# Patient Record
Sex: FEMALE | Race: BLACK OR AFRICAN AMERICAN | NOT HISPANIC OR LATINO | URBAN - METROPOLITAN AREA
[De-identification: names, ages, dates, MRNs, and addresses within clinical notes are randomized per-mention and may not be internally consistent; named-entity substitution may affect disease eponyms.]

---

## 2017-01-01 ENCOUNTER — TELEPHONE (OUTPATIENT)
Dept: FAMILY MEDICINE | Facility: CLINIC | Age: 72
End: 2017-01-01

## 2017-01-01 DIAGNOSIS — I73.9 PAD (PERIPHERAL ARTERY DISEASE) (H): ICD-10-CM

## 2017-01-01 DIAGNOSIS — E78.5 HYPERLIPIDEMIA LDL GOAL <100: ICD-10-CM

## 2017-01-01 DIAGNOSIS — J44.9 COPD (CHRONIC OBSTRUCTIVE PULMONARY DISEASE) (H): Primary | ICD-10-CM

## 2017-01-01 RX ORDER — ATORVASTATIN CALCIUM 40 MG/1
TABLET, FILM COATED ORAL
Qty: 90 TABLET | Refills: 1 | Status: ON HOLD | OUTPATIENT
Start: 2017-01-01 | End: 2018-01-01

## 2017-01-01 RX ORDER — CLOPIDOGREL BISULFATE 75 MG/1
TABLET ORAL
Qty: 90 TABLET | Refills: 1 | Status: ON HOLD | OUTPATIENT
Start: 2017-01-01 | End: 2018-01-01

## 2017-01-09 ENCOUNTER — NURSING HOME VISIT (OUTPATIENT)
Dept: GERIATRICS | Facility: CLINIC | Age: 72
End: 2017-01-09
Payer: MEDICARE

## 2017-01-09 VITALS
TEMPERATURE: 97.8 F | WEIGHT: 136 LBS | DIASTOLIC BLOOD PRESSURE: 74 MMHG | RESPIRATION RATE: 18 BRPM | HEIGHT: 55 IN | OXYGEN SATURATION: 99 % | BODY MASS INDEX: 31.47 KG/M2 | HEART RATE: 74 BPM | SYSTOLIC BLOOD PRESSURE: 138 MMHG

## 2017-01-09 DIAGNOSIS — E04.2 MULTINODULAR GOITER: ICD-10-CM

## 2017-01-09 DIAGNOSIS — Z72.0 TOBACCO ABUSE: ICD-10-CM

## 2017-01-09 DIAGNOSIS — I73.9 PAD (PERIPHERAL ARTERY DISEASE) (H): ICD-10-CM

## 2017-01-09 DIAGNOSIS — R74.8 ABNORMAL LIVER ENZYMES: ICD-10-CM

## 2017-01-09 DIAGNOSIS — I10 BENIGN ESSENTIAL HYPERTENSION: ICD-10-CM

## 2017-01-09 DIAGNOSIS — L97.429 ULCER OF HEEL, LEFT, WITH UNSPECIFIED SEVERITY (H): Primary | ICD-10-CM

## 2017-01-09 DIAGNOSIS — F10.10 ALCOHOL ABUSE: ICD-10-CM

## 2017-01-09 DIAGNOSIS — J44.9 CHRONIC OBSTRUCTIVE PULMONARY DISEASE, UNSPECIFIED COPD TYPE (H): ICD-10-CM

## 2017-01-09 PROCEDURE — 99207 ZZC CDG-CORRECTLY CODED, REVIEWED AND AGREE: CPT | Performed by: NURSE PRACTITIONER

## 2017-01-09 PROCEDURE — 99310 SBSQ NF CARE HIGH MDM 45: CPT | Performed by: NURSE PRACTITIONER

## 2017-01-09 RX ORDER — MULTIVITAMIN
1 TABLET ORAL DAILY
Status: ON HOLD | COMMUNITY
End: 2018-01-01

## 2017-01-09 RX ORDER — POLYETHYLENE GLYCOL 3350 17 G/17G
1 POWDER, FOR SOLUTION ORAL DAILY PRN
Qty: 7 PACKET | Status: ON HOLD
Start: 2017-01-09 | End: 2018-01-01

## 2017-01-09 RX ORDER — BISACODYL 5 MG/1
15 TABLET, DELAYED RELEASE ORAL DAILY PRN
Status: ON HOLD | COMMUNITY
End: 2018-01-01

## 2017-01-09 RX ORDER — SENNOSIDES 8.6 MG
2 TABLET ORAL 2 TIMES DAILY
Status: ON HOLD | COMMUNITY
End: 2018-01-01

## 2017-01-09 RX ORDER — ACETAMINOPHEN 500 MG
500 TABLET ORAL EVERY 6 HOURS
Status: ON HOLD
Start: 2017-01-09 | End: 2018-01-01

## 2017-01-09 RX ORDER — POLYETHYLENE GLYCOL 3350 17 G/17G
1 POWDER, FOR SOLUTION ORAL DAILY
COMMUNITY
End: 2017-01-09

## 2017-01-09 RX ORDER — ALBUTEROL SULFATE 90 UG/1
2 AEROSOL, METERED RESPIRATORY (INHALATION) EVERY 4 HOURS PRN
COMMUNITY
End: 2018-01-01

## 2017-01-09 NOTE — PROGRESS NOTES
"[place] [facility]  PRIMARY CARE PROVIDER AND CLINIC:  Asa Elliott [facility] [place] [id number] [facility] / [facility] [place]   Chief Complaint   Patient presents with     Hospital F/U     HPI:    Keira Collins is a 71 year old  (1945),admitted to the [facility] from [facility].  Hospital stay 12/28/16 through 1/6/17.  Admitted to this facility for  rehab, medical management and nursing care. Hospital course per review of discharge summary:    This is a 71-year-old female, with a past medical history significant for chronic obstructive pulmonary disease, severe multilevel peripheral arterial disease with critical limb ischemia and non-healing bilateral foot ulcerations s/p angiogram 9/23/16, peripheral neuropathy, hypertension, hyperlipidemia, tobacco abuse, alcohol dependence and multinodular goiter, who was admitted to [facility] with left foot pain and drainage. An MRI revealed \"soft tissue ulceration of the left heel extends to the posterolateral aspect of the inferior calcaneus. Mild reactive marrow edema and enhancement, but no definite cortical erosion to suggest osteomyelitis\". An abdominal and pelvic CT angiogram with LE run off revealed \"1. Occlusion of both external iliac arteries 2. Extensive atherosclerotic calcified and noncalcified plaque in the common femoral arteries bilaterally. 3. Occlusion of the proximal half of the right SFA. 4. Occlusion of a 6.5 cm segment of the mid left SFA......Extensive plaque in the distal left and right posterior tibial arteries 6. Moderate stenosis at the origin of the left renal artery\". Podiatry was consulted. An irrigation with debridement of the left heel and placement of a wound VAC was performed on 12/29/16. Vascular Surgery was consulted and a femoral artery endarterectomy, iliac artery recannulization with stent and right SFA recannulization with stent was performed on " "1/1/17. A TCU stay was recommended for ongoing physical rehabilitation.     Current issues are:        Questions when she will be returning home. Has some pain in the left foot and hip. Tingling in both feet for years. Has had sores on both feet for years. Lives with her son and daughter. Daughter is currently in the hospital. Moved to [place] \"2 or 3\" years ago from [place]. Is not quite certain who her primary care provider is. Uses a walker for mobility at home. Has been smoking since she was 2 years old after stealing her mother's cigarettes off the table. Has smoked her entire life, except 5 years, and has no intention of quitting. Drinks 2-3 cans of beer every other day with her daughter while watching television. Does not want to quit drinking even if it does impact her health.     CODE STATUS/ADVANCE DIRECTIVES DISCUSSION:   CPR/Full code   Patient's living condition: lives with family, son and daughter     ALLERGIES:Penicillins  PAST MEDICAL HISTORY:  has a past medical history of Essential hypertension with goal blood pressure less than 140/90; Hyperlipidemia LDL goal <100; H/O: alcohol abuse; Tobacco abuse; and Cholelithiasis.  PAST SURGICAL HISTORY:  has past surgical history that includes surgical history of -  (5/13/2016).  FAMILY HISTORY: family history is not on file.  SOCIAL HISTORY:  reports that she has been smoking Cigarettes.  She has a 25 pack-year smoking history. She has never used smokeless tobacco. She reports that she drinks alcohol. She reports that she does not use illicit drugs.    Post Discharge Medication Reconciliation Status: discharge medications reconciled and changed, per note/orders (see AVS).  Current Outpatient Prescriptions   Medication Sig Dispense Refill     albuterol (PROAIR HFA/PROVENTIL HFA/VENTOLIN HFA) 108 (90 BASE) MCG/ACT Inhaler Inhale 2 puffs into the lungs every 4 hours as needed for shortness of breath / dyspnea or wheezing       amoxicillin-clavulanate " "(AUGMENTIN) 875-125 MG per tablet Take 1 tablet by mouth 2 times daily       bisacodyl (DULCOLAX) 5 MG EC tablet Take 15 mg by mouth daily as needed for constipation       Atorvastatin Calcium (LIPITOR PO) Take 40 mg by mouth daily       calcium carb 1250 mg, 500 mg [place],/vitamin D 200 units (OSCAL WITH D) 500-200 MG-UNIT per tablet Take 1 tablet by mouth 3 times daily (with meals)       Clopidogrel Bisulfate (PLAVIX PO) Take 75 mg by mouth daily       Multiple vitamin TABS Take 1 tablet by mouth daily       sennosides (SENOKOT) 8.6 MG tablet Take 2 tablets by mouth 2 times daily as needed for constipation        acetaminophen (TYLENOL) 500 MG tablet Take 1 tablet (500 mg) by mouth every 6 hours       polyethylene glycol (MIRALAX/GLYCOLAX) Packet Take 17 g by mouth daily as needed for constipation 7 packet      ONDANSETRON PO Take 4 mg by mouth every 6 hours as needed for nausea       tiotropium (SPIRIVA HANDIHALER) 18 MCG inhalation capsule Inhale contents of one capsule daily. 90 capsule 1     fluticasone-salmeterol (ADVAIR-HFA) 45-21 MCG/ACT inhaler Inhale 2 puffs into the lungs 2 times daily 36 g 1     amLODIPine (NORVASC) 10 MG tablet Take 1 tablet (10 mg) by mouth daily 30 tablet 3     metoprolol (TOPROL-XL) 50 MG 24 hr tablet Take 1 tablet (50 mg) by mouth daily 30 tablet 3     aspirin 81 MG tablet Take 1 tablet (81 mg) by mouth daily 30 tablet      ROS:  4 point ROS including Respiratory, CV, GI and , other than that noted in the HPI,  is negative    Exam:  /74 mmHg  Pulse 74  Temp(Src) 97.8  F (36.6  C)  Resp 18  Ht 4' 6\" (1.372 m)  Wt 136 lb (61.689 kg)  BMI 32.77 kg/m2  SpO2 99%  GENERAL APPEARANCE:  Alert, in no distress  ENT:  Mouth and posterior oropharynx normal, moist mucous membranes  EYES:  EOM, conjunctivae, lids, pupils and irises normal  RESP:  respiratory effort and palpation of chest normal, lungs clear to auscultation , no respiratory distress  CV:  Palpation and auscultation " of heart done , regular rate and rhythm, no murmur, rub, or gallop  ABDOMEN:  normal bowel sounds, soft, nontender, no hepatosplenomegaly or other masses  M/S:   Active movement of bilateral upper and lower extremities. No edema.  SKIN:  Inspection of skin and subcutaneous tissue baseline, Palpation of skin and subcutaneous tissue baseline, Jomar intact on left groin. Dressing in place on left foot.   NEURO:   Cranial nerves 2-12 are normal tested and grossly at patient's baseline  PSYCH:  oriented to person and place    Lab/Diagnostic data:  Last Complete Blood Count:  WBC     11.0   12/28/2016  RBC     4.50   12/28/2016  HGB     13.5   12/28/2016  HCT     42.0   12/28/2016  MCV       93   12/28/2016  MCH       30   12/28/2016  MCHC       32   12/28/2016  RDW     13.0   12/28/2016  PLT      182   12/28/2016    Last Basic Metabolic Panel:  NA      140   9/15/2016   POTASSIUM      4.0   9/15/2016  ESTEFANI      9.9   9/15/2016  CO2       29   9/15/2016  BUN        9   9/15/2016  CR     0.59   9/15/2016  GLC       88   9/15/2016    ASSESSMENT/PLAN:  Left Heel Ulcer Cellulitis S/P I&D with Wound VAC placement on 12/29/16. Follow-up with Dr. Stewart within 2 weeks of discharge. Continue Amoxicillin-Clavulanate as ordered. Acetaminophen ordered for pain control. Wound vac removed today. Continue dressing changes as ordered. Weight bearing as tolerated with CAM boot. Physical and Occupational Therapy ordered for deconditioning.    Severe Peripheral Artery Disease with Critical Limb Ischemia S/P Femoral Artery Endarterectomy, Iliac Artery Recannulization with Stent and Right SFA Recannulization with Stent on 1/1/17. Follow-up with Dr. Hughes on 1/19/17 as scheduled. Clopidogrel initiated during hospital stay. Continue Aspirin as ordered. Physical and Occupational Therapy ordered for deconditioning.    Hypertension/Hyperlipidemia. Monitor blood pressure daily. Continue Amlodipine, Atorvastatin and Metoprolol as  ordered.    Multinodular Goiter. Noted. Last TSH 1.36 on 7/11/16.     Chronic Obstructive Pulmonary Disease. Continue Albuterol, Fluticasone-Salmeterol and Tiotropium as ordered.    Tobacco Abuse. Not interested in smoking cessation. Will discontinue Nicotine Patch. Encouraged to communicate with staff if interested in cessation.    Alcohol Abuse with Abnormal Liver Enzymes and Cholelithiasis. CIWA scale consistently low during hospitalization. Reviewed recent abnormal liver enzymes and recommendations to stop alcohol use. Patient not interested in alcohol cessation. Acetaminophen 2000 mg/24 hours scheduled for pain control.     Information reviewed:  Medications, vital signs, orders, nursing notes, problem list, hospital information. Total time spent with patient visit was 45 min including patient visit and review of past records. Greater than 50% of total time spent with counseling and coordinating care.    Electronically signed by:  LORI Salazar CNP

## 2017-01-11 ENCOUNTER — TRANSFERRED RECORDS (OUTPATIENT)
Dept: HEALTH INFORMATION MANAGEMENT | Facility: CLINIC | Age: 72
End: 2017-01-11

## 2017-01-11 LAB
ANION GAP SERPL CALCULATED.3IONS-SCNC: 9 MMOL/L (ref 0–15)
BUN SERPL-MCNC: 11 MG/DL (ref 7–24)
CALCIUM SERPL-MCNC: 8.7 MG/DL (ref 8.5–10.1)
CHLORIDE SERPLBLD-SCNC: 109 MMOL/L (ref 98–112)
CO2 SERPL-SCNC: 25 MMOL/L (ref 21–32)
CREAT SERPL-MCNC: 0.54 MG/DL (ref 0.55–1.02)
ERYTHROCYTE [DISTWIDTH] IN BLOOD BY AUTOMATED COUNT: 13.4 % (ref 11.5–14.5)
GFR SERPL CREATININE-BSD FRML MDRD: >60 ML/MIN/1.73M2
GLUCOSE SERPL-MCNC: 90 MG/DL (ref 74–106)
HCT VFR BLD AUTO: 29.9 % (ref 36–48)
HEMOGLOBIN: 9.5 G/DL (ref 12–18)
MCH RBC QN AUTO: 30 PG (ref 27–33)
MCHC RBC AUTO-ENTMCNC: 32 G/DL (ref 33–35)
MCV RBC AUTO: 94 FL (ref 80–100)
PLATELET # BLD AUTO: 297 K/UL (ref 150–400)
PMV BLD: 12.2 FL (ref 5.5–12)
POTASSIUM SERPL-SCNC: 4 MMOL/L (ref 3.5–5.1)
RBC # BLD AUTO: 3.18 M/UL (ref 4.2–5.4)
SODIUM SERPL-SCNC: 143 MMOL/L (ref 136–145)
WBC # BLD AUTO: 12.5 K/UL (ref 4.3–10.3)

## 2017-01-12 VITALS
BODY MASS INDEX: 32.77 KG/M2 | WEIGHT: 136 LBS | OXYGEN SATURATION: 100 % | RESPIRATION RATE: 18 BRPM | SYSTOLIC BLOOD PRESSURE: 116 MMHG | HEART RATE: 74 BPM | DIASTOLIC BLOOD PRESSURE: 65 MMHG | TEMPERATURE: 96.8 F

## 2017-01-12 RX ORDER — ZINC OXIDE 216 MG/ML
4 LOTION TOPICAL 2 TIMES DAILY
Status: ON HOLD | COMMUNITY
End: 2018-01-01

## 2017-01-13 ENCOUNTER — NURSING HOME VISIT (OUTPATIENT)
Dept: GERIATRICS | Facility: CLINIC | Age: 72
End: 2017-01-13
Payer: MEDICARE

## 2017-01-13 DIAGNOSIS — I10 BENIGN ESSENTIAL HYPERTENSION: ICD-10-CM

## 2017-01-13 DIAGNOSIS — L03.818 CELLULITIS OF OTHER SPECIFIED SITE: Primary | ICD-10-CM

## 2017-01-13 DIAGNOSIS — K59.01 SLOW TRANSIT CONSTIPATION: ICD-10-CM

## 2017-01-13 DIAGNOSIS — I73.9 PAD (PERIPHERAL ARTERY DISEASE) (H): ICD-10-CM

## 2017-01-13 DIAGNOSIS — J44.9 CHRONIC OBSTRUCTIVE PULMONARY DISEASE, UNSPECIFIED COPD TYPE (H): ICD-10-CM

## 2017-01-13 DIAGNOSIS — R53.81 PHYSICAL DECONDITIONING: ICD-10-CM

## 2017-01-13 PROCEDURE — 99306 1ST NF CARE HIGH MDM 50: CPT | Performed by: INTERNAL MEDICINE

## 2017-01-13 PROCEDURE — 99207 ZZC CDG-CORRECTLY CODED, REVIEWED AND AGREE: CPT | Performed by: INTERNAL MEDICINE

## 2017-01-13 NOTE — PROGRESS NOTES
"[place] [facility]  INITIAL VISIT NOTE  January 13, 2017    PRIMARY CARE PROVIDER AND CLINIC:  Asa Elliott [facility] [place] [address] [facility] / [facility] [place] [facility]    Chief Complaint   Patient presents with     Hospital F/U     Initial MD       HPI:    Keria Collins is a 71 year old  (1945) female who was seen at [facility] on January 13, 2017 for an initial visit. Medical history is notable for PAD, HTN, COPD, peripheral neuropathy and EtOH abuse. She was hospitalized at [facility] from 12/28/16 to 1/6/17 where she presented with left heel pain and drainage. Imaging not consistent with osteomyelitis s/p I&D with wound VAC placement (12/29/16). Imaging also with extensive calcification and occlusion of iliac and femoral arteries and she is s/p femoral artery endarterectomy, iliac artery recannulization with stent and right SFA recannulization with stent (1/1/17). She was admitted to this facility for medical management and rehab.     Today, Ms. Collins is seen in her room. She only wants to talk about when she can go home. States her daughter is 57 years old and has \"breathing problems\" and is being discharged from the hospital today. I suggested her daughter should have some time to get stronger to which she replied, \"Itake care of her and she takes care of me\". Talked with therapies and she has not yet met goals for discharge.     CODE STATUS:   CPR/Full code     ALLERGIES:     Allergies   Allergen Reactions     Penicillins        PAST MEDICAL HISTORY:   Past Medical History   Diagnosis Date     Essential hypertension with goal blood pressure less than 140/90      Hyperlipidemia LDL goal <100      H/O: alcohol abuse      Tobacco abuse      Cholelithiasis        PAST SURGICAL HISTORY:   Past Surgical History   Procedure Laterality Date     Surgical history of -   5/13/2016     right hip fracture       FAMILY HISTORY:   No family history on file.    SOCIAL HISTORY:   Lives with " family    MEDICATIONS:  Current Outpatient Prescriptions   Medication Sig Dispense Refill     NUTRITIONAL SUPPLEMENT LIQD Take 4 oz by mouth 2 times daily       albuterol (PROAIR HFA/PROVENTIL HFA/VENTOLIN HFA) 108 (90 BASE) MCG/ACT Inhaler Inhale 2 puffs into the lungs every 4 hours as needed for shortness of breath / dyspnea or wheezing       amoxicillin-clavulanate (AUGMENTIN) 875-125 MG per tablet Take 1 tablet by mouth 2 times daily       bisacodyl (DULCOLAX) 5 MG EC tablet Take 15 mg by mouth daily as needed for constipation       Atorvastatin Calcium (LIPITOR PO) Take 40 mg by mouth daily       calcium carb 1250 mg, 500 mg [place],/vitamin D 200 units (OSCAL WITH D) 500-200 MG-UNIT per tablet Take 1 tablet by mouth 3 times daily (with meals)       Clopidogrel Bisulfate (PLAVIX PO) Take 75 mg by mouth daily       Multiple vitamin TABS Take 1 tablet by mouth daily       sennosides (SENOKOT) 8.6 MG tablet Take 2 tablets by mouth 2 times daily        acetaminophen (TYLENOL) 500 MG tablet Take 1 tablet (500 mg) by mouth every 6 hours       polyethylene glycol (MIRALAX/GLYCOLAX) Packet Take 17 g by mouth daily as needed for constipation 7 packet      ONDANSETRON PO Take 4 mg by mouth every 6 hours as needed for nausea       tiotropium (SPIRIVA HANDIHALER) 18 MCG inhalation capsule Inhale contents of one capsule daily. 90 capsule 1     fluticasone-salmeterol (ADVAIR-HFA) 45-21 MCG/ACT inhaler Inhale 2 puffs into the lungs 2 times daily 36 g 1     amLODIPine (NORVASC) 10 MG tablet Take 1 tablet (10 mg) by mouth daily 30 tablet 3     metoprolol (TOPROL-XL) 50 MG 24 hr tablet Take 1 tablet (50 mg) by mouth daily 30 tablet 3     aspirin 81 MG tablet Take 1 tablet (81 mg) by mouth daily 30 tablet        Post Discharge Medication Reconciliation Status: medication reconcilation previously completed during another office visit.    ROS:  10 point ROS neg other than the symptoms noted above in the HPI    PHYSICAL EXAM:  BP  116/65 mmHg  Pulse 74  Temp(Src) 96.8  F (36  C)  Resp 18  Wt 136 lb (61.689 kg)  SpO2 100%  Gen: sitting in wheelchair, alert, cooperative and in no acute distress  HEENT: normocephalic; oropharynx clear  Card: RRR, S1, S2, no murmurs  Resp: lungs clear to auscultation bilaterally  GI: abdomen soft, not-tender  MSK: normal muscle tone, no RLE edema; LLE is short CAM boot  Neuro: CX II-XII grossly in tact; ROM in all four extremities grossly in tact  Psych: alert and oriented x3; normal affect    LABORATORY/IMAGING DATA:  Reviewed as per Epic    ASSESSMENT/PLAN:    Left Heel Cellulitis s/p I&D with Wound VAC (12/29/16)  MRI not convincing for osteomyelitis.   -- continues on Augmentin 875-125 mg BID x14 days post hospital discharge   -- follow up with podiatry as scheduled on 1/26/17    PAD s/p Femoral Artery Endarterectomy/Iliac Artery Recannulization/R SFA Recannulization (1/1/17)  New on clopidogrel.   -- continues on ASA 81 mg daily, atorvastatin 40 mg daily, clopidogrel 75 mg daily   -- follow up with vascular surgery as scheduled on 1/19/17    HTN  SBPs 130s  -- continues on amlodipine 10 mg daily and metoprolol XL 50 mg daily  -- follow BPs and adjust medications as needed    COPD  No signs of exacerbation.   -- continues on Advair 45-21 mcg BID, tiotropium 18 mcg daily and PRN albuterol     Slow Transit Constipation  -- continues on Senna 2 tabs BID plus PRN meds  -- adjust bowel regimen as needed    Physical Deconditioning  In setting of hospitalization and underlying medical conditions  -- ongoing PT/OT        Electronically signed by:  Vivien Wilson MD

## 2017-01-16 ENCOUNTER — TRANSFERRED RECORDS (OUTPATIENT)
Dept: HEALTH INFORMATION MANAGEMENT | Facility: CLINIC | Age: 72
End: 2017-01-16

## 2017-01-16 ENCOUNTER — NURSING HOME VISIT (OUTPATIENT)
Dept: GERIATRICS | Facility: CLINIC | Age: 72
End: 2017-01-16
Payer: MEDICARE

## 2017-01-16 DIAGNOSIS — I10 BENIGN ESSENTIAL HYPERTENSION: ICD-10-CM

## 2017-01-16 DIAGNOSIS — D72.829 LEUKOCYTOSIS, UNSPECIFIED TYPE: Primary | ICD-10-CM

## 2017-01-16 DIAGNOSIS — L97.429 HEEL ULCER, LEFT, WITH UNSPECIFIED SEVERITY (H): ICD-10-CM

## 2017-01-16 LAB
DIFFERENTIAL: ABNORMAL
ERYTHROCYTE [DISTWIDTH] IN BLOOD BY AUTOMATED COUNT: 13 % (ref 11.5–14.5)
HCT VFR BLD AUTO: 34.1 % (ref 36–48)
HEMOGLOBIN: 10.6 G/DL (ref 12–16)
MCH RBC QN AUTO: 29 PG (ref 27–33)
MCHC RBC AUTO-ENTMCNC: 31 G/DL (ref 33–36)
MCV RBC AUTO: 94 FL (ref 80–100)
PLATELET # BLD AUTO: 382 K/UL (ref 150–400)
PMV BLD: 11.7 FL (ref 8.5–12)
RBC # BLD AUTO: 3.64 M/UL (ref 4.2–5.4)
WBC # BLD AUTO: 12.9 K/UL (ref 4.3–10.8)

## 2017-01-16 PROCEDURE — 99309 SBSQ NF CARE MODERATE MDM 30: CPT | Performed by: NURSE PRACTITIONER

## 2017-01-16 PROCEDURE — 99207 ZZC CDG-CORRECTLY CODED, REVIEWED AND AGREE: CPT | Performed by: NURSE PRACTITIONER

## 2017-01-17 VITALS
WEIGHT: 125.3 LBS | SYSTOLIC BLOOD PRESSURE: 125 MMHG | RESPIRATION RATE: 18 BRPM | DIASTOLIC BLOOD PRESSURE: 74 MMHG | OXYGEN SATURATION: 99 % | HEART RATE: 78 BPM | BODY MASS INDEX: 30.19 KG/M2 | TEMPERATURE: 97.8 F

## 2017-01-17 NOTE — PROGRESS NOTES
[place] [facility]    Chief Complaint   Patient presents with     Nursing Home Acute     HPI:    Keira Collins is a 71 year old  (1945), who is being seen today for an episodic care visit at [facility]. Today's concern is:    Leukocytosis. WBC 12.9 on 1/16/17. Previous WBC 12.5. Is taking Augmentin for left heel cellulitis. Is incontinent of bowel and bladder, but reports no new issues with urination or diarrhea. Denies shortness of breath or chills.    Left Heel Ulcer Cellulitis S/P I&D with Wound VAC placement on 12/29/16. Performed dressing change with staff today. Patient wants to return home with her daughter. Therapies have no discharge date set.     Hypertension. Upon review of blood pressure over the past week, systolic range from 116-134. Diastolic range from 65-74.    ALLERGIES: Penicillins  Past Medical, Surgical, Family and Social History reviewed and updated in [facility].    Current Outpatient Prescriptions   Medication Sig Dispense Refill     NUTRITIONAL SUPPLEMENT LIQD Take 4 oz by mouth 2 times daily       albuterol (PROAIR HFA/PROVENTIL HFA/VENTOLIN HFA) 108 (90 BASE) MCG/ACT Inhaler Inhale 2 puffs into the lungs every 4 hours as needed for shortness of breath / dyspnea or wheezing       amoxicillin-clavulanate (AUGMENTIN) 875-125 MG per tablet Take 1 tablet by mouth 2 times daily       bisacodyl (DULCOLAX) 5 MG EC tablet Take 15 mg by mouth daily as needed for constipation       Atorvastatin Calcium (LIPITOR PO) Take 40 mg by mouth daily       calcium carb 1250 mg, 500 mg [place],/vitamin D 200 units (OSCAL WITH D) 500-200 MG-UNIT per tablet Take 1 tablet by mouth 3 times daily (with meals)       Clopidogrel Bisulfate (PLAVIX PO) Take 75 mg by mouth daily       Multiple vitamin TABS Take 1 tablet by mouth daily       sennosides (SENOKOT) 8.6 MG tablet Take 2 tablets by mouth 2 times daily        acetaminophen (TYLENOL) 500 MG tablet Take 1 tablet (500 mg) by mouth every 6  hours       polyethylene glycol (MIRALAX/GLYCOLAX) Packet Take 17 g by mouth daily as needed for constipation 7 packet      ONDANSETRON PO Take 4 mg by mouth every 6 hours as needed for nausea       tiotropium (SPIRIVA HANDIHALER) 18 MCG inhalation capsule Inhale contents of one capsule daily. 90 capsule 1     fluticasone-salmeterol (ADVAIR-HFA) 45-21 MCG/ACT inhaler Inhale 2 puffs into the lungs 2 times daily 36 g 1     amLODIPine (NORVASC) 10 MG tablet Take 1 tablet (10 mg) by mouth daily 30 tablet 3     metoprolol (TOPROL-XL) 50 MG 24 hr tablet Take 1 tablet (50 mg) by mouth daily 30 tablet 3     aspirin 81 MG tablet Take 1 tablet (81 mg) by mouth daily 30 tablet      Medications reviewed:  Medications reconciled to facility chart and changes were made to reflect current medications as identified as above med list. Below are the changes that were made:   Medications stopped since last EPIC medication reconciliation:   There are no discontinued medications.    Medications started since last [facility] medication reconciliation:  No orders of the defined types were placed in this encounter.       REVIEW OF SYSTEMS:  4 point ROS including Respiratory, CV, GI and , other than that noted in the HPI,  is negative    Physical Exam:  /74 mmHg  Pulse 78  Temp(Src) 97.8  F (36.6  C)  Resp 18  Wt 125 lb 4.8 oz (56.836 kg)  SpO2 99%  GENERAL APPEARANCE:  Alert, in no distress  ENT:  Mouth and posterior oropharynx normal, moist mucous membranes  EYES:  EOM, conjunctivae, lids, pupils and irises normal  RESP:  respiratory effort and palpation of chest normal, lungs clear to auscultation , no respiratory distress  CV:  Palpation and auscultation of heart done , regular rate and rhythm, no murmur, rub, or gallop  ABDOMEN:  normal bowel sounds, soft, nontender, no hepatosplenomegaly or other masses  M/S:   Active movement of bilateral upper and lower extremities. No edema.  SKIN:  Left heel ulcer covered with slough.  Macerated edges. Scant amount of drainage.  NEURO:   Cranial nerves 2-12 are normal tested and grossly at patient's baseline  PSYCH:  affect and mood normal    Recent Labs:      Last Basic Metabolic Panel:  NA      143   1/11/2017   POTASSIUM      4.0   1/11/2017  CHLORIDE      109   1/11/2017  ESTEFANI      8.7   1/11/2017  CO2       25   1/11/2017  BUN       11   1/11/2017  CR     0.54   1/11/2017  GLC       90   1/11/2017    WBC     12.9   1/16/2017  RBC     3.64   1/16/2017  HGB     10.6   1/16/2017  HCT     34.1   1/16/2017  MCV       94   1/16/2017  MCH       29   1/16/2017  MCHC       31   1/16/2017  RDW     13.0   1/16/2017  PLT      382   1/16/2017    Assessment/Plan:  Leukocytosis. Continues on Augmentin for left heel cellulitis. No other obvious source of infection. VSS. WBC previously elevated on 7/11/16 at 12.2 with no obvious source of infection. Likely reactive. Continue to monitor clinically.     Left Heel Ulcer Cellulitis S/P I&D with Wound VAC placement on 12/29/16. Follow-up with Dr. Stewart as scheduled. Continue Amoxicillin-Clavulanate as ordered. Acetaminophen ordered for pain control. Continue dressing changes as ordered. Physical and Occupational Therapy ordered for deconditioning.    Hypertension. Blood pressures <140/90. Continue Amlodipine and Metoprolol as ordered.    Electronically signed by  LORI Salazar CNP

## 2017-01-30 ENCOUNTER — NURSING HOME VISIT (OUTPATIENT)
Dept: GERIATRICS | Facility: CLINIC | Age: 72
End: 2017-01-30
Payer: MEDICARE

## 2017-01-30 VITALS
OXYGEN SATURATION: 99 % | DIASTOLIC BLOOD PRESSURE: 75 MMHG | HEART RATE: 89 BPM | SYSTOLIC BLOOD PRESSURE: 134 MMHG | BODY MASS INDEX: 30.07 KG/M2 | TEMPERATURE: 98.2 F | RESPIRATION RATE: 18 BRPM | WEIGHT: 124.8 LBS

## 2017-01-30 DIAGNOSIS — Z51.89 ENCOUNTER FOR WOUND RE-CHECK: Primary | ICD-10-CM

## 2017-01-30 PROCEDURE — 99207 ZZC CDG-CORRECTLY CODED, REVIEWED AND AGREE: CPT | Performed by: NURSE PRACTITIONER

## 2017-01-30 PROCEDURE — 99308 SBSQ NF CARE LOW MDM 20: CPT | Performed by: NURSE PRACTITIONER

## 2017-01-30 NOTE — PROGRESS NOTES
[place] [facility]    Chief Complaint   Patient presents with     Nursing Home Acute     HPI:    Keira Collins is a 71 year old  (1945), who is being seen today for an episodic care visit at [facility]. Today's concern is:    Left Heel Ulcer Cellulitis S/P I&D with Wound VAC placement on 12/29/16 and Severe Peripheral Artery Disease with Critical Limb Ischemia S/P Femoral Artery Endarterectomy, Iliac Artery Recannulization with Stent and Right SFA Recannulization with Stent on 1/1/17. Requested by staff to evaluate left groin incision. Staples removed 1/19/17 by Dr. Hughes at appointment.     REVIEW OF SYSTEMS:  4 point ROS including Respiratory, CV, GI and , other than that noted in the HPI,  is negative    /75 mmHg  Pulse 89  Temp(Src) 98.2  F (36.8  C)  Resp 18  Wt 124 lb 12.8 oz (56.609 kg)  SpO2 99%  GENERAL APPEARANCE:  Alert, in no distress  LEFT GROIN: Dehiscence at bottom of incision. Nearly 100% slough with small amount of drainage. Covered by steri-strips. Mild erythema surrounding wound edges.  LEFT HEEL: Covered with nearly 100% slough. Macerated. No foul smells    ASSESSMENT/PLAN:  Wound Recheck S/P Left Heel Ulcer Cellulitis S/P I&D with Wound VAC placement on 12/29/16 and Severe Peripheral Artery Disease with Critical Limb Ischemia S/P Femoral Artery Endarterectomy, Iliac Artery Recannulization with Stent and Right SFA Recannulization with Stent on 1/1/17. No obvious signs of infection. Due to amount of slough on both wounds, will change dressing to Silvasorb gel daily to help debride wounds. Steri-strips removed from bottom of groin incision. Follow-up with Dr. Garg on 2/2/17 as scheduled (family rescheduled previous appointment). Per Dr. Hughes's recommendations, continue Plavix and Aspirin x 90 days from 1/19/17 then continue just Aspirin.     LORI Salazar CNP

## 2017-02-02 ENCOUNTER — DISCHARGE SUMMARY NURSING HOME (OUTPATIENT)
Dept: GERIATRICS | Facility: CLINIC | Age: 72
End: 2017-02-02
Payer: MEDICARE

## 2017-02-02 VITALS
TEMPERATURE: 97.9 F | DIASTOLIC BLOOD PRESSURE: 75 MMHG | WEIGHT: 124.8 LBS | HEART RATE: 88 BPM | SYSTOLIC BLOOD PRESSURE: 129 MMHG | RESPIRATION RATE: 18 BRPM | OXYGEN SATURATION: 99 % | BODY MASS INDEX: 30.07 KG/M2

## 2017-02-02 DIAGNOSIS — R74.8 ABNORMAL LIVER ENZYMES: ICD-10-CM

## 2017-02-02 DIAGNOSIS — J44.9 CHRONIC OBSTRUCTIVE PULMONARY DISEASE, UNSPECIFIED COPD TYPE (H): ICD-10-CM

## 2017-02-02 DIAGNOSIS — F10.10 ALCOHOL ABUSE: ICD-10-CM

## 2017-02-02 DIAGNOSIS — E04.2 MULTINODULAR GOITER: ICD-10-CM

## 2017-02-02 DIAGNOSIS — I73.9 PVD (PERIPHERAL VASCULAR DISEASE) (H): Chronic | ICD-10-CM

## 2017-02-02 DIAGNOSIS — I10 BENIGN ESSENTIAL HYPERTENSION: ICD-10-CM

## 2017-02-02 DIAGNOSIS — D72.829 LEUKOCYTOSIS, UNSPECIFIED TYPE: ICD-10-CM

## 2017-02-02 DIAGNOSIS — Z72.0 TOBACCO ABUSE: ICD-10-CM

## 2017-02-02 DIAGNOSIS — L97.429 ULCER OF HEEL, LEFT, WITH UNSPECIFIED SEVERITY (H): Primary | ICD-10-CM

## 2017-02-02 PROCEDURE — 99316 NF DSCHRG MGMT 30 MIN+: CPT | Performed by: NURSE PRACTITIONER

## 2017-02-02 PROCEDURE — 99207 ZZC CDG-CORRECTLY CODED, REVIEWED AND AGREE: CPT | Performed by: NURSE PRACTITIONER

## 2017-02-02 NOTE — PROGRESS NOTES
"[facility] DISCHARGE SUMMARY    PATIENT'S NAME: Keira Collins  YOB: 1945  MEDICAL RECORD NUMBER:  [id number]    PRIMARY CARE PROVIDER AND CLINIC RESPONSIBLE AFTER TRANSFER: [facility] [address] [facility] [place] [facility]    CODE STATUS/ADVANCE DIRECTIVES DISCUSSION:   CPR/Full code      Allergies   Allergen Reactions     Penicillins      TRANSFERRING PROVIDERS: LORI Salazar CNP, Vivien Wilson MD  DATE OF SNF ADMISSION:  January / 06 / 2017  DATE OF SNF (anticipated) DISCHARGE: February / 04 / 2017  DISCHARGE DISPOSITION: FMG Provider   Nursing Facility: [facility] stay 12/28/016 to 1/6/2017.     Condition on Discharge:  Stable.  Cognitive Scores: BIMS 14/15, SLUMS 14/30 and CPT 4.6/5.6    Equipment: walker    DISCHARGE DIAGNOSIS:   1. Ulcer of heel, left, with unspecified severity (H)    2. PVD (peripheral vascular disease) (H)    3. Multinodular goiter    4. Benign essential hypertension    5. Chronic obstructive pulmonary disease, unspecified COPD type (H)    6. Tobacco abuse    7. Alcohol abuse    8. Abnormal liver enzymes      HPI Nursing Facility Course:  This is a 71-year-old female, with a past medical history significant for chronic obstructive pulmonary disease, severe multilevel peripheral arterial disease with critical limb ischemia and non-healing bilateral foot ulcerations s/p angiogram 9/23/16, peripheral neuropathy, hypertension, hyperlipidemia, tobacco abuse, alcohol dependence and multinodular goiter, who was admitted to [facility] with left foot pain and drainage. An MRI revealed \"soft tissue ulceration of the left heel extends to the posterolateral aspect of the inferior calcaneus. Mild reactive marrow edema and enhancement, but no definite cortical erosion to suggest osteomyelitis\". An abdominal and pelvic CT angiogram with LE run off " "revealed \"1. Occlusion of both external iliac arteries 2. Extensive atherosclerotic calcified and noncalcified plaque in the common femoral arteries bilaterally. 3. Occlusion of the proximal half of the right SFA. 4. Occlusion of a 6.5 cm segment of the mid left SFA......Extensive plaque in the distal left and right posterior tibial arteries 6. Moderate stenosis at the origin of the left renal artery\". Podiatry was consulted. An irrigation with debridement of the left heel and placement of a wound VAC was performed on 12/29/16. Vascular Surgery was consulted and a femoral artery endarterectomy, iliac artery recannulization with stent and right SFA recannulization with stent was performed on 1/1/17. A TCU stay was recommended for ongoing physical rehabilitation.     During her TCU stay, received Physical and Occupational Therapy. Able to dress self and use toilet with modified independence. Requires assistance with pad management due to incontinence. Able to ambulate with modified independence and FWW with left CAM boot.     Left Heel Ulcer Cellulitis S/P I&D with Wound VAC placement on 12/29/16. Follow-up with Dr. Stewart on 2/16/17 at 1100 as scheduled. Family did not transport patient to scheduled podiatry appointments 2 times during TCU stay.  Completed course of Amoxicillin-Clavulanate. Acetaminophen ordered for pain control. Continue dressing changes as ordered. Weight bearing as tolerated with CAM boot. Home Health Nurse ordered to assist with dressing changes. Son to be instructed on how to perform dressing changes prior to discharge.    Severe Peripheral Artery Disease with Critical Limb Ischemia S/P Femoral Artery Endarterectomy, Iliac Artery Recannulization with Stent and Right SFA Recannulization with Stent on 1/1/17. Seen by Dr. Hughes on 1/19/17. Will continue Clopidogrel x 90 days until 4/20/17. Continue Aspirin indefinitely. Follow-up ultrasound recommended around 2/19/17. Dressing change ordered for " groin incision.     Leukocytosis. While on Augmentin for left heel cellulitis, WBC 12.9 on 1/16/17. No other obvious source of infection. VSS. WBC previously elevated on 7/11/16 at 12.2 with no obvious source of infection. Likely reactive. Follow-up outpatient.    Hypertension/Hyperlipidemia. Upon review of blood pressure over the past week, systolic range from 128-146. Diastolic range from 75-86. Continue Amlodipine, Atorvastatin and Metoprolol as ordered.    Multinodular Goiter. Noted. Last TSH 1.36 on 7/11/16.     Chronic Obstructive Pulmonary Disease. Continue Albuterol, Fluticasone-Salmeterol and Tiotropium as ordered.    Tobacco Abuse. Not interested in smoking cessation.     Alcohol Abuse with Abnormal Liver Enzymes and Cholelithiasis. CIWA scale consistently low during hospitalization. Reviewed recent abnormal liver enzymes and recommendations to stop alcohol use. Patient not interested in alcohol cessation. Acetaminophen 2000 mg/24 hours scheduled for pain control.     PAST MEDICAL HISTORY:  has a past medical history of Essential hypertension with goal blood pressure less than 140/90; Hyperlipidemia LDL goal <100; H/O: alcohol abuse; Tobacco abuse; and Cholelithiasis.    DISCHARGE MEDICATIONS:  Current Outpatient Prescriptions   Medication Sig Dispense Refill     NUTRITIONAL SUPPLEMENT LIQD Take 4 oz by mouth 2 times daily       albuterol (PROAIR HFA/PROVENTIL HFA/VENTOLIN HFA) 108 (90 BASE) MCG/ACT Inhaler Inhale 2 puffs into the lungs every 4 hours as needed for shortness of breath / dyspnea or wheezing       bisacodyl (DULCOLAX) 5 MG EC tablet Take 15 mg by mouth daily as needed for constipation       Atorvastatin Calcium (LIPITOR PO) Take 40 mg by mouth daily       calcium carb 1250 mg, 500 mg [place],/vitamin D 200 units (OSCAL WITH D) 500-200 MG-UNIT per tablet Take 1 tablet by mouth 3 times daily (with meals)       Clopidogrel Bisulfate (PLAVIX PO) Take 75 mg by mouth daily       Multiple vitamin TABS  Take 1 tablet by mouth daily       sennosides (SENOKOT) 8.6 MG tablet Take 2 tablets by mouth 2 times daily        acetaminophen (TYLENOL) 500 MG tablet Take 1 tablet (500 mg) by mouth every 6 hours       polyethylene glycol (MIRALAX/GLYCOLAX) Packet Take 17 g by mouth daily as needed for constipation 7 packet      ONDANSETRON PO Take 4 mg by mouth every 6 hours as needed for nausea       tiotropium (SPIRIVA HANDIHALER) 18 MCG inhalation capsule Inhale contents of one capsule daily. 90 capsule 1     fluticasone-salmeterol (ADVAIR-HFA) 45-21 MCG/ACT inhaler Inhale 2 puffs into the lungs 2 times daily 36 g 1     amLODIPine (NORVASC) 10 MG tablet Take 1 tablet (10 mg) by mouth daily 30 tablet 3     metoprolol (TOPROL-XL) 50 MG 24 hr tablet Take 1 tablet (50 mg) by mouth daily 30 tablet 3     aspirin 81 MG tablet Take 1 tablet (81 mg) by mouth daily 30 tablet      MEDICATION CHANGES/RATIONALE: See above    Controlled medications sent with patient: not applicable/none     ROS:    4 point ROS including Respiratory, CV, GI and , other than that noted in the HPI,  is negative    Physical Exam:   Vitals: /75 mmHg  Pulse 88  Temp(Src) 97.9  F (36.6  C)  Resp 18  Wt 124 lb 12.8 oz (56.609 kg)  SpO2 99%  BMI= Body mass index is 30.07 kg/(m^2).    GENERAL APPEARANCE:  Alert, in no distress  ENT:  Mouth and posterior oropharynx normal, moist mucous membranes  EYES:  EOM, conjunctivae, lids, pupils and irises normal  RESP:  no respiratory distress  M/S:   Active movement of bilateral upper and lower extremities. No edema.  SKIN:  Inspection of skin and subcutaneous tissue baseline, Palpation of skin and subcutaneous tissue baseline, Dressing in place on left heel  NEURO:   Cranial nerves 2-12 are normal tested and grossly at patient's baseline  PSYCH:  affect and mood normal    DISCHARGE PLAN:  Registered Nurse and From:  [facility]  Patient instructed to follow-up with:  PCP in 7 days      Current [place]  scheduled appointments: None    Pending labs: None  SNF labs     Last Basic Metabolic Panel:  NA      143   2017   POTASSIUM      4.0   2017  CHLORIDE      109   2017  ESTEFANI      8.7   2017  CO2       25   2017  BUN       11   2017  CR     0.54   2017  GLC       90   2017    WBC     12.9   2017  RBC     3.64   2017  HGB     10.6   2017  HCT     34.1   2017  MCV       94   2017  MCH       29   2017  MCHC       31   2017  RDW     13.0   2017  PLT      382   2017    Discharge Treatments: Cleanse left heel wound with wound cleanser. Apply Silvasorb gel. Cover with non-adhesive Optifoam and wrap with Kerlix. Clean left groin incision with wound cleanser. Apply Silvasorb gel. Cover with dressing.    TOTAL DISCHARGE TIME:   Greater than 30 minutes  Electronically signed by:  LORI Salazar CNP       Face to Face and Medical Necessity Statement for DME Provider visit    Demographic Information on Keira Collins:  Gender: female  : 1945  [facility] [address] [place] [address]  [phone] (home)     Medical Record: [id number]  Social Security Number: [id number]  Primary Care Provider: Asa Elliott  Insurance: Payor: MEDICARE / Plan: MEDICARE / Product Type: Medicare /     HPI:   Keira Collins is a 71 year old  (1945), who is being seen today for a face to face provider visit at [facility]; medical necessity statement for DME included. This patient requires the following:  DME ordered:  Walker:  4 wheeled seated    Medical Necessity Statement   Pt needing above DME with expected length of need of 99  months  due to medical nessisity associated with following diagnosis:     The patient has gait instability due to a left heel ulcer requiring a 4WW. Length of need is 99 months.    PMH   has a past medical history of Essential hypertension with goal blood pressure less than 140/90;  Hyperlipidemia LDL goal <100; H/O: alcohol abuse; Tobacco abuse; and Cholelithiasis.  CURRENT MEDICATIONS  Current Outpatient Prescriptions   Medication Sig Dispense Refill     NUTRITIONAL SUPPLEMENT LIQD Take 4 oz by mouth 2 times daily       albuterol (PROAIR HFA/PROVENTIL HFA/VENTOLIN HFA) 108 (90 BASE) MCG/ACT Inhaler Inhale 2 puffs into the lungs every 4 hours as needed for shortness of breath / dyspnea or wheezing       bisacodyl (DULCOLAX) 5 MG EC tablet Take 15 mg by mouth daily as needed for constipation       Atorvastatin Calcium (LIPITOR PO) Take 40 mg by mouth daily       calcium carb 1250 mg, 500 mg [place],/vitamin D 200 units (OSCAL WITH D) 500-200 MG-UNIT per tablet Take 1 tablet by mouth 3 times daily (with meals)       Clopidogrel Bisulfate (PLAVIX PO) Take 75 mg by mouth daily       Multiple vitamin TABS Take 1 tablet by mouth daily       sennosides (SENOKOT) 8.6 MG tablet Take 2 tablets by mouth 2 times daily        acetaminophen (TYLENOL) 500 MG tablet Take 1 tablet (500 mg) by mouth every 6 hours       polyethylene glycol (MIRALAX/GLYCOLAX) Packet Take 17 g by mouth daily as needed for constipation 7 packet      ONDANSETRON PO Take 4 mg by mouth every 6 hours as needed for nausea       tiotropium (SPIRIVA HANDIHALER) 18 MCG inhalation capsule Inhale contents of one capsule daily. 90 capsule 1     fluticasone-salmeterol (ADVAIR-HFA) 45-21 MCG/ACT inhaler Inhale 2 puffs into the lungs 2 times daily 36 g 1     amLODIPine (NORVASC) 10 MG tablet Take 1 tablet (10 mg) by mouth daily 30 tablet 3     metoprolol (TOPROL-XL) 50 MG 24 hr tablet Take 1 tablet (50 mg) by mouth daily 30 tablet 3     aspirin 81 MG tablet Take 1 tablet (81 mg) by mouth daily 30 tablet      ROS:4 point ROS including Respiratory, CV, GI and , other than that noted in the HPI,  is negative     EXAM  Vitals: /75 mmHg  Pulse 88  Temp(Src) 97.9  F (36.6  C)  Resp 18  Wt 124 lb 12.8 oz (56.609 kg)  SpO2 99%;BMI= Body mass  index is 30.07 kg/(m^2).  Constitutional: healthy, alert and no distress   Respiratory: negative  JOINT/EXTREMITIES: extremities normal- no gross deformities noted     ASSESSMENT/PLAN:  1. Ulcer of heel, left, with unspecified severity (H)    2. PVD (peripheral vascular disease) (H)    3. Multinodular goiter    4. Benign essential hypertension    5. Chronic obstructive pulmonary disease, unspecified COPD type (H)    6. Tobacco abuse    7. Alcohol abuse    8. Abnormal liver enzymes    9. Leukocytosis, unspecified type      Orders:  1. Facility staff/TC to contact DME company to get their order form for provider to fill out    ELECTRONICALLY SIGNED BY [employer] CERTIFIED PROVIDER:  LORI Salazar CNP   NPI: [id number]  [facility]  [facility], [address]  [place] [address]

## 2017-02-06 ENCOUNTER — TELEPHONE (OUTPATIENT)
Dept: FAMILY MEDICINE | Facility: CLINIC | Age: 72
End: 2017-02-06

## 2017-02-06 ENCOUNTER — CARE COORDINATION (OUTPATIENT)
Dept: CARE COORDINATION | Facility: CLINIC | Age: 72
End: 2017-02-06

## 2017-02-06 NOTE — Clinical Note
Health Care Home - Access Care Plan    About Me  Patient Name:  Keira García    YOB: 1945  Age:                            71 year old   [place] MRN:         [id number] Telephone Information:     Home Phone [phone]   Mobile [phone]       Address:    [address] [place] [address] Email address:  No e-mail address on record      Emergency Contact(s)  Name Relationship Lgl Grd Work Phone Home Phone Mobile Phone   1. KAREL GARCÍA* Son   [phone]    2. CHANDRAKANT PAULA* Relative   [phone]         Health Maintenance:    My Access Plan  Medical Emergency 911   Questions or concerns during clinic hours Primary Clinic Line, I will call the clinic directly: Primary Clinic: [facility] [phone]   24 Hour Appointment Line [phone] or  [phone])  (toll free)   24 Hour Nurse Line [phone] (toll free)   Questions or concerns outside clinic hours 24 Hour Appointment Line, I will call the after-hours on-call line:   [facility] [phone] or [phone] ([phone]) (toll-free)   Preferred Urgent Care     Preferred Hospital [facility]  [phone]   [facility]acy [id number] - [place], [place] - [address])     Behavioral Health Crisis Line [facility], [phone] or [phone]

## 2017-02-06 NOTE — PROGRESS NOTES
Clinic Care Coordination Contact  [facility]/Voicemail    Referral Source: IP/TCU Report  Clinical Data: Care Coordinator Outreach  Notified by clinic of DC from TCU ([facility]). Had been there 1/6-2/4. Prior to that was hospitalized at [facility] 12/28-1/6 with bilateral foot wounds. HC ( Home Care) ordered on DC from TCU.  Outreach attempted x 1.  Left message on voicemail with call back information and requested return call.  Plan: Care Coordinator will try to reach patient again in 1-2 business days.  Lenin ROGERSN,RN- BC  Clinic Care Coordinator  [facility], [facility]  Phone: [phone]

## 2017-02-06 NOTE — TELEPHONE ENCOUNTER
[facility] called and states that they can not get in contact with this patient and needs a delay of start of care.

## 2017-02-06 NOTE — TELEPHONE ENCOUNTER
Care Coordination to call for hospital follow up:    Reason for follow up: Keira Collins appeared on our list for being seen in an Emergency Room or a recent Hospital discharge.    Admitting date: 1/6/2017  Discharge date: 2/4/17  Location: [facility]  Reason for visit: Chief Complaint: Ulcer Of Heel, Left, With Unspecified Severity    Ellen Corbin RN, BSN

## 2017-02-06 NOTE — Clinical Note
[facility] [place]   [address] [facility]    [place] [address]  February 7, 2017      Keira Collins  [address] [place] [address]    Dear Keira,  I am a Clinic Care Coordinator who works with your primary care provider's clinic. I have been trying to reach you recently to introduce Clinic Care Coordination and to see if there was anything I could assist you with.  Below is a description of what Clinic Care Coordination is and how I can further assist you.     The Clinic Care Coordinator role is a Registered Nurse and/or  who understands the health care system. The goal of Clinic Care Coordination is to help you manage your health and improve access to the [facility] in the most efficient manner.  The Registered Nurse can assist you in meeting your health care goals by providing education, coordinating services, and strengthening the communication among your providers. The  can assist you with financial, behavioral, psychosocial, and chemical dependency and counseling/psychiatric resources.    Please feel free to keep this letter and contact information to contact me at [phone] with any further questions or concerns that may arise. We at [facility] are focused on providing you with the highest-quality healthcare experience possible and that all starts with you.       Sincerely,     Lenin LOCKHART,RN- BC  Clinic Care Coordinator  [facility]  Phone: [phone]      Enclosed: I have enclosed a copy of a 24 Hour Access Plan. This has helpful phone numbers for you to call when needed. Please keep this in an easy to access place to use as needed.

## 2017-02-07 ENCOUNTER — TELEPHONE (OUTPATIENT)
Dept: FAMILY MEDICINE | Facility: CLINIC | Age: 72
End: 2017-02-07

## 2017-02-07 NOTE — TELEPHONE ENCOUNTER
Lety from  Home Care calling. They are unable to reach the pt. They need a verbal order for a delay of service. Please call her at [phone].  Thank you,  Monica Gotti- Pt Rep.

## 2017-02-07 NOTE — PROGRESS NOTES
Clinic Care Coordination Contact  UTC/ No Voicemail    Referral Source: IP/TCU Report  Clinical Data: Care Coordinator Outreach  Outreach attempted x 2.  Unable to leave message on voicemail. RN CC called both numbers listed for patient contact. The first number just rings continually, no answer or VM. The second number has VM. Yesterday I was able to leave message but today mailbox is full, unable to take messages. Per FYI from provider office,  HC has been unable to make contact with patient as well. They have requested a delay for start of care.  Plan: Care Coordinator will mail out care coordination introduction letter with care coordinator contact information and explanation of care coordination services. Care Coordinator will try to reach patient again in 3-5 business days.  Lenin ROGERSN,RN- BC  Clinic Care Coordinator  [facility]  Phone: [phone]

## 2017-02-09 ENCOUNTER — TELEPHONE (OUTPATIENT)
Dept: FAMILY MEDICINE | Facility: CLINIC | Age: 72
End: 2017-02-09

## 2017-02-09 NOTE — TELEPHONE ENCOUNTER
Reason for call:  Not able to get a hold of patient to schedule home care.  So they are going to close the order.

## 2017-02-10 ENCOUNTER — TELEPHONE (OUTPATIENT)
Dept: FAMILY MEDICINE | Facility: CLINIC | Age: 72
End: 2017-02-10

## 2017-02-10 NOTE — TELEPHONE ENCOUNTER
Reason for call:  Form  Reason for Call:  Form, our goal is to have forms completed with 72 hours, however, some forms may require a visit or additional information.    Type of letter, form or note:  medical    Who is the form from?: [facility]     Where did the form come from: form was faxed in    What clinic location was the form placed at?: [facility] - [phone]    Where the form was placed:  in box     What number is listed as a contact on the form?: [phone]       Additional comments: Fax to [phone]    Call taken on 11/8/2016 at 3:08 PM by Constanza Calderon

## 2017-02-16 NOTE — PROGRESS NOTES
"  SUBJECTIVE:                                                    Keira Collins is a 71 year old female who presents to clinic today for the following health issues:    Hospital Follow-up Visit:    Hospital/Nursing Home/IP Rehab Facility: [facility]  Date of Admission: December 2016  Date of Discharge: 2/4/17  Reason(s) for Admission: Blood Loss to Foot and had a hole that was drained and infected.            Problems taking medications regularly:  Yes- has not been taking her oral meds. Unclear why.        Medication changes since discharge: None       Problems adhering to non-medication therapy:  In Rehab facility after surgery    Summary of hospitalization:  [facility] discharge summary reviewed  Diagnostic Tests/Treatments reviewed.  Follow up needed: yes- see below  Other Healthcare Providers Involved in Patient s Care:         Specialist appointment - vascular & podiatry and Surgical follow-up appointment - vascular  Update since discharge: improved.     Post Discharge Medication Reconciliation: discharge medications reconciled and changed, per note/orders (see AVS).  Plan of care communicated with patient and family     Coding guidelines for this visit:  Type of Medical   Decision Making Face-to-Face Visit       within 7 Days of discharge Face-to-Face Visit        within 14 days of discharge   Moderate Complexity [id number]   High Complexity [id number]            Has been doing well. More mobile now. Can walk further and longer amount of time- using 2 wheeled walker.     Foot: left foot. Family is changing the bandage daily. Was taught how to do it by RN staff.   Was supposed to see  02/16/17 but \"didn't make that visit\".  Got a letter in the mail told to follow up.  Son thinks is getting better. \"deep hole\".   Painful. Taking tylenol for that.   Serosanguinous fluid.   Not purulent looking.  No fevers.   Has not needed the CAM boot lately.    Needs F/U " arterial US per  after/around 02/19/17.  Has not been taking clopidogrel. Supposed to be on 90 days.   Has been taking aspirin daily. Not sure dose.     HTN: pt has not been taking any oral meds since her discharge from TCU. Not taking her amlodipine or her metoprolol. BP here 130/80.   Not checking at home.    Has not been taking statin since being home.     COPD: has been using her inhalers. Denies change in baseline breathing, SOB, cough, HAGER.    Overall doing well not additional or new concerns      Hypertension Follow-up      Outpatient blood pressures are being checked at home.  Results are Unknown.    Low Salt Diet: not monitoring salt     COPD Follow-Up    Symptoms are currently: stable    Current fatigue or dyspnea with ambulation: stable     Shortness of breath: stable    Increased or change in Cough/Sputum: No    Fever(s): No    Baseline ambulation without stopping to rest 10 min. Able to walk up 2 flights of stairs without stopping to rest.    Any ER/UC or hospital admissions since your last visit? Yes - Hospital     History   Smoking Status     Current Every Day Smoker     Packs/day: 0.50     Years: 50.00     Types: Cigarettes   Smokeless Tobacco     Never Used     No results found for: FEV1, GQM5ORY       Amount of exercise or physical activity: None    Problems taking medications regularly: No    Medication side effects: none  Diet: regular (no restrictions)      Problem list and histories reviewed & adjusted, as indicated.  Additional history: as documented    Patient Active Problem List   Diagnosis     Essential hypertension with goal blood pressure less than 140/90     Hyperlipidemia LDL goal <100     H/O: alcohol abuse     History of fracture of right hip     PVD (peripheral vascular disease) (H)     Pressure ulcer, stage I     Abnormal liver enzymes     Microalbuminuria     Tobacco abuse     Disorder of artery or arteriole (H)      Cholelithiasis     Osteoporosis     COPD (chronic  "obstructive pulmonary disease) (H)     Physical deconditioning     Past Surgical History   Procedure Laterality Date     Surgical history of -   5/13/2016     right hip fracture       Social History   Substance Use Topics     Smoking status: Current Every Day Smoker     Packs/day: 0.50     Years: 50.00     Types: Cigarettes     Smokeless tobacco: Never Used     Alcohol use 0.0 oz/week     0 Standard drinks or equivalent per week      Comment: Occassionally     History reviewed. No pertinent family history.      Current Outpatient Prescriptions   Medication Sig Dispense Refill     clopidogrel (PLAVIX) 75 MG tablet Take 1 tablet (75 mg) by mouth daily 90 tablet 0     metoprolol (TOPROL-XL) 50 MG 24 hr tablet Take 1 tablet (50 mg) by mouth daily 90 tablet 0     atorvastatin (LIPITOR) 40 MG tablet Take 1 tablet (40 mg) by mouth daily 90 tablet 0     tiotropium (SPIRIVA HANDIHALER) 18 MCG capsule Inhale contents of one capsule daily. 90 capsule 1     fluticasone-salmeterol (ADVAIR-HFA) 45-21 MCG/ACT inhaler Inhale 2 puffs into the lungs 2 times daily 36 Inhaler 1     Wound Dressings (SILVASORB) GEL Externally apply 1 Application topically daily 1 Tube 1     Gauze Pads & Dressings (OPTIFOAM) 4\"X4\" PADS 1 Application daily 30 each 1     Gauze Pads & Dressings (CURITY KERLIX ROLL) MISC 1 Application daily 30 each 1     Incontinence Supply Disposable (ULTIMA INCONTINENCE PAD) MISC 1 Units At Bedtime 30 each 5     amoxicillin-clavulanate (AUGMENTIN) 875-125 MG per tablet Take 1 tablet by mouth 2 times daily 28 tablet 0     albuterol (PROAIR HFA/PROVENTIL HFA/VENTOLIN HFA) 108 (90 BASE) MCG/ACT Inhaler Inhale 2 puffs into the lungs every 4 hours as needed for shortness of breath / dyspnea or wheezing       acetaminophen (TYLENOL) 500 MG tablet Take 1 tablet (500 mg) by mouth every 6 hours       NUTRITIONAL SUPPLEMENT LIQD Take 4 oz by mouth 2 times daily Reported on 2/24/2017       bisacodyl (DULCOLAX) 5 MG EC tablet Take 15 " mg by mouth daily as needed for constipation Reported on 2/24/2017       calcium carb 1250 mg, 500 mg [place],/vitamin D 200 units (OSCAL WITH D) 500-200 MG-UNIT per tablet Take 1 tablet by mouth 3 times daily (with meals) Reported on 2/24/2017       Multiple vitamin TABS Take 1 tablet by mouth daily Reported on 2/24/2017       sennosides (SENOKOT) 8.6 MG tablet Take 2 tablets by mouth 2 times daily Reported on 2/24/2017       polyethylene glycol (MIRALAX/GLYCOLAX) Packet Take 17 g by mouth daily as needed for constipation (Patient not taking: Reported on 2/24/2017) 7 packet      ONDANSETRON PO Take 4 mg by mouth every 6 hours as needed for nausea       aspirin 81 MG tablet Take 1 tablet (81 mg) by mouth daily (Patient not taking: Reported on 2/24/2017) 30 tablet      Allergies   Allergen Reactions     Penicillins      BP Readings from Last 3 Encounters:   02/24/17 130/80   01/31/17 129/75   01/29/17 134/75    Wt Readings from Last 3 Encounters:   01/31/17 124 lb 12.8 oz (56.6 kg)   01/29/17 124 lb 12.8 oz (56.6 kg)   01/16/17 125 lb 4.8 oz (56.8 kg)                  Labs reviewed in EPIC  Problem list, Medication list, Allergies, and Medical/Social/Surgical histories reviewed in [facility] and updated as appropriate.    ROS:  Constitutional, HEENT, cardiovascular, pulmonary, gi and gu systems are negative, except as otherwise noted.    OBJECTIVE:                                                    /80  Pulse 86  Temp 97.1  F (36.2  C) (Temporal)  Resp 10  SpO2 96%  There is no height or weight on file to calculate BMI.  GENERAL: healthy, alert and no distress  EYES: Eyes grossly normal to inspection, PERRL and conjunctivae and sclerae normal  HENT: ear canals and TM's normal, nose and mouth without ulcers or lesions  NECK: no adenopathy and no asymmetry, masses, or scars  RESP: lungs clear to auscultation - no rales, rhonchi or wheezes and decreased breath sounds bibasilar  CV: regular rates and rhythm, normal S1 S2,  no S3 or S4 and no peripheral edema  ABDOMEN: soft, nontender, no hepatosplenomegaly, no masses and bowel sounds normal. Left groin incision: removed steri-strip from upper portion of incision. Lower portion of incision 3mm healing area open to sub-cutaneous tissue, no erythema, no drainage, granulation tissue at edges.   MS: slender legs, low muscle bulk, difficult to palpate pedal pulses  SKIN: warm and dry. LEFT heel: dressed with kerlix and coban and telfa. Removed dressing over ulcer- quarter to half-dollar sized- thick yellow mucoid drainage over surface w/strong odor.   PSYCH: mentation appears normal and affect calm    Diagnostic Test Results:  Results for orders placed or performed in visit on 02/24/17 (from the past 24 hour(s))   Comprehensive metabolic panel (BMP + Alb, Alk Phos, ALT, AST, Total. Bili, TP)   Result Value Ref Range    Sodium 141 133 - 144 mmol/L    Potassium 3.9 3.4 - 5.3 mmol/L    Chloride 107 94 - 109 mmol/L    Carbon Dioxide 22 20 - 32 mmol/L    Anion Gap 12 3 - 14 mmol/L    Glucose 99 70 - 99 mg/dL    Urea Nitrogen 13 7 - 30 mg/dL    Creatinine 0.60 0.52 - 1.04 mg/dL    GFR Estimate >90  Non  GFR Calc   >60 mL/min/1.7m2    GFR Estimate If Black >90   GFR Calc   >60 mL/min/1.7m2    Calcium 9.7 8.5 - 10.1 mg/dL    Bilirubin Total 0.4 0.2 - 1.3 mg/dL    Albumin 3.9 3.4 - 5.0 g/dL    Protein Total 8.7 6.8 - 8.8 g/dL    Alkaline Phosphatase 105 40 - 150 U/L    ALT 17 0 - 50 U/L    AST 18 0 - 45 U/L   Lipid panel reflex to direct LDL   Result Value Ref Range    Cholesterol 162 <200 mg/dL    Triglycerides 146 <150 mg/dL    HDL Cholesterol 46 (L) >49 mg/dL    LDL Cholesterol Calculated 87 <100 mg/dL    Non HDL Cholesterol 116 <130 mg/dL   CBC with platelets and differential   Result Value Ref Range    WBC 10.3 4.0 - 11.0 10e9/L    RBC Count 4.33 3.8 - 5.2 10e12/L    Hemoglobin 12.7 11.7 - 15.7 g/dL    Hematocrit 40.4 35.0 - 47.0 %    MCV 93 78 - 100 fl    MCH  29.3 26.5 - 33.0 pg    MCHC 31.4 (L) 31.5 - 36.5 g/dL    RDW 13.3 10.0 - 15.0 %    Platelet Count 246 150 - 450 10e9/L    Diff Method Automated Method     % Neutrophils 57.4 %    % Lymphocytes 30.9 %    % Monocytes 6.9 %    % Eosinophils 4.1 %    % Basophils 0.7 %    Absolute Neutrophil 5.9 1.6 - 8.3 10e9/L    Absolute Lymphocytes 3.2 0.8 - 5.3 10e9/L    Absolute Monocytes 0.7 0.0 - 1.3 10e9/L    Absolute Eosinophils 0.4 0.0 - 0.7 10e9/L    Absolute Basophils 0.1 0.0 - 0.2 10e9/L        ASSESSMENT/PLAN:                                                      1. PAD (peripheral artery disease) (H)  Reviewed discharge summary from TCU indetail with pt and her son.  They have missed the follow up with the podiatrist.  Has not been taking plavix since discharge. Refilled urged to  today and restart; continue aslo w/daily aspirin. Reviewed duration of plavix. And need for follow up arterial US per 's office- contact 's office for US (due last week).     - clopidogrel (PLAVIX) 75 MG tablet; Take 1 tablet (75 mg) by mouth daily  Dispense: 90 tablet; Refill: 0  - atorvastatin (LIPITOR) 40 MG tablet; Take 1 tablet (40 mg) by mouth daily  Dispense: 90 tablet; Refill: 0  - Comprehensive metabolic panel (BMP + Alb, Alk Phos, ALT, AST, Total. Bili, TP)  - Lipid panel reflex to direct LDL    2. Ulcer of left heel, with unspecified severity (H)  Son has info to schedule with podiatrist- should be seen next week- contact us if difficulty getting scheduled.  rx for supplies that were advised by the TCU for wound care of the ulcer.    PCN allergy listed in chart, however, clearly documented that pt has had augmentin for foot ulcer- both from [facility] chart and TCU notes.   Will restart augmentin.   Reassuring that pt's pain has been improving and has increased tolerance for walking and has not needed CAM boot.   - Wound Dressings (SILVASORB) GEL; Externally apply 1 Application topically daily  Dispense: 1 Tube;  "Refill: 1  - Gauze Pads & Dressings (OPTIFOAM) 4\"X4\" PADS; 1 Application daily  Dispense: 30 each; Refill: 1  - Gauze Pads & Dressings (CURITY KERLIX ROLL) MISC; 1 Application daily  Dispense: 30 each; Refill: 1  - amoxicillin-clavulanate (AUGMENTIN) 875-125 MG per tablet; Take 1 tablet by mouth 2 times daily  Dispense: 28 tablet; Refill: 0    3. Essential hypertension with goal blood pressure less than 140/90  BP today at goal- 130/80  Son does not think she has been taking her oral meds since d/c from [facility].   Would have them verify if this is indeed the case. Would restart metoprolol monotherapy, hold the amlodipine for now with pressures as they are in clinic today.    Labs as below.   - metoprolol (TOPROL-XL) 50 MG 24 hr tablet; Take 1 tablet (50 mg) by mouth daily  Dispense: 90 tablet; Refill: 0  - Comprehensive metabolic panel (BMP + Alb, Alk Phos, ALT, AST, Total. Bili, TP)    4. Hyperlipidemia LDL goal <100  Restart if has been off  - atorvastatin (LIPITOR) 40 MG tablet; Take 1 tablet (40 mg) by mouth daily  Dispense: 90 tablet; Refill: 0  - Lipid panel reflex to direct LDL    5. Chronic obstructive pulmonary disease, unspecified COPD type (H)  Refilled inhalers  - tiotropium (SPIRIVA HANDIHALER) 18 MCG capsule; Inhale contents of one capsule daily.  Dispense: 90 capsule; Refill: 1  - fluticasone-salmeterol (ADVAIR-HFA) 45-21 MCG/ACT inhaler; Inhale 2 puffs into the lungs 2 times daily  Dispense: 36 Inhaler; Refill: 1    6. Leukocytosis, unspecified type  Improved. Labs as below  - CBC with platelets and differential    7. H/O: alcohol abuse  Pt not interested in cessation or reduction    8. Abnormal liver enzymes  Normal on  Lab today    9. Urinary incontinence, unspecified type  rx for supplies  - Incontinence Supply Disposable (ULTIMA INCONTINENCE PAD) MISC; 1 Units At Bedtime  Dispense: 30 each; Refill: 5    Follow Up: as above. RTC here in 4-8 weeks. For worsening symptoms (ie new fevers, worsening pain, " etc), non-improvement as expected/discussed, questions regarding your medications or treatment plan. Discussed parameters for follow up and included in After Visit Summary given to patient.      Jennifer Lee PA-C  [facility]

## 2017-02-24 ENCOUNTER — OFFICE VISIT (OUTPATIENT)
Dept: FAMILY MEDICINE | Facility: CLINIC | Age: 72
End: 2017-02-24
Payer: MEDICARE

## 2017-02-24 VITALS
RESPIRATION RATE: 10 BRPM | DIASTOLIC BLOOD PRESSURE: 80 MMHG | OXYGEN SATURATION: 96 % | SYSTOLIC BLOOD PRESSURE: 130 MMHG | HEART RATE: 86 BPM | TEMPERATURE: 97.1 F

## 2017-02-24 DIAGNOSIS — R32 URINARY INCONTINENCE, UNSPECIFIED TYPE: ICD-10-CM

## 2017-02-24 DIAGNOSIS — E78.5 HYPERLIPIDEMIA LDL GOAL <100: ICD-10-CM

## 2017-02-24 DIAGNOSIS — I10 ESSENTIAL HYPERTENSION WITH GOAL BLOOD PRESSURE LESS THAN 140/90: ICD-10-CM

## 2017-02-24 DIAGNOSIS — R74.8 ABNORMAL LIVER ENZYMES: ICD-10-CM

## 2017-02-24 DIAGNOSIS — F10.11 H/O: ALCOHOL ABUSE: ICD-10-CM

## 2017-02-24 DIAGNOSIS — J44.9 CHRONIC OBSTRUCTIVE PULMONARY DISEASE, UNSPECIFIED COPD TYPE (H): ICD-10-CM

## 2017-02-24 DIAGNOSIS — L97.429: ICD-10-CM

## 2017-02-24 DIAGNOSIS — I73.9 PAD (PERIPHERAL ARTERY DISEASE) (H): Primary | ICD-10-CM

## 2017-02-24 DIAGNOSIS — D72.829 LEUKOCYTOSIS, UNSPECIFIED TYPE: ICD-10-CM

## 2017-02-24 LAB
ALBUMIN SERPL-MCNC: 3.9 G/DL (ref 3.4–5)
ALP SERPL-CCNC: 105 U/L (ref 40–150)
ALT SERPL W P-5'-P-CCNC: 17 U/L (ref 0–50)
ANION GAP SERPL CALCULATED.3IONS-SCNC: 12 MMOL/L (ref 3–14)
AST SERPL W P-5'-P-CCNC: 18 U/L (ref 0–45)
BASOPHILS # BLD AUTO: 0.1 10E9/L (ref 0–0.2)
BASOPHILS NFR BLD AUTO: 0.7 %
BILIRUB SERPL-MCNC: 0.4 MG/DL (ref 0.2–1.3)
BUN SERPL-MCNC: 13 MG/DL (ref 7–30)
CALCIUM SERPL-MCNC: 9.7 MG/DL (ref 8.5–10.1)
CHLORIDE SERPL-SCNC: 107 MMOL/L (ref 94–109)
CHOLEST SERPL-MCNC: 162 MG/DL
CO2 SERPL-SCNC: 22 MMOL/L (ref 20–32)
CREAT SERPL-MCNC: 0.6 MG/DL (ref 0.52–1.04)
DIFFERENTIAL METHOD BLD: ABNORMAL
EOSINOPHIL # BLD AUTO: 0.4 10E9/L (ref 0–0.7)
EOSINOPHIL NFR BLD AUTO: 4.1 %
ERYTHROCYTE [DISTWIDTH] IN BLOOD BY AUTOMATED COUNT: 13.3 % (ref 10–15)
GFR SERPL CREATININE-BSD FRML MDRD: NORMAL ML/MIN/1.7M2
GLUCOSE SERPL-MCNC: 99 MG/DL (ref 70–99)
HCT VFR BLD AUTO: 40.4 % (ref 35–47)
HDLC SERPL-MCNC: 46 MG/DL
HGB BLD-MCNC: 12.7 G/DL (ref 11.7–15.7)
LDLC SERPL CALC-MCNC: 87 MG/DL
LYMPHOCYTES # BLD AUTO: 3.2 10E9/L (ref 0.8–5.3)
LYMPHOCYTES NFR BLD AUTO: 30.9 %
MCH RBC QN AUTO: 29.3 PG (ref 26.5–33)
MCHC RBC AUTO-ENTMCNC: 31.4 G/DL (ref 31.5–36.5)
MCV RBC AUTO: 93 FL (ref 78–100)
MONOCYTES # BLD AUTO: 0.7 10E9/L (ref 0–1.3)
MONOCYTES NFR BLD AUTO: 6.9 %
NEUTROPHILS # BLD AUTO: 5.9 10E9/L (ref 1.6–8.3)
NEUTROPHILS NFR BLD AUTO: 57.4 %
NONHDLC SERPL-MCNC: 116 MG/DL
PLATELET # BLD AUTO: 246 10E9/L (ref 150–450)
POTASSIUM SERPL-SCNC: 3.9 MMOL/L (ref 3.4–5.3)
PROT SERPL-MCNC: 8.7 G/DL (ref 6.8–8.8)
RBC # BLD AUTO: 4.33 10E12/L (ref 3.8–5.2)
SODIUM SERPL-SCNC: 141 MMOL/L (ref 133–144)
TRIGL SERPL-MCNC: 146 MG/DL
WBC # BLD AUTO: 10.3 10E9/L (ref 4–11)

## 2017-02-24 PROCEDURE — 36415 COLL VENOUS BLD VENIPUNCTURE: CPT | Performed by: PHYSICIAN ASSISTANT

## 2017-02-24 PROCEDURE — 85025 COMPLETE CBC W/AUTO DIFF WBC: CPT | Performed by: PHYSICIAN ASSISTANT

## 2017-02-24 PROCEDURE — 80053 COMPREHEN METABOLIC PANEL: CPT | Performed by: PHYSICIAN ASSISTANT

## 2017-02-24 PROCEDURE — 99215 OFFICE O/P EST HI 40 MIN: CPT | Performed by: PHYSICIAN ASSISTANT

## 2017-02-24 PROCEDURE — 80061 LIPID PANEL: CPT | Performed by: PHYSICIAN ASSISTANT

## 2017-02-24 RX ORDER — GAUZE BANDAGE 4.5"X147"
1 BANDAGE TOPICAL DAILY
Qty: 30 EACH | Refills: 1 | Status: ON HOLD | OUTPATIENT
Start: 2017-02-24 | End: 2018-01-01

## 2017-02-24 RX ORDER — TIOTROPIUM BROMIDE 18 UG/1
CAPSULE ORAL; RESPIRATORY (INHALATION)
Qty: 90 CAPSULE | Refills: 1 | Status: SHIPPED | OUTPATIENT
Start: 2017-02-24 | End: 2018-01-01

## 2017-02-24 RX ORDER — METOPROLOL SUCCINATE 50 MG/1
50 TABLET, EXTENDED RELEASE ORAL DAILY
Qty: 90 TABLET | Refills: 0 | Status: SHIPPED | OUTPATIENT
Start: 2017-02-24 | End: 2018-01-01

## 2017-02-24 RX ORDER — CLOPIDOGREL BISULFATE 75 MG/1
75 TABLET ORAL DAILY
Qty: 90 TABLET | Refills: 0 | Status: SHIPPED | OUTPATIENT
Start: 2017-02-24 | End: 2017-01-01

## 2017-02-24 RX ORDER — ATORVASTATIN CALCIUM 40 MG/1
40 TABLET, FILM COATED ORAL DAILY
Qty: 90 TABLET | Refills: 0 | Status: SHIPPED | OUTPATIENT
Start: 2017-02-24 | End: 2017-01-01

## 2017-02-24 RX ORDER — FLUTICASONE PROPIONATE AND SALMETEROL XINAFOATE 45; 21 UG/1; UG/1
2 AEROSOL, METERED RESPIRATORY (INHALATION) 2 TIMES DAILY
Qty: 36 INHALER | Refills: 1 | Status: ON HOLD | OUTPATIENT
Start: 2017-02-24 | End: 2018-01-01

## 2017-02-24 RX ORDER — FOAM BANDAGE 4" X 4"
1 BANDAGE TOPICAL DAILY
Qty: 30 EACH | Refills: 1 | Status: ON HOLD | OUTPATIENT
Start: 2017-02-24 | End: 2018-01-01

## 2017-02-24 RX ORDER — SILVER
1 GEL, EXTENDED RELEASE (ML) TOPICAL DAILY
Qty: 1 TUBE | Refills: 1 | Status: ON HOLD | OUTPATIENT
Start: 2017-02-24 | End: 2018-01-01

## 2017-02-24 ASSESSMENT — PAIN SCALES - GENERAL: PAINLEVEL: MODERATE PAIN (5)

## 2017-02-24 NOTE — NURSING NOTE
"Chief Complaint   Patient presents with     Hospital F/U     Panel Management     MyChart, Flu Shot, Honoring Choices, Tobacco Cessation, BMP, COPD Action Plan, Spirometry       Initial /80  Pulse 86  Temp 97.1  F (36.2  C) (Temporal)  Resp 10  SpO2 96% Estimated body mass index is 30.09 kg/(m^2) as calculated from the following:    Height as of 1/9/17: 4' 6\" (1.372 m).    Weight as of 1/31/17: 124 lb 12.8 oz (56.6 kg).  Medication Reconciliation: complete     Delfina Maciel CMA  "

## 2017-02-24 NOTE — MR AVS SNAPSHOT
"              After Visit Summary   2/24/2017    Keira Collins    MRN: [id number]           Patient Information     Date Of Birth          1945        Visit Information        Provider Department      2/24/2017 11:40 AM Jennifer Lee PA-C [facility]        Today's Diagnoses     PAD (peripheral artery disease) (H)    -  1    Essential hypertension with goal blood pressure less than 140/90        Hyperlipidemia LDL goal <100        Leukocytosis, unspecified type        Chronic obstructive pulmonary disease, unspecified COPD type (H)        H/O: alcohol abuse        Abnormal liver enzymes        Pressure ulcer, stage I        Urinary incontinence, unspecified type          Care Instructions    Please plan to see the podiatrist (foot doctor)- . Our paperwork said you were to see him on 02/16/17-- should reschedule from missing that visit    You should call 's office - Vascular Surgery- he wanted you to have a follow up arterial ultrasound after 02/19/2017.  You need to be taking clopidogrel 75mg daily through the end of April, maybe longer-- per .   Continue the aspirin everyday for rest of life    She should restart her metoprolol 50mg daily. Please check blood pressures at home      I sent wound supplies to the pharmacy: from the [facility]:  \"Discharge Treatments: Cleanse left heel wound with wound cleanser. Apply Silvasorb gel. Cover with non-adhesive Optifoam and wrap with Kerlix. Clean left groin incision with wound cleanser. Apply Silvasorb gel. Cover with dressing.\"    Will swab and reculture  Restart augmentin twice daily - see podiatry early next week    Labs today            Follow-ups after your visit        Who to contact     If you have questions or need follow up information about today's clinic visit or your schedule please contact [facility] directly at [phone].  Normal or non-critical lab and imaging results will be communicated to " "you by MyChart, letter or phone within 4 business days after the clinic has received the results. If you do not hear from us within 7 days, please contact the clinic through [employer] or phone. If you have a critical or abnormal lab result, we will notify you by phone as soon as possible.  Submit refill requests through [employer] or call your pharmacy and they will forward the refill request to us. Please allow 3 business days for your refill to be completed.          Additional Information About Your Visit        [employer] Information     [employer] lets you send messages to your doctor, view your test results, renew your prescriptions, schedule appointments and more. To sign up, go to [email or web address] . Click on \"Log in\" on the left side of the screen, which will take you to the Welcome page. Then click on \"Sign up Now\" on the right side of the page.     You will be asked to enter the access code listed below, as well as some personal information. Please follow the directions to create your username and password.     Your access code is: [email or web address]  Expires: 2017 12:25 PM     Your access code will  in 90 days. If you need help or a new code, please call your [place] clinic or [phone].        Care EveryWhere ID     This is your Care EveryWhere ID. This could be used by other organizations to access your [place] medical records  [id number]        Your Vitals Were     Pulse Temperature Respirations Pulse Oximetry          86 97.1  F (36.2  C) (Temporal) 10 96%         Blood Pressure from Last 3 Encounters:   17 130/80   17 129/75   17 134/75    Weight from Last 3 Encounters:   17 124 lb 12.8 oz (56.6 kg)   17 124 lb 12.8 oz (56.6 kg)   17 125 lb 4.8 oz (56.8 kg)              We Performed the Following     CBC with platelets and differential     Comprehensive metabolic panel (BMP + Alb, Alk Phos, ALT, AST, Total. Bili, TP)     Lipid panel reflex to direct LDL     " "     Today's Medication Changes          These changes are accurate as of: 2/24/17 12:25 PM.  If you have any questions, ask your nurse or doctor.               Start taking these medicines.        Dose/Directions    amoxicillin-clavulanate 875-125 MG per tablet   Commonly known as:  AUGMENTIN   Used for:  Pressure ulcer, stage I   Started by:  Jennifer Lee PA-C        Dose:  1 tablet   Take 1 tablet by mouth 2 times daily   Quantity:  28 tablet   Refills:  0       * OPTIFOAM 4\"X4\" Pads   Used for:  Pressure ulcer, stage I   Started by:  Jennifer Lee PA-C        Dose:  1 Application   1 Application daily   Quantity:  30 each   Refills:  1       * CURITY KERLIX ROLL Misc   Used for:  Pressure ulcer, stage I   Started by:  Jennifer Lee PA-C        Dose:  1 Application   1 Application daily   Quantity:  30 each   Refills:  1       SILVASORB Gel   Used for:  Pressure ulcer, stage I   Started by:  Jennifer Lee PA-C        Dose:  1 Application   Externally apply 1 Application topically daily   Quantity:  1 Tube   Refills:  1       ULTIMA INCONTINENCE PAD Misc   Used for:  Urinary incontinence, unspecified type   Started by:  Jennifer Lee PA-C        Dose:  1 Units   1 Units At Bedtime   Quantity:  30 each   Refills:  5       * Notice:  This list has 2 medication(s) that are the same as other medications prescribed for you. Read the directions carefully, and ask your doctor or other care provider to review them with you.      These medicines have changed or have updated prescriptions.        Dose/Directions    atorvastatin 40 MG tablet   Commonly known as:  LIPITOR   This may have changed:    - medication strength  - additional instructions   Used for:  PAD (peripheral artery disease) (H), Hyperlipidemia LDL goal <100   Changed by:  Jennifer Lee PA-C        Dose:  40 mg   Take 1 tablet (40 mg) by mouth daily   Quantity:  90 tablet   Refills:  0       clopidogrel 75 MG tablet " "  Commonly known as:  PLAVIX   This may have changed:  additional instructions   Used for:  PAD (peripheral artery disease) (H)   Changed by:  Jennifer Lee PA-C        Dose:  75 mg   Take 1 tablet (75 mg) by mouth daily   Quantity:  90 tablet   Refills:  0            Where to get your medicines      These medications were sent to [id number] - [place], [place] - [address] AT [facility] [address] ( [address])  [address], [place] [address]     Phone:  [phone]     amoxicillin-clavulanate 875-125 MG per tablet    atorvastatin 40 MG tablet    clopidogrel 75 MG tablet    CURITY KERLIX ROLL Misc    fluticasone-salmeterol 45-21 MCG/ACT inhaler    metoprolol 50 MG 24 hr tablet    OPTIFOAM 4\"X4\" Pads    SILVASORB Gel    tiotropium 18 MCG capsule    ULTIMA INCONTINENCE PAD [facility]                Primary Care Provider Office Phone # Fax #    Asa Elliott [phone]       [facility] [address] [place] [id number]        Thank you!     Thank you for choosing [facility]  for your care. Our goal is always to provide you with excellent care. Hearing back from our patients is one way we can continue to improve our services. Please take a few minutes to complete the written survey that you may receive in the mail after your visit with us. Thank you!             Your Updated Medication List - Protect others around you: Learn how to safely use, store and throw away your medicines at www.disposemymeds.org.          This list is accurate as of: 2/24/17 12:25 PM.  Always use your most recent med list.                   Brand Name Dispense Instructions for use    acetaminophen 500 MG tablet    TYLENOL     Take 1 tablet (500 mg) by mouth every 6 hours       albuterol 108 (90 BASE) MCG/ACT Inhaler    PROAIR HFA/PROVENTIL HFA/VENTOLIN HFA     Inhale 2 puffs into the lungs every 4 hours as needed for shortness of breath / dyspnea or wheezing    " "   amoxicillin-clavulanate 875-125 MG per tablet    AUGMENTIN    28 tablet    Take 1 tablet by mouth 2 times daily       aspirin 81 MG tablet     30 tablet    Take 1 tablet (81 mg) by mouth daily       atorvastatin 40 MG tablet    LIPITOR    90 tablet    Take 1 tablet (40 mg) by mouth daily       bisacodyl 5 MG EC tablet    DULCOLAX     Take 15 mg by mouth daily as needed for constipation Reported on 2/24/2017       calcium carb 1250 mg (500 mg [place])/vitamin D 200 units 500-200 MG-UNIT per tablet    OSCAL with D     Take 1 tablet by mouth 3 times daily (with meals) Reported on 2/24/2017       clopidogrel 75 MG tablet    PLAVIX    90 tablet    Take 1 tablet (75 mg) by mouth daily       fluticasone-salmeterol 45-21 MCG/ACT inhaler    ADVAIR-HFA    36 Inhaler    Inhale 2 puffs into the lungs 2 times daily       metoprolol 50 MG 24 hr tablet    TOPROL-XL    90 tablet    Take 1 tablet (50 mg) by mouth daily       Multiple vitamin Tabs      Take 1 tablet by mouth daily Reported on 2/24/2017       NUTRITIONAL SUPPLEMENT Liqd      Take 4 oz by mouth 2 times daily Reported on 2/24/2017       ONDANSETRON PO      Take 4 mg by mouth every 6 hours as needed for nausea       * OPTIFOAM 4\"X4\" Pads     30 each    1 Application daily       * CURITY KERLIX ROLL Misc     30 each    1 Application daily       polyethylene glycol Packet    MIRALAX/GLYCOLAX    7 packet    Take 17 g by mouth daily as needed for constipation       sennosides 8.6 MG tablet    SENOKOT     Take 2 tablets by mouth 2 times daily Reported on 2/24/2017       SILVASORB Gel     1 Tube    Externally apply 1 Application topically daily       tiotropium 18 MCG capsule    SPIRIVA HANDIHALER    90 capsule    Inhale contents of one capsule daily.       ULTIMA INCONTINENCE PAD Misc     30 each    1 Units At Bedtime       * Notice:  This list has 2 medication(s) that are the same as other medications prescribed for you. Read the directions carefully, and ask your doctor or " other care provider to review them with you.

## 2017-02-24 NOTE — LETTER
[facility] [place]  [address]  [place] [address] [phone]             February 25, 2017    Keira Collins  [address] [place] [address]              Dear Keira,    The results of your recent tests were normal.  Enclosed is a copy of the results.  It was a pleasure to see you at your last appointment.      Your complete blood count was normal: hemoglobin (measure of red blood cells, screening for anemia), the white blood cells (fight infection), and platelets (form plug/clot at sites of bleeding) were all normal.     Cholesterol is controlled on your atorvastatin.    Electrolytes, kidney function, and liver tests are normal.       Results for orders placed or performed in visit on 02/24/17   Comprehensive metabolic panel (BMP + Alb, Alk Phos, ALT, AST, Total. Bili, TP)   Result Value Ref Range    Sodium 141 133 - 144 mmol/L    Potassium 3.9 3.4 - 5.3 mmol/L    Chloride 107 94 - 109 mmol/L    Carbon Dioxide 22 20 - 32 mmol/L    Anion Gap 12 3 - 14 mmol/L    Glucose 99 70 - 99 mg/dL    Urea Nitrogen 13 7 - 30 mg/dL    Creatinine 0.60 0.52 - 1.04 mg/dL    GFR Estimate >90  Non  GFR Calc   >60 mL/min/1.7m2    GFR Estimate If Black >90   GFR Calc   >60 mL/min/1.7m2    Calcium 9.7 8.5 - 10.1 mg/dL    Bilirubin Total 0.4 0.2 - 1.3 mg/dL    Albumin 3.9 3.4 - 5.0 g/dL    Protein Total 8.7 6.8 - 8.8 g/dL    Alkaline Phosphatase 105 40 - 150 U/L    ALT 17 0 - 50 U/L    AST 18 0 - 45 U/L   Lipid panel reflex to direct LDL   Result Value Ref Range    Cholesterol 162 <200 mg/dL    Triglycerides 146 <150 mg/dL    HDL Cholesterol 46 (L) >49 mg/dL    LDL Cholesterol Calculated 87 <100 mg/dL    Non HDL Cholesterol 116 <130 mg/dL   CBC with platelets and differential   Result Value Ref Range    WBC 10.3 4.0 - 11.0 10e9/L    RBC Count 4.33 3.8 - 5.2 10e12/L    Hemoglobin 12.7 11.7 - 15.7 g/dL    Hematocrit 40.4 35.0 - 47.0 %    MCV 93 78 - 100 fl    MCH  29.3 26.5 - 33.0 pg    MCHC 31.4 (L) 31.5 - 36.5 g/dL    RDW 13.3 10.0 - 15.0 %    Platelet Count 246 150 - 450 10e9/L    Diff Method Automated Method     % Neutrophils 57.4 %    % Lymphocytes 30.9 %    % Monocytes 6.9 %    % Eosinophils 4.1 %    % Basophils 0.7 %    Absolute Neutrophil 5.9 1.6 - 8.3 10e9/L    Absolute Lymphocytes 3.2 0.8 - 5.3 10e9/L    Absolute Monocytes 0.7 0.0 - 1.3 10e9/L    Absolute Eosinophils 0.4 0.0 - 0.7 10e9/L    Absolute Basophils 0.1 0.0 - 0.2 10e9/L         If you have any questions or concerns, please call myself or my nurse at the above number.      Sincerely,      Jennifer Lee PA-C

## 2017-02-24 NOTE — PATIENT INSTRUCTIONS
"Please plan to see the podiatrist (foot doctor)- . Our paperwork said you were to see him on 02/16/17-- should reschedule from missing that visit    You should call 's office - Vascular Surgery- he wanted you to have a follow up arterial ultrasound after 02/19/2017.  You need to be taking clopidogrel 75mg daily through the end of April, maybe longer-- per .   Continue the aspirin everyday for rest of life    She should restart her metoprolol 50mg daily. Please check blood pressures at home      I sent wound supplies to the pharmacy: from the [facility]:  \"Discharge Treatments: Cleanse left heel wound with wound cleanser. Apply Silvasorb gel. Cover with non-adhesive Optifoam and wrap with Kerlix. Clean left groin incision with wound cleanser. Apply Silvasorb gel. Cover with dressing.\"    Will swab and reculture  Restart augmentin twice daily - see podiatry early next week    Labs today      "

## 2017-02-27 ENCOUNTER — TELEPHONE (OUTPATIENT)
Dept: FAMILY MEDICINE | Facility: CLINIC | Age: 72
End: 2017-02-27

## 2017-02-27 NOTE — TELEPHONE ENCOUNTER
Notes Recorded by Babar Cedeño on 2/27/2017 at 8:25 AM  Letter printed.   Unable to reach patient. Will have to try call patient again.     ------    Notes Recorded by Jennifer Lee PA-C on 2/25/2017 at 9:49 AM  Please call pt- her labs from her visit were normal. She should follow up with the specialists as we discussed. Please print and mail letter with lab results in Letters Tab. Thanks. Jennifer Lee PA-C

## 2017-04-18 DIAGNOSIS — L97.429: ICD-10-CM

## 2017-04-18 NOTE — TELEPHONE ENCOUNTER
Review the request for a repeat fell on Augmentin. This requires reassessment in clinic and is not refilled.    Asa Elliott MD  Please close encounter when call/work completed.

## 2017-04-18 NOTE — TELEPHONE ENCOUNTER
This was prescribed for an acute issue - refill consideration would need a visit.     Jennifer Woodson, RN, BSN

## 2017-04-18 NOTE — TELEPHONE ENCOUNTER
AMOX-CLAV      Last Written Prescription Date: 2/24/17  Last Fill Quantity: 28,  # refills: 0   Last Office Visit with G, P or [facility] prescribing provider: 2/24/17

## 2017-04-18 NOTE — TELEPHONE ENCOUNTER
Patient's son Dar informed.  He stated this request came from the pharmacy, not them.  Patient's heel is healing nicely.  If the situation declines, he will schedule an appt.

## 2017-09-22 NOTE — TELEPHONE ENCOUNTER
Summary:    Patient is due/failing the following:   BMP, FIT and PHYSICAL    Action needed:   Patient needs office visit for Physical . and complete a FIT test     Type of outreach:    Phone, left message for patient to call back.     Questions for provider review:    None                                                                                                                                    Violeta Singh       Chart routed to Care Team .      Panel Management Review      Patient has the following on her problem list:     Hypertension   Last three blood pressure readings:  BP Readings from Last 3 Encounters:   02/24/17 130/80   01/31/17 129/75   01/29/17 134/75     Blood pressure: Passed    HTN Guidelines:  Age 18-59 BP range:  Less than 140/90  Age 60-85 with Diabetes:  Less than 140/90  Age 60-85 without Diabetes:  less than 150/90        Composite cancer screening  Chart review shows that this patient is due/due soon for the following Fecal Colorectal (FIT)

## 2017-09-22 NOTE — LETTER
[facility]  [address]  [place] [address]  Phone: [phone]  Fax: [phone]  September 26, 2017      Keira Collins  [address] [place] [address]      Dear Keira,    We care about your health and have reviewed your health plan including your medical conditions, medications, and lab results.  Based on this review, it is recommended that you follow up regarding the following health topic(s):  -Colon Cancer Screening  -Wellness (Physical) Visit     We recommend you take the following action(s):  -schedule a WELLNESS (Physical) APPOINTMENT.  We will perform the following labs: BMP (basic metabolic panel).  -schedule a COLONOSCOPY to look for colon cancer (due every 10 years or 5 years in higher risk situations.)  Colonoscopies can prevent 90-95% of colon cancer deaths.  Problem lesions can be removed before they ever become cancer.  If you do not wish to do a colonoscopy or cannot afford to do one at this time, there is another option called a Fecal Immunochemical Occult Blood Test (FIT) a take home stool sample kit.  It does not replace the colonoscopy for colorectal cancer screening, but it can detect hidden bleeding in the lower colon.  It does need to be repeated every year and if a positive result is obtained, you would be referred for a colonoscopy.  If you have completed either one of these tests at another facility, please have the records sent to our clinic for our records.     Please call us at the [facility] - [phone] (or use [employer]) to address the above recommendations.     Thank you for trusting [facility] and we appreciate the opportunity to serve you.  We look forward to supporting your healthcare needs in the future.    Healthy Regards,    Your Health Care Team  [facility]

## 2017-09-26 NOTE — TELEPHONE ENCOUNTER
2nd attempt to contact patient. Phone number listed no longer in service and reminder letter sent.   Violeta Singh

## 2017-11-03 NOTE — TELEPHONE ENCOUNTER
clopidogrel (PLAVIX) 75 MG tablet  Routing refill request to provider for review/approval because:  A break in medication, should have needed refills 05/24/2017      Lab Results   Component Value Date    WBC 10.3 02/24/2017     Lab Results   Component Value Date    RBC 4.33 02/24/2017     Lab Results   Component Value Date    HGB 12.7 02/24/2017     Lab Results   Component Value Date    HCT 40.4 02/24/2017     No components found for: MCT  Lab Results   Component Value Date    MCV 93 02/24/2017     Lab Results   Component Value Date    MCH 29.3 02/24/2017     Lab Results   Component Value Date    MCHC 31.4 02/24/2017     Lab Results   Component Value Date    RDW 13.3 02/24/2017     Lab Results   Component Value Date     02/24/2017     Lab Results   Component Value Date    AST 18 02/24/2017     Lab Results   Component Value Date    ALT 17 02/24/2017     Creatinine   Date Value Ref Range Status   02/24/2017 0.60 0.52 - 1.04 mg/dL Final   ]  atorvastatin (LIPITOR) 40 MG tablet  Routing refill request to provider for review/approval because:  A break in medication, should have needed refills around 05/24/2017    Lab Results   Component Value Date    CHOL 162 02/24/2017     Lab Results   Component Value Date    HDL 46 02/24/2017     Lab Results   Component Value Date    LDL 87 02/24/2017     Lab Results   Component Value Date    TRIG 146 02/24/2017     No results found for: YANET Medrano, RN, BSN

## 2017-12-22 NOTE — LETTER
My COPD Action Plan     Name: Keira Collins    YOB: 1945   Date: 12/26/2017    My doctor: Asa Elliott MD   My clinic: [facility]    [address] [place] [address], [address]  [place] [id number] [phone]  My Controller Medicine: Tiotropium (Spiriva)  Serevent/Fluticasone (Advair)   Dose: As directed     My Rescue Medicine: Albuterol (Proair/Ventolin/Proventil) inhaler   Dose: 2 puffs every 4 hours as needed     My Flare Up Medicine: Prednisone   Dose: AS=s needed     My COPD Severity: Moderate = FeV1 < 79% -50%      Use of Oxygen: Oxygen Not Prescribed      Make sure you've had your pneumonia   vaccines.          GREEN ZONE       Doing well today      Usual level of activity and exercise    Usual amount of cough and mucus    No shortness of breath    Usual level of health (thinking clearly, sleeping well, feel like eating) Actions:      Take daily medicines    Use oxygen as prescribed    Follow regular exercise and diet plan    Avoid cigarette smoke and other irritants that harm the lungs           YELLOW ZONE          Having a bad day or flare up      Short of breath more than usual    A lot more sputum (mucus) than usual    Sputum looks yellow, green, tan, brown or bloody    More coughing or wheezing    Fever or chills    Less energy; trouble completing activities    Trouble thinking or focusing    Using quick relief inhaler or nebulizer more often    Poor sleep; symptoms wake me up    Do not feel like eating Actions:      Get plenty of rest    Take daily medicines    Use quick relief inhaler every 4 hours    If you use oxygen, call you doctor to see if you should adjust your oxygen    Do breathing exercises or other things to help you relax    Let a loved one, friend or neighbor know you are feeling worse    Call your care team if you have 2 or more symptoms.  Start taking steroids or antibiotics if directed by your care team           RED ZONE       Need medical care  now      Severe shortness of breath (feel you can't breathe)    Fever, chills    Not enough breath to do any activity    Trouble coughing up mucus, walking or talking    Blood in mucus    Frequent coughing   Rescue medicines are not working    Not able to sleep because of breathing    Feel confused or drowsy    Chest pain    Actions:      Call your health care team.  If you cannot reach your care team, call 911 or go to the emergency room.        Electronically signed by: Asa Elliott, December 26, 2017  Annual Reminders:  Meet with Care Team, Flu Shot every Fall  Pharmacy: [id number] - [place], [place] - [address]

## 2017-12-22 NOTE — TELEPHONE ENCOUNTER
Panel Management Review      Patient has the following on her problem list:       IVD   ASA: Passed    Last LDL:    Lab Results   Component Value Date    CHOL 162 02/24/2017     Lab Results   Component Value Date    HDL 46 02/24/2017     Lab Results   Component Value Date    LDL 87 02/24/2017     Lab Results   Component Value Date    TRIG 146 02/24/2017      No results found for: CHOLHDLRATIO     Is the patient on a Statin? YES   Is the patient on Aspirin? YES                  Medications     HMG CoA Reductase Inhibitors    atorvastatin (LIPITOR) 40 MG tablet    Salicylates    aspirin 81 MG tablet          Last three blood pressure readings:  BP Readings from Last 3 Encounters:   02/24/17 130/80   01/31/17 129/75   01/29/17 134/75        Tobacco History:     History   Smoking Status     Current Every Day Smoker     Packs/day: 0.50     Years: 50.00     Types: Cigarettes   Smokeless Tobacco     Never Used       Hypertension   Last three blood pressure readings:  BP Readings from Last 3 Encounters:   02/24/17 130/80   01/31/17 129/75   01/29/17 134/75     Blood pressure: Passed    HTN Guidelines:  Age 18-59 BP range:  Less than 140/90  Age 60-85 with Diabetes:  Less than 140/90  Age 60-85 without Diabetes:  less than 150/90     COPD  Diagnosis date: 5/2016   Is this diagnosis new within the last year?   NO   Was spirometry completed?  NO            Composite cancer screening  Chart review shows that this patient is due/due soon for the following Fecal Colorectal (FIT)  Summary:    Patient is due/failing the following:   Copd action plan, bmp/cmp, spirometry, FIT and PHYSICAL    Action needed:   Patient needs office visit for physical. Due now Patient needs non-fasting lab only appointment Patient needs referral/order: for spirometry  Patient needs to complete FIT test.     Type of outreach:    C2C on file, spoke with son and pt is scheduled for physical and will discuss other items at appt with provider    Questions  for provider review:    Please update COPD action plan                                                                                                                                    Yeni Bentley CMA (AAMA)       Chart routed to Provider .

## 2018-01-01 ENCOUNTER — TELEPHONE (OUTPATIENT)
Dept: FAMILY MEDICINE | Facility: CLINIC | Age: 73
End: 2018-01-01

## 2018-01-01 ENCOUNTER — APPOINTMENT (OUTPATIENT)
Dept: SPEECH THERAPY | Facility: CLINIC | Age: 73
DRG: 011 | End: 2018-01-01
Attending: OTOLARYNGOLOGY
Payer: MEDICARE

## 2018-01-01 ENCOUNTER — APPOINTMENT (OUTPATIENT)
Dept: PHYSICAL THERAPY | Facility: CLINIC | Age: 73
DRG: 011 | End: 2018-01-01
Attending: OTOLARYNGOLOGY
Payer: MEDICARE

## 2018-01-01 ENCOUNTER — OFFICE VISIT (OUTPATIENT)
Dept: OTOLARYNGOLOGY | Facility: CLINIC | Age: 73
End: 2018-01-01
Payer: MEDICARE

## 2018-01-01 ENCOUNTER — APPOINTMENT (OUTPATIENT)
Dept: OCCUPATIONAL THERAPY | Facility: CLINIC | Age: 73
DRG: 011 | End: 2018-01-01
Attending: OTOLARYNGOLOGY
Payer: MEDICARE

## 2018-01-01 ENCOUNTER — APPOINTMENT (OUTPATIENT)
Dept: GENERAL RADIOLOGY | Facility: CLINIC | Age: 73
DRG: 981 | End: 2018-01-01
Payer: MEDICARE

## 2018-01-01 ENCOUNTER — OFFICE VISIT (OUTPATIENT)
Dept: FAMILY MEDICINE | Facility: CLINIC | Age: 73
End: 2018-01-01
Payer: MEDICARE

## 2018-01-01 ENCOUNTER — TELEPHONE (OUTPATIENT)
Dept: PHARMACY | Facility: OTHER | Age: 73
End: 2018-01-01

## 2018-01-01 ENCOUNTER — PATIENT OUTREACH (OUTPATIENT)
Dept: CARE COORDINATION | Facility: CLINIC | Age: 73
End: 2018-01-01

## 2018-01-01 ENCOUNTER — APPOINTMENT (OUTPATIENT)
Dept: OCCUPATIONAL THERAPY | Facility: CLINIC | Age: 73
DRG: 981 | End: 2018-01-01
Payer: MEDICARE

## 2018-01-01 ENCOUNTER — CARE COORDINATION (OUTPATIENT)
Dept: CARE COORDINATION | Facility: CLINIC | Age: 73
End: 2018-01-01

## 2018-01-01 ENCOUNTER — DOCUMENTATION ONLY (OUTPATIENT)
Dept: INTERVENTIONAL RADIOLOGY/VASCULAR | Facility: CLINIC | Age: 73
End: 2018-01-01

## 2018-01-01 ENCOUNTER — TELEPHONE (OUTPATIENT)
Dept: OTOLARYNGOLOGY | Facility: CLINIC | Age: 73
End: 2018-01-01

## 2018-01-01 ENCOUNTER — APPOINTMENT (OUTPATIENT)
Dept: OCCUPATIONAL THERAPY | Facility: CLINIC | Age: 73
DRG: 981 | End: 2018-01-01
Attending: PHYSICIAN ASSISTANT
Payer: MEDICARE

## 2018-01-01 ENCOUNTER — APPOINTMENT (OUTPATIENT)
Dept: GENERAL RADIOLOGY | Facility: CLINIC | Age: 73
DRG: 011 | End: 2018-01-01
Attending: OTOLARYNGOLOGY
Payer: MEDICARE

## 2018-01-01 ENCOUNTER — THERAPY VISIT (OUTPATIENT)
Dept: SPEECH THERAPY | Facility: CLINIC | Age: 73
End: 2018-01-01
Payer: MEDICARE

## 2018-01-01 ENCOUNTER — ANESTHESIA EVENT (OUTPATIENT)
Dept: SURGERY | Facility: CLINIC | Age: 73
DRG: 011 | End: 2018-01-01
Payer: MEDICARE

## 2018-01-01 ENCOUNTER — APPOINTMENT (OUTPATIENT)
Dept: GENERAL RADIOLOGY | Facility: CLINIC | Age: 73
DRG: 011 | End: 2018-01-01
Attending: HOSPITALIST
Payer: MEDICARE

## 2018-01-01 ENCOUNTER — SURGERY (OUTPATIENT)
Age: 73
End: 2018-01-01
Payer: MEDICARE

## 2018-01-01 ENCOUNTER — RADIANT APPOINTMENT (OUTPATIENT)
Dept: CT IMAGING | Facility: CLINIC | Age: 73
End: 2018-01-01
Attending: FAMILY MEDICINE
Payer: MEDICARE

## 2018-01-01 ENCOUNTER — RADIANT APPOINTMENT (OUTPATIENT)
Dept: GENERAL RADIOLOGY | Facility: CLINIC | Age: 73
End: 2018-01-01
Attending: FAMILY MEDICINE
Payer: MEDICARE

## 2018-01-01 ENCOUNTER — HOSPITAL ENCOUNTER (INPATIENT)
Facility: CLINIC | Age: 73
LOS: 16 days | Discharge: HOSPICE | DRG: 981 | End: 2018-09-01
Attending: EMERGENCY MEDICINE | Admitting: OTOLARYNGOLOGY
Payer: MEDICARE

## 2018-01-01 ENCOUNTER — ANESTHESIA (OUTPATIENT)
Dept: SURGERY | Facility: CLINIC | Age: 73
DRG: 011 | End: 2018-01-01
Payer: MEDICARE

## 2018-01-01 ENCOUNTER — ANESTHESIA (OUTPATIENT)
Dept: SURGERY | Facility: CLINIC | Age: 73
DRG: 981 | End: 2018-01-01
Payer: MEDICARE

## 2018-01-01 ENCOUNTER — APPOINTMENT (OUTPATIENT)
Dept: INTERVENTIONAL RADIOLOGY/VASCULAR | Facility: CLINIC | Age: 73
DRG: 011 | End: 2018-01-01
Attending: NURSE PRACTITIONER
Payer: MEDICARE

## 2018-01-01 ENCOUNTER — APPOINTMENT (OUTPATIENT)
Dept: MRI IMAGING | Facility: CLINIC | Age: 73
DRG: 011 | End: 2018-01-01
Attending: PHYSICIAN ASSISTANT
Payer: MEDICARE

## 2018-01-01 ENCOUNTER — APPOINTMENT (OUTPATIENT)
Dept: GENERAL RADIOLOGY | Facility: CLINIC | Age: 73
DRG: 011 | End: 2018-01-01
Attending: PHYSICIAN ASSISTANT
Payer: MEDICARE

## 2018-01-01 ENCOUNTER — APPOINTMENT (OUTPATIENT)
Dept: PET IMAGING | Facility: CLINIC | Age: 73
DRG: 011 | End: 2018-01-01
Attending: OTOLARYNGOLOGY
Payer: MEDICARE

## 2018-01-01 ENCOUNTER — PRE VISIT (OUTPATIENT)
Dept: UROLOGY | Facility: CLINIC | Age: 73
End: 2018-01-01

## 2018-01-01 ENCOUNTER — TEAM CONFERENCE (OUTPATIENT)
Dept: SURGERY | Facility: CLINIC | Age: 73
End: 2018-01-01

## 2018-01-01 ENCOUNTER — APPOINTMENT (OUTPATIENT)
Dept: CT IMAGING | Facility: CLINIC | Age: 73
DRG: 011 | End: 2018-01-01
Attending: PHYSICIAN ASSISTANT
Payer: MEDICARE

## 2018-01-01 ENCOUNTER — HOSPITAL ENCOUNTER (INPATIENT)
Facility: CLINIC | Age: 73
LOS: 24 days | Discharge: NURSING, REHAB OR LONG-TERM CARE | DRG: 011 | End: 2018-08-10
Attending: OTOLARYNGOLOGY | Admitting: OTOLARYNGOLOGY
Payer: MEDICARE

## 2018-01-01 ENCOUNTER — ANESTHESIA EVENT (OUTPATIENT)
Dept: SURGERY | Facility: CLINIC | Age: 73
DRG: 981 | End: 2018-01-01
Payer: MEDICARE

## 2018-01-01 ENCOUNTER — APPOINTMENT (OUTPATIENT)
Dept: CT IMAGING | Facility: CLINIC | Age: 73
DRG: 011 | End: 2018-01-01
Attending: NURSE PRACTITIONER
Payer: MEDICARE

## 2018-01-01 ENCOUNTER — APPOINTMENT (OUTPATIENT)
Dept: GENERAL RADIOLOGY | Facility: CLINIC | Age: 73
DRG: 011 | End: 2018-01-01
Attending: NURSE PRACTITIONER
Payer: MEDICARE

## 2018-01-01 ENCOUNTER — TRANSFERRED RECORDS (OUTPATIENT)
Dept: HEALTH INFORMATION MANAGEMENT | Facility: CLINIC | Age: 73
End: 2018-01-01

## 2018-01-01 ENCOUNTER — PRE VISIT (OUTPATIENT)
Dept: OTOLARYNGOLOGY | Facility: CLINIC | Age: 73
End: 2018-01-01

## 2018-01-01 ENCOUNTER — APPOINTMENT (OUTPATIENT)
Dept: CT IMAGING | Facility: CLINIC | Age: 73
DRG: 981 | End: 2018-01-01
Payer: MEDICARE

## 2018-01-01 ENCOUNTER — APPOINTMENT (OUTPATIENT)
Dept: CARDIOLOGY | Facility: CLINIC | Age: 73
DRG: 011 | End: 2018-01-01
Attending: NURSE PRACTITIONER
Payer: MEDICARE

## 2018-01-01 ENCOUNTER — DOCUMENTATION ONLY (OUTPATIENT)
Dept: OTHER | Facility: CLINIC | Age: 73
End: 2018-01-01

## 2018-01-01 ENCOUNTER — HOME INFUSION (PRE-WILLOW HOME INFUSION) (OUTPATIENT)
Dept: PHARMACY | Facility: CLINIC | Age: 73
End: 2018-01-01

## 2018-01-01 ENCOUNTER — APPOINTMENT (OUTPATIENT)
Dept: CARDIOLOGY | Facility: CLINIC | Age: 73
DRG: 011 | End: 2018-01-01
Attending: PHYSICIAN ASSISTANT
Payer: MEDICARE

## 2018-01-01 ENCOUNTER — RADIANT APPOINTMENT (OUTPATIENT)
Dept: ULTRASOUND IMAGING | Facility: CLINIC | Age: 73
End: 2018-01-01
Attending: FAMILY MEDICINE
Payer: MEDICARE

## 2018-01-01 VITALS
HEART RATE: 89 BPM | TEMPERATURE: 97.8 F | OXYGEN SATURATION: 99 % | DIASTOLIC BLOOD PRESSURE: 88 MMHG | HEIGHT: 62 IN | WEIGHT: 132 LBS | SYSTOLIC BLOOD PRESSURE: 150 MMHG | RESPIRATION RATE: 14 BRPM | BODY MASS INDEX: 24.29 KG/M2

## 2018-01-01 VITALS
HEART RATE: 100 BPM | TEMPERATURE: 98.2 F | WEIGHT: 121.25 LBS | BODY MASS INDEX: 19.03 KG/M2 | RESPIRATION RATE: 16 BRPM | OXYGEN SATURATION: 99 % | SYSTOLIC BLOOD PRESSURE: 110 MMHG | HEIGHT: 67 IN | DIASTOLIC BLOOD PRESSURE: 58 MMHG

## 2018-01-01 VITALS
BODY MASS INDEX: 20.96 KG/M2 | OXYGEN SATURATION: 100 % | SYSTOLIC BLOOD PRESSURE: 138 MMHG | DIASTOLIC BLOOD PRESSURE: 72 MMHG | WEIGHT: 118.3 LBS | HEIGHT: 63 IN | RESPIRATION RATE: 18 BRPM | TEMPERATURE: 97.7 F | HEART RATE: 83 BPM

## 2018-01-01 VITALS — DIASTOLIC BLOOD PRESSURE: 84 MMHG | HEART RATE: 92 BPM | SYSTOLIC BLOOD PRESSURE: 159 MMHG

## 2018-01-01 VITALS — HEIGHT: 63 IN | BODY MASS INDEX: 20.38 KG/M2 | WEIGHT: 115 LBS

## 2018-01-01 VITALS
HEIGHT: 63 IN | OXYGEN SATURATION: 98 % | DIASTOLIC BLOOD PRESSURE: 63 MMHG | SYSTOLIC BLOOD PRESSURE: 139 MMHG | BODY MASS INDEX: 22.58 KG/M2 | RESPIRATION RATE: 22 BRPM | HEART RATE: 96 BPM | WEIGHT: 127.43 LBS | TEMPERATURE: 98.8 F

## 2018-01-01 VITALS
SYSTOLIC BLOOD PRESSURE: 160 MMHG | OXYGEN SATURATION: 95 % | HEART RATE: 93 BPM | DIASTOLIC BLOOD PRESSURE: 80 MMHG | TEMPERATURE: 98.3 F

## 2018-01-01 DIAGNOSIS — D64.9 ANEMIA, UNSPECIFIED TYPE: ICD-10-CM

## 2018-01-01 DIAGNOSIS — I73.9 PVD (PERIPHERAL VASCULAR DISEASE) (H): Chronic | ICD-10-CM

## 2018-01-01 DIAGNOSIS — J44.9 CHRONIC OBSTRUCTIVE PULMONARY DISEASE, UNSPECIFIED COPD TYPE (H): Primary | ICD-10-CM

## 2018-01-01 DIAGNOSIS — Z12.31 ENCOUNTER FOR SCREENING MAMMOGRAM FOR BREAST CANCER: ICD-10-CM

## 2018-01-01 DIAGNOSIS — F43.22 ADJUSTMENT DISORDER WITH ANXIOUS MOOD: ICD-10-CM

## 2018-01-01 DIAGNOSIS — I10 ESSENTIAL HYPERTENSION WITH GOAL BLOOD PRESSURE LESS THAN 140/90: ICD-10-CM

## 2018-01-01 DIAGNOSIS — R79.89 ELEVATED SERUM FREE T4 LEVEL: ICD-10-CM

## 2018-01-01 DIAGNOSIS — R13.12 OROPHARYNGEAL DYSPHAGIA: ICD-10-CM

## 2018-01-01 DIAGNOSIS — E78.5 HYPERLIPIDEMIA LDL GOAL <100: ICD-10-CM

## 2018-01-01 DIAGNOSIS — R32 URINARY INCONTINENCE, UNSPECIFIED TYPE: ICD-10-CM

## 2018-01-01 DIAGNOSIS — C32.9 LARYNGEAL CANCER (H): ICD-10-CM

## 2018-01-01 DIAGNOSIS — R91.8 PULMONARY NODULES: ICD-10-CM

## 2018-01-01 DIAGNOSIS — J44.9 CHRONIC OBSTRUCTIVE PULMONARY DISEASE, UNSPECIFIED COPD TYPE (H): ICD-10-CM

## 2018-01-01 DIAGNOSIS — R53.81 PHYSICAL DECONDITIONING: ICD-10-CM

## 2018-01-01 DIAGNOSIS — R49.1 LOSS OF VOICE: Primary | ICD-10-CM

## 2018-01-01 DIAGNOSIS — Z71.89 ADVANCED DIRECTIVES, COUNSELING/DISCUSSION: ICD-10-CM

## 2018-01-01 DIAGNOSIS — R80.9 MICROALBUMINURIA: ICD-10-CM

## 2018-01-01 DIAGNOSIS — D49.1 NEOPLASM OF THE TRACHEA, BRONCHUS AND LUNG: ICD-10-CM

## 2018-01-01 DIAGNOSIS — R49.1 LOSS OF VOICE: ICD-10-CM

## 2018-01-01 DIAGNOSIS — R74.8 ABNORMAL LIVER ENZYMES: ICD-10-CM

## 2018-01-01 DIAGNOSIS — R94.31 ABNORMAL EKG: ICD-10-CM

## 2018-01-01 DIAGNOSIS — C77.0 SECONDARY MALIGNANCY OF LYMPH NODES OF HEAD, FACE AND NECK (H): ICD-10-CM

## 2018-01-01 DIAGNOSIS — I73.9 PAD (PERIPHERAL ARTERY DISEASE) (H): ICD-10-CM

## 2018-01-01 DIAGNOSIS — H04.123 DRY EYES: ICD-10-CM

## 2018-01-01 DIAGNOSIS — R91.8 ABNORMALITY OF LUNG ON CXR: ICD-10-CM

## 2018-01-01 DIAGNOSIS — R91.8 PULMONARY NODULES: Primary | ICD-10-CM

## 2018-01-01 DIAGNOSIS — R40.0 SOMNOLENCE: ICD-10-CM

## 2018-01-01 DIAGNOSIS — R59.0 HILAR ADENOPATHY: ICD-10-CM

## 2018-01-01 DIAGNOSIS — J95.01 TRACHEOSTOMY HEMORRHAGE (H): ICD-10-CM

## 2018-01-01 DIAGNOSIS — R22.1 MASS OF NECK: ICD-10-CM

## 2018-01-01 DIAGNOSIS — R91.8 ABNORMALITY OF LUNG ON CXR: Primary | ICD-10-CM

## 2018-01-01 DIAGNOSIS — K59.00 CONSTIPATION, UNSPECIFIED CONSTIPATION TYPE: ICD-10-CM

## 2018-01-01 DIAGNOSIS — C32.9 CARCINOMA LARYNX (H): ICD-10-CM

## 2018-01-01 DIAGNOSIS — J38.7 LARYNGEAL MASS: Primary | ICD-10-CM

## 2018-01-01 DIAGNOSIS — E04.1 THYROID NODULE: ICD-10-CM

## 2018-01-01 DIAGNOSIS — Z12.11 SCREEN FOR COLON CANCER: ICD-10-CM

## 2018-01-01 DIAGNOSIS — C32.9 MALIGNANT NEOPLASM OF LARYNX (H): ICD-10-CM

## 2018-01-01 DIAGNOSIS — R93.89 ABNORMAL IMAGING OF THYROID: ICD-10-CM

## 2018-01-01 DIAGNOSIS — Z72.0 TOBACCO ABUSE: ICD-10-CM

## 2018-01-01 DIAGNOSIS — R09.89 AIR HUNGER: ICD-10-CM

## 2018-01-01 DIAGNOSIS — R19.7 DIARRHEA, UNSPECIFIED TYPE: ICD-10-CM

## 2018-01-01 DIAGNOSIS — C32.9 LARYNGEAL CANCER (H): Primary | ICD-10-CM

## 2018-01-01 DIAGNOSIS — R06.09 DOE (DYSPNEA ON EXERTION): ICD-10-CM

## 2018-01-01 DIAGNOSIS — E46 MALNUTRITION, UNSPECIFIED TYPE (H): ICD-10-CM

## 2018-01-01 DIAGNOSIS — C32.9 MALIGNANT NEOPLASM OF LARYNX (H): Primary | ICD-10-CM

## 2018-01-01 DIAGNOSIS — D49.1 NEOPLASM OF LARYNX: Primary | ICD-10-CM

## 2018-01-01 DIAGNOSIS — Z91.81 AT RISK FOR FALLING: ICD-10-CM

## 2018-01-01 DIAGNOSIS — Z00.00 MEDICARE ANNUAL WELLNESS VISIT, SUBSEQUENT: Primary | ICD-10-CM

## 2018-01-01 DIAGNOSIS — Z23 NEED FOR PROPHYLACTIC VACCINATION AND INOCULATION AGAINST INFLUENZA: ICD-10-CM

## 2018-01-01 DIAGNOSIS — L97.429 ULCER OF HEEL, LEFT, WITH UNSPECIFIED SEVERITY (H): ICD-10-CM

## 2018-01-01 DIAGNOSIS — Z01.818 PREOP GENERAL PHYSICAL EXAM: Primary | ICD-10-CM

## 2018-01-01 DIAGNOSIS — R13.10 DYSPHAGIA, UNSPECIFIED TYPE: ICD-10-CM

## 2018-01-01 DIAGNOSIS — F43.21 ADJUSTMENT DISORDER WITH DEPRESSED MOOD: ICD-10-CM

## 2018-01-01 DIAGNOSIS — F10.11 H/O: ALCOHOL ABUSE: ICD-10-CM

## 2018-01-01 DIAGNOSIS — K59.03 DRUG-INDUCED CONSTIPATION: Primary | ICD-10-CM

## 2018-01-01 DIAGNOSIS — Z43.0 TRACHEOSTOMY, ACUTE MANAGEMENT (H): Primary | ICD-10-CM

## 2018-01-01 DIAGNOSIS — F17.200 TOBACCO USE DISORDER: ICD-10-CM

## 2018-01-01 LAB
ABO + RH BLD: NORMAL
ALBUMIN SERPL-MCNC: 2.2 G/DL (ref 3.4–5)
ALBUMIN SERPL-MCNC: 2.6 G/DL (ref 3.4–5)
ALBUMIN SERPL-MCNC: 3.5 G/DL (ref 3.4–5)
ALBUMIN SERPL-MCNC: 3.6 G/DL (ref 3.4–5)
ALBUMIN SERPL-MCNC: 3.7 G/DL (ref 3.4–5)
ALBUMIN SERPL-MCNC: 4 G/DL (ref 3.4–5)
ALBUMIN UR-MCNC: 30 MG/DL
ALBUMIN UR-MCNC: 30 MG/DL
ALBUMIN UR-MCNC: NEGATIVE MG/DL
ALP SERPL-CCNC: 100 U/L (ref 40–150)
ALP SERPL-CCNC: 103 U/L (ref 40–150)
ALP SERPL-CCNC: 92 U/L (ref 40–150)
ALT SERPL W P-5'-P-CCNC: 18 U/L (ref 0–50)
ALT SERPL W P-5'-P-CCNC: 19 U/L (ref 0–50)
ALT SERPL W P-5'-P-CCNC: 20 U/L (ref 0–50)
AMORPH CRY #/AREA URNS HPF: ABNORMAL /HPF
ANGLE RATE OF CLOT GROWTH: 78.1 DEG (ref 59–74)
ANION GAP SERPL CALCULATED.3IONS-SCNC: 10 MMOL/L (ref 3–14)
ANION GAP SERPL CALCULATED.3IONS-SCNC: 11 MMOL/L (ref 3–14)
ANION GAP SERPL CALCULATED.3IONS-SCNC: 12 MMOL/L (ref 3–14)
ANION GAP SERPL CALCULATED.3IONS-SCNC: 14 MMOL/L (ref 3–14)
ANION GAP SERPL CALCULATED.3IONS-SCNC: 5 MMOL/L (ref 3–14)
ANION GAP SERPL CALCULATED.3IONS-SCNC: 7 MMOL/L (ref 3–14)
ANION GAP SERPL CALCULATED.3IONS-SCNC: 8 MMOL/L (ref 3–14)
ANION GAP SERPL CALCULATED.3IONS-SCNC: 9 MMOL/L (ref 3–14)
APPEARANCE UR: CLEAR
AST SERPL W P-5'-P-CCNC: 18 U/L (ref 0–45)
AST SERPL W P-5'-P-CCNC: 21 U/L (ref 0–45)
AST SERPL W P-5'-P-CCNC: 21 U/L (ref 0–45)
BACTERIA SPEC CULT: ABNORMAL
BACTERIA SPEC CULT: ABNORMAL
BACTERIA SPEC CULT: NO GROWTH
BACTERIA SPEC CULT: NORMAL
BASE DEFICIT BLDA-SCNC: 2.2 MMOL/L
BASE DEFICIT BLDA-SCNC: 3.3 MMOL/L
BASE DEFICIT BLDV-SCNC: 5.4 MMOL/L
BASE EXCESS BLDA CALC-SCNC: 1.3 MMOL/L
BASOPHILS # BLD AUTO: 0 10E9/L (ref 0–0.2)
BASOPHILS # BLD AUTO: 0.1 10E9/L (ref 0–0.2)
BASOPHILS NFR BLD AUTO: 0 %
BASOPHILS NFR BLD AUTO: 0.2 %
BASOPHILS NFR BLD AUTO: 0.3 %
BASOPHILS NFR BLD AUTO: 0.3 %
BILIRUB SERPL-MCNC: 0.4 MG/DL (ref 0.2–1.3)
BILIRUB UR QL STRIP: NEGATIVE
BLD GP AB SCN SERPL QL: NORMAL
BLD PROD TYP BPU: NORMAL
BLD UNIT ID BPU: 0
BLOOD BANK CMNT PATIENT-IMP: NORMAL
BLOOD PRODUCT CODE: NORMAL
BPU ID: NORMAL
BUN SERPL-MCNC: 10 MG/DL (ref 7–30)
BUN SERPL-MCNC: 11 MG/DL (ref 7–30)
BUN SERPL-MCNC: 11 MG/DL (ref 7–30)
BUN SERPL-MCNC: 12 MG/DL (ref 7–30)
BUN SERPL-MCNC: 13 MG/DL (ref 7–30)
BUN SERPL-MCNC: 13 MG/DL (ref 7–30)
BUN SERPL-MCNC: 15 MG/DL (ref 7–30)
BUN SERPL-MCNC: 16 MG/DL (ref 7–30)
BUN SERPL-MCNC: 18 MG/DL (ref 7–30)
BUN SERPL-MCNC: 20 MG/DL (ref 7–30)
BUN SERPL-MCNC: 20 MG/DL (ref 7–30)
BUN SERPL-MCNC: 23 MG/DL (ref 7–30)
BUN SERPL-MCNC: 27 MG/DL (ref 7–30)
BUN SERPL-MCNC: 30 MG/DL (ref 7–30)
BUN SERPL-MCNC: 31 MG/DL (ref 7–30)
BUN SERPL-MCNC: 31 MG/DL (ref 7–30)
BUN SERPL-MCNC: 32 MG/DL (ref 7–30)
BUN SERPL-MCNC: 37 MG/DL (ref 7–30)
BUN SERPL-MCNC: 39 MG/DL (ref 7–30)
BUN SERPL-MCNC: 4 MG/DL (ref 7–30)
BUN SERPL-MCNC: 4 MG/DL (ref 7–30)
BUN SERPL-MCNC: 40 MG/DL (ref 7–30)
BUN SERPL-MCNC: 5 MG/DL (ref 7–30)
BUN SERPL-MCNC: 6 MG/DL (ref 7–30)
BUN SERPL-MCNC: 7 MG/DL (ref 7–30)
BUN SERPL-MCNC: 8 MG/DL (ref 7–30)
BUN SERPL-MCNC: 8 MG/DL (ref 7–30)
BUN SERPL-MCNC: 9 MG/DL (ref 7–30)
BURR CELLS BLD QL SMEAR: SLIGHT
CA-I BLD-MCNC: 4.3 MG/DL (ref 4.4–5.2)
CA-I BLD-MCNC: 4.4 MG/DL (ref 4.4–5.2)
CA-I SERPL ISE-MCNC: 4.2 MG/DL (ref 4.4–5.2)
CA-I SERPL ISE-MCNC: 4.3 MG/DL (ref 4.4–5.2)
CA-I SERPL ISE-MCNC: 4.3 MG/DL (ref 4.4–5.2)
CA-I SERPL ISE-MCNC: 4.5 MG/DL (ref 4.4–5.2)
CA-I SERPL ISE-MCNC: 4.6 MG/DL (ref 4.4–5.2)
CA-I SERPL ISE-MCNC: 4.7 MG/DL (ref 4.4–5.2)
CA-I SERPL ISE-MCNC: 4.7 MG/DL (ref 4.4–5.2)
CA-I SERPL ISE-MCNC: NORMAL MG/DL (ref 4.4–5.2)
CALCIUM SERPL-MCNC: 7.3 MG/DL (ref 8.5–10.1)
CALCIUM SERPL-MCNC: 7.6 MG/DL (ref 8.5–10.1)
CALCIUM SERPL-MCNC: 8 MG/DL (ref 8.5–10.1)
CALCIUM SERPL-MCNC: 8 MG/DL (ref 8.5–10.1)
CALCIUM SERPL-MCNC: 8.1 MG/DL (ref 8.5–10.1)
CALCIUM SERPL-MCNC: 8.1 MG/DL (ref 8.5–10.1)
CALCIUM SERPL-MCNC: 8.3 MG/DL (ref 8.5–10.1)
CALCIUM SERPL-MCNC: 8.4 MG/DL (ref 8.5–10.1)
CALCIUM SERPL-MCNC: 8.5 MG/DL (ref 8.5–10.1)
CALCIUM SERPL-MCNC: 8.6 MG/DL (ref 8.5–10.1)
CALCIUM SERPL-MCNC: 8.7 MG/DL (ref 8.5–10.1)
CALCIUM SERPL-MCNC: 8.7 MG/DL (ref 8.5–10.1)
CALCIUM SERPL-MCNC: 8.8 MG/DL (ref 8.5–10.1)
CALCIUM SERPL-MCNC: 8.8 MG/DL (ref 8.5–10.1)
CALCIUM SERPL-MCNC: 8.9 MG/DL (ref 8.5–10.1)
CALCIUM SERPL-MCNC: 8.9 MG/DL (ref 8.5–10.1)
CALCIUM SERPL-MCNC: 9 MG/DL (ref 8.5–10.1)
CALCIUM SERPL-MCNC: 9.1 MG/DL (ref 8.5–10.1)
CALCIUM SERPL-MCNC: 9.2 MG/DL (ref 8.5–10.1)
CALCIUM SERPL-MCNC: 9.3 MG/DL (ref 8.5–10.1)
CALCIUM SERPL-MCNC: 9.3 MG/DL (ref 8.5–10.1)
CALCIUM SERPL-MCNC: 9.4 MG/DL (ref 8.5–10.1)
CHLORIDE SERPL-SCNC: 102 MMOL/L (ref 94–109)
CHLORIDE SERPL-SCNC: 103 MMOL/L (ref 94–109)
CHLORIDE SERPL-SCNC: 103 MMOL/L (ref 94–109)
CHLORIDE SERPL-SCNC: 104 MMOL/L (ref 94–109)
CHLORIDE SERPL-SCNC: 105 MMOL/L (ref 94–109)
CHLORIDE SERPL-SCNC: 106 MMOL/L (ref 94–109)
CHLORIDE SERPL-SCNC: 107 MMOL/L (ref 94–109)
CHLORIDE SERPL-SCNC: 108 MMOL/L (ref 94–109)
CHLORIDE SERPL-SCNC: 109 MMOL/L (ref 94–109)
CHLORIDE SERPL-SCNC: 110 MMOL/L (ref 94–109)
CHLORIDE SERPL-SCNC: 110 MMOL/L (ref 94–109)
CHLORIDE SERPL-SCNC: 112 MMOL/L (ref 94–109)
CHLORIDE SERPL-SCNC: 98 MMOL/L (ref 94–109)
CHOLEST SERPL-MCNC: 110 MG/DL
CHOLEST SERPL-MCNC: 191 MG/DL
CHOLEST SERPL-MCNC: 214 MG/DL
CI HYPERCOAGULATION INDEX: 5.2 RATIO (ref 0–3)
CLOT LYSIS 30M P MA LENFR BLD TEG: 10 % (ref 0–8)
CLOT STRENGTH BLD TEG: 35 KD/SC (ref 5.3–13.2)
CO2 SERPL-SCNC: 16 MMOL/L (ref 20–32)
CO2 SERPL-SCNC: 16 MMOL/L (ref 20–32)
CO2 SERPL-SCNC: 17 MMOL/L (ref 20–32)
CO2 SERPL-SCNC: 18 MMOL/L (ref 20–32)
CO2 SERPL-SCNC: 18 MMOL/L (ref 20–32)
CO2 SERPL-SCNC: 19 MMOL/L (ref 20–32)
CO2 SERPL-SCNC: 20 MMOL/L (ref 20–32)
CO2 SERPL-SCNC: 21 MMOL/L (ref 20–32)
CO2 SERPL-SCNC: 22 MMOL/L (ref 20–32)
CO2 SERPL-SCNC: 23 MMOL/L (ref 20–32)
CO2 SERPL-SCNC: 24 MMOL/L (ref 20–32)
CO2 SERPL-SCNC: 25 MMOL/L (ref 20–32)
CO2 SERPL-SCNC: 25 MMOL/L (ref 20–32)
CO2 SERPL-SCNC: 26 MMOL/L (ref 20–32)
CO2 SERPL-SCNC: 27 MMOL/L (ref 20–32)
CO2 SERPL-SCNC: 28 MMOL/L (ref 20–32)
CO2 SERPL-SCNC: 29 MMOL/L (ref 20–32)
COLOR UR AUTO: YELLOW
COPATH REPORT: NORMAL
CREAT SERPL-MCNC: 0.44 MG/DL (ref 0.52–1.04)
CREAT SERPL-MCNC: 0.45 MG/DL (ref 0.52–1.04)
CREAT SERPL-MCNC: 0.47 MG/DL (ref 0.52–1.04)
CREAT SERPL-MCNC: 0.48 MG/DL (ref 0.52–1.04)
CREAT SERPL-MCNC: 0.5 MG/DL (ref 0.52–1.04)
CREAT SERPL-MCNC: 0.51 MG/DL (ref 0.52–1.04)
CREAT SERPL-MCNC: 0.52 MG/DL (ref 0.52–1.04)
CREAT SERPL-MCNC: 0.53 MG/DL (ref 0.52–1.04)
CREAT SERPL-MCNC: 0.53 MG/DL (ref 0.52–1.04)
CREAT SERPL-MCNC: 0.54 MG/DL (ref 0.52–1.04)
CREAT SERPL-MCNC: 0.54 MG/DL (ref 0.52–1.04)
CREAT SERPL-MCNC: 0.55 MG/DL (ref 0.52–1.04)
CREAT SERPL-MCNC: 0.56 MG/DL (ref 0.52–1.04)
CREAT SERPL-MCNC: 0.58 MG/DL (ref 0.52–1.04)
CREAT SERPL-MCNC: 0.6 MG/DL (ref 0.52–1.04)
CREAT SERPL-MCNC: 0.61 MG/DL (ref 0.52–1.04)
CREAT SERPL-MCNC: 0.63 MG/DL (ref 0.52–1.04)
CREAT SERPL-MCNC: 0.65 MG/DL (ref 0.52–1.04)
CREAT SERPL-MCNC: 0.65 MG/DL (ref 0.52–1.04)
CREAT SERPL-MCNC: 0.66 MG/DL (ref 0.52–1.04)
CREAT SERPL-MCNC: 0.67 MG/DL (ref 0.52–1.04)
CREAT SERPL-MCNC: 0.68 MG/DL (ref 0.52–1.04)
CREAT SERPL-MCNC: 0.68 MG/DL (ref 0.52–1.04)
CREAT SERPL-MCNC: 0.78 MG/DL (ref 0.52–1.04)
CREAT UR-MCNC: 47 MG/DL
CRP SERPL-MCNC: 110 MG/L (ref 0–8)
CRP SERPL-MCNC: 140 MG/L (ref 0–8)
CRP SERPL-MCNC: 19 MG/L (ref 0–8)
CRP SERPL-MCNC: 23 MG/L (ref 0–8)
CRP SERPL-MCNC: 44 MG/L (ref 0–8)
CRP SERPL-MCNC: 47 MG/L (ref 0–8)
CRP SERPL-MCNC: 68 MG/L (ref 0–8)
CRP SERPL-MCNC: 75 MG/L (ref 0–8)
CRP SERPL-MCNC: 84 MG/L (ref 0–8)
CRP SERPL-MCNC: 99 MG/L (ref 0–8)
DIFFERENTIAL METHOD BLD: ABNORMAL
EOSINOPHIL # BLD AUTO: 0.1 10E9/L (ref 0–0.7)
EOSINOPHIL # BLD AUTO: 0.9 10E9/L (ref 0–0.7)
EOSINOPHIL # BLD AUTO: 1.1 10E9/L (ref 0–0.7)
EOSINOPHIL # BLD AUTO: 1.7 10E9/L (ref 0–0.7)
EOSINOPHIL NFR BLD AUTO: 0.6 %
EOSINOPHIL NFR BLD AUTO: 6.5 %
EOSINOPHIL NFR BLD AUTO: 6.7 %
EOSINOPHIL NFR BLD AUTO: 9.5 %
ERYTHROCYTE [DISTWIDTH] IN BLOOD BY AUTOMATED COUNT: 12.5 % (ref 10–15)
ERYTHROCYTE [DISTWIDTH] IN BLOOD BY AUTOMATED COUNT: 12.6 % (ref 10–15)
ERYTHROCYTE [DISTWIDTH] IN BLOOD BY AUTOMATED COUNT: 12.6 % (ref 10–15)
ERYTHROCYTE [DISTWIDTH] IN BLOOD BY AUTOMATED COUNT: 12.8 % (ref 10–15)
ERYTHROCYTE [DISTWIDTH] IN BLOOD BY AUTOMATED COUNT: 13 % (ref 10–15)
ERYTHROCYTE [DISTWIDTH] IN BLOOD BY AUTOMATED COUNT: 13.1 % (ref 10–15)
ERYTHROCYTE [DISTWIDTH] IN BLOOD BY AUTOMATED COUNT: 13.2 % (ref 10–15)
ERYTHROCYTE [DISTWIDTH] IN BLOOD BY AUTOMATED COUNT: 13.2 % (ref 10–15)
ERYTHROCYTE [DISTWIDTH] IN BLOOD BY AUTOMATED COUNT: 13.3 % (ref 10–15)
ERYTHROCYTE [DISTWIDTH] IN BLOOD BY AUTOMATED COUNT: 13.4 % (ref 10–15)
ERYTHROCYTE [DISTWIDTH] IN BLOOD BY AUTOMATED COUNT: 13.5 % (ref 10–15)
ERYTHROCYTE [DISTWIDTH] IN BLOOD BY AUTOMATED COUNT: 13.6 % (ref 10–15)
ERYTHROCYTE [DISTWIDTH] IN BLOOD BY AUTOMATED COUNT: 13.6 % (ref 10–15)
ERYTHROCYTE [DISTWIDTH] IN BLOOD BY AUTOMATED COUNT: 13.7 % (ref 10–15)
ERYTHROCYTE [DISTWIDTH] IN BLOOD BY AUTOMATED COUNT: 14.1 % (ref 10–15)
ERYTHROCYTE [DISTWIDTH] IN BLOOD BY AUTOMATED COUNT: 14.2 % (ref 10–15)
ERYTHROCYTE [DISTWIDTH] IN BLOOD BY AUTOMATED COUNT: 14.4 % (ref 10–15)
ERYTHROCYTE [DISTWIDTH] IN BLOOD BY AUTOMATED COUNT: 14.4 % (ref 10–15)
ERYTHROCYTE [DISTWIDTH] IN BLOOD BY AUTOMATED COUNT: 14.5 % (ref 10–15)
ERYTHROCYTE [DISTWIDTH] IN BLOOD BY AUTOMATED COUNT: 14.6 % (ref 10–15)
ERYTHROCYTE [DISTWIDTH] IN BLOOD BY AUTOMATED COUNT: 14.7 % (ref 10–15)
ERYTHROCYTE [DISTWIDTH] IN BLOOD BY AUTOMATED COUNT: 14.7 % (ref 10–15)
ERYTHROCYTE [DISTWIDTH] IN BLOOD BY AUTOMATED COUNT: 14.8 % (ref 10–15)
ERYTHROCYTE [DISTWIDTH] IN BLOOD BY AUTOMATED COUNT: 15.1 % (ref 10–15)
ERYTHROCYTE [DISTWIDTH] IN BLOOD BY AUTOMATED COUNT: 16.5 % (ref 10–15)
ERYTHROCYTE [DISTWIDTH] IN BLOOD BY AUTOMATED COUNT: 16.7 % (ref 10–15)
ERYTHROCYTE [DISTWIDTH] IN BLOOD BY AUTOMATED COUNT: 16.9 % (ref 10–15)
ERYTHROCYTE [DISTWIDTH] IN BLOOD BY AUTOMATED COUNT: 16.9 % (ref 10–15)
ERYTHROCYTE [DISTWIDTH] IN BLOOD BY AUTOMATED COUNT: 17 % (ref 10–15)
FEF 25/75: 0.34
FEF 25/75: NORMAL
FERRITIN SERPL-MCNC: 1069 NG/ML (ref 8–252)
FEV-1: 0.58
FEV-1: NORMAL
FEV1/FVC: NORMAL
FEV1/FVC: NORMAL
FOLATE SERPL-MCNC: 36.4 NG/ML
FVC: 0.89
FVC: NORMAL
GFR SERPL CREATININE-BSD FRML MDRD: 72 ML/MIN/1.7M2
GFR SERPL CREATININE-BSD FRML MDRD: 85 ML/MIN/1.7M2
GFR SERPL CREATININE-BSD FRML MDRD: 85 ML/MIN/1.7M2
GFR SERPL CREATININE-BSD FRML MDRD: 86 ML/MIN/1.7M2
GFR SERPL CREATININE-BSD FRML MDRD: 88 ML/MIN/1.7M2
GFR SERPL CREATININE-BSD FRML MDRD: 89 ML/MIN/1.7M2
GFR SERPL CREATININE-BSD FRML MDRD: 89 ML/MIN/1.7M2
GFR SERPL CREATININE-BSD FRML MDRD: >90 ML/MIN/1.7M2
GLUCOSE BLD-MCNC: 159 MG/DL (ref 70–99)
GLUCOSE BLDC GLUCOMTR-MCNC: 102 MG/DL (ref 70–99)
GLUCOSE BLDC GLUCOMTR-MCNC: 103 MG/DL (ref 70–99)
GLUCOSE BLDC GLUCOMTR-MCNC: 103 MG/DL (ref 70–99)
GLUCOSE BLDC GLUCOMTR-MCNC: 104 MG/DL (ref 70–99)
GLUCOSE BLDC GLUCOMTR-MCNC: 104 MG/DL (ref 70–99)
GLUCOSE BLDC GLUCOMTR-MCNC: 105 MG/DL (ref 70–99)
GLUCOSE BLDC GLUCOMTR-MCNC: 106 MG/DL (ref 70–99)
GLUCOSE BLDC GLUCOMTR-MCNC: 110 MG/DL (ref 70–99)
GLUCOSE BLDC GLUCOMTR-MCNC: 111 MG/DL (ref 70–99)
GLUCOSE BLDC GLUCOMTR-MCNC: 112 MG/DL (ref 70–99)
GLUCOSE BLDC GLUCOMTR-MCNC: 112 MG/DL (ref 70–99)
GLUCOSE BLDC GLUCOMTR-MCNC: 115 MG/DL (ref 70–99)
GLUCOSE BLDC GLUCOMTR-MCNC: 116 MG/DL (ref 70–99)
GLUCOSE BLDC GLUCOMTR-MCNC: 118 MG/DL (ref 70–99)
GLUCOSE BLDC GLUCOMTR-MCNC: 118 MG/DL (ref 70–99)
GLUCOSE BLDC GLUCOMTR-MCNC: 120 MG/DL (ref 70–99)
GLUCOSE BLDC GLUCOMTR-MCNC: 120 MG/DL (ref 70–99)
GLUCOSE BLDC GLUCOMTR-MCNC: 121 MG/DL (ref 70–99)
GLUCOSE BLDC GLUCOMTR-MCNC: 121 MG/DL (ref 70–99)
GLUCOSE BLDC GLUCOMTR-MCNC: 123 MG/DL (ref 70–99)
GLUCOSE BLDC GLUCOMTR-MCNC: 124 MG/DL (ref 70–99)
GLUCOSE BLDC GLUCOMTR-MCNC: 125 MG/DL (ref 70–99)
GLUCOSE BLDC GLUCOMTR-MCNC: 126 MG/DL (ref 70–99)
GLUCOSE BLDC GLUCOMTR-MCNC: 127 MG/DL (ref 70–99)
GLUCOSE BLDC GLUCOMTR-MCNC: 128 MG/DL (ref 70–99)
GLUCOSE BLDC GLUCOMTR-MCNC: 130 MG/DL (ref 70–99)
GLUCOSE BLDC GLUCOMTR-MCNC: 130 MG/DL (ref 70–99)
GLUCOSE BLDC GLUCOMTR-MCNC: 131 MG/DL (ref 70–99)
GLUCOSE BLDC GLUCOMTR-MCNC: 132 MG/DL (ref 70–99)
GLUCOSE BLDC GLUCOMTR-MCNC: 133 MG/DL (ref 70–99)
GLUCOSE BLDC GLUCOMTR-MCNC: 135 MG/DL (ref 70–99)
GLUCOSE BLDC GLUCOMTR-MCNC: 140 MG/DL (ref 70–99)
GLUCOSE BLDC GLUCOMTR-MCNC: 141 MG/DL (ref 70–99)
GLUCOSE BLDC GLUCOMTR-MCNC: 145 MG/DL (ref 70–99)
GLUCOSE BLDC GLUCOMTR-MCNC: 148 MG/DL (ref 70–99)
GLUCOSE BLDC GLUCOMTR-MCNC: 155 MG/DL (ref 70–99)
GLUCOSE BLDC GLUCOMTR-MCNC: 165 MG/DL (ref 70–99)
GLUCOSE BLDC GLUCOMTR-MCNC: 68 MG/DL (ref 70–99)
GLUCOSE BLDC GLUCOMTR-MCNC: 73 MG/DL (ref 70–99)
GLUCOSE BLDC GLUCOMTR-MCNC: 79 MG/DL (ref 70–99)
GLUCOSE BLDC GLUCOMTR-MCNC: 80 MG/DL (ref 70–99)
GLUCOSE BLDC GLUCOMTR-MCNC: 85 MG/DL (ref 70–99)
GLUCOSE BLDC GLUCOMTR-MCNC: 86 MG/DL (ref 70–99)
GLUCOSE BLDC GLUCOMTR-MCNC: 89 MG/DL (ref 70–99)
GLUCOSE BLDC GLUCOMTR-MCNC: 92 MG/DL (ref 70–99)
GLUCOSE BLDC GLUCOMTR-MCNC: 92 MG/DL (ref 70–99)
GLUCOSE BLDC GLUCOMTR-MCNC: 95 MG/DL (ref 70–99)
GLUCOSE BLDC GLUCOMTR-MCNC: 97 MG/DL (ref 70–99)
GLUCOSE BLDC GLUCOMTR-MCNC: 98 MG/DL (ref 70–99)
GLUCOSE BLDC GLUCOMTR-MCNC: 98 MG/DL (ref 70–99)
GLUCOSE SERPL-MCNC: 100 MG/DL (ref 70–99)
GLUCOSE SERPL-MCNC: 101 MG/DL (ref 70–99)
GLUCOSE SERPL-MCNC: 102 MG/DL (ref 70–99)
GLUCOSE SERPL-MCNC: 103 MG/DL (ref 70–99)
GLUCOSE SERPL-MCNC: 104 MG/DL (ref 70–99)
GLUCOSE SERPL-MCNC: 107 MG/DL (ref 70–99)
GLUCOSE SERPL-MCNC: 114 MG/DL (ref 70–99)
GLUCOSE SERPL-MCNC: 114 MG/DL (ref 70–99)
GLUCOSE SERPL-MCNC: 118 MG/DL (ref 70–99)
GLUCOSE SERPL-MCNC: 119 MG/DL (ref 70–99)
GLUCOSE SERPL-MCNC: 121 MG/DL (ref 70–99)
GLUCOSE SERPL-MCNC: 122 MG/DL (ref 70–99)
GLUCOSE SERPL-MCNC: 122 MG/DL (ref 70–99)
GLUCOSE SERPL-MCNC: 124 MG/DL (ref 70–99)
GLUCOSE SERPL-MCNC: 128 MG/DL (ref 70–99)
GLUCOSE SERPL-MCNC: 128 MG/DL (ref 70–99)
GLUCOSE SERPL-MCNC: 130 MG/DL (ref 70–99)
GLUCOSE SERPL-MCNC: 130 MG/DL (ref 70–99)
GLUCOSE SERPL-MCNC: 133 MG/DL (ref 70–99)
GLUCOSE SERPL-MCNC: 134 MG/DL (ref 70–99)
GLUCOSE SERPL-MCNC: 135 MG/DL (ref 70–99)
GLUCOSE SERPL-MCNC: 139 MG/DL (ref 70–99)
GLUCOSE SERPL-MCNC: 141 MG/DL (ref 70–99)
GLUCOSE SERPL-MCNC: 142 MG/DL (ref 70–99)
GLUCOSE SERPL-MCNC: 154 MG/DL (ref 70–99)
GLUCOSE SERPL-MCNC: 155 MG/DL (ref 70–99)
GLUCOSE SERPL-MCNC: 156 MG/DL (ref 70–99)
GLUCOSE SERPL-MCNC: 164 MG/DL (ref 70–99)
GLUCOSE SERPL-MCNC: 170 MG/DL (ref 70–99)
GLUCOSE SERPL-MCNC: 179 MG/DL (ref 70–99)
GLUCOSE SERPL-MCNC: 91 MG/DL (ref 70–99)
GLUCOSE SERPL-MCNC: 95 MG/DL (ref 70–99)
GLUCOSE SERPL-MCNC: 97 MG/DL (ref 70–99)
GLUCOSE SERPL-MCNC: 98 MG/DL (ref 70–99)
GLUCOSE UR STRIP-MCNC: NEGATIVE MG/DL
GRAM STN SPEC: ABNORMAL
GRAM STN SPEC: ABNORMAL
GRAM STN SPEC: NORMAL
GRAM STN SPEC: NORMAL
HAPTOGLOB SERPL-MCNC: 528 MG/DL (ref 35–175)
HBA1C MFR BLD: 5.7 % (ref 0–5.6)
HCO3 BLD-SCNC: 20 MMOL/L (ref 21–28)
HCO3 BLD-SCNC: 21 MMOL/L (ref 21–28)
HCO3 BLD-SCNC: 25 MMOL/L (ref 21–28)
HCO3 BLDV-SCNC: 20 MMOL/L (ref 21–28)
HCT VFR BLD AUTO: 23.3 % (ref 35–47)
HCT VFR BLD AUTO: 26.1 % (ref 35–47)
HCT VFR BLD AUTO: 26.5 % (ref 35–47)
HCT VFR BLD AUTO: 27.4 % (ref 35–47)
HCT VFR BLD AUTO: 27.5 % (ref 35–47)
HCT VFR BLD AUTO: 27.7 % (ref 35–47)
HCT VFR BLD AUTO: 27.8 % (ref 35–47)
HCT VFR BLD AUTO: 28.9 % (ref 35–47)
HCT VFR BLD AUTO: 29 % (ref 35–47)
HCT VFR BLD AUTO: 29.1 % (ref 35–47)
HCT VFR BLD AUTO: 29.2 % (ref 35–47)
HCT VFR BLD AUTO: 29.2 % (ref 35–47)
HCT VFR BLD AUTO: 29.5 % (ref 35–47)
HCT VFR BLD AUTO: 30.3 % (ref 35–47)
HCT VFR BLD AUTO: 30.6 % (ref 35–47)
HCT VFR BLD AUTO: 30.8 % (ref 35–47)
HCT VFR BLD AUTO: 32.2 % (ref 35–47)
HCT VFR BLD AUTO: 33.1 % (ref 35–47)
HCT VFR BLD AUTO: 33.1 % (ref 35–47)
HCT VFR BLD AUTO: 33.7 % (ref 35–47)
HCT VFR BLD AUTO: 33.7 % (ref 35–47)
HCT VFR BLD AUTO: 33.9 % (ref 35–47)
HCT VFR BLD AUTO: 34.4 % (ref 35–47)
HCT VFR BLD AUTO: 34.9 % (ref 35–47)
HCT VFR BLD AUTO: 35.1 % (ref 35–47)
HCT VFR BLD AUTO: 37.1 % (ref 35–47)
HCT VFR BLD AUTO: 37.5 % (ref 35–47)
HCT VFR BLD AUTO: 38 % (ref 35–47)
HCT VFR BLD AUTO: 38.2 % (ref 35–47)
HCT VFR BLD AUTO: 38.5 % (ref 35–47)
HCT VFR BLD AUTO: 38.7 % (ref 35–47)
HCT VFR BLD AUTO: 40.2 % (ref 35–47)
HCT VFR BLD AUTO: 40.2 % (ref 35–47)
HCT VFR BLD AUTO: 41.4 % (ref 35–47)
HCT VFR BLD AUTO: 42 % (ref 35–47)
HCT VFR BLD AUTO: 42.8 % (ref 35–47)
HCT VFR BLD AUTO: 42.8 % (ref 35–47)
HCT VFR BLD AUTO: 44.1 % (ref 35–47)
HCT VFR BLD AUTO: 45.3 % (ref 35–47)
HCT VFR BLD AUTO: 46.7 % (ref 35–47)
HCT VFR BLD AUTO: 47 % (ref 35–47)
HCT VFR BLD AUTO: 47.1 % (ref 35–47)
HDLC SERPL-MCNC: 35 MG/DL
HDLC SERPL-MCNC: 44 MG/DL
HDLC SERPL-MCNC: 57 MG/DL
HGB BLD-MCNC: 10.1 G/DL (ref 11.7–15.7)
HGB BLD-MCNC: 10.4 G/DL (ref 11.7–15.7)
HGB BLD-MCNC: 10.4 G/DL (ref 11.7–15.7)
HGB BLD-MCNC: 10.5 G/DL (ref 11.7–15.7)
HGB BLD-MCNC: 10.6 G/DL (ref 11.7–15.7)
HGB BLD-MCNC: 10.9 G/DL (ref 11.7–15.7)
HGB BLD-MCNC: 11.1 G/DL (ref 11.7–15.7)
HGB BLD-MCNC: 11.2 G/DL (ref 11.7–15.7)
HGB BLD-MCNC: 11.3 G/DL (ref 11.7–15.7)
HGB BLD-MCNC: 11.5 G/DL (ref 11.7–15.7)
HGB BLD-MCNC: 11.8 G/DL (ref 11.7–15.7)
HGB BLD-MCNC: 11.9 G/DL (ref 11.7–15.7)
HGB BLD-MCNC: 12 G/DL (ref 11.7–15.7)
HGB BLD-MCNC: 12.3 G/DL (ref 11.7–15.7)
HGB BLD-MCNC: 12.4 G/DL (ref 11.7–15.7)
HGB BLD-MCNC: 13 G/DL (ref 11.7–15.7)
HGB BLD-MCNC: 13.1 G/DL (ref 11.7–15.7)
HGB BLD-MCNC: 13.5 G/DL (ref 11.7–15.7)
HGB BLD-MCNC: 13.7 G/DL (ref 11.7–15.7)
HGB BLD-MCNC: 14 G/DL (ref 11.7–15.7)
HGB BLD-MCNC: 14 G/DL (ref 11.7–15.7)
HGB BLD-MCNC: 14.7 G/DL (ref 11.7–15.7)
HGB BLD-MCNC: 14.9 G/DL (ref 11.7–15.7)
HGB BLD-MCNC: 14.9 G/DL (ref 11.7–15.7)
HGB BLD-MCNC: 15 G/DL (ref 11.7–15.7)
HGB BLD-MCNC: 15.4 G/DL (ref 11.7–15.7)
HGB BLD-MCNC: 7.2 G/DL (ref 11.7–15.7)
HGB BLD-MCNC: 7.3 G/DL (ref 11.7–15.7)
HGB BLD-MCNC: 7.5 G/DL (ref 11.7–15.7)
HGB BLD-MCNC: 8.2 G/DL (ref 11.7–15.7)
HGB BLD-MCNC: 8.3 G/DL (ref 11.7–15.7)
HGB BLD-MCNC: 8.4 G/DL (ref 11.7–15.7)
HGB BLD-MCNC: 8.5 G/DL (ref 11.7–15.7)
HGB BLD-MCNC: 8.7 G/DL (ref 11.7–15.7)
HGB BLD-MCNC: 8.8 G/DL (ref 11.7–15.7)
HGB BLD-MCNC: 8.8 G/DL (ref 11.7–15.7)
HGB BLD-MCNC: 9 G/DL (ref 11.7–15.7)
HGB BLD-MCNC: 9.1 G/DL (ref 11.7–15.7)
HGB BLD-MCNC: 9.2 G/DL (ref 11.7–15.7)
HGB BLD-MCNC: 9.4 G/DL (ref 11.7–15.7)
HGB BLD-MCNC: 9.5 G/DL (ref 11.7–15.7)
HGB BLD-MCNC: 9.6 G/DL (ref 11.7–15.7)
HGB BLD-MCNC: 9.6 G/DL (ref 11.7–15.7)
HGB BLD-MCNC: 9.7 G/DL (ref 11.7–15.7)
HGB BLD-MCNC: 9.8 G/DL (ref 11.7–15.7)
HGB BLD-MCNC: 9.9 G/DL (ref 11.7–15.7)
HGB BLD-MCNC: 9.9 G/DL (ref 11.7–15.7)
HGB UR QL STRIP: ABNORMAL
HGB UR QL STRIP: ABNORMAL
HGB UR QL STRIP: NEGATIVE
IMM GRANULOCYTES # BLD: 0 10E9/L (ref 0–0.4)
IMM GRANULOCYTES # BLD: 0.1 10E9/L (ref 0–0.4)
IMM GRANULOCYTES # BLD: 0.1 10E9/L (ref 0–0.4)
IMM GRANULOCYTES NFR BLD: 0.3 %
IMM GRANULOCYTES NFR BLD: 0.6 %
IMM GRANULOCYTES NFR BLD: 0.6 %
INR PPP: 1.11 (ref 0.86–1.14)
INR PPP: 1.16 (ref 0.86–1.14)
INTERPRETATION ECG - MUSE: NORMAL
IRON SATN MFR SERPL: 10 % (ref 15–46)
IRON SERPL-MCNC: 26 UG/DL (ref 35–180)
K TIME TO SPEC CLOT STRENGTH: 1 MIN (ref 1–3)
KETONES UR STRIP-MCNC: 10 MG/DL
KETONES UR STRIP-MCNC: 40 MG/DL
KETONES UR STRIP-MCNC: NEGATIVE MG/DL
LACTATE BLD-SCNC: 0.7 MMOL/L (ref 0.7–2)
LACTATE BLD-SCNC: 0.8 MMOL/L (ref 0.4–1.9)
LACTATE BLD-SCNC: 0.9 MMOL/L (ref 0.4–1.9)
LACTATE BLD-SCNC: 0.9 MMOL/L (ref 0.4–1.9)
LACTATE BLD-SCNC: 0.9 MMOL/L (ref 0.7–2)
LACTATE BLD-SCNC: 0.9 MMOL/L (ref 0.7–2)
LACTATE BLD-SCNC: 1 MMOL/L (ref 0.7–2)
LACTATE BLD-SCNC: 1.1 MMOL/L (ref 0.7–2)
LACTATE BLD-SCNC: 1.2 MMOL/L (ref 0.7–2)
LACTATE BLD-SCNC: 1.3 MMOL/L (ref 0.4–1.9)
LACTATE BLD-SCNC: 1.3 MMOL/L (ref 0.7–2)
LACTATE BLD-SCNC: 1.4 MMOL/L (ref 0.7–2)
LACTATE BLD-SCNC: 1.5 MMOL/L (ref 0.4–1.9)
LACTATE BLD-SCNC: 1.5 MMOL/L (ref 0.7–2)
LDH SERPL L TO P-CCNC: 128 U/L (ref 81–234)
LDH SERPL L TO P-CCNC: 178 U/L (ref 81–234)
LDLC SERPL CALC-MCNC: 125 MG/DL
LDLC SERPL CALC-MCNC: 125 MG/DL
LDLC SERPL CALC-MCNC: 59 MG/DL
LEUKOCYTE ESTERASE UR QL STRIP: ABNORMAL
LY60 LYSIS AT 60 MINUTES: 19.2 % (ref 0–15)
LYMPHOCYTES # BLD AUTO: 0.3 10E9/L (ref 0.8–5.3)
LYMPHOCYTES # BLD AUTO: 1.9 10E9/L (ref 0.8–5.3)
LYMPHOCYTES # BLD AUTO: 2.8 10E9/L (ref 0.8–5.3)
LYMPHOCYTES # BLD AUTO: 3.1 10E9/L (ref 0.8–5.3)
LYMPHOCYTES NFR BLD AUTO: 1.7 %
LYMPHOCYTES NFR BLD AUTO: 14.6 %
LYMPHOCYTES NFR BLD AUTO: 17.2 %
LYMPHOCYTES NFR BLD AUTO: 24.7 %
Lab: ABNORMAL
Lab: ABNORMAL
Lab: NORMAL
MA MAXIMUM CLOT STRENGTH: 87.5 MM (ref 55–74)
MAGNESIUM SERPL-MCNC: 1.9 MG/DL (ref 1.6–2.3)
MAGNESIUM SERPL-MCNC: 2 MG/DL (ref 1.6–2.3)
MAGNESIUM SERPL-MCNC: 2.1 MG/DL (ref 1.6–2.3)
MAGNESIUM SERPL-MCNC: 2.2 MG/DL (ref 1.6–2.3)
MAGNESIUM SERPL-MCNC: 2.3 MG/DL (ref 1.6–2.3)
MAGNESIUM SERPL-MCNC: 2.4 MG/DL (ref 1.6–2.3)
MAGNESIUM SERPL-MCNC: 2.4 MG/DL (ref 1.6–2.3)
MAGNESIUM SERPL-MCNC: 2.5 MG/DL (ref 1.6–2.3)
MAGNESIUM SERPL-MCNC: 2.6 MG/DL (ref 1.6–2.3)
MAGNESIUM SERPL-MCNC: 2.7 MG/DL (ref 1.6–2.3)
MAGNESIUM SERPL-MCNC: 2.8 MG/DL (ref 1.6–2.3)
MAGNESIUM SERPL-MCNC: 2.8 MG/DL (ref 1.6–2.3)
MCH RBC QN AUTO: 28.7 PG (ref 26.5–33)
MCH RBC QN AUTO: 28.8 PG (ref 26.5–33)
MCH RBC QN AUTO: 28.9 PG (ref 26.5–33)
MCH RBC QN AUTO: 29 PG (ref 26.5–33)
MCH RBC QN AUTO: 29 PG (ref 26.5–33)
MCH RBC QN AUTO: 29.1 PG (ref 26.5–33)
MCH RBC QN AUTO: 29.2 PG (ref 26.5–33)
MCH RBC QN AUTO: 29.3 PG (ref 26.5–33)
MCH RBC QN AUTO: 29.5 PG (ref 26.5–33)
MCH RBC QN AUTO: 29.5 PG (ref 26.5–33)
MCH RBC QN AUTO: 29.6 PG (ref 26.5–33)
MCH RBC QN AUTO: 29.6 PG (ref 26.5–33)
MCH RBC QN AUTO: 29.7 PG (ref 26.5–33)
MCH RBC QN AUTO: 29.8 PG (ref 26.5–33)
MCH RBC QN AUTO: 29.8 PG (ref 26.5–33)
MCH RBC QN AUTO: 29.9 PG (ref 26.5–33)
MCH RBC QN AUTO: 30.1 PG (ref 26.5–33)
MCH RBC QN AUTO: 30.1 PG (ref 26.5–33)
MCH RBC QN AUTO: 30.2 PG (ref 26.5–33)
MCH RBC QN AUTO: 30.2 PG (ref 26.5–33)
MCH RBC QN AUTO: 30.3 PG (ref 26.5–33)
MCH RBC QN AUTO: 30.3 PG (ref 26.5–33)
MCH RBC QN AUTO: 30.4 PG (ref 26.5–33)
MCH RBC QN AUTO: 30.6 PG (ref 26.5–33)
MCH RBC QN AUTO: 30.6 PG (ref 26.5–33)
MCH RBC QN AUTO: 30.7 PG (ref 26.5–33)
MCH RBC QN AUTO: 31.3 PG (ref 26.5–33)
MCHC RBC AUTO-ENTMCNC: 30.4 G/DL (ref 31.5–36.5)
MCHC RBC AUTO-ENTMCNC: 30.5 G/DL (ref 31.5–36.5)
MCHC RBC AUTO-ENTMCNC: 30.7 G/DL (ref 31.5–36.5)
MCHC RBC AUTO-ENTMCNC: 30.8 G/DL (ref 31.5–36.5)
MCHC RBC AUTO-ENTMCNC: 30.9 G/DL (ref 31.5–36.5)
MCHC RBC AUTO-ENTMCNC: 30.9 G/DL (ref 31.5–36.5)
MCHC RBC AUTO-ENTMCNC: 31 G/DL (ref 31.5–36.5)
MCHC RBC AUTO-ENTMCNC: 31.1 G/DL (ref 31.5–36.5)
MCHC RBC AUTO-ENTMCNC: 31.2 G/DL (ref 31.5–36.5)
MCHC RBC AUTO-ENTMCNC: 31.2 G/DL (ref 31.5–36.5)
MCHC RBC AUTO-ENTMCNC: 31.3 G/DL (ref 31.5–36.5)
MCHC RBC AUTO-ENTMCNC: 31.4 G/DL (ref 31.5–36.5)
MCHC RBC AUTO-ENTMCNC: 31.6 G/DL (ref 31.5–36.5)
MCHC RBC AUTO-ENTMCNC: 31.7 G/DL (ref 31.5–36.5)
MCHC RBC AUTO-ENTMCNC: 31.8 G/DL (ref 31.5–36.5)
MCHC RBC AUTO-ENTMCNC: 31.9 G/DL (ref 31.5–36.5)
MCHC RBC AUTO-ENTMCNC: 32 G/DL (ref 31.5–36.5)
MCHC RBC AUTO-ENTMCNC: 32 G/DL (ref 31.5–36.5)
MCHC RBC AUTO-ENTMCNC: 32.1 G/DL (ref 31.5–36.5)
MCHC RBC AUTO-ENTMCNC: 32.2 G/DL (ref 31.5–36.5)
MCHC RBC AUTO-ENTMCNC: 32.2 G/DL (ref 31.5–36.5)
MCHC RBC AUTO-ENTMCNC: 32.3 G/DL (ref 31.5–36.5)
MCHC RBC AUTO-ENTMCNC: 32.6 G/DL (ref 31.5–36.5)
MCHC RBC AUTO-ENTMCNC: 32.6 G/DL (ref 31.5–36.5)
MCHC RBC AUTO-ENTMCNC: 32.7 G/DL (ref 31.5–36.5)
MCHC RBC AUTO-ENTMCNC: 32.7 G/DL (ref 31.5–36.5)
MCHC RBC AUTO-ENTMCNC: 32.9 G/DL (ref 31.5–36.5)
MCHC RBC AUTO-ENTMCNC: 33.1 G/DL (ref 31.5–36.5)
MCHC RBC AUTO-ENTMCNC: 33.3 G/DL (ref 31.5–36.5)
MCHC RBC AUTO-ENTMCNC: 33.3 G/DL (ref 31.5–36.5)
MCHC RBC AUTO-ENTMCNC: 33.5 G/DL (ref 31.5–36.5)
MCV RBC AUTO: 90 FL (ref 78–100)
MCV RBC AUTO: 90 FL (ref 78–100)
MCV RBC AUTO: 91 FL (ref 78–100)
MCV RBC AUTO: 92 FL (ref 78–100)
MCV RBC AUTO: 93 FL (ref 78–100)
MCV RBC AUTO: 94 FL (ref 78–100)
MCV RBC AUTO: 95 FL (ref 78–100)
MICROALBUMIN UR-MCNC: 175 MG/L
MICROALBUMIN/CREAT UR: 371.55 MG/G CR (ref 0–25)
MONOCYTES # BLD AUTO: 0.5 10E9/L (ref 0–1.3)
MONOCYTES # BLD AUTO: 1 10E9/L (ref 0–1.3)
MONOCYTES # BLD AUTO: 1 10E9/L (ref 0–1.3)
MONOCYTES # BLD AUTO: 1.1 10E9/L (ref 0–1.3)
MONOCYTES NFR BLD AUTO: 2.6 %
MONOCYTES NFR BLD AUTO: 6.4 %
MONOCYTES NFR BLD AUTO: 7.7 %
MONOCYTES NFR BLD AUTO: 8.7 %
MRSA DNA SPEC QL NAA+PROBE: NEGATIVE
MUCOUS THREADS #/AREA URNS LPF: PRESENT /LPF
MUCOUS THREADS #/AREA URNS LPF: PRESENT /LPF
NEUTROPHILS # BLD AUTO: 11.1 10E9/L (ref 1.6–8.3)
NEUTROPHILS # BLD AUTO: 15.3 10E9/L (ref 1.6–8.3)
NEUTROPHILS # BLD AUTO: 7.6 10E9/L (ref 1.6–8.3)
NEUTROPHILS # BLD AUTO: 9.5 10E9/L (ref 1.6–8.3)
NEUTROPHILS NFR BLD AUTO: 60.1 %
NEUTROPHILS NFR BLD AUTO: 69 %
NEUTROPHILS NFR BLD AUTO: 75.5 %
NEUTROPHILS NFR BLD AUTO: 86.2 %
NITRATE UR QL: NEGATIVE
NON-SQ EPI CELLS #/AREA URNS LPF: ABNORMAL /LPF
NONHDLC SERPL-MCNC: 147 MG/DL
NONHDLC SERPL-MCNC: 157 MG/DL
NONHDLC SERPL-MCNC: 74 MG/DL
NRBC # BLD AUTO: 0 10*3/UL
NRBC BLD AUTO-RTO: 0 /100
NT-PROBNP SERPL-MCNC: 1932 PG/ML (ref 0–900)
NUM BPU REQUESTED: 1
NUM BPU REQUESTED: 1
NUM BPU REQUESTED: 2
O2/TOTAL GAS SETTING VFR VENT: 21 %
O2/TOTAL GAS SETTING VFR VENT: 50 %
PCO2 BLD: 27 MM HG (ref 35–45)
PCO2 BLD: 34 MM HG (ref 35–45)
PCO2 BLD: 36 MM HG (ref 35–45)
PCO2 BLDV: 36 MM HG (ref 40–50)
PH BLD: 7.39 PH (ref 7.35–7.45)
PH BLD: 7.46 PH (ref 7.35–7.45)
PH BLD: 7.48 PH (ref 7.35–7.45)
PH BLDV: 7.34 PH (ref 7.32–7.43)
PH UR STRIP: 6 PH (ref 5–7)
PH UR STRIP: 6 PH (ref 5–7)
PH UR STRIP: 7 PH (ref 5–7)
PHOSPHATE SERPL-MCNC: 2.2 MG/DL (ref 2.5–4.5)
PHOSPHATE SERPL-MCNC: 2.2 MG/DL (ref 2.5–4.5)
PHOSPHATE SERPL-MCNC: 2.3 MG/DL (ref 2.5–4.5)
PHOSPHATE SERPL-MCNC: 2.3 MG/DL (ref 2.5–4.5)
PHOSPHATE SERPL-MCNC: 2.4 MG/DL (ref 2.5–4.5)
PHOSPHATE SERPL-MCNC: 2.4 MG/DL (ref 2.5–4.5)
PHOSPHATE SERPL-MCNC: 2.5 MG/DL (ref 2.5–4.5)
PHOSPHATE SERPL-MCNC: 3 MG/DL (ref 2.5–4.5)
PHOSPHATE SERPL-MCNC: 3 MG/DL (ref 2.5–4.5)
PHOSPHATE SERPL-MCNC: 3.1 MG/DL (ref 2.5–4.5)
PHOSPHATE SERPL-MCNC: 3.1 MG/DL (ref 2.5–4.5)
PHOSPHATE SERPL-MCNC: 3.2 MG/DL (ref 2.5–4.5)
PHOSPHATE SERPL-MCNC: 3.7 MG/DL (ref 2.5–4.5)
PHOSPHATE SERPL-MCNC: 3.8 MG/DL (ref 2.5–4.5)
PHOSPHATE SERPL-MCNC: 3.8 MG/DL (ref 2.5–4.5)
PHOSPHATE SERPL-MCNC: 3.9 MG/DL (ref 2.5–4.5)
PHOSPHATE SERPL-MCNC: 4 MG/DL (ref 2.5–4.5)
PHOSPHATE SERPL-MCNC: 4.1 MG/DL (ref 2.5–4.5)
PHOSPHATE SERPL-MCNC: 4.2 MG/DL (ref 2.5–4.5)
PHOSPHATE SERPL-MCNC: 4.4 MG/DL (ref 2.5–4.5)
PHOSPHATE SERPL-MCNC: 4.5 MG/DL (ref 2.5–4.5)
PHOSPHATE SERPL-MCNC: 4.6 MG/DL (ref 2.5–4.5)
PHOSPHATE SERPL-MCNC: 4.6 MG/DL (ref 2.5–4.5)
PHOSPHATE SERPL-MCNC: 4.7 MG/DL (ref 2.5–4.5)
PHOSPHATE SERPL-MCNC: 4.7 MG/DL (ref 2.5–4.5)
PHOSPHATE SERPL-MCNC: 4.8 MG/DL (ref 2.5–4.5)
PLATELET # BLD AUTO: 166 10E9/L (ref 150–450)
PLATELET # BLD AUTO: 175 10E9/L (ref 150–450)
PLATELET # BLD AUTO: 177 10E9/L (ref 150–450)
PLATELET # BLD AUTO: 177 10E9/L (ref 150–450)
PLATELET # BLD AUTO: 179 10E9/L (ref 150–450)
PLATELET # BLD AUTO: 179 10E9/L (ref 150–450)
PLATELET # BLD AUTO: 187 10E9/L (ref 150–450)
PLATELET # BLD AUTO: 189 10E9/L (ref 150–450)
PLATELET # BLD AUTO: 189 10E9/L (ref 150–450)
PLATELET # BLD AUTO: 190 10E9/L (ref 150–450)
PLATELET # BLD AUTO: 194 10E9/L (ref 150–450)
PLATELET # BLD AUTO: 206 10E9/L (ref 150–450)
PLATELET # BLD AUTO: 216 10E9/L (ref 150–450)
PLATELET # BLD AUTO: 224 10E9/L (ref 150–450)
PLATELET # BLD AUTO: 225 10E9/L (ref 150–450)
PLATELET # BLD AUTO: 225 10E9/L (ref 150–450)
PLATELET # BLD AUTO: 228 10E9/L (ref 150–450)
PLATELET # BLD AUTO: 230 10E9/L (ref 150–450)
PLATELET # BLD AUTO: 235 10E9/L (ref 150–450)
PLATELET # BLD AUTO: 236 10E9/L (ref 150–450)
PLATELET # BLD AUTO: 243 10E9/L (ref 150–450)
PLATELET # BLD AUTO: 246 10E9/L (ref 150–450)
PLATELET # BLD AUTO: 247 10E9/L (ref 150–450)
PLATELET # BLD AUTO: 247 10E9/L (ref 150–450)
PLATELET # BLD AUTO: 250 10E9/L (ref 150–450)
PLATELET # BLD AUTO: 251 10E9/L (ref 150–450)
PLATELET # BLD AUTO: 258 10E9/L (ref 150–450)
PLATELET # BLD AUTO: 259 10E9/L (ref 150–450)
PLATELET # BLD AUTO: 269 10E9/L (ref 150–450)
PLATELET # BLD AUTO: 272 10E9/L (ref 150–450)
PLATELET # BLD AUTO: 278 10E9/L (ref 150–450)
PLATELET # BLD AUTO: 283 10E9/L (ref 150–450)
PLATELET # BLD AUTO: 285 10E9/L (ref 150–450)
PLATELET # BLD AUTO: 300 10E9/L (ref 150–450)
PLATELET # BLD AUTO: 303 10E9/L (ref 150–450)
PLATELET # BLD AUTO: 310 10E9/L (ref 150–450)
PLATELET # BLD AUTO: 311 10E9/L (ref 150–450)
PLATELET # BLD AUTO: 314 10E9/L (ref 150–450)
PLATELET # BLD AUTO: 314 10E9/L (ref 150–450)
PLATELET # BLD AUTO: 332 10E9/L (ref 150–450)
PLATELET # BLD EST: ABNORMAL 10*3/UL
PLATELET # BLD EST: ABNORMAL 10*3/UL
PLATELET REACTIVITY P2Y12: 20 PRU
PO2 BLD: 105 MM HG (ref 80–105)
PO2 BLD: 59 MM HG (ref 80–105)
PO2 BLD: 86 MM HG (ref 80–105)
PO2 BLDV: 68 MM HG (ref 25–47)
POIKILOCYTOSIS BLD QL SMEAR: SLIGHT
POTASSIUM BLD-SCNC: 4.5 MMOL/L (ref 3.4–5.3)
POTASSIUM SERPL-SCNC: 3 MMOL/L (ref 3.4–5.3)
POTASSIUM SERPL-SCNC: 3.1 MMOL/L (ref 3.4–5.3)
POTASSIUM SERPL-SCNC: 3.1 MMOL/L (ref 3.4–5.3)
POTASSIUM SERPL-SCNC: 3.2 MMOL/L (ref 3.4–5.3)
POTASSIUM SERPL-SCNC: 3.3 MMOL/L (ref 3.4–5.3)
POTASSIUM SERPL-SCNC: 3.3 MMOL/L (ref 3.4–5.3)
POTASSIUM SERPL-SCNC: 3.4 MMOL/L (ref 3.4–5.3)
POTASSIUM SERPL-SCNC: 3.4 MMOL/L (ref 3.4–5.3)
POTASSIUM SERPL-SCNC: 3.5 MMOL/L (ref 3.4–5.3)
POTASSIUM SERPL-SCNC: 3.6 MMOL/L (ref 3.4–5.3)
POTASSIUM SERPL-SCNC: 3.7 MMOL/L (ref 3.4–5.3)
POTASSIUM SERPL-SCNC: 3.8 MMOL/L (ref 3.4–5.3)
POTASSIUM SERPL-SCNC: 3.9 MMOL/L (ref 3.4–5.3)
POTASSIUM SERPL-SCNC: 3.9 MMOL/L (ref 3.4–5.3)
POTASSIUM SERPL-SCNC: 4 MMOL/L (ref 3.4–5.3)
POTASSIUM SERPL-SCNC: 4.1 MMOL/L (ref 3.4–5.3)
POTASSIUM SERPL-SCNC: 4.2 MMOL/L (ref 3.4–5.3)
POTASSIUM SERPL-SCNC: 4.3 MMOL/L (ref 3.4–5.3)
POTASSIUM SERPL-SCNC: 4.4 MMOL/L (ref 3.4–5.3)
POTASSIUM SERPL-SCNC: 4.4 MMOL/L (ref 3.4–5.3)
POTASSIUM SERPL-SCNC: 4.5 MMOL/L (ref 3.4–5.3)
POTASSIUM SERPL-SCNC: 4.5 MMOL/L (ref 3.4–5.3)
PREALB SERPL IA-MCNC: 13 MG/DL (ref 15–45)
PREALB SERPL IA-MCNC: 16 MG/DL (ref 15–45)
PREALB SERPL IA-MCNC: 17 MG/DL (ref 15–45)
PROCALCITONIN SERPL-MCNC: 0.11 NG/ML
PROCALCITONIN SERPL-MCNC: 0.11 NG/ML
PROCALCITONIN SERPL-MCNC: 0.15 NG/ML
PROCALCITONIN SERPL-MCNC: 0.15 NG/ML
PROCALCITONIN SERPL-MCNC: 0.16 NG/ML
PROCALCITONIN SERPL-MCNC: 0.25 NG/ML
PROCALCITONIN SERPL-MCNC: 0.27 NG/ML
PROCALCITONIN SERPL-MCNC: 0.71 NG/ML
PROCALCITONIN SERPL-MCNC: 1.41 NG/ML
PROT SERPL-MCNC: 8 G/DL (ref 6.8–8.8)
PROT SERPL-MCNC: 8.1 G/DL (ref 6.8–8.8)
PROT SERPL-MCNC: 8.2 G/DL (ref 6.8–8.8)
R TIME UNTIL CLOT FORMS: 5 MIN (ref 4–9)
RADIOLOGIST FLAGS: ABNORMAL
RADIOLOGIST FLAGS: NORMAL
RBC # BLD AUTO: 2.5 10E12/L (ref 3.8–5.2)
RBC # BLD AUTO: 2.78 10E12/L (ref 3.8–5.2)
RBC # BLD AUTO: 2.84 10E12/L (ref 3.8–5.2)
RBC # BLD AUTO: 2.9 10E12/L (ref 3.8–5.2)
RBC # BLD AUTO: 2.92 10E12/L (ref 3.8–5.2)
RBC # BLD AUTO: 2.93 10E12/L (ref 3.8–5.2)
RBC # BLD AUTO: 3.04 10E12/L (ref 3.8–5.2)
RBC # BLD AUTO: 3.05 10E12/L (ref 3.8–5.2)
RBC # BLD AUTO: 3.1 10E12/L (ref 3.8–5.2)
RBC # BLD AUTO: 3.12 10E12/L (ref 3.8–5.2)
RBC # BLD AUTO: 3.15 10E12/L (ref 3.8–5.2)
RBC # BLD AUTO: 3.22 10E12/L (ref 3.8–5.2)
RBC # BLD AUTO: 3.22 10E12/L (ref 3.8–5.2)
RBC # BLD AUTO: 3.24 10E12/L (ref 3.8–5.2)
RBC # BLD AUTO: 3.25 10E12/L (ref 3.8–5.2)
RBC # BLD AUTO: 3.4 10E12/L (ref 3.8–5.2)
RBC # BLD AUTO: 3.48 10E12/L (ref 3.8–5.2)
RBC # BLD AUTO: 3.56 10E12/L (ref 3.8–5.2)
RBC # BLD AUTO: 3.59 10E12/L (ref 3.8–5.2)
RBC # BLD AUTO: 3.59 10E12/L (ref 3.8–5.2)
RBC # BLD AUTO: 3.62 10E12/L (ref 3.8–5.2)
RBC # BLD AUTO: 3.67 10E12/L (ref 3.8–5.2)
RBC # BLD AUTO: 3.71 10E12/L (ref 3.8–5.2)
RBC # BLD AUTO: 3.74 10E12/L (ref 3.8–5.2)
RBC # BLD AUTO: 3.74 10E12/L (ref 3.8–5.2)
RBC # BLD AUTO: 3.92 10E12/L (ref 3.8–5.2)
RBC # BLD AUTO: 3.99 10E12/L (ref 3.8–5.2)
RBC # BLD AUTO: 4 10E12/L (ref 3.8–5.2)
RBC # BLD AUTO: 4.04 10E12/L (ref 3.8–5.2)
RBC # BLD AUTO: 4.11 10E12/L (ref 3.8–5.2)
RBC # BLD AUTO: 4.11 10E12/L (ref 3.8–5.2)
RBC # BLD AUTO: 4.29 10E12/L (ref 3.8–5.2)
RBC # BLD AUTO: 4.33 10E12/L (ref 3.8–5.2)
RBC # BLD AUTO: 4.44 10E12/L (ref 3.8–5.2)
RBC # BLD AUTO: 4.51 10E12/L (ref 3.8–5.2)
RBC # BLD AUTO: 4.57 10E12/L (ref 3.8–5.2)
RBC # BLD AUTO: 4.6 10E12/L (ref 3.8–5.2)
RBC # BLD AUTO: 4.76 10E12/L (ref 3.8–5.2)
RBC # BLD AUTO: 4.81 10E12/L (ref 3.8–5.2)
RBC # BLD AUTO: 4.95 10E12/L (ref 3.8–5.2)
RBC # BLD AUTO: 4.99 10E12/L (ref 3.8–5.2)
RBC # BLD AUTO: 5.01 10E12/L (ref 3.8–5.2)
RBC #/AREA URNS AUTO: 13 /HPF (ref 0–2)
RBC #/AREA URNS AUTO: <1 /HPF (ref 0–2)
RBC #/AREA URNS AUTO: ABNORMAL /HPF
RETICS # AUTO: 46.2 10E9/L (ref 25–95)
RETICS/RBC NFR AUTO: 1.5 % (ref 0.5–2)
SODIUM BLD-SCNC: 135 MMOL/L (ref 133–144)
SODIUM SERPL-SCNC: 134 MMOL/L (ref 133–144)
SODIUM SERPL-SCNC: 134 MMOL/L (ref 133–144)
SODIUM SERPL-SCNC: 136 MMOL/L (ref 133–144)
SODIUM SERPL-SCNC: 136 MMOL/L (ref 133–144)
SODIUM SERPL-SCNC: 137 MMOL/L (ref 133–144)
SODIUM SERPL-SCNC: 138 MMOL/L (ref 133–144)
SODIUM SERPL-SCNC: 139 MMOL/L (ref 133–144)
SODIUM SERPL-SCNC: 140 MMOL/L (ref 133–144)
SODIUM SERPL-SCNC: 141 MMOL/L (ref 133–144)
SODIUM SERPL-SCNC: 142 MMOL/L (ref 133–144)
SOURCE: ABNORMAL
SP GR UR STRIP: 1.01 (ref 1–1.03)
SP GR UR STRIP: 1.01 (ref 1–1.03)
SP GR UR STRIP: 1.02 (ref 1–1.03)
SPECIMEN EXP DATE BLD: NORMAL
SPECIMEN SOURCE: ABNORMAL
SPECIMEN SOURCE: NORMAL
SQUAMOUS #/AREA URNS AUTO: <1 /HPF (ref 0–1)
T4 FREE SERPL-MCNC: 1.88 NG/DL (ref 0.76–1.46)
T4 FREE SERPL-MCNC: 1.89 NG/DL (ref 0.76–1.46)
TIBC SERPL-MCNC: 265 UG/DL (ref 240–430)
TRANS CELLS #/AREA URNS HPF: 1 /HPF (ref 0–1)
TRANS CELLS #/AREA URNS HPF: <1 /HPF (ref 0–1)
TRANSFUSION STATUS PATIENT QL: NORMAL
TRIGL SERPL-MCNC: 108 MG/DL
TRIGL SERPL-MCNC: 160 MG/DL
TRIGL SERPL-MCNC: 76 MG/DL
TROPONIN I SERPL-MCNC: <0.015 UG/L (ref 0–0.04)
TSH SERPL DL<=0.005 MIU/L-ACNC: 0.54 MU/L (ref 0.4–4)
TSH SERPL DL<=0.005 MIU/L-ACNC: 0.95 MU/L (ref 0.4–4)
TSH SERPL DL<=0.005 MIU/L-ACNC: 1.36 MU/L (ref 0.4–4)
TSH SERPL DL<=0.005 MIU/L-ACNC: 2.07 MU/L (ref 0.4–4)
URATE SERPL-MCNC: 2.4 MG/DL (ref 2.6–6)
UROBILINOGEN UR STRIP-ACNC: 0.2 EU/DL (ref 0.2–1)
UROBILINOGEN UR STRIP-MCNC: NORMAL MG/DL (ref 0–2)
UROBILINOGEN UR STRIP-MCNC: NORMAL MG/DL (ref 0–2)
VIT B12 SERPL-MCNC: 737 PG/ML (ref 193–986)
WBC # BLD AUTO: 10 10E9/L (ref 4–11)
WBC # BLD AUTO: 10.2 10E9/L (ref 4–11)
WBC # BLD AUTO: 10.2 10E9/L (ref 4–11)
WBC # BLD AUTO: 11.4 10E9/L (ref 4–11)
WBC # BLD AUTO: 11.6 10E9/L (ref 4–11)
WBC # BLD AUTO: 11.8 10E9/L (ref 4–11)
WBC # BLD AUTO: 11.9 10E9/L (ref 4–11)
WBC # BLD AUTO: 12 10E9/L (ref 4–11)
WBC # BLD AUTO: 12.1 10E9/L (ref 4–11)
WBC # BLD AUTO: 12.1 10E9/L (ref 4–11)
WBC # BLD AUTO: 12.2 10E9/L (ref 4–11)
WBC # BLD AUTO: 12.2 10E9/L (ref 4–11)
WBC # BLD AUTO: 12.3 10E9/L (ref 4–11)
WBC # BLD AUTO: 12.6 10E9/L (ref 4–11)
WBC # BLD AUTO: 12.7 10E9/L (ref 4–11)
WBC # BLD AUTO: 12.7 10E9/L (ref 4–11)
WBC # BLD AUTO: 13 10E9/L (ref 4–11)
WBC # BLD AUTO: 13.1 10E9/L (ref 4–11)
WBC # BLD AUTO: 13.7 10E9/L (ref 4–11)
WBC # BLD AUTO: 13.7 10E9/L (ref 4–11)
WBC # BLD AUTO: 13.8 10E9/L (ref 4–11)
WBC # BLD AUTO: 13.9 10E9/L (ref 4–11)
WBC # BLD AUTO: 13.9 10E9/L (ref 4–11)
WBC # BLD AUTO: 14.2 10E9/L (ref 4–11)
WBC # BLD AUTO: 14.6 10E9/L (ref 4–11)
WBC # BLD AUTO: 14.8 10E9/L (ref 4–11)
WBC # BLD AUTO: 15.2 10E9/L (ref 4–11)
WBC # BLD AUTO: 15.6 10E9/L (ref 4–11)
WBC # BLD AUTO: 16.9 10E9/L (ref 4–11)
WBC # BLD AUTO: 16.9 10E9/L (ref 4–11)
WBC # BLD AUTO: 17.7 10E9/L (ref 4–11)
WBC # BLD AUTO: 7.4 10E9/L (ref 4–11)
WBC # BLD AUTO: 7.5 10E9/L (ref 4–11)
WBC # BLD AUTO: 7.7 10E9/L (ref 4–11)
WBC # BLD AUTO: 8.9 10E9/L (ref 4–11)
WBC # BLD AUTO: 9.1 10E9/L (ref 4–11)
WBC # BLD AUTO: 9.7 10E9/L (ref 4–11)
WBC # BLD AUTO: 9.9 10E9/L (ref 4–11)
WBC # BLD AUTO: 9.9 10E9/L (ref 4–11)
WBC #/AREA URNS AUTO: 11 /HPF (ref 0–5)
WBC #/AREA URNS AUTO: 11 /HPF (ref 0–5)
WBC #/AREA URNS AUTO: ABNORMAL /HPF
YEAST #/AREA URNS HPF: ABNORMAL /HPF

## 2018-01-01 PROCEDURE — 25000132 ZZH RX MED GY IP 250 OP 250 PS 637: Mod: GY | Performed by: NURSE PRACTITIONER

## 2018-01-01 PROCEDURE — 27210794 ZZH OR GENERAL SUPPLY STERILE: Performed by: OTOLARYNGOLOGY

## 2018-01-01 PROCEDURE — 0W3Q8ZZ CONTROL BLEEDING IN RESPIRATORY TRACT, VIA NATURAL OR ARTIFICIAL OPENING ENDOSCOPIC: ICD-10-PCS | Performed by: OTOLARYNGOLOGY

## 2018-01-01 PROCEDURE — 97110 THERAPEUTIC EXERCISES: CPT | Mod: GP

## 2018-01-01 PROCEDURE — A9270 NON-COVERED ITEM OR SERVICE: HCPCS | Mod: GY | Performed by: PHYSICIAN ASSISTANT

## 2018-01-01 PROCEDURE — 85027 COMPLETE CBC AUTOMATED: CPT | Performed by: STUDENT IN AN ORGANIZED HEALTH CARE EDUCATION/TRAINING PROGRAM

## 2018-01-01 PROCEDURE — 40000047 ZZH STATISTIC CTO2 CONT OXYGEN TECH TIME EA 90 MIN

## 2018-01-01 PROCEDURE — 25000131 ZZH RX MED GY IP 250 OP 636 PS 637: Mod: GY | Performed by: NURSE PRACTITIONER

## 2018-01-01 PROCEDURE — 25000132 ZZH RX MED GY IP 250 OP 250 PS 637: Mod: GY | Performed by: PHYSICIAN ASSISTANT

## 2018-01-01 PROCEDURE — 25000128 H RX IP 250 OP 636: Performed by: STUDENT IN AN ORGANIZED HEALTH CARE EDUCATION/TRAINING PROGRAM

## 2018-01-01 PROCEDURE — 0CJS8ZZ INSPECTION OF LARYNX, VIA NATURAL OR ARTIFICIAL OPENING ENDOSCOPIC: ICD-10-PCS | Performed by: OTOLARYNGOLOGY

## 2018-01-01 PROCEDURE — 00000146 ZZHCL STATISTIC GLUCOSE BY METER IP

## 2018-01-01 PROCEDURE — 37000009 ZZH ANESTHESIA TECHNICAL FEE, EACH ADDTL 15 MIN: Performed by: OTOLARYNGOLOGY

## 2018-01-01 PROCEDURE — 36415 COLL VENOUS BLD VENIPUNCTURE: CPT | Performed by: NURSE PRACTITIONER

## 2018-01-01 PROCEDURE — 36415 COLL VENOUS BLD VENIPUNCTURE: CPT | Performed by: PHYSICIAN ASSISTANT

## 2018-01-01 PROCEDURE — 40000133 ZZH STATISTIC OT WARD VISIT

## 2018-01-01 PROCEDURE — 25800025 ZZH RX 258: Performed by: STUDENT IN AN ORGANIZED HEALTH CARE EDUCATION/TRAINING PROGRAM

## 2018-01-01 PROCEDURE — 40000275 ZZH STATISTIC RCP TIME EA 10 MIN

## 2018-01-01 PROCEDURE — 97535 SELF CARE MNGMENT TRAINING: CPT | Mod: GO | Performed by: OCCUPATIONAL THERAPIST

## 2018-01-01 PROCEDURE — 97530 THERAPEUTIC ACTIVITIES: CPT | Mod: GO | Performed by: OCCUPATIONAL THERAPIST

## 2018-01-01 PROCEDURE — 99233 SBSQ HOSP IP/OBS HIGH 50: CPT | Performed by: INTERNAL MEDICINE

## 2018-01-01 PROCEDURE — A9270 NON-COVERED ITEM OR SERVICE: HCPCS | Mod: GY | Performed by: STUDENT IN AN ORGANIZED HEALTH CARE EDUCATION/TRAINING PROGRAM

## 2018-01-01 PROCEDURE — 99207 ZZC APP CREDIT; MD BILLING SHARED VISIT: CPT | Performed by: PHYSICIAN ASSISTANT

## 2018-01-01 PROCEDURE — 80048 BASIC METABOLIC PNL TOTAL CA: CPT | Performed by: STUDENT IN AN ORGANIZED HEALTH CARE EDUCATION/TRAINING PROGRAM

## 2018-01-01 PROCEDURE — 84439 ASSAY OF FREE THYROXINE: CPT | Performed by: FAMILY MEDICINE

## 2018-01-01 PROCEDURE — 85004 AUTOMATED DIFF WBC COUNT: CPT | Performed by: STUDENT IN AN ORGANIZED HEALTH CARE EDUCATION/TRAINING PROGRAM

## 2018-01-01 PROCEDURE — 85018 HEMOGLOBIN: CPT | Performed by: STUDENT IN AN ORGANIZED HEALTH CARE EDUCATION/TRAINING PROGRAM

## 2018-01-01 PROCEDURE — 71000015 ZZH RECOVERY PHASE 1 LEVEL 2 EA ADDTL HR: Performed by: OTOLARYNGOLOGY

## 2018-01-01 PROCEDURE — 40000141 ZZH STATISTIC PERIPHERAL IV START W/O US GUIDANCE

## 2018-01-01 PROCEDURE — 97110 THERAPEUTIC EXERCISES: CPT | Mod: GO

## 2018-01-01 PROCEDURE — G0463 HOSPITAL OUTPT CLINIC VISIT: HCPCS

## 2018-01-01 PROCEDURE — 25000128 H RX IP 250 OP 636: Performed by: NURSE PRACTITIONER

## 2018-01-01 PROCEDURE — 25000132 ZZH RX MED GY IP 250 OP 250 PS 637: Mod: GY | Performed by: STUDENT IN AN ORGANIZED HEALTH CARE EDUCATION/TRAINING PROGRAM

## 2018-01-01 PROCEDURE — 84100 ASSAY OF PHOSPHORUS: CPT | Performed by: STUDENT IN AN ORGANIZED HEALTH CARE EDUCATION/TRAINING PROGRAM

## 2018-01-01 PROCEDURE — 0BJ18ZZ INSPECTION OF TRACHEA, VIA NATURAL OR ARTIFICIAL OPENING ENDOSCOPIC: ICD-10-PCS | Performed by: PHYSICIAN ASSISTANT

## 2018-01-01 PROCEDURE — 80048 BASIC METABOLIC PNL TOTAL CA: CPT | Performed by: PHYSICIAN ASSISTANT

## 2018-01-01 PROCEDURE — 36415 COLL VENOUS BLD VENIPUNCTURE: CPT | Performed by: INTERNAL MEDICINE

## 2018-01-01 PROCEDURE — 86923 COMPATIBILITY TEST ELECTRIC: CPT | Performed by: EMERGENCY MEDICINE

## 2018-01-01 PROCEDURE — 94660 CPAP INITIATION&MGMT: CPT

## 2018-01-01 PROCEDURE — A9270 NON-COVERED ITEM OR SERVICE: HCPCS | Performed by: OTOLARYNGOLOGY

## 2018-01-01 PROCEDURE — 82803 BLOOD GASES ANY COMBINATION: CPT | Performed by: HOSPITALIST

## 2018-01-01 PROCEDURE — 84132 ASSAY OF SERUM POTASSIUM: CPT | Performed by: PEDIATRICS

## 2018-01-01 PROCEDURE — 12000006 ZZH R&B IMCU INTERMEDIATE UMMC

## 2018-01-01 PROCEDURE — 99233 SBSQ HOSP IP/OBS HIGH 50: CPT | Performed by: PEDIATRICS

## 2018-01-01 PROCEDURE — 12000003 ZZH R&B CRITICAL UMMC

## 2018-01-01 PROCEDURE — 99222 1ST HOSP IP/OBS MODERATE 55: CPT | Performed by: PSYCHIATRY & NEUROLOGY

## 2018-01-01 PROCEDURE — 83605 ASSAY OF LACTIC ACID: CPT | Performed by: PSYCHIATRY & NEUROLOGY

## 2018-01-01 PROCEDURE — 94640 AIRWAY INHALATION TREATMENT: CPT

## 2018-01-01 PROCEDURE — 93321 DOPPLER ECHO F-UP/LMTD STD: CPT | Mod: 26 | Performed by: INTERNAL MEDICINE

## 2018-01-01 PROCEDURE — 85396 CLOTTING ASSAY WHOLE BLOOD: CPT | Performed by: OTOLARYNGOLOGY

## 2018-01-01 PROCEDURE — 82330 ASSAY OF CALCIUM: CPT | Performed by: NURSE PRACTITIONER

## 2018-01-01 PROCEDURE — 87641 MR-STAPH DNA AMP PROBE: CPT | Performed by: NURSE PRACTITIONER

## 2018-01-01 PROCEDURE — 25000125 ZZHC RX 250: Performed by: STUDENT IN AN ORGANIZED HEALTH CARE EDUCATION/TRAINING PROGRAM

## 2018-01-01 PROCEDURE — 80048 BASIC METABOLIC PNL TOTAL CA: CPT | Performed by: NURSE PRACTITIONER

## 2018-01-01 PROCEDURE — P9016 RBC LEUKOCYTES REDUCED: HCPCS | Performed by: EMERGENCY MEDICINE

## 2018-01-01 PROCEDURE — 25000128 H RX IP 250 OP 636: Performed by: PEDIATRICS

## 2018-01-01 PROCEDURE — 94640 AIRWAY INHALATION TREATMENT: CPT | Mod: 76 | Performed by: OPTOMETRIST

## 2018-01-01 PROCEDURE — 92526 ORAL FUNCTION THERAPY: CPT | Mod: GN

## 2018-01-01 PROCEDURE — 12000008 ZZH R&B INTERMEDIATE UMMC

## 2018-01-01 PROCEDURE — 25000131 ZZH RX MED GY IP 250 OP 636 PS 637: Mod: GY | Performed by: NURSE ANESTHETIST, CERTIFIED REGISTERED

## 2018-01-01 PROCEDURE — HZ2ZZZZ DETOXIFICATION SERVICES FOR SUBSTANCE ABUSE TREATMENT: ICD-10-PCS | Performed by: OTOLARYNGOLOGY

## 2018-01-01 PROCEDURE — 82043 UR ALBUMIN QUANTITATIVE: CPT | Performed by: FAMILY MEDICINE

## 2018-01-01 PROCEDURE — 99232 SBSQ HOSP IP/OBS MODERATE 35: CPT | Performed by: NURSE PRACTITIONER

## 2018-01-01 PROCEDURE — 25000128 H RX IP 250 OP 636: Performed by: NURSE ANESTHETIST, CERTIFIED REGISTERED

## 2018-01-01 PROCEDURE — 27210995 ZZH RX 272

## 2018-01-01 PROCEDURE — 84100 ASSAY OF PHOSPHORUS: CPT | Performed by: NURSE PRACTITIONER

## 2018-01-01 PROCEDURE — 84484 ASSAY OF TROPONIN QUANT: CPT | Performed by: OTOLARYNGOLOGY

## 2018-01-01 PROCEDURE — 83735 ASSAY OF MAGNESIUM: CPT | Performed by: OTOLARYNGOLOGY

## 2018-01-01 PROCEDURE — 40000133 ZZH STATISTIC OT WARD VISIT: Performed by: OCCUPATIONAL THERAPIST

## 2018-01-01 PROCEDURE — 94640 AIRWAY INHALATION TREATMENT: CPT | Mod: 76

## 2018-01-01 PROCEDURE — 27210429 ZZH NUTRITION PRODUCT INTERMEDIATE LITER

## 2018-01-01 PROCEDURE — 85027 COMPLETE CBC AUTOMATED: CPT | Performed by: FAMILY MEDICINE

## 2018-01-01 PROCEDURE — 88305 TISSUE EXAM BY PATHOLOGIST: CPT | Performed by: OTOLARYNGOLOGY

## 2018-01-01 PROCEDURE — 25000125 ZZHC RX 250: Performed by: PHYSICIAN ASSISTANT

## 2018-01-01 PROCEDURE — 36415 COLL VENOUS BLD VENIPUNCTURE: CPT | Performed by: PSYCHIATRY & NEUROLOGY

## 2018-01-01 PROCEDURE — 84145 PROCALCITONIN (PCT): CPT | Performed by: OTOLARYNGOLOGY

## 2018-01-01 PROCEDURE — 80053 COMPREHEN METABOLIC PANEL: CPT | Performed by: FAMILY MEDICINE

## 2018-01-01 PROCEDURE — 25000128 H RX IP 250 OP 636: Performed by: OTOLARYNGOLOGY

## 2018-01-01 PROCEDURE — 70450 CT HEAD/BRAIN W/O DYE: CPT

## 2018-01-01 PROCEDURE — 81001 URINALYSIS AUTO W/SCOPE: CPT | Performed by: FAMILY MEDICINE

## 2018-01-01 PROCEDURE — 83605 ASSAY OF LACTIC ACID: CPT | Performed by: INTERNAL MEDICINE

## 2018-01-01 PROCEDURE — 99356 ZZC PROLONGED SERV,INPATIENT,1ST HR: CPT | Performed by: NURSE PRACTITIONER

## 2018-01-01 PROCEDURE — 0B21XFZ CHANGE TRACHEOSTOMY DEVICE IN TRACHEA, EXTERNAL APPROACH: ICD-10-PCS | Performed by: PHYSICIAN ASSISTANT

## 2018-01-01 PROCEDURE — 36415 COLL VENOUS BLD VENIPUNCTURE: CPT | Performed by: STUDENT IN AN ORGANIZED HEALTH CARE EDUCATION/TRAINING PROGRAM

## 2018-01-01 PROCEDURE — 82330 ASSAY OF CALCIUM: CPT | Performed by: OTOLARYNGOLOGY

## 2018-01-01 PROCEDURE — A9270 NON-COVERED ITEM OR SERVICE: HCPCS | Mod: GY | Performed by: NURSE PRACTITIONER

## 2018-01-01 PROCEDURE — C1894 INTRO/SHEATH, NON-LASER: HCPCS | Performed by: THORACIC SURGERY (CARDIOTHORACIC VASCULAR SURGERY)

## 2018-01-01 PROCEDURE — 84484 ASSAY OF TROPONIN QUANT: CPT | Performed by: PHYSICIAN ASSISTANT

## 2018-01-01 PROCEDURE — 71045 X-RAY EXAM CHEST 1 VIEW: CPT

## 2018-01-01 PROCEDURE — 36592 COLLECT BLOOD FROM PICC: CPT | Performed by: NURSE PRACTITIONER

## 2018-01-01 PROCEDURE — 12000001 ZZH R&B MED SURG/OB UMMC

## 2018-01-01 PROCEDURE — 25000125 ZZHC RX 250: Performed by: NURSE ANESTHETIST, CERTIFIED REGISTERED

## 2018-01-01 PROCEDURE — 97535 SELF CARE MNGMENT TRAINING: CPT | Mod: GO

## 2018-01-01 PROCEDURE — 80048 BASIC METABOLIC PNL TOTAL CA: CPT | Performed by: OTOLARYNGOLOGY

## 2018-01-01 PROCEDURE — 40000274 ZZH STATISTIC RCP CONSULT EA 30 MIN

## 2018-01-01 PROCEDURE — 80061 LIPID PANEL: CPT | Performed by: PHYSICIAN ASSISTANT

## 2018-01-01 PROCEDURE — 40000983 ZZH STATISTIC HFNC ADULT NON-CPAP

## 2018-01-01 PROCEDURE — 83735 ASSAY OF MAGNESIUM: CPT | Performed by: STUDENT IN AN ORGANIZED HEALTH CARE EDUCATION/TRAINING PROGRAM

## 2018-01-01 PROCEDURE — 86850 RBC ANTIBODY SCREEN: CPT | Performed by: EMERGENCY MEDICINE

## 2018-01-01 PROCEDURE — 86900 BLOOD TYPING SEROLOGIC ABO: CPT | Performed by: STUDENT IN AN ORGANIZED HEALTH CARE EDUCATION/TRAINING PROGRAM

## 2018-01-01 PROCEDURE — 83605 ASSAY OF LACTIC ACID: CPT | Performed by: OTOLARYNGOLOGY

## 2018-01-01 PROCEDURE — 70491 CT SOFT TISSUE NECK W/DYE: CPT | Performed by: RADIOLOGY

## 2018-01-01 PROCEDURE — 40000802 ZZH SITE CHECK

## 2018-01-01 PROCEDURE — 37000009 ZZH ANESTHESIA TECHNICAL FEE, EACH ADDTL 15 MIN: Performed by: THORACIC SURGERY (CARDIOTHORACIC VASCULAR SURGERY)

## 2018-01-01 PROCEDURE — 93308 TTE F-UP OR LMTD: CPT | Mod: 26 | Performed by: INTERNAL MEDICINE

## 2018-01-01 PROCEDURE — 88172 CYTP DX EVAL FNA 1ST EA SITE: CPT | Performed by: PHYSICIAN ASSISTANT

## 2018-01-01 PROCEDURE — 99214 OFFICE O/P EST MOD 30 MIN: CPT | Performed by: FAMILY MEDICINE

## 2018-01-01 PROCEDURE — 83735 ASSAY OF MAGNESIUM: CPT | Performed by: PSYCHIATRY & NEUROLOGY

## 2018-01-01 PROCEDURE — 36415 COLL VENOUS BLD VENIPUNCTURE: CPT | Performed by: OTOLARYNGOLOGY

## 2018-01-01 PROCEDURE — 83605 ASSAY OF LACTIC ACID: CPT | Performed by: THORACIC SURGERY (CARDIOTHORACIC VASCULAR SURGERY)

## 2018-01-01 PROCEDURE — 71046 X-RAY EXAM CHEST 2 VIEWS: CPT | Mod: FY

## 2018-01-01 PROCEDURE — 40000281 ZZH STATISTIC TRANSPORT TIME EA 15 MIN

## 2018-01-01 PROCEDURE — 40000275 ZZH STATISTIC RCP TIME EA 10 MIN: Performed by: OPTOMETRIST

## 2018-01-01 PROCEDURE — 86140 C-REACTIVE PROTEIN: CPT | Performed by: NURSE PRACTITIONER

## 2018-01-01 PROCEDURE — 40000986 XR ABDOMEN PORT 2 VW

## 2018-01-01 PROCEDURE — 40000225 ZZH STATISTIC SLP WARD VISIT: Performed by: SPEECH-LANGUAGE PATHOLOGIST

## 2018-01-01 PROCEDURE — 40000171 ZZH STATISTIC PRE-PROCEDURE ASSESSMENT III: Performed by: OTOLARYNGOLOGY

## 2018-01-01 PROCEDURE — 40000556 ZZH STATISTIC PERIPHERAL IV START W US GUIDANCE

## 2018-01-01 PROCEDURE — 25000566 ZZH SEVOFLURANE, EA 15 MIN: Performed by: OTOLARYNGOLOGY

## 2018-01-01 PROCEDURE — 85027 COMPLETE CBC AUTOMATED: CPT | Performed by: PHYSICIAN ASSISTANT

## 2018-01-01 PROCEDURE — 25000125 ZZHC RX 250

## 2018-01-01 PROCEDURE — 84145 PROCALCITONIN (PCT): CPT | Performed by: NURSE PRACTITIONER

## 2018-01-01 PROCEDURE — 71260 CT THORAX DX C+: CPT

## 2018-01-01 PROCEDURE — 25000125 ZZHC RX 250: Performed by: OTOLARYNGOLOGY

## 2018-01-01 PROCEDURE — 97530 THERAPEUTIC ACTIVITIES: CPT | Mod: GP

## 2018-01-01 PROCEDURE — 37000008 ZZH ANESTHESIA TECHNICAL FEE, 1ST 30 MIN: Performed by: THORACIC SURGERY (CARDIOTHORACIC VASCULAR SURGERY)

## 2018-01-01 PROCEDURE — 83605 ASSAY OF LACTIC ACID: CPT | Performed by: HOSPITALIST

## 2018-01-01 PROCEDURE — 93005 ELECTROCARDIOGRAM TRACING: CPT

## 2018-01-01 PROCEDURE — 71000014 ZZH RECOVERY PHASE 1 LEVEL 2 FIRST HR: Performed by: OTOLARYNGOLOGY

## 2018-01-01 PROCEDURE — 85027 COMPLETE CBC AUTOMATED: CPT | Performed by: OTOLARYNGOLOGY

## 2018-01-01 PROCEDURE — 81001 URINALYSIS AUTO W/SCOPE: CPT | Performed by: NURSE PRACTITIONER

## 2018-01-01 PROCEDURE — 83735 ASSAY OF MAGNESIUM: CPT | Performed by: NURSE PRACTITIONER

## 2018-01-01 PROCEDURE — 99285 EMERGENCY DEPT VISIT HI MDM: CPT | Mod: Z6 | Performed by: EMERGENCY MEDICINE

## 2018-01-01 PROCEDURE — 84100 ASSAY OF PHOSPHORUS: CPT | Performed by: PSYCHIATRY & NEUROLOGY

## 2018-01-01 PROCEDURE — 40000986 XR CHEST PORT 1 VW

## 2018-01-01 PROCEDURE — 80061 LIPID PANEL: CPT | Performed by: FAMILY MEDICINE

## 2018-01-01 PROCEDURE — 25000125 ZZHC RX 250: Performed by: PEDIATRICS

## 2018-01-01 PROCEDURE — 36000057 ZZH SURGERY LEVEL 3 1ST 30 MIN - UMMC: Performed by: OTOLARYNGOLOGY

## 2018-01-01 PROCEDURE — 84134 ASSAY OF PREALBUMIN: CPT | Performed by: NURSE PRACTITIONER

## 2018-01-01 PROCEDURE — 84484 ASSAY OF TROPONIN QUANT: CPT | Performed by: NURSE PRACTITIONER

## 2018-01-01 PROCEDURE — 82330 ASSAY OF CALCIUM: CPT | Performed by: HOSPITALIST

## 2018-01-01 PROCEDURE — 36592 COLLECT BLOOD FROM PICC: CPT | Performed by: PEDIATRICS

## 2018-01-01 PROCEDURE — 88173 CYTOPATH EVAL FNA REPORT: CPT | Performed by: PHYSICIAN ASSISTANT

## 2018-01-01 PROCEDURE — 40000193 ZZH STATISTIC PT WARD VISIT: Performed by: PHYSICAL THERAPIST

## 2018-01-01 PROCEDURE — 84484 ASSAY OF TROPONIN QUANT: CPT | Performed by: PSYCHIATRY & NEUROLOGY

## 2018-01-01 PROCEDURE — 87086 URINE CULTURE/COLONY COUNT: CPT | Performed by: OTOLARYNGOLOGY

## 2018-01-01 PROCEDURE — 25000128 H RX IP 250 OP 636: Performed by: PHYSICIAN ASSISTANT

## 2018-01-01 PROCEDURE — 86140 C-REACTIVE PROTEIN: CPT | Performed by: HOSPITALIST

## 2018-01-01 PROCEDURE — 40000193 ZZH STATISTIC PT WARD VISIT

## 2018-01-01 PROCEDURE — 36415 COLL VENOUS BLD VENIPUNCTURE: CPT | Performed by: HOSPITALIST

## 2018-01-01 PROCEDURE — 0CBV8ZX EXCISION OF LEFT VOCAL CORD, VIA NATURAL OR ARTIFICIAL OPENING ENDOSCOPIC, DIAGNOSTIC: ICD-10-PCS | Performed by: OTOLARYNGOLOGY

## 2018-01-01 PROCEDURE — 97110 THERAPEUTIC EXERCISES: CPT | Mod: GO | Performed by: OCCUPATIONAL THERAPIST

## 2018-01-01 PROCEDURE — 84443 ASSAY THYROID STIM HORMONE: CPT | Performed by: FAMILY MEDICINE

## 2018-01-01 PROCEDURE — 40000225 ZZH STATISTIC SLP WARD VISIT

## 2018-01-01 PROCEDURE — 99233 SBSQ HOSP IP/OBS HIGH 50: CPT | Performed by: NURSE PRACTITIONER

## 2018-01-01 PROCEDURE — 83735 ASSAY OF MAGNESIUM: CPT | Performed by: PHYSICIAN ASSISTANT

## 2018-01-01 PROCEDURE — 84132 ASSAY OF SERUM POTASSIUM: CPT | Performed by: OTOLARYNGOLOGY

## 2018-01-01 PROCEDURE — 36000059 ZZH SURGERY LEVEL 3 EA 15 ADDTL MIN UMMC: Performed by: OTOLARYNGOLOGY

## 2018-01-01 PROCEDURE — 25800025 ZZH RX 258: Performed by: PHYSICIAN ASSISTANT

## 2018-01-01 PROCEDURE — A9270 NON-COVERED ITEM OR SERVICE: HCPCS | Performed by: STUDENT IN AN ORGANIZED HEALTH CARE EDUCATION/TRAINING PROGRAM

## 2018-01-01 PROCEDURE — 25500064 ZZH RX 255 OP 636: Performed by: INTERNAL MEDICINE

## 2018-01-01 PROCEDURE — 83880 ASSAY OF NATRIURETIC PEPTIDE: CPT | Performed by: OTOLARYNGOLOGY

## 2018-01-01 PROCEDURE — 99207 ZZC CDG-HISTORY COMP: MEETS EXP. PROBLEM FOCUSED-DOWN CODED LACK OF ROS: CPT | Performed by: PHYSICIAN ASSISTANT

## 2018-01-01 PROCEDURE — 40000264 ECHO BUBBLE STUDY LIMITED WITH OPTISON

## 2018-01-01 PROCEDURE — 40000047 ZZH STATISTIC CTO2 CONT OXYGEN TECH TIME EA 90 MIN: Performed by: OPTOMETRIST

## 2018-01-01 PROCEDURE — 0CBR8ZX EXCISION OF EPIGLOTTIS, VIA NATURAL OR ARTIFICIAL OPENING ENDOSCOPIC, DIAGNOSTIC: ICD-10-PCS | Performed by: OTOLARYNGOLOGY

## 2018-01-01 PROCEDURE — 93000 ELECTROCARDIOGRAM COMPLETE: CPT | Performed by: PHYSICIAN ASSISTANT

## 2018-01-01 PROCEDURE — 99285 EMERGENCY DEPT VISIT HI MDM: CPT | Mod: 25 | Performed by: EMERGENCY MEDICINE

## 2018-01-01 PROCEDURE — 12000005 ZZH R&B IMCU CRITICAL UMMC

## 2018-01-01 PROCEDURE — C9399 UNCLASSIFIED DRUGS OR BIOLOG: HCPCS | Performed by: NURSE ANESTHETIST, CERTIFIED REGISTERED

## 2018-01-01 PROCEDURE — 85025 COMPLETE CBC W/AUTO DIFF WBC: CPT | Performed by: PHYSICIAN ASSISTANT

## 2018-01-01 PROCEDURE — 84550 ASSAY OF BLOOD/URIC ACID: CPT | Performed by: PHYSICIAN ASSISTANT

## 2018-01-01 PROCEDURE — 99238 HOSP IP/OBS DSCHRG MGMT 30/<: CPT | Performed by: INTERNAL MEDICINE

## 2018-01-01 PROCEDURE — 92597 ORAL SPEECH DEVICE EVAL: CPT | Mod: GN | Performed by: SPEECH-LANGUAGE PATHOLOGIST

## 2018-01-01 PROCEDURE — 84295 ASSAY OF SERUM SODIUM: CPT | Performed by: OTOLARYNGOLOGY

## 2018-01-01 PROCEDURE — 85018 HEMOGLOBIN: CPT | Performed by: OTOLARYNGOLOGY

## 2018-01-01 PROCEDURE — 25000131 ZZH RX MED GY IP 250 OP 636 PS 637: Mod: GY | Performed by: HOSPITALIST

## 2018-01-01 PROCEDURE — 84134 ASSAY OF PREALBUMIN: CPT | Performed by: STUDENT IN AN ORGANIZED HEALTH CARE EDUCATION/TRAINING PROGRAM

## 2018-01-01 PROCEDURE — 25000128 H RX IP 250 OP 636: Performed by: INTERNAL MEDICINE

## 2018-01-01 PROCEDURE — 92507 TX SP LANG VOICE COMM INDIV: CPT | Mod: GN

## 2018-01-01 PROCEDURE — 27210300 ZZH CANNULA HIGH FLOW, ADULT

## 2018-01-01 PROCEDURE — 93325 DOPPLER ECHO COLOR FLOW MAPG: CPT | Mod: 26 | Performed by: INTERNAL MEDICINE

## 2018-01-01 PROCEDURE — 25000566 ZZH SEVOFLURANE, EA 15 MIN: Performed by: THORACIC SURGERY (CARDIOTHORACIC VASCULAR SURGERY)

## 2018-01-01 PROCEDURE — 31575 DIAGNOSTIC LARYNGOSCOPY: CPT | Performed by: OTOLARYNGOLOGY

## 2018-01-01 PROCEDURE — 99233 SBSQ HOSP IP/OBS HIGH 50: CPT | Performed by: HOSPITALIST

## 2018-01-01 PROCEDURE — 92610 EVALUATE SWALLOWING FUNCTION: CPT | Mod: GN

## 2018-01-01 PROCEDURE — A9585 GADOBUTROL INJECTION: HCPCS | Performed by: OTOLARYNGOLOGY

## 2018-01-01 PROCEDURE — 83735 ASSAY OF MAGNESIUM: CPT | Performed by: HOSPITALIST

## 2018-01-01 PROCEDURE — 10022 IR THYROID BIOPSY: CPT

## 2018-01-01 PROCEDURE — 84443 ASSAY THYROID STIM HORMONE: CPT | Performed by: STUDENT IN AN ORGANIZED HEALTH CARE EDUCATION/TRAINING PROGRAM

## 2018-01-01 PROCEDURE — 25000131 ZZH RX MED GY IP 250 OP 636 PS 637: Mod: GY | Performed by: PHYSICIAN ASSISTANT

## 2018-01-01 PROCEDURE — 83605 ASSAY OF LACTIC ACID: CPT | Performed by: NURSE PRACTITIONER

## 2018-01-01 PROCEDURE — 99233 SBSQ HOSP IP/OBS HIGH 50: CPT | Performed by: CLINICAL NURSE SPECIALIST

## 2018-01-01 PROCEDURE — 0B21XFZ CHANGE TRACHEOSTOMY DEVICE IN TRACHEA, EXTERNAL APPROACH: ICD-10-PCS | Performed by: OTOLARYNGOLOGY

## 2018-01-01 PROCEDURE — 83615 LACTATE (LD) (LDH) ENZYME: CPT | Performed by: PHYSICIAN ASSISTANT

## 2018-01-01 PROCEDURE — 84132 ASSAY OF SERUM POTASSIUM: CPT | Performed by: PSYCHIATRY & NEUROLOGY

## 2018-01-01 PROCEDURE — 82803 BLOOD GASES ANY COMBINATION: CPT | Performed by: OTOLARYNGOLOGY

## 2018-01-01 PROCEDURE — 99207 ZZC CONSULT E&M CHANGED TO SUBSEQUENT LEVEL: CPT | Performed by: PHYSICIAN ASSISTANT

## 2018-01-01 PROCEDURE — 87640 STAPH A DNA AMP PROBE: CPT | Performed by: NURSE PRACTITIONER

## 2018-01-01 PROCEDURE — 93306 TTE W/DOPPLER COMPLETE: CPT | Mod: 26 | Performed by: INTERNAL MEDICINE

## 2018-01-01 PROCEDURE — P9041 ALBUMIN (HUMAN),5%, 50ML: HCPCS | Performed by: NURSE ANESTHETIST, CERTIFIED REGISTERED

## 2018-01-01 PROCEDURE — 84484 ASSAY OF TROPONIN QUANT: CPT | Performed by: STUDENT IN AN ORGANIZED HEALTH CARE EDUCATION/TRAINING PROGRAM

## 2018-01-01 PROCEDURE — 70549 MR ANGIOGRAPH NECK W/O&W/DYE: CPT

## 2018-01-01 PROCEDURE — 27210338 ZZH CIRCUIT HUMID FACE/TRACH MSK

## 2018-01-01 PROCEDURE — P9037 PLATE PHERES LEUKOREDU IRRAD: HCPCS | Performed by: EMERGENCY MEDICINE

## 2018-01-01 PROCEDURE — 87070 CULTURE OTHR SPECIMN AEROBIC: CPT | Performed by: NURSE PRACTITIONER

## 2018-01-01 PROCEDURE — 83605 ASSAY OF LACTIC ACID: CPT | Performed by: PEDIATRICS

## 2018-01-01 PROCEDURE — 99213 OFFICE O/P EST LOW 20 MIN: CPT | Mod: 25 | Performed by: FAMILY MEDICINE

## 2018-01-01 PROCEDURE — 0BJ08ZZ INSPECTION OF TRACHEOBRONCHIAL TREE, VIA NATURAL OR ARTIFICIAL OPENING ENDOSCOPIC: ICD-10-PCS | Performed by: OTOLARYNGOLOGY

## 2018-01-01 PROCEDURE — 97162 PT EVAL MOD COMPLEX 30 MIN: CPT | Mod: GP | Performed by: PHYSICAL THERAPIST

## 2018-01-01 PROCEDURE — 99203 OFFICE O/P NEW LOW 30 MIN: CPT | Mod: 25 | Performed by: OTOLARYNGOLOGY

## 2018-01-01 PROCEDURE — 36415 COLL VENOUS BLD VENIPUNCTURE: CPT | Performed by: FAMILY MEDICINE

## 2018-01-01 PROCEDURE — 94010 BREATHING CAPACITY TEST: CPT | Performed by: FAMILY MEDICINE

## 2018-01-01 PROCEDURE — 85027 COMPLETE CBC AUTOMATED: CPT | Performed by: NURSE PRACTITIONER

## 2018-01-01 PROCEDURE — 86901 BLOOD TYPING SEROLOGIC RH(D): CPT | Performed by: PEDIATRICS

## 2018-01-01 PROCEDURE — 99232 SBSQ HOSP IP/OBS MODERATE 35: CPT | Performed by: HOSPITALIST

## 2018-01-01 PROCEDURE — 92507 TX SP LANG VOICE COMM INDIV: CPT | Mod: GN | Performed by: SPEECH-LANGUAGE PATHOLOGIST

## 2018-01-01 PROCEDURE — 82040 ASSAY OF SERUM ALBUMIN: CPT | Performed by: STUDENT IN AN ORGANIZED HEALTH CARE EDUCATION/TRAINING PROGRAM

## 2018-01-01 PROCEDURE — 82746 ASSAY OF FOLIC ACID SERUM: CPT | Performed by: PHYSICIAN ASSISTANT

## 2018-01-01 PROCEDURE — 25000125 ZZHC RX 250: Performed by: NURSE PRACTITIONER

## 2018-01-01 PROCEDURE — 78816 PET IMAGE W/CT FULL BODY: CPT | Mod: PI

## 2018-01-01 PROCEDURE — 40000611 ZZHCL STATISTIC MORPHOLOGY W/INTERP HEMEPATH TC 85060: Performed by: PHYSICIAN ASSISTANT

## 2018-01-01 PROCEDURE — 0GBH3ZX EXCISION OF RIGHT THYROID GLAND LOBE, PERCUTANEOUS APPROACH, DIAGNOSTIC: ICD-10-PCS | Performed by: PHYSICIAN ASSISTANT

## 2018-01-01 PROCEDURE — 40000809 ZZH STATISTIC NO DOCUMENTATION TO SUPPORT CHARGE

## 2018-01-01 PROCEDURE — 85576 BLOOD PLATELET AGGREGATION: CPT | Performed by: PHYSICIAN ASSISTANT

## 2018-01-01 PROCEDURE — 70491 CT SOFT TISSUE NECK W/DYE: CPT

## 2018-01-01 PROCEDURE — 27210185 ZZH KIT OPEN ENDED DOUBLE LUMEN

## 2018-01-01 PROCEDURE — 34300033 ZZH RX 343: Performed by: OTOLARYNGOLOGY

## 2018-01-01 PROCEDURE — 27211040 ZZH CONTINUOUS NEBULIZER MICRO PUMP

## 2018-01-01 PROCEDURE — 71260 CT THORAX DX C+: CPT | Performed by: RADIOLOGY

## 2018-01-01 PROCEDURE — 25000128 H RX IP 250 OP 636: Performed by: ANESTHESIOLOGY

## 2018-01-01 PROCEDURE — 99223 1ST HOSP IP/OBS HIGH 75: CPT | Performed by: PEDIATRICS

## 2018-01-01 PROCEDURE — 97530 THERAPEUTIC ACTIVITIES: CPT | Mod: GO

## 2018-01-01 PROCEDURE — 97165 OT EVAL LOW COMPLEX 30 MIN: CPT | Mod: GO | Performed by: OCCUPATIONAL THERAPIST

## 2018-01-01 PROCEDURE — 36000051 ZZH SURGERY LEVEL 2 1ST 30 MIN - UMMC: Performed by: THORACIC SURGERY (CARDIOTHORACIC VASCULAR SURGERY)

## 2018-01-01 PROCEDURE — 43246 EGD PLACE GASTROSTOMY TUBE: CPT | Mod: GC | Performed by: THORACIC SURGERY (CARDIOTHORACIC VASCULAR SURGERY)

## 2018-01-01 PROCEDURE — 85610 PROTHROMBIN TIME: CPT | Performed by: EMERGENCY MEDICINE

## 2018-01-01 PROCEDURE — 83010 ASSAY OF HAPTOGLOBIN QUANT: CPT | Performed by: PHYSICIAN ASSISTANT

## 2018-01-01 PROCEDURE — 97530 THERAPEUTIC ACTIVITIES: CPT | Mod: GP | Performed by: PHYSICAL THERAPIST

## 2018-01-01 PROCEDURE — G0439 PPPS, SUBSEQ VISIT: HCPCS | Performed by: FAMILY MEDICINE

## 2018-01-01 PROCEDURE — 85045 AUTOMATED RETICULOCYTE COUNT: CPT | Performed by: PHYSICIAN ASSISTANT

## 2018-01-01 PROCEDURE — 88331 PATH CONSLTJ SURG 1 BLK 1SPC: CPT | Performed by: OTOLARYNGOLOGY

## 2018-01-01 PROCEDURE — 99356 ZZC PROLONGED SERV,INPATIENT,1ST HR: CPT | Performed by: INTERNAL MEDICINE

## 2018-01-01 PROCEDURE — 87070 CULTURE OTHR SPECIMN AEROBIC: CPT | Performed by: STUDENT IN AN ORGANIZED HEALTH CARE EDUCATION/TRAINING PROGRAM

## 2018-01-01 PROCEDURE — 82803 BLOOD GASES ANY COMBINATION: CPT | Performed by: NURSE PRACTITIONER

## 2018-01-01 PROCEDURE — 94010 BREATHING CAPACITY TEST: CPT | Performed by: PHYSICIAN ASSISTANT

## 2018-01-01 PROCEDURE — 36600 WITHDRAWAL OF ARTERIAL BLOOD: CPT

## 2018-01-01 PROCEDURE — 86850 RBC ANTIBODY SCREEN: CPT | Performed by: PEDIATRICS

## 2018-01-01 PROCEDURE — 87040 BLOOD CULTURE FOR BACTERIA: CPT | Performed by: NURSE PRACTITIONER

## 2018-01-01 PROCEDURE — 0DH63UZ INSERTION OF FEEDING DEVICE INTO STOMACH, PERCUTANEOUS APPROACH: ICD-10-PCS | Performed by: THORACIC SURGERY (CARDIOTHORACIC VASCULAR SURGERY)

## 2018-01-01 PROCEDURE — 93010 ELECTROCARDIOGRAM REPORT: CPT | Performed by: INTERNAL MEDICINE

## 2018-01-01 PROCEDURE — 99207 ZZC APP CREDIT; MD BILLING SHARED VISIT: CPT | Performed by: NURSE PRACTITIONER

## 2018-01-01 PROCEDURE — 83550 IRON BINDING TEST: CPT | Performed by: PHYSICIAN ASSISTANT

## 2018-01-01 PROCEDURE — 36569 INSJ PICC 5 YR+ W/O IMAGING: CPT

## 2018-01-01 PROCEDURE — 86900 BLOOD TYPING SEROLOGIC ABO: CPT | Performed by: PEDIATRICS

## 2018-01-01 PROCEDURE — 84145 PROCALCITONIN (PCT): CPT | Performed by: STUDENT IN AN ORGANIZED HEALTH CARE EDUCATION/TRAINING PROGRAM

## 2018-01-01 PROCEDURE — 84132 ASSAY OF SERUM POTASSIUM: CPT | Performed by: STUDENT IN AN ORGANIZED HEALTH CARE EDUCATION/TRAINING PROGRAM

## 2018-01-01 PROCEDURE — 27210794 ZZH OR GENERAL SUPPLY STERILE: Performed by: THORACIC SURGERY (CARDIOTHORACIC VASCULAR SURGERY)

## 2018-01-01 PROCEDURE — 84100 ASSAY OF PHOSPHORUS: CPT | Performed by: OTOLARYNGOLOGY

## 2018-01-01 PROCEDURE — 87106 FUNGI IDENTIFICATION YEAST: CPT | Performed by: NURSE PRACTITIONER

## 2018-01-01 PROCEDURE — 80069 RENAL FUNCTION PANEL: CPT | Performed by: NURSE PRACTITIONER

## 2018-01-01 PROCEDURE — 97168 OT RE-EVAL EST PLAN CARE: CPT | Mod: GO

## 2018-01-01 PROCEDURE — 25000128 H RX IP 250 OP 636: Performed by: HOSPITALIST

## 2018-01-01 PROCEDURE — 87205 SMEAR GRAM STAIN: CPT | Performed by: NURSE PRACTITIONER

## 2018-01-01 PROCEDURE — 82040 ASSAY OF SERUM ALBUMIN: CPT | Performed by: NURSE PRACTITIONER

## 2018-01-01 PROCEDURE — 99215 OFFICE O/P EST HI 40 MIN: CPT | Mod: 25 | Performed by: PHYSICIAN ASSISTANT

## 2018-01-01 PROCEDURE — 37000008 ZZH ANESTHESIA TECHNICAL FEE, 1ST 30 MIN: Performed by: OTOLARYNGOLOGY

## 2018-01-01 PROCEDURE — 86923 COMPATIBILITY TEST ELECTRIC: CPT | Performed by: STUDENT IN AN ORGANIZED HEALTH CARE EDUCATION/TRAINING PROGRAM

## 2018-01-01 PROCEDURE — 86900 BLOOD TYPING SEROLOGIC ABO: CPT | Performed by: EMERGENCY MEDICINE

## 2018-01-01 PROCEDURE — 86850 RBC ANTIBODY SCREEN: CPT | Performed by: STUDENT IN AN ORGANIZED HEALTH CARE EDUCATION/TRAINING PROGRAM

## 2018-01-01 PROCEDURE — 99233 SBSQ HOSP IP/OBS HIGH 50: CPT | Performed by: PHYSICIAN ASSISTANT

## 2018-01-01 PROCEDURE — A9552 F18 FDG: HCPCS | Performed by: OTOLARYNGOLOGY

## 2018-01-01 PROCEDURE — 93306 TTE W/DOPPLER COMPLETE: CPT

## 2018-01-01 PROCEDURE — 84145 PROCALCITONIN (PCT): CPT | Performed by: PHYSICIAN ASSISTANT

## 2018-01-01 PROCEDURE — 92526 ORAL FUNCTION THERAPY: CPT | Mod: GN | Performed by: SPEECH-LANGUAGE PATHOLOGIST

## 2018-01-01 PROCEDURE — 71000014 ZZH RECOVERY PHASE 1 LEVEL 2 FIRST HR: Performed by: THORACIC SURGERY (CARDIOTHORACIC VASCULAR SURGERY)

## 2018-01-01 PROCEDURE — 99207 ZZC CDG-HISTORY COMP: MEETS EXP. PROBLEM FOCUSED - DOWN CODED LACK OF HPI: CPT | Performed by: PHYSICIAN ASSISTANT

## 2018-01-01 PROCEDURE — 82607 VITAMIN B-12: CPT | Performed by: PHYSICIAN ASSISTANT

## 2018-01-01 PROCEDURE — 86901 BLOOD TYPING SEROLOGIC RH(D): CPT | Performed by: EMERGENCY MEDICINE

## 2018-01-01 PROCEDURE — 87040 BLOOD CULTURE FOR BACTERIA: CPT | Performed by: STUDENT IN AN ORGANIZED HEALTH CARE EDUCATION/TRAINING PROGRAM

## 2018-01-01 PROCEDURE — 87086 URINE CULTURE/COLONY COUNT: CPT | Performed by: NURSE PRACTITIONER

## 2018-01-01 PROCEDURE — 84132 ASSAY OF SERUM POTASSIUM: CPT | Performed by: NURSE PRACTITIONER

## 2018-01-01 PROCEDURE — 36000053 ZZH SURGERY LEVEL 2 EA 15 ADDTL MIN - UMMC: Performed by: THORACIC SURGERY (CARDIOTHORACIC VASCULAR SURGERY)

## 2018-01-01 PROCEDURE — 84100 ASSAY OF PHOSPHORUS: CPT | Performed by: PHYSICIAN ASSISTANT

## 2018-01-01 PROCEDURE — 99232 SBSQ HOSP IP/OBS MODERATE 35: CPT | Performed by: PHYSICIAN ASSISTANT

## 2018-01-01 PROCEDURE — 86901 BLOOD TYPING SEROLOGIC RH(D): CPT | Performed by: STUDENT IN AN ORGANIZED HEALTH CARE EDUCATION/TRAINING PROGRAM

## 2018-01-01 PROCEDURE — 76536 US EXAM OF HEAD AND NECK: CPT | Performed by: STUDENT IN AN ORGANIZED HEALTH CARE EDUCATION/TRAINING PROGRAM

## 2018-01-01 PROCEDURE — 82803 BLOOD GASES ANY COMBINATION: CPT | Performed by: STUDENT IN AN ORGANIZED HEALTH CARE EDUCATION/TRAINING PROGRAM

## 2018-01-01 PROCEDURE — 80048 BASIC METABOLIC PNL TOTAL CA: CPT | Performed by: EMERGENCY MEDICINE

## 2018-01-01 PROCEDURE — P9016 RBC LEUKOCYTES REDUCED: HCPCS | Performed by: STUDENT IN AN ORGANIZED HEALTH CARE EDUCATION/TRAINING PROGRAM

## 2018-01-01 PROCEDURE — 82728 ASSAY OF FERRITIN: CPT | Performed by: PHYSICIAN ASSISTANT

## 2018-01-01 PROCEDURE — 40000014 ZZH STATISTIC ARTERIAL MONITORING DAILY

## 2018-01-01 PROCEDURE — 81001 URINALYSIS AUTO W/SCOPE: CPT | Performed by: STUDENT IN AN ORGANIZED HEALTH CARE EDUCATION/TRAINING PROGRAM

## 2018-01-01 PROCEDURE — 00000155 ZZHCL STATISTIC H-CELL BLOCK W/STAIN: Performed by: PHYSICIAN ASSISTANT

## 2018-01-01 PROCEDURE — 83540 ASSAY OF IRON: CPT | Performed by: PHYSICIAN ASSISTANT

## 2018-01-01 PROCEDURE — 0B110F4 BYPASS TRACHEA TO CUTANEOUS WITH TRACHEOSTOMY DEVICE, OPEN APPROACH: ICD-10-PCS | Performed by: OTOLARYNGOLOGY

## 2018-01-01 PROCEDURE — 85610 PROTHROMBIN TIME: CPT | Performed by: STUDENT IN AN ORGANIZED HEALTH CARE EDUCATION/TRAINING PROGRAM

## 2018-01-01 PROCEDURE — 83036 HEMOGLOBIN GLYCOSYLATED A1C: CPT | Performed by: PHYSICIAN ASSISTANT

## 2018-01-01 PROCEDURE — 80048 BASIC METABOLIC PNL TOTAL CA: CPT | Performed by: HOSPITALIST

## 2018-01-01 PROCEDURE — 27210995 ZZH RX 272: Performed by: PHYSICIAN ASSISTANT

## 2018-01-01 PROCEDURE — 84484 ASSAY OF TROPONIN QUANT: CPT | Performed by: HOSPITALIST

## 2018-01-01 PROCEDURE — 71275 CT ANGIOGRAPHY CHEST: CPT

## 2018-01-01 PROCEDURE — 87205 SMEAR GRAM STAIN: CPT | Performed by: STUDENT IN AN ORGANIZED HEALTH CARE EDUCATION/TRAINING PROGRAM

## 2018-01-01 PROCEDURE — 25000125 ZZHC RX 250: Performed by: THORACIC SURGERY (CARDIOTHORACIC VASCULAR SURGERY)

## 2018-01-01 PROCEDURE — 25800025 ZZH RX 258

## 2018-01-01 PROCEDURE — 85018 HEMOGLOBIN: CPT | Performed by: EMERGENCY MEDICINE

## 2018-01-01 PROCEDURE — 88305 TISSUE EXAM BY PATHOLOGIST: CPT | Performed by: PHYSICIAN ASSISTANT

## 2018-01-01 PROCEDURE — 0CBT8ZX EXCISION OF RIGHT VOCAL CORD, VIA NATURAL OR ARTIFICIAL OPENING ENDOSCOPIC, DIAGNOSTIC: ICD-10-PCS | Performed by: OTOLARYNGOLOGY

## 2018-01-01 PROCEDURE — 82947 ASSAY GLUCOSE BLOOD QUANT: CPT | Performed by: OTOLARYNGOLOGY

## 2018-01-01 PROCEDURE — 71045 X-RAY EXAM CHEST 1 VIEW: CPT | Mod: 77

## 2018-01-01 PROCEDURE — 84145 PROCALCITONIN (PCT): CPT | Performed by: HOSPITALIST

## 2018-01-01 PROCEDURE — 80053 COMPREHEN METABOLIC PANEL: CPT | Performed by: PHYSICIAN ASSISTANT

## 2018-01-01 PROCEDURE — 36592 COLLECT BLOOD FROM PICC: CPT | Performed by: THORACIC SURGERY (CARDIOTHORACIC VASCULAR SURGERY)

## 2018-01-01 PROCEDURE — 99223 1ST HOSP IP/OBS HIGH 75: CPT | Performed by: NURSE PRACTITIONER

## 2018-01-01 RX ORDER — ALBUTEROL SULFATE 90 UG/1
2 AEROSOL, METERED RESPIRATORY (INHALATION) EVERY 4 HOURS PRN
Status: DISCONTINUED | OUTPATIENT
Start: 2018-01-01 | End: 2018-01-01

## 2018-01-01 RX ORDER — FOLIC ACID 1 MG/1
1 TABLET ORAL DAILY
Status: ON HOLD | COMMUNITY
End: 2018-01-01

## 2018-01-01 RX ORDER — LEVALBUTEROL INHALATION SOLUTION 0.63 MG/3ML
0.63 SOLUTION RESPIRATORY (INHALATION) 4 TIMES DAILY
Qty: 360 ML | Status: ON HOLD | DISCHARGE
Start: 2018-01-01 | End: 2018-01-01

## 2018-01-01 RX ORDER — AMLODIPINE BESYLATE 5 MG/1
5 TABLET ORAL DAILY
Status: DISCONTINUED | OUTPATIENT
Start: 2018-01-01 | End: 2018-01-01

## 2018-01-01 RX ORDER — NALOXONE HYDROCHLORIDE 0.4 MG/ML
.1-.4 INJECTION, SOLUTION INTRAMUSCULAR; INTRAVENOUS; SUBCUTANEOUS
Status: DISCONTINUED | OUTPATIENT
Start: 2018-01-01 | End: 2018-01-01

## 2018-01-01 RX ORDER — FUROSEMIDE 10 MG/ML
20 INJECTION INTRAMUSCULAR; INTRAVENOUS ONCE
Status: COMPLETED | OUTPATIENT
Start: 2018-01-01 | End: 2018-01-01

## 2018-01-01 RX ORDER — BISACODYL 10 MG
10 SUPPOSITORY, RECTAL RECTAL DAILY PRN
Status: DISCONTINUED | OUTPATIENT
Start: 2018-01-01 | End: 2018-01-01 | Stop reason: HOSPADM

## 2018-01-01 RX ORDER — POTASSIUM CHLORIDE 750 MG/1
20-40 TABLET, EXTENDED RELEASE ORAL
Status: DISCONTINUED | OUTPATIENT
Start: 2018-01-01 | End: 2018-01-01

## 2018-01-01 RX ORDER — ATORVASTATIN CALCIUM 40 MG/1
40 TABLET, FILM COATED ORAL DAILY
Status: DISCONTINUED | OUTPATIENT
Start: 2018-01-01 | End: 2018-01-01

## 2018-01-01 RX ORDER — BUDESONIDE 0.25 MG/2ML
0.25 INHALANT ORAL 2 TIMES DAILY
Status: DISCONTINUED | OUTPATIENT
Start: 2018-01-01 | End: 2018-01-01 | Stop reason: HOSPADM

## 2018-01-01 RX ORDER — LOSARTAN POTASSIUM 50 MG/1
50 TABLET ORAL DAILY
Qty: 30 TABLET | Refills: 3 | Status: ON HOLD | OUTPATIENT
Start: 2018-01-01 | End: 2018-01-01

## 2018-01-01 RX ORDER — NALOXONE HYDROCHLORIDE 0.4 MG/ML
.1-.4 INJECTION, SOLUTION INTRAMUSCULAR; INTRAVENOUS; SUBCUTANEOUS
Status: DISCONTINUED | OUTPATIENT
Start: 2018-01-01 | End: 2018-01-01 | Stop reason: HOSPADM

## 2018-01-01 RX ORDER — LEVOFLOXACIN 5 MG/ML
750 INJECTION, SOLUTION INTRAVENOUS EVERY 24 HOURS
Status: DISCONTINUED | OUTPATIENT
Start: 2018-01-01 | End: 2018-01-01

## 2018-01-01 RX ORDER — ALBUTEROL SULFATE 0.83 MG/ML
2.5 SOLUTION RESPIRATORY (INHALATION) EVERY 4 HOURS PRN
Qty: 360 ML | Refills: 0 | Status: SHIPPED | OUTPATIENT
Start: 2018-01-01 | End: 2018-09-30

## 2018-01-01 RX ORDER — ONDANSETRON 2 MG/ML
4 INJECTION INTRAMUSCULAR; INTRAVENOUS EVERY 30 MIN PRN
Status: DISCONTINUED | OUTPATIENT
Start: 2018-01-01 | End: 2018-01-01 | Stop reason: HOSPADM

## 2018-01-01 RX ORDER — ONDANSETRON 2 MG/ML
INJECTION INTRAMUSCULAR; INTRAVENOUS PRN
Status: DISCONTINUED | OUTPATIENT
Start: 2018-01-01 | End: 2018-01-01

## 2018-01-01 RX ORDER — SODIUM CHLORIDE FOR INHALATION 0.9 %
3 VIAL, NEBULIZER (ML) INHALATION 3 TIMES DAILY
Status: ON HOLD | DISCHARGE
Start: 2018-01-01 | End: 2018-01-01

## 2018-01-01 RX ORDER — PROCHLORPERAZINE 25 MG
12.5 SUPPOSITORY, RECTAL RECTAL EVERY 12 HOURS PRN
Status: DISCONTINUED | OUTPATIENT
Start: 2018-01-01 | End: 2018-01-01 | Stop reason: HOSPADM

## 2018-01-01 RX ORDER — SODIUM CHLORIDE FOR INHALATION 0.9 %
3 VIAL, NEBULIZER (ML) INHALATION
Status: DISCONTINUED | OUTPATIENT
Start: 2018-01-01 | End: 2018-01-01

## 2018-01-01 RX ORDER — SODIUM CHLORIDE, SODIUM LACTATE, POTASSIUM CHLORIDE, CALCIUM CHLORIDE 600; 310; 30; 20 MG/100ML; MG/100ML; MG/100ML; MG/100ML
INJECTION, SOLUTION INTRAVENOUS CONTINUOUS
Status: DISCONTINUED | OUTPATIENT
Start: 2018-01-01 | End: 2018-01-01 | Stop reason: HOSPADM

## 2018-01-01 RX ORDER — DEXAMETHASONE SODIUM PHOSPHATE 4 MG/ML
INJECTION, SOLUTION INTRA-ARTICULAR; INTRALESIONAL; INTRAMUSCULAR; INTRAVENOUS; SOFT TISSUE PRN
Status: DISCONTINUED | OUTPATIENT
Start: 2018-01-01 | End: 2018-01-01

## 2018-01-01 RX ORDER — FLUMAZENIL 0.1 MG/ML
0.2 INJECTION, SOLUTION INTRAVENOUS
Status: DISCONTINUED | OUTPATIENT
Start: 2018-01-01 | End: 2018-01-01 | Stop reason: HOSPADM

## 2018-01-01 RX ORDER — AMLODIPINE BESYLATE 5 MG/1
5 TABLET ORAL DAILY
Qty: 30 TABLET | Refills: 3 | Status: ON HOLD | OUTPATIENT
Start: 2018-01-01 | End: 2018-01-01

## 2018-01-01 RX ORDER — MOXIFLOXACIN HYDROCHLORIDE 400 MG/250ML
400 INJECTION, SOLUTION INTRAVENOUS EVERY 24 HOURS
Status: DISCONTINUED | OUTPATIENT
Start: 2018-01-01 | End: 2018-01-01

## 2018-01-01 RX ORDER — LABETALOL HYDROCHLORIDE 5 MG/ML
10 INJECTION, SOLUTION INTRAVENOUS
Status: DISCONTINUED | OUTPATIENT
Start: 2018-01-01 | End: 2018-01-01 | Stop reason: HOSPADM

## 2018-01-01 RX ORDER — FENTANYL CITRATE 50 UG/ML
25-50 INJECTION, SOLUTION INTRAMUSCULAR; INTRAVENOUS EVERY 5 MIN PRN
Status: DISCONTINUED | OUTPATIENT
Start: 2018-01-01 | End: 2018-01-01 | Stop reason: HOSPADM

## 2018-01-01 RX ORDER — LABETALOL HYDROCHLORIDE 5 MG/ML
20 INJECTION, SOLUTION INTRAVENOUS ONCE
Status: COMPLETED | OUTPATIENT
Start: 2018-01-01 | End: 2018-01-01

## 2018-01-01 RX ORDER — POTASSIUM CHLORIDE 750 MG/1
20-40 TABLET, EXTENDED RELEASE ORAL
Status: DISCONTINUED | OUTPATIENT
Start: 2018-01-01 | End: 2018-01-01 | Stop reason: HOSPADM

## 2018-01-01 RX ORDER — DEXTROSE MONOHYDRATE, SODIUM CHLORIDE, AND POTASSIUM CHLORIDE 50; 1.49; 4.5 G/1000ML; G/1000ML; G/1000ML
INJECTION, SOLUTION INTRAVENOUS CONTINUOUS
Status: DISCONTINUED | OUTPATIENT
Start: 2018-01-01 | End: 2018-01-01

## 2018-01-01 RX ORDER — OXYCODONE HYDROCHLORIDE 5 MG/1
5-10 TABLET ORAL
Status: DISCONTINUED | OUTPATIENT
Start: 2018-01-01 | End: 2018-01-01 | Stop reason: HOSPADM

## 2018-01-01 RX ORDER — CLOPIDOGREL BISULFATE 75 MG/1
75 TABLET ORAL DAILY
Status: DISCONTINUED | OUTPATIENT
Start: 2018-01-01 | End: 2018-01-01 | Stop reason: HOSPADM

## 2018-01-01 RX ORDER — LIDOCAINE HYDROCHLORIDE 20 MG/ML
INJECTION, SOLUTION EPIDURAL; INFILTRATION; INTRACAUDAL; PERINEURAL
Status: DISCONTINUED
Start: 2018-01-01 | End: 2018-01-01 | Stop reason: HOSPADM

## 2018-01-01 RX ORDER — FENTANYL CITRATE 50 UG/ML
INJECTION, SOLUTION INTRAMUSCULAR; INTRAVENOUS PRN
Status: DISCONTINUED | OUTPATIENT
Start: 2018-01-01 | End: 2018-01-01

## 2018-01-01 RX ORDER — AMLODIPINE BESYLATE 10 MG/1
10 TABLET ORAL DAILY
Status: DISCONTINUED | OUTPATIENT
Start: 2018-01-01 | End: 2018-01-01

## 2018-01-01 RX ORDER — SODIUM CHLORIDE, SODIUM GLUCONATE, SODIUM ACETATE, POTASSIUM CHLORIDE AND MAGNESIUM CHLORIDE 526; 502; 368; 37; 30 MG/100ML; MG/100ML; MG/100ML; MG/100ML; MG/100ML
INJECTION, SOLUTION INTRAVENOUS CONTINUOUS PRN
Status: DISCONTINUED | OUTPATIENT
Start: 2018-01-01 | End: 2018-01-01

## 2018-01-01 RX ORDER — CLINDAMYCIN PHOSPHATE 900 MG/50ML
900 INJECTION, SOLUTION INTRAVENOUS
Status: COMPLETED | OUTPATIENT
Start: 2018-01-01 | End: 2018-01-01

## 2018-01-01 RX ORDER — POTASSIUM CHLORIDE 1.5 G/1.58G
20-40 POWDER, FOR SOLUTION ORAL
Status: DISCONTINUED | OUTPATIENT
Start: 2018-01-01 | End: 2018-01-01 | Stop reason: HOSPADM

## 2018-01-01 RX ORDER — HYDRALAZINE HYDROCHLORIDE 20 MG/ML
10 INJECTION INTRAMUSCULAR; INTRAVENOUS
Status: COMPLETED | OUTPATIENT
Start: 2018-01-01 | End: 2018-01-01

## 2018-01-01 RX ORDER — LIDOCAINE 40 MG/G
CREAM TOPICAL
Status: DISCONTINUED | OUTPATIENT
Start: 2018-01-01 | End: 2018-01-01

## 2018-01-01 RX ORDER — HYDRALAZINE HYDROCHLORIDE 20 MG/ML
10 INJECTION INTRAMUSCULAR; INTRAVENOUS EVERY 4 HOURS PRN
Status: DISCONTINUED | OUTPATIENT
Start: 2018-01-01 | End: 2018-01-01 | Stop reason: HOSPADM

## 2018-01-01 RX ORDER — LIDOCAINE HYDROCHLORIDE 10 MG/ML
5 INJECTION, SOLUTION EPIDURAL; INFILTRATION; INTRACAUDAL; PERINEURAL ONCE
Status: DISCONTINUED | OUTPATIENT
Start: 2018-01-01 | End: 2018-01-01 | Stop reason: CLARIF

## 2018-01-01 RX ORDER — GLYCOPYRROLATE 1 MG/1
1 TABLET ORAL 3 TIMES DAILY
Status: DISCONTINUED | OUTPATIENT
Start: 2018-01-01 | End: 2018-01-01 | Stop reason: HOSPADM

## 2018-01-01 RX ORDER — LOSARTAN POTASSIUM 50 MG/1
50 TABLET ORAL DAILY
Status: DISCONTINUED | OUTPATIENT
Start: 2018-01-01 | End: 2018-01-01

## 2018-01-01 RX ORDER — NICOTINE POLACRILEX 4 MG
15-30 LOZENGE BUCCAL
Status: DISCONTINUED | OUTPATIENT
Start: 2018-01-01 | End: 2018-01-01 | Stop reason: HOSPADM

## 2018-01-01 RX ORDER — LIDOCAINE 40 MG/G
CREAM TOPICAL
Status: DISCONTINUED | OUTPATIENT
Start: 2018-01-01 | End: 2018-01-01 | Stop reason: HOSPADM

## 2018-01-01 RX ORDER — ATORVASTATIN CALCIUM 40 MG/1
40 TABLET, FILM COATED ORAL DAILY
Qty: 90 TABLET | Refills: 1 | Status: ON HOLD | DISCHARGE
Start: 2018-01-01 | End: 2018-01-01

## 2018-01-01 RX ORDER — SODIUM CHLORIDE, SODIUM LACTATE, POTASSIUM CHLORIDE, CALCIUM CHLORIDE 600; 310; 30; 20 MG/100ML; MG/100ML; MG/100ML; MG/100ML
INJECTION, SOLUTION INTRAVENOUS CONTINUOUS PRN
Status: DISCONTINUED | OUTPATIENT
Start: 2018-01-01 | End: 2018-01-01

## 2018-01-01 RX ORDER — KETOROLAC TROMETHAMINE 15 MG/ML
15 INJECTION, SOLUTION INTRAMUSCULAR; INTRAVENOUS ONCE
Status: COMPLETED | OUTPATIENT
Start: 2018-01-01 | End: 2018-01-01

## 2018-01-01 RX ORDER — LABETALOL HYDROCHLORIDE 5 MG/ML
10 INJECTION, SOLUTION INTRAVENOUS EVERY 4 HOURS PRN
Status: DISCONTINUED | OUTPATIENT
Start: 2018-01-01 | End: 2018-01-01

## 2018-01-01 RX ORDER — ASPIRIN 81 MG/1
81 TABLET, CHEWABLE ORAL DAILY
Status: DISCONTINUED | OUTPATIENT
Start: 2018-01-01 | End: 2018-01-01

## 2018-01-01 RX ORDER — IOPAMIDOL 755 MG/ML
100 INJECTION, SOLUTION INTRAVASCULAR ONCE
Status: DISCONTINUED | OUTPATIENT
Start: 2018-01-01 | End: 2018-01-01

## 2018-01-01 RX ORDER — ALBUMIN, HUMAN INJ 5% 5 %
SOLUTION INTRAVENOUS CONTINUOUS PRN
Status: DISCONTINUED | OUTPATIENT
Start: 2018-01-01 | End: 2018-01-01

## 2018-01-01 RX ORDER — ALBUTEROL SULFATE 0.83 MG/ML
2.5 SOLUTION RESPIRATORY (INHALATION) 4 TIMES DAILY
Status: DISCONTINUED | OUTPATIENT
Start: 2018-01-01 | End: 2018-01-01

## 2018-01-01 RX ORDER — POLYETHYLENE GLYCOL 3350 17 G/17G
17 POWDER, FOR SOLUTION ORAL DAILY
Qty: 7 PACKET | Status: ON HOLD | DISCHARGE
Start: 2018-01-01 | End: 2018-01-01

## 2018-01-01 RX ORDER — ACETAMINOPHEN 325 MG/1
650 TABLET ORAL EVERY 4 HOURS PRN
Status: DISCONTINUED | OUTPATIENT
Start: 2018-01-01 | End: 2018-01-01

## 2018-01-01 RX ORDER — METHYLPHENIDATE HYDROCHLORIDE 5 MG/1
5 TABLET ORAL DAILY
Status: DISCONTINUED | OUTPATIENT
Start: 2018-01-01 | End: 2018-01-01

## 2018-01-01 RX ORDER — PROCHLORPERAZINE MALEATE 5 MG
5 TABLET ORAL EVERY 6 HOURS PRN
Status: DISCONTINUED | OUTPATIENT
Start: 2018-01-01 | End: 2018-01-01 | Stop reason: HOSPADM

## 2018-01-01 RX ORDER — NALOXONE HYDROCHLORIDE 0.4 MG/ML
.1-.4 INJECTION, SOLUTION INTRAMUSCULAR; INTRAVENOUS; SUBCUTANEOUS
Status: ACTIVE | OUTPATIENT
Start: 2018-01-01 | End: 2018-01-01

## 2018-01-01 RX ORDER — MEPERIDINE HYDROCHLORIDE 50 MG/ML
12.5 INJECTION INTRAMUSCULAR; INTRAVENOUS; SUBCUTANEOUS
Status: DISCONTINUED | OUTPATIENT
Start: 2018-01-01 | End: 2018-01-01 | Stop reason: HOSPADM

## 2018-01-01 RX ORDER — FOLIC ACID 5 MG/ML
1 INJECTION, SOLUTION INTRAMUSCULAR; INTRAVENOUS; SUBCUTANEOUS DAILY
Status: DISCONTINUED | OUTPATIENT
Start: 2018-01-01 | End: 2018-01-01

## 2018-01-01 RX ORDER — ACYCLOVIR 200 MG/1
12 CAPSULE ORAL ONCE
Status: DISCONTINUED | OUTPATIENT
Start: 2018-01-01 | End: 2018-01-01 | Stop reason: CLARIF

## 2018-01-01 RX ORDER — ONDANSETRON 4 MG/1
4 TABLET, ORALLY DISINTEGRATING ORAL EVERY 30 MIN PRN
Status: DISCONTINUED | OUTPATIENT
Start: 2018-01-01 | End: 2018-01-01 | Stop reason: HOSPADM

## 2018-01-01 RX ORDER — PROPOFOL 10 MG/ML
INJECTION, EMULSION INTRAVENOUS PRN
Status: DISCONTINUED | OUTPATIENT
Start: 2018-01-01 | End: 2018-01-01

## 2018-01-01 RX ORDER — AMLODIPINE BESYLATE 5 MG/1
5 TABLET ORAL DAILY
Qty: 30 TABLET | Refills: 3 | DISCHARGE
Start: 2018-01-01

## 2018-01-01 RX ORDER — LEVALBUTEROL INHALATION SOLUTION 0.63 MG/3ML
0.63 SOLUTION RESPIRATORY (INHALATION) 4 TIMES DAILY
Qty: 360 ML | Refills: 0 | Status: SHIPPED | OUTPATIENT
Start: 2018-01-01

## 2018-01-01 RX ORDER — POTASSIUM CHLORIDE 7.45 MG/ML
10 INJECTION INTRAVENOUS
Status: DISCONTINUED | OUTPATIENT
Start: 2018-01-01 | End: 2018-01-01 | Stop reason: HOSPADM

## 2018-01-01 RX ORDER — LIDOCAINE HYDROCHLORIDE AND EPINEPHRINE 5; 5 MG/ML; UG/ML
INJECTION, SOLUTION INFILTRATION; PERINEURAL PRN
Status: DISCONTINUED | OUTPATIENT
Start: 2018-01-01 | End: 2018-01-01 | Stop reason: HOSPADM

## 2018-01-01 RX ORDER — HEPARIN SODIUM,PORCINE 10 UNIT/ML
5-10 VIAL (ML) INTRAVENOUS EVERY 24 HOURS
Status: DISCONTINUED | OUTPATIENT
Start: 2018-01-01 | End: 2018-01-01 | Stop reason: HOSPADM

## 2018-01-01 RX ORDER — THIAMINE HYDROCHLORIDE 100 MG/ML
100 INJECTION, SOLUTION INTRAMUSCULAR; INTRAVENOUS ONCE
Status: COMPLETED | OUTPATIENT
Start: 2018-01-01 | End: 2018-01-01

## 2018-01-01 RX ORDER — ACETAMINOPHEN 500 MG
500 TABLET ORAL EVERY 4 HOURS PRN
Qty: 100 TABLET | Refills: 0 | Status: SHIPPED | OUTPATIENT
Start: 2018-01-01

## 2018-01-01 RX ORDER — CLOPIDOGREL BISULFATE 75 MG/1
75 TABLET ORAL DAILY
Status: DISCONTINUED | OUTPATIENT
Start: 2018-01-01 | End: 2018-01-01

## 2018-01-01 RX ORDER — LIDOCAINE 4 G/G
1 PATCH TOPICAL
Status: DISCONTINUED | OUTPATIENT
Start: 2018-01-01 | End: 2018-01-01 | Stop reason: HOSPADM

## 2018-01-01 RX ORDER — POTASSIUM CHLORIDE 29.8 MG/ML
20 INJECTION INTRAVENOUS
Status: DISCONTINUED | OUTPATIENT
Start: 2018-01-01 | End: 2018-01-01

## 2018-01-01 RX ORDER — AMLODIPINE BESYLATE 2.5 MG/1
5 TABLET ORAL DAILY
Status: DISCONTINUED | OUTPATIENT
Start: 2018-01-01 | End: 2018-01-01 | Stop reason: HOSPADM

## 2018-01-01 RX ORDER — OXYMETAZOLINE HYDROCHLORIDE 0.05 G/100ML
SPRAY NASAL PRN
Status: DISCONTINUED | OUTPATIENT
Start: 2018-01-01 | End: 2018-01-01 | Stop reason: HOSPADM

## 2018-01-01 RX ORDER — AMLODIPINE BESYLATE 5 MG/1
5 TABLET ORAL ONCE
Status: DISCONTINUED | OUTPATIENT
Start: 2018-01-01 | End: 2018-01-01

## 2018-01-01 RX ORDER — ALBUTEROL SULFATE 0.83 MG/ML
2.5 SOLUTION RESPIRATORY (INHALATION) EVERY 4 HOURS PRN
Status: DISCONTINUED | OUTPATIENT
Start: 2018-01-01 | End: 2018-01-01 | Stop reason: HOSPADM

## 2018-01-01 RX ORDER — VANCOMYCIN HYDROCHLORIDE 1 G/200ML
1000 INJECTION, SOLUTION INTRAVENOUS
Status: COMPLETED | OUTPATIENT
Start: 2018-01-01 | End: 2018-01-01

## 2018-01-01 RX ORDER — LABETALOL HYDROCHLORIDE 5 MG/ML
10 INJECTION, SOLUTION INTRAVENOUS EVERY 4 HOURS PRN
Status: DISCONTINUED | OUTPATIENT
Start: 2018-01-01 | End: 2018-01-01 | Stop reason: HOSPADM

## 2018-01-01 RX ORDER — AMOXICILLIN 250 MG
2 CAPSULE ORAL 2 TIMES DAILY
Status: DISCONTINUED | OUTPATIENT
Start: 2018-01-01 | End: 2018-01-01

## 2018-01-01 RX ORDER — HYDROMORPHONE HCL/0.9% NACL/PF 0.2MG/0.2
0.2 SYRINGE (ML) INTRAVENOUS
Status: DISCONTINUED | OUTPATIENT
Start: 2018-01-01 | End: 2018-01-01

## 2018-01-01 RX ORDER — ACETAMINOPHEN 650 MG/1
650 SUPPOSITORY RECTAL EVERY 6 HOURS PRN
Status: DISCONTINUED | OUTPATIENT
Start: 2018-01-01 | End: 2018-01-01

## 2018-01-01 RX ORDER — FOLIC ACID 1 MG/1
1 TABLET ORAL DAILY
Status: DISCONTINUED | OUTPATIENT
Start: 2018-01-01 | End: 2018-01-01

## 2018-01-01 RX ORDER — METHYLPHENIDATE HYDROCHLORIDE 5 MG/1
2.5 TABLET ORAL 2 TIMES DAILY
Qty: 6 TABLET | Refills: 0 | Status: ON HOLD | OUTPATIENT
Start: 2018-01-01 | End: 2018-01-01

## 2018-01-01 RX ORDER — PROPOFOL 10 MG/ML
INJECTION, EMULSION INTRAVENOUS CONTINUOUS PRN
Status: DISCONTINUED | OUTPATIENT
Start: 2018-01-01 | End: 2018-01-01

## 2018-01-01 RX ORDER — MAGNESIUM SULFATE HEPTAHYDRATE 40 MG/ML
4 INJECTION, SOLUTION INTRAVENOUS EVERY 4 HOURS PRN
Status: DISCONTINUED | OUTPATIENT
Start: 2018-01-01 | End: 2018-01-01 | Stop reason: HOSPADM

## 2018-01-01 RX ORDER — BUDESONIDE 0.25 MG/2ML
0.25 INHALANT ORAL 2 TIMES DAILY
Status: ON HOLD | DISCHARGE
Start: 2018-01-01 | End: 2018-01-01

## 2018-01-01 RX ORDER — CEFAZOLIN SODIUM 1 G/3ML
1 INJECTION, POWDER, FOR SOLUTION INTRAMUSCULAR; INTRAVENOUS SEE ADMIN INSTRUCTIONS
Status: DISCONTINUED | OUTPATIENT
Start: 2018-01-01 | End: 2018-01-01

## 2018-01-01 RX ORDER — DEXTROSE MONOHYDRATE 25 G/50ML
INJECTION, SOLUTION INTRAVENOUS
Status: COMPLETED
Start: 2018-01-01 | End: 2018-01-01

## 2018-01-01 RX ORDER — ACETAMINOPHEN 325 MG/1
650 TABLET ORAL EVERY 6 HOURS PRN
Status: DISCONTINUED | OUTPATIENT
Start: 2018-01-01 | End: 2018-01-01 | Stop reason: HOSPADM

## 2018-01-01 RX ORDER — HYDRALAZINE HYDROCHLORIDE 20 MG/ML
2.5-5 INJECTION INTRAMUSCULAR; INTRAVENOUS EVERY 10 MIN PRN
Status: DISCONTINUED | OUTPATIENT
Start: 2018-01-01 | End: 2018-01-01 | Stop reason: HOSPADM

## 2018-01-01 RX ORDER — METOCLOPRAMIDE HYDROCHLORIDE 5 MG/ML
5 INJECTION INTRAMUSCULAR; INTRAVENOUS EVERY 6 HOURS PRN
Status: DISCONTINUED | OUTPATIENT
Start: 2018-01-01 | End: 2018-01-01 | Stop reason: HOSPADM

## 2018-01-01 RX ORDER — FOLIC ACID 1 MG/1
1 TABLET ORAL DAILY
Status: DISCONTINUED | OUTPATIENT
Start: 2018-01-01 | End: 2018-01-01 | Stop reason: HOSPADM

## 2018-01-01 RX ORDER — CEFTRIAXONE 2 G/1
2 INJECTION, POWDER, FOR SOLUTION INTRAMUSCULAR; INTRAVENOUS EVERY 24 HOURS
Status: DISCONTINUED | OUTPATIENT
Start: 2018-01-01 | End: 2018-01-01

## 2018-01-01 RX ORDER — ALBUTEROL SULFATE 0.83 MG/ML
2.5 SOLUTION RESPIRATORY (INHALATION) 4 TIMES DAILY
Status: ON HOLD | COMMUNITY
End: 2018-01-01

## 2018-01-01 RX ORDER — THIAMINE HYDROCHLORIDE 100 MG/ML
100 INJECTION, SOLUTION INTRAMUSCULAR; INTRAVENOUS DAILY
Status: DISCONTINUED | OUTPATIENT
Start: 2018-01-01 | End: 2018-01-01

## 2018-01-01 RX ORDER — NYSTATIN 100000/ML
500000 SUSPENSION, ORAL (FINAL DOSE FORM) ORAL 4 TIMES DAILY
Status: ON HOLD | COMMUNITY
Start: 2018-01-01 | End: 2018-01-01

## 2018-01-01 RX ORDER — SODIUM CHLORIDE FOR INHALATION 0.9 %
3 VIAL, NEBULIZER (ML) INHALATION 3 TIMES DAILY
Status: DISCONTINUED | OUTPATIENT
Start: 2018-01-01 | End: 2018-01-01 | Stop reason: HOSPADM

## 2018-01-01 RX ORDER — LIDOCAINE HYDROCHLORIDE 10 MG/ML
1-30 INJECTION, SOLUTION EPIDURAL; INFILTRATION; INTRACAUDAL; PERINEURAL
Status: COMPLETED | OUTPATIENT
Start: 2018-01-01 | End: 2018-01-01

## 2018-01-01 RX ORDER — FENTANYL CITRATE 50 UG/ML
25-50 INJECTION, SOLUTION INTRAMUSCULAR; INTRAVENOUS
Status: DISCONTINUED | OUTPATIENT
Start: 2018-01-01 | End: 2018-01-01 | Stop reason: HOSPADM

## 2018-01-01 RX ORDER — HYDROMORPHONE HCL/0.9% NACL/PF 0.2MG/0.2
0.2 SYRINGE (ML) INTRAVENOUS
Status: DISCONTINUED | OUTPATIENT
Start: 2018-01-01 | End: 2018-01-01 | Stop reason: HOSPADM

## 2018-01-01 RX ORDER — IOPAMIDOL 755 MG/ML
65 INJECTION, SOLUTION INTRAVASCULAR ONCE
Status: COMPLETED | OUTPATIENT
Start: 2018-01-01 | End: 2018-01-01

## 2018-01-01 RX ORDER — ONDANSETRON 4 MG/1
4 TABLET, ORALLY DISINTEGRATING ORAL EVERY 6 HOURS PRN
Status: DISCONTINUED | OUTPATIENT
Start: 2018-01-01 | End: 2018-01-01 | Stop reason: HOSPADM

## 2018-01-01 RX ORDER — MORPHINE SULFATE 20 MG/ML
5-10 SOLUTION ORAL
Qty: 30 ML | Refills: 0 | Status: SHIPPED | OUTPATIENT
Start: 2018-01-01 | End: 2018-01-01

## 2018-01-01 RX ORDER — ACETAMINOPHEN 500 MG
500 TABLET ORAL EVERY 6 HOURS
Status: ON HOLD | DISCHARGE
Start: 2018-01-01 | End: 2018-01-01

## 2018-01-01 RX ORDER — POTASSIUM CL/LIDO/0.9 % NACL 10MEQ/0.1L
10 INTRAVENOUS SOLUTION, PIGGYBACK (ML) INTRAVENOUS
Status: DISCONTINUED | OUTPATIENT
Start: 2018-01-01 | End: 2018-01-01 | Stop reason: HOSPADM

## 2018-01-01 RX ORDER — CLINDAMYCIN PHOSPHATE 600 MG/50ML
600 INJECTION, SOLUTION INTRAVENOUS EVERY 8 HOURS
Status: DISCONTINUED | OUTPATIENT
Start: 2018-01-01 | End: 2018-01-01

## 2018-01-01 RX ORDER — POLYETHYLENE GLYCOL 3350 17 G/17G
17 POWDER, FOR SOLUTION ORAL DAILY
Status: DISCONTINUED | OUTPATIENT
Start: 2018-01-01 | End: 2018-01-01

## 2018-01-01 RX ORDER — POTASSIUM CHLORIDE 29.8 MG/ML
20 INJECTION INTRAVENOUS
Status: DISCONTINUED | OUTPATIENT
Start: 2018-01-01 | End: 2018-01-01 | Stop reason: HOSPADM

## 2018-01-01 RX ORDER — LABETALOL HYDROCHLORIDE 5 MG/ML
5 INJECTION, SOLUTION INTRAVENOUS EVERY 10 MIN PRN
Status: DISCONTINUED | OUTPATIENT
Start: 2018-01-01 | End: 2018-01-01 | Stop reason: HOSPADM

## 2018-01-01 RX ORDER — LEVOFLOXACIN 250 MG/1
500 TABLET, FILM COATED ORAL DAILY
Status: DISCONTINUED | OUTPATIENT
Start: 2018-01-01 | End: 2018-01-01 | Stop reason: HOSPADM

## 2018-01-01 RX ORDER — GADOBUTROL 604.72 MG/ML
7.5 INJECTION INTRAVENOUS ONCE
Status: COMPLETED | OUTPATIENT
Start: 2018-01-01 | End: 2018-01-01

## 2018-01-01 RX ORDER — MULTIPLE VITAMINS W/ MINERALS TAB 9MG-400MCG
1 TAB ORAL DAILY
Status: DISCONTINUED | OUTPATIENT
Start: 2018-01-01 | End: 2018-01-01 | Stop reason: ALTCHOICE

## 2018-01-01 RX ORDER — LORAZEPAM 1 MG/1
1-4 TABLET ORAL EVERY 30 MIN PRN
Status: DISCONTINUED | OUTPATIENT
Start: 2018-01-01 | End: 2018-01-01

## 2018-01-01 RX ORDER — HYDRALAZINE HYDROCHLORIDE 20 MG/ML
INJECTION INTRAMUSCULAR; INTRAVENOUS PRN
Status: DISCONTINUED | OUTPATIENT
Start: 2018-01-01 | End: 2018-01-01

## 2018-01-01 RX ORDER — CLINDAMYCIN PHOSPHATE 600 MG/50ML
600 INJECTION, SOLUTION INTRAVENOUS
Status: COMPLETED | OUTPATIENT
Start: 2018-01-01 | End: 2018-01-01

## 2018-01-01 RX ORDER — VANCOMYCIN HYDROCHLORIDE 1 G/200ML
1000 INJECTION, SOLUTION INTRAVENOUS EVERY 24 HOURS
Status: DISCONTINUED | OUTPATIENT
Start: 2018-01-01 | End: 2018-01-01

## 2018-01-01 RX ORDER — LACTOBACILLUS RHAMNOSUS GG 10B CELL
1 CAPSULE ORAL
Status: DISCONTINUED | OUTPATIENT
Start: 2018-01-01 | End: 2018-01-01 | Stop reason: HOSPADM

## 2018-01-01 RX ORDER — CEFAZOLIN SODIUM 2 G/100ML
2 INJECTION, SOLUTION INTRAVENOUS
Status: DISCONTINUED | OUTPATIENT
Start: 2018-01-01 | End: 2018-01-01

## 2018-01-01 RX ORDER — POTASSIUM CL/LIDO/0.9 % NACL 10MEQ/0.1L
10 INTRAVENOUS SOLUTION, PIGGYBACK (ML) INTRAVENOUS
Status: DISCONTINUED | OUTPATIENT
Start: 2018-01-01 | End: 2018-01-01

## 2018-01-01 RX ORDER — IOPAMIDOL 755 MG/ML
10-140 INJECTION, SOLUTION INTRAVASCULAR ONCE
Status: COMPLETED | OUTPATIENT
Start: 2018-01-01 | End: 2018-01-01

## 2018-01-01 RX ORDER — ONDANSETRON 2 MG/ML
4 INJECTION INTRAMUSCULAR; INTRAVENOUS EVERY 6 HOURS PRN
Status: DISCONTINUED | OUTPATIENT
Start: 2018-01-01 | End: 2018-01-01 | Stop reason: HOSPADM

## 2018-01-01 RX ORDER — MULTIPLE VITAMINS W/ MINERALS TAB 9MG-400MCG
1 TAB ORAL DAILY
Status: DISCONTINUED | OUTPATIENT
Start: 2018-01-01 | End: 2018-01-01

## 2018-01-01 RX ORDER — ARFORMOTEROL TARTRATE 15 UG/2ML
15 SOLUTION RESPIRATORY (INHALATION) 2 TIMES DAILY
Status: DISCONTINUED | OUTPATIENT
Start: 2018-01-01 | End: 2018-01-01 | Stop reason: HOSPADM

## 2018-01-01 RX ORDER — GLYCOPYRROLATE 1 MG/1
1 TABLET ORAL 2 TIMES DAILY
Status: DISCONTINUED | OUTPATIENT
Start: 2018-01-01 | End: 2018-01-01

## 2018-01-01 RX ORDER — LEVALBUTEROL INHALATION SOLUTION 0.63 MG/3ML
0.63 SOLUTION RESPIRATORY (INHALATION)
Status: DISCONTINUED | OUTPATIENT
Start: 2018-01-01 | End: 2018-01-01

## 2018-01-01 RX ORDER — HEPARIN SODIUM,PORCINE 10 UNIT/ML
5-10 VIAL (ML) INTRAVENOUS
Status: DISCONTINUED | OUTPATIENT
Start: 2018-01-01 | End: 2018-01-01 | Stop reason: HOSPADM

## 2018-01-01 RX ORDER — CEFTRIAXONE 1 G/1
1 INJECTION, POWDER, FOR SOLUTION INTRAMUSCULAR; INTRAVENOUS EVERY 24 HOURS
Status: DISCONTINUED | OUTPATIENT
Start: 2018-01-01 | End: 2018-01-01

## 2018-01-01 RX ORDER — FUROSEMIDE 10 MG/ML
40 INJECTION INTRAMUSCULAR; INTRAVENOUS ONCE
Status: COMPLETED | OUTPATIENT
Start: 2018-01-01 | End: 2018-01-01

## 2018-01-01 RX ORDER — PREDNISONE 20 MG/1
20 TABLET ORAL DAILY
Qty: 30 TABLET | Refills: 1 | Status: SHIPPED | OUTPATIENT
Start: 2018-01-01

## 2018-01-01 RX ORDER — POTASSIUM CHLORIDE 1.5 G/1.58G
20-40 POWDER, FOR SOLUTION ORAL
Status: DISCONTINUED | OUTPATIENT
Start: 2018-01-01 | End: 2018-01-01

## 2018-01-01 RX ORDER — ARFORMOTEROL TARTRATE 15 UG/2ML
15 SOLUTION RESPIRATORY (INHALATION) 2 TIMES DAILY
Qty: 120 ML | Refills: 0 | Status: SHIPPED | OUTPATIENT
Start: 2018-01-01

## 2018-01-01 RX ORDER — ACETAMINOPHEN 500 MG
500 TABLET ORAL EVERY 6 HOURS
Status: DISCONTINUED | OUTPATIENT
Start: 2018-01-01 | End: 2018-01-01

## 2018-01-01 RX ORDER — MAGNESIUM HYDROXIDE 1200 MG/15ML
LIQUID ORAL
Status: COMPLETED
Start: 2018-01-01 | End: 2018-01-01

## 2018-01-01 RX ORDER — METOCLOPRAMIDE 5 MG/1
5 TABLET ORAL EVERY 6 HOURS PRN
Status: DISCONTINUED | OUTPATIENT
Start: 2018-01-01 | End: 2018-01-01

## 2018-01-01 RX ORDER — LEVALBUTEROL INHALATION SOLUTION 0.63 MG/3ML
0.63 SOLUTION RESPIRATORY (INHALATION)
Status: DISCONTINUED | OUTPATIENT
Start: 2018-01-01 | End: 2018-01-01 | Stop reason: HOSPADM

## 2018-01-01 RX ORDER — HYDRALAZINE HYDROCHLORIDE 20 MG/ML
10 INJECTION INTRAMUSCULAR; INTRAVENOUS EVERY 6 HOURS PRN
Status: DISCONTINUED | OUTPATIENT
Start: 2018-01-01 | End: 2018-01-01

## 2018-01-01 RX ORDER — POTASSIUM CHLORIDE 7.45 MG/ML
10 INJECTION INTRAVENOUS
Status: DISCONTINUED | OUTPATIENT
Start: 2018-01-01 | End: 2018-01-01

## 2018-01-01 RX ORDER — LABETALOL HYDROCHLORIDE 5 MG/ML
10 INJECTION, SOLUTION INTRAVENOUS EVERY 4 HOURS
Status: DISCONTINUED | OUTPATIENT
Start: 2018-01-01 | End: 2018-01-01

## 2018-01-01 RX ORDER — LABETALOL HYDROCHLORIDE 5 MG/ML
INJECTION, SOLUTION INTRAVENOUS PRN
Status: DISCONTINUED | OUTPATIENT
Start: 2018-01-01 | End: 2018-01-01

## 2018-01-01 RX ORDER — IOPAMIDOL 755 MG/ML
100 INJECTION, SOLUTION INTRAVASCULAR ONCE
Status: COMPLETED | OUTPATIENT
Start: 2018-01-01 | End: 2018-01-01

## 2018-01-01 RX ORDER — GLYCOPYRROLATE 1 MG/1
1 TABLET ORAL 3 TIMES DAILY
Status: DISCONTINUED | OUTPATIENT
Start: 2018-01-01 | End: 2018-01-01

## 2018-01-01 RX ORDER — LOSARTAN POTASSIUM 50 MG/1
50 TABLET ORAL DAILY
Status: DISCONTINUED | OUTPATIENT
Start: 2018-01-01 | End: 2018-01-01 | Stop reason: HOSPADM

## 2018-01-01 RX ORDER — METOCLOPRAMIDE 5 MG/1
5 TABLET ORAL EVERY 6 HOURS PRN
Status: DISCONTINUED | OUTPATIENT
Start: 2018-01-01 | End: 2018-01-01 | Stop reason: HOSPADM

## 2018-01-01 RX ORDER — ATORVASTATIN CALCIUM 40 MG/1
40 TABLET, FILM COATED ORAL DAILY
Status: DISCONTINUED | OUTPATIENT
Start: 2018-01-01 | End: 2018-01-01 | Stop reason: HOSPADM

## 2018-01-01 RX ORDER — HYDROMORPHONE HCL/0.9% NACL/PF 0.2MG/0.2
0.2 SYRINGE (ML) INTRAVENOUS EVERY 6 HOURS PRN
Status: DISCONTINUED | OUTPATIENT
Start: 2018-01-01 | End: 2018-01-01

## 2018-01-01 RX ORDER — MEROPENEM 1 G/1
1 INJECTION, POWDER, FOR SOLUTION INTRAVENOUS EVERY 8 HOURS
Status: DISCONTINUED | OUTPATIENT
Start: 2018-01-01 | End: 2018-01-01

## 2018-01-01 RX ORDER — POLYETHYLENE GLYCOL 3350 17 G/17G
17 POWDER, FOR SOLUTION ORAL DAILY PRN
Status: DISCONTINUED | OUTPATIENT
Start: 2018-01-01 | End: 2018-01-01 | Stop reason: HOSPADM

## 2018-01-01 RX ORDER — HYDROMORPHONE HYDROCHLORIDE 1 MG/ML
.3-.5 INJECTION, SOLUTION INTRAMUSCULAR; INTRAVENOUS; SUBCUTANEOUS EVERY 10 MIN PRN
Status: DISCONTINUED | OUTPATIENT
Start: 2018-01-01 | End: 2018-01-01 | Stop reason: HOSPADM

## 2018-01-01 RX ORDER — MORPHINE SULFATE 100 MG/5ML
5-10 SOLUTION ORAL
Qty: 15 ML | Refills: 0 | Status: SHIPPED | OUTPATIENT
Start: 2018-01-01

## 2018-01-01 RX ORDER — ALBUTEROL SULFATE 90 UG/1
2 AEROSOL, METERED RESPIRATORY (INHALATION) EVERY 4 HOURS PRN
Qty: 1 INHALER | Refills: 9 | Status: ON HOLD | OUTPATIENT
Start: 2018-01-01 | End: 2018-01-01

## 2018-01-01 RX ORDER — LABETALOL HYDROCHLORIDE 5 MG/ML
10 INJECTION, SOLUTION INTRAVENOUS ONCE
Status: DISCONTINUED | OUTPATIENT
Start: 2018-01-01 | End: 2018-01-01

## 2018-01-01 RX ORDER — CIPROFLOXACIN AND DEXAMETHASONE 3; 1 MG/ML; MG/ML
4 SUSPENSION/ DROPS AURICULAR (OTIC) 2 TIMES DAILY
Status: DISCONTINUED | OUTPATIENT
Start: 2018-01-01 | End: 2018-01-01 | Stop reason: HOSPADM

## 2018-01-01 RX ORDER — PROCHLORPERAZINE MALEATE 5 MG
5 TABLET ORAL EVERY 6 HOURS PRN
Status: DISCONTINUED | OUTPATIENT
Start: 2018-01-01 | End: 2018-01-01

## 2018-01-01 RX ORDER — BUDESONIDE 0.25 MG/2ML
0.25 INHALANT ORAL 2 TIMES DAILY
Qty: 60 AMPULE | Refills: 0 | Status: SHIPPED | OUTPATIENT
Start: 2018-01-01 | End: 2018-09-30

## 2018-01-01 RX ORDER — IOPAMIDOL 755 MG/ML
60-135 INJECTION, SOLUTION INTRAVASCULAR ONCE
Status: DISCONTINUED | OUTPATIENT
Start: 2018-01-01 | End: 2018-01-01

## 2018-01-01 RX ORDER — DEXTROSE MONOHYDRATE 25 G/50ML
25-50 INJECTION, SOLUTION INTRAVENOUS
Status: DISCONTINUED | OUTPATIENT
Start: 2018-01-01 | End: 2018-01-01 | Stop reason: HOSPADM

## 2018-01-01 RX ORDER — SULFAMETHOXAZOLE/TRIMETHOPRIM 800-160 MG
1 TABLET ORAL 2 TIMES DAILY
Status: DISCONTINUED | OUTPATIENT
Start: 2018-01-01 | End: 2018-01-01

## 2018-01-01 RX ORDER — ALBUTEROL SULFATE 5 MG/ML
2.5 SOLUTION RESPIRATORY (INHALATION) EVERY 4 HOURS PRN
Status: DISCONTINUED | OUTPATIENT
Start: 2018-01-01 | End: 2018-01-01

## 2018-01-01 RX ORDER — LOSARTAN POTASSIUM 50 MG/1
50 TABLET ORAL DAILY
Qty: 30 TABLET | Refills: 3 | DISCHARGE
Start: 2018-01-01

## 2018-01-01 RX ORDER — POTASSIUM CHLORIDE 29.8 MG/ML
20 INJECTION INTRAVENOUS
Status: DISCONTINUED | OUTPATIENT
Start: 2018-01-01 | End: 2018-01-01 | Stop reason: RX

## 2018-01-01 RX ORDER — TIOTROPIUM BROMIDE 18 UG/1
CAPSULE ORAL; RESPIRATORY (INHALATION)
Qty: 90 CAPSULE | Refills: 1 | Status: SHIPPED | OUTPATIENT
Start: 2018-01-01 | End: 2018-01-01

## 2018-01-01 RX ORDER — GLYCOPYRROLATE 1 MG/1
1 TABLET ORAL 3 TIMES DAILY
DISCHARGE
Start: 2018-01-01 | End: 2018-01-01

## 2018-01-01 RX ORDER — MORPHINE SULFATE 100 MG/5ML
5-10 SOLUTION ORAL
Qty: 15 ML | Refills: 0 | Status: SHIPPED | OUTPATIENT
Start: 2018-01-01 | End: 2018-01-01

## 2018-01-01 RX ORDER — POLYETHYLENE GLYCOL 3350 17 G/17G
17 POWDER, FOR SOLUTION ORAL DAILY
Status: DISCONTINUED | OUTPATIENT
Start: 2018-01-01 | End: 2018-01-01 | Stop reason: HOSPADM

## 2018-01-01 RX ORDER — METHYLPHENIDATE HYDROCHLORIDE 5 MG/1
2.5 TABLET ORAL 2 TIMES DAILY
Qty: 30 TABLET | Refills: 0 | Status: SHIPPED | OUTPATIENT
Start: 2018-01-01 | End: 2018-09-30

## 2018-01-01 RX ORDER — AMOXICILLIN 250 MG
1 CAPSULE ORAL 2 TIMES DAILY
Status: DISCONTINUED | OUTPATIENT
Start: 2018-01-01 | End: 2018-01-01

## 2018-01-01 RX ORDER — IOPAMIDOL 755 MG/ML
53 INJECTION, SOLUTION INTRAVASCULAR ONCE
Status: COMPLETED | OUTPATIENT
Start: 2018-01-01 | End: 2018-01-01

## 2018-01-01 RX ORDER — CALCIUM CHLORIDE 100 MG/ML
INJECTION INTRAVENOUS; INTRAVENTRICULAR PRN
Status: DISCONTINUED | OUTPATIENT
Start: 2018-01-01 | End: 2018-01-01

## 2018-01-01 RX ORDER — METHYLPHENIDATE HYDROCHLORIDE 5 MG/1
5 TABLET ORAL DAILY
Status: ON HOLD | COMMUNITY
End: 2018-01-01

## 2018-01-01 RX ORDER — MOXIFLOXACIN HYDROCHLORIDE 400 MG/1
400 TABLET ORAL DAILY
Status: DISCONTINUED | OUTPATIENT
Start: 2018-01-01 | End: 2018-01-01

## 2018-01-01 RX ORDER — ALBUTEROL SULFATE 0.83 MG/ML
SOLUTION RESPIRATORY (INHALATION)
Status: DISCONTINUED
Start: 2018-01-01 | End: 2018-01-01 | Stop reason: WASHOUT

## 2018-01-01 RX ORDER — FOLIC ACID 1 MG/1
1 TABLET ORAL DAILY
Status: DISCONTINUED | OUTPATIENT
Start: 2018-01-01 | End: 2018-01-01 | Stop reason: ALTCHOICE

## 2018-01-01 RX ORDER — PROCHLORPERAZINE 25 MG
12.5 SUPPOSITORY, RECTAL RECTAL EVERY 12 HOURS PRN
Status: DISCONTINUED | OUTPATIENT
Start: 2018-01-01 | End: 2018-01-01

## 2018-01-01 RX ORDER — OXYMETAZOLINE HYDROCHLORIDE 0.05 G/100ML
2 SPRAY NASAL 4 TIMES DAILY PRN
Status: DISCONTINUED | OUTPATIENT
Start: 2018-01-01 | End: 2018-01-01 | Stop reason: HOSPADM

## 2018-01-01 RX ORDER — ARFORMOTEROL TARTRATE 15 UG/2ML
15 SOLUTION RESPIRATORY (INHALATION) 2 TIMES DAILY
Qty: 120 ML | Status: ON HOLD | DISCHARGE
Start: 2018-01-01 | End: 2018-01-01

## 2018-01-01 RX ORDER — LEVALBUTEROL INHALATION SOLUTION 0.63 MG/3ML
0.63 SOLUTION RESPIRATORY (INHALATION) 4 TIMES DAILY
Status: DISCONTINUED | OUTPATIENT
Start: 2018-01-01 | End: 2018-01-01 | Stop reason: HOSPADM

## 2018-01-01 RX ORDER — SODIUM CHLORIDE 9 MG/ML
INJECTION, SOLUTION INTRAVENOUS CONTINUOUS
Status: DISCONTINUED | OUTPATIENT
Start: 2018-01-01 | End: 2018-01-01 | Stop reason: ALTCHOICE

## 2018-01-01 RX ORDER — FERROUS SULFATE 325(65) MG
325 TABLET ORAL DAILY
Status: DISCONTINUED | OUTPATIENT
Start: 2018-01-01 | End: 2018-01-01 | Stop reason: RX

## 2018-01-01 RX ORDER — DIAZEPAM 5 MG
5-20 TABLET ORAL EVERY 30 MIN PRN
Status: DISCONTINUED | OUTPATIENT
Start: 2018-01-01 | End: 2018-01-01

## 2018-01-01 RX ORDER — HYDROMORPHONE HYDROCHLORIDE 1 MG/ML
.3-.5 INJECTION, SOLUTION INTRAMUSCULAR; INTRAVENOUS; SUBCUTANEOUS EVERY 5 MIN PRN
Status: DISCONTINUED | OUTPATIENT
Start: 2018-01-01 | End: 2018-01-01 | Stop reason: HOSPADM

## 2018-01-01 RX ORDER — NYSTATIN 100000/ML
500000 SUSPENSION, ORAL (FINAL DOSE FORM) ORAL 4 TIMES DAILY
Status: DISCONTINUED | OUTPATIENT
Start: 2018-01-01 | End: 2018-01-01 | Stop reason: HOSPADM

## 2018-01-01 RX ORDER — SODIUM CHLORIDE FOR INHALATION 0.9 %
3 VIAL, NEBULIZER (ML) INHALATION 3 TIMES DAILY
Status: DISCONTINUED | OUTPATIENT
Start: 2018-01-01 | End: 2018-01-01

## 2018-01-01 RX ORDER — LACTOBACILLUS RHAMNOSUS GG 10B CELL
1 CAPSULE ORAL
Status: ON HOLD | DISCHARGE
Start: 2018-01-01 | End: 2018-01-01

## 2018-01-01 RX ORDER — OXYCODONE HYDROCHLORIDE 5 MG/1
5-10 TABLET ORAL
Qty: 15 TABLET | Refills: 0 | Status: ON HOLD | OUTPATIENT
Start: 2018-01-01 | End: 2018-01-01

## 2018-01-01 RX ORDER — METOCLOPRAMIDE HYDROCHLORIDE 5 MG/ML
5 INJECTION INTRAMUSCULAR; INTRAVENOUS EVERY 6 HOURS PRN
Status: DISCONTINUED | OUTPATIENT
Start: 2018-01-01 | End: 2018-01-01

## 2018-01-01 RX ORDER — POLYETHYLENE GLYCOL 3350 17 G/17G
17 POWDER, FOR SOLUTION ORAL DAILY PRN
Qty: 14 PACKET | Refills: 0 | Status: SHIPPED | OUTPATIENT
Start: 2018-01-01

## 2018-01-01 RX ORDER — MORPHINE SULFATE 20 MG/5ML
5-10 SOLUTION ORAL
Qty: 30 ML | Refills: 0 | Status: SHIPPED | OUTPATIENT
Start: 2018-01-01 | End: 2018-01-01

## 2018-01-01 RX ORDER — ACETAMINOPHEN 500 MG
500 TABLET ORAL EVERY 6 HOURS PRN
Status: DISCONTINUED | OUTPATIENT
Start: 2018-01-01 | End: 2018-01-01 | Stop reason: HOSPADM

## 2018-01-01 RX ORDER — GLYCOPYRROLATE 1 MG/1
1 TABLET ORAL 3 TIMES DAILY
Status: ON HOLD | DISCHARGE
Start: 2018-01-01 | End: 2018-01-01

## 2018-01-01 RX ORDER — LEVOFLOXACIN 5 MG/ML
750 INJECTION, SOLUTION INTRAVENOUS ONCE
Status: COMPLETED | OUTPATIENT
Start: 2018-01-01 | End: 2018-01-01

## 2018-01-01 RX ORDER — LANOLIN ALCOHOL/MO/W.PET/CERES
3 CREAM (GRAM) TOPICAL AT BEDTIME
Status: DISCONTINUED | OUTPATIENT
Start: 2018-01-01 | End: 2018-01-01 | Stop reason: HOSPADM

## 2018-01-01 RX ORDER — AMLODIPINE BESYLATE 5 MG/1
5 TABLET ORAL DAILY
Qty: 30 TABLET | Refills: 3 | Status: SHIPPED | OUTPATIENT
Start: 2018-01-01 | End: 2018-01-01

## 2018-01-01 RX ORDER — ASPIRIN 81 MG/1
81 TABLET, CHEWABLE ORAL DAILY
Status: DISCONTINUED | OUTPATIENT
Start: 2018-01-01 | End: 2018-01-01 | Stop reason: HOSPADM

## 2018-01-01 RX ADMIN — ASPIRIN 81 MG CHEWABLE TABLET 81 MG: 81 TABLET CHEWABLE at 08:51

## 2018-01-01 RX ADMIN — OYSTER SHELL CALCIUM WITH VITAMIN D 1 TABLET: 500; 200 TABLET, FILM COATED ORAL at 20:37

## 2018-01-01 RX ADMIN — Medication 1 CAPSULE: at 08:45

## 2018-01-01 RX ADMIN — IPRATROPIUM BROMIDE 0.5 MG: 0.5 SOLUTION RESPIRATORY (INHALATION) at 12:30

## 2018-01-01 RX ADMIN — LEVALBUTEROL HYDROCHLORIDE 0.63 MG: 0.63 SOLUTION RESPIRATORY (INHALATION) at 16:28

## 2018-01-01 RX ADMIN — Medication 2.5 MG: at 08:03

## 2018-01-01 RX ADMIN — ACETAMINOPHEN 500 MG: 500 TABLET, FILM COATED ORAL at 04:01

## 2018-01-01 RX ADMIN — BUDESONIDE 0.25 MG: 0.25 INHALANT RESPIRATORY (INHALATION) at 19:38

## 2018-01-01 RX ADMIN — LEVALBUTEROL HYDROCHLORIDE 0.63 MG: 0.63 SOLUTION RESPIRATORY (INHALATION) at 07:59

## 2018-01-01 RX ADMIN — NYSTATIN 500000 UNITS: 100000 SUSPENSION ORAL at 15:59

## 2018-01-01 RX ADMIN — OYSTER SHELL CALCIUM WITH VITAMIN D 1 TABLET: 500; 200 TABLET, FILM COATED ORAL at 21:12

## 2018-01-01 RX ADMIN — NYSTATIN 500000 UNITS: 100000 SUSPENSION ORAL at 17:01

## 2018-01-01 RX ADMIN — Medication 2.5 MG: at 21:09

## 2018-01-01 RX ADMIN — LEVALBUTEROL HYDROCHLORIDE 0.63 MG: 0.63 SOLUTION RESPIRATORY (INHALATION) at 09:36

## 2018-01-01 RX ADMIN — Medication 1 CAPSULE: at 17:35

## 2018-01-01 RX ADMIN — IPRATROPIUM BROMIDE 0.5 MG: 0.5 SOLUTION RESPIRATORY (INHALATION) at 20:44

## 2018-01-01 RX ADMIN — Medication 80 MG: at 08:18

## 2018-01-01 RX ADMIN — BUDESONIDE 0.25 MG: 0.25 INHALANT RESPIRATORY (INHALATION) at 21:30

## 2018-01-01 RX ADMIN — OYSTER SHELL CALCIUM WITH VITAMIN D 1 TABLET: 500; 200 TABLET, FILM COATED ORAL at 19:51

## 2018-01-01 RX ADMIN — CIPROFLOXACIN AND DEXAMETHASONE 4 DROP: 3; 1 SUSPENSION/ DROPS AURICULAR (OTIC) at 08:57

## 2018-01-01 RX ADMIN — LEVALBUTEROL HYDROCHLORIDE 0.63 MG: 0.63 SOLUTION RESPIRATORY (INHALATION) at 21:55

## 2018-01-01 RX ADMIN — LEVOFLOXACIN 500 MG: 250 TABLET, FILM COATED ORAL at 08:24

## 2018-01-01 RX ADMIN — IPRATROPIUM BROMIDE 0.5 MG: 0.5 SOLUTION RESPIRATORY (INHALATION) at 20:39

## 2018-01-01 RX ADMIN — Medication 1 CAPSULE: at 13:08

## 2018-01-01 RX ADMIN — NYSTATIN 500000 UNITS: 100000 SUSPENSION ORAL at 19:57

## 2018-01-01 RX ADMIN — Medication 1 CAPSULE: at 17:18

## 2018-01-01 RX ADMIN — Medication 2.5 MG: at 07:30

## 2018-01-01 RX ADMIN — ARFORMOTEROL TARTRATE 15 MCG: 15 SOLUTION RESPIRATORY (INHALATION) at 19:51

## 2018-01-01 RX ADMIN — LABETALOL HYDROCHLORIDE 2.5 MG: 5 INJECTION INTRAVENOUS at 14:55

## 2018-01-01 RX ADMIN — LEVALBUTEROL HYDROCHLORIDE 0.63 MG: 0.63 SOLUTION RESPIRATORY (INHALATION) at 16:32

## 2018-01-01 RX ADMIN — LEVALBUTEROL HYDROCHLORIDE 0.63 MG: 0.63 SOLUTION RESPIRATORY (INHALATION) at 19:48

## 2018-01-01 RX ADMIN — ATORVASTATIN CALCIUM 40 MG: 40 TABLET, FILM COATED ORAL at 07:39

## 2018-01-01 RX ADMIN — LEVALBUTEROL HYDROCHLORIDE 0.63 MG: 0.63 SOLUTION RESPIRATORY (INHALATION) at 11:32

## 2018-01-01 RX ADMIN — Medication 100 MG: at 08:17

## 2018-01-01 RX ADMIN — METRONIDAZOLE 500 MG: 500 INJECTION, SOLUTION INTRAVENOUS at 19:40

## 2018-01-01 RX ADMIN — IPRATROPIUM BROMIDE 0.5 MG: 0.5 SOLUTION RESPIRATORY (INHALATION) at 16:28

## 2018-01-01 RX ADMIN — IPRATROPIUM BROMIDE 0.5 MG: 0.5 SOLUTION RESPIRATORY (INHALATION) at 20:40

## 2018-01-01 RX ADMIN — LEVALBUTEROL HYDROCHLORIDE 0.63 MG: 0.63 SOLUTION RESPIRATORY (INHALATION) at 08:29

## 2018-01-01 RX ADMIN — Medication 2.5 MG: at 11:38

## 2018-01-01 RX ADMIN — GLYCOPYRROLATE 1 MG: 1 TABLET ORAL at 20:35

## 2018-01-01 RX ADMIN — Medication 10 MEQ: at 19:40

## 2018-01-01 RX ADMIN — Medication 10 MG: at 21:48

## 2018-01-01 RX ADMIN — OXYCODONE HYDROCHLORIDE 5 MG: 5 TABLET ORAL at 21:14

## 2018-01-01 RX ADMIN — Medication 1 CAPSULE: at 16:51

## 2018-01-01 RX ADMIN — Medication 1 CAPSULE: at 11:57

## 2018-01-01 RX ADMIN — BUDESONIDE 0.25 MG: 0.25 INHALANT RESPIRATORY (INHALATION) at 09:00

## 2018-01-01 RX ADMIN — THIAMINE HYDROCHLORIDE 200 MG: 100 INJECTION, SOLUTION INTRAMUSCULAR; INTRAVENOUS at 08:34

## 2018-01-01 RX ADMIN — MEROPENEM 1 G: 1 INJECTION, POWDER, FOR SOLUTION INTRAVENOUS at 11:17

## 2018-01-01 RX ADMIN — LOSARTAN POTASSIUM 50 MG: 50 TABLET ORAL at 08:03

## 2018-01-01 RX ADMIN — NYSTATIN 500000 UNITS: 100000 SUSPENSION ORAL at 16:01

## 2018-01-01 RX ADMIN — Medication 10 MG: at 20:33

## 2018-01-01 RX ADMIN — LEVALBUTEROL HYDROCHLORIDE 0.63 MG: 0.63 SOLUTION RESPIRATORY (INHALATION) at 11:53

## 2018-01-01 RX ADMIN — ATORVASTATIN CALCIUM 40 MG: 40 TABLET, FILM COATED ORAL at 08:00

## 2018-01-01 RX ADMIN — LEVALBUTEROL HYDROCHLORIDE 0.63 MG: 0.63 SOLUTION RESPIRATORY (INHALATION) at 12:06

## 2018-01-01 RX ADMIN — GLYCOPYRROLATE 1 MG: 1 TABLET ORAL at 08:18

## 2018-01-01 RX ADMIN — OYSTER SHELL CALCIUM WITH VITAMIN D 1 TABLET: 500; 200 TABLET, FILM COATED ORAL at 08:50

## 2018-01-01 RX ADMIN — Medication 1 MG: at 08:10

## 2018-01-01 RX ADMIN — Medication 1 MG: at 08:56

## 2018-01-01 RX ADMIN — IPRATROPIUM BROMIDE 0.5 MG: 0.5 SOLUTION RESPIRATORY (INHALATION) at 12:40

## 2018-01-01 RX ADMIN — MEROPENEM 1 G: 1 INJECTION, POWDER, FOR SOLUTION INTRAVENOUS at 08:36

## 2018-01-01 RX ADMIN — Medication 100 MG: at 08:18

## 2018-01-01 RX ADMIN — ROCURONIUM BROMIDE 20 MG: 10 INJECTION INTRAVENOUS at 13:25

## 2018-01-01 RX ADMIN — SODIUM CHLORIDE, SODIUM GLUCONATE, SODIUM ACETATE, POTASSIUM CHLORIDE AND MAGNESIUM CHLORIDE: 526; 502; 368; 37; 30 INJECTION, SOLUTION INTRAVENOUS at 15:39

## 2018-01-01 RX ADMIN — MULTIVITAMIN 15 ML: LIQUID ORAL at 18:05

## 2018-01-01 RX ADMIN — Medication 80 MG: at 09:57

## 2018-01-01 RX ADMIN — ONDANSETRON 4 MG: 2 INJECTION INTRAMUSCULAR; INTRAVENOUS at 14:33

## 2018-01-01 RX ADMIN — Medication 3 MG: at 23:40

## 2018-01-01 RX ADMIN — Medication 1 CAPSULE: at 08:38

## 2018-01-01 RX ADMIN — ENOXAPARIN SODIUM 30 MG: 30 INJECTION SUBCUTANEOUS at 16:02

## 2018-01-01 RX ADMIN — METRONIDAZOLE 500 MG: 500 INJECTION, SOLUTION INTRAVENOUS at 00:50

## 2018-01-01 RX ADMIN — MULTIVITAMIN 15 ML: LIQUID ORAL at 08:53

## 2018-01-01 RX ADMIN — OYSTER SHELL CALCIUM WITH VITAMIN D 1 TABLET: 500; 200 TABLET, FILM COATED ORAL at 20:16

## 2018-01-01 RX ADMIN — POLYETHYLENE GLYCOL 3350 17 G: 17 POWDER, FOR SOLUTION ORAL at 07:58

## 2018-01-01 RX ADMIN — ENOXAPARIN SODIUM 30 MG: 30 INJECTION SUBCUTANEOUS at 15:57

## 2018-01-01 RX ADMIN — LEVALBUTEROL HYDROCHLORIDE 0.63 MG: 0.63 SOLUTION RESPIRATORY (INHALATION) at 12:40

## 2018-01-01 RX ADMIN — LEVALBUTEROL HYDROCHLORIDE 0.63 MG: 0.63 SOLUTION RESPIRATORY (INHALATION) at 07:10

## 2018-01-01 RX ADMIN — CIPROFLOXACIN AND DEXAMETHASONE 4 DROP: 3; 1 SUSPENSION/ DROPS AURICULAR (OTIC) at 08:24

## 2018-01-01 RX ADMIN — Medication 1 CAPSULE: at 13:41

## 2018-01-01 RX ADMIN — LEVALBUTEROL HYDROCHLORIDE 0.63 MG: 0.63 SOLUTION RESPIRATORY (INHALATION) at 13:01

## 2018-01-01 RX ADMIN — GLYCOPYRROLATE 1 MG: 1 TABLET ORAL at 14:01

## 2018-01-01 RX ADMIN — LABETALOL HYDROCHLORIDE 10 MG: 5 INJECTION INTRAVENOUS at 16:45

## 2018-01-01 RX ADMIN — IPRATROPIUM BROMIDE 0.5 MG: 0.5 SOLUTION RESPIRATORY (INHALATION) at 09:36

## 2018-01-01 RX ADMIN — LEVALBUTEROL HYDROCHLORIDE 0.63 MG: 0.63 SOLUTION RESPIRATORY (INHALATION) at 07:24

## 2018-01-01 RX ADMIN — Medication 10 MEQ: at 23:28

## 2018-01-01 RX ADMIN — NYSTATIN 500000 UNITS: 100000 SUSPENSION ORAL at 12:12

## 2018-01-01 RX ADMIN — ASPIRIN 81 MG CHEWABLE TABLET 81 MG: 81 TABLET CHEWABLE at 10:07

## 2018-01-01 RX ADMIN — AMLODIPINE BESYLATE 5 MG: 2.5 TABLET ORAL at 07:48

## 2018-01-01 RX ADMIN — BUDESONIDE 0.25 MG: 0.25 INHALANT RESPIRATORY (INHALATION) at 20:44

## 2018-01-01 RX ADMIN — SENNOSIDES 5 ML: 8.8 SYRUP ORAL at 20:12

## 2018-01-01 RX ADMIN — THIAMINE HYDROCHLORIDE 200 MG: 100 INJECTION, SOLUTION INTRAMUSCULAR; INTRAVENOUS at 08:36

## 2018-01-01 RX ADMIN — ARFORMOTEROL TARTRATE 15 MCG: 15 SOLUTION RESPIRATORY (INHALATION) at 07:29

## 2018-01-01 RX ADMIN — THIAMINE HYDROCHLORIDE 100 MG: 100 INJECTION, SOLUTION INTRAMUSCULAR; INTRAVENOUS at 11:59

## 2018-01-01 RX ADMIN — GLYCOPYRROLATE 1 MG: 1 TABLET ORAL at 14:31

## 2018-01-01 RX ADMIN — Medication 1 CAPSULE: at 08:50

## 2018-01-01 RX ADMIN — ATORVASTATIN CALCIUM 40 MG: 40 TABLET, FILM COATED ORAL at 08:40

## 2018-01-01 RX ADMIN — SENNOSIDES 5 ML: 8.8 SYRUP ORAL at 07:55

## 2018-01-01 RX ADMIN — LOSARTAN POTASSIUM 50 MG: 50 TABLET ORAL at 07:26

## 2018-01-01 RX ADMIN — NYSTATIN 500000 UNITS: 100000 SUSPENSION ORAL at 20:11

## 2018-01-01 RX ADMIN — MINERAL SUPPLEMENT IRON 300 MG / 5 ML STRENGTH LIQUID 100 PER BOX UNFLAVORED 300 MG: at 08:25

## 2018-01-01 RX ADMIN — NYSTATIN 500000 UNITS: 100000 SUSPENSION ORAL at 12:06

## 2018-01-01 RX ADMIN — ENOXAPARIN SODIUM 40 MG: 40 INJECTION SUBCUTANEOUS at 17:31

## 2018-01-01 RX ADMIN — AMLODIPINE BESYLATE 5 MG: 2.5 TABLET ORAL at 08:26

## 2018-01-01 RX ADMIN — BUDESONIDE 0.25 MG: 0.25 INHALANT RESPIRATORY (INHALATION) at 21:33

## 2018-01-01 RX ADMIN — Medication 1 CAPSULE: at 08:23

## 2018-01-01 RX ADMIN — IPRATROPIUM BROMIDE 0.5 MG: 0.5 SOLUTION RESPIRATORY (INHALATION) at 15:35

## 2018-01-01 RX ADMIN — LABETALOL HYDROCHLORIDE 5 MG: 5 INJECTION INTRAVENOUS at 15:01

## 2018-01-01 RX ADMIN — SENNOSIDES 5 ML: 8.8 SYRUP ORAL at 08:17

## 2018-01-01 RX ADMIN — LEVALBUTEROL HYDROCHLORIDE 0.63 MG: 0.63 SOLUTION RESPIRATORY (INHALATION) at 12:30

## 2018-01-01 RX ADMIN — LEVALBUTEROL HYDROCHLORIDE 0.63 MG: 0.63 SOLUTION RESPIRATORY (INHALATION) at 15:34

## 2018-01-01 RX ADMIN — ARFORMOTEROL TARTRATE 15 MCG: 15 SOLUTION RESPIRATORY (INHALATION) at 20:45

## 2018-01-01 RX ADMIN — ARFORMOTEROL TARTRATE 15 MCG: 15 SOLUTION RESPIRATORY (INHALATION) at 08:26

## 2018-01-01 RX ADMIN — LEVALBUTEROL HYDROCHLORIDE 0.63 MG: 0.63 SOLUTION RESPIRATORY (INHALATION) at 12:18

## 2018-01-01 RX ADMIN — LOSARTAN POTASSIUM 50 MG: 50 TABLET ORAL at 08:44

## 2018-01-01 RX ADMIN — ATORVASTATIN CALCIUM 40 MG: 40 TABLET, FILM COATED ORAL at 10:02

## 2018-01-01 RX ADMIN — IPRATROPIUM BROMIDE 0.5 MG: 0.5 SOLUTION RESPIRATORY (INHALATION) at 09:13

## 2018-01-01 RX ADMIN — LEVALBUTEROL HYDROCHLORIDE 0.63 MG: 0.63 SOLUTION RESPIRATORY (INHALATION) at 16:18

## 2018-01-01 RX ADMIN — BUDESONIDE 0.25 MG: 0.25 INHALANT RESPIRATORY (INHALATION) at 07:29

## 2018-01-01 RX ADMIN — FLUDEOXYGLUCOSE F-18 10.28 MCI.: 500 INJECTION, SOLUTION INTRAVENOUS at 12:40

## 2018-01-01 RX ADMIN — LEVOFLOXACIN 500 MG: 250 TABLET, FILM COATED ORAL at 12:32

## 2018-01-01 RX ADMIN — MINERAL SUPPLEMENT IRON 300 MG / 5 ML STRENGTH LIQUID 100 PER BOX UNFLAVORED 300 MG: at 08:36

## 2018-01-01 RX ADMIN — NYSTATIN 500000 UNITS: 100000 SUSPENSION ORAL at 08:34

## 2018-01-01 RX ADMIN — Medication 10 MEQ: at 20:32

## 2018-01-01 RX ADMIN — Medication 1 CAPSULE: at 08:18

## 2018-01-01 RX ADMIN — Medication 2.5 MG: at 08:32

## 2018-01-01 RX ADMIN — Medication 80 MG: at 16:32

## 2018-01-01 RX ADMIN — SODIUM CHLORIDE: 9 INJECTION, SOLUTION INTRAVENOUS at 12:05

## 2018-01-01 RX ADMIN — ARFORMOTEROL TARTRATE 15 MCG: 15 SOLUTION RESPIRATORY (INHALATION) at 20:21

## 2018-01-01 RX ADMIN — Medication 10 MG: at 19:59

## 2018-01-01 RX ADMIN — IPRATROPIUM BROMIDE 0.5 MG: 0.5 SOLUTION RESPIRATORY (INHALATION) at 13:26

## 2018-01-01 RX ADMIN — SODIUM CHLORIDE: 900 IRRIGANT IRRIGATION at 18:28

## 2018-01-01 RX ADMIN — ASPIRIN 81 MG CHEWABLE TABLET 81 MG: 81 TABLET CHEWABLE at 07:49

## 2018-01-01 RX ADMIN — ARFORMOTEROL TARTRATE 15 MCG: 15 SOLUTION RESPIRATORY (INHALATION) at 08:45

## 2018-01-01 RX ADMIN — IPRATROPIUM BROMIDE 0.5 MG: 0.5 SOLUTION RESPIRATORY (INHALATION) at 15:39

## 2018-01-01 RX ADMIN — Medication 100 MG: at 08:38

## 2018-01-01 RX ADMIN — Medication 1 CAPSULE: at 16:59

## 2018-01-01 RX ADMIN — LEVALBUTEROL HYDROCHLORIDE 0.63 MG: 0.63 SOLUTION RESPIRATORY (INHALATION) at 07:13

## 2018-01-01 RX ADMIN — ARFORMOTEROL TARTRATE 15 MCG: 15 SOLUTION RESPIRATORY (INHALATION) at 09:28

## 2018-01-01 RX ADMIN — Medication 1 CAPSULE: at 08:36

## 2018-01-01 RX ADMIN — LEVALBUTEROL HYDROCHLORIDE 0.63 MG: 0.63 SOLUTION RESPIRATORY (INHALATION) at 11:48

## 2018-01-01 RX ADMIN — Medication 2.5 MG: at 20:35

## 2018-01-01 RX ADMIN — LEVALBUTEROL HYDROCHLORIDE 0.63 MG: 0.63 SOLUTION RESPIRATORY (INHALATION) at 20:18

## 2018-01-01 RX ADMIN — Medication 2.5 MG: at 21:15

## 2018-01-01 RX ADMIN — LOSARTAN POTASSIUM 50 MG: 50 TABLET ORAL at 08:00

## 2018-01-01 RX ADMIN — DOCUSATE SODIUM 50 MG: 50 LIQUID ORAL at 07:56

## 2018-01-01 RX ADMIN — LEVALBUTEROL HYDROCHLORIDE 0.63 MG: 0.63 SOLUTION RESPIRATORY (INHALATION) at 07:30

## 2018-01-01 RX ADMIN — MULTIVITAMIN 15 ML: LIQUID ORAL at 08:39

## 2018-01-01 RX ADMIN — BUDESONIDE 0.25 MG: 0.25 INHALANT RESPIRATORY (INHALATION) at 21:09

## 2018-01-01 RX ADMIN — IPRATROPIUM BROMIDE 0.5 MG: 0.5 SOLUTION RESPIRATORY (INHALATION) at 20:15

## 2018-01-01 RX ADMIN — HYDRALAZINE HYDROCHLORIDE 10 MG: 20 INJECTION INTRAMUSCULAR; INTRAVENOUS at 19:07

## 2018-01-01 RX ADMIN — HYDRALAZINE HYDROCHLORIDE 5 MG: 20 INJECTION INTRAMUSCULAR; INTRAVENOUS at 16:23

## 2018-01-01 RX ADMIN — ASPIRIN 81 MG CHEWABLE TABLET 81 MG: 81 TABLET CHEWABLE at 08:25

## 2018-01-01 RX ADMIN — ASPIRIN 81 MG CHEWABLE TABLET 81 MG: 81 TABLET CHEWABLE at 08:39

## 2018-01-01 RX ADMIN — ATORVASTATIN CALCIUM 40 MG: 40 TABLET, FILM COATED ORAL at 08:53

## 2018-01-01 RX ADMIN — BUDESONIDE 0.25 MG: 0.25 INHALANT RESPIRATORY (INHALATION) at 19:47

## 2018-01-01 RX ADMIN — Medication 0.2 MG: at 08:17

## 2018-01-01 RX ADMIN — AMLODIPINE BESYLATE 5 MG: 2.5 TABLET ORAL at 08:28

## 2018-01-01 RX ADMIN — IPRATROPIUM BROMIDE 0.5 MG: 0.5 SOLUTION RESPIRATORY (INHALATION) at 12:38

## 2018-01-01 RX ADMIN — METRONIDAZOLE 500 MG: 500 INJECTION, SOLUTION INTRAVENOUS at 13:08

## 2018-01-01 RX ADMIN — IPRATROPIUM BROMIDE 0.5 MG: 0.5 SOLUTION RESPIRATORY (INHALATION) at 07:30

## 2018-01-01 RX ADMIN — GLYCOPYRROLATE 1 MG: 1 TABLET ORAL at 21:05

## 2018-01-01 RX ADMIN — FOLIC ACID 1 MG: 1 TABLET ORAL at 08:26

## 2018-01-01 RX ADMIN — SODIUM CHLORIDE, POTASSIUM CHLORIDE, SODIUM LACTATE AND CALCIUM CHLORIDE: 600; 310; 30; 20 INJECTION, SOLUTION INTRAVENOUS at 13:45

## 2018-01-01 RX ADMIN — IPRATROPIUM BROMIDE 0.5 MG: 0.5 SOLUTION RESPIRATORY (INHALATION) at 16:33

## 2018-01-01 RX ADMIN — ARFORMOTEROL TARTRATE 15 MCG: 15 SOLUTION RESPIRATORY (INHALATION) at 07:54

## 2018-01-01 RX ADMIN — Medication 1 CAPSULE: at 09:57

## 2018-01-01 RX ADMIN — MULTIVITAMIN 15 ML: LIQUID ORAL at 08:18

## 2018-01-01 RX ADMIN — Medication 2.5 MG: at 20:16

## 2018-01-01 RX ADMIN — NYSTATIN 500000 UNITS: 100000 SUSPENSION ORAL at 11:04

## 2018-01-01 RX ADMIN — GLYCOPYRROLATE 1 MG: 1 TABLET ORAL at 13:49

## 2018-01-01 RX ADMIN — IPRATROPIUM BROMIDE 0.5 MG: 0.5 SOLUTION RESPIRATORY (INHALATION) at 19:47

## 2018-01-01 RX ADMIN — SENNOSIDES 5 ML: 8.8 SYRUP ORAL at 08:44

## 2018-01-01 RX ADMIN — THIAMINE HYDROCHLORIDE 200 MG: 100 INJECTION, SOLUTION INTRAMUSCULAR; INTRAVENOUS at 21:52

## 2018-01-01 RX ADMIN — IPRATROPIUM BROMIDE 0.5 MG: 0.5 SOLUTION RESPIRATORY (INHALATION) at 12:05

## 2018-01-01 RX ADMIN — BUDESONIDE 0.25 MG: 0.25 INHALANT RESPIRATORY (INHALATION) at 19:41

## 2018-01-01 RX ADMIN — LEVALBUTEROL HYDROCHLORIDE 0.63 MG: 0.63 SOLUTION RESPIRATORY (INHALATION) at 20:26

## 2018-01-01 RX ADMIN — IPRATROPIUM BROMIDE 0.5 MG: 0.5 SOLUTION RESPIRATORY (INHALATION) at 20:18

## 2018-01-01 RX ADMIN — ATORVASTATIN CALCIUM 40 MG: 40 TABLET, FILM COATED ORAL at 08:10

## 2018-01-01 RX ADMIN — BUDESONIDE 0.25 MG: 0.25 INHALANT RESPIRATORY (INHALATION) at 07:16

## 2018-01-01 RX ADMIN — SODIUM CHLORIDE: 9 INJECTION, SOLUTION INTRAVENOUS at 06:27

## 2018-01-01 RX ADMIN — GLYCOPYRROLATE 1 MG: 1 TABLET ORAL at 20:16

## 2018-01-01 RX ADMIN — Medication 1 CAPSULE: at 16:45

## 2018-01-01 RX ADMIN — LABETALOL HYDROCHLORIDE 15 MG: 5 INJECTION INTRAVENOUS at 15:18

## 2018-01-01 RX ADMIN — BUDESONIDE 0.25 MG: 0.25 INHALANT RESPIRATORY (INHALATION) at 19:51

## 2018-01-01 RX ADMIN — Medication 10 MG: at 04:35

## 2018-01-01 RX ADMIN — ASPIRIN 81 MG CHEWABLE TABLET 81 MG: 81 TABLET CHEWABLE at 08:10

## 2018-01-01 RX ADMIN — OYSTER SHELL CALCIUM WITH VITAMIN D 1 TABLET: 500; 200 TABLET, FILM COATED ORAL at 11:41

## 2018-01-01 RX ADMIN — Medication 80 MG: at 22:12

## 2018-01-01 RX ADMIN — ASPIRIN 81 MG CHEWABLE TABLET 81 MG: 81 TABLET CHEWABLE at 08:02

## 2018-01-01 RX ADMIN — SENNOSIDES 5 ML: 8.8 SYRUP ORAL at 23:21

## 2018-01-01 RX ADMIN — Medication 1 CAPSULE: at 16:44

## 2018-01-01 RX ADMIN — OYSTER SHELL CALCIUM WITH VITAMIN D 1 TABLET: 500; 200 TABLET, FILM COATED ORAL at 20:35

## 2018-01-01 RX ADMIN — IPRATROPIUM BROMIDE 0.5 MG: 0.5 SOLUTION RESPIRATORY (INHALATION) at 13:09

## 2018-01-01 RX ADMIN — IPRATROPIUM BROMIDE 0.5 MG: 0.5 SOLUTION RESPIRATORY (INHALATION) at 08:29

## 2018-01-01 RX ADMIN — MINERAL SUPPLEMENT IRON 300 MG / 5 ML STRENGTH LIQUID 100 PER BOX UNFLAVORED 300 MG: at 08:01

## 2018-01-01 RX ADMIN — OYSTER SHELL CALCIUM WITH VITAMIN D 1 TABLET: 500; 200 TABLET, FILM COATED ORAL at 08:40

## 2018-01-01 RX ADMIN — LEVALBUTEROL HYDROCHLORIDE 0.63 MG: 0.63 SOLUTION RESPIRATORY (INHALATION) at 13:03

## 2018-01-01 RX ADMIN — DOCUSATE SODIUM 50 MG: 50 LIQUID ORAL at 10:01

## 2018-01-01 RX ADMIN — GLYCOPYRROLATE 1 MG: 1 TABLET ORAL at 08:51

## 2018-01-01 RX ADMIN — AMLODIPINE BESYLATE 5 MG: 2.5 TABLET ORAL at 08:43

## 2018-01-01 RX ADMIN — ARFORMOTEROL TARTRATE 15 MCG: 15 SOLUTION RESPIRATORY (INHALATION) at 07:13

## 2018-01-01 RX ADMIN — LEVALBUTEROL HYDROCHLORIDE 0.63 MG: 0.63 SOLUTION RESPIRATORY (INHALATION) at 07:36

## 2018-01-01 RX ADMIN — FOLIC ACID 1 MG: 5 INJECTION, SOLUTION INTRAMUSCULAR; INTRAVENOUS; SUBCUTANEOUS at 08:48

## 2018-01-01 RX ADMIN — BUDESONIDE 0.25 MG: 0.25 INHALANT RESPIRATORY (INHALATION) at 09:24

## 2018-01-01 RX ADMIN — THIAMINE HYDROCHLORIDE 100 MG: 100 INJECTION, SOLUTION INTRAMUSCULAR; INTRAVENOUS at 08:40

## 2018-01-01 RX ADMIN — SENNOSIDES 5 ML: 8.8 SYRUP ORAL at 19:59

## 2018-01-01 RX ADMIN — ATORVASTATIN CALCIUM 40 MG: 40 TABLET, FILM COATED ORAL at 08:27

## 2018-01-01 RX ADMIN — GLYCOPYRROLATE 1 MG: 1 TABLET ORAL at 08:52

## 2018-01-01 RX ADMIN — FOLIC ACID 1 MG: 1 TABLET ORAL at 09:45

## 2018-01-01 RX ADMIN — HYDRALAZINE HYDROCHLORIDE 10 MG: 20 INJECTION INTRAMUSCULAR; INTRAVENOUS at 23:37

## 2018-01-01 RX ADMIN — IPRATROPIUM BROMIDE 0.5 MG: 0.5 SOLUTION RESPIRATORY (INHALATION) at 07:16

## 2018-01-01 RX ADMIN — Medication 3 MG: at 22:14

## 2018-01-01 RX ADMIN — IPRATROPIUM BROMIDE 0.5 MG: 0.5 SOLUTION RESPIRATORY (INHALATION) at 07:36

## 2018-01-01 RX ADMIN — IPRATROPIUM BROMIDE 0.5 MG: 0.5 SOLUTION RESPIRATORY (INHALATION) at 09:03

## 2018-01-01 RX ADMIN — DEXTROSE AND SODIUM CHLORIDE: 5; 900 INJECTION, SOLUTION INTRAVENOUS at 04:45

## 2018-01-01 RX ADMIN — OYSTER SHELL CALCIUM WITH VITAMIN D 1 TABLET: 500; 200 TABLET, FILM COATED ORAL at 09:56

## 2018-01-01 RX ADMIN — ARFORMOTEROL TARTRATE 15 MCG: 15 SOLUTION RESPIRATORY (INHALATION) at 19:25

## 2018-01-01 RX ADMIN — ARFORMOTEROL TARTRATE 15 MCG: 15 SOLUTION RESPIRATORY (INHALATION) at 20:44

## 2018-01-01 RX ADMIN — LEVALBUTEROL HYDROCHLORIDE 0.63 MG: 0.63 SOLUTION RESPIRATORY (INHALATION) at 15:53

## 2018-01-01 RX ADMIN — IPRATROPIUM BROMIDE 0.5 MG: 0.5 SOLUTION RESPIRATORY (INHALATION) at 11:13

## 2018-01-01 RX ADMIN — CIPROFLOXACIN AND DEXAMETHASONE 4 DROP: 3; 1 SUSPENSION/ DROPS AURICULAR (OTIC) at 09:06

## 2018-01-01 RX ADMIN — BUDESONIDE 0.25 MG: 0.25 INHALANT RESPIRATORY (INHALATION) at 20:03

## 2018-01-01 RX ADMIN — LEVALBUTEROL HYDROCHLORIDE 0.63 MG: 0.63 SOLUTION RESPIRATORY (INHALATION) at 12:03

## 2018-01-01 RX ADMIN — ISODIUM CHLORIDE 3 ML: 0.03 SOLUTION RESPIRATORY (INHALATION) at 22:34

## 2018-01-01 RX ADMIN — LOSARTAN POTASSIUM 50 MG: 50 TABLET ORAL at 08:25

## 2018-01-01 RX ADMIN — IPRATROPIUM BROMIDE 0.5 MG: 0.5 SOLUTION RESPIRATORY (INHALATION) at 15:34

## 2018-01-01 RX ADMIN — ATORVASTATIN CALCIUM 40 MG: 40 TABLET, FILM COATED ORAL at 08:25

## 2018-01-01 RX ADMIN — IPRATROPIUM BROMIDE 0.5 MG: 0.5 SOLUTION RESPIRATORY (INHALATION) at 12:34

## 2018-01-01 RX ADMIN — Medication 1 CAPSULE: at 16:54

## 2018-01-01 RX ADMIN — LEVALBUTEROL HYDROCHLORIDE 0.63 MG: 0.63 SOLUTION RESPIRATORY (INHALATION) at 21:34

## 2018-01-01 RX ADMIN — Medication 100 MG: at 15:00

## 2018-01-01 RX ADMIN — NYSTATIN 500000 UNITS: 100000 SUSPENSION ORAL at 08:43

## 2018-01-01 RX ADMIN — GLYCOPYRROLATE 1 MG: 1 TABLET ORAL at 20:23

## 2018-01-01 RX ADMIN — OXYCODONE HYDROCHLORIDE 5 MG: 5 TABLET ORAL at 16:48

## 2018-01-01 RX ADMIN — NYSTATIN 500000 UNITS: 100000 SUSPENSION ORAL at 17:18

## 2018-01-01 RX ADMIN — ARFORMOTEROL TARTRATE 15 MCG: 15 SOLUTION RESPIRATORY (INHALATION) at 21:09

## 2018-01-01 RX ADMIN — IPRATROPIUM BROMIDE 0.5 MG: 0.5 SOLUTION RESPIRATORY (INHALATION) at 16:16

## 2018-01-01 RX ADMIN — Medication 1 CAPSULE: at 11:24

## 2018-01-01 RX ADMIN — CEFTRIAXONE SODIUM 2 G: 2 INJECTION, POWDER, FOR SOLUTION INTRAMUSCULAR; INTRAVENOUS at 20:51

## 2018-01-01 RX ADMIN — IPRATROPIUM BROMIDE 0.5 MG: 0.5 SOLUTION RESPIRATORY (INHALATION) at 16:18

## 2018-01-01 RX ADMIN — Medication 1 MG: at 10:01

## 2018-01-01 RX ADMIN — MINERAL SUPPLEMENT IRON 300 MG / 5 ML STRENGTH LIQUID 100 PER BOX UNFLAVORED 300 MG: at 08:39

## 2018-01-01 RX ADMIN — ATORVASTATIN CALCIUM 40 MG: 40 TABLET, FILM COATED ORAL at 07:50

## 2018-01-01 RX ADMIN — BUDESONIDE 0.25 MG: 0.25 INHALANT RESPIRATORY (INHALATION) at 19:31

## 2018-01-01 RX ADMIN — Medication 10 MEQ: at 08:43

## 2018-01-01 RX ADMIN — AMLODIPINE BESYLATE 5 MG: 2.5 TABLET ORAL at 08:50

## 2018-01-01 RX ADMIN — NYSTATIN 500000 UNITS: 100000 SUSPENSION ORAL at 08:38

## 2018-01-01 RX ADMIN — IPRATROPIUM BROMIDE 0.5 MG: 0.5 SOLUTION RESPIRATORY (INHALATION) at 15:53

## 2018-01-01 RX ADMIN — Medication 10 MEQ: at 19:52

## 2018-01-01 RX ADMIN — LEVOFLOXACIN 500 MG: 250 TABLET, FILM COATED ORAL at 08:50

## 2018-01-01 RX ADMIN — CLOPIDOGREL 75 MG: 75 TABLET, FILM COATED ORAL at 07:52

## 2018-01-01 RX ADMIN — IPRATROPIUM BROMIDE 0.5 MG: 0.5 SOLUTION RESPIRATORY (INHALATION) at 11:32

## 2018-01-01 RX ADMIN — LOSARTAN POTASSIUM 50 MG: 50 TABLET ORAL at 08:22

## 2018-01-01 RX ADMIN — LEVALBUTEROL HYDROCHLORIDE 0.63 MG: 0.63 SOLUTION RESPIRATORY (INHALATION) at 16:25

## 2018-01-01 RX ADMIN — ASPIRIN 81 MG CHEWABLE TABLET 81 MG: 81 TABLET CHEWABLE at 08:00

## 2018-01-01 RX ADMIN — Medication 2.5 MG: at 08:55

## 2018-01-01 RX ADMIN — Medication 1 CAPSULE: at 18:05

## 2018-01-01 RX ADMIN — CLOPIDOGREL 75 MG: 75 TABLET, FILM COATED ORAL at 08:39

## 2018-01-01 RX ADMIN — IPRATROPIUM BROMIDE 0.5 MG: 0.5 SOLUTION RESPIRATORY (INHALATION) at 11:33

## 2018-01-01 RX ADMIN — ACETAMINOPHEN 650 MG: 325 TABLET, FILM COATED ORAL at 23:40

## 2018-01-01 RX ADMIN — Medication 5 ML: at 22:31

## 2018-01-01 RX ADMIN — Medication 2.5 MG: at 19:57

## 2018-01-01 RX ADMIN — Medication 10 MG: at 15:39

## 2018-01-01 RX ADMIN — GLYCOPYRROLATE 1 MG: 1 TABLET ORAL at 13:03

## 2018-01-01 RX ADMIN — IPRATROPIUM BROMIDE 0.5 MG: 0.5 SOLUTION RESPIRATORY (INHALATION) at 20:20

## 2018-01-01 RX ADMIN — Medication 3 MG: at 23:36

## 2018-01-01 RX ADMIN — BUDESONIDE 0.25 MG: 0.25 INHALANT RESPIRATORY (INHALATION) at 08:30

## 2018-01-01 RX ADMIN — Medication 2.5 MG: at 09:11

## 2018-01-01 RX ADMIN — ARFORMOTEROL TARTRATE 15 MCG: 15 SOLUTION RESPIRATORY (INHALATION) at 08:41

## 2018-01-01 RX ADMIN — NYSTATIN 500000 UNITS: 100000 SUSPENSION ORAL at 08:31

## 2018-01-01 RX ADMIN — LEVALBUTEROL HYDROCHLORIDE 0.63 MG: 0.63 SOLUTION RESPIRATORY (INHALATION) at 00:45

## 2018-01-01 RX ADMIN — DOCUSATE SODIUM 50 MG: 50 LIQUID ORAL at 08:23

## 2018-01-01 RX ADMIN — ACETAMINOPHEN 650 MG: 325 TABLET, FILM COATED ORAL at 11:57

## 2018-01-01 RX ADMIN — Medication 10 MG: at 01:04

## 2018-01-01 RX ADMIN — SODIUM CHLORIDE: 900 IRRIGANT IRRIGATION at 17:10

## 2018-01-01 RX ADMIN — POTASSIUM CHLORIDE 40 MEQ: 1.5 POWDER, FOR SOLUTION ORAL at 16:48

## 2018-01-01 RX ADMIN — Medication 1 CAPSULE: at 09:06

## 2018-01-01 RX ADMIN — Medication 1 CAPSULE: at 14:27

## 2018-01-01 RX ADMIN — IPRATROPIUM BROMIDE 0.5 MG: 0.5 SOLUTION RESPIRATORY (INHALATION) at 20:49

## 2018-01-01 RX ADMIN — Medication 100 MG: at 08:23

## 2018-01-01 RX ADMIN — BUDESONIDE 0.25 MG: 0.25 INHALANT RESPIRATORY (INHALATION) at 20:41

## 2018-01-01 RX ADMIN — CLOPIDOGREL 75 MG: 75 TABLET, FILM COATED ORAL at 08:15

## 2018-01-01 RX ADMIN — NYSTATIN 500000 UNITS: 100000 SUSPENSION ORAL at 16:36

## 2018-01-01 RX ADMIN — CLOPIDOGREL 75 MG: 75 TABLET, FILM COATED ORAL at 07:47

## 2018-01-01 RX ADMIN — BUDESONIDE 0.25 MG: 0.25 INHALANT RESPIRATORY (INHALATION) at 19:59

## 2018-01-01 RX ADMIN — LEVALBUTEROL HYDROCHLORIDE 0.63 MG: 0.63 SOLUTION RESPIRATORY (INHALATION) at 09:02

## 2018-01-01 RX ADMIN — Medication 2.5 MG: at 08:04

## 2018-01-01 RX ADMIN — ARFORMOTEROL TARTRATE 15 MCG: 15 SOLUTION RESPIRATORY (INHALATION) at 19:47

## 2018-01-01 RX ADMIN — CLOPIDOGREL 75 MG: 75 TABLET, FILM COATED ORAL at 08:51

## 2018-01-01 RX ADMIN — Medication 1 CAPSULE: at 08:43

## 2018-01-01 RX ADMIN — FENTANYL CITRATE 50 MCG: 50 INJECTION, SOLUTION INTRAMUSCULAR; INTRAVENOUS at 13:36

## 2018-01-01 RX ADMIN — THIAMINE HYDROCHLORIDE 100 MG: 100 INJECTION, SOLUTION INTRAMUSCULAR; INTRAVENOUS at 10:05

## 2018-01-01 RX ADMIN — IPRATROPIUM BROMIDE 0.5 MG: 0.5 SOLUTION RESPIRATORY (INHALATION) at 12:04

## 2018-01-01 RX ADMIN — LEVALBUTEROL HYDROCHLORIDE 0.63 MG: 0.63 SOLUTION RESPIRATORY (INHALATION) at 20:39

## 2018-01-01 RX ADMIN — IPRATROPIUM BROMIDE 0.5 MG: 0.5 SOLUTION RESPIRATORY (INHALATION) at 19:51

## 2018-01-01 RX ADMIN — GLYCOPYRROLATE 1 MG: 1 TABLET ORAL at 08:39

## 2018-01-01 RX ADMIN — Medication 1 MG: at 08:23

## 2018-01-01 RX ADMIN — NYSTATIN 500000 UNITS: 100000 SUSPENSION ORAL at 19:42

## 2018-01-01 RX ADMIN — POTASSIUM CHLORIDE 20 MEQ: 1.5 POWDER, FOR SOLUTION ORAL at 08:06

## 2018-01-01 RX ADMIN — IPRATROPIUM BROMIDE 0.5 MG: 0.5 SOLUTION RESPIRATORY (INHALATION) at 07:34

## 2018-01-01 RX ADMIN — DEXTROSE MONOHYDRATE 25 ML: 500 INJECTION PARENTERAL at 19:51

## 2018-01-01 RX ADMIN — IPRATROPIUM BROMIDE 0.5 MG: 0.5 SOLUTION RESPIRATORY (INHALATION) at 19:34

## 2018-01-01 RX ADMIN — ARFORMOTEROL TARTRATE 15 MCG: 15 SOLUTION RESPIRATORY (INHALATION) at 09:24

## 2018-01-01 RX ADMIN — NYSTATIN 500000 UNITS: 100000 SUSPENSION ORAL at 08:00

## 2018-01-01 RX ADMIN — LEVALBUTEROL HYDROCHLORIDE 0.63 MG: 0.63 SOLUTION RESPIRATORY (INHALATION) at 11:13

## 2018-01-01 RX ADMIN — Medication 100 MG: at 08:44

## 2018-01-01 RX ADMIN — GLYCOPYRROLATE 1 MG: 1 TABLET ORAL at 13:50

## 2018-01-01 RX ADMIN — DOCUSATE SODIUM 50 MG: 50 LIQUID ORAL at 21:11

## 2018-01-01 RX ADMIN — MULTIVITAMIN 15 ML: LIQUID ORAL at 08:43

## 2018-01-01 RX ADMIN — IPRATROPIUM BROMIDE 0.5 MG: 0.5 SOLUTION RESPIRATORY (INHALATION) at 00:35

## 2018-01-01 RX ADMIN — SODIUM CHLORIDE, POTASSIUM CHLORIDE, SODIUM LACTATE AND CALCIUM CHLORIDE 250 ML: 600; 310; 30; 20 INJECTION, SOLUTION INTRAVENOUS at 05:41

## 2018-01-01 RX ADMIN — IPRATROPIUM BROMIDE 0.5 MG: 0.5 SOLUTION RESPIRATORY (INHALATION) at 12:26

## 2018-01-01 RX ADMIN — NYSTATIN 500000 UNITS: 100000 SUSPENSION ORAL at 08:18

## 2018-01-01 RX ADMIN — ACETAMINOPHEN 650 MG: 325 TABLET, FILM COATED ORAL at 08:35

## 2018-01-01 RX ADMIN — NYSTATIN 500000 UNITS: 100000 SUSPENSION ORAL at 19:58

## 2018-01-01 RX ADMIN — LEVALBUTEROL HYDROCHLORIDE 0.63 MG: 0.63 SOLUTION RESPIRATORY (INHALATION) at 12:04

## 2018-01-01 RX ADMIN — DEXTROSE AND SODIUM CHLORIDE: 5; 900 INJECTION, SOLUTION INTRAVENOUS at 20:54

## 2018-01-01 RX ADMIN — NYSTATIN 500000 UNITS: 100000 SUSPENSION ORAL at 20:13

## 2018-01-01 RX ADMIN — SODIUM CHLORIDE, PRESERVATIVE FREE 5 ML: 5 INJECTION INTRAVENOUS at 15:58

## 2018-01-01 RX ADMIN — LEVALBUTEROL HYDROCHLORIDE 0.63 MG: 0.63 SOLUTION RESPIRATORY (INHALATION) at 19:25

## 2018-01-01 RX ADMIN — AMLODIPINE BESYLATE 5 MG: 2.5 TABLET ORAL at 09:57

## 2018-01-01 RX ADMIN — LIDOCAINE HYDROCHLORIDE 20 ML: 10 INJECTION, SOLUTION EPIDURAL; INFILTRATION; INTRACAUDAL; PERINEURAL at 11:33

## 2018-01-01 RX ADMIN — NYSTATIN 500000 UNITS: 100000 SUSPENSION ORAL at 19:52

## 2018-01-01 RX ADMIN — LOSARTAN POTASSIUM 50 MG: 50 TABLET ORAL at 08:23

## 2018-01-01 RX ADMIN — CALCIUM GLUCONATE 1 G: 98 INJECTION, SOLUTION INTRAVENOUS at 10:15

## 2018-01-01 RX ADMIN — Medication 1 MG: at 08:39

## 2018-01-01 RX ADMIN — GLYCOPYRROLATE 1 MG: 1 TABLET ORAL at 00:12

## 2018-01-01 RX ADMIN — Medication 0.2 MG: at 17:15

## 2018-01-01 RX ADMIN — ARFORMOTEROL TARTRATE 15 MCG: 15 SOLUTION RESPIRATORY (INHALATION) at 20:16

## 2018-01-01 RX ADMIN — Medication 1 MG: at 08:01

## 2018-01-01 RX ADMIN — MINERAL SUPPLEMENT IRON 300 MG / 5 ML STRENGTH LIQUID 100 PER BOX UNFLAVORED 300 MG: at 08:51

## 2018-01-01 RX ADMIN — IPRATROPIUM BROMIDE 0.5 MG: 0.5 SOLUTION RESPIRATORY (INHALATION) at 11:26

## 2018-01-01 RX ADMIN — Medication 2.5 MG: at 07:56

## 2018-01-01 RX ADMIN — IPRATROPIUM BROMIDE 0.5 MG: 0.5 SOLUTION RESPIRATORY (INHALATION) at 00:27

## 2018-01-01 RX ADMIN — CLINDAMYCIN IN 5 PERCENT DEXTROSE 600 MG: 12 INJECTION, SOLUTION INTRAVENOUS at 13:20

## 2018-01-01 RX ADMIN — LEVALBUTEROL HYDROCHLORIDE 0.63 MG: 0.63 SOLUTION RESPIRATORY (INHALATION) at 08:30

## 2018-01-01 RX ADMIN — IPRATROPIUM BROMIDE 0.5 MG: 0.5 SOLUTION RESPIRATORY (INHALATION) at 21:01

## 2018-01-01 RX ADMIN — BUDESONIDE 0.25 MG: 0.25 INHALANT RESPIRATORY (INHALATION) at 08:15

## 2018-01-01 RX ADMIN — BUDESONIDE 0.25 MG: 0.25 INHALANT RESPIRATORY (INHALATION) at 07:59

## 2018-01-01 RX ADMIN — CIPROFLOXACIN AND DEXAMETHASONE 4 DROP: 3; 1 SUSPENSION/ DROPS AURICULAR (OTIC) at 08:53

## 2018-01-01 RX ADMIN — Medication 2.5 MG: at 08:31

## 2018-01-01 RX ADMIN — ATORVASTATIN CALCIUM 40 MG: 40 TABLET, FILM COATED ORAL at 09:06

## 2018-01-01 RX ADMIN — MULTIVITAMIN 15 ML: LIQUID ORAL at 08:11

## 2018-01-01 RX ADMIN — OYSTER SHELL CALCIUM WITH VITAMIN D 1 TABLET: 500; 200 TABLET, FILM COATED ORAL at 08:43

## 2018-01-01 RX ADMIN — ARFORMOTEROL TARTRATE 15 MCG: 15 SOLUTION RESPIRATORY (INHALATION) at 07:24

## 2018-01-01 RX ADMIN — ACETAMINOPHEN 650 MG: 325 SOLUTION ORAL at 23:20

## 2018-01-01 RX ADMIN — ATORVASTATIN CALCIUM 40 MG: 40 TABLET, FILM COATED ORAL at 08:03

## 2018-01-01 RX ADMIN — MULTIVITAMIN 15 ML: LIQUID ORAL at 08:26

## 2018-01-01 RX ADMIN — Medication 1 CAPSULE: at 07:56

## 2018-01-01 RX ADMIN — CEFEPIME HYDROCHLORIDE 2 G: 2 INJECTION, POWDER, FOR SOLUTION INTRAVENOUS at 21:43

## 2018-01-01 RX ADMIN — FENTANYL CITRATE 50 MCG: 50 INJECTION, SOLUTION INTRAMUSCULAR; INTRAVENOUS at 14:16

## 2018-01-01 RX ADMIN — LEVALBUTEROL HYDROCHLORIDE 0.63 MG: 0.63 SOLUTION RESPIRATORY (INHALATION) at 12:38

## 2018-01-01 RX ADMIN — CIPROFLOXACIN AND DEXAMETHASONE 4 DROP: 3; 1 SUSPENSION/ DROPS AURICULAR (OTIC) at 21:15

## 2018-01-01 RX ADMIN — NYSTATIN 500000 UNITS: 100000 SUSPENSION ORAL at 13:30

## 2018-01-01 RX ADMIN — ATORVASTATIN CALCIUM 40 MG: 40 TABLET, FILM COATED ORAL at 08:16

## 2018-01-01 RX ADMIN — DOCUSATE SODIUM 50 MG: 50 LIQUID ORAL at 08:39

## 2018-01-01 RX ADMIN — OYSTER SHELL CALCIUM WITH VITAMIN D 1 TABLET: 500; 200 TABLET, FILM COATED ORAL at 20:14

## 2018-01-01 RX ADMIN — Medication 2.5 MG: at 14:45

## 2018-01-01 RX ADMIN — ARFORMOTEROL TARTRATE 15 MCG: 15 SOLUTION RESPIRATORY (INHALATION) at 20:17

## 2018-01-01 RX ADMIN — Medication 1 CAPSULE: at 12:29

## 2018-01-01 RX ADMIN — Medication 1 CAPSULE: at 11:20

## 2018-01-01 RX ADMIN — LEVALBUTEROL HYDROCHLORIDE 0.63 MG: 0.63 SOLUTION RESPIRATORY (INHALATION) at 07:44

## 2018-01-01 RX ADMIN — METRONIDAZOLE 500 MG: 500 INJECTION, SOLUTION INTRAVENOUS at 18:00

## 2018-01-01 RX ADMIN — LEVALBUTEROL HYDROCHLORIDE 0.63 MG: 0.63 SOLUTION RESPIRATORY (INHALATION) at 09:00

## 2018-01-01 RX ADMIN — Medication 100 MG: at 08:59

## 2018-01-01 RX ADMIN — Medication 1 CAPSULE: at 09:45

## 2018-01-01 RX ADMIN — BUDESONIDE 0.25 MG: 0.25 INHALANT RESPIRATORY (INHALATION) at 09:36

## 2018-01-01 RX ADMIN — KETOROLAC TROMETHAMINE 15 MG: 15 INJECTION, SOLUTION INTRAMUSCULAR; INTRAVENOUS at 14:56

## 2018-01-01 RX ADMIN — MULTIVITAMIN 15 ML: LIQUID ORAL at 07:58

## 2018-01-01 RX ADMIN — SENNOSIDES 5 ML: 8.8 SYRUP ORAL at 19:51

## 2018-01-01 RX ADMIN — Medication 2.5 MG: at 20:04

## 2018-01-01 RX ADMIN — Medication 100 MG: at 07:45

## 2018-01-01 RX ADMIN — MINERAL SUPPLEMENT IRON 300 MG / 5 ML STRENGTH LIQUID 100 PER BOX UNFLAVORED 300 MG: at 08:53

## 2018-01-01 RX ADMIN — SODIUM CHLORIDE, PRESERVATIVE FREE 5 ML: 5 INJECTION INTRAVENOUS at 17:44

## 2018-01-01 RX ADMIN — OYSTER SHELL CALCIUM WITH VITAMIN D 1 TABLET: 500; 200 TABLET, FILM COATED ORAL at 19:54

## 2018-01-01 RX ADMIN — BUDESONIDE 0.25 MG: 0.25 INHALANT RESPIRATORY (INHALATION) at 07:55

## 2018-01-01 RX ADMIN — Medication 10 MG: at 12:16

## 2018-01-01 RX ADMIN — GLYCOPYRROLATE 1 MG: 1 TABLET ORAL at 19:07

## 2018-01-01 RX ADMIN — ASPIRIN 81 MG CHEWABLE TABLET 81 MG: 81 TABLET CHEWABLE at 08:26

## 2018-01-01 RX ADMIN — OYSTER SHELL CALCIUM WITH VITAMIN D 1 TABLET: 500; 200 TABLET, FILM COATED ORAL at 08:25

## 2018-01-01 RX ADMIN — Medication 2.5 MG: at 14:01

## 2018-01-01 RX ADMIN — GLYCOPYRROLATE 1 MG: 1 TABLET ORAL at 21:44

## 2018-01-01 RX ADMIN — ACETAMINOPHEN 650 MG: 325 TABLET, FILM COATED ORAL at 21:15

## 2018-01-01 RX ADMIN — OYSTER SHELL CALCIUM WITH VITAMIN D 1 TABLET: 500; 200 TABLET, FILM COATED ORAL at 20:55

## 2018-01-01 RX ADMIN — Medication 2.5 MG: at 13:41

## 2018-01-01 RX ADMIN — ARFORMOTEROL TARTRATE 15 MCG: 15 SOLUTION RESPIRATORY (INHALATION) at 20:34

## 2018-01-01 RX ADMIN — Medication 2.5 MG: at 11:24

## 2018-01-01 RX ADMIN — LEVALBUTEROL HYDROCHLORIDE 0.63 MG: 0.63 SOLUTION RESPIRATORY (INHALATION) at 15:35

## 2018-01-01 RX ADMIN — ENOXAPARIN SODIUM 30 MG: 30 INJECTION SUBCUTANEOUS at 22:18

## 2018-01-01 RX ADMIN — LEVALBUTEROL HYDROCHLORIDE 0.63 MG: 0.63 SOLUTION RESPIRATORY (INHALATION) at 11:58

## 2018-01-01 RX ADMIN — Medication 1 CAPSULE: at 16:36

## 2018-01-01 RX ADMIN — LIDOCAINE HYDROCHLORIDE 3.5 ML: 10 INJECTION, SOLUTION EPIDURAL; INFILTRATION; INTRACAUDAL; PERINEURAL at 17:00

## 2018-01-01 RX ADMIN — CLOPIDOGREL 75 MG: 75 TABLET, FILM COATED ORAL at 08:10

## 2018-01-01 RX ADMIN — METRONIDAZOLE 500 MG: 500 INJECTION, SOLUTION INTRAVENOUS at 01:24

## 2018-01-01 RX ADMIN — IPRATROPIUM BROMIDE 0.5 MG: 0.5 SOLUTION RESPIRATORY (INHALATION) at 13:00

## 2018-01-01 RX ADMIN — Medication 1 CAPSULE: at 16:26

## 2018-01-01 RX ADMIN — Medication 1 CAPSULE: at 07:48

## 2018-01-01 RX ADMIN — CLOPIDOGREL 75 MG: 75 TABLET, FILM COATED ORAL at 08:18

## 2018-01-01 RX ADMIN — BUDESONIDE 0.25 MG: 0.25 INHALANT RESPIRATORY (INHALATION) at 21:01

## 2018-01-01 RX ADMIN — LEVALBUTEROL HYDROCHLORIDE 0.63 MG: 0.63 SOLUTION RESPIRATORY (INHALATION) at 07:14

## 2018-01-01 RX ADMIN — Medication 100 MG: at 07:55

## 2018-01-01 RX ADMIN — BUDESONIDE 0.25 MG: 0.25 INHALANT RESPIRATORY (INHALATION) at 20:19

## 2018-01-01 RX ADMIN — POTASSIUM PHOSPHATE, MONOBASIC AND POTASSIUM PHOSPHATE, DIBASIC 15 MMOL: 224; 236 INJECTION, SOLUTION INTRAVENOUS at 19:56

## 2018-01-01 RX ADMIN — POTASSIUM PHOSPHATE, MONOBASIC AND POTASSIUM PHOSPHATE, DIBASIC 15 MMOL: 224; 236 INJECTION, SOLUTION INTRAVENOUS at 08:32

## 2018-01-01 RX ADMIN — Medication 1 CAPSULE: at 12:39

## 2018-01-01 RX ADMIN — ARFORMOTEROL TARTRATE 15 MCG: 15 SOLUTION RESPIRATORY (INHALATION) at 20:11

## 2018-01-01 RX ADMIN — ATORVASTATIN CALCIUM 40 MG: 40 TABLET, FILM COATED ORAL at 09:45

## 2018-01-01 RX ADMIN — Medication 1 MG: at 08:18

## 2018-01-01 RX ADMIN — Medication 10 MG: at 11:58

## 2018-01-01 RX ADMIN — LEVALBUTEROL HYDROCHLORIDE 0.63 MG: 0.63 SOLUTION RESPIRATORY (INHALATION) at 11:07

## 2018-01-01 RX ADMIN — ARFORMOTEROL TARTRATE 15 MCG: 15 SOLUTION RESPIRATORY (INHALATION) at 20:49

## 2018-01-01 RX ADMIN — Medication 10 MEQ: at 08:10

## 2018-01-01 RX ADMIN — OYSTER SHELL CALCIUM WITH VITAMIN D 1 TABLET: 500; 200 TABLET, FILM COATED ORAL at 19:55

## 2018-01-01 RX ADMIN — LEVALBUTEROL HYDROCHLORIDE 0.63 MG: 0.63 SOLUTION RESPIRATORY (INHALATION) at 16:27

## 2018-01-01 RX ADMIN — DOCUSATE SODIUM 50 MG: 50 LIQUID ORAL at 14:04

## 2018-01-01 RX ADMIN — LEVALBUTEROL HYDROCHLORIDE 0.63 MG: 0.63 SOLUTION RESPIRATORY (INHALATION) at 00:35

## 2018-01-01 RX ADMIN — BUDESONIDE 0.25 MG: 0.25 INHALANT RESPIRATORY (INHALATION) at 07:15

## 2018-01-01 RX ADMIN — Medication 1 MG: at 08:28

## 2018-01-01 RX ADMIN — ENOXAPARIN SODIUM 30 MG: 30 INJECTION SUBCUTANEOUS at 21:12

## 2018-01-01 RX ADMIN — IPRATROPIUM BROMIDE 0.5 MG: 0.5 SOLUTION RESPIRATORY (INHALATION) at 07:14

## 2018-01-01 RX ADMIN — OYSTER SHELL CALCIUM WITH VITAMIN D 1 TABLET: 500; 200 TABLET, FILM COATED ORAL at 20:12

## 2018-01-01 RX ADMIN — IPRATROPIUM BROMIDE 0.5 MG: 0.5 SOLUTION RESPIRATORY (INHALATION) at 19:31

## 2018-01-01 RX ADMIN — ARFORMOTEROL TARTRATE 15 MCG: 15 SOLUTION RESPIRATORY (INHALATION) at 21:34

## 2018-01-01 RX ADMIN — NYSTATIN 500000 UNITS: 100000 SUSPENSION ORAL at 09:58

## 2018-01-01 RX ADMIN — DOCUSATE SODIUM 50 MG: 50 LIQUID ORAL at 19:50

## 2018-01-01 RX ADMIN — IPRATROPIUM BROMIDE 0.5 MG: 0.5 SOLUTION RESPIRATORY (INHALATION) at 12:09

## 2018-01-01 RX ADMIN — LEVALBUTEROL HYDROCHLORIDE 0.63 MG: 0.63 SOLUTION RESPIRATORY (INHALATION) at 08:24

## 2018-01-01 RX ADMIN — SODIUM CHLORIDE, POTASSIUM CHLORIDE, SODIUM LACTATE AND CALCIUM CHLORIDE 125 ML: 600; 310; 30; 20 INJECTION, SOLUTION INTRAVENOUS at 09:39

## 2018-01-01 RX ADMIN — IPRATROPIUM BROMIDE 0.5 MG: 0.5 SOLUTION RESPIRATORY (INHALATION) at 07:59

## 2018-01-01 RX ADMIN — ACETAMINOPHEN 650 MG: 650 SUPPOSITORY RECTAL at 02:05

## 2018-01-01 RX ADMIN — ATORVASTATIN CALCIUM 40 MG: 40 TABLET, FILM COATED ORAL at 07:48

## 2018-01-01 RX ADMIN — LEVALBUTEROL HYDROCHLORIDE 0.63 MG: 0.63 SOLUTION RESPIRATORY (INHALATION) at 15:07

## 2018-01-01 RX ADMIN — NYSTATIN 500000 UNITS: 100000 SUSPENSION ORAL at 08:32

## 2018-01-01 RX ADMIN — AMLODIPINE BESYLATE 5 MG: 2.5 TABLET ORAL at 08:16

## 2018-01-01 RX ADMIN — Medication 0.2 MG: at 11:33

## 2018-01-01 RX ADMIN — LEVALBUTEROL HYDROCHLORIDE 0.63 MG: 0.63 SOLUTION RESPIRATORY (INHALATION) at 20:13

## 2018-01-01 RX ADMIN — LEVALBUTEROL HYDROCHLORIDE 0.63 MG: 0.63 SOLUTION RESPIRATORY (INHALATION) at 07:56

## 2018-01-01 RX ADMIN — ARFORMOTEROL TARTRATE 15 MCG: 15 SOLUTION RESPIRATORY (INHALATION) at 19:31

## 2018-01-01 RX ADMIN — OYSTER SHELL CALCIUM WITH VITAMIN D 1 TABLET: 500; 200 TABLET, FILM COATED ORAL at 19:38

## 2018-01-01 RX ADMIN — NYSTATIN 500000 UNITS: 100000 SUSPENSION ORAL at 19:51

## 2018-01-01 RX ADMIN — Medication 2.5 MG: at 07:39

## 2018-01-01 RX ADMIN — POLYETHYLENE GLYCOL 3350 17 G: 17 POWDER, FOR SOLUTION ORAL at 07:47

## 2018-01-01 RX ADMIN — FOLIC ACID 1 MG: 5 INJECTION, SOLUTION INTRAMUSCULAR; INTRAVENOUS; SUBCUTANEOUS at 14:28

## 2018-01-01 RX ADMIN — IPRATROPIUM BROMIDE 0.5 MG: 0.5 SOLUTION RESPIRATORY (INHALATION) at 12:06

## 2018-01-01 RX ADMIN — MULTIVITAMIN 15 ML: LIQUID ORAL at 08:28

## 2018-01-01 RX ADMIN — Medication 0.2 MG: at 03:28

## 2018-01-01 RX ADMIN — SENNOSIDES 5 ML: 8.8 SYRUP ORAL at 20:56

## 2018-01-01 RX ADMIN — BUDESONIDE 0.25 MG: 0.25 INHALANT RESPIRATORY (INHALATION) at 20:11

## 2018-01-01 RX ADMIN — CIPROFLOXACIN AND DEXAMETHASONE 4 DROP: 3; 1 SUSPENSION/ DROPS AURICULAR (OTIC) at 08:48

## 2018-01-01 RX ADMIN — LEVALBUTEROL HYDROCHLORIDE 0.63 MG: 0.63 SOLUTION RESPIRATORY (INHALATION) at 20:16

## 2018-01-01 RX ADMIN — Medication 10 MG: at 07:52

## 2018-01-01 RX ADMIN — Medication 1 CAPSULE: at 08:25

## 2018-01-01 RX ADMIN — LEVALBUTEROL HYDROCHLORIDE 0.63 MG: 0.63 SOLUTION RESPIRATORY (INHALATION) at 16:10

## 2018-01-01 RX ADMIN — Medication 10 MG: at 03:39

## 2018-01-01 RX ADMIN — Medication 80 MG: at 20:20

## 2018-01-01 RX ADMIN — Medication 1 CAPSULE: at 12:08

## 2018-01-01 RX ADMIN — ACETAMINOPHEN 650 MG: 325 SOLUTION ORAL at 17:26

## 2018-01-01 RX ADMIN — NYSTATIN 500000 UNITS: 100000 SUSPENSION ORAL at 07:58

## 2018-01-01 RX ADMIN — AMLODIPINE BESYLATE 5 MG: 2.5 TABLET ORAL at 08:44

## 2018-01-01 RX ADMIN — POLYETHYLENE GLYCOL 3350 17 G: 17 POWDER, FOR SOLUTION ORAL at 08:43

## 2018-01-01 RX ADMIN — ARFORMOTEROL TARTRATE 15 MCG: 15 SOLUTION RESPIRATORY (INHALATION) at 08:15

## 2018-01-01 RX ADMIN — IPRATROPIUM BROMIDE 0.5 MG: 0.5 SOLUTION RESPIRATORY (INHALATION) at 11:16

## 2018-01-01 RX ADMIN — HYDRALAZINE HYDROCHLORIDE 10 MG: 20 INJECTION INTRAMUSCULAR; INTRAVENOUS at 17:31

## 2018-01-01 RX ADMIN — HYDRALAZINE HYDROCHLORIDE 10 MG: 20 INJECTION INTRAMUSCULAR; INTRAVENOUS at 01:32

## 2018-01-01 RX ADMIN — NYSTATIN 500000 UNITS: 100000 SUSPENSION ORAL at 19:55

## 2018-01-01 RX ADMIN — ARFORMOTEROL TARTRATE 15 MCG: 15 SOLUTION RESPIRATORY (INHALATION) at 07:14

## 2018-01-01 RX ADMIN — SODIUM CHLORIDE, POTASSIUM CHLORIDE, SODIUM LACTATE AND CALCIUM CHLORIDE: 600; 310; 30; 20 INJECTION, SOLUTION INTRAVENOUS at 15:16

## 2018-01-01 RX ADMIN — OYSTER SHELL CALCIUM WITH VITAMIN D 1 TABLET: 500; 200 TABLET, FILM COATED ORAL at 09:08

## 2018-01-01 RX ADMIN — Medication 1 MG: at 09:06

## 2018-01-01 RX ADMIN — SENNOSIDES 5 ML: 8.8 SYRUP ORAL at 21:12

## 2018-01-01 RX ADMIN — Medication 3 MG: at 22:18

## 2018-01-01 RX ADMIN — NYSTATIN 500000 UNITS: 100000 SUSPENSION ORAL at 11:57

## 2018-01-01 RX ADMIN — BUDESONIDE 0.25 MG: 0.25 INHALANT RESPIRATORY (INHALATION) at 07:46

## 2018-01-01 RX ADMIN — Medication 1 CAPSULE: at 18:56

## 2018-01-01 RX ADMIN — Medication 1 CAPSULE: at 17:48

## 2018-01-01 RX ADMIN — DOCUSATE SODIUM 50 MG: 50 LIQUID ORAL at 07:47

## 2018-01-01 RX ADMIN — RACEPINEPHRINE HYDROCHLORIDE 0.5 ML: 11.25 SOLUTION RESPIRATORY (INHALATION) at 07:14

## 2018-01-01 RX ADMIN — CLOPIDOGREL 75 MG: 75 TABLET, FILM COATED ORAL at 15:38

## 2018-01-01 RX ADMIN — LEVALBUTEROL HYDROCHLORIDE 0.63 MG: 0.63 SOLUTION RESPIRATORY (INHALATION) at 09:13

## 2018-01-01 RX ADMIN — Medication 1 MG: at 08:48

## 2018-01-01 RX ADMIN — NYSTATIN 500000 UNITS: 100000 SUSPENSION ORAL at 08:23

## 2018-01-01 RX ADMIN — Medication 1 CAPSULE: at 08:49

## 2018-01-01 RX ADMIN — GLYCOPYRROLATE 1 MG: 1 TABLET ORAL at 08:26

## 2018-01-01 RX ADMIN — Medication 1 CAPSULE: at 11:38

## 2018-01-01 RX ADMIN — SODIUM CHLORIDE, POTASSIUM CHLORIDE, SODIUM LACTATE AND CALCIUM CHLORIDE: 600; 310; 30; 20 INJECTION, SOLUTION INTRAVENOUS at 15:05

## 2018-01-01 RX ADMIN — Medication 80 MG: at 18:00

## 2018-01-01 RX ADMIN — MINERAL SUPPLEMENT IRON 300 MG / 5 ML STRENGTH LIQUID 100 PER BOX UNFLAVORED 300 MG: at 09:06

## 2018-01-01 RX ADMIN — Medication 1 MG: at 08:17

## 2018-01-01 RX ADMIN — IPRATROPIUM BROMIDE 0.5 MG: 0.5 SOLUTION RESPIRATORY (INHALATION) at 12:18

## 2018-01-01 RX ADMIN — IPRATROPIUM BROMIDE 0.5 MG: 0.5 SOLUTION RESPIRATORY (INHALATION) at 11:58

## 2018-01-01 RX ADMIN — IPRATROPIUM BROMIDE 0.5 MG: 0.5 SOLUTION RESPIRATORY (INHALATION) at 16:07

## 2018-01-01 RX ADMIN — POLYETHYLENE GLYCOL 3350 17 G: 17 POWDER, FOR SOLUTION ORAL at 09:58

## 2018-01-01 RX ADMIN — LEVALBUTEROL HYDROCHLORIDE 0.63 MG: 0.63 SOLUTION RESPIRATORY (INHALATION) at 11:26

## 2018-01-01 RX ADMIN — IPRATROPIUM BROMIDE 0.5 MG: 0.5 SOLUTION RESPIRATORY (INHALATION) at 11:47

## 2018-01-01 RX ADMIN — LOSARTAN POTASSIUM 50 MG: 50 TABLET ORAL at 07:38

## 2018-01-01 RX ADMIN — Medication 1 MG: at 07:45

## 2018-01-01 RX ADMIN — IPRATROPIUM BROMIDE 0.5 MG: 0.5 SOLUTION RESPIRATORY (INHALATION) at 16:25

## 2018-01-01 RX ADMIN — Medication 1 CAPSULE: at 18:35

## 2018-01-01 RX ADMIN — SODIUM CHLORIDE: 9 INJECTION, SOLUTION INTRAVENOUS at 08:27

## 2018-01-01 RX ADMIN — Medication 2.5 MG: at 08:19

## 2018-01-01 RX ADMIN — GLYCOPYRROLATE 1 MG: 1 TABLET ORAL at 14:45

## 2018-01-01 RX ADMIN — OYSTER SHELL CALCIUM WITH VITAMIN D 1 TABLET: 500; 200 TABLET, FILM COATED ORAL at 17:35

## 2018-01-01 RX ADMIN — NYSTATIN 500000 UNITS: 100000 SUSPENSION ORAL at 11:55

## 2018-01-01 RX ADMIN — OXYMETAZOLINE HYDROCHLORIDE 2 SPRAY: 5 SPRAY NASAL at 02:46

## 2018-01-01 RX ADMIN — IPRATROPIUM BROMIDE 0.5 MG: 0.5 SOLUTION RESPIRATORY (INHALATION) at 08:34

## 2018-01-01 RX ADMIN — BUDESONIDE 0.25 MG: 0.25 INHALANT RESPIRATORY (INHALATION) at 19:25

## 2018-01-01 RX ADMIN — Medication 10 MG: at 04:20

## 2018-01-01 RX ADMIN — LEVALBUTEROL HYDROCHLORIDE 0.63 MG: 0.63 SOLUTION RESPIRATORY (INHALATION) at 15:26

## 2018-01-01 RX ADMIN — SODIUM CHLORIDE, PRESERVATIVE FREE 5 ML: 5 INJECTION INTRAVENOUS at 15:17

## 2018-01-01 RX ADMIN — MULTIVITAMIN 15 ML: LIQUID ORAL at 08:38

## 2018-01-01 RX ADMIN — BUDESONIDE 0.25 MG: 0.25 INHALANT RESPIRATORY (INHALATION) at 20:01

## 2018-01-01 RX ADMIN — BUDESONIDE 0.25 MG: 0.25 INHALANT RESPIRATORY (INHALATION) at 20:15

## 2018-01-01 RX ADMIN — LEVALBUTEROL HYDROCHLORIDE 0.63 MG: 0.63 SOLUTION RESPIRATORY (INHALATION) at 16:17

## 2018-01-01 RX ADMIN — CLOPIDOGREL 75 MG: 75 TABLET, FILM COATED ORAL at 08:26

## 2018-01-01 RX ADMIN — Medication 1 CAPSULE: at 12:32

## 2018-01-01 RX ADMIN — Medication 2.5 MG: at 08:51

## 2018-01-01 RX ADMIN — LEVALBUTEROL HYDROCHLORIDE 0.63 MG: 0.63 SOLUTION RESPIRATORY (INHALATION) at 07:34

## 2018-01-01 RX ADMIN — AMLODIPINE BESYLATE 5 MG: 2.5 TABLET ORAL at 08:37

## 2018-01-01 RX ADMIN — ALBUTEROL SULFATE 2.5 MG: 2.5 SOLUTION RESPIRATORY (INHALATION) at 13:35

## 2018-01-01 RX ADMIN — IPRATROPIUM BROMIDE 0.5 MG: 0.5 SOLUTION RESPIRATORY (INHALATION) at 20:45

## 2018-01-01 RX ADMIN — OYSTER SHELL CALCIUM WITH VITAMIN D 1 TABLET: 500; 200 TABLET, FILM COATED ORAL at 08:26

## 2018-01-01 RX ADMIN — Medication 10 MG: at 14:57

## 2018-01-01 RX ADMIN — Medication 0.2 MG: at 02:05

## 2018-01-01 RX ADMIN — LEVALBUTEROL HYDROCHLORIDE 0.63 MG: 0.63 SOLUTION RESPIRATORY (INHALATION) at 19:47

## 2018-01-01 RX ADMIN — ACETAMINOPHEN 650 MG: 325 TABLET, FILM COATED ORAL at 14:58

## 2018-01-01 RX ADMIN — FOLIC ACID 1 MG: 1 TABLET ORAL at 09:11

## 2018-01-01 RX ADMIN — Medication 3 MG: at 21:15

## 2018-01-01 RX ADMIN — GLYCOPYRROLATE 1 MG: 1 TABLET ORAL at 16:36

## 2018-01-01 RX ADMIN — BUDESONIDE 0.25 MG: 0.25 INHALANT RESPIRATORY (INHALATION) at 08:20

## 2018-01-01 RX ADMIN — CIPROFLOXACIN AND DEXAMETHASONE 4 DROP: 3; 1 SUSPENSION/ DROPS AURICULAR (OTIC) at 20:23

## 2018-01-01 RX ADMIN — NYSTATIN 500000 UNITS: 100000 SUSPENSION ORAL at 21:11

## 2018-01-01 RX ADMIN — Medication 2.5 MG: at 20:47

## 2018-01-01 RX ADMIN — NYSTATIN 500000 UNITS: 100000 SUSPENSION ORAL at 15:12

## 2018-01-01 RX ADMIN — IPRATROPIUM BROMIDE 0.5 MG: 0.5 SOLUTION RESPIRATORY (INHALATION) at 15:09

## 2018-01-01 RX ADMIN — MULTIVITAMIN 15 ML: LIQUID ORAL at 08:51

## 2018-01-01 RX ADMIN — AMLODIPINE BESYLATE 5 MG: 2.5 TABLET ORAL at 07:51

## 2018-01-01 RX ADMIN — MAGNESIUM SULFATE IN WATER 4 G: 40 INJECTION, SOLUTION INTRAVENOUS at 15:38

## 2018-01-01 RX ADMIN — ALBUTEROL SULFATE 2.5 MG: 2.5 SOLUTION RESPIRATORY (INHALATION) at 02:25

## 2018-01-01 RX ADMIN — ATORVASTATIN CALCIUM 40 MG: 40 TABLET, FILM COATED ORAL at 07:55

## 2018-01-01 RX ADMIN — THIAMINE HYDROCHLORIDE 200 MG: 100 INJECTION, SOLUTION INTRAMUSCULAR; INTRAVENOUS at 07:55

## 2018-01-01 RX ADMIN — ATORVASTATIN CALCIUM 40 MG: 40 TABLET, FILM COATED ORAL at 09:36

## 2018-01-01 RX ADMIN — LEVALBUTEROL HYDROCHLORIDE 0.63 MG: 0.63 SOLUTION RESPIRATORY (INHALATION) at 08:34

## 2018-01-01 RX ADMIN — LEVOFLOXACIN 750 MG: 5 INJECTION, SOLUTION INTRAVENOUS at 11:40

## 2018-01-01 RX ADMIN — Medication 2.5 MG: at 08:48

## 2018-01-01 RX ADMIN — LEVALBUTEROL HYDROCHLORIDE 0.63 MG: 0.63 SOLUTION RESPIRATORY (INHALATION) at 11:17

## 2018-01-01 RX ADMIN — IPRATROPIUM BROMIDE 0.5 MG: 0.5 SOLUTION RESPIRATORY (INHALATION) at 12:53

## 2018-01-01 RX ADMIN — Medication 10 MEQ: at 06:16

## 2018-01-01 RX ADMIN — POTASSIUM PHOSPHATE, MONOBASIC AND POTASSIUM PHOSPHATE, DIBASIC 15 MMOL: 224; 236 INJECTION, SOLUTION INTRAVENOUS at 22:42

## 2018-01-01 RX ADMIN — Medication 1 CAPSULE: at 11:42

## 2018-01-01 RX ADMIN — GLYCOPYRROLATE 1 MG: 1 TABLET ORAL at 13:29

## 2018-01-01 RX ADMIN — SODIUM CHLORIDE, POTASSIUM CHLORIDE, SODIUM LACTATE AND CALCIUM CHLORIDE: 600; 310; 30; 20 INJECTION, SOLUTION INTRAVENOUS at 12:47

## 2018-01-01 RX ADMIN — MEROPENEM 1 G: 1 INJECTION, POWDER, FOR SOLUTION INTRAVENOUS at 16:30

## 2018-01-01 RX ADMIN — MULTIVITAMIN 15 ML: LIQUID ORAL at 09:57

## 2018-01-01 RX ADMIN — PHENYLEPHRINE HYDROCHLORIDE 100 MCG: 10 INJECTION, SOLUTION INTRAMUSCULAR; INTRAVENOUS; SUBCUTANEOUS at 13:27

## 2018-01-01 RX ADMIN — DEXTROSE AND SODIUM CHLORIDE: 5; 900 INJECTION, SOLUTION INTRAVENOUS at 13:34

## 2018-01-01 RX ADMIN — IPRATROPIUM BROMIDE 0.5 MG: 0.5 SOLUTION RESPIRATORY (INHALATION) at 12:19

## 2018-01-01 RX ADMIN — AMLODIPINE BESYLATE 5 MG: 2.5 TABLET ORAL at 08:53

## 2018-01-01 RX ADMIN — CLOPIDOGREL 75 MG: 75 TABLET, FILM COATED ORAL at 08:43

## 2018-01-01 RX ADMIN — IPRATROPIUM BROMIDE 0.5 MG: 0.5 SOLUTION RESPIRATORY (INHALATION) at 08:49

## 2018-01-01 RX ADMIN — LEVALBUTEROL HYDROCHLORIDE 0.63 MG: 0.63 SOLUTION RESPIRATORY (INHALATION) at 20:15

## 2018-01-01 RX ADMIN — VANCOMYCIN HYDROCHLORIDE 1000 MG: 1 INJECTION, SOLUTION INTRAVENOUS at 20:55

## 2018-01-01 RX ADMIN — DOCUSATE SODIUM 50 MG: 50 LIQUID ORAL at 20:55

## 2018-01-01 RX ADMIN — POTASSIUM CHLORIDE 20 MEQ: 1.5 POWDER, FOR SOLUTION ORAL at 06:01

## 2018-01-01 RX ADMIN — CLOPIDOGREL 75 MG: 75 TABLET, FILM COATED ORAL at 08:03

## 2018-01-01 RX ADMIN — Medication 2.5 MG: at 08:53

## 2018-01-01 RX ADMIN — LEVALBUTEROL HYDROCHLORIDE 0.63 MG: 0.63 SOLUTION RESPIRATORY (INHALATION) at 16:00

## 2018-01-01 RX ADMIN — NYSTATIN 500000 UNITS: 100000 SUSPENSION ORAL at 12:19

## 2018-01-01 RX ADMIN — ARFORMOTEROL TARTRATE 15 MCG: 15 SOLUTION RESPIRATORY (INHALATION) at 19:39

## 2018-01-01 RX ADMIN — LEVALBUTEROL HYDROCHLORIDE 0.63 MG: 0.63 SOLUTION RESPIRATORY (INHALATION) at 12:25

## 2018-01-01 RX ADMIN — ATORVASTATIN CALCIUM 40 MG: 40 TABLET, FILM COATED ORAL at 08:18

## 2018-01-01 RX ADMIN — IOPAMIDOL 65 ML: 755 INJECTION, SOLUTION INTRAVASCULAR at 10:57

## 2018-01-01 RX ADMIN — IOPAMIDOL 82 ML: 755 INJECTION, SOLUTION INTRAVENOUS at 13:26

## 2018-01-01 RX ADMIN — Medication 2.5 MG: at 21:01

## 2018-01-01 RX ADMIN — IPRATROPIUM BROMIDE 0.5 MG: 0.5 SOLUTION RESPIRATORY (INHALATION) at 07:46

## 2018-01-01 RX ADMIN — GLYCOPYRROLATE 1 MG: 1 TABLET ORAL at 13:05

## 2018-01-01 RX ADMIN — ATORVASTATIN CALCIUM 40 MG: 40 TABLET, FILM COATED ORAL at 08:51

## 2018-01-01 RX ADMIN — NYSTATIN 500000 UNITS: 100000 SUSPENSION ORAL at 20:14

## 2018-01-01 RX ADMIN — ATORVASTATIN CALCIUM 40 MG: 40 TABLET, FILM COATED ORAL at 08:24

## 2018-01-01 RX ADMIN — ARFORMOTEROL TARTRATE 15 MCG: 15 SOLUTION RESPIRATORY (INHALATION) at 21:01

## 2018-01-01 RX ADMIN — HYDRALAZINE HYDROCHLORIDE 10 MG: 20 INJECTION INTRAMUSCULAR; INTRAVENOUS at 18:00

## 2018-01-01 RX ADMIN — IPRATROPIUM BROMIDE 0.5 MG: 0.5 SOLUTION RESPIRATORY (INHALATION) at 07:29

## 2018-01-01 RX ADMIN — LOSARTAN POTASSIUM 50 MG: 50 TABLET ORAL at 07:56

## 2018-01-01 RX ADMIN — BUDESONIDE 0.25 MG: 0.25 INHALANT RESPIRATORY (INHALATION) at 00:45

## 2018-01-01 RX ADMIN — IPRATROPIUM BROMIDE 0.5 MG: 0.5 SOLUTION RESPIRATORY (INHALATION) at 07:13

## 2018-01-01 RX ADMIN — LEVALBUTEROL HYDROCHLORIDE 0.63 MG: 0.63 SOLUTION RESPIRATORY (INHALATION) at 15:45

## 2018-01-01 RX ADMIN — ACETAMINOPHEN 650 MG: 650 SUPPOSITORY RECTAL at 18:01

## 2018-01-01 RX ADMIN — BUDESONIDE 0.25 MG: 0.25 INHALANT RESPIRATORY (INHALATION) at 19:48

## 2018-01-01 RX ADMIN — AMLODIPINE BESYLATE 5 MG: 2.5 TABLET ORAL at 07:26

## 2018-01-01 RX ADMIN — IPRATROPIUM BROMIDE 0.5 MG: 0.5 SOLUTION RESPIRATORY (INHALATION) at 07:54

## 2018-01-01 RX ADMIN — LEVALBUTEROL HYDROCHLORIDE 0.63 MG: 0.63 SOLUTION RESPIRATORY (INHALATION) at 08:27

## 2018-01-01 RX ADMIN — Medication 10 MEQ: at 09:59

## 2018-01-01 RX ADMIN — ARFORMOTEROL TARTRATE 15 MCG: 15 SOLUTION RESPIRATORY (INHALATION) at 07:16

## 2018-01-01 RX ADMIN — ACETAMINOPHEN 650 MG: 325 TABLET, FILM COATED ORAL at 21:13

## 2018-01-01 RX ADMIN — IPRATROPIUM BROMIDE 0.5 MG: 0.5 SOLUTION RESPIRATORY (INHALATION) at 20:07

## 2018-01-01 RX ADMIN — NYSTATIN 500000 UNITS: 100000 SUSPENSION ORAL at 07:54

## 2018-01-01 RX ADMIN — IPRATROPIUM BROMIDE 0.5 MG: 0.5 SOLUTION RESPIRATORY (INHALATION) at 17:03

## 2018-01-01 RX ADMIN — FUROSEMIDE 40 MG: 10 INJECTION, SOLUTION INTRAVENOUS at 09:19

## 2018-01-01 RX ADMIN — LEVOFLOXACIN 500 MG: 250 TABLET, FILM COATED ORAL at 09:45

## 2018-01-01 RX ADMIN — CLOPIDOGREL 75 MG: 75 TABLET, FILM COATED ORAL at 07:38

## 2018-01-01 RX ADMIN — IPRATROPIUM BROMIDE 0.5 MG: 0.5 SOLUTION RESPIRATORY (INHALATION) at 16:27

## 2018-01-01 RX ADMIN — IPRATROPIUM BROMIDE 0.5 MG: 0.5 SOLUTION RESPIRATORY (INHALATION) at 21:54

## 2018-01-01 RX ADMIN — ASPIRIN 81 MG CHEWABLE TABLET 81 MG: 81 TABLET CHEWABLE at 08:53

## 2018-01-01 RX ADMIN — CLINDAMYCIN PHOSPHATE 600 MG: 12 INJECTION, SOLUTION INTRAMUSCULAR; INTRAVENOUS at 03:05

## 2018-01-01 RX ADMIN — LOSARTAN POTASSIUM 50 MG: 50 TABLET ORAL at 08:39

## 2018-01-01 RX ADMIN — BUDESONIDE 0.25 MG: 0.25 INHALANT RESPIRATORY (INHALATION) at 07:10

## 2018-01-01 RX ADMIN — BUDESONIDE 0.25 MG: 0.25 INHALANT RESPIRATORY (INHALATION) at 08:45

## 2018-01-01 RX ADMIN — MEROPENEM 1 G: 1 INJECTION, POWDER, FOR SOLUTION INTRAVENOUS at 22:15

## 2018-01-01 RX ADMIN — CLINDAMYCIN PHOSPHATE 900 MG: 18 INJECTION, SOLUTION INTRAVENOUS at 15:20

## 2018-01-01 RX ADMIN — METRONIDAZOLE 500 MG: 500 INJECTION, SOLUTION INTRAVENOUS at 08:46

## 2018-01-01 RX ADMIN — IPRATROPIUM BROMIDE 0.5 MG: 0.5 SOLUTION RESPIRATORY (INHALATION) at 07:44

## 2018-01-01 RX ADMIN — LEVALBUTEROL HYDROCHLORIDE 0.63 MG: 0.63 SOLUTION RESPIRATORY (INHALATION) at 16:16

## 2018-01-01 RX ADMIN — OXYMETAZOLINE HYDROCHLORIDE 2 SPRAY: 5 SPRAY NASAL at 20:23

## 2018-01-01 RX ADMIN — ARFORMOTEROL TARTRATE 15 MCG: 15 SOLUTION RESPIRATORY (INHALATION) at 07:30

## 2018-01-01 RX ADMIN — Medication 10 MG: at 15:16

## 2018-01-01 RX ADMIN — ACETAMINOPHEN 650 MG: 325 SOLUTION ORAL at 08:19

## 2018-01-01 RX ADMIN — Medication 10 MG: at 11:37

## 2018-01-01 RX ADMIN — Medication 10 MEQ: at 01:28

## 2018-01-01 RX ADMIN — OXYCODONE HYDROCHLORIDE 5 MG: 5 TABLET ORAL at 20:27

## 2018-01-01 RX ADMIN — IPRATROPIUM BROMIDE 0.5 MG: 0.5 SOLUTION RESPIRATORY (INHALATION) at 12:56

## 2018-01-01 RX ADMIN — Medication 100 MG: at 08:26

## 2018-01-01 RX ADMIN — LEVALBUTEROL HYDROCHLORIDE 0.63 MG: 0.63 SOLUTION RESPIRATORY (INHALATION) at 08:38

## 2018-01-01 RX ADMIN — SENNOSIDES 5 ML: 8.8 SYRUP ORAL at 22:14

## 2018-01-01 RX ADMIN — Medication 100 MG: at 09:57

## 2018-01-01 RX ADMIN — METRONIDAZOLE 500 MG: 500 INJECTION, SOLUTION INTRAVENOUS at 14:37

## 2018-01-01 RX ADMIN — BUDESONIDE 0.25 MG: 0.25 INHALANT RESPIRATORY (INHALATION) at 08:16

## 2018-01-01 RX ADMIN — Medication 1 MG: at 07:47

## 2018-01-01 RX ADMIN — LEVALBUTEROL HYDROCHLORIDE 0.63 MG: 0.63 SOLUTION RESPIRATORY (INHALATION) at 08:49

## 2018-01-01 RX ADMIN — LIDOCAINE HYDROCHLORIDE 1.5 ML: 10 INJECTION, SOLUTION EPIDURAL; INFILTRATION; INTRACAUDAL; PERINEURAL at 14:15

## 2018-01-01 RX ADMIN — Medication 10 MG: at 15:55

## 2018-01-01 RX ADMIN — NYSTATIN 500000 UNITS: 100000 SUSPENSION ORAL at 15:37

## 2018-01-01 RX ADMIN — AMLODIPINE BESYLATE 5 MG: 2.5 TABLET ORAL at 08:38

## 2018-01-01 RX ADMIN — AMLODIPINE BESYLATE 5 MG: 2.5 TABLET ORAL at 07:49

## 2018-01-01 RX ADMIN — GLYCOPYRROLATE 1 MG: 1 TABLET ORAL at 08:44

## 2018-01-01 RX ADMIN — ARFORMOTEROL TARTRATE 15 MCG: 15 SOLUTION RESPIRATORY (INHALATION) at 19:48

## 2018-01-01 RX ADMIN — POTASSIUM PHOSPHATE, MONOBASIC AND POTASSIUM PHOSPHATE, DIBASIC 15 MMOL: 224; 236 INJECTION, SOLUTION INTRAVENOUS at 20:58

## 2018-01-01 RX ADMIN — MULTIVITAMIN 15 ML: LIQUID ORAL at 07:26

## 2018-01-01 RX ADMIN — MULTIVITAMIN 15 ML: LIQUID ORAL at 08:55

## 2018-01-01 RX ADMIN — LEVALBUTEROL HYDROCHLORIDE 0.63 MG: 0.63 SOLUTION RESPIRATORY (INHALATION) at 20:20

## 2018-01-01 RX ADMIN — IPRATROPIUM BROMIDE 0.5 MG: 0.5 SOLUTION RESPIRATORY (INHALATION) at 13:03

## 2018-01-01 RX ADMIN — LOSARTAN POTASSIUM 50 MG: 50 TABLET ORAL at 08:54

## 2018-01-01 RX ADMIN — LEVALBUTEROL HYDROCHLORIDE 0.63 MG: 0.63 SOLUTION RESPIRATORY (INHALATION) at 13:26

## 2018-01-01 RX ADMIN — LEVOFLOXACIN 750 MG: 5 INJECTION, SOLUTION INTRAVENOUS at 12:02

## 2018-01-01 RX ADMIN — LEVALBUTEROL HYDROCHLORIDE 0.63 MG: 0.63 SOLUTION RESPIRATORY (INHALATION) at 11:33

## 2018-01-01 RX ADMIN — Medication 1 MG: at 08:38

## 2018-01-01 RX ADMIN — IPRATROPIUM BROMIDE 0.5 MG: 0.5 SOLUTION RESPIRATORY (INHALATION) at 16:23

## 2018-01-01 RX ADMIN — IPRATROPIUM BROMIDE 0.5 MG: 0.5 SOLUTION RESPIRATORY (INHALATION) at 21:30

## 2018-01-01 RX ADMIN — SODIUM CHLORIDE, PRESERVATIVE FREE 5 ML: 5 INJECTION INTRAVENOUS at 22:24

## 2018-01-01 RX ADMIN — LOSARTAN POTASSIUM 50 MG: 50 TABLET ORAL at 08:26

## 2018-01-01 RX ADMIN — Medication 0.2 MG: at 23:28

## 2018-01-01 RX ADMIN — LEVALBUTEROL HYDROCHLORIDE 0.63 MG: 0.63 SOLUTION RESPIRATORY (INHALATION) at 20:44

## 2018-01-01 RX ADMIN — Medication 3 MG: at 21:10

## 2018-01-01 RX ADMIN — THIAMINE HYDROCHLORIDE 200 MG: 100 INJECTION, SOLUTION INTRAMUSCULAR; INTRAVENOUS at 22:23

## 2018-01-01 RX ADMIN — ATORVASTATIN CALCIUM 40 MG: 40 TABLET, FILM COATED ORAL at 08:43

## 2018-01-01 RX ADMIN — HYDRALAZINE HYDROCHLORIDE 10 MG: 20 INJECTION INTRAMUSCULAR; INTRAVENOUS at 05:22

## 2018-01-01 RX ADMIN — ENOXAPARIN SODIUM 30 MG: 30 INJECTION SUBCUTANEOUS at 15:12

## 2018-01-01 RX ADMIN — ATORVASTATIN CALCIUM 40 MG: 40 TABLET, FILM COATED ORAL at 14:05

## 2018-01-01 RX ADMIN — Medication 1 CAPSULE: at 07:38

## 2018-01-01 RX ADMIN — MULTIVITAMIN 15 ML: LIQUID ORAL at 08:52

## 2018-01-01 RX ADMIN — Medication 10 MG: at 20:57

## 2018-01-01 RX ADMIN — IPRATROPIUM BROMIDE 0.5 MG: 0.5 SOLUTION RESPIRATORY (INHALATION) at 09:28

## 2018-01-01 RX ADMIN — Medication 100 MG: at 08:02

## 2018-01-01 RX ADMIN — ONDANSETRON 4 MG: 2 INJECTION INTRAMUSCULAR; INTRAVENOUS at 14:48

## 2018-01-01 RX ADMIN — FOLIC ACID 1 MG: 1 TABLET ORAL at 08:15

## 2018-01-01 RX ADMIN — ENOXAPARIN SODIUM 30 MG: 30 INJECTION SUBCUTANEOUS at 16:29

## 2018-01-01 RX ADMIN — IPRATROPIUM BROMIDE 0.5 MG: 0.5 SOLUTION RESPIRATORY (INHALATION) at 11:41

## 2018-01-01 RX ADMIN — ATORVASTATIN CALCIUM 40 MG: 40 TABLET, FILM COATED ORAL at 08:19

## 2018-01-01 RX ADMIN — POLYETHYLENE GLYCOL 3350 17 G: 17 POWDER, FOR SOLUTION ORAL at 08:39

## 2018-01-01 RX ADMIN — Medication 0.2 MG: at 06:45

## 2018-01-01 RX ADMIN — IPRATROPIUM BROMIDE 0.5 MG: 0.5 SOLUTION RESPIRATORY (INHALATION) at 08:16

## 2018-01-01 RX ADMIN — Medication 10 MEQ: at 11:11

## 2018-01-01 RX ADMIN — Medication 1 MG: at 07:56

## 2018-01-01 RX ADMIN — Medication 1 CAPSULE: at 16:32

## 2018-01-01 RX ADMIN — LEVALBUTEROL HYDROCHLORIDE 0.63 MG: 0.63 SOLUTION RESPIRATORY (INHALATION) at 07:41

## 2018-01-01 RX ADMIN — Medication 1 CAPSULE: at 08:53

## 2018-01-01 RX ADMIN — OYSTER SHELL CALCIUM WITH VITAMIN D 1 TABLET: 500; 200 TABLET, FILM COATED ORAL at 08:17

## 2018-01-01 RX ADMIN — LEVALBUTEROL HYDROCHLORIDE 0.63 MG: 0.63 SOLUTION RESPIRATORY (INHALATION) at 09:24

## 2018-01-01 RX ADMIN — DEXTROSE MONOHYDRATE 25 ML: 500 INJECTION PARENTERAL at 03:34

## 2018-01-01 RX ADMIN — Medication 100 MG: at 21:12

## 2018-01-01 RX ADMIN — ENOXAPARIN SODIUM 30 MG: 30 INJECTION SUBCUTANEOUS at 17:26

## 2018-01-01 RX ADMIN — GLYCOPYRROLATE 1 MG: 1 TABLET ORAL at 07:39

## 2018-01-01 RX ADMIN — Medication 1 CAPSULE: at 08:01

## 2018-01-01 RX ADMIN — ARFORMOTEROL TARTRATE 15 MCG: 15 SOLUTION RESPIRATORY (INHALATION) at 08:59

## 2018-01-01 RX ADMIN — CIPROFLOXACIN AND DEXAMETHASONE 4 DROP: 3; 1 SUSPENSION/ DROPS AURICULAR (OTIC) at 20:04

## 2018-01-01 RX ADMIN — IPRATROPIUM BROMIDE 0.5 MG: 0.5 SOLUTION RESPIRATORY (INHALATION) at 15:21

## 2018-01-01 RX ADMIN — IPRATROPIUM BROMIDE 0.5 MG: 0.5 SOLUTION RESPIRATORY (INHALATION) at 07:24

## 2018-01-01 RX ADMIN — NYSTATIN 500000 UNITS: 100000 SUSPENSION ORAL at 11:14

## 2018-01-01 RX ADMIN — IPRATROPIUM BROMIDE 0.5 MG: 0.5 SOLUTION RESPIRATORY (INHALATION) at 20:11

## 2018-01-01 RX ADMIN — Medication 1 CAPSULE: at 08:24

## 2018-01-01 RX ADMIN — IPRATROPIUM BROMIDE 0.5 MG: 0.5 SOLUTION RESPIRATORY (INHALATION) at 19:36

## 2018-01-01 RX ADMIN — CALCIUM CHLORIDE 100 MG: 100 INJECTION, SOLUTION INTRAVENOUS at 16:37

## 2018-01-01 RX ADMIN — GLYCOPYRROLATE 1 MG: 1 TABLET ORAL at 14:47

## 2018-01-01 RX ADMIN — CIPROFLOXACIN AND DEXAMETHASONE 4 DROP: 3; 1 SUSPENSION/ DROPS AURICULAR (OTIC) at 21:09

## 2018-01-01 RX ADMIN — MINERAL SUPPLEMENT IRON 300 MG / 5 ML STRENGTH LIQUID 100 PER BOX UNFLAVORED 300 MG: at 11:17

## 2018-01-01 RX ADMIN — AMLODIPINE BESYLATE 5 MG: 2.5 TABLET ORAL at 08:01

## 2018-01-01 RX ADMIN — CIPROFLOXACIN AND DEXAMETHASONE 4 DROP: 3; 1 SUSPENSION/ DROPS AURICULAR (OTIC) at 07:25

## 2018-01-01 RX ADMIN — IPRATROPIUM BROMIDE 0.5 MG: 0.5 SOLUTION RESPIRATORY (INHALATION) at 20:27

## 2018-01-01 RX ADMIN — MULTIVITAMIN 15 ML: LIQUID ORAL at 07:39

## 2018-01-01 RX ADMIN — OYSTER SHELL CALCIUM WITH VITAMIN D 1 TABLET: 500; 200 TABLET, FILM COATED ORAL at 17:32

## 2018-01-01 RX ADMIN — BUDESONIDE 0.25 MG: 0.25 INHALANT RESPIRATORY (INHALATION) at 07:40

## 2018-01-01 RX ADMIN — GLYCOPYRROLATE 1 MG: 1 TABLET ORAL at 20:02

## 2018-01-01 RX ADMIN — POTASSIUM CHLORIDE, DEXTROSE MONOHYDRATE AND SODIUM CHLORIDE: 150; 5; 450 INJECTION, SOLUTION INTRAVENOUS at 14:10

## 2018-01-01 RX ADMIN — IPRATROPIUM BROMIDE 0.5 MG: 0.5 SOLUTION RESPIRATORY (INHALATION) at 15:26

## 2018-01-01 RX ADMIN — Medication 2.5 MG: at 20:36

## 2018-01-01 RX ADMIN — CLINDAMYCIN PHOSPHATE 600 MG: 12 INJECTION, SOLUTION INTRAMUSCULAR; INTRAVENOUS at 21:28

## 2018-01-01 RX ADMIN — DOCUSATE SODIUM 50 MG: 50 LIQUID ORAL at 21:12

## 2018-01-01 RX ADMIN — LEVALBUTEROL HYDROCHLORIDE 0.63 MG: 0.63 SOLUTION RESPIRATORY (INHALATION) at 00:27

## 2018-01-01 RX ADMIN — LOSARTAN POTASSIUM 50 MG: 50 TABLET ORAL at 08:50

## 2018-01-01 RX ADMIN — ARFORMOTEROL TARTRATE 15 MCG: 15 SOLUTION RESPIRATORY (INHALATION) at 08:29

## 2018-01-01 RX ADMIN — BUDESONIDE 0.25 MG: 0.25 INHALANT RESPIRATORY (INHALATION) at 08:34

## 2018-01-01 RX ADMIN — LABETALOL HYDROCHLORIDE 2.5 MG: 5 INJECTION INTRAVENOUS at 14:57

## 2018-01-01 RX ADMIN — CIPROFLOXACIN AND DEXAMETHASONE 4 DROP: 3; 1 SUSPENSION/ DROPS AURICULAR (OTIC) at 20:24

## 2018-01-01 RX ADMIN — Medication 1 CAPSULE: at 12:30

## 2018-01-01 RX ADMIN — GLYCOPYRROLATE 1 MG: 1 TABLET ORAL at 21:09

## 2018-01-01 RX ADMIN — ARFORMOTEROL TARTRATE 15 MCG: 15 SOLUTION RESPIRATORY (INHALATION) at 20:15

## 2018-01-01 RX ADMIN — IPRATROPIUM BROMIDE 0.5 MG: 0.5 SOLUTION RESPIRATORY (INHALATION) at 11:20

## 2018-01-01 RX ADMIN — Medication 0.2 MG: at 21:04

## 2018-01-01 RX ADMIN — AMLODIPINE BESYLATE 5 MG: 2.5 TABLET ORAL at 08:11

## 2018-01-01 RX ADMIN — Medication 10 MG: at 10:04

## 2018-01-01 RX ADMIN — Medication 3 MG: at 21:12

## 2018-01-01 RX ADMIN — ARFORMOTEROL TARTRATE 15 MCG: 15 SOLUTION RESPIRATORY (INHALATION) at 20:13

## 2018-01-01 RX ADMIN — LEVALBUTEROL HYDROCHLORIDE 0.63 MG: 0.63 SOLUTION RESPIRATORY (INHALATION) at 12:28

## 2018-01-01 RX ADMIN — BUDESONIDE 0.25 MG: 0.25 INHALANT RESPIRATORY (INHALATION) at 20:50

## 2018-01-01 RX ADMIN — ACETAMINOPHEN 650 MG: 325 TABLET, FILM COATED ORAL at 20:35

## 2018-01-01 RX ADMIN — Medication 1 MG: at 07:30

## 2018-01-01 RX ADMIN — ARFORMOTEROL TARTRATE 15 MCG: 15 SOLUTION RESPIRATORY (INHALATION) at 20:18

## 2018-01-01 RX ADMIN — MINERAL SUPPLEMENT IRON 300 MG / 5 ML STRENGTH LIQUID 100 PER BOX UNFLAVORED 300 MG: at 11:10

## 2018-01-01 RX ADMIN — ATORVASTATIN CALCIUM 40 MG: 40 TABLET, FILM COATED ORAL at 07:56

## 2018-01-01 RX ADMIN — MULTIVITAMIN 15 ML: LIQUID ORAL at 08:01

## 2018-01-01 RX ADMIN — CIPROFLOXACIN AND DEXAMETHASONE 4 DROP: 3; 1 SUSPENSION/ DROPS AURICULAR (OTIC) at 08:52

## 2018-01-01 RX ADMIN — Medication 2.5 MG: at 20:23

## 2018-01-01 RX ADMIN — AMLODIPINE BESYLATE 5 MG: 2.5 TABLET ORAL at 08:48

## 2018-01-01 RX ADMIN — Medication 0.2 MG: at 08:31

## 2018-01-01 RX ADMIN — Medication 10 MEQ: at 12:08

## 2018-01-01 RX ADMIN — ATORVASTATIN CALCIUM 40 MG: 40 TABLET, FILM COATED ORAL at 08:52

## 2018-01-01 RX ADMIN — GLYCOPYRROLATE 1 MG: 1 TABLET ORAL at 08:10

## 2018-01-01 RX ADMIN — ATORVASTATIN CALCIUM 40 MG: 40 TABLET, FILM COATED ORAL at 07:26

## 2018-01-01 RX ADMIN — PROPOFOL 30 MG: 10 INJECTION, EMULSION INTRAVENOUS at 15:09

## 2018-01-01 RX ADMIN — DOCUSATE SODIUM 50 MG: 50 LIQUID ORAL at 19:51

## 2018-01-01 RX ADMIN — DOCUSATE SODIUM 50 MG: 50 LIQUID ORAL at 20:46

## 2018-01-01 RX ADMIN — CLOPIDOGREL 75 MG: 75 TABLET, FILM COATED ORAL at 08:01

## 2018-01-01 RX ADMIN — IPRATROPIUM BROMIDE 0.5 MG: 0.5 SOLUTION RESPIRATORY (INHALATION) at 16:48

## 2018-01-01 RX ADMIN — Medication 1 MG: at 08:53

## 2018-01-01 RX ADMIN — Medication 1 CAPSULE: at 11:40

## 2018-01-01 RX ADMIN — SODIUM CHLORIDE: 9 INJECTION, SOLUTION INTRAVENOUS at 15:47

## 2018-01-01 RX ADMIN — Medication 100 MG: at 18:04

## 2018-01-01 RX ADMIN — LEVALBUTEROL HYDROCHLORIDE 0.63 MG: 0.63 SOLUTION RESPIRATORY (INHALATION) at 08:41

## 2018-01-01 RX ADMIN — LEVALBUTEROL HYDROCHLORIDE 0.63 MG: 0.63 SOLUTION RESPIRATORY (INHALATION) at 16:23

## 2018-01-01 RX ADMIN — IPRATROPIUM BROMIDE 0.5 MG: 0.5 SOLUTION RESPIRATORY (INHALATION) at 19:59

## 2018-01-01 RX ADMIN — PROPOFOL 200 MCG/KG/MIN: 10 INJECTION, EMULSION INTRAVENOUS at 13:37

## 2018-01-01 RX ADMIN — NYSTATIN 500000 UNITS: 100000 SUSPENSION ORAL at 18:34

## 2018-01-01 RX ADMIN — Medication 1 CAPSULE: at 13:05

## 2018-01-01 RX ADMIN — POTASSIUM CHLORIDE, DEXTROSE MONOHYDRATE AND SODIUM CHLORIDE: 150; 5; 450 INJECTION, SOLUTION INTRAVENOUS at 17:22

## 2018-01-01 RX ADMIN — Medication 1 MG: at 07:38

## 2018-01-01 RX ADMIN — LEVALBUTEROL HYDROCHLORIDE 0.63 MG: 0.63 SOLUTION RESPIRATORY (INHALATION) at 07:54

## 2018-01-01 RX ADMIN — NYSTATIN 500000 UNITS: 100000 SUSPENSION ORAL at 08:47

## 2018-01-01 RX ADMIN — MULTIVITAMIN 15 ML: LIQUID ORAL at 09:06

## 2018-01-01 RX ADMIN — LOSARTAN POTASSIUM 50 MG: 50 TABLET ORAL at 07:48

## 2018-01-01 RX ADMIN — NYSTATIN 500000 UNITS: 100000 SUSPENSION ORAL at 12:08

## 2018-01-01 RX ADMIN — MINERAL SUPPLEMENT IRON 300 MG / 5 ML STRENGTH LIQUID 100 PER BOX UNFLAVORED 300 MG: at 08:23

## 2018-01-01 RX ADMIN — GLYCOPYRROLATE 1 MG: 1 TABLET ORAL at 14:20

## 2018-01-01 RX ADMIN — AMLODIPINE BESYLATE 5 MG: 2.5 TABLET ORAL at 08:39

## 2018-01-01 RX ADMIN — LOSARTAN POTASSIUM 50 MG: 50 TABLET ORAL at 09:45

## 2018-01-01 RX ADMIN — LEVALBUTEROL HYDROCHLORIDE 0.63 MG: 0.63 SOLUTION RESPIRATORY (INHALATION) at 07:29

## 2018-01-01 RX ADMIN — MEROPENEM 1 G: 1 INJECTION, POWDER, FOR SOLUTION INTRAVENOUS at 04:24

## 2018-01-01 RX ADMIN — MULTIVITAMIN 15 ML: LIQUID ORAL at 07:56

## 2018-01-01 RX ADMIN — OYSTER SHELL CALCIUM WITH VITAMIN D 1 TABLET: 500; 200 TABLET, FILM COATED ORAL at 07:47

## 2018-01-01 RX ADMIN — ACETAMINOPHEN 650 MG: 325 SOLUTION ORAL at 21:14

## 2018-01-01 RX ADMIN — NYSTATIN 500000 UNITS: 100000 SUSPENSION ORAL at 15:11

## 2018-01-01 RX ADMIN — IPRATROPIUM BROMIDE 0.5 MG: 0.5 SOLUTION RESPIRATORY (INHALATION) at 16:00

## 2018-01-01 RX ADMIN — POTASSIUM CHLORIDE 40 MEQ: 1.5 POWDER, FOR SOLUTION ORAL at 14:59

## 2018-01-01 RX ADMIN — IPRATROPIUM BROMIDE 0.5 MG: 0.5 SOLUTION RESPIRATORY (INHALATION) at 19:38

## 2018-01-01 RX ADMIN — Medication 80 MG: at 09:08

## 2018-01-01 RX ADMIN — ACETAMINOPHEN 650 MG: 325 SOLUTION ORAL at 00:22

## 2018-01-01 RX ADMIN — Medication 1 CAPSULE: at 07:47

## 2018-01-01 RX ADMIN — FOLIC ACID 1 MG: 1 TABLET ORAL at 09:56

## 2018-01-01 RX ADMIN — MULTIVITAMIN 15 ML: LIQUID ORAL at 14:05

## 2018-01-01 RX ADMIN — DOCUSATE SODIUM 50 MG: 50 LIQUID ORAL at 22:30

## 2018-01-01 RX ADMIN — IPRATROPIUM BROMIDE 0.5 MG: 0.5 SOLUTION RESPIRATORY (INHALATION) at 16:17

## 2018-01-01 RX ADMIN — Medication 1 CAPSULE: at 13:03

## 2018-01-01 RX ADMIN — Medication 2.5 MG: at 13:31

## 2018-01-01 RX ADMIN — LEVALBUTEROL HYDROCHLORIDE 0.63 MG: 0.63 SOLUTION RESPIRATORY (INHALATION) at 12:05

## 2018-01-01 RX ADMIN — NYSTATIN 500000 UNITS: 100000 SUSPENSION ORAL at 19:07

## 2018-01-01 RX ADMIN — POLYETHYLENE GLYCOL 3350 17 G: 17 POWDER, FOR SOLUTION ORAL at 08:23

## 2018-01-01 RX ADMIN — MEROPENEM 1 G: 1 INJECTION, POWDER, FOR SOLUTION INTRAVENOUS at 19:58

## 2018-01-01 RX ADMIN — Medication 0.2 MG: at 04:29

## 2018-01-01 RX ADMIN — MINERAL SUPPLEMENT IRON 300 MG / 5 ML STRENGTH LIQUID 100 PER BOX UNFLAVORED 300 MG: at 07:56

## 2018-01-01 RX ADMIN — OYSTER SHELL CALCIUM WITH VITAMIN D 1 TABLET: 500; 200 TABLET, FILM COATED ORAL at 14:05

## 2018-01-01 RX ADMIN — LABETALOL HYDROCHLORIDE 10 MG: 5 INJECTION, SOLUTION INTRAVENOUS at 08:31

## 2018-01-01 RX ADMIN — FOLIC ACID 1 MG: 1 TABLET ORAL at 08:36

## 2018-01-01 RX ADMIN — LOSARTAN POTASSIUM 50 MG: 50 TABLET ORAL at 08:52

## 2018-01-01 RX ADMIN — LEVALBUTEROL HYDROCHLORIDE 0.63 MG: 0.63 SOLUTION RESPIRATORY (INHALATION) at 21:30

## 2018-01-01 RX ADMIN — CIPROFLOXACIN AND DEXAMETHASONE 4 DROP: 3; 1 SUSPENSION/ DROPS AURICULAR (OTIC) at 08:38

## 2018-01-01 RX ADMIN — Medication 10 MEQ: at 13:43

## 2018-01-01 RX ADMIN — LEVALBUTEROL HYDROCHLORIDE 0.63 MG: 0.63 SOLUTION RESPIRATORY (INHALATION) at 21:04

## 2018-01-01 RX ADMIN — BUDESONIDE 0.25 MG: 0.25 INHALANT RESPIRATORY (INHALATION) at 08:24

## 2018-01-01 RX ADMIN — Medication 10 MEQ: at 09:37

## 2018-01-01 RX ADMIN — BUDESONIDE 0.25 MG: 0.25 INHALANT RESPIRATORY (INHALATION) at 20:49

## 2018-01-01 RX ADMIN — PROPOFOL 30 MG: 10 INJECTION, EMULSION INTRAVENOUS at 16:13

## 2018-01-01 RX ADMIN — NYSTATIN 500000 UNITS: 100000 SUSPENSION ORAL at 15:17

## 2018-01-01 RX ADMIN — OYSTER SHELL CALCIUM WITH VITAMIN D 1 TABLET: 500; 200 TABLET, FILM COATED ORAL at 20:46

## 2018-01-01 RX ADMIN — IPRATROPIUM BROMIDE 0.5 MG: 0.5 SOLUTION RESPIRATORY (INHALATION) at 12:03

## 2018-01-01 RX ADMIN — Medication 2.5 MG: at 08:26

## 2018-01-01 RX ADMIN — VANCOMYCIN HYDROCHLORIDE 1000 MG: 1 INJECTION, SOLUTION INTRAVENOUS at 11:14

## 2018-01-01 RX ADMIN — MULTIVITAMIN 15 ML: LIQUID ORAL at 08:45

## 2018-01-01 RX ADMIN — Medication 10 MG: at 08:12

## 2018-01-01 RX ADMIN — LEVALBUTEROL HYDROCHLORIDE 0.63 MG: 0.63 SOLUTION RESPIRATORY (INHALATION) at 20:40

## 2018-01-01 RX ADMIN — ARFORMOTEROL TARTRATE 15 MCG: 15 SOLUTION RESPIRATORY (INHALATION) at 07:36

## 2018-01-01 RX ADMIN — MOXIFLOXACIN HYDROCHLORIDE 400 MG: 400 INJECTION, SOLUTION INTRAVENOUS at 13:25

## 2018-01-01 RX ADMIN — POTASSIUM CHLORIDE 20 MEQ: 1.5 POWDER, FOR SOLUTION ORAL at 12:08

## 2018-01-01 RX ADMIN — SODIUM CHLORIDE: 9 INJECTION, SOLUTION INTRAVENOUS at 13:36

## 2018-01-01 RX ADMIN — ACETAMINOPHEN 650 MG: 325 TABLET, FILM COATED ORAL at 20:14

## 2018-01-01 RX ADMIN — ARFORMOTEROL TARTRATE 15 MCG: 15 SOLUTION RESPIRATORY (INHALATION) at 19:36

## 2018-01-01 RX ADMIN — AMLODIPINE BESYLATE 5 MG: 2.5 TABLET ORAL at 09:06

## 2018-01-01 RX ADMIN — LOSARTAN POTASSIUM 50 MG: 50 TABLET ORAL at 09:06

## 2018-01-01 RX ADMIN — IPRATROPIUM BROMIDE 0.5 MG: 0.5 SOLUTION RESPIRATORY (INHALATION) at 08:15

## 2018-01-01 RX ADMIN — GLYCOPYRROLATE 1 MG: 1 TABLET ORAL at 14:34

## 2018-01-01 RX ADMIN — HYDRALAZINE HYDROCHLORIDE 5 MG: 20 INJECTION INTRAMUSCULAR; INTRAVENOUS at 15:57

## 2018-01-01 RX ADMIN — Medication 1 CAPSULE: at 16:22

## 2018-01-01 RX ADMIN — Medication 2.5 MG: at 07:54

## 2018-01-01 RX ADMIN — LEVALBUTEROL HYDROCHLORIDE 0.63 MG: 0.63 SOLUTION RESPIRATORY (INHALATION) at 16:04

## 2018-01-01 RX ADMIN — BUDESONIDE 0.25 MG: 0.25 INHALANT RESPIRATORY (INHALATION) at 20:40

## 2018-01-01 RX ADMIN — GLYCOPYRROLATE 1 MG: 1 TABLET ORAL at 10:02

## 2018-01-01 RX ADMIN — LEVALBUTEROL HYDROCHLORIDE 0.63 MG: 0.63 SOLUTION RESPIRATORY (INHALATION) at 11:27

## 2018-01-01 RX ADMIN — BUDESONIDE 0.25 MG: 0.25 INHALANT RESPIRATORY (INHALATION) at 07:14

## 2018-01-01 RX ADMIN — ENOXAPARIN SODIUM 30 MG: 30 INJECTION SUBCUTANEOUS at 16:26

## 2018-01-01 RX ADMIN — Medication 10 MEQ: at 05:59

## 2018-01-01 RX ADMIN — SENNOSIDES 5 ML: 8.8 SYRUP ORAL at 20:22

## 2018-01-01 RX ADMIN — IPRATROPIUM BROMIDE 0.5 MG: 0.5 SOLUTION RESPIRATORY (INHALATION) at 08:02

## 2018-01-01 RX ADMIN — NYSTATIN 500000 UNITS: 100000 SUSPENSION ORAL at 19:50

## 2018-01-01 RX ADMIN — ARFORMOTEROL TARTRATE 15 MCG: 15 SOLUTION RESPIRATORY (INHALATION) at 07:59

## 2018-01-01 RX ADMIN — GLYCOPYRROLATE 1 MG: 1 TABLET ORAL at 07:55

## 2018-01-01 RX ADMIN — FOLIC ACID 1 MG: 5 INJECTION, SOLUTION INTRAMUSCULAR; INTRAVENOUS; SUBCUTANEOUS at 08:44

## 2018-01-01 RX ADMIN — LEVOFLOXACIN 500 MG: 250 TABLET, FILM COATED ORAL at 08:38

## 2018-01-01 RX ADMIN — NYSTATIN 500000 UNITS: 100000 SUSPENSION ORAL at 11:38

## 2018-01-01 RX ADMIN — CIPROFLOXACIN AND DEXAMETHASONE 4 DROP: 3; 1 SUSPENSION/ DROPS AURICULAR (OTIC) at 20:41

## 2018-01-01 RX ADMIN — Medication 1 CAPSULE: at 18:19

## 2018-01-01 RX ADMIN — NYSTATIN 500000 UNITS: 100000 SUSPENSION ORAL at 20:37

## 2018-01-01 RX ADMIN — MINERAL SUPPLEMENT IRON 300 MG / 5 ML STRENGTH LIQUID 100 PER BOX UNFLAVORED 300 MG: at 07:39

## 2018-01-01 RX ADMIN — Medication 0.2 MG: at 14:34

## 2018-01-01 RX ADMIN — GLYCOPYRROLATE 1 MG: 1 TABLET ORAL at 08:25

## 2018-01-01 RX ADMIN — LEVALBUTEROL HYDROCHLORIDE 0.63 MG: 0.63 SOLUTION RESPIRATORY (INHALATION) at 20:01

## 2018-01-01 RX ADMIN — CIPROFLOXACIN AND DEXAMETHASONE 4 DROP: 3; 1 SUSPENSION/ DROPS AURICULAR (OTIC) at 09:50

## 2018-01-01 RX ADMIN — MEROPENEM 1 G: 1 INJECTION, POWDER, FOR SOLUTION INTRAVENOUS at 23:52

## 2018-01-01 RX ADMIN — Medication 0.2 MG: at 17:00

## 2018-01-01 RX ADMIN — SENNOSIDES 5 ML: 8.8 SYRUP ORAL at 07:56

## 2018-01-01 RX ADMIN — ROCURONIUM BROMIDE 10 MG: 10 INJECTION INTRAVENOUS at 16:13

## 2018-01-01 RX ADMIN — GADOBUTROL 6 ML: 604.72 INJECTION INTRAVENOUS at 14:22

## 2018-01-01 RX ADMIN — MULTIVITAMIN 15 ML: LIQUID ORAL at 08:23

## 2018-01-01 RX ADMIN — IPRATROPIUM BROMIDE 0.5 MG: 0.5 SOLUTION RESPIRATORY (INHALATION) at 15:46

## 2018-01-01 RX ADMIN — BUDESONIDE 0.25 MG: 0.25 INHALANT RESPIRATORY (INHALATION) at 20:39

## 2018-01-01 RX ADMIN — ENOXAPARIN SODIUM 40 MG: 100 INJECTION SUBCUTANEOUS at 06:20

## 2018-01-01 RX ADMIN — ASPIRIN 81 MG CHEWABLE TABLET 81 MG: 81 TABLET CHEWABLE at 07:47

## 2018-01-01 RX ADMIN — LEVALBUTEROL HYDROCHLORIDE 0.63 MG: 0.63 SOLUTION RESPIRATORY (INHALATION) at 19:34

## 2018-01-01 RX ADMIN — ISODIUM CHLORIDE 3 ML: 0.03 SOLUTION RESPIRATORY (INHALATION) at 08:21

## 2018-01-01 RX ADMIN — BUDESONIDE 0.25 MG: 0.25 INHALANT RESPIRATORY (INHALATION) at 07:13

## 2018-01-01 RX ADMIN — Medication 1 CAPSULE: at 14:01

## 2018-01-01 RX ADMIN — NYSTATIN 500000 UNITS: 100000 SUSPENSION ORAL at 18:00

## 2018-01-01 RX ADMIN — NYSTATIN 500000 UNITS: 100000 SUSPENSION ORAL at 21:15

## 2018-01-01 RX ADMIN — ATORVASTATIN CALCIUM 40 MG: 40 TABLET, FILM COATED ORAL at 08:44

## 2018-01-01 RX ADMIN — AMLODIPINE BESYLATE 5 MG: 5 TABLET ORAL at 09:08

## 2018-01-01 RX ADMIN — IPRATROPIUM BROMIDE 0.5 MG: 0.5 SOLUTION RESPIRATORY (INHALATION) at 08:40

## 2018-01-01 RX ADMIN — CIPROFLOXACIN AND DEXAMETHASONE 4 DROP: 3; 1 SUSPENSION/ DROPS AURICULAR (OTIC) at 07:56

## 2018-01-01 RX ADMIN — LABETALOL HYDROCHLORIDE 15 MG: 5 INJECTION INTRAVENOUS at 15:15

## 2018-01-01 RX ADMIN — GLYCOPYRROLATE 1 MG: 1 TABLET ORAL at 20:36

## 2018-01-01 RX ADMIN — NYSTATIN 500000 UNITS: 100000 SUSPENSION ORAL at 11:40

## 2018-01-01 RX ADMIN — BUDESONIDE 0.25 MG: 0.25 INHALANT RESPIRATORY (INHALATION) at 07:24

## 2018-01-01 RX ADMIN — CALCIUM GLUCONATE 2 G: 98 INJECTION, SOLUTION INTRAVENOUS at 09:25

## 2018-01-01 RX ADMIN — HYDRALAZINE HYDROCHLORIDE 10 MG: 20 INJECTION INTRAMUSCULAR; INTRAVENOUS at 11:15

## 2018-01-01 RX ADMIN — ARFORMOTEROL TARTRATE 15 MCG: 15 SOLUTION RESPIRATORY (INHALATION) at 08:07

## 2018-01-01 RX ADMIN — CIPROFLOXACIN AND DEXAMETHASONE 4 DROP: 3; 1 SUSPENSION/ DROPS AURICULAR (OTIC) at 20:02

## 2018-01-01 RX ADMIN — OYSTER SHELL CALCIUM WITH VITAMIN D 1 TABLET: 500; 200 TABLET, FILM COATED ORAL at 08:10

## 2018-01-01 RX ADMIN — IPRATROPIUM BROMIDE 0.5 MG: 0.5 SOLUTION RESPIRATORY (INHALATION) at 09:02

## 2018-01-01 RX ADMIN — IOPAMIDOL 100 ML: 755 INJECTION, SOLUTION INTRAVENOUS at 17:53

## 2018-01-01 RX ADMIN — FENTANYL CITRATE 50 MCG: 50 INJECTION, SOLUTION INTRAMUSCULAR; INTRAVENOUS at 15:31

## 2018-01-01 RX ADMIN — HYDRALAZINE HYDROCHLORIDE 10 MG: 20 INJECTION INTRAMUSCULAR; INTRAVENOUS at 04:22

## 2018-01-01 RX ADMIN — IPRATROPIUM BROMIDE 0.5 MG: 0.5 SOLUTION RESPIRATORY (INHALATION) at 07:15

## 2018-01-01 RX ADMIN — OXYMETAZOLINE HYDROCHLORIDE 2 SPRAY: 5 SPRAY NASAL at 20:35

## 2018-01-01 RX ADMIN — FOLIC ACID 1 MG: 5 INJECTION, SOLUTION INTRAMUSCULAR; INTRAVENOUS; SUBCUTANEOUS at 07:49

## 2018-01-01 RX ADMIN — IPRATROPIUM BROMIDE 0.5 MG: 0.5 SOLUTION RESPIRATORY (INHALATION) at 13:01

## 2018-01-01 RX ADMIN — HYDRALAZINE HYDROCHLORIDE 10 MG: 20 INJECTION INTRAMUSCULAR; INTRAVENOUS at 16:30

## 2018-01-01 RX ADMIN — HUMAN ALBUMIN MICROSPHERES AND PERFLUTREN 4 ML: 10; .22 INJECTION, SOLUTION INTRAVENOUS at 14:15

## 2018-01-01 RX ADMIN — POTASSIUM CHLORIDE, DEXTROSE MONOHYDRATE AND SODIUM CHLORIDE: 150; 5; 450 INJECTION, SOLUTION INTRAVENOUS at 04:18

## 2018-01-01 RX ADMIN — LEVALBUTEROL HYDROCHLORIDE 0.63 MG: 0.63 SOLUTION RESPIRATORY (INHALATION) at 21:53

## 2018-01-01 RX ADMIN — AMLODIPINE BESYLATE 5 MG: 2.5 TABLET ORAL at 08:22

## 2018-01-01 RX ADMIN — IPRATROPIUM BROMIDE 0.5 MG: 0.5 SOLUTION RESPIRATORY (INHALATION) at 07:56

## 2018-01-01 RX ADMIN — BUDESONIDE 0.25 MG: 0.25 INHALANT RESPIRATORY (INHALATION) at 08:49

## 2018-01-01 RX ADMIN — IPRATROPIUM BROMIDE 0.5 MG: 0.5 SOLUTION RESPIRATORY (INHALATION) at 15:44

## 2018-01-01 RX ADMIN — Medication 2.5 MG: at 11:04

## 2018-01-01 RX ADMIN — Medication 1 CAPSULE: at 08:40

## 2018-01-01 RX ADMIN — IPRATROPIUM BROMIDE 0.5 MG: 0.5 SOLUTION RESPIRATORY (INHALATION) at 09:00

## 2018-01-01 RX ADMIN — IPRATROPIUM BROMIDE 0.5 MG: 0.5 SOLUTION RESPIRATORY (INHALATION) at 11:53

## 2018-01-01 RX ADMIN — ARFORMOTEROL TARTRATE 15 MCG: 15 SOLUTION RESPIRATORY (INHALATION) at 20:14

## 2018-01-01 RX ADMIN — IPRATROPIUM BROMIDE 0.5 MG: 0.5 SOLUTION RESPIRATORY (INHALATION) at 20:03

## 2018-01-01 RX ADMIN — IPRATROPIUM BROMIDE 0.5 MG: 0.5 SOLUTION RESPIRATORY (INHALATION) at 16:04

## 2018-01-01 RX ADMIN — AMLODIPINE BESYLATE 5 MG: 2.5 TABLET ORAL at 07:56

## 2018-01-01 RX ADMIN — ACETAMINOPHEN 650 MG: 325 SOLUTION ORAL at 08:18

## 2018-01-01 RX ADMIN — IOPAMIDOL 53 ML: 755 INJECTION, SOLUTION INTRAVENOUS at 10:05

## 2018-01-01 RX ADMIN — IPRATROPIUM BROMIDE 0.5 MG: 0.5 SOLUTION RESPIRATORY (INHALATION) at 20:13

## 2018-01-01 RX ADMIN — CALCIUM CHLORIDE 200 MG: 100 INJECTION, SOLUTION INTRAVENOUS at 16:34

## 2018-01-01 RX ADMIN — LEVALBUTEROL HYDROCHLORIDE 0.63 MG: 0.63 SOLUTION RESPIRATORY (INHALATION) at 12:34

## 2018-01-01 RX ADMIN — ACETAMINOPHEN 650 MG: 325 TABLET, FILM COATED ORAL at 19:45

## 2018-01-01 RX ADMIN — METRONIDAZOLE 500 MG: 500 INJECTION, SOLUTION INTRAVENOUS at 00:10

## 2018-01-01 RX ADMIN — LOSARTAN POTASSIUM 50 MG: 50 TABLET ORAL at 08:18

## 2018-01-01 RX ADMIN — OYSTER SHELL CALCIUM WITH VITAMIN D 1 TABLET: 500; 200 TABLET, FILM COATED ORAL at 18:30

## 2018-01-01 RX ADMIN — DOCUSATE SODIUM 50 MG: 50 LIQUID ORAL at 07:55

## 2018-01-01 RX ADMIN — CLOPIDOGREL 75 MG: 75 TABLET, FILM COATED ORAL at 10:39

## 2018-01-01 RX ADMIN — Medication 1 CAPSULE: at 13:30

## 2018-01-01 RX ADMIN — IPRATROPIUM BROMIDE 0.5 MG: 0.5 SOLUTION RESPIRATORY (INHALATION) at 21:04

## 2018-01-01 RX ADMIN — Medication 1 CAPSULE: at 15:02

## 2018-01-01 RX ADMIN — Medication 1 CAPSULE: at 07:26

## 2018-01-01 RX ADMIN — ACETAMINOPHEN 650 MG: 325 TABLET, FILM COATED ORAL at 13:20

## 2018-01-01 RX ADMIN — Medication 1 MG: at 08:43

## 2018-01-01 RX ADMIN — MEROPENEM 1 G: 1 INJECTION, POWDER, FOR SOLUTION INTRAVENOUS at 12:37

## 2018-01-01 RX ADMIN — LEVALBUTEROL HYDROCHLORIDE 0.63 MG: 0.63 SOLUTION RESPIRATORY (INHALATION) at 12:10

## 2018-01-01 RX ADMIN — DOCUSATE SODIUM 50 MG: 50 LIQUID ORAL at 08:43

## 2018-01-01 RX ADMIN — LOSARTAN POTASSIUM 50 MG: 50 TABLET ORAL at 09:56

## 2018-01-01 RX ADMIN — NYSTATIN 500000 UNITS: 100000 SUSPENSION ORAL at 08:26

## 2018-01-01 RX ADMIN — IPRATROPIUM BROMIDE 0.5 MG: 0.5 SOLUTION RESPIRATORY (INHALATION) at 20:17

## 2018-01-01 RX ADMIN — ARFORMOTEROL TARTRATE 15 MCG: 15 SOLUTION RESPIRATORY (INHALATION) at 09:16

## 2018-01-01 RX ADMIN — AMLODIPINE BESYLATE 5 MG: 2.5 TABLET ORAL at 10:01

## 2018-01-01 RX ADMIN — AMLODIPINE BESYLATE 5 MG: 2.5 TABLET ORAL at 08:52

## 2018-01-01 RX ADMIN — GLYCOPYRROLATE 1 MG: 1 TABLET ORAL at 13:31

## 2018-01-01 RX ADMIN — BUDESONIDE 0.25 MG: 0.25 INHALANT RESPIRATORY (INHALATION) at 20:18

## 2018-01-01 RX ADMIN — IPRATROPIUM BROMIDE 0.5 MG: 0.5 SOLUTION RESPIRATORY (INHALATION) at 08:19

## 2018-01-01 RX ADMIN — LIDOCAINE 1 PATCH: 560 PATCH PERCUTANEOUS; TOPICAL; TRANSDERMAL at 07:58

## 2018-01-01 RX ADMIN — OYSTER SHELL CALCIUM WITH VITAMIN D 1 TABLET: 500; 200 TABLET, FILM COATED ORAL at 17:49

## 2018-01-01 RX ADMIN — Medication 1 CAPSULE: at 17:04

## 2018-01-01 RX ADMIN — CLOPIDOGREL 75 MG: 75 TABLET, FILM COATED ORAL at 08:25

## 2018-01-01 RX ADMIN — Medication 10 MEQ: at 20:50

## 2018-01-01 RX ADMIN — MINERAL SUPPLEMENT IRON 300 MG / 5 ML STRENGTH LIQUID 100 PER BOX UNFLAVORED 300 MG: at 07:26

## 2018-01-01 RX ADMIN — LEVALBUTEROL HYDROCHLORIDE 0.63 MG: 0.63 SOLUTION RESPIRATORY (INHALATION) at 15:21

## 2018-01-01 RX ADMIN — BUDESONIDE 0.25 MG: 0.25 INHALANT RESPIRATORY (INHALATION) at 20:13

## 2018-01-01 RX ADMIN — Medication 10 MEQ: at 08:46

## 2018-01-01 RX ADMIN — HYDRALAZINE HYDROCHLORIDE 5 MG: 20 INJECTION INTRAMUSCULAR; INTRAVENOUS at 15:24

## 2018-01-01 RX ADMIN — GLYCOPYRROLATE 1 MG: 1 TABLET ORAL at 08:00

## 2018-01-01 RX ADMIN — Medication 2.5 MG: at 09:50

## 2018-01-01 RX ADMIN — MINERAL SUPPLEMENT IRON 300 MG / 5 ML STRENGTH LIQUID 100 PER BOX UNFLAVORED 300 MG: at 16:38

## 2018-01-01 RX ADMIN — ARFORMOTEROL TARTRATE 15 MCG: 15 SOLUTION RESPIRATORY (INHALATION) at 20:03

## 2018-01-01 RX ADMIN — DOCUSATE SODIUM 50 MG: 50 LIQUID ORAL at 20:14

## 2018-01-01 RX ADMIN — BUDESONIDE 0.25 MG: 0.25 INHALANT RESPIRATORY (INHALATION) at 08:01

## 2018-01-01 RX ADMIN — ENOXAPARIN SODIUM 30 MG: 30 INJECTION SUBCUTANEOUS at 18:34

## 2018-01-01 RX ADMIN — LABETALOL HYDROCHLORIDE 5 MG: 5 INJECTION INTRAVENOUS at 15:07

## 2018-01-01 RX ADMIN — ROCURONIUM BROMIDE 30 MG: 10 INJECTION INTRAVENOUS at 15:11

## 2018-01-01 RX ADMIN — GLYCOPYRROLATE 1 MG: 1 TABLET ORAL at 07:25

## 2018-01-01 RX ADMIN — Medication 10 MG: at 08:45

## 2018-01-01 RX ADMIN — OYSTER SHELL CALCIUM WITH VITAMIN D 1 TABLET: 500; 200 TABLET, FILM COATED ORAL at 08:23

## 2018-01-01 RX ADMIN — ENOXAPARIN SODIUM 30 MG: 30 INJECTION SUBCUTANEOUS at 15:17

## 2018-01-01 RX ADMIN — NYSTATIN 500000 UNITS: 100000 SUSPENSION ORAL at 20:46

## 2018-01-01 RX ADMIN — ARFORMOTEROL TARTRATE 15 MCG: 15 SOLUTION RESPIRATORY (INHALATION) at 09:00

## 2018-01-01 RX ADMIN — Medication 1 CAPSULE: at 18:30

## 2018-01-01 RX ADMIN — LOSARTAN POTASSIUM 50 MG: 50 TABLET ORAL at 08:53

## 2018-01-01 RX ADMIN — BUDESONIDE 0.25 MG: 0.25 INHALANT RESPIRATORY (INHALATION) at 09:13

## 2018-01-01 RX ADMIN — IPRATROPIUM BROMIDE 0.5 MG: 0.5 SOLUTION RESPIRATORY (INHALATION) at 20:02

## 2018-01-01 RX ADMIN — LEVALBUTEROL HYDROCHLORIDE 0.63 MG: 0.63 SOLUTION RESPIRATORY (INHALATION) at 12:20

## 2018-01-01 RX ADMIN — BUDESONIDE 0.25 MG: 0.25 INHALANT RESPIRATORY (INHALATION) at 07:54

## 2018-01-01 RX ADMIN — OYSTER SHELL CALCIUM WITH VITAMIN D 1 TABLET: 500; 200 TABLET, FILM COATED ORAL at 08:44

## 2018-01-01 RX ADMIN — BUDESONIDE 0.25 MG: 0.25 INHALANT RESPIRATORY (INHALATION) at 07:34

## 2018-01-01 RX ADMIN — Medication 2.5 MG: at 08:44

## 2018-01-01 RX ADMIN — METRONIDAZOLE 500 MG: 500 INJECTION, SOLUTION INTRAVENOUS at 06:01

## 2018-01-01 RX ADMIN — BUDESONIDE 0.25 MG: 0.25 INHALANT RESPIRATORY (INHALATION) at 07:44

## 2018-01-01 RX ADMIN — CIPROFLOXACIN AND DEXAMETHASONE 4 DROP: 3; 1 SUSPENSION/ DROPS AURICULAR (OTIC) at 19:57

## 2018-01-01 RX ADMIN — IPRATROPIUM BROMIDE 0.5 MG: 0.5 SOLUTION RESPIRATORY (INHALATION) at 12:25

## 2018-01-01 RX ADMIN — BUDESONIDE 0.25 MG: 0.25 INHALANT RESPIRATORY (INHALATION) at 08:38

## 2018-01-01 RX ADMIN — ARFORMOTEROL TARTRATE 15 MCG: 15 SOLUTION RESPIRATORY (INHALATION) at 21:04

## 2018-01-01 RX ADMIN — DOCUSATE SODIUM 50 MG: 50 LIQUID ORAL at 19:59

## 2018-01-01 RX ADMIN — OYSTER SHELL CALCIUM WITH VITAMIN D 1 TABLET: 500; 200 TABLET, FILM COATED ORAL at 21:15

## 2018-01-01 RX ADMIN — IPRATROPIUM BROMIDE 0.5 MG: 0.5 SOLUTION RESPIRATORY (INHALATION) at 23:38

## 2018-01-01 RX ADMIN — LEVALBUTEROL HYDROCHLORIDE 0.63 MG: 0.63 SOLUTION RESPIRATORY (INHALATION) at 12:53

## 2018-01-01 RX ADMIN — BUDESONIDE 0.25 MG: 0.25 INHALANT RESPIRATORY (INHALATION) at 09:28

## 2018-01-01 RX ADMIN — ATORVASTATIN CALCIUM 40 MG: 40 TABLET, FILM COATED ORAL at 08:22

## 2018-01-01 RX ADMIN — LOSARTAN POTASSIUM 50 MG: 50 TABLET ORAL at 07:47

## 2018-01-01 RX ADMIN — MULTIVITAMIN 15 ML: LIQUID ORAL at 08:36

## 2018-01-01 RX ADMIN — AMLODIPINE BESYLATE 5 MG: 2.5 TABLET ORAL at 07:39

## 2018-01-01 RX ADMIN — Medication 1 CAPSULE: at 17:32

## 2018-01-01 RX ADMIN — Medication 1 CAPSULE: at 11:53

## 2018-01-01 RX ADMIN — IPRATROPIUM BROMIDE 0.5 MG: 0.5 SOLUTION RESPIRATORY (INHALATION) at 15:11

## 2018-01-01 RX ADMIN — NYSTATIN 500000 UNITS: 100000 SUSPENSION ORAL at 09:38

## 2018-01-01 RX ADMIN — ARFORMOTEROL TARTRATE 15 MCG: 15 SOLUTION RESPIRATORY (INHALATION) at 20:41

## 2018-01-01 RX ADMIN — GLYCOPYRROLATE 1 MG: 1 TABLET ORAL at 08:02

## 2018-01-01 RX ADMIN — IPRATROPIUM BROMIDE 0.5 MG: 0.5 SOLUTION RESPIRATORY (INHALATION) at 09:24

## 2018-01-01 RX ADMIN — LIDOCAINE 1 PATCH: 560 PATCH PERCUTANEOUS; TOPICAL; TRANSDERMAL at 20:35

## 2018-01-01 RX ADMIN — Medication 3 MG: at 21:52

## 2018-01-01 RX ADMIN — MULTIVITAMIN 15 ML: LIQUID ORAL at 08:50

## 2018-01-01 RX ADMIN — Medication 2.5 MG: at 21:44

## 2018-01-01 RX ADMIN — LEVALBUTEROL HYDROCHLORIDE 0.63 MG: 0.63 SOLUTION RESPIRATORY (INHALATION) at 19:39

## 2018-01-01 RX ADMIN — Medication 2.5 MG: at 10:07

## 2018-01-01 RX ADMIN — IPRATROPIUM BROMIDE 0.5 MG: 0.5 SOLUTION RESPIRATORY (INHALATION) at 15:42

## 2018-01-01 RX ADMIN — LEVALBUTEROL HYDROCHLORIDE 0.63 MG: 0.63 SOLUTION RESPIRATORY (INHALATION) at 07:33

## 2018-01-01 RX ADMIN — SENNOSIDES 5 ML: 8.8 SYRUP ORAL at 09:08

## 2018-01-01 RX ADMIN — ASPIRIN 81 MG CHEWABLE TABLET 81 MG: 81 TABLET CHEWABLE at 08:18

## 2018-01-01 RX ADMIN — OYSTER SHELL CALCIUM WITH VITAMIN D 1 TABLET: 500; 200 TABLET, FILM COATED ORAL at 07:52

## 2018-01-01 RX ADMIN — ARFORMOTEROL TARTRATE 15 MCG: 15 SOLUTION RESPIRATORY (INHALATION) at 07:44

## 2018-01-01 RX ADMIN — ALBUMIN HUMAN: 0.05 INJECTION, SOLUTION INTRAVENOUS at 15:13

## 2018-01-01 RX ADMIN — IPRATROPIUM BROMIDE 0.5 MG: 0.5 SOLUTION RESPIRATORY (INHALATION) at 15:22

## 2018-01-01 RX ADMIN — GLYCOPYRROLATE 1 MG: 1 TABLET ORAL at 19:54

## 2018-01-01 RX ADMIN — GLYCOPYRROLATE 1 MG: 1 TABLET ORAL at 19:38

## 2018-01-01 RX ADMIN — IPRATROPIUM BROMIDE 0.5 MG: 0.5 SOLUTION RESPIRATORY (INHALATION) at 08:38

## 2018-01-01 RX ADMIN — ARFORMOTEROL TARTRATE 15 MCG: 15 SOLUTION RESPIRATORY (INHALATION) at 20:42

## 2018-01-01 RX ADMIN — LEVALBUTEROL HYDROCHLORIDE 0.63 MG: 0.63 SOLUTION RESPIRATORY (INHALATION) at 19:49

## 2018-01-01 RX ADMIN — LOSARTAN POTASSIUM 50 MG: 50 TABLET ORAL at 08:17

## 2018-01-01 RX ADMIN — NYSTATIN 500000 UNITS: 100000 SUSPENSION ORAL at 08:39

## 2018-01-01 RX ADMIN — NYSTATIN 500000 UNITS: 100000 SUSPENSION ORAL at 16:26

## 2018-01-01 RX ADMIN — MULTIVITAMIN 15 ML: LIQUID ORAL at 10:01

## 2018-01-01 RX ADMIN — Medication 2.5 MG: at 21:05

## 2018-01-01 RX ADMIN — ASPIRIN 81 MG CHEWABLE TABLET 81 MG: 81 TABLET CHEWABLE at 07:38

## 2018-01-01 RX ADMIN — SENNOSIDES 5 ML: 8.8 SYRUP ORAL at 08:39

## 2018-01-01 RX ADMIN — SODIUM CHLORIDE, PRESERVATIVE FREE 5 ML: 5 INJECTION INTRAVENOUS at 16:57

## 2018-01-01 RX ADMIN — LEVALBUTEROL HYDROCHLORIDE 0.63 MG: 0.63 SOLUTION RESPIRATORY (INHALATION) at 23:36

## 2018-01-01 RX ADMIN — OXYCODONE HYDROCHLORIDE 5 MG: 5 TABLET ORAL at 02:36

## 2018-01-01 RX ADMIN — IPRATROPIUM BROMIDE 0.5 MG: 0.5 SOLUTION RESPIRATORY (INHALATION) at 21:34

## 2018-01-01 RX ADMIN — ONDANSETRON 4 MG: 2 INJECTION INTRAMUSCULAR; INTRAVENOUS at 16:18

## 2018-01-01 RX ADMIN — Medication 2.5 MG: at 09:01

## 2018-01-01 RX ADMIN — ARFORMOTEROL TARTRATE 15 MCG: 15 SOLUTION RESPIRATORY (INHALATION) at 08:24

## 2018-01-01 RX ADMIN — PHENYLEPHRINE HYDROCHLORIDE 100 MCG: 10 INJECTION, SOLUTION INTRAMUSCULAR; INTRAVENOUS; SUBCUTANEOUS at 14:22

## 2018-01-01 RX ADMIN — ARFORMOTEROL TARTRATE 15 MCG: 15 SOLUTION RESPIRATORY (INHALATION) at 19:54

## 2018-01-01 RX ADMIN — ATORVASTATIN CALCIUM 40 MG: 40 TABLET, FILM COATED ORAL at 08:50

## 2018-01-01 RX ADMIN — SENNOSIDES 5 ML: 8.8 SYRUP ORAL at 08:45

## 2018-01-01 RX ADMIN — BUDESONIDE 0.25 MG: 0.25 INHALANT RESPIRATORY (INHALATION) at 09:03

## 2018-01-01 RX ADMIN — Medication 20 MG: at 06:43

## 2018-01-01 RX ADMIN — LEVALBUTEROL HYDROCHLORIDE 0.63 MG: 0.63 SOLUTION RESPIRATORY (INHALATION) at 16:07

## 2018-01-01 RX ADMIN — OYSTER SHELL CALCIUM WITH VITAMIN D 1 TABLET: 500; 200 TABLET, FILM COATED ORAL at 08:19

## 2018-01-01 RX ADMIN — Medication 2.5 MG: at 08:50

## 2018-01-01 RX ADMIN — IPRATROPIUM BROMIDE 0.5 MG: 0.5 SOLUTION RESPIRATORY (INHALATION) at 08:45

## 2018-01-01 RX ADMIN — IPRATROPIUM BROMIDE 0.5 MG: 0.5 SOLUTION RESPIRATORY (INHALATION) at 11:27

## 2018-01-01 RX ADMIN — SENNOSIDES 5 ML: 8.8 SYRUP ORAL at 13:08

## 2018-01-01 RX ADMIN — Medication 1 CAPSULE: at 07:53

## 2018-01-01 RX ADMIN — ASPIRIN 81 MG CHEWABLE TABLET 81 MG: 81 TABLET CHEWABLE at 08:17

## 2018-01-01 RX ADMIN — Medication 10 MG: at 23:17

## 2018-01-01 RX ADMIN — Medication 3 MG: at 21:43

## 2018-01-01 RX ADMIN — AMLODIPINE BESYLATE 5 MG: 2.5 TABLET ORAL at 08:23

## 2018-01-01 RX ADMIN — LEVALBUTEROL HYDROCHLORIDE 0.63 MG: 0.63 SOLUTION RESPIRATORY (INHALATION) at 20:00

## 2018-01-01 RX ADMIN — Medication 5 ML: at 07:55

## 2018-01-01 RX ADMIN — OXYCODONE HYDROCHLORIDE 5 MG: 5 TABLET ORAL at 00:52

## 2018-01-01 RX ADMIN — ACETAMINOPHEN 650 MG: 650 SUPPOSITORY RECTAL at 16:33

## 2018-01-01 RX ADMIN — POLYETHYLENE GLYCOL 3350 17 G: 17 POWDER, FOR SOLUTION ORAL at 07:55

## 2018-01-01 RX ADMIN — FUROSEMIDE 20 MG: 10 INJECTION, SOLUTION INTRAVENOUS at 20:15

## 2018-01-01 RX ADMIN — GLYCOPYRROLATE 1 MG: 1 TABLET ORAL at 15:11

## 2018-01-01 RX ADMIN — MULTIVITAMIN 15 ML: LIQUID ORAL at 09:44

## 2018-01-01 RX ADMIN — GLYCOPYRROLATE 1 MG: 1 TABLET ORAL at 20:12

## 2018-01-01 RX ADMIN — ENOXAPARIN SODIUM 40 MG: 100 INJECTION SUBCUTANEOUS at 06:02

## 2018-01-01 RX ADMIN — OYSTER SHELL CALCIUM WITH VITAMIN D 1 TABLET: 500; 200 TABLET, FILM COATED ORAL at 19:45

## 2018-01-01 RX ADMIN — LIDOCAINE 1 PATCH: 560 PATCH PERCUTANEOUS; TOPICAL; TRANSDERMAL at 08:20

## 2018-01-01 RX ADMIN — IPRATROPIUM BROMIDE 0.5 MG: 0.5 SOLUTION RESPIRATORY (INHALATION) at 23:31

## 2018-01-01 RX ADMIN — LEVALBUTEROL HYDROCHLORIDE 0.63 MG: 0.63 SOLUTION RESPIRATORY (INHALATION) at 19:31

## 2018-01-01 RX ADMIN — IPRATROPIUM BROMIDE 0.5 MG: 0.5 SOLUTION RESPIRATORY (INHALATION) at 19:48

## 2018-01-01 RX ADMIN — NYSTATIN 500000 UNITS: 100000 SUSPENSION ORAL at 08:02

## 2018-01-01 RX ADMIN — ATORVASTATIN CALCIUM 40 MG: 40 TABLET, FILM COATED ORAL at 07:51

## 2018-01-01 RX ADMIN — IPRATROPIUM BROMIDE 0.5 MG: 0.5 SOLUTION RESPIRATORY (INHALATION) at 12:28

## 2018-01-01 RX ADMIN — BUDESONIDE 0.25 MG: 0.25 INHALANT RESPIRATORY (INHALATION) at 07:30

## 2018-01-01 RX ADMIN — Medication 10 MG: at 20:11

## 2018-01-01 RX ADMIN — POTASSIUM CHLORIDE, DEXTROSE MONOHYDRATE AND SODIUM CHLORIDE: 150; 5; 450 INJECTION, SOLUTION INTRAVENOUS at 09:14

## 2018-01-01 RX ADMIN — Medication 1 MG: at 08:51

## 2018-01-01 RX ADMIN — ACETAMINOPHEN 650 MG: 650 SUPPOSITORY RECTAL at 21:01

## 2018-01-01 RX ADMIN — Medication 1 CAPSULE: at 18:43

## 2018-01-01 RX ADMIN — IPRATROPIUM BROMIDE 0.5 MG: 0.5 SOLUTION RESPIRATORY (INHALATION) at 00:02

## 2018-01-01 RX ADMIN — OYSTER SHELL CALCIUM WITH VITAMIN D 1 TABLET: 500; 200 TABLET, FILM COATED ORAL at 19:58

## 2018-01-01 RX ADMIN — GLYCOPYRROLATE 1 MG: 1 TABLET ORAL at 14:27

## 2018-01-01 RX ADMIN — NYSTATIN 500000 UNITS: 100000 SUSPENSION ORAL at 13:55

## 2018-01-01 RX ADMIN — LABETALOL HYDROCHLORIDE 5 MG: 5 INJECTION INTRAVENOUS at 15:12

## 2018-01-01 RX ADMIN — BUDESONIDE 0.25 MG: 0.25 INHALANT RESPIRATORY (INHALATION) at 19:49

## 2018-01-01 RX ADMIN — Medication 10 MEQ: at 10:07

## 2018-01-01 RX ADMIN — LEVALBUTEROL HYDROCHLORIDE 0.63 MG: 0.63 SOLUTION RESPIRATORY (INHALATION) at 12:56

## 2018-01-01 RX ADMIN — IPRATROPIUM BROMIDE 0.5 MG: 0.5 SOLUTION RESPIRATORY (INHALATION) at 07:40

## 2018-01-01 RX ADMIN — POLYETHYLENE GLYCOL 3350 17 G: 17 POWDER, FOR SOLUTION ORAL at 08:44

## 2018-01-01 RX ADMIN — POTASSIUM PHOSPHATE, MONOBASIC AND POTASSIUM PHOSPHATE, DIBASIC 15 MMOL: 224; 236 INJECTION, SOLUTION INTRAVENOUS at 01:04

## 2018-01-01 RX ADMIN — FENTANYL CITRATE 50 MCG: 50 INJECTION, SOLUTION INTRAMUSCULAR; INTRAVENOUS at 15:28

## 2018-01-01 RX ADMIN — ARFORMOTEROL TARTRATE 15 MCG: 15 SOLUTION RESPIRATORY (INHALATION) at 20:39

## 2018-01-01 RX ADMIN — ARFORMOTEROL TARTRATE 15 MCG: 15 SOLUTION RESPIRATORY (INHALATION) at 09:03

## 2018-01-01 RX ADMIN — NYSTATIN 500000 UNITS: 100000 SUSPENSION ORAL at 15:38

## 2018-01-01 RX ADMIN — MINERAL SUPPLEMENT IRON 300 MG / 5 ML STRENGTH LIQUID 100 PER BOX UNFLAVORED 300 MG: at 08:52

## 2018-01-01 RX ADMIN — DOCUSATE SODIUM 50 MG: 50 LIQUID ORAL at 20:15

## 2018-01-01 RX ADMIN — LOSARTAN POTASSIUM 50 MG: 50 TABLET ORAL at 08:37

## 2018-01-01 RX ADMIN — ENOXAPARIN SODIUM 30 MG: 30 INJECTION SUBCUTANEOUS at 18:00

## 2018-01-01 RX ADMIN — ACETAMINOPHEN 650 MG: 325 SOLUTION ORAL at 13:11

## 2018-01-01 RX ADMIN — LEVALBUTEROL HYDROCHLORIDE 0.63 MG: 0.63 SOLUTION RESPIRATORY (INHALATION) at 19:51

## 2018-01-01 RX ADMIN — CEFTRIAXONE 1 G: 1 INJECTION, POWDER, FOR SOLUTION INTRAMUSCULAR; INTRAVENOUS at 13:38

## 2018-01-01 RX ADMIN — LOSARTAN POTASSIUM 50 MG: 50 TABLET ORAL at 07:52

## 2018-01-01 RX ADMIN — DOCUSATE SODIUM 50 MG: 50 LIQUID ORAL at 08:18

## 2018-01-01 RX ADMIN — THIAMINE HYDROCHLORIDE 200 MG: 100 INJECTION, SOLUTION INTRAMUSCULAR; INTRAVENOUS at 08:22

## 2018-01-01 RX ADMIN — RACEPINEPHRINE HYDROCHLORIDE 0.5 ML: 11.25 SOLUTION RESPIRATORY (INHALATION) at 17:53

## 2018-01-01 RX ADMIN — ASPIRIN 81 MG CHEWABLE TABLET 81 MG: 81 TABLET CHEWABLE at 08:45

## 2018-01-01 RX ADMIN — LEVOFLOXACIN 500 MG: 250 TABLET, FILM COATED ORAL at 09:06

## 2018-01-01 RX ADMIN — Medication 1 CAPSULE: at 10:01

## 2018-01-01 RX ADMIN — NYSTATIN 500000 UNITS: 100000 SUSPENSION ORAL at 08:36

## 2018-01-01 RX ADMIN — GLYCOPYRROLATE 1 MG: 1 TABLET ORAL at 22:31

## 2018-01-01 RX ADMIN — Medication 10 MEQ: at 07:40

## 2018-01-01 RX ADMIN — LEVALBUTEROL HYDROCHLORIDE 0.63 MG: 0.63 SOLUTION RESPIRATORY (INHALATION) at 15:39

## 2018-01-01 RX ADMIN — FOLIC ACID 1 MG: 1 TABLET ORAL at 17:29

## 2018-01-01 RX ADMIN — ATORVASTATIN CALCIUM 40 MG: 40 TABLET, FILM COATED ORAL at 08:26

## 2018-01-01 RX ADMIN — GLYCOPYRROLATE 1 MG: 1 TABLET ORAL at 19:55

## 2018-01-01 RX ADMIN — POTASSIUM CHLORIDE 20 MEQ: 1.5 POWDER, FOR SOLUTION ORAL at 20:14

## 2018-01-01 RX ADMIN — ARFORMOTEROL TARTRATE 15 MCG: 15 SOLUTION RESPIRATORY (INHALATION) at 12:20

## 2018-01-01 RX ADMIN — ARFORMOTEROL TARTRATE 15 MCG: 15 SOLUTION RESPIRATORY (INHALATION) at 07:08

## 2018-01-01 RX ADMIN — LEVALBUTEROL HYDROCHLORIDE 0.63 MG: 0.63 SOLUTION RESPIRATORY (INHALATION) at 08:26

## 2018-01-01 RX ADMIN — ARFORMOTEROL TARTRATE 15 MCG: 15 SOLUTION RESPIRATORY (INHALATION) at 07:33

## 2018-01-01 RX ADMIN — ARFORMOTEROL TARTRATE 15 MCG: 15 SOLUTION RESPIRATORY (INHALATION) at 00:44

## 2018-01-01 RX ADMIN — LEVALBUTEROL HYDROCHLORIDE 0.63 MG: 0.63 SOLUTION RESPIRATORY (INHALATION) at 11:20

## 2018-01-01 RX ADMIN — OYSTER SHELL CALCIUM WITH VITAMIN D 1 TABLET: 500; 200 TABLET, FILM COATED ORAL at 08:39

## 2018-01-01 RX ADMIN — Medication 1 CAPSULE: at 11:04

## 2018-01-01 RX ADMIN — LIDOCAINE 1 PATCH: 560 PATCH PERCUTANEOUS; TOPICAL; TRANSDERMAL at 19:50

## 2018-01-01 RX ADMIN — LEVALBUTEROL HYDROCHLORIDE 0.63 MG: 0.63 SOLUTION RESPIRATORY (INHALATION) at 11:41

## 2018-01-01 RX ADMIN — NYSTATIN 500000 UNITS: 100000 SUSPENSION ORAL at 20:35

## 2018-01-01 RX ADMIN — BUDESONIDE 0.25 MG: 0.25 INHALANT RESPIRATORY (INHALATION) at 08:39

## 2018-01-01 RX ADMIN — MULTIVITAMIN 15 ML: LIQUID ORAL at 08:02

## 2018-01-01 RX ADMIN — ENOXAPARIN SODIUM 30 MG: 30 INJECTION SUBCUTANEOUS at 15:03

## 2018-01-01 RX ADMIN — GLYCOPYRROLATE 1 MG: 1 TABLET ORAL at 20:25

## 2018-01-01 RX ADMIN — Medication 10 MEQ: at 07:06

## 2018-01-01 RX ADMIN — Medication 2.5 MG: at 08:25

## 2018-01-01 RX ADMIN — METRONIDAZOLE 500 MG: 500 INJECTION, SOLUTION INTRAVENOUS at 18:58

## 2018-01-01 RX ADMIN — METRONIDAZOLE 500 MG: 500 INJECTION, SOLUTION INTRAVENOUS at 20:11

## 2018-01-01 RX ADMIN — NYSTATIN 500000 UNITS: 100000 SUSPENSION ORAL at 13:08

## 2018-01-01 RX ADMIN — ARFORMOTEROL TARTRATE 15 MCG: 15 SOLUTION RESPIRATORY (INHALATION) at 08:16

## 2018-01-01 RX ADMIN — SUGAMMADEX 120 MG: 100 INJECTION, SOLUTION INTRAVENOUS at 16:33

## 2018-01-01 RX ADMIN — GLYCOPYRROLATE 1 MG: 1 TABLET ORAL at 20:04

## 2018-01-01 RX ADMIN — NYSTATIN 500000 UNITS: 100000 SUSPENSION ORAL at 11:59

## 2018-01-01 RX ADMIN — NYSTATIN 500000 UNITS: 100000 SUSPENSION ORAL at 08:01

## 2018-01-01 RX ADMIN — MULTIVITAMIN 15 ML: LIQUID ORAL at 09:08

## 2018-01-01 RX ADMIN — LEVALBUTEROL HYDROCHLORIDE 0.63 MG: 0.63 SOLUTION RESPIRATORY (INHALATION) at 09:28

## 2018-01-01 RX ADMIN — ACETAMINOPHEN 650 MG: 325 TABLET, FILM COATED ORAL at 23:21

## 2018-01-01 RX ADMIN — DEXAMETHASONE SODIUM PHOSPHATE 10 MG: 4 INJECTION, SOLUTION INTRA-ARTICULAR; INTRALESIONAL; INTRAMUSCULAR; INTRAVENOUS; SOFT TISSUE at 13:23

## 2018-01-01 RX ADMIN — Medication 1 CAPSULE: at 13:39

## 2018-01-01 RX ADMIN — GLYCOPYRROLATE 1 MG: 1 TABLET ORAL at 20:47

## 2018-01-01 RX ADMIN — Medication 1 MG: at 08:52

## 2018-01-01 RX ADMIN — FENTANYL CITRATE 50 MCG: 50 INJECTION, SOLUTION INTRAMUSCULAR; INTRAVENOUS at 14:18

## 2018-01-01 RX ADMIN — Medication 2.5 MG: at 20:25

## 2018-01-01 RX ADMIN — SUGAMMADEX 100 MG: 100 INJECTION, SOLUTION INTRAVENOUS at 15:02

## 2018-01-01 RX ADMIN — LEVALBUTEROL HYDROCHLORIDE 0.63 MG: 0.63 SOLUTION RESPIRATORY (INHALATION) at 07:16

## 2018-01-01 RX ADMIN — NYSTATIN 500000 UNITS: 100000 SUSPENSION ORAL at 15:16

## 2018-01-01 RX ADMIN — ARFORMOTEROL TARTRATE 15 MCG: 15 SOLUTION RESPIRATORY (INHALATION) at 08:20

## 2018-01-01 RX ADMIN — NYSTATIN 500000 UNITS: 100000 SUSPENSION ORAL at 11:24

## 2018-01-01 RX ADMIN — LEVALBUTEROL HYDROCHLORIDE 0.63 MG: 0.63 SOLUTION RESPIRATORY (INHALATION) at 15:44

## 2018-01-01 RX ADMIN — NYSTATIN 500000 UNITS: 100000 SUSPENSION ORAL at 14:28

## 2018-01-01 RX ADMIN — LEVALBUTEROL HYDROCHLORIDE 0.63 MG: 0.63 SOLUTION RESPIRATORY (INHALATION) at 20:45

## 2018-01-01 RX ADMIN — NYSTATIN 500000 UNITS: 100000 SUSPENSION ORAL at 18:28

## 2018-01-01 RX ADMIN — LEVALBUTEROL HYDROCHLORIDE 0.63 MG: 0.63 SOLUTION RESPIRATORY (INHALATION) at 07:45

## 2018-01-01 RX ADMIN — LEVALBUTEROL HYDROCHLORIDE 0.63 MG: 0.63 SOLUTION RESPIRATORY (INHALATION) at 17:03

## 2018-01-01 RX ADMIN — Medication 10 MG: at 04:39

## 2018-01-01 RX ADMIN — METRONIDAZOLE 500 MG: 500 INJECTION, SOLUTION INTRAVENOUS at 21:37

## 2018-01-01 RX ADMIN — ENOXAPARIN SODIUM 30 MG: 30 INJECTION SUBCUTANEOUS at 15:30

## 2018-01-01 RX ADMIN — GLYCOPYRROLATE 1 MG: 1 TABLET ORAL at 21:01

## 2018-01-01 RX ADMIN — IPRATROPIUM BROMIDE 0.5 MG: 0.5 SOLUTION RESPIRATORY (INHALATION) at 19:50

## 2018-01-01 RX ADMIN — ACETAMINOPHEN 650 MG: 325 SOLUTION ORAL at 02:36

## 2018-01-01 RX ADMIN — ARFORMOTEROL TARTRATE 15 MCG: 15 SOLUTION RESPIRATORY (INHALATION) at 07:34

## 2018-01-01 RX ADMIN — CLOPIDOGREL 75 MG: 75 TABLET, FILM COATED ORAL at 08:23

## 2018-01-01 RX ADMIN — LEVALBUTEROL HYDROCHLORIDE 0.63 MG: 0.63 SOLUTION RESPIRATORY (INHALATION) at 08:45

## 2018-01-01 RX ADMIN — CEFTRIAXONE 1 G: 1 INJECTION, POWDER, FOR SOLUTION INTRAMUSCULAR; INTRAVENOUS at 13:00

## 2018-01-01 RX ADMIN — FUROSEMIDE 20 MG: 10 INJECTION, SOLUTION INTRAVENOUS at 06:13

## 2018-01-01 RX ADMIN — AMLODIPINE BESYLATE 5 MG: 2.5 TABLET ORAL at 09:44

## 2018-01-01 RX ADMIN — Medication 2.5 MG: at 11:20

## 2018-01-01 RX ADMIN — OYSTER SHELL CALCIUM WITH VITAMIN D 1 TABLET: 500; 200 TABLET, FILM COATED ORAL at 07:39

## 2018-01-01 RX ADMIN — MULTIVITAMIN 15 ML: LIQUID ORAL at 08:25

## 2018-01-01 RX ADMIN — GLYCOPYRROLATE 1 MG: 1 TABLET ORAL at 22:14

## 2018-01-01 RX ADMIN — Medication 1 MG: at 11:18

## 2018-01-01 RX ADMIN — DEXTROSE AND SODIUM CHLORIDE: 5; 900 INJECTION, SOLUTION INTRAVENOUS at 12:07

## 2018-01-01 RX ADMIN — NYSTATIN 500000 UNITS: 100000 SUSPENSION ORAL at 16:00

## 2018-01-01 RX ADMIN — ATORVASTATIN CALCIUM 40 MG: 40 TABLET, FILM COATED ORAL at 08:39

## 2018-01-01 RX ADMIN — LEVALBUTEROL HYDROCHLORIDE 0.63 MG: 0.63 SOLUTION RESPIRATORY (INHALATION) at 20:03

## 2018-01-01 RX ADMIN — OXYCODONE HYDROCHLORIDE 5 MG: 5 TABLET ORAL at 17:26

## 2018-01-01 RX ADMIN — BUDESONIDE 0.25 MG: 0.25 INHALANT RESPIRATORY (INHALATION) at 08:27

## 2018-01-01 RX ADMIN — CALCIUM CHLORIDE 200 MG: 100 INJECTION, SOLUTION INTRAVENOUS at 16:30

## 2018-01-01 RX ADMIN — POTASSIUM CHLORIDE 20 MEQ: 1.5 POWDER, FOR SOLUTION ORAL at 06:27

## 2018-01-01 RX ADMIN — OYSTER SHELL CALCIUM WITH VITAMIN D 1 TABLET: 500; 200 TABLET, FILM COATED ORAL at 13:29

## 2018-01-01 RX ADMIN — IPRATROPIUM BROMIDE 0.5 MG: 0.5 SOLUTION RESPIRATORY (INHALATION) at 11:48

## 2018-01-01 RX ADMIN — IPRATROPIUM BROMIDE 0.5 MG: 0.5 SOLUTION RESPIRATORY (INHALATION) at 08:27

## 2018-01-01 RX ADMIN — LEVALBUTEROL HYDROCHLORIDE 0.63 MG: 0.63 SOLUTION RESPIRATORY (INHALATION) at 20:11

## 2018-01-01 RX ADMIN — BUDESONIDE 0.25 MG: 0.25 INHALANT RESPIRATORY (INHALATION) at 20:16

## 2018-01-01 RX ADMIN — Medication 2.5 MG: at 08:10

## 2018-01-01 RX ADMIN — AMLODIPINE BESYLATE 5 MG: 2.5 TABLET ORAL at 08:25

## 2018-01-01 RX ADMIN — NYSTATIN 500000 UNITS: 100000 SUSPENSION ORAL at 15:36

## 2018-01-01 RX ADMIN — Medication 5 ML: at 20:36

## 2018-01-01 RX ADMIN — Medication 1 MG: at 08:26

## 2018-01-01 RX ADMIN — LEVALBUTEROL HYDROCHLORIDE 0.63 MG: 0.63 SOLUTION RESPIRATORY (INHALATION) at 08:15

## 2018-01-01 RX ADMIN — LOSARTAN POTASSIUM 50 MG: 50 TABLET ORAL at 09:08

## 2018-01-01 RX ADMIN — CIPROFLOXACIN AND DEXAMETHASONE 4 DROP: 3; 1 SUSPENSION/ DROPS AURICULAR (OTIC) at 20:16

## 2018-01-01 RX ADMIN — FOLIC ACID 1 MG: 5 INJECTION, SOLUTION INTRAMUSCULAR; INTRAVENOUS; SUBCUTANEOUS at 08:32

## 2018-01-01 RX ADMIN — ARFORMOTEROL TARTRATE 15 MCG: 15 SOLUTION RESPIRATORY (INHALATION) at 08:37

## 2018-01-01 RX ADMIN — FOLIC ACID 1 MG: 5 INJECTION, SOLUTION INTRAMUSCULAR; INTRAVENOUS; SUBCUTANEOUS at 08:17

## 2018-01-01 RX ADMIN — CLOPIDOGREL 75 MG: 75 TABLET, FILM COATED ORAL at 07:55

## 2018-01-01 RX ADMIN — IPRATROPIUM BROMIDE 0.5 MG: 0.5 SOLUTION RESPIRATORY (INHALATION) at 08:26

## 2018-01-01 RX ADMIN — IPRATROPIUM BROMIDE 0.5 MG: 0.5 SOLUTION RESPIRATORY (INHALATION) at 19:25

## 2018-01-01 RX ADMIN — HYDRALAZINE HYDROCHLORIDE 10 MG: 20 INJECTION INTRAMUSCULAR; INTRAVENOUS at 23:22

## 2018-01-01 RX ADMIN — LEVALBUTEROL HYDROCHLORIDE 0.63 MG: 0.63 SOLUTION RESPIRATORY (INHALATION) at 12:19

## 2018-01-01 RX ADMIN — IPRATROPIUM BROMIDE 0.5 MG: 0.5 SOLUTION RESPIRATORY (INHALATION) at 11:07

## 2018-01-01 RX ADMIN — POTASSIUM CHLORIDE 20 MEQ: 1.5 POWDER, FOR SOLUTION ORAL at 18:02

## 2018-01-01 RX ADMIN — Medication 1 CAPSULE: at 16:01

## 2018-01-01 RX ADMIN — LEVALBUTEROL HYDROCHLORIDE 0.63 MG: 0.63 SOLUTION RESPIRATORY (INHALATION) at 08:02

## 2018-01-01 RX ADMIN — Medication 10 MG: at 00:21

## 2018-01-01 RX ADMIN — BUDESONIDE 0.25 MG: 0.25 INHALANT RESPIRATORY (INHALATION) at 07:33

## 2018-01-01 RX ADMIN — GLYCOPYRROLATE 1 MG: 1 TABLET ORAL at 13:18

## 2018-01-01 RX ADMIN — LEVALBUTEROL HYDROCHLORIDE 0.63 MG: 0.63 SOLUTION RESPIRATORY (INHALATION) at 11:47

## 2018-01-01 RX ADMIN — ARFORMOTEROL TARTRATE 15 MCG: 15 SOLUTION RESPIRATORY (INHALATION) at 08:38

## 2018-01-01 RX ADMIN — Medication 0.2 MG: at 00:04

## 2018-01-01 RX ADMIN — SODIUM CHLORIDE, POTASSIUM CHLORIDE, SODIUM LACTATE AND CALCIUM CHLORIDE: 600; 310; 30; 20 INJECTION, SOLUTION INTRAVENOUS at 15:00

## 2018-01-01 RX ADMIN — BUDESONIDE 0.25 MG: 0.25 INHALANT RESPIRATORY (INHALATION) at 08:29

## 2018-01-01 RX ADMIN — NYSTATIN 500000 UNITS: 100000 SUSPENSION ORAL at 16:29

## 2018-01-01 RX ADMIN — MEROPENEM 1 G: 1 INJECTION, POWDER, FOR SOLUTION INTRAVENOUS at 08:33

## 2018-01-01 RX ADMIN — DOCUSATE SODIUM 50 MG: 50 LIQUID ORAL at 20:12

## 2018-01-01 RX ADMIN — IPRATROPIUM BROMIDE 0.5 MG: 0.5 SOLUTION RESPIRATORY (INHALATION) at 16:10

## 2018-01-01 RX ADMIN — AMLODIPINE BESYLATE 5 MG: 2.5 TABLET ORAL at 08:18

## 2018-01-01 RX ADMIN — Medication 10 MEQ: at 07:22

## 2018-01-01 RX ADMIN — LEVOFLOXACIN 500 MG: 250 TABLET, FILM COATED ORAL at 07:26

## 2018-01-01 RX ADMIN — LOSARTAN POTASSIUM 50 MG: 50 TABLET ORAL at 08:51

## 2018-01-01 RX ADMIN — CALCIUM GLUCONATE 1 G: 98 INJECTION, SOLUTION INTRAVENOUS at 12:06

## 2018-01-01 RX ADMIN — MINERAL SUPPLEMENT IRON 300 MG / 5 ML STRENGTH LIQUID 100 PER BOX UNFLAVORED 300 MG: at 10:02

## 2018-01-01 RX ADMIN — IPRATROPIUM BROMIDE 0.5 MG: 0.5 SOLUTION RESPIRATORY (INHALATION) at 12:00

## 2018-01-01 RX ADMIN — MOXIFLOXACIN HYDROCHLORIDE 400 MG: 400 INJECTION, SOLUTION INTRAVENOUS at 11:47

## 2018-01-01 RX ADMIN — CLOPIDOGREL 75 MG: 75 TABLET, FILM COATED ORAL at 07:51

## 2018-01-01 RX ADMIN — METRONIDAZOLE 500 MG: 500 INJECTION, SOLUTION INTRAVENOUS at 06:26

## 2018-01-01 RX ADMIN — Medication 1 CAPSULE: at 16:38

## 2018-01-01 RX ADMIN — Medication 1 CAPSULE: at 11:43

## 2018-01-01 RX ADMIN — OXYCODONE HYDROCHLORIDE 5 MG: 5 TABLET ORAL at 23:36

## 2018-01-01 RX ADMIN — DOCUSATE SODIUM 50 MG: 50 LIQUID ORAL at 20:36

## 2018-01-01 RX ADMIN — Medication 2.5 MG: at 10:03

## 2018-01-01 RX ADMIN — LEVALBUTEROL HYDROCHLORIDE 0.63 MG: 0.63 SOLUTION RESPIRATORY (INHALATION) at 19:36

## 2018-01-01 RX ADMIN — OYSTER SHELL CALCIUM WITH VITAMIN D 1 TABLET: 500; 200 TABLET, FILM COATED ORAL at 13:03

## 2018-01-01 RX ADMIN — LEVALBUTEROL HYDROCHLORIDE 0.63 MG: 0.63 SOLUTION RESPIRATORY (INHALATION) at 07:46

## 2018-01-01 RX ADMIN — IPRATROPIUM BROMIDE 0.5 MG: 0.5 SOLUTION RESPIRATORY (INHALATION) at 15:38

## 2018-01-01 RX ADMIN — ROCURONIUM BROMIDE 20 MG: 10 INJECTION INTRAVENOUS at 15:23

## 2018-01-01 RX ADMIN — AMLODIPINE BESYLATE 5 MG: 2.5 TABLET ORAL at 08:03

## 2018-01-01 RX ADMIN — THIAMINE HYDROCHLORIDE 200 MG: 100 INJECTION, SOLUTION INTRAMUSCULAR; INTRAVENOUS at 16:29

## 2018-01-01 RX ADMIN — IPRATROPIUM BROMIDE 0.5 MG: 0.5 SOLUTION RESPIRATORY (INHALATION) at 00:45

## 2018-01-01 RX ADMIN — LEVALBUTEROL HYDROCHLORIDE 0.63 MG: 0.63 SOLUTION RESPIRATORY (INHALATION) at 21:02

## 2018-01-01 RX ADMIN — Medication 1 CAPSULE: at 12:48

## 2018-01-01 RX ADMIN — LEVALBUTEROL HYDROCHLORIDE 0.63 MG: 0.63 SOLUTION RESPIRATORY (INHALATION) at 00:01

## 2018-01-01 RX ADMIN — NYSTATIN 500000 UNITS: 100000 SUSPENSION ORAL at 19:38

## 2018-01-01 RX ADMIN — NYSTATIN 500000 UNITS: 100000 SUSPENSION ORAL at 11:43

## 2018-01-01 RX ADMIN — LEVALBUTEROL HYDROCHLORIDE 0.63 MG: 0.63 SOLUTION RESPIRATORY (INHALATION) at 15:42

## 2018-01-01 RX ADMIN — METRONIDAZOLE 500 MG: 500 INJECTION, SOLUTION INTRAVENOUS at 12:11

## 2018-01-01 RX ADMIN — LEVALBUTEROL HYDROCHLORIDE 0.63 MG: 0.63 SOLUTION RESPIRATORY (INHALATION) at 11:42

## 2018-01-01 RX ADMIN — NYSTATIN 500000 UNITS: 100000 SUSPENSION ORAL at 13:41

## 2018-01-01 RX ADMIN — CLOPIDOGREL 75 MG: 75 TABLET, FILM COATED ORAL at 08:45

## 2018-01-01 RX ADMIN — POTASSIUM CHLORIDE 20 MEQ: 1.5 POWDER, FOR SOLUTION ORAL at 18:19

## 2018-01-01 RX ADMIN — ATORVASTATIN CALCIUM 40 MG: 40 TABLET, FILM COATED ORAL at 08:37

## 2018-01-01 RX ADMIN — OYSTER SHELL CALCIUM WITH VITAMIN D 1 TABLET: 500; 200 TABLET, FILM COATED ORAL at 19:07

## 2018-01-01 RX ADMIN — AMLODIPINE BESYLATE 5 MG: 2.5 TABLET ORAL at 08:19

## 2018-01-01 RX ADMIN — Medication 1 CAPSULE: at 18:28

## 2018-01-01 RX ADMIN — Medication 1 CAPSULE: at 11:31

## 2018-01-01 RX ADMIN — ASPIRIN 81 MG CHEWABLE TABLET 81 MG: 81 TABLET CHEWABLE at 07:56

## 2018-01-01 RX ADMIN — AMLODIPINE BESYLATE 5 MG: 5 TABLET ORAL at 09:55

## 2018-01-01 RX ADMIN — SENNOSIDES 5 ML: 8.8 SYRUP ORAL at 08:23

## 2018-01-01 RX ADMIN — IPRATROPIUM BROMIDE 0.5 MG: 0.5 SOLUTION RESPIRATORY (INHALATION) at 07:10

## 2018-01-01 RX ADMIN — Medication 10 MEQ: at 06:38

## 2018-01-01 RX ADMIN — ARFORMOTEROL TARTRATE 15 MCG: 15 SOLUTION RESPIRATORY (INHALATION) at 07:39

## 2018-01-01 RX ADMIN — VANCOMYCIN HYDROCHLORIDE 1500 MG: 10 INJECTION, POWDER, LYOPHILIZED, FOR SOLUTION INTRAVENOUS at 18:56

## 2018-01-01 RX ADMIN — LOSARTAN POTASSIUM 50 MG: 50 TABLET ORAL at 10:03

## 2018-01-01 RX ADMIN — Medication 1 CAPSULE: at 18:01

## 2018-01-01 RX ADMIN — OYSTER SHELL CALCIUM WITH VITAMIN D 1 TABLET: 500; 200 TABLET, FILM COATED ORAL at 19:52

## 2018-01-01 RX ADMIN — NYSTATIN 500000 UNITS: 100000 SUSPENSION ORAL at 19:54

## 2018-01-01 RX ADMIN — ACETAMINOPHEN 650 MG: 325 TABLET, FILM COATED ORAL at 20:46

## 2018-01-01 RX ADMIN — LEVALBUTEROL HYDROCHLORIDE 0.63 MG: 0.63 SOLUTION RESPIRATORY (INHALATION) at 15:23

## 2018-01-01 RX ADMIN — DOCUSATE SODIUM 50 MG: 50 LIQUID ORAL at 09:57

## 2018-01-01 RX ADMIN — LEVALBUTEROL HYDROCHLORIDE 0.63 MG: 0.63 SOLUTION RESPIRATORY (INHALATION) at 12:00

## 2018-01-01 RX ADMIN — ASPIRIN 81 MG CHEWABLE TABLET 81 MG: 81 TABLET CHEWABLE at 08:44

## 2018-01-01 RX ADMIN — MULTIVITAMIN 15 ML: LIQUID ORAL at 07:45

## 2018-01-01 RX ADMIN — DOCUSATE SODIUM 50 MG: 50 LIQUID ORAL at 23:20

## 2018-01-01 RX ADMIN — MINERAL SUPPLEMENT IRON 300 MG / 5 ML STRENGTH LIQUID 100 PER BOX UNFLAVORED 300 MG: at 10:39

## 2018-01-01 RX ADMIN — GLYCOPYRROLATE 1 MG: 1 TABLET ORAL at 07:47

## 2018-01-01 RX ADMIN — BUDESONIDE 0.25 MG: 0.25 INHALANT RESPIRATORY (INHALATION) at 07:36

## 2018-01-01 RX ADMIN — METRONIDAZOLE 500 MG: 500 INJECTION, SOLUTION INTRAVENOUS at 03:14

## 2018-01-01 RX ADMIN — ASPIRIN 81 MG CHEWABLE TABLET 81 MG: 81 TABLET CHEWABLE at 07:55

## 2018-01-01 RX ADMIN — Medication 1 CAPSULE: at 12:10

## 2018-01-01 RX ADMIN — POTASSIUM CHLORIDE, DEXTROSE MONOHYDRATE AND SODIUM CHLORIDE: 150; 5; 450 INJECTION, SOLUTION INTRAVENOUS at 00:46

## 2018-01-01 RX ADMIN — BUDESONIDE 0.25 MG: 0.25 INHALANT RESPIRATORY (INHALATION) at 20:20

## 2018-01-01 RX ADMIN — LEVALBUTEROL HYDROCHLORIDE 0.63 MG: 0.63 SOLUTION RESPIRATORY (INHALATION) at 23:31

## 2018-01-01 RX ADMIN — Medication 10 MG: at 03:31

## 2018-01-01 RX ADMIN — Medication 100 MG: at 08:51

## 2018-01-01 RX ADMIN — DOCUSATE SODIUM 50 MG: 50 LIQUID ORAL at 07:38

## 2018-01-01 RX ADMIN — IPRATROPIUM BROMIDE 0.5 MG: 0.5 SOLUTION RESPIRATORY (INHALATION) at 11:42

## 2018-01-01 RX ADMIN — MINERAL SUPPLEMENT IRON 300 MG / 5 ML STRENGTH LIQUID 100 PER BOX UNFLAVORED 300 MG: at 08:56

## 2018-01-01 RX ADMIN — OYSTER SHELL CALCIUM WITH VITAMIN D 1 TABLET: 500; 200 TABLET, FILM COATED ORAL at 08:02

## 2018-01-01 RX ADMIN — Medication 1 CAPSULE: at 13:29

## 2018-01-01 RX ADMIN — SODIUM CHLORIDE, PRESERVATIVE FREE 5 ML: 5 INJECTION INTRAVENOUS at 15:02

## 2018-01-01 RX ADMIN — NYSTATIN 500000 UNITS: 100000 SUSPENSION ORAL at 15:03

## 2018-01-01 RX ADMIN — Medication 1 CAPSULE: at 08:03

## 2018-01-01 RX ADMIN — LIDOCAINE 1 PATCH: 560 PATCH PERCUTANEOUS; TOPICAL; TRANSDERMAL at 08:26

## 2018-01-01 RX ADMIN — OYSTER SHELL CALCIUM WITH VITAMIN D 1 TABLET: 500; 200 TABLET, FILM COATED ORAL at 07:56

## 2018-01-01 RX ADMIN — Medication 1 CAPSULE: at 18:02

## 2018-01-01 RX ADMIN — OXYMETAZOLINE HYDROCHLORIDE 2 SPRAY: 5 SPRAY NASAL at 08:52

## 2018-01-01 RX ADMIN — ATORVASTATIN CALCIUM 40 MG: 40 TABLET, FILM COATED ORAL at 09:56

## 2018-01-01 RX ADMIN — BUDESONIDE 0.25 MG: 0.25 INHALANT RESPIRATORY (INHALATION) at 21:04

## 2018-01-01 RX ADMIN — NYSTATIN 500000 UNITS: 100000 SUSPENSION ORAL at 12:09

## 2018-01-01 RX ADMIN — LEVALBUTEROL HYDROCHLORIDE 0.63 MG: 0.63 SOLUTION RESPIRATORY (INHALATION) at 13:10

## 2018-01-01 RX ADMIN — MULTIVITAMIN 15 ML: LIQUID ORAL at 07:48

## 2018-01-01 RX ADMIN — PHENYLEPHRINE HYDROCHLORIDE 5 ML: 10 INJECTION, SOLUTION INTRAMUSCULAR; INTRAVENOUS; SUBCUTANEOUS at 18:58

## 2018-01-01 RX ADMIN — MOXIFLOXACIN HYDROCHLORIDE 400 MG: 400 INJECTION, SOLUTION INTRAVENOUS at 12:08

## 2018-01-01 RX ADMIN — IPRATROPIUM BROMIDE 0.5 MG: 0.5 SOLUTION RESPIRATORY (INHALATION) at 08:24

## 2018-01-01 RX ADMIN — FOLIC ACID 1 MG: 1 TABLET ORAL at 08:23

## 2018-01-01 RX ADMIN — AMLODIPINE BESYLATE 5 MG: 5 TABLET ORAL at 08:26

## 2018-01-01 RX ADMIN — Medication 1 MG: at 08:02

## 2018-01-01 RX ADMIN — CLOPIDOGREL 75 MG: 75 TABLET, FILM COATED ORAL at 08:17

## 2018-01-01 RX ADMIN — LEVALBUTEROL HYDROCHLORIDE 0.63 MG: 0.63 SOLUTION RESPIRATORY (INHALATION) at 08:20

## 2018-01-01 RX ADMIN — Medication 1 CAPSULE: at 08:11

## 2018-01-01 RX ADMIN — LOSARTAN POTASSIUM 50 MG: 50 TABLET ORAL at 08:10

## 2018-01-01 RX ADMIN — ISODIUM CHLORIDE 3 ML: 0.03 SOLUTION RESPIRATORY (INHALATION) at 07:17

## 2018-01-01 RX ADMIN — CEFTRIAXONE 1 G: 1 INJECTION, POWDER, FOR SOLUTION INTRAMUSCULAR; INTRAVENOUS at 18:01

## 2018-01-01 RX ADMIN — ARFORMOTEROL TARTRATE 15 MCG: 15 SOLUTION RESPIRATORY (INHALATION) at 07:56

## 2018-01-01 RX ADMIN — IPRATROPIUM BROMIDE 0.5 MG: 0.5 SOLUTION RESPIRATORY (INHALATION) at 19:41

## 2018-01-01 RX ADMIN — ENOXAPARIN SODIUM 30 MG: 30 INJECTION SUBCUTANEOUS at 18:28

## 2018-01-01 RX ADMIN — BUDESONIDE 0.25 MG: 0.25 INHALANT RESPIRATORY (INHALATION) at 19:36

## 2018-01-01 RX ADMIN — OYSTER SHELL CALCIUM WITH VITAMIN D 1 TABLET: 500; 200 TABLET, FILM COATED ORAL at 13:05

## 2018-01-01 RX ADMIN — Medication 1 MG: at 22:27

## 2018-01-01 RX ADMIN — SENNOSIDES 5 ML: 8.8 SYRUP ORAL at 20:46

## 2018-01-01 RX ADMIN — ISODIUM CHLORIDE 3 ML: 0.03 SOLUTION RESPIRATORY (INHALATION) at 16:05

## 2018-01-01 RX ADMIN — Medication 1 CAPSULE: at 17:49

## 2018-01-01 RX ADMIN — LEVALBUTEROL HYDROCHLORIDE 0.63 MG: 0.63 SOLUTION RESPIRATORY (INHALATION) at 19:41

## 2018-01-01 RX ADMIN — OYSTER SHELL CALCIUM WITH VITAMIN D 1 TABLET: 500; 200 TABLET, FILM COATED ORAL at 18:05

## 2018-01-01 RX ADMIN — Medication 3 MG: at 22:06

## 2018-01-01 RX ADMIN — NYSTATIN 500000 UNITS: 100000 SUSPENSION ORAL at 20:56

## 2018-01-01 RX ADMIN — ARFORMOTEROL TARTRATE 15 MCG: 15 SOLUTION RESPIRATORY (INHALATION) at 21:30

## 2018-01-01 RX ADMIN — NYSTATIN 500000 UNITS: 100000 SUSPENSION ORAL at 20:20

## 2018-01-01 RX ADMIN — SENNOSIDES 5 ML: 8.8 SYRUP ORAL at 14:05

## 2018-01-01 RX ADMIN — Medication 1 CAPSULE: at 17:37

## 2018-01-01 RX ADMIN — ENOXAPARIN SODIUM 30 MG: 30 INJECTION SUBCUTANEOUS at 15:43

## 2018-01-01 RX ADMIN — Medication 1 CAPSULE: at 08:51

## 2018-01-01 ASSESSMENT — ACTIVITIES OF DAILY LIVING (ADL)
ADLS_ACUITY_SCORE: 38
ADLS_ACUITY_SCORE: 28
CURRENT_FUNCTION: SHOPPING REQUIRES ASSISTANCE
ADLS_ACUITY_SCORE: 37
ADLS_ACUITY_SCORE: 28
ADLS_ACUITY_SCORE: 27
ADLS_ACUITY_SCORE: 29
ADLS_ACUITY_SCORE: 27
ADLS_ACUITY_SCORE: 37
ADLS_ACUITY_SCORE: 34
ADLS_ACUITY_SCORE: 30
ADLS_ACUITY_SCORE: 40
ADLS_ACUITY_SCORE: 37
ADLS_ACUITY_SCORE: 40
ADLS_ACUITY_SCORE: 26
ADLS_ACUITY_SCORE: 30
CURRENT_FUNCTION: PREPARING MEALS REQUIRES ASSISTANCE
ADLS_ACUITY_SCORE: 38
ADLS_ACUITY_SCORE: 28
ADLS_ACUITY_SCORE: 27
ADLS_ACUITY_SCORE: 37
ADLS_ACUITY_SCORE: 27
ADLS_ACUITY_SCORE: 34
ADLS_ACUITY_SCORE: 40
ADLS_ACUITY_SCORE: 28
FALL_HISTORY_WITHIN_LAST_SIX_MONTHS: NO
ADLS_ACUITY_SCORE: 38
ADLS_ACUITY_SCORE: 28
RETIRED_EATING: 4-->COMPLETELY DEPENDENT
ADLS_ACUITY_SCORE: 32
ADLS_ACUITY_SCORE: 30
ADLS_ACUITY_SCORE: 38
ADLS_ACUITY_SCORE: 38
ADLS_ACUITY_SCORE: 28
ADLS_ACUITY_SCORE: 32
RETIRED_COMMUNICATION: 0-->UNDERSTANDS/COMMUNICATES WITHOUT DIFFICULTY
ADLS_ACUITY_SCORE: 38
ADLS_ACUITY_SCORE: 28
ADLS_ACUITY_SCORE: 34
ADLS_ACUITY_SCORE: 38
ADLS_ACUITY_SCORE: 30
ADLS_ACUITY_SCORE: 28
ADLS_ACUITY_SCORE: 37
ADLS_ACUITY_SCORE: 38
ADLS_ACUITY_SCORE: 28
ADLS_ACUITY_SCORE: 32
ADLS_ACUITY_SCORE: 37
ADLS_ACUITY_SCORE: 40
ADLS_ACUITY_SCORE: 38
ADLS_ACUITY_SCORE: 26
ADLS_ACUITY_SCORE: 38
ADLS_ACUITY_SCORE: 38
ADLS_ACUITY_SCORE: 28
ADLS_ACUITY_SCORE: 28
ADLS_ACUITY_SCORE: 25
ADLS_ACUITY_SCORE: 32
ADLS_ACUITY_SCORE: 30
ADLS_ACUITY_SCORE: 26
ADLS_ACUITY_SCORE: 32
ADLS_ACUITY_SCORE: 37
ADLS_ACUITY_SCORE: 30
ADLS_ACUITY_SCORE: 26
ADLS_ACUITY_SCORE: 30
ADLS_ACUITY_SCORE: 28
ADLS_ACUITY_SCORE: 25
ADLS_ACUITY_SCORE: 38
ADLS_ACUITY_SCORE: 28
ADLS_ACUITY_SCORE: 37
ADLS_ACUITY_SCORE: 32
ADLS_ACUITY_SCORE: 40
ADLS_ACUITY_SCORE: 24
ADLS_ACUITY_SCORE: 30
ADLS_ACUITY_SCORE: 34
ADLS_ACUITY_SCORE: 30
ADLS_ACUITY_SCORE: 28
CURRENT_FUNCTION: LAUNDRY REQUIRES ASSISTANCE
ADLS_ACUITY_SCORE: 30
ADLS_ACUITY_SCORE: 30
ADLS_ACUITY_SCORE: 27
ADLS_ACUITY_SCORE: 30
TOILETING: 4-->COMPLETELY DEPENDENT
ADLS_ACUITY_SCORE: 37
ADLS_ACUITY_SCORE: 37
ADLS_ACUITY_SCORE: 28
CURRENT_FUNCTION: MONEY MANAGEMENT REQUIRES ASSISTANCE
ADLS_ACUITY_SCORE: 26
CURRENT_FUNCTION: MEDICATION ADMINISTRATION REQUIRES ASSISTANCE
ADLS_ACUITY_SCORE: 28
ADLS_ACUITY_SCORE: 34
ADLS_ACUITY_SCORE: 30
ADLS_ACUITY_SCORE: 30
ADLS_ACUITY_SCORE: 28
ADLS_ACUITY_SCORE: 40
ADLS_ACUITY_SCORE: 32
ADLS_ACUITY_SCORE: 30
ADLS_ACUITY_SCORE: 33
ADLS_ACUITY_SCORE: 40
ADLS_ACUITY_SCORE: 29
ADLS_ACUITY_SCORE: 38
ADLS_ACUITY_SCORE: 40
ADLS_ACUITY_SCORE: 32
ADLS_ACUITY_SCORE: 25
ADLS_ACUITY_SCORE: 30
ADLS_ACUITY_SCORE: 30
ADLS_ACUITY_SCORE: 24
ADLS_ACUITY_SCORE: 28
ADLS_ACUITY_SCORE: 40
ADLS_ACUITY_SCORE: 32
ADLS_ACUITY_SCORE: 28
ADLS_ACUITY_SCORE: 30
ADLS_ACUITY_SCORE: 28
ADLS_ACUITY_SCORE: 27
ADLS_ACUITY_SCORE: 28
ADLS_ACUITY_SCORE: 28
ADLS_ACUITY_SCORE: 25
ADLS_ACUITY_SCORE: 28
ADLS_ACUITY_SCORE: 37
ADLS_ACUITY_SCORE: 29
ADLS_ACUITY_SCORE: 27
ADLS_ACUITY_SCORE: 28
ADLS_ACUITY_SCORE: 32
ADLS_ACUITY_SCORE: 38
ADLS_ACUITY_SCORE: 34
ADLS_ACUITY_SCORE: 30
ADLS_ACUITY_SCORE: 29
ADLS_ACUITY_SCORE: 30
ADLS_ACUITY_SCORE: 28
ADLS_ACUITY_SCORE: 32
ADLS_ACUITY_SCORE: 37
ADLS_ACUITY_SCORE: 37
ADLS_ACUITY_SCORE: 28
ADLS_ACUITY_SCORE: 30
ADLS_ACUITY_SCORE: 30
ADLS_ACUITY_SCORE: 32
ADLS_ACUITY_SCORE: 30
ADLS_ACUITY_SCORE: 28
ADLS_ACUITY_SCORE: 26
ADLS_ACUITY_SCORE: 28
ADLS_ACUITY_SCORE: 30
ADLS_ACUITY_SCORE: 32
ADLS_ACUITY_SCORE: 40
CURRENT_FUNCTION: TRANSPORTATION REQUIRES ASSISTANCE
ADLS_ACUITY_SCORE: 31
ADLS_ACUITY_SCORE: 38
ADLS_ACUITY_SCORE: 38
ADLS_ACUITY_SCORE: 37
ADLS_ACUITY_SCORE: 38
ADLS_ACUITY_SCORE: 33
ADLS_ACUITY_SCORE: 28
ADLS_ACUITY_SCORE: 38
ADLS_ACUITY_SCORE: 26
ADLS_ACUITY_SCORE: 28
ADLS_ACUITY_SCORE: 26
ADLS_ACUITY_SCORE: 30
CURRENT_FUNCTION: HOUSEWORK REQUIRES ASSISTANCE
ADLS_ACUITY_SCORE: 32
ADLS_ACUITY_SCORE: 28
ADLS_ACUITY_SCORE: 26
ADLS_ACUITY_SCORE: 32
ADLS_ACUITY_SCORE: 28
ADLS_ACUITY_SCORE: 38
ADLS_ACUITY_SCORE: 34
ADLS_ACUITY_SCORE: 26
ADLS_ACUITY_SCORE: 32
ADLS_ACUITY_SCORE: 28
ADLS_ACUITY_SCORE: 38
ADLS_ACUITY_SCORE: 28
ADLS_ACUITY_SCORE: 28
ADLS_ACUITY_SCORE: 32
ADLS_ACUITY_SCORE: 28
ADLS_ACUITY_SCORE: 30
ADLS_ACUITY_SCORE: 32
ADLS_ACUITY_SCORE: 29
ADLS_ACUITY_SCORE: 37
ADLS_ACUITY_SCORE: 28
ADLS_ACUITY_SCORE: 26
ADLS_ACUITY_SCORE: 27
ADLS_ACUITY_SCORE: 30
ADLS_ACUITY_SCORE: 30
ADLS_ACUITY_SCORE: 28
ADLS_ACUITY_SCORE: 38
ADLS_ACUITY_SCORE: 28
ADLS_ACUITY_SCORE: 28
ADLS_ACUITY_SCORE: 32
ADLS_ACUITY_SCORE: 25
ADLS_ACUITY_SCORE: 38
ADLS_ACUITY_SCORE: 28
ADLS_ACUITY_SCORE: 28
ADLS_ACUITY_SCORE: 38
ADLS_ACUITY_SCORE: 38
AMBULATION: 4-->COMPLETELY DEPENDENT
ADLS_ACUITY_SCORE: 28
ADLS_ACUITY_SCORE: 38
ADLS_ACUITY_SCORE: 30
ADLS_ACUITY_SCORE: 26
ADLS_ACUITY_SCORE: 27
ADLS_ACUITY_SCORE: 28
ADLS_ACUITY_SCORE: 37
ADLS_ACUITY_SCORE: 28
ADLS_ACUITY_SCORE: 30
ADLS_ACUITY_SCORE: 38
ADLS_ACUITY_SCORE: 28
ADLS_ACUITY_SCORE: 38
SWALLOWING: 2-->DIFFICULTY SWALLOWING FOODS
ADLS_ACUITY_SCORE: 28
ADLS_ACUITY_SCORE: 38
ADLS_ACUITY_SCORE: 34
ADLS_ACUITY_SCORE: 27
ADLS_ACUITY_SCORE: 34
ADLS_ACUITY_SCORE: 34
ADLS_ACUITY_SCORE: 38
ADLS_ACUITY_SCORE: 28
DRESS: 2-->ASSISTIVE PERSON
ADLS_ACUITY_SCORE: 28
ADLS_ACUITY_SCORE: 32
ADLS_ACUITY_SCORE: 28
ADLS_ACUITY_SCORE: 30
ADLS_ACUITY_SCORE: 34
ADLS_ACUITY_SCORE: 28
ADLS_ACUITY_SCORE: 34
ADLS_ACUITY_SCORE: 34
COGNITION: 0 - NO COGNITION ISSUES REPORTED
ADLS_ACUITY_SCORE: 30
ADLS_ACUITY_SCORE: 30
ADLS_ACUITY_SCORE: 38
ADLS_ACUITY_SCORE: 30
ADLS_ACUITY_SCORE: 32
ADLS_ACUITY_SCORE: 26
ADLS_ACUITY_SCORE: 32
TRANSFERRING: 4-->COMPLETELY DEPENDENT
ADLS_ACUITY_SCORE: 28
ADLS_ACUITY_SCORE: 38
ADLS_ACUITY_SCORE: 28
ADLS_ACUITY_SCORE: 28
ADLS_ACUITY_SCORE: 32
ADLS_ACUITY_SCORE: 38
ADLS_ACUITY_SCORE: 30
ADLS_ACUITY_SCORE: 28
ADLS_ACUITY_SCORE: 37
ADLS_ACUITY_SCORE: 32
BATHING: 3-->ASSISTIVE EQUIPMENT AND PERSON

## 2018-01-01 ASSESSMENT — VISUAL ACUITY
OU: BASELINE
OU: NORMAL ACUITY
OU: NORMAL ACUITY
OU: BASELINE
OU: BASELINE
OU: NORMAL ACUITY
OU: BASELINE
OU: NORMAL ACUITY
OU: BASELINE
OU: NORMAL ACUITY
OU: BASELINE
OU: NORMAL ACUITY
OU: NORMAL ACUITY
OU: BASELINE
OU: BASELINE
OU: NORMAL ACUITY
OU: BASELINE
OU: NORMAL ACUITY;BASELINE
OU: BASELINE
OU: NORMAL ACUITY;BASELINE
OU: BASELINE
OU: NORMAL ACUITY;BASELINE
OU: NORMAL ACUITY
OU: NORMAL ACUITY
OU: BASELINE
OU: NORMAL ACUITY;BASELINE
OU: BASELINE
OU: NORMAL ACUITY
OU: BASELINE
OU: NORMAL ACUITY
OU: NORMAL ACUITY
OU: BASELINE
OU: BASELINE
OU: NORMAL ACUITY
OU: BASELINE
OU: NORMAL ACUITY
OU: BASELINE
OU: BASELINE
OU: NORMAL ACUITY
OU: BASELINE
OU: NORMAL ACUITY;BASELINE
OU: BASELINE
OU: NORMAL ACUITY
OU: BASELINE
OU: NORMAL ACUITY
OU: BASELINE
OU: BASELINE
OU: NORMAL ACUITY;BASELINE
OU: NORMAL ACUITY
OU: BASELINE
OU: NORMAL ACUITY
OU: BASELINE

## 2018-01-01 ASSESSMENT — LIFESTYLE VARIABLES
TOBACCO_USE: 1

## 2018-01-01 ASSESSMENT — PAIN DESCRIPTION - DESCRIPTORS
DESCRIPTORS: CONSTANT;SHARP
DESCRIPTORS: PATIENT UNABLE TO DESCRIBE
DESCRIPTORS: PATIENT UNABLE TO DESCRIBE
DESCRIPTORS: ACHING
DESCRIPTORS: ACHING;SORE
DESCRIPTORS: SORE
DESCRIPTORS: PATIENT UNABLE TO DESCRIBE
DESCRIPTORS: HEADACHE
DESCRIPTORS: PATIENT UNABLE TO DESCRIBE
DESCRIPTORS: ACHING;CONSTANT
DESCRIPTORS: ACHING;SORE
DESCRIPTORS: PATIENT UNABLE TO DESCRIBE
DESCRIPTORS: HEADACHE
DESCRIPTORS: SORE
DESCRIPTORS: ACHING
DESCRIPTORS: CONSTANT
DESCRIPTORS: CONSTANT
DESCRIPTORS: CONSTANT;ACHING
DESCRIPTORS: PATIENT UNABLE TO DESCRIBE

## 2018-01-01 ASSESSMENT — PAIN SCALES - GENERAL
PAINLEVEL: SEVERE PAIN (7)
PAINLEVEL: NO PAIN (0)
PAINLEVEL: SEVERE PAIN (6)

## 2018-01-01 ASSESSMENT — COPD QUESTIONNAIRES
COPD: 1

## 2018-01-12 NOTE — PROGRESS NOTES
SUBJECTIVE:   Keira Collins is a 72 year old female who presents for Preventive Visit.      Are you in the first 12 months of your Medicare coverage?  No    Physical   Annual:     Getting at least 3 servings of Calcium per day::  NO    Bi-annual eye exam::  NO    Dental care twice a year::  NO    Sleep apnea or symptoms of sleep apnea::  None    Diet::  Regular (no restrictions)    Frequency of exercise::  6-7 days/week    Duration of exercise::  Less than 15 minutes    Taking medications regularly::  Yes    Additional concerns today::  YES    Ability to successfully perform activities of daily living: housework requires assistance, transportation requires assistance, shopping requires assistance, preparing meals requires assistance, laundry requires assistance, medication administration requires assistance and money management requires assistance  Home Safety:  No safety concerns identified  Hearing Impairment: no hearing concerns        Fall risk:  Fallen 2 or more times in the past year?: Yes  Any fall with injury in the past year?: No      COGNITIVE SCREEN  1) Repeat 3 items (Banana, Sunrise, Chair)    2) Clock draw: ABNORMAL   3) 3 item recall: Recalls 2 objects   Results: ABNORMAL clock, 1-2 items recalled: PROBABLE COGNITIVE IMPAIRMENT, **INFORM PROVIDER**    Definitely has some cognitive decline with gross observation.    Mini-CogTM Copyright GERMAN Garrido. Licensed by the author for use in [facility]; reprinted with permission ([email or web address]). All rights reserved.          Reviewed and updated as needed this visit by clinical staff  Tobacco  Allergies  Meds  Med Hx  Surg Hx  Fam Hx  Soc Hx        Reviewed and updated as needed this visit by Provider        Social History   Substance Use Topics     Smoking status: Current Every Day Smoker     Packs/day: 0.50     Years: 50.00     Types: Cigarettes     Smokeless tobacco: Never Used     Alcohol use 0.0 oz/week     0 Standard drinks or equivalent  per week      Comment: Occassionally       No flowsheet data found.          Hyperlipidemia Follow-Up      Rate your low fat/cholesterol diet?: fair    Taking statin?  Has been ordered but probably not compliant    Other lipid medications/supplements?:  none    Hypertension Follow-up      Outpatient blood pressures are not being checked.    Low Salt Diet: not monitoring salt    Vascular Disease Follow-up:  Peripheral Vascular Disease (PVD)      Chest pain or pressure, left side neck or arm pain: No    Shortness of breath/increased sweats/nausea with exertion: No    Pain in calves walking 1-2 blocks: Yes having some pain at rest at times. Minimal activity    Worsened or new symptoms since last visit: No    Nitroglycerin use: no    Daily aspirin use: Yew with Plavix    COPD Follow-Up    Symptoms are currently: stable    Current fatigue or dyspnea with ambulation: Essentially not active and very sedentary    Shortness of breath: Unchanged    Increased or change in Cough/Sputum: No    Fever(s): No    Baseline ambulation without stopping to rest:  Minimal feet. Able to walk up zero flights of stairs without stopping to rest.    Any ER/UC or hospital admissions since your last visit? No     History   Smoking Status     Current Every Day Smoker     Packs/day: 0.50     Years: 50.00     Types: Cigarettes   Smokeless Tobacco     Never Used     No results found for: FEV1, [id number]    Patient's predicted FVC is 2.14, FEV1 1.65 with FEV1/FVC 75%.    Actual FVC 1.56  FEV1 0.93, and percent FEV1 of predicted was 56%.    Impression moderate obstructive pulmonary disease.              Today's PHQ-2 Score:   PHQ-2 ( 1999 Pfizer) 9/15/2016   Q1: Little interest or pleasure in doing things 0   Q2: Feeling down, depressed or hopeless 0   PHQ-2 Score 0       Do you feel safe in your environment - Yes    Do you have a Health Care Directive?: No: Advance care planning reviewed with patient; information given to patient to  review.      Current providers sharing in care for this patient include: Patient Care Team:  Asa Elliott MD as PCP - General (Family Practice)    The following health maintenance items are reviewed in Epic and correct as of today:  Health Maintenance   Topic Date Due     FALL RISK ASSESSMENT  07/11/2017     FIT Q1 YR  07/17/2017     INFLUENZA VACCINE (SYSTEM ASSIGNED)  09/01/2017     BMP Q1 MOS  02/16/2018     MAMMO SCREEN Q2 YR (SYSTEM ASSIGNED)  07/14/2018     CMP Q6 MOS  07/16/2018     COPD ACTION PLAN Q1 YR  12/22/2018     TOBACCO CESSATION COUNSELING Q1 YR  01/16/2019     LIPID MONITORING Q1 YEAR  01/16/2019     ADVANCE DIRECTIVE PLANNING Q5 YRS  01/16/2023     TETANUS IMMUNIZATION (SYSTEM ASSIGNED)  07/26/2023     SPIROMETRY ONETIME  Completed     DEXA SCAN SCREENING (SYSTEM ASSIGNED)  Completed     PNEUMOCOCCAL  Completed         Pneumonia Vaccine:Adults age 65+ who received Pneumovax (PPSV23) at 65 years or older: Should be given PCV13 > 1 year after their most recent PPSV23    Review of Systems  C: NEGATIVE for fever, chills, change in weight  I: NEGATIVE for worrisome rashes, moles or lesions  E: NEGATIVE for vision changes or irritation  E/M: NEGATIVE for ear, mouth and throat problems  RESP: Has significant COPD and unfortunately continues to smoke cigarettes.  Also causing a major ill effect on her peripheral vascular disease.  B: NEGATIVE for masses, tenderness or discharge  CV: NEGATIVE for chest pain, palpitations or peripheral edema  CV: Hypertension present today.  Will need to begin treatment.  Denies any chest pain at rest or postprandial.  Current ambulation is minimal.  At times appears to have resting pain in her lower extremities presumably from peripheral vascular disease.  No apparent ulcers.  GI: NEGATIVE for nausea, abdominal pain, heartburn, or change in bowel habits  GI: Screening for colorectal cancer with stool test.  Patient is not a good candidate for colonoscopy.  :  "NEGATIVE for frequency, dysuria, or hematuria   female: Postmenopausal with no vaginal bleeding or pelvic pain.  Wears adult diapers due to uncontrolled incontinence.  MUSCULOSKELETAL: Significant peripheral vascular disease of both lower extremities.  Has significant aorto iliac disease and femoral artery disease bilaterally.  N: NEGATIVE for weakness, dizziness or paresthesias  E: NEGATIVE for temperature intolerance, skin/hair changes  H: NEGATIVE for bleeding problems  P: NEGATIVE for changes in mood or affect    OBJECTIVE:   /88  Pulse 89  Temp 97.8  F (36.6  C) (Temporal)  Resp 14  Ht 5' 1.81\" (1.57 m)  Wt 132 lb (59.9 kg)  SpO2 99%  BMI 24.29 kg/m2 Estimated body mass index is 24.29 kg/(m^2) as calculated from the following:    Height as of this encounter: 5' 1.81\" (1.57 m).    Weight as of this encounter: 132 lb (59.9 kg).  Physical Exam  GENERAL: alert and no distress  EYES: Eyes grossly normal to inspection, PERRL and conjunctivae and sclerae normal  HENT: ear canals and TM's normal, nose and mouth without ulcers or lesions  NECK: no adenopathy, no asymmetry, masses, or scars and thyroid normal to palpation  NECK: no carotid bruits  RESP: lungs clear to auscultation - no rales, rhonchi or wheezes  CV: regular rate and rhythm, normal S1 S2, no S3 or S4, no murmur, click or rub, no peripheral edema and peripheral pulses strong  ABDOMEN: soft, nontender, no hepatosplenomegaly, no masses and bowel sounds normal   (female): deferred  RECTAL: deferred  MS: no gross musculoskeletal defects noted, no edema  MS: no edema and atrophic appearing lower legs with pulseless legs.  No gangrenous changes.  No ulcers of the feet.  SKIN: no suspicious lesions or rashes  NEURO: Normal strength and tone, mentation intact and speech normal  PSYCH: mentation appears normal, affect normal/bright  LYMPH: no cervical, supraclavicular, axillary, or inguinal adenopathy  Diabetic foot exam: normal sensory exam, DP " absent bilateral, PT absent bilateral and dry cracking heels    Results for orders placed or performed in visit on 01/16/18   Spirometry, Breathing Capacity: Normal Order, Clinic Performed   Result Value Ref Range    FEV-1      FVC      FEV1/FVC      FEF 25/75     CBC with platelets   Result Value Ref Range    WBC 8.9 4.0 - 11.0 10e9/L    RBC Count 4.76 3.8 - 5.2 10e12/L    Hemoglobin 14.9 11.7 - 15.7 g/dL    Hematocrit 45.3 35.0 - 47.0 %    MCV 95 78 - 100 fl    MCH 31.3 26.5 - 33.0 pg    MCHC 32.9 31.5 - 36.5 g/dL    RDW 13.3 10.0 - 15.0 %    Platelet Count 194 150 - 450 10e9/L   Lipid panel reflex to direct LDL Fasting   Result Value Ref Range    Cholesterol 214 (H) <200 mg/dL    Triglycerides 160 (H) <150 mg/dL    HDL Cholesterol 57 >49 mg/dL    LDL Cholesterol Calculated 125 (H) <100 mg/dL    Non HDL Cholesterol 157 (H) <130 mg/dL   Comprehensive metabolic panel   Result Value Ref Range    Sodium 140 133 - 144 mmol/L    Potassium 3.8 3.4 - 5.3 mmol/L    Chloride 105 94 - 109 mmol/L    Carbon Dioxide 24 20 - 32 mmol/L    Anion Gap 11 3 - 14 mmol/L    Glucose 98 70 - 99 mg/dL    Urea Nitrogen 10 7 - 30 mg/dL    Creatinine 0.67 0.52 - 1.04 mg/dL    GFR Estimate 86 >60 mL/min/1.7m2    GFR Estimate If Black >90 >60 mL/min/1.7m2    Calcium 9.2 8.5 - 10.1 mg/dL    Bilirubin Total 0.4 0.2 - 1.3 mg/dL    Albumin 4.0 3.4 - 5.0 g/dL    Protein Total 8.2 6.8 - 8.8 g/dL    Alkaline Phosphatase 103 40 - 150 U/L    ALT 20 0 - 50 U/L    AST 18 0 - 45 U/L   TSH   Result Value Ref Range    TSH 2.07 0.40 - 4.00 mU/L   T4, free   Result Value Ref Range    T4 Free 1.89 (H) 0.76 - 1.46 ng/dL   *UA reflex to Microscopic   Result Value Ref Range    Color Urine Yellow     Appearance Urine Clear     Glucose Urine Negative NEG^Negative mg/dL    Bilirubin Urine Negative NEG^Negative    Ketones Urine Negative NEG^Negative mg/dL    Specific Gravity Urine 1.015 1.003 - 1.035    Blood Urine Small (A) NEG^Negative    pH Urine 7.0 5.0 - 7.0 pH     Protein Albumin Urine 30 (A) NEG^Negative mg/dL    Urobilinogen Urine 0.2 0.2 - 1.0 EU/dL    Nitrite Urine Negative NEG^Negative    Leukocyte Esterase Urine Moderate (A) NEG^Negative    Source Midstream Urine    Urine Microscopic   Result Value Ref Range    WBC Urine O - 2 OTO2^O - 2 /HPF    RBC Urine O - 2 OTO2^O - 2 /HPF    Squamous Epithelial /LPF Urine Few FEW^Few /LPF    Mucous Urine Present (A) NEG^Negative /LPF         ASSESSMENT / PLAN:   1. Medicare annual wellness visit, subsequent  General Medicare wellness exam completed.  Did not pursue pelvic exam due to age and debilitation.  Patient was asymptomatic.  - *UA reflex to Microscopic  - Urine Microscopic    2. Chronic obstructive pulmonary disease, unspecified COPD type (H)  Advanced with spirometry attempted.  Continues to smoke.  - Spirometry, Breathing Capacity: Normal Order, Clinic Performed  - tiotropium (SPIRIVA HANDIHALER) 18 MCG capsule; Inhale contents of one capsule daily.  Dispense: 90 capsule; Refill: 1  - albuterol (PROAIR HFA/PROVENTIL HFA/VENTOLIN HFA) 108 (90 BASE) MCG/ACT Inhaler; Inhale 2 puffs into the lungs every 4 hours as needed for shortness of breath / dyspnea or wheezing  Dispense: 1 Inhaler; Refill: 9    3. Screen for colon cancer  We will do stool guaiac testing.  Patient not a good candidate to prep for colonoscopy  - Fecal colorectal cancer screen FIT - Future (S+30); Future    4. At risk for falling  Generally weak and is at risk for falling    5. Tobacco use disorder  Absolutely counseled to discontinue all tobacco products.  - TOBACCO CESSATION ORDER FOR     6. Need for prophylactic vaccination and inoculation against influenza  Inadvertently omitted.  Will update in 3-4 weeks.    7. Urinary incontinence, unspecified type  Urinalysis free of infection.  Continue with protection  - order for DME; Equipment being ordered: Adult diapers--using 10 per day  Dispense: 300 each; Refill: 10  - *UA reflex to Microscopic    8.  "Hyperlipidemia LDL goal <100  LDL is elevated.  May not be compliant with medications  - CBC with platelets  - Lipid panel reflex to direct LDL Fasting  - Comprehensive metabolic panel  - TSH  - T4, free  - amLODIPine (NORVASC) 5 MG tablet; Take 1 tablet (5 mg) by mouth daily  Dispense: 30 tablet; Refill: 3    9. Essential hypertension with goal blood pressure less than 140/90  Begin amlodipine 5 mg daily.  Recheck in 4 weeks.  - Lipid panel reflex to direct LDL Fasting  - Comprehensive metabolic panel  - Albumin Random Urine Quantitative with Creat Ratio    10. Microalbuminuria  Checking due to hypertension development.    11. PVD (peripheral vascular disease) (H)  Severe aortoiliac disease and femoral artery disease.  Patient continues to smoke.  She has seen a vascular surgeon in the past.  - CBC with platelets  - Comprehensive metabolic panel    12. Advanced directives, counseling/discussion  Formal referral made  - HONORING CHOICES REFERRAL    13. Encounter for screening mammogram for breast cancer  Would be due for mammogram in mid July 2018  - *MA Screening Digital Bilateral; Future    14. Elevated serum free T4 level  Patient has no history of hyperthyroidism and is on no supplements.  TSH is normal  We will continue to monitor.      End of Life Planning:  Patient currently has an advanced directive: No.  I have verified the patient's ablity to prepare an advanced directive/make health care decisions.  Literature was provided to assist patient in preparing an advanced directive.    COUNSELING:  Reviewed preventive health counseling, as reflected in patient instructions  Special attention given to:       Healthy diet/nutrition       Aspirin Prophylaxsis       Colon cancer screening        Estimated body mass index is 24.29 kg/(m^2) as calculated from the following:    Height as of this encounter: 5' 1.81\" (1.57 m).    Weight as of this encounter: 132 lb (59.9 kg).     reports that she has been smoking " Cigarettes.  She has a 25.00 pack-year smoking history. She has never used smokeless tobacco.  Tobacco Cessation Action Plan: Information offered: Patient not interested at this time  Self help information given to patient    Appropriate preventive services were discussed with this patient, including applicable screening as appropriate for cardiovascular disease, diabetes, osteopenia/osteoporosis, and glaucoma.  As appropriate for age/gender, discussed screening for colorectal cancer, prostate cancer, breast cancer, and cervical cancer. Checklist reviewing preventive services available has been given to the patient.    Reviewed patients plan of care and provided an AVS. The Basic Care Plan (routine screening as documented in Health Maintenance) for Keira meets the Care Plan requirement. This Care Plan has been established and reviewed with the Patient and son.    Counseling Resources:  ATP IV Guidelines  Pooled Cohorts Equation Calculator  Breast Cancer Risk Calculator  FRAX Risk Assessment  ICSI Preventive Guidelines  Dietary Guidelines for Americans, 2010  USDA's MyPlate  ASA Prophylaxis  Lung CA Screening    Follow-up in 1 month for blood pressure.  Flu vaccination at that time.    The patient understood the rational for the diagnosis and treatment plan. All questions were answered to best of my ability and the patient's satisfaction.      Note: Chart documentation done in part with Dragon Voice Recognition software. Although reviewed after completion, some word and grammatical errors may remain.  Please consider this when interpreting information in this chart.    Time spent with the patient 30+ minutes with greater than 50 % spent in care coordination and counseling.        Asa Elliott MD  [facility]

## 2018-01-12 NOTE — PATIENT INSTRUCTIONS
Preventive Health Recommendations    Female Ages 65 +    Yearly exam:     See your health care provider every year in order to  o Review health changes.   o Discuss preventive care.    o Review your medicines if your doctor has prescribed any.      You no longer need a yearly Pap test unless you've had an abnormal Pap test in the past 10 years. If you have vaginal symptoms, such as bleeding or discharge, be sure to talk with your provider about a Pap test.      Every 1 to 2 years, have a mammogram.  If you are over 69, talk with your health care provider about whether or not you want to continue having screening mammograms.      Every 10 years, have a colonoscopy. Or, have a yearly FIT test (stool test). These exams will check for colon cancer.       Have a cholesterol test every 5 years, or more often if your doctor advises it.       Have a diabetes test (fasting glucose) every three years. If you are at risk for diabetes, you should have this test more often.       At age 65, have a bone density scan (DEXA) to check for osteoporosis (brittle bone disease).    Shots:    Get a flu shot each year.    Get a tetanus shot every 10 years.    Talk to your doctor about your pneumonia vaccines. There are now two you should receive - Pneumovax (PPSV 23) and Prevnar (PCV 13).    Talk to your doctor about the shingles vaccine.    Talk to your doctor about the hepatitis B vaccine.    Nutrition:     Eat at least 5 servings of fruits and vegetables each day.      Eat whole-grain bread, whole-wheat pasta and brown rice instead of white grains and rice.      Talk to your provider about Calcium and Vitamin D.     Lifestyle    Exercise at least 150 minutes a week (30 minutes a day, 5 days a week). This will help you control your weight and prevent disease.      Limit alcohol to one drink per day.      No smoking.       Wear sunscreen to prevent skin cancer.       See your dentist twice a year for an exam and cleaning.      See your  eye doctor every 1 to 2 years to screen for conditions such as glaucoma, macular degeneration and cataracts.    These are new changes to your current plan of care based on today's visit:    Medications stopped    Medications to be started None   Change dose of this medication none   New treatments Stop smoking       Follow up appointments:    1.  FOLLOW UP WITH YOUR PRIMARY CARE PROVIDER: 1 month(s) for hypertension follow up    2. FOLLOW UP WITH SPECIALIST:     3. FOLLOW UP WITH NURSE VISIT:     4. IMAGING STUDIES TO BE SCHEDULED: Needing to schedule a mammogram at [facility] on mid July 2018    5. PROCEDURES TO BE SCHEDULED: Complete the stool study at home    Asa Elliott MD

## 2018-01-16 PROBLEM — I10 ESSENTIAL HYPERTENSION: Status: ACTIVE | Noted: 2018-01-01

## 2018-01-16 PROBLEM — Z71.89 ADVANCED DIRECTIVES, COUNSELING/DISCUSSION: Status: ACTIVE | Noted: 2018-01-01

## 2018-01-16 NOTE — MR AVS SNAPSHOT
After Visit Summary   1/16/2018    Keira Collins    MRN: [id number]           Patient Information     Date Of Birth          1945        Visit Information        Provider Department      1/16/2018 10:00 AM Asa Elliott MD [facility]        Today's Diagnoses     Medicare annual wellness visit, subsequent    -  1    Chronic obstructive pulmonary disease, unspecified COPD type (H)        Screen for colon cancer        At risk for falling        Tobacco use disorder        Need for prophylactic vaccination and inoculation against influenza        Urinary incontinence, unspecified type        Hyperlipidemia LDL goal <100        Essential hypertension with goal blood pressure less than 140/90        Microalbuminuria        PVD (peripheral vascular disease) (H)        Advanced directives, counseling/discussion        Encounter for screening mammogram for breast cancer        Essential hypertension          Care Instructions      Preventive Health Recommendations    Female Ages 65 +    Yearly exam:     See your health care provider every year in order to  o Review health changes.   o Discuss preventive care.    o Review your medicines if your doctor has prescribed any.      You no longer need a yearly Pap test unless you've had an abnormal Pap test in the past 10 years. If you have vaginal symptoms, such as bleeding or discharge, be sure to talk with your provider about a Pap test.      Every 1 to 2 years, have a mammogram.  If you are over 69, talk with your health care provider about whether or not you want to continue having screening mammograms.      Every 10 years, have a colonoscopy. Or, have a yearly FIT test (stool test). These exams will check for colon cancer.       Have a cholesterol test every 5 years, or more often if your doctor advises it.       Have a diabetes test (fasting glucose) every three years. If you are at risk for diabetes, you should have this test more  often.       At age 65, have a bone density scan (DEXA) to check for osteoporosis (brittle bone disease).    Shots:    Get a flu shot each year.    Get a tetanus shot every 10 years.    Talk to your doctor about your pneumonia vaccines. There are now two you should receive - Pneumovax (PPSV 23) and Prevnar (PCV 13).    Talk to your doctor about the shingles vaccine.    Talk to your doctor about the hepatitis B vaccine.    Nutrition:     Eat at least 5 servings of fruits and vegetables each day.      Eat whole-grain bread, whole-wheat pasta and brown rice instead of white grains and rice.      Talk to your provider about Calcium and Vitamin D.     Lifestyle    Exercise at least 150 minutes a week (30 minutes a day, 5 days a week). This will help you control your weight and prevent disease.      Limit alcohol to one drink per day.      No smoking.       Wear sunscreen to prevent skin cancer.       See your dentist twice a year for an exam and cleaning.      See your eye doctor every 1 to 2 years to screen for conditions such as glaucoma, macular degeneration and cataracts.    These are new changes to your current plan of care based on today's visit:    Medications stopped    Medications to be started None   Change dose of this medication none   New treatments Stop smoking       Follow up appointments:    1.  FOLLOW UP WITH YOUR PRIMARY CARE PROVIDER: 1 month(s) for hypertension follow up    2. FOLLOW UP WITH SPECIALIST:     3. FOLLOW UP WITH NURSE VISIT:     4. IMAGING STUDIES TO BE SCHEDULED: Needing to schedule a mammogram at [facility] ( [facility] ) on mid July 2018    5. PROCEDURES TO BE SCHEDULED: Complete the stool study at home    Asa Elliott MD                Follow-ups after your visit        Additional Services     [facility] REFERRAL       Your provider has referred you to [facility] Advance Care Planning Facilitator or Serious illness clinic support staff. The  "facilitator or support staff will contact you to schedule the appointment or for the follow up call    Reason for Referral: Basic Advance Care Planning - 1:1 need                  Follow-up notes from your care team     Return in about 1 month (around 2/16/2018) for Routine Visit.      Future tests that were ordered for you today     Open Future Orders        Priority Expected Expires Ordered    Fecal colorectal cancer screen FIT - Future (S+30) Routine 2/2/2018 2/11/2018 1/16/2018    *MA Screening Digital Bilateral Routine 7/9/2018 12/31/2018 1/16/2018            Who to contact     If you have questions or need follow up information about today's clinic visit or your schedule please contact [facility] directly at [phone].  Normal or non-critical lab and imaging results will be communicated to you by [facility], letter or phone within 4 business days after the clinic has received the results. If you do not hear from us within 7 days, please contact the clinic through [facility] or phone. If you have a critical or abnormal lab result, we will notify you by phone as soon as possible.  Submit refill requests through [employer] or call your pharmacy and they will forward the refill request to us. Please allow 3 business days for your refill to be completed.          Additional Information About Your Visit        [employer] Information     [employer] lets you send messages to your doctor, view your test results, renew your prescriptions, schedule appointments and more. To sign up, go to [email or web address] . Click on \"Log in\" on the left side of the screen, which will take you to the Welcome page. Then click on \"Sign up Now\" on the right side of the page.     You will be asked to enter the access code listed below, as well as some personal information. Please follow the directions to create your username and password.     Your access code is: [email or web address]  Expires: 4/15/2018  9:13 AM     Your access code will " " in 90 days. If you need help or a new code, please call your [facility] clinic or [phone].        Care EveryWhere ID     This is your Care EveryWhere ID. This could be used by other organizations to access your [facility] medical records  [id number]        Your Vitals Were     Pulse Temperature Respirations Height Pulse Oximetry BMI (Body Mass Index)    89 97.8  F (36.6  C) (Temporal) 14 5' 1.81\" (1.57 m) 99% 24.29 kg/m2       Blood Pressure from Last 3 Encounters:   18 150/88   17 130/80   17 129/75    Weight from Last 3 Encounters:   18 132 lb (59.9 kg)   17 124 lb 12.8 oz (56.6 kg)   17 124 lb 12.8 oz (56.6 kg)              We Performed the Following     *UA reflex to Microscopic     Albumin Random Urine Quantitative with Creat Ratio     CBC with platelets     Comprehensive metabolic panel     [facility]ING CHOICES REFERRAL     Lipid panel reflex to direct LDL Fasting     Spirometry, Breathing Capacity: Normal Order, Clinic Performed     T4, free     TOBACCO CESSATION ORDER FOR      TSH          Today's Medication Changes          These changes are accurate as of: 18 10:38 AM.  If you have any questions, ask your nurse or doctor.               Start taking these medicines.        Dose/Directions    amLODIPine 5 MG tablet   Commonly known as:  NORVASC   Used for:  Essential hypertension   Started by:  Asa Elliott MD        Dose:  5 mg   Take 1 tablet (5 mg) by mouth daily   Quantity:  30 tablet   Refills:  3       order for DME   Used for:  Urinary incontinence, unspecified type   Started by:  Asa Elliott MD        Equipment being ordered: Adult diapers--using 10 per day   Quantity:  300 each   Refills:  10         Stop taking these medicines if you haven't already. Please contact your care team if you have questions.     metoprolol succinate 50 MG 24 hr tablet   Commonly known as:  TOPROL-XL   Stopped by:  Asa Elliott MD              "   Where to get your medicines      These medications were sent to [id number] - [place], [place] - [address])  [address], [place] [address]     Phone:  [phone]     albuterol 108 (90 BASE) MCG/ACT Inhaler    amLODIPine 5 MG tablet    tiotropium 18 MCG capsule         Some of these will need a paper prescription and others can be bought over the counter.  Ask your nurse if you have questions.     Bring a paper prescription for each of these medications     order for DME                Primary Care Provider Office Phone # Fax #    Asa Elliott [phone] [address] [facility] [address]        Equal Access to Services     CHRIS MAGALLANES : Hadii aad ku hadasho Soomaali, waaxda luqadaha, qaybta kaalmada adeegyada, stas waters. So [facility] [phone].    ATENCIÓN: Si habla español, tiene a smart disposición servicios gratuitos de asistencia lingüística. [facility] [phone].    We comply with applicable federal civil rights laws and [place] laws. We do not discriminate on the basis of race, color, national origin, age, disability, sex, sexual orientation, or gender identity.            Thank you!     Thank you for choosing [facility]  for your care. Our goal is always to provide you with excellent care. Hearing back from our patients is one way we can continue to improve our services. Please take a few minutes to complete the written survey that you may receive in the mail after your visit with us. Thank you!             Your Updated Medication List - Protect others around you: Learn how to safely use, store and throw away your medicines at www.disposemymeds.org.          This list is accurate as of: 1/16/18 10:38 AM.  Always use your most recent med list.                   Brand Name Dispense Instructions for use Diagnosis    acetaminophen 500 MG tablet    TYLENOL     Take  "1 tablet (500 mg) by mouth every 6 hours    Ulcer of heel, left, with unspecified severity (H)       albuterol 108 (90 BASE) MCG/ACT Inhaler    PROAIR HFA/PROVENTIL HFA/VENTOLIN HFA    1 Inhaler    Inhale 2 puffs into the lungs every 4 hours as needed for shortness of breath / dyspnea or wheezing    Chronic obstructive pulmonary disease, unspecified COPD type (H)       amLODIPine 5 MG tablet    NORVASC    30 tablet    Take 1 tablet (5 mg) by mouth daily    Essential hypertension       aspirin 81 MG tablet     30 tablet    Take 1 tablet (81 mg) by mouth daily    PVD (peripheral vascular disease) (H)       atorvastatin 40 MG tablet    LIPITOR    90 tablet    TAKE 1 TABLET BY MOUTH EVERY DAY    PAD (peripheral artery disease) (H), Hyperlipidemia LDL goal <100       bisacodyl 5 MG EC tablet    DULCOLAX     Take 15 mg by mouth daily as needed for constipation Reported on 2/24/2017        Calcium carb-Vitamin D 500 mg [place]-200 units 500-200 MG-UNIT per tablet    OSCAL with D;Oyster Shell Calcium     Take 1 tablet by mouth 3 times daily (with meals) Reported on 2/24/2017        clopidogrel 75 MG tablet    PLAVIX    90 tablet    TAKE 1 TABLET BY MOUTH ONCE DAILY    PAD (peripheral artery disease) (H)       fluticasone-salmeterol 45-21 MCG/ACT inhaler    ADVAIR-HFA    36 Inhaler    Inhale 2 puffs into the lungs 2 times daily    Chronic obstructive pulmonary disease, unspecified COPD type (H)       Multiple vitamin Tabs      Take 1 tablet by mouth daily Reported on 2/24/2017        NUTRITIONAL SUPPLEMENT Liqd      Take 4 oz by mouth 2 times daily Reported on 2/24/2017        ONDANSETRON PO      Take 4 mg by mouth every 6 hours as needed for nausea        * OPTIFOAM 4\"X4\" Pads     30 each    1 Application daily    Ulcer of left heel, with unspecified severity (H)       * CURITY KERLIX ROLL Misc     30 each    1 Application daily    Ulcer of left heel, with unspecified severity (H)       order for DME     300 each    Equipment " being ordered: Adult diapers--using 10 per day    Urinary incontinence, unspecified type       polyethylene glycol Packet    MIRALAX/GLYCOLAX    7 packet    Take 17 g by mouth daily as needed for constipation    Ulcer of heel, left, with unspecified severity (H)       sennosides 8.6 MG tablet    SENOKOT     Take 2 tablets by mouth 2 times daily Reported on 2/24/2017        SILVASORB Gel     1 Tube    Externally apply 1 Application topically daily    Ulcer of left heel, with unspecified severity (H)       tiotropium 18 MCG capsule    SPIRIVA HANDIHALER    90 capsule    Inhale contents of one capsule daily.    Chronic obstructive pulmonary disease, unspecified COPD type (H)       ULTIMA INCONTINENCE PAD Misc     30 each    1 Units At Bedtime    Urinary incontinence, unspecified type       * Notice:  This list has 2 medication(s) that are the same as other medications prescribed for you. Read the directions carefully, and ask your doctor or other care provider to review them with you.

## 2018-01-16 NOTE — LETTER
January 17, 2018      [place] Collins  [facility] [address]   [place] [address]        Dear ,    Enclosed is a copy of the laboratory values from this week with results as follows:    1.  There is an increased amount of albumin in the urine over the last year.  This is a result of uncontrolled hypertension.  It usually improves when the blood pressures under better control.  2.  General urinalysis was acceptable except for the protein.  3.  Metabolic panel showed no evidence of diabetes.  The kidney function and liver function tests were normal.  4.  Thyroid status is likely normal but 1 of the thyroid hormones appeared elevated.  As of now here not on thyroid medication so it is a little bit confusing.  Usually if your thyroid was overactive, the TSH would also be abnormal.  We will plan to recheck the thyroid status when you return in 1 month.  5.  The cholesterol and triglyceride levels have increased from 10 months ago.  Be sure you are taking your cholesterol medications (Atorvastatin) on a daily basis.  6.  The CBC showed no anemia and normal white blood cell counts    Please follow-up as planned in approximately 1 month.Please call with any questions or concerns and thank you for your confidence.    Sincerely,        Asa Elliott MD

## 2018-01-17 PROBLEM — R79.89 ELEVATED SERUM FREE T4 LEVEL: Status: ACTIVE | Noted: 2018-01-01

## 2018-01-17 NOTE — TELEPHONE ENCOUNTER
Pharmacy is requesting an alternative to tiotropium (SPIRIVA HANDIHALER) 18 MCG capsule    Insurance will cover advair, BREO, incruse, anoro.  Provider, please review and send Rx to [facility].    If PA is needed:  ID [id number]  Phone - [phone]

## 2018-01-30 NOTE — TELEPHONE ENCOUNTER
Reason for call:  Form  Reason for Call:  Form, our goal is to have forms completed with 72 hours, however, some forms may require a visit or additional information.    Type of letter, form or note:  medical    Who is the form from?:  [employer]    Where did the form come from: form was faxed in    What clinic location was the form placed at?: [facility] - [phone]    Where the form was placed: 's Box    What number is listed as a contact on the form?:  [phone]       Additional comments:  Fax to [phone]    created by Terrie Mejialee

## 2018-06-07 NOTE — PROGRESS NOTES
SUBJECTIVE:   Keira Collins is a 72 year old female who presents to clinic today for the following health issues:      HPI  Acute Illness-    The past 1-2 weeks, this patient has had loss of voice without change in coughing.  There has been no throat pain, no hemoptysis and no difficulty eating or drinking.  He simply has no voice.  She is a lifelong smoker of at least one pack per day.  She has been advised numerous times to discontinue smoking but is failed to do so or even try.    She currently is quite inactive.  No major changes in respiratory status.  She lives with her son.  She has known COPD.   Acute illness concerns: throat issues, difficulty to talk ( questions mostly answer by patient's son)  Onset: 1 week     Fever: no     Chills/Sweats: no     Headache (location?): no     Sinus Pressure:no    Conjunctivitis:  no    Ear Pain: YES- left ear     Rhinorrhea: YES- sometime     Congestion: YES    Sore Throat: no      Cough: YES-productive of clear sputum, productive of yellow sputum, productive of green sputum, with shortness of breath- patient has COPD, patient son think patient COPD getting worse. Pt been wheezing for the past 2-3 weeks now.     Wheeze: YES- getting worse     Decreased Appetite: YES     Nausea: no     Vomiting: no     Diarrhea:  YES- sometimes    Dysuria/Freq.: no     Fatigue/Achiness: YES    Sick/Strep Exposure: no      Therapies Tried and outcome: used inhaler, cough drop    Problem list and histories reviewed & adjusted, as indicated.  Additional history: as documented      Hyperlipidemia Follow-Up      Rate your low fat/cholesterol diet?: fair    Taking statin?  Yes, no muscle aches from statin    Other lipid medications/supplements?:  none    Hypertension Follow-up--The patient's son reports that she has not been taking Amlodipine that has been prescribed in the past.      Outpatient blood pressures are not being checked.    Low Salt Diet: no added salt    Vascular Disease  Follow-up:  Peripheral Vascular Disease (PVD)--currently appears stable at rest.  The heel ulcer on the left heel in the past is stable with an eschar.  No new ulcers present.      Chest pain or pressure, left side neck or arm pain: No    Shortness of breath/increased sweats/nausea with exertion: No -but is essentially sedentary.    Pain in calves walking 1-2 blocks: No-does not walk or ambulate to any great degree.    Worsened or new symptoms since last visit: No    Nitroglycerin use: no    Daily aspirin use: Yes    COPD Follow-Up    Symptoms are currently: stable    Current fatigue or dyspnea with ambulation: Stable at rest.  Patient has not active at all to check exercise endurance.    Shortness of breath: stable    Increased or change in Cough/Sputum: No    Fever(s): No    Baseline ambulation without stopping to rest: Not active  Able to walk up not actively climbing flights of stairs without stopping to rest.    Any ER/UC or hospital admissions since your last visit? No     History   Smoking Status     Current Every Day Smoker     Packs/day: 1.00     Years: 50.00     Types: Cigarettes   Smokeless Tobacco     Never Used     Comment: advised      No results found for: FEV1, FDU4IPC              ROS:  CONSTITUTIONAL: Possible suggestion of weight loss.  Cannot get an accurate weight today due to generalized weakness and needing a wheelchair.  INTEGUMENTARY/SKIN: NEGATIVE for worrisome rashes, moles or lesions  EYES: NEGATIVE for vision changes or irritation  ENT/MOUTH: Total loss of her voice, suspicious for underlying malignancy.  RESP: Relatively asymptomatic at rest.  Continues to smoke at least one pack of cigarettes per day.  Denies any hemoptysis or new mucus production.  BREAST: NEGATIVE for masses, tenderness or discharge  CV: NEGATIVE for chest pain, palpitations or peripheral edema  CV: Blood pressure not controlled at this time due to noncompliance with taking medication.  GI: NEGATIVE for nausea,  abdominal pain, heartburn, or change in bowel habits.  Previously had abnormal liver enzymes presumably from alcohol use.  Currently has reduced to 2 or 3 beers per day.  : NEGATIVE for frequency, dysuria, or hematuria   female: No vaginal bleeding.  Vaginal discharge.  MUSCULOSKELETAL: No reopening of the ulcer on the left heel.  Still has an eschar.  NEURO: NEGATIVE for weakness, dizziness or paresthesias  ENDOCRINE: NEGATIVE for temperature intolerance, skin/hair changes  HEME: NEGATIVE for bleeding problems  PSYCHIATRIC: NEGATIVE for changes in mood or affect    OBJECTIVE:                                                    /80  Pulse 93  Temp 98.3  F (36.8  C) (Oral)  SpO2 95%  There is no height or weight on file to calculate BMI.  GENERAL: alert and thin appearing, older than her stated age.  No major distress.  EYES: Eyes grossly normal to inspection, extraocular movements - intact, and PERRL  HENT: ear canals- normal; TMs- normal; Nose- normal; Mouth- no ulcers, no lesions  NECK: no tenderness, no adenopathy, no asymmetry, no masses, no stiffness; thyroid- normal to palpation  NECK: No adenopathy or palpable masses present  RESP: lungs clear to auscultation - no rales, no rhonchi, no wheezes  RESP: decreased breath sounds throughout  CV: regular rates and rhythm, normal S1 S2, no S3 or S4 and no murmur, no click or rub -  ABDOMEN: soft, no tenderness, no  hepatosplenomegaly, no masses, normal bowel sounds  MS: extremities- no gross deformities noted, no edema  MS: No peripheral edema.  No palpable pulses.  Thin lower extremities.  No resting ischemia suggested.  SKIN: no suspicious lesions, no rashes  NEURO: strength and tone- normal, sensory exam- grossly normal, mentation- intact, speech- normal, reflexes- symmetric  BACK: no CVA tenderness, no paralumbar tenderness  PSYCH: Alert and oriented times 3; speech- coherent , normal rate and volume; able to articulate logical thoughts, able to  abstract reason, no tangential thoughts, no hallucinations or delusions, affect- normal  LYMPHATICS: ant. cervical- normal, post. cervical- normal, axillary- normal, supraclavicular- normal, inguinal- normal    Diagnostic test results:  Diagnostic Test Results:  Results for orders placed or performed in visit on 06/11/18 (from the past 24 hour(s))   Comprehensive metabolic panel   Result Value Ref Range    Sodium 139 133 - 144 mmol/L    Potassium 3.9 3.4 - 5.3 mmol/L    Chloride 104 94 - 109 mmol/L    Carbon Dioxide 27 20 - 32 mmol/L    Anion Gap 8 3 - 14 mmol/L    Glucose 97 70 - 99 mg/dL    Urea Nitrogen 8 7 - 30 mg/dL    Creatinine 0.68 0.52 - 1.04 mg/dL    GFR Estimate 85 >60 mL/min/1.7m2    GFR Estimate If Black >90 >60 mL/min/1.7m2    Calcium 9.0 8.5 - 10.1 mg/dL    Bilirubin Total 0.4 0.2 - 1.3 mg/dL    Albumin 3.7 3.4 - 5.0 g/dL    Protein Total 8.1 6.8 - 8.8 g/dL    Alkaline Phosphatase 100 40 - 150 U/L    ALT 18 0 - 50 U/L    AST 21 0 - 45 U/L   CBC with platelets   Result Value Ref Range    WBC 7.5 4.0 - 11.0 10e9/L    RBC Count 5.01 3.8 - 5.2 10e12/L    Hemoglobin 15.4 11.7 - 15.7 g/dL    Hematocrit 47.1 (H) 35.0 - 47.0 %    MCV 94 78 - 100 fl    MCH 30.7 26.5 - 33.0 pg    MCHC 32.7 31.5 - 36.5 g/dL    RDW 12.8 10.0 - 15.0 %    Platelet Count 175 150 - 450 10e9/L   Lipid panel reflex to direct LDL Fasting   Result Value Ref Range    Cholesterol 191 <200 mg/dL    Triglycerides 108 <150 mg/dL    HDL Cholesterol 44 (L) >49 mg/dL    LDL Cholesterol Calculated 125 (H) <100 mg/dL    Non HDL Cholesterol 147 (H) <130 mg/dL   TSH   Result Value Ref Range    TSH 1.36 0.40 - 4.00 mU/L   T4, free   Result Value Ref Range    T4 Free 1.88 (H) 0.76 - 1.46 ng/dL        Xr Chest 2 Views    Result Date: 6/11/2018  CHEST TWO VIEWS   6/11/2018 10:42 AM HISTORY: Acute loss of voice with history of COPD. Some productive cough. Loss of voice. Chronic obstructive pulmonary disease, unspecified COPD type (H). COMPARISON: None.  FINDINGS:  Lungs are mildly hyperaerated. There is a nodule in the upper outer right lung measures up to 1.6 cm in diameter with questionable spiculations. Questionable second nodule seen in the left upper lung, projected between the posterior left fourth and fifth ribs. The lungs are otherwise grossly clear. Heart size and pulmonary vascularity are within normal limits. No pneumothorax or significant pleural fluid collection. Degenerative changes of the right proximal humerus are noted. There are atherosclerotic calcifications of the aorta.     IMPRESSION: 1. Soft tissue nodule with questionable spiculations in the upper outer right lung. Questionable nodule projected over the left upper lung. Further evaluation with CT chest is recommended. 2. Irregularity of the proximal right humerus likely secondary to degenerative change. This could also be secondary to healing fracture. Other etiologies are considered less likely. 3. Hyperaeration could be secondary to COPD. I discussed the finding of the nodules and recommendations of CT chest with Dr. Elliott on 6/11/2018 at 11:25 AM.    ASSESSMENT/PLAN:                                                    1. Loss of voice  Given the chest x-ray findings, suspect this will be due to possibly involvement of the recurrent laryngeal nerve from metastatic lung cancer.  We will be checking a CT scan with IV contrast with ENT consultation.  - XR Chest 2 Views; Future  - OTOLARYNGOLOGY REFERRAL    2. Essential hypertension with goal blood pressure less than 140/90  Noncompliant with medications for hypertension.  Restarting Amlodipine 5 mg daily and adding Losartan 50 mg daily.  Follow-up in approximately 4 weeks.  - Comprehensive metabolic panel  - Lipid panel reflex to direct LDL Fasting  - losartan (COZAAR) 50 MG tablet; Take 1 tablet (50 mg) by mouth daily  Dispense: 30 tablet; Refill: 3    3. Abnormal liver enzymes  Appears to have resolved and are now normal.  Not consuming as  much alcohol as in the past but still 2-3 beers per day.  - Comprehensive metabolic panel    4. Chronic obstructive pulmonary disease, unspecified COPD type (H)  Advanced secondary to smoking.  Continue current inhalers  - CBC with platelets  - XR Chest 2 Views; Future    5. Abnormality of lung on CXR  Strong suspicion for right upper lobe lung cancer.  CT scan to be obtained.    6. Elevated serum free T4 level  Persistent but without major progression.  Continue to monitor.  Thyroid will be evaluated partially on CT scanning.  Will include a CT scan of the neck as well as the chest.  - TSH  - T4, free    7. Hyperlipidemia LDL goal <100  We will check on compliance with the use of Atorvastatin.  Likely not taking regularly.  - amLODIPine (NORVASC) 5 MG tablet; Take 1 tablet (5 mg) by mouth daily  Dispense: 30 tablet; Refill: 3    8. PVD (peripheral vascular disease) (H)   Stable    9. Tobacco abuse  Again encouraged to discontinue all tobacco use.  - Tobacco Cessation - Order to Satisfy Health Maintenance      Follow up with Provider -ENT evaluation pending.  Return in the next 3-4 weeks for hypertension follow-up.  See Patient Instructions    The patient understood the rational for the diagnosis and treatment plan. All questions were answered to best of my ability and the patient's satisfaction.    Note: Chart documentation done in part with Dragon Voice Recognition software. Although reviewed after completion, some word and grammatical errors may remain.  Please consider this when interpreting information in this chart.    Asa Elliott MD  [facility]

## 2018-06-11 PROBLEM — I10 ESSENTIAL HYPERTENSION: Status: RESOLVED | Noted: 2018-01-01 | Resolved: 2018-01-01

## 2018-06-11 NOTE — LETTER
June 11, 2018      Keira Collins  [address] [place] [address]      Dear Keira,    Enclosed is a copy of the laboratory studies from today with results as follows:    1.  Thyroid studies are stable.  The slight abnormality in thyroid hormone is stable.  Will also be checking the structure of the thyroid with the CT scan.  2.  The metabolic panel shows normal liver function, normal kidney function and no diabetes.  3.  The total cholesterol and LDL cholesterol are higher than desired given your vascular disease.  Be sure you are taking Atorvastatin 40 mg daily on a regular basis to control the cholesterol.  4.  The CBC is normal with no anemia or signs of infection.    Follow-up as planned or discussed. Please call with any questions or concerns and thank you for your confidence.      Sincerely,      Asa Elliott MD

## 2018-06-11 NOTE — MR AVS SNAPSHOT
After Visit Summary   6/11/2018    Keira Collins    MRN: [id number]           Patient Information     Date Of Birth          1945        Visit Information        Provider Department      6/11/2018 9:40 AM Asa [facility]        Today's Diagnoses     Loss of voice    -  1    Essential hypertension with goal blood pressure less than 140/90        Abnormal liver enzymes        Chronic obstructive pulmonary disease, unspecified COPD type (H)        Elevated serum free T4 level        Hyperlipidemia LDL goal <100        PVD (peripheral vascular disease) (H)        Tobacco use        Tobacco abuse          Care Instructions    These are new changes to your current plan of care based on today's visit:    Medications stopped    Medications to be started Amlodipine 5 mg each morning for blood pressure    Losartan 50 mg daily each evening for blood pressure   Change dose of this medication    New treatments        Follow up appointments:    1.  FOLLOW UP WITH YOUR PRIMARY CARE PROVIDER: 6 week(s) for hypertension    2. FOLLOW UP WITH SPECIALIST: ENT as planned    Stop all tobacco use.    Asa Elliott MD          HOW TO QUIT SMOKING  Smoking is one of the hardest habits to break. About half of all those who have ever smoked have been able to quit, and most of those (about 70%) who still smoke want to quit. Here are some of the best ways to stop smoking.     KEEP TRYING:  It takes most smokers about 8 tries before they are finally able to fully quit. So, the more often you try and fail, the better your chance of quitting the next time! So, don't give up!    GO COLD TURKEY:  Most ex-smokers quit cold turkey. Trying to cut back gradually doesn't seem to work as well, perhaps because it continues the smoking habit. Also, it is possible to fool yourself by inhaling more while smoking fewer cigarettes. This results in the same amount of nicotine in your body!    GET  SUPPORT:  Support programs can make an important difference, especially for the heavy smoker. These groups offer lectures, methods to change your behavior and peer support. Call the free national Quitline for more information. 800-QUIT-NOW (075-727-7867). Low-cost or free programs are offered by many hospitals, local chapters of the American Lung Association (960-703-2092) and the American Cancer Society (815-624-8363). Support at home is important too. Non-smokers can help by offering praise and encouragement. If the smoker fails to quit, encourage them to try again!    OVER-THE-COUNTER MEDICINES:  For those who can't quit on their own, Nicotine Replacement Therapy (NRT) may make quitting much easier. Certain aids such as the nicotine patch, gum and lozenge are available without a prescription. However, it is best to use these under the guidance of your doctor. The skin patch provides a steady supply of nicotine to the body. Nicotine gum and lozenge gives temporary bursts of low levels of nicotine. Both methods take the edge off the craving for cigarettes. WARNING: If you feel symptoms of nicotine overdose, such as nausea, vomiting, dizziness, weakness, or fast heartbeat, stop using these and see your doctor.    PRESCRIPTION MEDICINES:  After evaluating your smoking patterns and prior attempts at quitting, your doctor may offer a prescription medicine such as bupropion (Zyban, Wellbutrin), varenicline (Chantix, Champix), a niocotine inhaler or nasal spray. Each has its unique advantage and side effects which your doctor can review with you.    HEALTH BENEFITS OF QUITTING:  The benefits of quitting start right away and keep improving the longer you go without smokin minutes: blood pressure and pulse return to normal  8 hours: oxygen levels return to normal  2 days: ability to smell and taste begins to improve as damaged nerves start to regrow  2-3 weeks: circulation and lung function improves  1-9 months:  decreased cough, congestion and shortness of breath; less tired  1 year: risk of heart attack decreases by half  5 years: risk of lung cancer decreases by half; risk of stroke becomes the same as a non-smoker  For information about how to quit smoking, visit the following links:  [employer] ,   Clearing the Air, Quit Smoking Today   - an online booklet. http://www.smokefree.gov/pubs/clearing_the_air.pdf  Smokefree.gov http://smokefree.gov/  QuitNet http://www.quitnet.com/    9992-1786 [address], [address] [place] [address], [place] [address]. All rights reserved. This information is not intended as a substitute for professional medical care. Always follow your healthcare professional's instructions.    The Benefits of Living Smoke Free  What do you want to gain from quitting? Check off some reasons to quit.  Health Benefits  ___ Reduce my risk of lung cancer, heart disease, chronic lung disease  ___ Have fewer wrinkles and softer skin  ___ Improve my sense of taste and smell  ___ For pregnant women--reduce the risk of having a miscarriage, stillbirth, premature birth, or low-birth-weight baby  Personal Benefits  ___ Feel more in control of my life  ___ Have better-smelling hair, breath, clothes, home, and car  ___ Save time by not having to take smoke breaks, buy cigarettes, or hunt for a light  ___ Have whiter teeth  Family Benefits  ___ Reduce my children s respiratory tract infections  ___ Set a good example for my children  ___ Reduce my family s cancer risk  Financial Benefits  ___ Save hundreds of dollars each year that would be spent on cigarettes  ___ Save money on medical bills  ___ Save on life, health, and car insurance premiums    Those Dollars Add Up!  Cigarettes are expensive, and getting more expensive all the time. Do you realize how much money you are spending on cigarettes per year? What is the average amount you spend on a pack of cigarettes? What is the average number of packs  that you smoke per day? Using your answers to these questions, fill in this formula to help you find out:  ($ _____ per pack) ×  ( _____ number of packs per day) × (365 days) =  $ _____ yearly cost of smoking  Besides tobacco, there are other costs, including extra cleaning bills and replacement costs for clothing and furniture; medical expenses for smoking-related illnesses; and higher health, life, and car insurance premiums.    Cigars and Pipes Count Too!  Cigars and pipes are also dangerous. So are smokeless (chewing) tobacco and snuff. All of these products contain nicotine, a highly addictive substance that has harmful effects on your body. Quitting smoking means giving up all tobacco products.      3112-8716 [facility], [address] [place] [address], [place] [address]. All rights reserved. This information is not intended as a substitute for professional medical care. Always follow your healthcare professional's instructions.          Follow-ups after your visit        Additional Services     OTOLARYNGOLOGY REFERRAL       Your provider has referred you to: [facility]: [facility] [phone]   [email or web address]    Please be aware that coverage of these services is subject to the terms and limitations of your health insurance plan.  Call member services at your health plan with any benefit or coverage questions.      Please bring the following with you to your appointment:    (1) Any X-Rays, CTs or MRIs which have been performed.  Contact the facility where they were done to arrange for  prior to your scheduled appointment.   (2) List of current medications  (3) This referral request   (4) Any documents/labs given to you for this referral                  Follow-up notes from your care team     Return in about 6 weeks (around 7/23/2018) for Routine Visit.      Your next 10 appointments already scheduled     Jun 27, 2018  9:30  AM CDT   New Visit with Holger Luna MD   [facility] ([facility]    [address] [address] [place] [id number] [phone]              Who to contact     If you have questions or need follow up information about today's clinic visit or your schedule please contact [facility] directly at [phone].  Normal or non-critical lab and imaging results will be communicated to you by MyChart, letter or phone within 4 business days after the clinic has received the results. If you do not hear from us within 7 days, please contact the clinic through MyChart or phone. If you have a critical or abnormal lab result, we will notify you by phone as soon as possible.  Submit refill requests through Off & Away or call your pharmacy and they will forward the refill request to us. Please allow 3 business days for your refill to be completed.          Additional Information About Your Visit        Care EveryWhere ID     This is your Care EveryWhere ID. This could be used by other organizations to access your [place] medical records  [id number]        Your Vitals Were     Pulse Temperature Pulse Oximetry             93 98.3  F (36.8  C) (Oral) 95%          Blood Pressure from Last 3 Encounters:   06/11/18 160/80   01/16/18 150/88   02/24/17 130/80    Weight from Last 3 Encounters:   01/16/18 132 lb (59.9 kg)   01/31/17 124 lb 12.8 oz (56.6 kg)   01/29/17 124 lb 12.8 oz (56.6 kg)              We Performed the Following     CBC with platelets     Comprehensive metabolic panel     Lipid panel reflex to direct LDL Fasting     OTOLARYNGOLOGY REFERRAL     T4, free     Tobacco Cessation - Order to Satisfy Health Maintenance     TSH          Today's Medication Changes          These changes are accurate as of 6/11/18 11:17 AM.  If you have any questions, ask your nurse or doctor.               Start taking these medicines.        Dose/Directions    losartan 50 MG tablet    Commonly known as:  COZAAR   Used for:  Essential hypertension with goal blood pressure less than 140/90   Started by:  Asa Elliott MD        Dose:  50 mg   Take 1 tablet (50 mg) by mouth daily   Quantity:  30 tablet   Refills:  3            Where to get your medicines      These medications were sent to [facility] - [place], [place] - [address] AT [facility] [address])  [address], [place] [address]     Phone:  [phone]     amLODIPine 5 MG tablet    losartan 50 MG tablet                Primary Care Provider Office Phone # Fax #    Asa Elliott [phone] [address] [facility] [place] [address]        Equal Access to Services     KIRSTEN MAGALLANES : Hadii stoney amin hadasho Soomaali, waaxda luqadaha, qaybta kaalmada adeegyada, waxay alekseyin haymarn karla fosneca . So [facility] [phone].    ATENCIÓN: Si habla español, tiene a smart disposición servicios gratuitos de asistencia lingüística. [facility] [phone].    We comply with applicable federal civil rights laws and [place] laws. We do not discriminate on the basis of race, color, national origin, age, disability, sex, sexual orientation, or gender identity.            Thank you!     Thank you for choosing [facility]  for your care. Our goal is always to provide you with excellent care. Hearing back from our patients is one way we can continue to improve our services. Please take a few minutes to complete the written survey that you may receive in the mail after your visit with us. Thank you!             Your Updated Medication List - Protect others around you: Learn how to safely use, store and throw away your medicines at www.disposemymeds.org.          This list is accurate as of 6/11/18 11:17 AM.  Always use your most recent med list.                   Brand Name Dispense Instructions for use Diagnosis    acetaminophen 500 MG tablet    TYLENOL     Take 1 tablet  "(500 mg) by mouth every 6 hours    Ulcer of heel, left, with unspecified severity (H)       albuterol 108 (90 Base) MCG/ACT Inhaler    PROAIR HFA/PROVENTIL HFA/VENTOLIN HFA    1 Inhaler    Inhale 2 puffs into the lungs every 4 hours as needed for shortness of breath / dyspnea or wheezing    Chronic obstructive pulmonary disease, unspecified COPD type (H)       amLODIPine 5 MG tablet    NORVASC    30 tablet    Take 1 tablet (5 mg) by mouth daily    Hyperlipidemia LDL goal <100       aspirin 81 MG tablet     30 tablet    Take 1 tablet (81 mg) by mouth daily    PVD (peripheral vascular disease) (H)       atorvastatin 40 MG tablet    LIPITOR    90 tablet    TAKE 1 TABLET BY MOUTH EVERY DAY    PAD (peripheral artery disease) (H), Hyperlipidemia LDL goal <100       bisacodyl 5 MG EC tablet    DULCOLAX     Take 15 mg by mouth daily as needed for constipation Reported on 2/24/2017        Calcium carb-Vitamin D 500 mg Sokaogon-200 units 500-200 MG-UNIT per tablet    OSCAL with D;Oyster Shell Calcium     Take 1 tablet by mouth 3 times daily (with meals) Reported on 2/24/2017        clopidogrel 75 MG tablet    PLAVIX    90 tablet    TAKE 1 TABLET BY MOUTH ONCE DAILY    PAD (peripheral artery disease) (H)       fluticasone-salmeterol 45-21 MCG/ACT inhaler    ADVAIR-HFA    36 Inhaler    Inhale 2 puffs into the lungs 2 times daily    Chronic obstructive pulmonary disease, unspecified COPD type (H)       losartan 50 MG tablet    COZAAR    30 tablet    Take 1 tablet (50 mg) by mouth daily    Essential hypertension with goal blood pressure less than 140/90       Multiple vitamin Tabs      Take 1 tablet by mouth daily Reported on 2/24/2017        NUTRITIONAL SUPPLEMENT Liqd      Take 4 oz by mouth 2 times daily Reported on 2/24/2017        ONDANSETRON PO      Take 4 mg by mouth every 6 hours as needed for nausea        * OPTIFOAM 4\"X4\" Pads     30 each    1 Application daily    Ulcer of left heel, with unspecified severity (H)       * " CURITY KERLIX ROLL Misc     30 each    1 Application daily    Ulcer of left heel, with unspecified severity (H)       order for DME     300 each    Equipment being ordered: Adult diapers--using 10 per day    Urinary incontinence, unspecified type       polyethylene glycol Packet    MIRALAX/GLYCOLAX    7 packet    Take 17 g by mouth daily as needed for constipation    Ulcer of heel, left, with unspecified severity (H)       sennosides 8.6 MG tablet    SENOKOT     Take 2 tablets by mouth 2 times daily Reported on 2/24/2017        SILVASORB Gel     1 Tube    Externally apply 1 Application topically daily    Ulcer of left heel, with unspecified severity (H)       ULTIMA INCONTINENCE PAD Misc     30 each    1 Units At Bedtime    Urinary incontinence, unspecified type       umeclidinium 62.5 MCG/INH oral inhaler    INCRUSE ELLIPTA    30 Inhaler    Inhale 1 puff into the lungs daily    Chronic obstructive pulmonary disease, unspecified COPD type (H)       * Notice:  This list has 2 medication(s) that are the same as other medications prescribed for you. Read the directions carefully, and ask your doctor or other care provider to review them with you.

## 2018-06-11 NOTE — TELEPHONE ENCOUNTER
Patient's son was called and informed that her chest x-ray was read by the radiologist is suspicious for right upper lobe malignancy.  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    We will need a CT scan with IV contrast.  We will be ordering a CT scan of the chest and neck with IV contrast that Sybil Weathers.  To be scheduled at the earliest convenience and then contact the patient with details.  He had no conflicts with the first available appointment.    Asa Elliott MD  Please close encounter when call/work completed.

## 2018-06-11 NOTE — PATIENT INSTRUCTIONS
These are new changes to your current plan of care based on today's visit:    Medications stopped    Medications to be started Amlodipine 5 mg each morning for blood pressure    Losartan 50 mg daily each evening for blood pressure   Change dose of this medication    New treatments        Follow up appointments:    1.  FOLLOW UP WITH YOUR PRIMARY CARE PROVIDER: 6 week(s) for hypertension    2. FOLLOW UP WITH SPECIALIST: ENT as planned    Stop all tobacco use.    Asa Elliott MD          HOW TO QUIT SMOKING  Smoking is one of the hardest habits to break. About half of all those who have ever smoked have been able to quit, and most of those (about 70%) who still smoke want to quit. Here are some of the best ways to stop smoking.     KEEP TRYING:  It takes most smokers about 8 tries before they are finally able to fully quit. So, the more often you try and fail, the better your chance of quitting the next time! So, don't give up!    GO COLD TURKEY:  Most ex-smokers quit cold turkey. Trying to cut back gradually doesn't seem to work as well, perhaps because it continues the smoking habit. Also, it is possible to fool yourself by inhaling more while smoking fewer cigarettes. This results in the same amount of nicotine in your body!    GET SUPPORT:  Support programs can make an important difference, especially for the heavy smoker. These groups offer lectures, methods to change your behavior and peer support. Call the free national Quitline for more information. 800-QUIT-NOW (214-849-8035). Low-cost or free programs are offered by many hospitals, local chapters of the American Lung Association (984-719-0682) and the American Cancer Society (081-755-1641). Support at home is important too. Non-smokers can help by offering praise and encouragement. If the smoker fails to quit, encourage them to try again!    OVER-THE-COUNTER MEDICINES:  For those who can't quit on their own, Nicotine Replacement Therapy (NRT) may make  quitting much easier. Certain aids such as the nicotine patch, gum and lozenge are available without a prescription. However, it is best to use these under the guidance of your doctor. The skin patch provides a steady supply of nicotine to the body. Nicotine gum and lozenge gives temporary bursts of low levels of nicotine. Both methods take the edge off the craving for cigarettes. WARNING: If you feel symptoms of nicotine overdose, such as nausea, vomiting, dizziness, weakness, or fast heartbeat, stop using these and see your doctor.    PRESCRIPTION MEDICINES:  After evaluating your smoking patterns and prior attempts at quitting, your doctor may offer a prescription medicine such as bupropion (Zyban, Wellbutrin), varenicline (Chantix, Champix), a niocotine inhaler or nasal spray. Each has its unique advantage and side effects which your doctor can review with you.    HEALTH BENEFITS OF QUITTING:  The benefits of quitting start right away and keep improving the longer you go without smokin minutes: blood pressure and pulse return to normal  8 hours: oxygen levels return to normal  2 days: ability to smell and taste begins to improve as damaged nerves start to regrow  2-3 weeks: circulation and lung function improves  1-9 months: decreased cough, congestion and shortness of breath; less tired  1 year: risk of heart attack decreases by half  5 years: risk of lung cancer decreases by half; risk of stroke becomes the same as a non-smoker  For information about how to quit smoking, visit the following links:  National Cancer [place] ,   Clearing the Air, Quit Smoking Today   - an online booklet. http://www.smokefree.gov/pubs/clearing_the_air.pdf  Smokefree.gov http://smokefree.gov/  QuitNet http://www.quitnet.com/    1668-0051 [address], [address] [place] [address], [place] [address]. All rights reserved. This information is not intended as a substitute for professional medical care. Always follow your  healthcare professional's instructions.    The Benefits of Living Smoke Free  What do you want to gain from quitting? Check off some reasons to quit.  Health Benefits  ___ Reduce my risk of lung cancer, heart disease, chronic lung disease  ___ Have fewer wrinkles and softer skin  ___ Improve my sense of taste and smell  ___ For pregnant women reduce the risk of having a miscarriage, stillbirth, premature birth, or low-birth-weight baby  Personal Benefits  ___ Feel more in control of my life  ___ Have better-smelling hair, breath, clothes, home, and car  ___ Save time by not having to take smoke breaks, buy cigarettes, or hunt for a light  ___ Have whiter teeth  Family Benefits  ___ Reduce my children s respiratory tract infections  ___ Set a good example for my children  ___ Reduce my family s cancer risk  Financial Benefits  ___ Save hundreds of dollars each year that would be spent on cigarettes  ___ Save money on medical bills  ___ Save on life, health, and car insurance premiums    Those Dollars Add Up!  Cigarettes are expensive, and getting more expensive all the time. Do you realize how much money you are spending on cigarettes per year? What is the average amount you spend on a pack of cigarettes? What is the average number of packs that you smoke per day? Using your answers to these questions, fill in this formula to help you find out:  ($ _____ per pack) ×  ( _____ number of packs per day) × (365 days) =  $ _____ yearly cost of smoking  Besides tobacco, there are other costs, including extra cleaning bills and replacement costs for clothing and furniture; medical expenses for smoking-related illnesses; and higher health, life, and car insurance premiums.    Cigars and Pipes Count Too!  Cigars and pipes are also dangerous. So are smokeless (chewing) tobacco and snuff. All of these products contain nicotine, a highly addictive substance that has harmful effects on your body. Quitting smoking means giving up  all tobacco products.      4567-1211 [facility], [address] [place] [address], [place] [address]. All rights reserved. This information is not intended as a substitute for professional medical care. Always follow your healthcare professional's instructions.

## 2018-06-13 NOTE — TELEPHONE ENCOUNTER
The patient's son was called and informed of the abnormalities on the CT scans.    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  Also has a major tumor growth in the laryngeal area, possibly a second primary.  Also has thyroid abnormalities on CT scan and an ultrasound will be needed to further address.  Her T4 free was slightly elevated but TSH was normal.    To assist in scheduling the followin.  Oncology consultation/referral as soon as possible at [place]    2.  Schedule thyroid ultrasound at [facility] and [place]    3.  Scheduled for ENT evaluation in 2 weeks.  Advised that she may develop some respiratory difficulties and to go to the emergency room if this happens.  If needed, may need emergency tracheostomy.    The patient's son to be called back with scheduling times in place.    The patient understood the rational for the diagnosis and treatment plan. All questions were answered to best of my ability and the patient's satisfaction.    Asa Elliott MD

## 2018-06-14 NOTE — TELEPHONE ENCOUNTER
Left detailed message informing patient's son about the US appt.    TC to follow up on onc appt & to inform pt's son on remainder of Providers message below.

## 2018-06-14 NOTE — TELEPHONE ENCOUNTER
Message received from Dr. 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  She does not feel medical oncology needs to be involved initially.    She did recommend strong consideration to be seen at the [facility] at ENT and evaluated there.      Please call the son and inform of the oncologist recommendation and then schedule at the [facility] campus if he is agreeable.  That will likely be the safest and most efficient way to find the diagnosis.  An order is in place.    Asa Elliott MD

## 2018-06-14 NOTE — TELEPHONE ENCOUNTER
[phone]  Oncology would not schedule the appt.  She said pt has to \"have a work up first before the oncologist would see pt.\"  This includes ENT and pulmonology. I informed her that the pt has an appt scheduled with ENT.  Can we just get an appointment on the books after the ENT appt?  She said no.  Provider, please advise.    Ultrasound scheduled for 6/15/2018 at 10AM at [facility] in       "

## 2018-06-14 NOTE — TELEPHONE ENCOUNTER
[facility] is booking into August for ENT appt.  They are scheduled 6/27 at  with Dr edge (the same provider that they would see at the [facility]).  Just going to keep the  appt date and let Dr. Edge advise from there.    Alfred Dodge of this.    SCOT for Provider.

## 2018-06-14 NOTE — TELEPHONE ENCOUNTER
We will proceed with [place] so as not to delay evaluation.  Dr. Boudreaux would be able to arrange further studies as needed.    Asa Elliott MD

## 2018-06-14 NOTE — TELEPHONE ENCOUNTER
After the current information from scheduling, we will need to schedule with pulmonary medicine prior to being able to schedule with oncology. Order has been placed.    Also a staff message has been sent to oncology to review for an early appointment.      Asa Elliott MD

## 2018-06-15 PROBLEM — E04.1 THYROID NODULE: Status: ACTIVE | Noted: 2018-01-01

## 2018-06-27 PROBLEM — J38.7 LARYNGEAL MASS: Status: ACTIVE | Noted: 2018-01-01

## 2018-06-27 NOTE — NURSING NOTE
Patient does not know her medications.    Keira Collins's goals for this visit include:   Chief Complaint   Patient presents with     Consult     Loss of voice       She requests these members of her care team be copied on today's visit information: yes      PCP: Asa Elliott    Referring Provider:  No referring provider defined for this encounter.    /84  Pulse 92    Marla Brown CMA ([facility])

## 2018-06-27 NOTE — LETTER
6/27/2018         RE: Keira Collins  [address] [place] [address]        Dear Colleague,    Thank you for referring your patient, Keira Collins, to the [facility]. Please see a copy of my visit note below.      HISTORY OF PRESENT ILLNESS: Keira Collins is a 72 year old female with a history of voice loss beginning around the start of June 2008.  She had no associated throat pain or difficulty eating and drinking.  She is along term smoker of at least one pack per day. She saw Dr. Elliott on 6/11/2018.  A CT scan of the neck was obtained on 6/13/2018 that showed enhancing nodular thickening of bilateral true and false vocal folds, anterior commissure, epiglottis and left area epiglottic fold extending to the left paraglottic fat and into the preepiglottic fat.  It was felt this was compatible with squamous cell cancer.  Bilateral level 3 lymphadenopathy was also seen.  A chest CT is showed a spiculated nodule in the right upper lobe measuring up to 1.5 cm which is also concerning for malignancy.  Multiple bilateral smaller nodules ranging from 4-7 mm which may represent metastasis were also seen.    She comments that she has been smoking ever since the age of 2.  She is able to take a normal diet and has no airway concerns.  Her vocal quality is variable.  She does complain of left ear pain.      PAST MEDICAL HISTORY:   Past Medical History:   Diagnosis Date     Cholelithiasis      Essential hypertension with goal blood pressure less than 140/90      H/O: alcohol abuse      Hyperlipidemia LDL goal <100      Tobacco abuse        PAST SURGICAL HISTORY:   Past Surgical History:   Procedure Laterality Date     SURGICAL HISTORY OF -   5/13/2016    right hip fracture       FAMILY HISTORY:   Family History   Problem Relation Age of Onset     Chronic Obstructive Pulmonary Disease Daughter      Diabetes Daughter      HEART DISEASE Daughter        SOCIAL HISTORY:   Social History  "  Substance Use Topics     Smoking status: Current Every Day Smoker     Packs/day: 1.00     Years: 50.00     Types: Cigarettes     Smokeless tobacco: Never Used      Comment: advised      Alcohol use 0.0 oz/week     0 Standard drinks or equivalent per week      Comment: Occassionally- beer        REVIEW OF SYSTEMS: Ten point review of systems was performed and is negative except for what is noted in the HPI and PMH.  Also positive for rhinorrhea, congestion, wheezing, decreased appetite, occasional diarrhea and neuropathy of the left leg.    ALLERGIES: Penicillins    MEDICATIONS:   Current Outpatient Prescriptions   Medication Sig Dispense Refill     acetaminophen (TYLENOL) 500 MG tablet Take 1 tablet (500 mg) by mouth every 6 hours       albuterol (PROAIR HFA/PROVENTIL HFA/VENTOLIN HFA) 108 (90 BASE) MCG/ACT Inhaler Inhale 2 puffs into the lungs every 4 hours as needed for shortness of breath / dyspnea or wheezing 1 Inhaler 9     amLODIPine (NORVASC) 5 MG tablet Take 1 tablet (5 mg) by mouth daily 30 tablet 3     aspirin 81 MG tablet Take 1 tablet (81 mg) by mouth daily 30 tablet      atorvastatin (LIPITOR) 40 MG tablet TAKE 1 TABLET BY MOUTH EVERY DAY 90 tablet 1     bisacodyl (DULCOLAX) 5 MG EC tablet Take 15 mg by mouth daily as needed for constipation Reported on 2/24/2017       calcium carb 1250 mg, 500 mg [place],/vitamin D 200 units (OSCAL WITH D) 500-200 MG-UNIT per tablet Take 1 tablet by mouth 3 times daily (with meals) Reported on 2/24/2017       clopidogrel (PLAVIX) 75 MG tablet TAKE 1 TABLET BY MOUTH ONCE DAILY 90 tablet 1     fluticasone-salmeterol (ADVAIR-HFA) 45-21 MCG/ACT inhaler Inhale 2 puffs into the lungs 2 times daily 36 Inhaler 1     Gauze Pads & Dressings (CURITY KERLIX ROLL) MISC 1 Application daily 30 each 1     Gauze Pads & Dressings (OPTIFOAM) 4\"X4\" PADS 1 Application daily 30 each 1     Incontinence Supply Disposable (ULTIMA INCONTINENCE PAD) MISC 1 Units At Bedtime 30 each 5     losartan " (COZAAR) 50 MG tablet Take 1 tablet (50 mg) by mouth daily 30 tablet 3     Multiple vitamin TABS Take 1 tablet by mouth daily Reported on 2/24/2017       NUTRITIONAL SUPPLEMENT LIQD Take 4 oz by mouth 2 times daily Reported on 2/24/2017       ONDANSETRON PO Take 4 mg by mouth every 6 hours as needed for nausea       order for DME Equipment being ordered: Adult diapers--using 10 per day 300 each 10     polyethylene glycol (MIRALAX/GLYCOLAX) Packet Take 17 g by mouth daily as needed for constipation 7 packet      sennosides (SENOKOT) 8.6 MG tablet Take 2 tablets by mouth 2 times daily Reported on 2/24/2017       umeclidinium (INCRUSE ELLIPTA) 62.5 MCG/INH oral inhaler Inhale 1 puff into the lungs daily 30 Inhaler 5     Wound Dressings (SILVASORB) GEL Externally apply 1 Application topically daily (Patient not taking: Reported on 6/11/2018) 1 Tube 1       PHYSICAL EXAMINATION:  She  is awake, alert and in no apparent distress.    Her tympanic membranes are clear and intact bilaterally. External auditory canals are clear.  Nasal exam shows a mild septal deviation without obstruction.  Examination of the oral cavity shows no suspicious lesions. She is edentulous.  There is symmetric movement of the tongue and soft palate.    The oropharynx is clear.  Her neck is thin,  Supple and without significant adenopathy.  I was unable to palpate the level 3 nodes detected on the CT scan.  Pulse is regular.  Upper airway is clear.  Cranial nerves II-XII are grossly intact.       PROCEDURE: A flexible laryngoscopy  was performed.  Informed consent was obtained and a time out was performed.  The scope was passed through the right sided nostril. Examination showed the vocal folds to be mobile and meet in the midline.  An exophytic mass was present along the left false vocal fold extending around to the laryngeal surface of the epiglottis.  The mucosa over the arytenoids did not appear to be involved however the mass did come close to  the arytenoid structure.  The left vocal fold appeared chronically inflamed.  No mucosal ulcerations of the left vocal fold were seen but the endoscopy was limited by gag.  The majority of the right vocal fold appears clear.  The immediate subglottic area is clear.  There is an excellent airway.  The hypopharynx is clear and there is no pooling of saliva.        IMPRESSION/PLAN: Laryngeal mass with a history and findings consistent with a laryngeal malignancy.  I did call the [facility] and they will help arrange an appointment with a head and neck oncologist for further evaluation and recommendation.  I advised Mrs. Collins and her son (Dar who is her ) that a laryngoscopy and biopsy are likely.  All initial questions were answered.  I did inform them that I was suspicious for a laryngeal malignancy.    Sincerely,      Holger Luna MD

## 2018-06-27 NOTE — TELEPHONE ENCOUNTER
You placed a referral for patient to oncology on 6/13/18.  Patient has not scheduled as of yet.      Please review and forward to team if follow up with the patient is needed.     Thank you!  Eunice/Clinic Referrals Dyad II

## 2018-06-27 NOTE — PROGRESS NOTES
HISTORY OF PRESENT ILLNESS: Keira Collins is a 72 year old female with a history of voice loss beginning around the start of June 2008.  She had no associated throat pain or difficulty eating and drinking.  She is along term smoker of at least one pack per day. She saw Dr. Elliott on 6/11/2018.  A CT scan of the neck was obtained on 6/13/2018 that showed enhancing nodular thickening of bilateral true and false vocal folds, anterior commissure, epiglottis and left area epiglottic fold extending to the left paraglottic fat and into the preepiglottic fat.  It was felt this was compatible with squamous cell cancer.  Bilateral level 3 lymphadenopathy was also seen.  A chest CT is showed a spiculated nodule in the right upper lobe measuring up to 1.5 cm which is also concerning for malignancy.  Multiple bilateral smaller nodules ranging from 4-7 mm which may represent metastasis were also seen.    She comments that she has been smoking ever since the age of 2.  She is able to take a normal diet and has no airway concerns.  Her vocal quality is variable.  She does complain of left ear pain.      PAST MEDICAL HISTORY:   Past Medical History:   Diagnosis Date     Cholelithiasis      Essential hypertension with goal blood pressure less than 140/90      H/O: alcohol abuse      Hyperlipidemia LDL goal <100      Tobacco abuse        PAST SURGICAL HISTORY:   Past Surgical History:   Procedure Laterality Date     SURGICAL HISTORY OF -   5/13/2016    right hip fracture       FAMILY HISTORY:   Family History   Problem Relation Age of Onset     Chronic Obstructive Pulmonary Disease Daughter      Diabetes Daughter      HEART DISEASE Daughter        SOCIAL HISTORY:   Social History   Substance Use Topics     Smoking status: Current Every Day Smoker     Packs/day: 1.00     Years: 50.00     Types: Cigarettes     Smokeless tobacco: Never Used      Comment: advised      Alcohol use 0.0 oz/week     0 Standard drinks or equivalent per week  "     Comment: Occassionally- beer        REVIEW OF SYSTEMS: Ten point review of systems was performed and is negative except for what is noted in the HPI and PMH.  Also positive for rhinorrhea, congestion, wheezing, decreased appetite, occasional diarrhea and neuropathy of the left leg.    ALLERGIES: Penicillins    MEDICATIONS:   Current Outpatient Prescriptions   Medication Sig Dispense Refill     acetaminophen (TYLENOL) 500 MG tablet Take 1 tablet (500 mg) by mouth every 6 hours       albuterol (PROAIR HFA/PROVENTIL HFA/VENTOLIN HFA) 108 (90 BASE) MCG/ACT Inhaler Inhale 2 puffs into the lungs every 4 hours as needed for shortness of breath / dyspnea or wheezing 1 Inhaler 9     amLODIPine (NORVASC) 5 MG tablet Take 1 tablet (5 mg) by mouth daily 30 tablet 3     aspirin 81 MG tablet Take 1 tablet (81 mg) by mouth daily 30 tablet      atorvastatin (LIPITOR) 40 MG tablet TAKE 1 TABLET BY MOUTH EVERY DAY 90 tablet 1     bisacodyl (DULCOLAX) 5 MG EC tablet Take 15 mg by mouth daily as needed for constipation Reported on 2/24/2017       calcium carb 1250 mg, 500 mg [place],/vitamin D 200 units (OSCAL WITH D) 500-200 MG-UNIT per tablet Take 1 tablet by mouth 3 times daily (with meals) Reported on 2/24/2017       clopidogrel (PLAVIX) 75 MG tablet TAKE 1 TABLET BY MOUTH ONCE DAILY 90 tablet 1     fluticasone-salmeterol (ADVAIR-HFA) 45-21 MCG/ACT inhaler Inhale 2 puffs into the lungs 2 times daily 36 Inhaler 1     Gauze Pads & Dressings (CURITY KERLIX ROLL) MISC 1 Application daily 30 each 1     Gauze Pads & Dressings (OPTIFOAM) 4\"X4\" PADS 1 Application daily 30 each 1     Incontinence Supply Disposable (ULTIMA INCONTINENCE PAD) MISC 1 Units At Bedtime 30 each 5     losartan (COZAAR) 50 MG tablet Take 1 tablet (50 mg) by mouth daily 30 tablet 3     Multiple vitamin TABS Take 1 tablet by mouth daily Reported on 2/24/2017       NUTRITIONAL SUPPLEMENT LIQD Take 4 oz by mouth 2 times daily Reported on 2/24/2017       ONDANSETRON PO " Take 4 mg by mouth every 6 hours as needed for nausea       order for DME Equipment being ordered: Adult diapers--using 10 per day 300 each 10     polyethylene glycol (MIRALAX/GLYCOLAX) Packet Take 17 g by mouth daily as needed for constipation 7 packet      sennosides (SENOKOT) 8.6 MG tablet Take 2 tablets by mouth 2 times daily Reported on 2/24/2017       umeclidinium (INCRUSE ELLIPTA) 62.5 MCG/INH oral inhaler Inhale 1 puff into the lungs daily 30 Inhaler 5     Wound Dressings (SILVASORB) GEL Externally apply 1 Application topically daily (Patient not taking: Reported on 6/11/2018) 1 Tube 1       PHYSICAL EXAMINATION:  She  is awake, alert and in no apparent distress.    Her tympanic membranes are clear and intact bilaterally. External auditory canals are clear.  Nasal exam shows a mild septal deviation without obstruction.  Examination of the oral cavity shows no suspicious lesions. She is edentulous.  There is symmetric movement of the tongue and soft palate.    The oropharynx is clear.  Her neck is thin,  Supple and without significant adenopathy.  I was unable to palpate the level 3 nodes detected on the CT scan.  Pulse is regular.  Upper airway is clear.  Cranial nerves II-XII are grossly intact.       PROCEDURE: A flexible laryngoscopy  was performed.  Informed consent was obtained and a time out was performed.  The scope was passed through the right sided nostril. Examination showed the vocal folds to be mobile and meet in the midline.  An exophytic mass was present along the left false vocal fold extending around to the laryngeal surface of the epiglottis.  The mucosa over the arytenoids did not appear to be involved however the mass did come close to the arytenoid structure.  The left vocal fold appeared chronically inflamed.  No mucosal ulcerations of the left vocal fold were seen but the endoscopy was limited by gag.  The majority of the right vocal fold appears clear.  The immediate subglottic area is  clear.  There is an excellent airway.  The hypopharynx is clear and there is no pooling of saliva.        IMPRESSION/PLAN: Laryngeal mass with a history and findings consistent with a laryngeal malignancy.  I did call the [place] clinic and they will help arrange an appointment with a head and neck oncologist for further evaluation and recommendation.  I advised Mrs. Collins and her son (Dar who is her ) that a laryngoscopy and biopsy are likely.  All initial questions were answered.  I did inform them that I was suspicious for a laryngeal malignancy.

## 2018-06-27 NOTE — MR AVS SNAPSHOT
After Visit Summary   6/27/2018    Keira Collins    MRN: [id number]           Patient Information     Date Of Birth          1945        Visit Information        Provider Department      6/27/2018 9:30 AM Holger Luna MD [facility]        Today's Diagnoses     Laryngeal mass    -  1       Follow-ups after your visit        Follow-up notes from your care team     Return for to be seen at [facility].      Your next 10 appointments already scheduled     Jul 11, 2018  9:20 AM CDT   (Arrive by 9:05 AM)   New Patient Visit with Cynthia Walden MD   [facility] ([facility])    [address] [facility] [address] [phone]              Who to contact     If you have questions or need follow up information about today's clinic visit or your schedule please contact [facility] directly at [phone].  Normal or non-critical lab and imaging results will be communicated to you by MyChart, letter or phone within 4 business days after the clinic has received the results. If you do not hear from us within 7 days, please contact the clinic through MyChart or phone. If you have a critical or abnormal lab result, we will notify you by phone as soon as possible.  Submit refill requests through "[employer] or call your pharmacy and they will forward the refill request to us. Please allow 3 business days for your refill to be completed.          Additional Information About Your Visit        Care EveryWhere ID     This is your Care EveryWhere ID. This could be used by other organizations to access your [facility] medical records  [id number]        Your Vitals Were     Pulse                   92            Blood Pressure from Last 3 Encounters:   06/27/18 159/84   06/11/18 160/80   01/16/18 150/88    Weight from Last 3 Encounters:   01/16/18 59.9 kg (132 lb)   01/31/17 56.6 kg (124 lb  12.8 oz)   01/29/17 56.6 kg (124 lb 12.8 oz)              We Performed the Following     Laryngoscopy, Fiber        Primary Care Provider Office Phone # Fax #    Asa Elliott [phone] [address] [facility] [address]        Equal Access to Services     [facility]: Hadii aad ku hadasho Soomaali, waaxda luqadaha, qaybta kaalmada adeegyada, waxay idiin hayaan adeeg khestevansh laKatherineaan . So [facility] [phone].    ATENCIÓN: Si habla español, tiene a smart disposición servicios gratuitos de asistencia lingüística. Llame al [phone].    We comply with applicable federal civil rights laws and [place] laws. We do not discriminate on the basis of race, color, national origin, age, disability, sex, sexual orientation, or gender identity.            Thank you!     Thank you for choosing [facility]  for your care. Our goal is always to provide you with excellent care. Hearing back from our patients is one way we can continue to improve our services. Please take a few minutes to complete the written survey that you may receive in the mail after your visit with us. Thank you!             Your Updated Medication List - Protect others around you: Learn how to safely use, store and throw away your medicines at www.disposemymeds.org.          This list is accurate as of 6/27/18 11:59 PM.  Always use your most recent med list.                   Brand Name Dispense Instructions for use Diagnosis    acetaminophen 500 MG tablet    TYLENOL     Take 1 tablet (500 mg) by mouth every 6 hours    Ulcer of heel, left, with unspecified severity (H)       albuterol 108 (90 Base) MCG/ACT Inhaler    PROAIR HFA/PROVENTIL HFA/VENTOLIN HFA    1 Inhaler    Inhale 2 puffs into the lungs every 4 hours as needed for shortness of breath / dyspnea or wheezing    Chronic obstructive pulmonary disease, unspecified COPD type (H)       amLODIPine 5 MG tablet    NORVASC    30 tablet    Take 1 tablet (5 mg)  "by mouth daily    Hyperlipidemia LDL goal <100       aspirin 81 MG tablet     30 tablet    Take 1 tablet (81 mg) by mouth daily    PVD (peripheral vascular disease) (H)       atorvastatin 40 MG tablet    LIPITOR    90 tablet    TAKE 1 TABLET BY MOUTH EVERY DAY    PAD (peripheral artery disease) (H), Hyperlipidemia LDL goal <100       bisacodyl 5 MG EC tablet    DULCOLAX     Take 15 mg by mouth daily as needed for constipation Reported on 2/24/2017        Calcium carb-Vitamin D 500 mg [place]-200 units 500-200 MG-UNIT per tablet    OSCAL with D;[place]yster [place] Calcium     Take 1 tablet by mouth 3 times daily (with meals) Reported on 2/24/2017        clopidogrel 75 MG tablet    PLAVIX    90 tablet    TAKE 1 TABLET BY MOUTH ONCE DAILY    PAD (peripheral artery disease) (H)       fluticasone-salmeterol 45-21 MCG/ACT inhaler    ADVAIR-HFA    36 Inhaler    Inhale 2 puffs into the lungs 2 times daily    Chronic obstructive pulmonary disease, unspecified COPD type (H)       losartan 50 MG tablet    COZAAR    30 tablet    Take 1 tablet (50 mg) by mouth daily    Essential hypertension with goal blood pressure less than 140/90       Multiple vitamin Tabs      Take 1 tablet by mouth daily Reported on 2/24/2017        NUTRITIONAL SUPPLEMENT Liqd      Take 4 oz by mouth 2 times daily Reported on 2/24/2017        ONDANSETRON PO      Take 4 mg by mouth every 6 hours as needed for nausea        * OPTIFOAM 4\"X4\" Pads     30 each    1 Application daily    Ulcer of left heel, with unspecified severity (H)       * CURITY KERLIX ROLL Misc     30 each    1 Application daily    Ulcer of left heel, with unspecified severity (H)       order for DME     300 each    Equipment being ordered: Adult diapers--using 10 per day    Urinary incontinence, unspecified type       polyethylene glycol Packet    MIRALAX/GLYCOLAX    7 packet    Take 17 g by mouth daily as needed for constipation    Ulcer of heel, left, with unspecified severity (H)       " sennosides 8.6 MG tablet    SENOKOT     Take 2 tablets by mouth 2 times daily Reported on 2/24/2017        SILVASORB Gel     1 Tube    Externally apply 1 Application topically daily    Ulcer of left heel, with unspecified severity (H)       ULTIMA INCONTINENCE PAD Misc     30 each    1 Units At Bedtime    Urinary incontinence, unspecified type       umeclidinium 62.5 MCG/INH oral inhaler    INCRUSE ELLIPTA    30 Inhaler    Inhale 1 puff into the lungs daily    Chronic obstructive pulmonary disease, unspecified COPD type (H)       * Notice:  This list has 2 medication(s) that are the same as other medications prescribed for you. Read the directions carefully, and ask your doctor or other care provider to review them with you.

## 2018-06-27 NOTE — LETTER
[facility]  [address]  [place] [address]  [phone]      June 27, 2018    Keira Collins                                                                                                                     [facility] [address]   [place] [place] [facility] [address]        Dear Keira,    We received a notice that you are to be scheduled with a specialty clinic. The referral has been placed by your provider and you can call to schedule an appointment directly.     Enclosed, you will find the referral with the phone number to call to schedule an appointment.  If you have already scheduled this, you may disregard this letter.    Please call us if you have any questions or concerns.      Sincerely,       [facility] Support Staff / rafael

## 2018-07-09 PROBLEM — C77.0 SECONDARY MALIGNANCY OF LYMPH NODES OF HEAD, FACE AND NECK (H): Status: ACTIVE | Noted: 2018-01-01

## 2018-07-09 PROBLEM — C32.9 MALIGNANT NEOPLASM OF LARYNX (H): Status: ACTIVE | Noted: 2018-01-01

## 2018-07-09 PROBLEM — R91.8 PULMONARY NODULES: Status: ACTIVE | Noted: 2018-01-01

## 2018-07-09 NOTE — PROGRESS NOTES
"Dear Dr. Luna:    I had the pleasure of meeting Keira Collins in consultation today at the [facility] at your request.     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  She has had a fluctuating voice for the last month.  Her sons made her undergo evaluation and she was recently seen by Dr. Luna.  She denies any sore throat but does have a pain in the left side of her neck for the last week.  Her family says that she has had wheezing for a considerable period of time thought to be related to her COPD.  She denies any hemoptysis.  Her son thinks she has lost weight recently but he cannot quantify an amount.  She has bilateral ear pain.  She has a decreased appetite which is causing her to eat less.  Her sons try to ensure that she gets adequate nutrition.  They are noticing that she is coughing more especially with liquids.  They are working on trying to get her to quit smoking.      Social history: 2 pack per day smoker since approximately age 2, no chewing tobacco use, has occasional beers.  She lives with her sons.      Family history: Her   of throat cancer    Past medical history: COPD, \"leg problems\" (PVD?), no history of an MI or stroke    Past surgical history: Hip replacement following a broken hip, surgery on her foot        MEDICATIONS:     Current Outpatient Prescriptions   Medication Sig Dispense Refill     acetaminophen (TYLENOL) 500 MG tablet Take 1 tablet (500 mg) by mouth every 6 hours       albuterol (PROAIR HFA/PROVENTIL HFA/VENTOLIN HFA) 108 (90 BASE) MCG/ACT Inhaler Inhale 2 puffs into the lungs every 4 hours as needed for shortness of breath / dyspnea or wheezing 1 Inhaler 9     amLODIPine (NORVASC) 5 MG tablet Take 1 tablet (5 mg) by mouth daily 30 tablet 3     aspirin 81 MG tablet Take 1 tablet (81 mg) by mouth daily 30 tablet      " "atorvastatin (LIPITOR) 40 MG tablet TAKE 1 TABLET BY MOUTH EVERY DAY 90 tablet 1     bisacodyl (DULCOLAX) 5 MG EC tablet Take 15 mg by mouth daily as needed for constipation Reported on 2/24/2017       calcium carb 1250 mg, 500 mg [place],/vitamin D 200 units (OSCAL WITH D) 500-200 MG-UNIT per tablet Take 1 tablet by mouth 3 times daily (with meals) Reported on 2/24/2017       clopidogrel (PLAVIX) 75 MG tablet TAKE 1 TABLET BY MOUTH ONCE DAILY 90 tablet 1     fluticasone-salmeterol (ADVAIR-HFA) 45-21 MCG/ACT inhaler Inhale 2 puffs into the lungs 2 times daily 36 Inhaler 1     Gauze Pads & Dressings (CURITY KERLIX ROLL) MISC 1 Application daily 30 each 1     Gauze Pads & Dressings (OPTIFOAM) 4\"X4\" PADS 1 Application daily 30 each 1     Incontinence Supply Disposable (ULTIMA INCONTINENCE PAD) MISC 1 Units At Bedtime 30 each 5     losartan (COZAAR) 50 MG tablet Take 1 tablet (50 mg) by mouth daily 30 tablet 3     Multiple vitamin TABS Take 1 tablet by mouth daily Reported on 2/24/2017       NUTRITIONAL SUPPLEMENT LIQD Take 4 oz by mouth 2 times daily Reported on 2/24/2017       ONDANSETRON PO Take 4 mg by mouth every 6 hours as needed for nausea       order for DME Equipment being ordered: Adult diapers--using 10 per day 300 each 10     polyethylene glycol (MIRALAX/GLYCOLAX) Packet Take 17 g by mouth daily as needed for constipation 7 packet      sennosides (SENOKOT) 8.6 MG tablet Take 2 tablets by mouth 2 times daily Reported on 2/24/2017       umeclidinium (INCRUSE ELLIPTA) 62.5 MCG/INH oral inhaler Inhale 1 puff into the lungs daily 30 Inhaler 5     Wound Dressings (SILVASORB) GEL Externally apply 1 Application topically daily 1 Tube 1       ALLERGIES:    Allergies   Allergen Reactions     Penicillins        HABITS/SOCIAL HISTORY:   2 pack per day smoker since approximately age 2, no chewing tobacco use, has occasional beers.  She lives with her sons.      Social History     Social History     Marital status: Single    " " Spouse name: N/A     Number of children: N/A     Years of education: N/A     Occupational History     Not on file.     Social History Main Topics     Smoking status: Current Every Day Smoker     Packs/day: 1.00     Years: 50.00     Types: Cigarettes     Smokeless tobacco: Never Used      Comment: advised      Alcohol use 0.0 oz/week     0 Standard drinks or equivalent per week      Comment: Occassionally- beer      Drug use: No     Sexual activity: No     Other Topics Concern     Not on file     Social History Narrative    Live with Son who helps with her care           PAST MEDICAL HISTORY:   Past Medical History:   Diagnosis Date     Cholelithiasis      Essential hypertension with goal blood pressure less than 140/90      H/O: alcohol abuse      Hyperlipidemia LDL goal <100      Tobacco abuse         PAST SURGICAL HISTORY:   Past Surgical History:   Procedure Laterality Date     SURGICAL HISTORY OF -   5/13/2016    right hip fracture       FAMILY HISTORY:    Family History   Problem Relation Age of Onset     Chronic Obstructive Pulmonary Disease Daughter      Diabetes Daughter      HEART DISEASE Daughter        REVIEW OF SYSTEMS:  12 point ROS was negative other than the symptoms noted above in the HPI.  Patient Supplied Answers to Review of Systems  [facility] ROS 7/11/2018   Constitutional Weight loss, Appetite change   Neurology Headache   Psychology Frequently feeling depressed or sad   Ears, Nose, Throat Sore throat   Cardiopulmonary Cough, Breathing problems, Wheezing   Gastrointestinal/Genitourinary Constipation   Musculoskeletal Back pain, Swollen legs/feet   Hematologic Easy bruising   Skin Skin lesions         PHYSICAL EXAMINATION:   Ht 1.6 m (5' 3\")  Wt 52.2 kg (115 lb)  BMI 20.37 kg/m2   Appearance:   normal; NAD, age-appropriate appearance, well-developed, normal habitus   Communication:   hoarse, rough quality to voice  *sons answer most of questions for patient*   Head/Face:   inspection -  Normal; " no scars or visible lesions   Salivary glands -  Normal size, no tenderness, swelling, or palpable masses   Facial strength -  Normal and symmetric bilateral; H/B I/VI   Skin:  normal, no rash   Ocular Motility:  normal occular movements   Ears:  auricle (AD) -  normal  EAC (AD) -  normal  TM (AD) -  Normal, no effusion  auricle (AS) -  normal  EAC (AS) -  normal  TM (AS) -  Normal, no effusion  Normal clinical speech reception   Nose:  Ext. inspection -  Normal  Internal Inspection -  Normal mucosa, septum, and turbinates   Nasopharynx:  normal mucosa, no masses   Oral Cavity:  lips -  Normal mucosa, oral competence, and stoma size   Edentulous, healthy gingival mucosa   Hard palate, buccal, floor of mouth mucosa normal   Tongue - normal movement, no lesions   Oropharynx:  mucosa -  Normal, no lesions  soft palate -  Normal, no lesions, no asymmetry, normal elevation  tonsils -  Normal, no exudates, no abnormal lesions, symmetric   Hypopharynx:  Normal pyriform sinus and pharyngeal wall mucosa   Pooled secretions    Larynx:  mass along the laryngeal surface of the epiglottis near the petiole.  She has an exophytic tumor of the left false cord, left true cord with crossing of the midline onto the right false and true cords.  She has lots of secretions that pool and she has at least penetration if not aspiration of secretions over the interarytenoid space and in the glottis.     Neck: No visible mass or asymmetry   Normal palpation, no tenderness, no tracheal deviation  thyroid -  Normal   Normal range of motion   Lymphatic:  small 1 cm node in left level II that is mobile   Cardiovascular: warm, pink, well-perfused extremities without swelling, tenderness, or edema   Respiratory: Normal respiratory effort, no stridor  Audible turbulent airflow on exhalation intermittently   Neuro/Psych.:  mood/affect -  normal  mental status -  normal  cranial nerves -  normal        PROCEDURES:   Flexible fiberoptic laryngoscopy:  Scope exam was indicated due to laryngeal tumor. Verbal consent was obtained. The nasal cavity was prepped with an aerosolized solution of topical anesthetic and vasoconstrictive agent. The scope was passed through the anterior nasal cavity and advanced. Inspection of the nasopharynx revealed no gross abnormality.  She does have a strong gag which made the laryngeal exam complicated.  However she does have a normal base of tongue and superior aspect of the epiglottis including the entire lingual surface.  The patient has abnormalities consistent with extension of a mass along the laryngeal surface of the epiglottis near the petiole.  She has an exophytic tumor of the left false cord, left true cord with crossing of the midline onto the right false and true cords.  She has lots of secretions that pool and she has at least penetration if not aspiration of secretions over the interarytenoid space and in the glottis.  There is some pooled secretions in the piriform sinus.  Her airway is narrowed but patent patient tolerated the procedure well with no immediate complications    RESULTS REVIEWED:   I reviewed the referral notes from Dr Luna which are summarized above    I independently reviewed the CT scan images which show a mass of the larynx involving both cords, the preepiglottic space, no obvious thyroid cartilage erosion, right level IV node, left level III node concerning ofr malignancy.  The thyroid is diffusely enlarged, greater on the right side with nodules bilaterally (R>L) with coarse calcifications on the right. The chest CT shows a right lung nodule that is about 1.5 cm in size.      CT:  Findings:      There is abnormal enhancement and thickening of the bilateral anterior  false vocal cords, anterior commissure and bilateral anterior true  vocal cords. There is extension of the lesion into the left  paraglottic fat and abuts the inner margin of the left side of the  thyroid cartilage. There is no mass  extending through the cartilage to  extralaryngeal space. The strap muscles appear symmetric and within  normal limits. Subglottic larynx appears normal. Superiorly abnormal  enhancement and thickening extends to the epiglottis, left  aryepiglottic fold and preepiglottic fat. Anteroposteriorly the mass  measures about 2.5 cm, transversely measures 2.0 cm craniocaudally  measures 2.8 cm. There are 2 left level 3 metastatic lymphadenopathy  and 1 right level 3 metastatic lymphadenopathy. The larger one on the  left measures 1.1 cm, the other 2 nodes measuring 1.0 cm. There are  necrotic areas within the lymph nodes.     There is nodular enlargement of the thyroid lobes. In the right  thyroid lobe there are coarse calcifications.     There are small hypodense areas within the palatine tonsils most  compatible with retention cysts. Tongue base is normal. Oral cavity is  otherwise normal. Bilateral parotid glands and submandibular glands  are normal. Lung apices demonstrate emphysematous changes.         IMPRESSION:      Enhancing nodular thickening of the bilateral true and false vocal  cords, anterior commissure, epiglottis, left AE fold and extending to  left paraglottic fat and into the preepiglottic fat. This is  compatible with squamosal cell cancer. Bilateral metastatic level 3  lymphadenopathy. ENT consultation is recommended.     Multinodular goiter. Ultrasound of the neck and biopsy of the  suspicious nodules recommended.       U/S:  Findings:    Thyroid parenchyma: heterogenous     The right lobe of the thyroid measures: 4.3 x 3.5 x 3.1 cm      The thyroid isthmus measures: 0.3 cm      The left lobe of the thyroid measures: 4.5 x 1.7 x 1.8 cm      Right lobe:  Nodule 1:  Nodule measurement: 3.8 x 2.7 x 3.6 cm, mid thyroid  Echogenicity: Isoechoic  Consistency: solid  Calcifications: coarse  Hypervascular: no     Isthmus: No discrete nodule     Left Lobe: Innumerable cystic nodules in the left lobe of the  thyroid  with echogenic foci most suggestive of colloid.     Incidental suspicious left cervical neck lymph nodes that are better  evaluated on the 6/13/2018 CT. For example a round hypoechoic lymph  node with loss of fatty hilum measuring 1.1 x 1.0 x 1.6 cm superior to  the left lobe of the thyroid and a rounded similar appearing node  lateral to the left lobe measuring 0.9 x 0.7 x 1.7 cm.         Impression:  1. Right lobe thyroid nodule measures up to 3.8 cm and correlates with  CT 6/13/2018.  Consider fine needle aspiration.  2. Suspicious left cervical lymph nodes, better evaluated on recent CT  comparison.       CT lung:  FINDINGS:  Heart size is normal. Mild to moderate thoracic aortic calcification.  Mild coronary artery calcification. No pericardial effusion. Prominent  right hilar node measuring up to 13 mm in short axis. Central  tracheobronchial tree is patent. No pleural effusion or pneumothorax.  Heterogenous appearance of the thyroid gland with large partially  calcified hypodense nodule in the right lobe measuring up to 3 cm.     Spiculated lesion in the right upper lobe measuring 13 x 13 x 15 mm on  series 8, image 82.  Additionally there are several other bilateral pulmonary nodules which  are indeterminate and seen on series 8, for example:  4 mm, right upper lobe, image 128  5 mm, subpleural, right lower lobe, image 147  3 mm, right upper lobe, image 102  6 mm, subpleural, left upper lobe, image 69  4 mm, left upper lobe, image 73  7 mm, right lower lobe, image 207  6 mm, left lower lobe, image 195  Majority of these nodules are well-circumscribed, although some of  them have slightly irregular margins with mild spiculation,  particularly the larger nodules     Moderate upper lobe predominant centrilobular emphysematous changes.  Mild bibasilar atelectasis. Biapical scarring.     Bones and soft tissues: No suspicious bone findings. Partially  visualized degenerative changes of the right shoulder  with significant  degenerative/cystic changes of the right humeral head. Multiple  left-sided healed rib fractures. A few are also seen on the right.  Mild degenerative changes of the thoracic spine.     Partially imaged upper abdomen: Limited. Cholelithiasis. Irregularity  of the left greater than right adrenal gland. The largest area on the  left measures up to 2 cm. The attenuation is slightly complex.         IMPRESSION:   1. Spiculated nodule in the right upper lobe measuring up to 1.5 cm is  concerning for malignancy. Additionally there are multiple bilateral  smaller nodules ranging from 4-7 mm which may represent metastasis.  Some of the smaller nodules are technically indeterminate.  2. Mildly enlarged right hilar lymph node is indeterminate.  3. Nodularity of bilateral adrenal glands is indeterminate and may  represent metastasis. This may be further evaluated on PET if  indicated or CT adrenal protocol.  4. Enlarged partially calcified nodule of the right thyroid lobe.  Consider ultrasound for further evaluation.  5. Moderate centrilobular emphysematous changes particularly in the  upper lobes.  5. Cholelithiasis.    IMPRESSION AND PLAN:   Keira Collins is a 72 year old woman with a likely [id number] SCC of the larynx.     I had a lengthy conversation with the patient and her sons who accompanied her today.  The images were reviewed which showed a tumor of the larynx along with a likely node in the left neck.  We tried to attempt an FNA in clinic with pathology but the patient was unable to tolerate this.  We discussed with the patient that this is certainly concerning for malignancy diagnosis and we need to start with a tissue diagnosis prior to proceeding with a PET scan.  Certainly it is concerning that she has a nodule in her lung which certainly could be granulomatous disease versus metastases versus second primary.    I think at this point it would be prudent to proceed with a direct laryngoscopy with  biopsy in the operating room.  She does have some audible stertor or turbulent airflow intermittently on exhalation but no stridor.  I think given her tumor extent along with her COPD any intubation could potentially cause issues with her extubation and she is also going to be at risk for bleeding from the tumor.  I think the safest route would be to proceed with a tracheostomy at the same time of her laryngoscopy with biopsy especially in the setting that she is not sure how she would want to proceed with treatment.  I discussed this procedure in detail to the patient's son.  We also explained that this could be a permanent tracheostomy, that she may not be able to speak around the tube even after trach change, that her swallowing may worsen and she may become PEG dependent.  We also discussed that depending on how she and her family do with the postoperative cares we may need to have her placed in a nursing home for period of time following surgery.  He understands this and wishes to proceed.    I am very concerned that she is at least penetrating if not aspirating.  I had speech pathology see her in clinic today to assess her.  They agree with my assessment that we need to proceed with a video swallow study.  We will likely place an NG tube at the time of her surgery to help with her oral nutrition.  She may very well but end up with a PEG prior to discharge.    The family did ask questions about the treatment for laryngeal cancer.  I discussed with him that this could include a total laryngectomy with postoperative radiation with or without chemotherapy pending the results of the CT scan and the disease in her lungs.  Alternatively she may be a candidate for upfront chemoradiation.  She is not interested at this time in hearing about treatment in detail but her family would like her to at least undergo workup to better understand her options.  We did discuss the palliative care is also an option if she does not  want any treatment but I think it would be prudent to proceed with a diagnosis so we can fully investigate all of her options.    She does need a preoperative clearance prior to her surgery which we have scheduled for next week.  I would like to review her case at tumor conference on Friday.  Of note we need to assess what is going on in the lung as well as the fact that she has a multinodular goiter with enlarged nodule on the right side that we ideally would have an US guided biopsy. We will place her scans on for review at lung nodule conference as well given the lung nodule that was seen on her recent chest CT.    Thank you very much for the opportunity to participate in the care of your patient.      Cynthia Walden M.D.  Otolaryngology- Head & Neck Surgery        I spent a total of 60 minutes face-to-face with Keira Collins during today s office visit. Over 50% of this time was spent counseling the patient and/or coordinating care regarding the likely diagnosis and treatment options, including need for tracheostomy to secure the airway as per above.        CC:  Filemon Luna MD  Otolaryngology/Head & Neck Surgery  [facility] [address]      Asa Elliott MD  [id number] [facility] [id number]

## 2018-07-10 NOTE — TELEPHONE ENCOUNTER
FUTURE VISIT INFORMATION      FUTURE VISIT INFORMATION:    Date: 7/11/18     Time: 9:20AM    Location: [facility]  REFERRAL INFORMATION:    Referring provider:  Dr Holger Luna    Referring providers clinic:  [facility]    Reason for visit/diagnosis  Laryngeal mass with a history and findings consistent with a laryngeal malignancy    RECORDS REQUESTED FROM:       Clinic name Comments Records Status Imaging Status   [facility] 6/27/18 visit notes with Dr Cheryl BARRERA    [facility] 6/15/18 US Thyroid   6/13/18 CT Neck and Chest [facility]  PACS                             RECORDS STATUS

## 2018-07-11 NOTE — TELEPHONE ENCOUNTER
"Pulmonary Nodule Conference      Patient Name: Keira Collins    Reason for conference discussion (brief overview): 71 yo woman who is referred for evaluation of a likely [id number] squamous cell carcinoma of the larynx.H/O ETOH abuse. Now drinks \"beer occasionally\".  Current smoker-2PPD since approximately age 2. CT chest reveals:    1. Spiculated nodule in the right upper lobe measuring up to 1.5 cm is  concerning for malignancy. Additionally there are multiple bilateral  smaller nodules ranging from 4-7 mm which may represent metastasis.  Some of the smaller nodules are technically indeterminate.  2. Mildly enlarged right hilar lymph node is indeterminate.  3. Nodularity of bilateral adrenal glands is indeterminate and may  represent metastasis. This may be further evaluated on PET if  indicated or CT adrenal protocol.  4. Enlarged partially calcified nodule of the right thyroid lobe.  Consider ultrasound for further evaluation.  5. Moderate centrilobular emphysematous changes particularly in the  upper lobes.  5. Cholelithiasis.       Specific Question:  IR biopsy? Would we consider a surgical biopsy?    Pertinent Histology:  Squamous Cell    Referring Physician: Dr. Walden    The patient's case was presented at the multidisciplinary conference for the above noted reason.  There was a consensus recommendation for the following actions:     The spiculated RUL lung nodule likely represents a new lung primary. The other bilateral nodules could be metastatic disease from the laryngeal cancer or from the suspected lung primary.     The group recommends asking IR to biopsy the RUL nodule.          Case Lead:  Albertina Han will email IR staff    Interventional Radiology Staff Present: No staff at meeting.         "

## 2018-07-11 NOTE — MR AVS SNAPSHOT
"              After Visit Summary   7/11/2018    Keira Collins    MRN: [id number]           Patient Information     Date Of Birth          1945        Visit Information        Provider Department      7/11/2018 9:20 AM Albertina Palacios SLP  Health Rehab        Today's Diagnoses     Laryngeal cancer (H)    -  1    Oropharyngeal dysphagia           Follow-ups after your visit        Additional Services     SPEECH THERAPY REFERRAL       *This therapy referral will be filtered to a centralized scheduling office at [facility] and the patient will receive a call to schedule an appointment at a [place] location most convenient for them. *     [facility] provides Speech Therapy evaluation and treatment and many specialty services across the [place] system.  If requesting a specialty program, please choose from the list below.  If you have not heard from the scheduling office within 2 business days, please call [phone] for all locations, with the exception of [place], please call [phone] and [facility], please call [phone]      Treatment: Evaluation & Treatment  Speech Treatment Diagnosis: Dysphagia  Special Instructions: Does not need to be scheduled.  Special Programs: Clinical Swallow Study    Please be aware that coverage of these services is subject to the terms and limitations of your health insurance plan.  Call member services at your health plan with any benefit or coverage questions.      **Note to Provider:  If you are referring outside of [place] for the therapy appointment, please list the name of the location in the \"special instructions\" above, print the referral and give to the patient to schedule the appointment.                  Your next 10 appointments already scheduled     Jul 16, 2018  1:00 PM CDT   Pre-Op physical with Jennifer Lee PA-C   [facility] [place] [facility] [place]    [address], [address]  [place] [address]   [phone]            Jul 16, 2018  4:10 PM CDT   (Arrive by 3:55 PM)   PAC Anesthesia Consult with Sandeep Pac Anesthesiologist   [facility] Preoperative Assessment [place] ([facility])    [address] [place] [address]   [phone]            Jul 16, 2018  5:30 PM CDT   (Arrive by 5:15 PM)   PAC EVALUATION with Natty Vincent NP   [facility] Preoperative Assessment [place] ([facility])    [address] [place] [address]   [phone]            Jul 16, 2018  6:30 PM CDT   (Arrive by 6:15 PM)   PAC RN ASSESSMENT with Sandeep Pac Rn   [facility] Assessment [place] ([facility])    [address] [place] [address]   [phone]            Jul 17, 2018   Procedure with Cynthia Walden MD   [facility], [place], Same Day Surgery (--)    [address] [address] [phone]              Future tests that were ordered for you today     Open Future Orders        Priority Expected Expires Ordered    US Biopsy Thyroid Fine Needle Aspiration Routine  7/11/2019 7/11/2018    IR Thyroid Biopsy Routine  7/11/2019 7/11/2018    XR Video Swallow w Esophagram - Order with Speech Therapy Referral Routine 7/11/2018 7/11/2019 7/11/2018    Fine needle aspiration Routine  7/12/2019 7/11/2018            Who to contact     Please call your clinic at [phone] to:    Ask questions about your health    Make or cancel appointments    Discuss your medicines    Learn about your test results    Speak to your doctor            Additional Information About Your Visit        Care EveryWhere ID     This is your Care EveryWhere ID. This could be used by other organizations to access your [place] medical records  [id number]         Blood Pressure from Last 3 Encounters:   06/27/18 159/84   06/11/18 160/80   01/16/18 150/88    Weight from Last 3  Encounters:   07/11/18 52.2 kg (115 lb)   01/16/18 59.9 kg (132 lb)   01/31/17 56.6 kg (124 lb 12.8 oz)              We Performed the Following     SPEECH THERAPY REFERRAL        Primary Care Provider Office Phone # Fax #    Asa Elliott [phone] [address] [address]        Equal Access to Services     KIRSTEN MAGALLANES : Hadii aad ku hadasho Soomaali, waaxda luqadaha, qaybta kaalmada adeegyada, waxay idiin hayaan adeeg michaelsh laedwin . So [facility] [phone].    ATENCIÓN: Si habla español, tiene a smart disposición servicios gratuitos de asistencia lingüística. Llame al [phone].    We comply with applicable federal civil rights laws and [place] laws. We do not discriminate on the basis of race, color, national origin, age, disability, sex, sexual orientation, or gender identity.            Thank you!     Thank you for choosing [facility]  for your care. Our goal is always to provide you with excellent care. Hearing back from our patients is one way we can continue to improve our services. Please take a few minutes to complete the written survey that you may receive in the mail after your visit with us. Thank you!             Your Updated Medication List - Protect others around you: Learn how to safely use, store and throw away your medicines at www.disposemymeds.org.          This list is accurate as of 7/11/18  6:53 PM.  Always use your most recent med list.                   Brand Name Dispense Instructions for use Diagnosis    acetaminophen 500 MG tablet    TYLENOL     Take 1 tablet (500 mg) by mouth every 6 hours    Ulcer of heel, left, with unspecified severity (H)       albuterol 108 (90 Base) MCG/ACT Inhaler    PROAIR HFA/PROVENTIL HFA/VENTOLIN HFA    1 Inhaler    Inhale 2 puffs into the lungs every 4 hours as needed for shortness of breath / dyspnea or wheezing    Chronic obstructive pulmonary disease, unspecified COPD type (H)       amLODIPine 5 MG  "tablet    NORVASC    30 tablet    Take 1 tablet (5 mg) by mouth daily    Hyperlipidemia LDL goal <100       aspirin 81 MG tablet     30 tablet    Take 1 tablet (81 mg) by mouth daily    PVD (peripheral vascular disease) (H)       atorvastatin 40 MG tablet    LIPITOR    90 tablet    TAKE 1 TABLET BY MOUTH EVERY DAY    PAD (peripheral artery disease) (H), Hyperlipidemia LDL goal <100       bisacodyl 5 MG EC tablet    DULCOLAX     Take 15 mg by mouth daily as needed for constipation Reported on 2/24/2017        Calcium carb-Vitamin D 500 mg [place]-200 units 500-200 MG-UNIT per tablet    OSCAL with D;Oyster Shell Calcium     Take 1 tablet by mouth 3 times daily (with meals) Reported on 2/24/2017        clopidogrel 75 MG tablet    PLAVIX    90 tablet    TAKE 1 TABLET BY MOUTH ONCE DAILY    PAD (peripheral artery disease) (H)       fluticasone-salmeterol 45-21 MCG/ACT inhaler    ADVAIR-HFA    36 Inhaler    Inhale 2 puffs into the lungs 2 times daily    Chronic obstructive pulmonary disease, unspecified COPD type (H)       losartan 50 MG tablet    COZAAR    30 tablet    Take 1 tablet (50 mg) by mouth daily    Essential hypertension with goal blood pressure less than 140/90       Multiple vitamin Tabs      Take 1 tablet by mouth daily Reported on 2/24/2017        NUTRITIONAL SUPPLEMENT Liqd      Take 4 oz by mouth 2 times daily Reported on 2/24/2017        ONDANSETRON PO      Take 4 mg by mouth every 6 hours as needed for nausea        * OPTIFOAM 4\"X4\" Pads     30 each    1 Application daily    Ulcer of left heel, with unspecified severity (H)       * CURITY KERLIX ROLL Misc     30 each    1 Application daily    Ulcer of left heel, with unspecified severity (H)       order for DME     300 each    Equipment being ordered: Adult diapers--using 10 per day    Urinary incontinence, unspecified type       polyethylene glycol Packet    MIRALAX/GLYCOLAX    7 packet    Take 17 g by mouth daily as needed for constipation    Ulcer of " heel, left, with unspecified severity (H)       sennosides 8.6 MG tablet    SENOKOT     Take 2 tablets by mouth 2 times daily Reported on 2/24/2017        SILVASORB Gel     1 Tube    Externally apply 1 Application topically daily    Ulcer of left heel, with unspecified severity (H)       ULTIMA INCONTINENCE PAD Misc     30 each    1 Units At Bedtime    Urinary incontinence, unspecified type       umeclidinium 62.5 MCG/INH oral inhaler    INCRUSE ELLIPTA    30 Inhaler    Inhale 1 puff into the lungs daily    Chronic obstructive pulmonary disease, unspecified COPD type (H)       * Notice:  This list has 2 medication(s) that are the same as other medications prescribed for you. Read the directions carefully, and ask your doctor or other care provider to review them with you.

## 2018-07-11 NOTE — PROGRESS NOTES
07/11/18 0920   General Information   Type Of Visit Initial   Start Of Care Date 07/11/18   Referring Physician Dr. Cynthia Walden   Orders Evaluate And Treat   Orders Comment Clinical Swallow Eval   Medical Diagnosis laryngeal cancer   Onset Of Illness/injury Or Date Of Surgery 07/11/18   Precautions/limitations Fall Precautions   Hearing Within functional limits   Pertinent History of Current Problem/OT: Additional Occupational Profile Info Ms. 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  She has had fluctuating voice over the last month and has a long history of tobacco use.  Patient was seen this date at the request of Dr. Cynthia Walden.  Patient currently reports no difficulties with swallowing although her son reports that she has been coughing more frequently when eating.  They were not sure if it had to do with her smoking or with her eating.  She reports she can eat whatever she wants and has not ever had any problems.   Respiratory Status Room air   Prior Level Of Function Swallowing   Prior Level Of Function Comment Regular solids thin liquids.  Patient is edentulous.   Patient Role/employment History Retired   Home/community Accessibility Comments (flowsheet Row) Patient lives with her son who helps take care of her   General Observations Patient is pleasant and generally cooperative during evaluation.   Patient/family Goals Patient would like to continue to eat and drink.   Clinical Swallow Evaluation   Oral Musculature generally intact   Structural Abnormalities present  (Probable laryngeal tumor)   Dentition edentulous, does not have dentures   Mucosal Quality adequate   Mandibular Strength and Mobility intact   Oral Labial Strength and Mobility WFL   Lingual Strength and Mobility WFL   Velar Elevation intact   Buccal Strength and Mobility intact   Laryngeal Function Voicing initiated;Swallow;Cough   Oral  Musculature Comments Patient demonstrates decreased cough strength.  Vocal quality at baseline is quite wet.   Clinical Swallow Eval: Thin Liquid Texture Trial   Mode of Presentation, Thin Liquids cup;self-fed   Volume of Liquid or Food Presented Cup sip ×4   Oral Phase of Swallow WFL   Pharyngeal Phase of Swallow impaired;repeated swallows;throat clearing;wet vocal quality after swallow;coughing/choking   Diagnostic Statement Patient demonstrates overt clinical signs or symptoms of aspiration/penetration on thin liquid trials 1 out of 4 trials.  She demonstrates coughing on initial swallow indicating probable aspiration.  She demonstrates wet vocal quality following 3 out of 4 trials of thin liquid.   Clinical Swallow Eval: Nectar Thick Liquid Texture Trial   Mode of Presentation, Nectar cup;self-fed   Volume of Nectar Presented 2 cups sips   Oral Phase, Nectar WFL   Pharyngeal Phase, Nectar impaired;repeated swallows   Diagnostic Statement Patient demonstrates no overt clinical signs or symptoms of aspiration/penetration on nectar thick liquid trials.  She was only agreeable to small amounts.   Clinical Swallow Eval: Puree Solid Texture Trial   Mode of Presentation, Puree spoon;self-fed   Volume of Puree Presented Half teaspoon ×3   Oral Phase, Puree WFL   Pharyngeal Phase, Puree intact   Diagnostic Statement Patient demonstrates no overt clinical signs or symptoms of aspiration/penetration on purée consistency trial.  Patient only agreeable to small amounts that she does not like pudding.   Swallow Compensations   Swallow Compensations No compensations were used   Educational Assessment   Barriers to Learning No barriers   Esophageal Phase of Swallow   Patient reports or presents with symptoms of esophageal dysphagia No   General Therapy Interventions   Planned Therapy Interventions Dysphagia Treatment   Dysphagia treatment Oropharyngeal exercise training;Modified diet education;Instruction of safe swallow  strategies;Compensatory strategies for swallowing   Swallow Eval: Clinical Impressions   Skilled Criteria for Therapy Intervention Skilled criteria met.  Treatment indicated.   Functional Assessment Scale (FAS) 3   Dysphagia Outcome Severity Scale ([place]) Level 3 - [place]   Treatment Diagnosis Moderate oral pharyngeal dysphasia   Diet texture recommendations Dysphagia diet level 3;Thin liquids   Recommended Feeding/Eating Techniques alternate between small bites and sips of food/liquid;maintain upright posture during/after eating for 30 mins;small sips/bites   Rehab Potential fair, will monitor progress closely   Therapy Frequency other (see comments)   Predicted Duration of Therapy Intervention (days/wks) 2 times per month for 3 months   Anticipated Discharge Disposition home w/ outpatient services   Risks and Benefits of Treatment have been explained. Yes   Patient, family and/or staff in agreement with Plan of Care Yes   Clinical Impression Comments Ms. Collins demonstrates moderate oral pharyngeal dysphagia is characterized by overt clinical signs or symptoms of aspiration/penetration on thin liquid trials, multiple swallows on all consistencies trialed and decreased cough strength.  Her vocal quality at baseline is hoarse and wet.  The wetness increases his p.o. trials progress.  Patient does not see the change in vocal quality or coughing following thin liquid trials is any concern.  Patient demonstrates adequate range of motion and strength of the lips, tongue, cheeks, and jaw.  Patient is edentulous and does not use any artificial dentition.  Recommend patient continue with dysphagia diet level 3 and thin liquids.  Encouraged patient to take small sips and use flow rate when swallowing liquids.  She would benefit from video swallow study to further assess her swallow function, risk for aspiration and pharyngeal phase.  Patient verbalized understanding of recommendations.  Her son was present throughout  evaluation.   Swallow Goal 1   Goal Identifier Diet   Goal Description 1.  Patient will tolerate regular consistency solids and thin liquids without overt clinical signs or symptoms of aspiration/penetration 100% of the time independently   Target Date 10/09/18   Total Session Time   Total Session Time 15   Total Evaluation Time 15

## 2018-07-11 NOTE — PROGRESS NOTES
[facility] [place]  [address]  [place] [address]  [phone]  Dept: [phone]    PRE-OP EVALUATION:  Today's date: 2018    Keira Collins (: 1945) presents for pre-operative evaluation assessment as requested by Dr. Walden.  She requires evaluation and anesthesia risk assessment prior to undergoing surgery/procedure for treatment of Throat  .    Fax number for surgical facility: [facility]   Primary Physician: Asa Elliott  Type of Anesthesia Anticipated: General    Patient has a Health Care Directive or Living Will:  NO    Preop Questions 2018   What are you having done? bi 0p   Date of Surgery/Procedure: \   1.  Do you have a history of Heart attack, stroke, stent, coronary bypass surgery, or other heart surgery? No   2.  Do you ever have any pain or discomfort in your chest? No   3.  Do you have a history of  Heart Failure? No   4.   Are you troubled by shortness of breath when:  walking on a level surface, or up a slight hill, or at night? YES - SOB if walks 30 ft around her home. Uses inhalers for COPD. Inhalers do help her sx.    5.  Do you currently have a cold, bronchitis or other respiratory infection? No   6.  Do you have a cough, shortness of breath, or wheezing? YES - attributes to COPD, baseline sx.   7.  Do you sometimes get pains in the calves of your legs when you walk? YES - known PVD. S/p left femoral endarterectomy surgery. Presently no open sores on legs.    8. Do you or anyone in your family have previous history of blood clots? No   9.  Do you or does anyone in your family have a serious bleeding problem such as prolonged bleeding following surgeries or cuts? No   10. Have you ever had problems with anemia or been told to take iron pills? No   11. Have you had any abnormal blood loss such as black, tarry or bloody stools, or abnormal vaginal bleeding? No   12. Have you ever had a blood transfusion? No   13. Have you or any of your  relatives ever had problems with anesthesia? No   14. Do you have sleep apnea, excessive snoring or daytime drowsiness? No   15. Do you have any prosthetic heart valves? No   16. Do you have prosthetic joints? No   17. Is there any chance that you may be pregnant? No         HPI:     HPI related to upcoming procedure: loss of voice and voice change for 1 mo or so. Pain in the left ear and left side of upper neck. Long term smoker 1 pack per day. PCP ordered CT which showed changes concerning for squamous cell cancer and lymphadenopathy.  Was seen by ENT  06/27/18, who arranged for pt to see ENT at [facility] - attempted FNA of mass but pt did not tolerate. Advised laryngoscopy, biopsy, and tracheostomy. Per ENT needs swallow study, exam showing possible aspiration due to tumor.     COPD- smoking 1+ packs per day and is not interested in quitting.   Cough daily- clear mucus. Son states her breathing is at her baseline.   Chronic HAGER at her baseline.   CXR 06/11/2018 and CT chest- 06/13/2018. Spiculated nodule/nodules on CT concerning for malignancy, no active infectious sx. No change in cough pattern, sputum quality, increased HAGER or SOB since those tests.   Advair 2 puffs daily  Incruse ellipta  Albuterol uses prn    PVD- s/pS/p left femoral endarterectomy, , [facility]. Does not ambulate to any significant degree.   Around her home- sons estimate ~30 ft  Has leg pain chronically with walking and at rest. Sons report stable pattern.   No hx of MI or stroke.   Losartan and amlodipine- son is not sure if she is taking both of those medications today.   Is on atorvastatin  Is holding aspirin and plavix per ENT surgeon.   Is sedentary. No CP. No CP when ambulates around home. No SOB at rest. SOB now and again w/ambulating at home- stable pattern.     Alcohol use: at least 1 beer daily. Sometimes 4-5 beers per day on heavy day.         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  May need to be biopsied depending on findings.  Asa Elliott MD         Elevated serum free T4 level 01/17/2018     Priority: Medium     Advanced directives, counseling/discussion 01/16/2018     Priority: Medium     Physical deconditioning 09/15/2016     Priority: Medium     Osteoporosis 07/14/2016     Priority: Medium     Cholelithiasis      Priority: Medium     PVD (peripheral vascular disease) (H) 07/11/2016     Priority: Medium     Tobacco abuse 07/11/2016     Priority: Medium     Essential hypertension with goal blood pressure less than 140/90      Priority: Medium     Hyperlipidemia LDL goal <100      Priority: Medium     H/O: alcohol abuse      Priority: Medium     COPD (chronic obstructive pulmonary disease) (H) 07/28/2014     Priority: Medium     From [facility] 05/2016: bedside feng shows moderate airflow obstruction [FEV1 0.99L, 62% pred and FVC 1.7L, 82% predicted; FEV1 and FVC DROPPED by 11 and 16% respectively post BD    Diagnosis made 7/28/2014 at [facility]            Past Medical History:   Diagnosis Date     COPD (chronic obstructive pulmonary disease) (H) 7/28/2014    From [facility] 05/2016: bedside feng shows moderate airflow obstruction [FEV1 0.99L, 62% pred and FVC 1.7L, 82% predicted; FEV1 and FVC DROPPED by 11 and 16% respectively post BD  Diagnosis made 7/28/2014 at [facility]        Essential hypertension with goal blood pressure less than 140/90      H/O: alcohol abuse      Hyperlipidemia LDL goal <100      Laryngeal mass 6/27/2018      "Osteoporosis 7/14/2016     Pulmonary nodules 7/9/2018     PVD (peripheral vascular disease) (H) 7/11/2016     Tobacco abuse      Past Surgical History:   Procedure Laterality Date     SURGICAL HISTORY OF -   5/13/2016    right hip fracture     VASCULAR SURGERY Left 01/2017    ; left femoral endarterectomy     Current Outpatient Prescriptions   Medication Sig Dispense Refill     acetaminophen (TYLENOL) 500 MG tablet Take 1 tablet (500 mg) by mouth every 6 hours       albuterol (PROAIR HFA/PROVENTIL HFA/VENTOLIN HFA) 108 (90 BASE) MCG/ACT Inhaler Inhale 2 puffs into the lungs every 4 hours as needed for shortness of breath / dyspnea or wheezing 1 Inhaler 9     amLODIPine (NORVASC) 5 MG tablet Take 1 tablet (5 mg) by mouth daily 30 tablet 3     aspirin 81 MG tablet Take 1 tablet (81 mg) by mouth daily 30 tablet      atorvastatin (LIPITOR) 40 MG tablet TAKE 1 TABLET BY MOUTH EVERY DAY 90 tablet 1     bisacodyl (DULCOLAX) 5 MG EC tablet Take 15 mg by mouth daily as needed for constipation Reported on 2/24/2017       calcium carb 1250 mg, 500 mg [place],/vitamin D 200 units (OSCAL WITH D) 500-200 MG-UNIT per tablet Take 1 tablet by mouth 3 times daily (with meals) Reported on 2/24/2017       clopidogrel (PLAVIX) 75 MG tablet TAKE 1 TABLET BY MOUTH ONCE DAILY 90 tablet 1     fluticasone-salmeterol (ADVAIR-HFA) 45-21 MCG/ACT inhaler Inhale 2 puffs into the lungs 2 times daily 36 Inhaler 1     Gauze Pads & Dressings (CURITY KERLIX ROLL) MISC 1 Application daily 30 each 1     Gauze Pads & Dressings (OPTIFOAM) 4\"X4\" PADS 1 Application daily 30 each 1     Incontinence Supply Disposable (ULTIMA INCONTINENCE PAD) MISC 1 Units At Bedtime 30 each 5     losartan (COZAAR) 50 MG tablet Take 1 tablet (50 mg) by mouth daily 30 tablet 3     Multiple vitamin TABS Take 1 tablet by mouth daily Reported on 2/24/2017       NUTRITIONAL SUPPLEMENT LIQD Take 4 oz by mouth 2 times daily Reported on 2/24/2017       ONDANSETRON PO Take 4 mg by " "mouth every 6 hours as needed for nausea       order for DME Equipment being ordered: Adult diapers--using 10 per day 300 each 10     polyethylene glycol (MIRALAX/GLYCOLAX) Packet Take 17 g by mouth daily as needed for constipation 7 packet      sennosides (SENOKOT) 8.6 MG tablet Take 2 tablets by mouth 2 times daily Reported on 2/24/2017       umeclidinium (INCRUSE ELLIPTA) 62.5 MCG/INH oral inhaler Inhale 1 puff into the lungs daily 30 Inhaler 5     Wound Dressings (SILVASORB) GEL Externally apply 1 Application topically daily 1 Tube 1     OTC products: none    Allergies   Allergen Reactions     Penicillins       Latex Allergy: NO    Social History   Substance Use Topics     Smoking status: Current Every Day Smoker     Packs/day: 1.00     Years: 50.00     Types: Cigarettes     Smokeless tobacco: Never Used      Comment: 1/2 a pack a day      Alcohol use 0.0 oz/week     0 Standard drinks or equivalent per week      Comment: Beer- 1-4 per day     History   Drug Use No       REVIEW OF SYSTEMS:   CONSTITUTIONAL: NEGATIVE for fever, chills, change in weight  INTEGUMENTARY/SKIN: NEGATIVE for worrisome rashes, moles or lesions  EYES: NEGATIVE for vision changes or irritation  ENT/MOUTH: + left ear pain, + voice change, neg ST.   RESP: no SOB, as above  CV: NEGATIVE for chest pain, palpitations or peripheral edema  GI: NEGATIVE for nausea, abdominal pain, heartburn, or change in bowel habits; ongoing constipation.   : NEGATIVE for frequency, dysuria, or hematuria; +urinary incontinence- wears depends  MUSCULOSKELETAL: leg pain, weakness of legs, physical deconditioning;   NEURO:No dizziness or paresthesias  ENDOCRINE: NEGATIVE for temperature intolerance, skin/hair changes  HEME: NEGATIVE for bleeding problems or bruising problems.   PSYCHIATRIC: NEGATIVE for changes in mood or affect    EXAM:   /72  Pulse 83  Temp 97.7  F (36.5  C) (Temporal)  Resp 18  Ht 5' 3\" (1.6 m)  Wt 118 lb 4.8 oz (53.7 kg)  SpO2 100% "  BMI 20.96 kg/m2    GENERAL APPEARANCE: slender, sitting in wheelchair, alert, no distress; voice is hoarse.      EYES: EOMI, PERRL     HENT: ear canals some cerumen and TM's normal and nose and mouth without ulcers or lesions     NECK: unable to palpate the left anterior cervical adenopathy noted on CT, no visible  masses, or scars      RESP:  Throat-clearing cough at times, decreased breath sounds in the bases, no rales, rhonchi or wheezes     CV: regular rates and rhythm, normal S1 S2, no S3 or S4 and no murmur, click or rub     ABDOMEN:  soft, nontender,no masses and bowel sounds normal     MS: slender extremities normal- no gross deformities noted; left heel- callus over previous ulcerated site. Right: difficult to palpate pulses, extremities warm, dry.      SKIN: no suspicious lesions or rashes     NEURO: Normal strength and tone, sensory exam grossly normal, mentation intact and speech quiet, minimally provides history     PSYCH: mentation appears normal. and affect quiet     LYMPHATICS: No cervical adenopathy    DIAGNOSTICS:   EKG: sinus rhythm, T-wave inversion V1, V2. Telephone consult,  cardiology [place]- reviewed, see notation below.     Results for orders placed or performed in visit on 07/16/18   Spirometry, Breathing Capacity: Normal Order, Clinic Performed   Result Value Ref Range    FEV-1 0.58     FVC 0.89     FEV1/FVC 65%     FEF 25/75 0.34    Comprehensive metabolic panel (BMP + Alb, Alk Phos, ALT, AST, Total. Bili, TP)   Result Value Ref Range    Sodium 137 133 - 144 mmol/L    Potassium 3.7 3.4 - 5.3 mmol/L    Chloride 102 94 - 109 mmol/L    Carbon Dioxide 28 20 - 32 mmol/L    Anion Gap 7 3 - 14 mmol/L    Glucose 95 70 - 99 mg/dL    Urea Nitrogen 15 7 - 30 mg/dL    Creatinine 0.68 0.52 - 1.04 mg/dL    GFR Estimate 85 >60 mL/min/1.7m2    GFR Estimate If Black >90 >60 mL/min/1.7m2    Calcium 9.1 8.5 - 10.1 mg/dL    Bilirubin Total 0.4 0.2 - 1.3 mg/dL    Albumin 3.6 3.4 - 5.0 g/dL     Protein Total 8.0 6.8 - 8.8 g/dL    Alkaline Phosphatase 92 40 - 150 U/L    ALT 19 0 - 50 U/L    AST 21 0 - 45 U/L   CBC with platelets   Result Value Ref Range    WBC 7.4 4.0 - 11.0 10e9/L    RBC Count 4.95 3.8 - 5.2 10e12/L    Hemoglobin 14.9 11.7 - 15.7 g/dL    Hematocrit 46.7 35.0 - 47.0 %    MCV 94 78 - 100 fl    MCH 30.1 26.5 - 33.0 pg    MCHC 31.9 31.5 - 36.5 g/dL    RDW 12.5 10.0 - 15.0 %    Platelet Count 189 150 - 450 10e9/L         Recent Labs   Lab Test  06/11/18   1041  01/16/18   1056   09/23/16   1115  09/15/16   1355   HGB  15.4  14.9   < >   --   14.6   PLT  175  194   < >   --   204   INR   --    --    --   0.95   --    NA  139  140   < >   --   140   POTASSIUM  3.9  3.8   < >   --   4.0   CR  0.68  0.67   < >   --   0.59   A1C   --    --    --    --   5.9    < > = values in this interval not displayed.        IMPRESSION:   Reason for surgery/procedure: Laryngeal mass  Diagnosis/reason for consult: preoperative exam     The proposed surgical procedure is considered INTERMEDIATE risk.    REVISED CARDIAC RISK INDEX  The patient has the following serious cardiovascular risks for perioperative complications such as (MI, PE, VFib and 3  AV Block):  PVD  INTERPRETATION: 1 risks: Class II (low risk - 0.9% complication rate)    The patient has the following additional risks for perioperative complications:  No identified additional risks  Alcohol abuse with risk of withdrawal      ICD-10-CM    1. Preop general physical exam Z01.818 EKG 12-lead complete w/read - Clinics     Comprehensive metabolic panel (BMP + Alb, Alk Phos, ALT, AST, Total. Bili, TP)     CBC with platelets     CARDIOLOGY EVAL ADULT REFERRAL   2. Malignant neoplasm of larynx (H) C32.9    3. Secondary malignancy of lymph nodes of head, face and neck (H) C77.0    4. HAGER (dyspnea on exertion) R06.09 CARDIOLOGY EVAL ADULT REFERRAL   5. Abnormal EKG R94.31 CARDIOLOGY EVAL ADULT REFERRAL   6. PVD (peripheral vascular disease) (H) I73.9 EKG  12-lead complete w/read - Clinics   7. Chronic obstructive pulmonary disease, unspecified COPD type (H) J44.9 Spirometry, Breathing Capacity: Normal Order, Clinic Performed   8. Physical deconditioning R53.81    9. Tobacco abuse Z72.0 EKG 12-lead complete w/read - Clinics   10. H/O: alcohol abuse Z87.898      1. Preop general physical exam  - EKG 12-lead complete w/read - Clinics  - Comprehensive metabolic panel (BMP + Alb, Alk Phos, ALT, AST, Total. Bili, TP)  - CBC with platelets    2. Malignant neoplasm of larynx (H)  3. Secondary malignancy of lymph nodes of head, face and neck (H)  Surgery as scheduled    4. HAGER (dyspnea on exertion)  5. Abnormal EKG  HAGER - pulmonary vs cardiac origin. Suspect secondary to COPD/structural lung disease and physical deconditioning. Has had decline in spirometry from Jan 2018.   T wave inversion V1 and V2- spoke with  cardiology [facility] who reviewed EKG and pt RF. He advised can be normal variant in female and does not need to delay her planned surgical procedures for additional evaluation     6. PVD (peripheral vascular disease) (H)  As above; continue with vascular specialist outside health system ([facility])  - EKG 12-lead complete w/read - Clinics    7. Chronic obstructive pulmonary disease, unspecified COPD type (H)  Use inhalers on schedule morning of surgery.   - Spirometry, Breathing Capacity: Normal Order, Clinic Performed    8. Physical deconditioning      9. Tobacco abuset is not interested in quiiting  Pt is not interested in cessation at this time  - EKG 12-lead complete w/read - Clinics    10. H/O: alcohol abuse  Daily alcohol use. Risk of withdrawal      RECOMMENDATIONS:     --Consult hospital rounder / IM to assist post-op medical management    Cardiovascular Risk  Patient has very limited exertional capacity.   No angina, CP sx. No MI hx. EKG reviewed with cardiology- see above.     Pulmonary Risk  Respiratory Therapy (Respiratory Care IP  Consult)  post op  NG tube decompression if abdominal distension or significant vomiting   Advised smoking cessation.   Inhalers morning before surgery    --Patient is to take all scheduled medications on the day of surgery EXCEPT for modifications listed below.    Anticoagulant or Antiplatelet Medication Use  ASPIRIN: pt is currently holding per recommendation from her surgical team.   PLAVIX: pt is currently holding per recommendation from her surgical team        ACE Inhibitor or Angiotensin Receptor Blocker (ARB) Use  Ace inhibitor or Angiotensin Receptor Blocker (ARB) and should HOLD this medication for the 24 hours prior to surgery.      APPROVAL GIVEN to proceed with proposed procedure, without further diagnostic evaluation       Signed Electronically by: Jennifer Lee PA-C    Copy of this evaluation report is provided to requesting physician.    [place] Preop Guidelines    Revised Cardiac Risk Index

## 2018-07-11 NOTE — MR AVS SNAPSHOT
After Visit Summary   7/11/2018    Keira Collins    MRN: [id number]           Patient Information     Date Of Birth          1945        Visit Information        Provider Department      7/11/2018 9:20 AM Cynthia Walden MD [facility] Ear Nose and Throat        Today's Diagnoses     Malignant neoplasm of larynx (H)    -  1    Thyroid nodule        Pulmonary nodules        Secondary malignancy of lymph nodes of head, face and neck (H)        Mass of neck        Dysphagia, unspecified type          Care Instructions    1. You are scheduled for surgery on Tuesday 7/17. You need to arrive at the hospital at 11:50am.  You are scheduled for a Pre-op appointment on Monday 7/16 at 5:30pm. This can be canceled if you are able to see your primary care doctor sooner.   2. Please call to let [facility] know when we can arrange a patient learning center appointment for trach teaching.   3. Please call the ENT clinic with any questions,concerns, new or worsening symptoms.    -Clinic number is [phone]   - Shelby's direct line (Dr. Walden's nurse) [phone]            Follow-ups after your visit        Additional Services     Speech Therapy Referral [3584]       *This order will print in the [facility] Central Scheduling Office*    [facility] provides Speech Therapy evaluation and treatment and many specialty services across the [facility].  If requesting a specialty program, please choose from the list below.    Call [phone] to schedule [facility] at all locations, with the exception of [facility], please call [phone].     Treatment: Evaluation & Treatment  Speech Treatment Diagnosis: Dysphagia  Special Instructions: with [facility]   Special Programs: Video Swallow Study    Please be aware that coverage of these services is subject to the terms and limitations of your health insurance plan.  Call member services at your  health plan with any benefit or coverage questions.      **Note to Provider** To refer patients to therapy outside of the location list, change the order class to External Referral in the order composer.                  Your next 10 appointments already scheduled     Jul 16, 2018  1:00 PM CDT   Pre-Op physical with Jennifer Lee PA-C   [facility] ([facility])    [address], [address]  [place] [id number] [phone]            Jul 17, 2018   Procedure with Cynthia Walden MD   [facility] [place], Same Day Surgery (--)    [address]  [place] [address]   [phone]            Jul 19, 2018  3:00 PM CDT   (Arrive by 2:45 PM)   New Patient Visit with LORI Schmitt   [facility] Interventional Radiology ([facility])    [address] [place] [address]  1st [facility] [address] [phone]            Aug 02, 2018 10:00 AM CDT   XR VIDEO SPEECH EVALUATION WITH ESOPHAGRAM with UCXR2, UC GIGU RAD   [facility] ([facility])    [address] [place] [address] [phone]           Please bring a list of your current medicines to your exam. (Include vitamins, minerals and over-the-counter medicines.) Leave your valuables at home.  Tell the doctor if there is a chance you could be pregnant.  Do not eat for 4 hours before the exam. Keep drinking clear liquids until 2 hours before the exam.  You may take pain medicine (with a sip of water) up to 4 hours before the exam.  Do not swallow any other medicines unless your doctor tells you to. Talk to your doctor to be sure it s safe to stop your medicines.  Please call the Imaging Department at your exam site with any questions.            Aug 02, 2018 10:00 AM CDT   Video Swallow with ADRIANA Del Castillo   [facility] ([facility])    [address] [place] [address]  "  [phone]           Please check in for this visit in Imaging on the 1st floor.              Who to contact     Please call your clinic at [phone] to:    Ask questions about your health    Make or cancel appointments    Discuss your medicines    Learn about your test results    Speak to your doctor            Additional Information About Your Visit        Care EveryWhere ID     This is your Care EveryWhere ID. This could be used by other organizations to access your [facility] medical records  [id number]        Your Vitals Were     Height BMI (Body Mass Index)                1.6 m (5' 3\") 20.37 kg/m2           Blood Pressure from Last 3 Encounters:   06/27/18 159/84   06/11/18 160/80   01/16/18 150/88    Weight from Last 3 Encounters:   07/11/18 52.2 kg (115 lb)   01/16/18 59.9 kg (132 lb)   01/31/17 56.6 kg (124 lb 12.8 oz)              We Performed the Following     IMAGESTREAM RECORDING ORDER     Speech Therapy Referral [3551]        Primary Care Provider Office Phone # Fax #    Asa Elliott [phone] [address] [address]        Equal Access to Services     [facility]: Hadii aad ku hadasho Soomaali, waaxda luqadaha, qaybta kaalmada adeegyada, stas suarez haymarn karla waters. So [facility] [phone].    ATENCIÓN: Si habla español, tiene a smart disposición servicios gratuitos de asistencia lingüística. Llame al [phone].    We comply with applicable federal civil rights laws and [place] laws. We do not discriminate on the basis of race, color, national origin, age, disability, sex, sexual orientation, or gender identity.            Thank you!     Thank you for choosing [facility] EAR NOSE [facility] TH[facility]AT  for your care. Our goal is always to provide you with excellent care. Hearing back from our patients is one way we can continue to improve our services. Please take a few minutes to complete the written survey that you may receive in the mail after " your visit with us. Thank you!             Your Updated Medication List - Protect others around you: Learn how to safely use, store and throw away your medicines at www.disposemymeds.org.          This list is accurate as of 7/11/18 11:59 PM.  Always use your most recent med list.                   Brand Name Dispense Instructions for use Diagnosis    acetaminophen 500 MG tablet    TYLENOL     Take 1 tablet (500 mg) by mouth every 6 hours    Ulcer of heel, left, with unspecified severity (H)       albuterol 108 (90 Base) MCG/ACT Inhaler    PROAIR HFA/PROVENTIL HFA/VENTOLIN HFA    1 Inhaler    Inhale 2 puffs into the lungs every 4 hours as needed for shortness of breath / dyspnea or wheezing    Chronic obstructive pulmonary disease, unspecified COPD type (H)       amLODIPine 5 MG tablet    NORVASC    30 tablet    Take 1 tablet (5 mg) by mouth daily    Hyperlipidemia LDL goal <100       aspirin 81 MG tablet     30 tablet    Take 1 tablet (81 mg) by mouth daily    PVD (peripheral vascular disease) (H)       atorvastatin 40 MG tablet    LIPITOR    90 tablet    TAKE 1 TABLET BY MOUTH EVERY DAY    PAD (peripheral artery disease) (H), Hyperlipidemia LDL goal <100       bisacodyl 5 MG EC tablet    DULCOLAX     Take 15 mg by mouth daily as needed for constipation Reported on 2/24/2017        Calcium carb-Vitamin D 500 mg [place]-200 units 500-200 MG-UNIT per tablet    OSCAL with D;Oyster Shell Calcium     Take 1 tablet by mouth 3 times daily (with meals) Reported on 2/24/2017        clopidogrel 75 MG tablet    PLAVIX    90 tablet    TAKE 1 TABLET BY MOUTH ONCE DAILY    PAD (peripheral artery disease) (H)       fluticasone-salmeterol 45-21 MCG/ACT inhaler    ADVAIR-HFA    36 Inhaler    Inhale 2 puffs into the lungs 2 times daily    Chronic obstructive pulmonary disease, unspecified COPD type (H)       losartan 50 MG tablet    COZAAR    30 tablet    Take 1 tablet (50 mg) by mouth daily    Essential hypertension with goal blood  "pressure less than 140/90       Multiple vitamin Tabs      Take 1 tablet by mouth daily Reported on 2/24/2017        NUTRITIONAL SUPPLEMENT Liqd      Take 4 oz by mouth 2 times daily Reported on 2/24/2017        ONDANSETRON PO      Take 4 mg by mouth every 6 hours as needed for nausea        * OPTIFOAM 4\"X4\" Pads     30 each    1 Application daily    Ulcer of left heel, with unspecified severity (H)       * CURITY KERLIX ROLL Misc     30 each    1 Application daily    Ulcer of left heel, with unspecified severity (H)       order for DME     300 each    Equipment being ordered: Adult diapers--using 10 per day    Urinary incontinence, unspecified type       polyethylene glycol Packet    MIRALAX/GLYCOLAX    7 packet    Take 17 g by mouth daily as needed for constipation    Ulcer of heel, left, with unspecified severity (H)       sennosides 8.6 MG tablet    SENOKOT     Take 2 tablets by mouth 2 times daily Reported on 2/24/2017        SILVASORB Gel     1 Tube    Externally apply 1 Application topically daily    Ulcer of left heel, with unspecified severity (H)       ULTIMA INCONTINENCE PAD Misc     30 each    1 Units At Bedtime    Urinary incontinence, unspecified type       umeclidinium 62.5 MCG/INH oral inhaler    INCRUSE ELLIPTA    30 Inhaler    Inhale 1 puff into the lungs daily    Chronic obstructive pulmonary disease, unspecified COPD type (H)       * Notice:  This list has 2 medication(s) that are the same as other medications prescribed for you. Read the directions carefully, and ask your doctor or other care provider to review them with you.      "

## 2018-07-11 NOTE — LETTER
"2018       RE: Keira Collins  [address] [place] [address]     Dear Colleague,    Thank you for referring your patient, Keira Collins, to the [facility] EAR NOSE AND [facility]ROAT at [facility]. Please see a copy of my visit note below.    Dear Dr. Luna:    I had the pleasure of meeting Keira Collins in consultation today at the [facility] at your request.     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  She has had a fluctuating voice for the last month.  Her sons made her undergo evaluation and she was recently seen by Dr. Luna.  She denies any sore throat but does have a pain in the left side of her neck for the last week.  Her family says that she has had wheezing for a considerable period of time thought to be related to her COPD.  She denies any hemoptysis.  Her son thinks she has lost weight recently but he cannot quantify an amount.  She has bilateral ear pain.  She has a decreased appetite which is causing her to eat less.  Her sons try to ensure that she gets adequate nutrition.  They are noticing that she is coughing more especially with liquids.  They are working on trying to get her to quit smoking.      Social history: 2 pack per day smoker since approximately age 2, no chewing tobacco use, has occasional beers.  She lives with her sons.      Family history: Her   of throat cancer    Past medical history: COPD, \"leg problems\" (PVD?), no history of an MI or stroke    Past surgical history: Hip replacement following a broken hip, surgery on her foot        MEDICATIONS:     Current Outpatient Prescriptions   Medication Sig Dispense Refill     acetaminophen (TYLENOL) 500 MG tablet Take 1 tablet (500 mg) by mouth every 6 hours       albuterol (PROAIR HFA/PROVENTIL HFA/VENTOLIN HFA) 108 (90 BASE) " "MCG/ACT Inhaler Inhale 2 puffs into the lungs every 4 hours as needed for shortness of breath / dyspnea or wheezing 1 Inhaler 9     amLODIPine (NORVASC) 5 MG tablet Take 1 tablet (5 mg) by mouth daily 30 tablet 3     aspirin 81 MG tablet Take 1 tablet (81 mg) by mouth daily 30 tablet      atorvastatin (LIPITOR) 40 MG tablet TAKE 1 TABLET BY MOUTH EVERY DAY 90 tablet 1     bisacodyl (DULCOLAX) 5 MG EC tablet Take 15 mg by mouth daily as needed for constipation Reported on 2/24/2017       calcium carb 1250 mg, 500 mg [employer],/vitamin D 200 units (OSCAL WITH D) 500-200 MG-UNIT per tablet Take 1 tablet by mouth 3 times daily (with meals) Reported on 2/24/2017       clopidogrel (PLAVIX) 75 MG tablet TAKE 1 TABLET BY MOUTH ONCE DAILY 90 tablet 1     fluticasone-salmeterol (ADVAIR-HFA) 45-21 MCG/ACT inhaler Inhale 2 puffs into the lungs 2 times daily 36 Inhaler 1     Gauze Pads & Dressings (CURITY KERLIX ROLL) MISC 1 Application daily 30 each 1     Gauze Pads & Dressings (OPTIFOAM) 4\"X4\" PADS 1 Application daily 30 each 1     Incontinence Supply Disposable (ULTIMA INCONTINENCE PAD) MISC 1 Units At Bedtime 30 each 5     losartan (COZAAR) 50 MG tablet Take 1 tablet (50 mg) by mouth daily 30 tablet 3     Multiple vitamin TABS Take 1 tablet by mouth daily Reported on 2/24/2017       NUTRITIONAL SUPPLEMENT LIQD Take 4 oz by mouth 2 times daily Reported on 2/24/2017       ONDANSETRON PO Take 4 mg by mouth every 6 hours as needed for nausea       order for DME Equipment being ordered: Adult diapers--using 10 per day 300 each 10     polyethylene glycol (MIRALAX/GLYCOLAX) Packet Take 17 g by mouth daily as needed for constipation 7 packet      sennosides (SENOKOT) 8.6 MG tablet Take 2 tablets by mouth 2 times daily Reported on 2/24/2017       umeclidinium (INCRUSE ELLIPTA) 62.5 MCG/INH oral inhaler Inhale 1 puff into the lungs daily 30 Inhaler 5     Wound Dressings (SILVASORB) GEL Externally apply 1 Application topically daily 1 Tube " 1       ALLERGIES:    Allergies   Allergen Reactions     Penicillins        HABITS/SOCIAL HISTORY:   2 pack per day smoker since approximately age 2, no chewing tobacco use, has occasional beers.  She lives with her sons.      Social History     Social History     Marital status: Single     Spouse name: N/A     Number of children: N/A     Years of education: N/A     Occupational History     Not on file.     Social History Main Topics     Smoking status: Current Every Day Smoker     Packs/day: 1.00     Years: 50.00     Types: Cigarettes     Smokeless tobacco: Never Used      Comment: advised      Alcohol use 0.0 oz/week     0 Standard drinks or equivalent per week      Comment: Occassionally- beer      Drug use: No     Sexual activity: No     Other Topics Concern     Not on file     Social History Narrative    Live with Son who helps with her care           PAST MEDICAL HISTORY:   Past Medical History:   Diagnosis Date     Cholelithiasis      Essential hypertension with goal blood pressure less than 140/90      H/O: alcohol abuse      Hyperlipidemia LDL goal <100      Tobacco abuse         PAST SURGICAL HISTORY:   Past Surgical History:   Procedure Laterality Date     SURGICAL HISTORY OF -   5/13/2016    right hip fracture       FAMILY HISTORY:    Family History   Problem Relation Age of Onset     Chronic Obstructive Pulmonary Disease Daughter      Diabetes Daughter      HEART DISEASE Daughter        REVIEW OF SYSTEMS:  12 point ROS was negative other than the symptoms noted above in the HPI.  Patient Supplied Answers to Review of Systems  [facility] ROS 7/11/2018   Constitutional Weight loss, Appetite change   Neurology Headache   Psychology Frequently feeling depressed or sad   Ears, Nose, Throat Sore throat   Cardiopulmonary Cough, Breathing problems, Wheezing   Gastrointestinal/Genitourinary Constipation   Musculoskeletal Back pain, Swollen legs/feet   Hematologic Easy bruising   Skin Skin lesions         PHYSICAL  "EXAMINATION:   Ht 1.6 m (5' 3\")  Wt 52.2 kg (115 lb)  BMI 20.37 kg/m2   Appearance:   normal; NAD, age-appropriate appearance, well-developed, normal habitus   Communication:   hoarse, rough quality to voice  *sons answer most of questions for patient*   Head/Face:   inspection -  Normal; no scars or visible lesions   Salivary glands -  Normal size, no tenderness, swelling, or palpable masses   Facial strength -  Normal and symmetric bilateral; H/B I/VI   Skin:  normal, no rash   Ocular Motility:  normal occular movements   Ears:  auricle (AD) -  normal  EAC (AD) -  normal  TM (AD) -  Normal, no effusion  auricle (AS) -  normal  EAC (AS) -  normal  TM (AS) -  Normal, no effusion  Normal clinical speech reception   Nose:  Ext. inspection -  Normal  Internal Inspection -  Normal mucosa, septum, and turbinates   Nasopharynx:  normal mucosa, no masses   Oral Cavity:  lips -  Normal mucosa, oral competence, and stoma size   Edentulous, healthy gingival mucosa   Hard palate, buccal, floor of mouth mucosa normal   Tongue - normal movement, no lesions   Oropharynx:  mucosa -  Normal, no lesions  soft palate -  Normal, no lesions, no asymmetry, normal elevation  tonsils -  Normal, no exudates, no abnormal lesions, symmetric   Hypopharynx:  Normal pyriform sinus and pharyngeal wall mucosa   Pooled secretions    Larynx:  mass along the laryngeal surface of the epiglottis near the petiole.  She has an exophytic tumor of the left false cord, left true cord with crossing of the midline onto the right false and true cords.  She has lots of secretions that pool and she has at least penetration if not aspiration of secretions over the interarytenoid space and in the glottis.     Neck: No visible mass or asymmetry   Normal palpation, no tenderness, no tracheal deviation  thyroid -  Normal   Normal range of motion   Lymphatic:  small 1 cm node in left level II that is mobile   Cardiovascular: warm, pink, well-perfused extremities " without swelling, tenderness, or edema   Respiratory: Normal respiratory effort, no stridor  Audible turbulent airflow on exhalation intermittently   Neuro/Psych.:  mood/affect -  normal  mental status -  normal  cranial nerves -  normal        PROCEDURES:   Flexible fiberoptic laryngoscopy: Scope exam was indicated due to laryngeal tumor. Verbal consent was obtained. The nasal cavity was prepped with an aerosolized solution of topical anesthetic and vasoconstrictive agent. The scope was passed through the anterior nasal cavity and advanced. Inspection of the nasopharynx revealed no gross abnormality.  She does have a strong gag which made the laryngeal exam complicated.  However she does have a normal base of tongue and superior aspect of the epiglottis including the entire lingual surface.  The patient has abnormalities consistent with extension of a mass along the laryngeal surface of the epiglottis near the petiole.  She has an exophytic tumor of the left false cord, left true cord with crossing of the midline onto the right false and true cords.  She has lots of secretions that pool and she has at least penetration if not aspiration of secretions over the interarytenoid space and in the glottis.  There is some pooled secretions in the piriform sinus.  Her airway is narrowed but patent patient tolerated the procedure well with no immediate complications    RESULTS REVIEWED:   I reviewed the referral notes from Dr Luna which are summarized above    I independently reviewed the CT scan images which show a mass of the larynx involving both cords, the preepiglottic space, no obvious thyroid cartilage erosion, right level IV node, left level III node concerning ofr malignancy.  The thyroid is diffusely enlarged, greater on the right side with nodules bilaterally (R>L) with coarse calcifications on the right. The chest CT shows a right lung nodule that is about 1.5 cm in size.      CT:  Findings:      There is  abnormal enhancement and thickening of the bilateral anterior  false vocal cords, anterior commissure and bilateral anterior true  vocal cords. There is extension of the lesion into the left  paraglottic fat and abuts the inner margin of the left side of the  thyroid cartilage. There is no mass extending through the cartilage to  extralaryngeal space. The strap muscles appear symmetric and within  normal limits. Subglottic larynx appears normal. Superiorly abnormal  enhancement and thickening extends to the epiglottis, left  aryepiglottic fold and preepiglottic fat. Anteroposteriorly the mass  measures about 2.5 cm, transversely measures 2.0 cm craniocaudally  measures 2.8 cm. There are 2 left level 3 metastatic lymphadenopathy  and 1 right level 3 metastatic lymphadenopathy. The larger one on the  left measures 1.1 cm, the other 2 nodes measuring 1.0 cm. There are  necrotic areas within the lymph nodes.     There is nodular enlargement of the thyroid lobes. In the right  thyroid lobe there are coarse calcifications.     There are small hypodense areas within the palatine tonsils most  compatible with retention cysts. Tongue base is normal. Oral cavity is  otherwise normal. Bilateral parotid glands and submandibular glands  are normal. Lung apices demonstrate emphysematous changes.         IMPRESSION:      Enhancing nodular thickening of the bilateral true and false vocal  cords, anterior commissure, epiglottis, left AE fold and extending to  left paraglottic fat and into the preepiglottic fat. This is  compatible with squamosal cell cancer. Bilateral metastatic level 3  lymphadenopathy. ENT consultation is recommended.     Multinodular goiter. Ultrasound of the neck and biopsy of the  suspicious nodules recommended.       U/S:  Findings:    Thyroid parenchyma: heterogenous     The right lobe of the thyroid measures: 4.3 x 3.5 x 3.1 cm      The thyroid isthmus measures: 0.3 cm      The left lobe of the thyroid  measures: 4.5 x 1.7 x 1.8 cm      Right lobe:  Nodule 1:  Nodule measurement: 3.8 x 2.7 x 3.6 cm, mid thyroid  Echogenicity: Isoechoic  Consistency: solid  Calcifications: coarse  Hypervascular: no     Isthmus: No discrete nodule     Left Lobe: Innumerable cystic nodules in the left lobe of the thyroid  with echogenic foci most suggestive of colloid.     Incidental suspicious left cervical neck lymph nodes that are better  evaluated on the 6/13/2018 CT. For example a round hypoechoic lymph  node with loss of fatty hilum measuring 1.1 x 1.0 x 1.6 cm superior to  the left lobe of the thyroid and a rounded similar appearing node  lateral to the left lobe measuring 0.9 x 0.7 x 1.7 cm.         Impression:  1. Right lobe thyroid nodule measures up to 3.8 cm and correlates with  CT 6/13/2018.  Consider fine needle aspiration.  2. Suspicious left cervical lymph nodes, better evaluated on recent CT  comparison.       CT lung:  FINDINGS:  Heart size is normal. Mild to moderate thoracic aortic calcification.  Mild coronary artery calcification. No pericardial effusion. Prominent  right hilar node measuring up to 13 mm in short axis. Central  tracheobronchial tree is patent. No pleural effusion or pneumothorax.  Heterogenous appearance of the thyroid gland with large partially  calcified hypodense nodule in the right lobe measuring up to 3 cm.     Spiculated lesion in the right upper lobe measuring 13 x 13 x 15 mm on  series 8, image 82.  Additionally there are several other bilateral pulmonary nodules which  are indeterminate and seen on series 8, for example:  4 mm, right upper lobe, image 128  5 mm, subpleural, right lower lobe, image 147  3 mm, right upper lobe, image 102  6 mm, subpleural, left upper lobe, image 69  4 mm, left upper lobe, image 73  7 mm, right lower lobe, image 207  6 mm, left lower lobe, image 195  Majority of these nodules are well-circumscribed, although some of  them have slightly irregular margins  with mild spiculation,  particularly the larger nodules     Moderate upper lobe predominant centrilobular emphysematous changes.  Mild bibasilar atelectasis. Biapical scarring.     Bones and soft tissues: No suspicious bone findings. Partially  visualized degenerative changes of the right shoulder with significant  degenerative/cystic changes of the right humeral head. Multiple  left-sided healed rib fractures. A few are also seen on the right.  Mild degenerative changes of the thoracic spine.     Partially imaged upper abdomen: Limited. Cholelithiasis. Irregularity  of the left greater than right adrenal gland. The largest area on the  left measures up to 2 cm. The attenuation is slightly complex.         IMPRESSION:   1. Spiculated nodule in the right upper lobe measuring up to 1.5 cm is  concerning for malignancy. Additionally there are multiple bilateral  smaller nodules ranging from 4-7 mm which may represent metastasis.  Some of the smaller nodules are technically indeterminate.  2. Mildly enlarged right hilar lymph node is indeterminate.  3. Nodularity of bilateral adrenal glands is indeterminate and may  represent metastasis. This may be further evaluated on PET if  indicated or CT adrenal protocol.  4. Enlarged partially calcified nodule of the right thyroid lobe.  Consider ultrasound for further evaluation.  5. Moderate centrilobular emphysematous changes particularly in the  upper lobes.  5. Cholelithiasis.    IMPRESSION AND PLAN:   Keira Collins is a 72 year old woman with a likely R0D2oC2 SCC of the larynx.     I had a lengthy conversation with the patient and her sons who accompanied her today.  The images were reviewed which showed a tumor of the larynx along with a likely node in the left neck.  We tried to attempt an FNA in clinic with pathology but the patient was unable to tolerate this.  We discussed with the patient that this is certainly concerning for malignancy diagnosis and we need to start  with a tissue diagnosis prior to proceeding with a PET scan.  Certainly it is concerning that she has a nodule in her lung which certainly could be granulomatous disease versus metastases versus second primary.    I think at this point it would be prudent to proceed with a direct laryngoscopy with biopsy in the operating room.  She does have some audible stertor or turbulent airflow intermittently on exhalation but no stridor.  I think given her tumor extent along with her COPD any intubation could potentially cause issues with her extubation and she is also going to be at risk for bleeding from the tumor.  I think the safest route would be to proceed with a tracheostomy at the same time of her laryngoscopy with biopsy especially in the setting that she is not sure how she would want to proceed with treatment.  I discussed this procedure in detail to the patient's son.  We also explained that this could be a permanent tracheostomy, that she may not be able to speak around the tube even after trach change, that her swallowing may worsen and she may become PEG dependent.  We also discussed that depending on how she and her family do with the postoperative cares we may need to have her placed in a nursing home for period of time following surgery.  He understands this and wishes to proceed.    I am very concerned that she is at least penetrating if not aspirating.  I had speech pathology see her in clinic today to assess her.  They agree with my assessment that we need to proceed with a video swallow study.  We will likely place an NG tube at the time of her surgery to help with her oral nutrition.  She may very well but end up with a PEG prior to discharge.    The family did ask questions about the treatment for laryngeal cancer.  I discussed with him that this could include a total laryngectomy with postoperative radiation with or without chemotherapy pending the results of the CT scan and the disease in her lungs.   Alternatively she may be a candidate for upfront chemoradiation.  She is not interested at this time in hearing about treatment in detail but her family would like her to at least undergo workup to better understand her options.  We did discuss the palliative care is also an option if she does not want any treatment but I think it would be prudent to proceed with a diagnosis so we can fully investigate all of her options.    She does need a preoperative clearance prior to her surgery which we have scheduled for next week.  I would like to review her case at tumor conference on Friday.  Of note we need to assess what is going on in the lung as well as the fact that she has a multinodular goiter with enlarged nodule on the right side that we ideally would have an US guided biopsy. We will place her scans on for review at lung nodule conference as well given the lung nodule that was seen on her recent chest CT.    Thank you very much for the opportunity to participate in the care of your patient.      Cynthia Walden M.D.  Otolaryngology- Head & Neck Surgery    I spent a total of 60 minutes face-to-face with Keira Collins during today s office visit. Over 50% of this time was spent counseling the patient and/or coordinating care regarding the likely diagnosis and treatment options, including need for tracheostomy to secure the airway as per above.        CC:  Filemon Luna MD  Otolaryngology/Head & Neck Surgery  [facility] [address]      Asa Elliott MD  [address] [facility] [address]

## 2018-07-12 NOTE — PROGRESS NOTES
OUTPATIENT SWALLOW  EVALUATION  PLAN OF TREATMENT FOR OUTPATIENT REHABILITATION  (COMPLETE FOR INITIAL CLAIMS ONLY)  Patient's Last Name, First Name, M.I.  YOB: 1945  Keira Collins        Provider's Name   Albertinaher Palacios   Medical Record No.  [id number]     Start of Care Date:  07/11/18   Onset Date:  07/11/18   Type:     ___PT   ____OT  ___X_SLP Medical Diagnosis:   Laryngeal cancer     Treatment Diagnosis:  Moderate oral pharyngeal dysphasia Visits from SOC:  1     _________________________________________________________________________________  Plan of Treatment/Functional Goals:  Planned Therapy Interventions: Dysphagia Treatment  Dysphagia treatment: Oropharyngeal exercise training, Modified diet education, Instruction of safe swallow strategies, Compensatory strategies for swallowing                     Goals   1. Goal Identifier: Diet       Goal Description: 1.  Patient will tolerate regular consistency solids and thin liquids without overt clinical signs or symptoms of aspiration/penetration 100% of the time independently       Target Date: 10/09/18                             Therapy Frequency: other (see comments)  Predicted Duration of Therapy Intervention (days/wks): 2 times per month for 3 months    Albertina Palacios SLP       I CERTIFY THE NEED FOR THESE SERVICES FURNISHED UNDER        THIS PLAN OF TREATMENT AND WHILE UNDER MY CARE     (Physician attestation of this document indicates review and certification of the therapy plan).                               Referring Physician: Dr. Cynthia Walden    Initial Assessment        See Epic Evaluation Start Of Care Date: 07/11/18

## 2018-07-13 NOTE — TELEPHONE ENCOUNTER
Dr. Jean Weber in IR reviewed this case and will do a needle biopsy of the RUL nodule as seen in series 7 image 41

## 2018-07-13 NOTE — TELEPHONE ENCOUNTER
Spoke to son, Dar, regarding need for patient to stop Plavix now in anticipation for surgery next week. Dar stated that patient has appointment scheduled with PCP on Monday for pre op and will stop plavix.     Kelly Moise RN, BSN.  7/13/2018 5:17 PM

## 2018-07-16 NOTE — MR AVS SNAPSHOT
After Visit Summary   7/16/2018    Keira Collins    MRN: [id number]           Patient Information     Date Of Birth          1945        Visit Information        Provider Department      7/16/2018 1:00 PM Jennifer Lee PA-C [facility]        Today's Diagnoses     Preop general physical exam    -  1    Malignant neoplasm of larynx (H)        Secondary malignancy of lymph nodes of head, face and neck (H)        HAGER (dyspnea on exertion)        Abnormal EKG        PVD (peripheral vascular disease) (H)        Chronic obstructive pulmonary disease, unspecified COPD type (H)        Physical deconditioning        Tobacco abuse        H/O: alcohol abuse          Care Instructions      Before Your Surgery      Call your surgeon if there is any change in your health. This includes signs of a cold or flu (such as a sore throat, runny nose, cough, rash or fever).    Do not smoke, drink alcohol or take over the counter medicine (unless your surgeon or primary care doctor tells you to) for the 24 hours before and after surgery.    If you take prescribed drugs: Follow your doctor s orders about which medicines to take and which to stop until after surgery.    Eating and drinking prior to surgery: follow the instructions from your surgeon    Take a shower or bath the night before surgery. Use the soap your surgeon gave you to gently clean your skin. If you do not have soap from your surgeon, use your regular soap. Do not shave or scrub the surgery site.  Wear clean pajamas and have clean sheets on your bed.           Follow-ups after your visit        Your next 10 appointments already scheduled     Jul 17, 2018   Procedure with Cynthia Walden MD   [facility], [place], Same Day Surgery (--)    [address] [facility] [address]   [phone]            Jul 19, 2018  3:00 PM CDT   (Arrive by 2:45 PM)   New Patient Visit with LORI Schmitt [facility] Interventional Radiology (M  [facility])    [address] [place] [address]  [address] [facility] [id number] [phone]            Aug 02, 2018 10:00 AM CDT   XR VIDEO SPEECH EVALUATION WITH ESOPHAGRAM with [id number] RAD   [facility] X[facility] ([facility])    [address] [place] [address]  [address] [facility] [id number] [phone]           Please bring a list of your current medicines to your exam. (Include vitamins, minerals and over-the-counter medicines.) Leave your valuables at home.  Tell the doctor if there is a chance you could be pregnant.  Do not eat for 4 hours before the exam. Keep drinking clear liquids until 2 hours before the exam.  You may take pain medicine (with a sip of water) up to 4 hours before the exam.  Do not swallow any other medicines unless your doctor tells you to. Talk to your doctor to be sure it s safe to stop your medicines.  Please call the Imaging Department at your exam site with any questions.            Aug 02, 2018 10:00 AM CDT   Video Swallow with ADRIANA Del Castillo   [facility] ([facility])    [address] [place] [address]  [address] [facility] [id number] [phone]           Please check in for this visit in Imaging on the 1st floor.              Who to contact     If you have questions or need follow up information about today's clinic visit or your schedule please [facility] at [phone].  Normal or non-critical lab and imaging results will be communicated to you by MyChart, letter or phone within 4 business days after the clinic has received the results. If you do not hear from us within 7 days, please contact the clinic through MyChart or phone. If you have a critical or abnormal lab result, we will notify you by phone as soon as possible.  Submit refill requests through [employer] or call your pharmacy and they will forward the refill request to us. Please allow 3 business days  "for your refill to be completed.          Additional Information About Your Visit        Care EveryWhere ID     This is your Care EveryWhere ID. This could be used by other organizations to access your [facility] medical records  [id number]        Your Vitals Were     Pulse Temperature Respirations Height Pulse Oximetry BMI (Body Mass Index)    83 97.7  F (36.5  C) (Temporal) 18 5' 3\" (1.6 m) 100% 20.96 kg/m2       Blood Pressure from Last 3 Encounters:   07/16/18 138/72   06/27/18 159/84   06/11/18 160/80    Weight from Last 3 Encounters:   07/16/18 118 lb 4.8 oz (53.7 kg)   07/11/18 115 lb (52.2 kg)   01/16/18 132 lb (59.9 kg)              We Performed the Following     CBC with platelets     Comprehensive metabolic panel (BMP + Alb, Alk Phos, ALT, AST, Total. Bili, TP)     EKG 12-lead complete w/read - Clinics     Spirometry, Breathing Capacity: Normal Order, Clinic Performed        Primary Care Provider Office Phone # Fax #    Asa Elliott [phone] [address] [facility] [address]        Equal Access to Services     CHRIS MAGALLANES AH: Hadii aad ku hadasho Soomaali, waaxda luqadaha, qaybta kaalmada adeegyada, stas waters. So [facility] [phone].    ATENCIÓN: Si habla español, tiene a smart disposición servicios gratuitos de asistencia lingüística. Dashaame al [phone].    We comply with applicable federal civil rights laws and [place] laws. We do not discriminate on the basis of race, color, national origin, age, disability, sex, sexual orientation, or gender identity.            Thank you!     Thank you for choosing [facility]  for your care. Our goal is always to provide you with excellent care. Hearing back from our patients is one way we can continue to improve our services. Please take a few minutes to complete the written survey that you may receive in the mail after your visit with us. Thank you!             Your Updated Medication " List - Protect others around you: Learn how to safely use, store and throw away your medicines at www.disposemymeds.org.          This list is accurate as of 7/16/18  9:36 PM.  Always use your most recent med list.                   Brand Name Dispense Instructions for use Diagnosis    acetaminophen 500 MG tablet    TYLENOL     Take 1 tablet (500 mg) by mouth every 6 hours    Ulcer of heel, left, with unspecified severity (H)       albuterol 108 (90 Base) MCG/ACT Inhaler    PROAIR HFA/PROVENTIL HFA/VENTOLIN HFA    1 Inhaler    Inhale 2 puffs into the lungs every 4 hours as needed for shortness of breath / dyspnea or wheezing    Chronic obstructive pulmonary disease, unspecified COPD type (H)       amLODIPine 5 MG tablet    NORVASC    30 tablet    Take 1 tablet (5 mg) by mouth daily    Hyperlipidemia LDL goal <100       aspirin 81 MG tablet     30 tablet    Take 1 tablet (81 mg) by mouth daily    PVD (peripheral vascular disease) (H)       atorvastatin 40 MG tablet    LIPITOR    90 tablet    TAKE 1 TABLET BY MOUTH EVERY DAY    PAD (peripheral artery disease) (H), Hyperlipidemia LDL goal <100       bisacodyl 5 MG EC tablet    DULCOLAX     Take 15 mg by mouth daily as needed for constipation Reported on 2/24/2017        Calcium carb-Vitamin D 500 mg Hopi-200 units 500-200 MG-UNIT per tablet    OSCAL with D;Oyster Shell Calcium     Take 1 tablet by mouth 3 times daily (with meals) Reported on 2/24/2017        clopidogrel 75 MG tablet    PLAVIX    90 tablet    TAKE 1 TABLET BY MOUTH ONCE DAILY    PAD (peripheral artery disease) (H)       fluticasone-salmeterol 45-21 MCG/ACT inhaler    ADVAIR-HFA    36 Inhaler    Inhale 2 puffs into the lungs 2 times daily    Chronic obstructive pulmonary disease, unspecified COPD type (H)       losartan 50 MG tablet    COZAAR    30 tablet    Take 1 tablet (50 mg) by mouth daily    Essential hypertension with goal blood pressure less than 140/90       Multiple vitamin Tabs      Take 1  "tablet by mouth daily Reported on 2/24/2017        NUTRITIONAL SUPPLEMENT Liqd      Take 4 oz by mouth 2 times daily Reported on 2/24/2017        ONDANSETRON PO      Take 4 mg by mouth every 6 hours as needed for nausea        * OPTIFOAM 4\"X4\" Pads     30 each    1 Application daily    Ulcer of left heel, with unspecified severity (H)       * CURITY KERLIX ROLL Misc     30 each    1 Application daily    Ulcer of left heel, with unspecified severity (H)       order for DME     300 each    Equipment being ordered: Adult diapers--using 10 per day    Urinary incontinence, unspecified type       polyethylene glycol Packet    MIRALAX/GLYCOLAX    7 packet    Take 17 g by mouth daily as needed for constipation    Ulcer of heel, left, with unspecified severity (H)       sennosides 8.6 MG tablet    SENOKOT     Take 2 tablets by mouth 2 times daily Reported on 2/24/2017        SILVASORB Gel     1 Tube    Externally apply 1 Application topically daily    Ulcer of left heel, with unspecified severity (H)       ULTIMA INCONTINENCE PAD Misc     30 each    1 Units At Bedtime    Urinary incontinence, unspecified type       umeclidinium 62.5 MCG/INH oral inhaler    INCRUSE ELLIPTA    30 Inhaler    Inhale 1 puff into the lungs daily    Chronic obstructive pulmonary disease, unspecified COPD type (H)       * Notice:  This list has 2 medication(s) that are the same as other medications prescribed for you. Read the directions carefully, and ask your doctor or other care provider to review them with you.      "

## 2018-07-17 PROBLEM — Z43.0: Status: ACTIVE | Noted: 2018-01-01

## 2018-07-17 NOTE — OR NURSING
Pt minimally responsive, not following commands at time. Dr. Ifeoma Elliott aware, no new orders received. Per Dr. Holden pt appears at her baseline from before surgery. Will continue to monitor.

## 2018-07-17 NOTE — IP AVS SNAPSHOT
MRN:[id number]                      After Visit Summary   7/17/2018    Keira Collins    MRN: [id number]           Thank you!     Thank you for choosing [facility] for your care. Our goal is always to provide you with excellent care. Hearing back from our patients is one way we can continue to improve our services. Please take a few minutes to complete the written survey that you may receive in the mail after you visit with us. Thank you!        Patient Information     Date Of Birth          1945        Designated Caregiver       Most Recent Value    Caregiver    Will someone help with your care after discharge? yes    Name of designated caregiver Dar    Phone number of caregiver [phone]    Caregiver address same as patient      About your hospital stay     You were admitted on:  July 17, 2018 You last received care in the:  [facility]    You were discharged on:  August 10, 2018        Reason for your hospital stay       You were admitted for a biopsy of the larynx. You had a stroke after surgery which was further complicated by pneumonia, tracheostomy needs and severe deconditioning. You discharged to transitional care unit for ongoing care and rehabilitation                  Who to Call     For medical emergencies, please call 911.  For non-urgent questions about your medical care, please call your primary care provider or clinic, [phone]  For questions related to your surgery, please call your surgery clinic        Attending Provider     Provider Specialty    Cynthia Walden MD Otolaryngology    Rochelle Wu MD Internal Medicine    Felix Barahona MD Internal Medicine    Amaury Estrada MD Internal Medicine       Primary Care Provider Office Phone # Fax #    Asa Elliott [phone]      After Care Instructions     Activity - Up with assistive device       HOB elevated to 30 degrees            Adult Formula Bolus Feeding        Specify: See diet order            Advance Diet as Tolerated       Follow this diet upon discharge: Orders Placed This Encounter      Adult Formula Drip Feeding: Continuous Isosource 1.5; Gastrostomy; Goal Rate: 45; mL/hr; Medication - Tube Feeding Flush Frequency: At least 15-30 mL water before and after medication administration and with tube clogging; Amount to Send (Nutritio...      Adult Formula Bolus Feeding      NPO for Medical/Clinical Reasons Except for: No Exceptions            Fall precautions           General info for SNF       Length of Stay Estimate: Short Term Care: Estimated # of Days <30  Condition at Discharge: Stable  Level of care:skilled   Rehabilitation Potential: Fair  Admission H&P remains valid and up-to-date: Yes  Recent Chemotherapy: N/A  Use Nursing Home Standing Orders: Yes            Mantoux instructions       Give two-step Mantoux (PPD) Per Facility Policy Yes            Neuro checks       q4h            Patient care order       Trach Tube Instructions:   1) Contact ENT on-call with any questions or concerns   2) The first changing of trach tube, sponge, and or ties will be performed by ENT post-op day 5. Afterwards, other staff can manage these items as needed.   3) Perform regular suctioning   4) RN should change disposable inner canulas every shift and/or clean it with a brush   5) Keep trach tube obturator taped to wall behind the head of the bed   6) Keep extra unopened 6.0 Shiley and 4.0 Shiley at bedside at all times   7) Minimize the amount of air in the cuff needed to adequately apply positive pressure ventilate. If positive pressure ventilation is not need then the cuff should be down.            Tracheostomy care by nursing       Standard Cares. Suggest suction for O2 sat changes or symptoms rather than gurgling as patient is often able to clear her own secretions                  Your next 10 appointments already scheduled     Sep 12, 2018 11:00 AM CDT   (Arrive by 10:45  AM)   New Patient Visit with Carlos Arroyo MD   [facility] ([facility])    [address]  4th Floor  [facility] [address] [phone]              Additional Services     Occupational Therapy Adult Consult       Evaluate and treat as clinically indicated.    Reason:  Deconditioning            Physical Therapy Adult Consult       Evaluate and treat as clinically indicated.    Reason:  Deconditioning            Speech Language Path Adult Consult       Evaluate and treat as clinically indicated.    Reason:  Recent CVA                  Future tests that were ordered for you     Pneumatic Compression Device        Bilateral calf. Remove 30 mins BID.                  Further instructions from your care team       Trach Care Instructions:   1) Perform regular suctioning   2) RN should change disposable inner canulas every shift and/or clean it with a brush   3) Keep trach tube obturator taped to wall behind the head of the bed   4) Keep extra unopened 6 Shiley and 4 Shiley at bedside at all times   5) Minimize the amount of air in the cuff needed to adequately apply positive pressure ventilate. If positive pressure ventilation is not need then the cuff should be down.    Pending Results     Date and Time Order Name Status Description    7/23/2018 1230 POC US Guide for Thorcentesis In process             Statement of Approval     Ordered          08/10/18 1126  I have reviewed and agree with all the recommendations and orders detailed in this document.  EFFECTIVE NOW     Approved and electronically signed by:  Sandie Grace PA-C             Admission Information     Date & Time Provider Department Dept. Phone    7/17/2018 Amaury Estrada MD [address] [facility] [place] [phone]      Your Vitals Were     Blood Pressure Pulse Temperature Respirations Height Weight    139/63 (BP Location: Left arm) 96 98.8  F (37.1  C)  "(Oral) 22 1.6 m (5' 2.99\") 57.8 kg (127 lb 6.8 oz)    Pulse Oximetry BMI (Body Mass Index)                98% 22.58 kg/m2          Care EveryWhere ID     This is your Care EveryWhere ID. This could be used by other organizations to access your [facility] medical records  [id number]        Equal Access to Services     JEREMIKIRSTEN SONA : Hadii stoney amin hadpeyton Sonoel, waaxda luqadaha, qaybta kaalmada lenorada, stas juanestevandorothy huizarmarkwadwo . So [facility] [phone].    ATENCIÓN: Si habla español, tiene a smart disposición servicios gratuitos de asistencia lingüística. Latisha al [phone].    We comply with applicable federal civil rights laws and [place] laws. We do not discriminate on the basis of race, color, national origin, age, disability, sex, sexual orientation, or gender identity.               Review of your medicines      START taking        Dose / Directions    arformoterol 15 MCG/2ML Nebu neb solution   Commonly known as:  BROVANA   Used for:  Tracheostomy, acute management (H)        Dose:  15 mcg   Take 2 mLs (15 mcg) by nebulization 2 times daily   Quantity:  120 mL   Refills:  0       budesonide 0.25 MG/2ML neb solution   Commonly known as:  PULMICORT   Used for:  Tracheostomy, acute management (H)        Dose:  0.25 mg   Take 2 mLs (0.25 mg) by nebulization 2 times daily   Refills:  0       docusate 50 MG/5ML liquid   Commonly known as:  COLACE   Used for:  Constipation, unspecified constipation type        Dose:  50 mg   5 mLs (50 mg) by Per Feeding Tube route 2 times daily   Quantity:  300 mL   Refills:  0       ferrous sulfate 300 (60 Fe) MG/5ML syrup   Used for:  Anemia, unspecified type        Dose:  300 mg   5 mLs (300 mg) by Per G Tube route daily   Quantity:  75 mL   Refills:  0       folic acid 500 mcg/mL Soln   Commonly known as:  FOLATE   Used for:  Anemia, unspecified type        Dose:  1 mg   2 mLs (1 mg) by Per G Tube route daily   Refills:  0       glycopyrrolate 1 MG tablet "   Commonly known as:  ROBINUL   Used for:  Tracheostomy, acute management (H)        Dose:  1 mg   1 tablet (1 mg) by Per G Tube route 3 times daily   Refills:  0       hypromellose-dextran 0.1-0.3 % Soln ophthalmic solution   Commonly known as:  ARTIFICAL TEARS   Used for:  Dry eyes        Dose:  1 drop   Place 1 drop into both eyes every hour as needed for dry eyes   Refills:  0       ipratropium 0.02 % neb solution   Commonly known as:  ATROVENT   Used for:  Tracheostomy, acute management (H)        Dose:  0.5 mg   Take 2.5 mLs (0.5 mg) by nebulization 4 times daily   Quantity:  225 mL   Refills:  0       lactobacillus rhamnosus (GG) capsule   Used for:  Diarrhea, unspecified type        Dose:  1 capsule   1 capsule by Per Feeding Tube route 3 times daily (before meals)   Refills:  0       levalbuterol 0.63 MG/3ML neb solution   Commonly known as:  XOPENEX   Used for:  Tracheostomy, acute management (H)        Dose:  0.63 mg   Take 3 mLs (0.63 mg) by nebulization 4 times daily   Quantity:  360 mL   Refills:  0       methylphenidate 5 MG tablet   Commonly known as:  RITALIN   Used for:  Adjustment disorder with depressed mood        Dose:  2.5 mg   Take 0.5 tablets (2.5 mg) by mouth 2 times daily   Quantity:  6 tablet   Refills:  0       multivitamins with minerals Liqd liquid   Used for:  Malnutrition, unspecified type (H)        Dose:  15 mL   15 mLs by Per Feeding Tube route daily   Refills:  0       oxyCODONE IR 5 MG tablet   Commonly known as:  ROXICODONE   Used for:  Tracheostomy, acute management (H), PVD (peripheral vascular disease) (H)        Dose:  5-10 mg   1-2 tablets (5-10 mg) by Per G Tube route every 3 hours as needed for other (pain control or improvement in physical function. Hold dose for analgesic side effects.)   Quantity:  15 tablet   Refills:  0       polyethylene glycol Packet   Commonly known as:  MIRALAX/GLYCOLAX   Used for:  Constipation, unspecified constipation type        Dose:  17 g    17 g by Per Feeding Tube route daily   Quantity:  7 packet   Refills:  0       sennosides 8.8 MG/5ML syrup   Commonly known as:  SENOKOT   Used for:  Anemia, unspecified type        Dose:  5 mL   5 mLs by Per Feeding Tube route 2 times daily   Quantity:  105 mL   Refills:  0       sodium chloride 0.9 % neb solution   Used for:  Tracheostomy, acute management (H)        Dose:  3 mL   Take 3 mLs by nebulization 3 times daily   Refills:  0         CONTINUE these medicines which may have CHANGED, or have new prescriptions. If we are uncertain of the size of tablets/capsules you have at home, strength may be listed as something that might have changed.        Dose / Directions    acetaminophen 500 MG tablet   Commonly known as:  TYLENOL   This may have changed:  how to take this   Used for:  Ulcer of heel, left, with unspecified severity (H)        Dose:  500 mg   1 tablet (500 mg) by Per G Tube route every 6 hours   Refills:  0       amLODIPine 5 MG tablet   Commonly known as:  NORVASC   This may have changed:  how to take this   Used for:  Hyperlipidemia LDL goal <100        Dose:  5 mg   1 tablet (5 mg) by Per G Tube route daily   Quantity:  30 tablet   Refills:  3       aspirin 81 MG tablet   This may have changed:  how to take this   Used for:  PVD (peripheral vascular disease) (H)        Dose:  81 mg   1 tablet (81 mg) by Per G Tube route daily   Quantity:  30 tablet   Refills:  0       atorvastatin 40 MG tablet   Commonly known as:  LIPITOR   This may have changed:  See the new instructions.   Used for:  PAD (peripheral artery disease) (H), Hyperlipidemia LDL goal <100        Dose:  40 mg   1 tablet (40 mg) by Per G Tube route daily   Quantity:  90 tablet   Refills:  1       Calcium carb-Vitamin D 500 mg [place]-200 units 500-200 MG-UNIT per tablet   Commonly known as:  OSCAL with D;Oyster Shell Calcium   This may have changed:  how to take this   Used for:  Malnutrition, unspecified type (H), Tracheostomy, acute  management (H)        Dose:  1 tablet   1 tablet by Per G Tube route 3 times daily (with meals) Reported on 2/24/2017   Quantity:  90 tablet   Refills:  0       losartan 50 MG tablet   Commonly known as:  COZAAR   This may have changed:  how to take this   Used for:  Essential hypertension with goal blood pressure less than 140/90        Dose:  50 mg   1 tablet (50 mg) by Per G Tube route daily   Quantity:  30 tablet   Refills:  3         CONTINUE these medicines which have NOT CHANGED        Dose / Directions    albuterol 108 (90 Base) MCG/ACT inhaler   Commonly known as:  PROAIR HFA/PROVENTIL HFA/VENTOLIN HFA   Used for:  Chronic obstructive pulmonary disease, unspecified COPD type (H)        Dose:  2 puff   Inhale 2 puffs into the lungs every 4 hours as needed for shortness of breath / dyspnea or wheezing   Quantity:  1 Inhaler   Refills:  9       clopidogrel 75 MG tablet   Commonly known as:  PLAVIX   Used for:  PAD (peripheral artery disease) (H)        TAKE 1 TABLET BY MOUTH ONCE DAILY   Quantity:  90 tablet   Refills:  1         STOP taking     fluticasone-salmeterol 45-21 MCG/ACT inhaler   Commonly known as:  ADVAIR-HFA           NUTRITIONAL SUPPLEMENT Liqd           SILVASORB Gel           umeclidinium 62.5 MCG/INH inhaler   Commonly known as:  INCRUSE ELLIPTA                Where to get your medicines      Some of these will need a paper prescription and others can be bought over the counter. Ask your nurse if you have questions.     Bring a paper prescription for each of these medications     methylphenidate 5 MG tablet    oxyCODONE IR 5 MG tablet       You don't need a prescription for these medications     acetaminophen 500 MG tablet    amLODIPine 5 MG tablet    arformoterol 15 MCG/2ML Nebu neb solution    aspirin 81 MG tablet    atorvastatin 40 MG tablet    budesonide 0.25 MG/2ML neb solution    Calcium carb-Vitamin D 500 mg [place]-200 units 500-200 MG-UNIT per tablet    docusate 50 MG/5ML liquid     ferrous sulfate 300 (60 Fe) MG/5ML syrup    folic acid 500 mcg/mL Soln    glycopyrrolate 1 MG tablet    hypromellose-dextran 0.1-0.3 % Soln ophthalmic solution    ipratropium 0.02 % neb solution    lactobacillus rhamnosus (GG) capsule    levalbuterol 0.63 MG/3ML neb solution    losartan 50 MG tablet    multivitamins with minerals Liqd liquid    polyethylene glycol Packet    sennosides 8.8 MG/5ML syrup    sodium chloride 0.9 % neb solution                Protect others around you: Learn how to safely use, store and throw away your medicines at www.disposemymeds.org.        Information about OPIOIDS     PRESCRIPTION OPIOIDS: WHAT YOU NEED TO KNOW   We gave you an opioid (narcotic) pain medicine. It is important to manage your pain, but opioids are not always the best choice. You should first try all the other options your care team gave you. Take this medicine for as short a time (and as few doses) as possible.    Some activities can increase your pain, such as bandage changes or therapy sessions. It may help to take your pain medicine 30 to 60 minutes before these activities. Reduce your stress by getting enough sleep, working on hobbies you enjoy and practicing relaxation or meditation. Talk to your care team about ways to manage your pain beyond prescription opioids.    These medicines have risks:    DO NOT drive when on new or higher doses of pain medicine. These medicines can affect your alertness and reaction times, and you could be arrested for driving under the influence (DUI). If you need to use opioids long-term, talk to your care team about driving.    DO NOT operate heavy machinery    DO NOT do any other dangerous activities while taking these medicines.    DO NOT drink any alcohol while taking these medicines.     If the opioid prescribed includes acetaminophen, DO NOT take with any other medicines that contain acetaminophen. Read all labels carefully. Look for the word  acetaminophen  or  Tylenol.  Ask  your pharmacist if you have questions or are unsure.    You can get addicted to pain medicines, especially if you have a history of addiction (chemical, alcohol or substance dependence). Talk to your care team about ways to reduce this risk.    All opioids tend to cause constipation. Drink plenty of water and eat foods that have a lot of fiber, such as fruits, vegetables, prune juice, apple juice and high-fiber cereal. Take a laxative (Miralax, milk of magnesia, Colace, Senna) if you don t move your bowels at least every other day. Other side effects include upset stomach, sleepiness, dizziness, throwing up, tolerance (needing more of the medicine to have the same effect), physical dependence and slowed breathing.    Store your pills in a secure place, locked if possible. We will not replace any lost or stolen medicine. If you don t finish your medicine, please throw away (dispose) as directed by your pharmacist. The [place] Pollution Control Agency has more information about safe disposal: https://www.pca.[facility].mn.us/living-green/managing-unwanted-medications             Medication List: This is a list of all your medications and when to take them. Check marks below indicate your daily home schedule. Keep this list as a reference.      Medications           Morning Afternoon Evening Bedtime As Needed    acetaminophen 500 MG tablet   Commonly known as:  TYLENOL   1 tablet (500 mg) by Per G Tube route every 6 hours   Last time this was given:  650 mg on 8/7/2018  8:35 PM                                albuterol 108 (90 Base) MCG/ACT inhaler   Commonly known as:  PROAIR HFA/PROVENTIL HFA/VENTOLIN HFA   Inhale 2 puffs into the lungs every 4 hours as needed for shortness of breath / dyspnea or wheezing                                amLODIPine 5 MG tablet   Commonly known as:  NORVASC   1 tablet (5 mg) by Per G Tube route daily   Last time this was given:  5 mg on 8/10/2018  8:39 AM                                 arformoterol 15 MCG/2ML Nebu neb solution   Commonly known as:  BROVANA   Take 2 mLs (15 mcg) by nebulization 2 times daily   Last time this was given:  15 mcg on 8/10/2018  7:16 AM                                aspirin 81 MG tablet   1 tablet (81 mg) by Per G Tube route daily                                atorvastatin 40 MG tablet   Commonly known as:  LIPITOR   1 tablet (40 mg) by Per G Tube route daily   Last time this was given:  40 mg on 8/10/2018  8:40 AM                                budesonide 0.25 MG/2ML neb solution   Commonly known as:  PULMICORT   Take 2 mLs (0.25 mg) by nebulization 2 times daily   Last time this was given:  0.25 mg on 8/10/2018  7:16 AM                                Calcium carb-Vitamin D 500 mg [place]-200 units 500-200 MG-UNIT per tablet   Commonly known as:  OSCAL with D;Oyster Shell Calcium   1 tablet by Per G Tube route 3 times daily (with meals) Reported on 2/24/2017   Last time this was given:  1 tablet on 8/10/2018  8:40 AM                                clopidogrel 75 MG tablet   Commonly known as:  PLAVIX   TAKE 1 TABLET BY MOUTH ONCE DAILY   Last time this was given:  75 mg on 8/10/2018  8:39 AM                                docusate 50 MG/5ML liquid   Commonly known as:  COLACE   5 mLs (50 mg) by Per Feeding Tube route 2 times daily   Last time this was given:  50 mg on 8/10/2018  8:39 AM                                ferrous sulfate 300 (60 Fe) MG/5ML syrup   5 mLs (300 mg) by Per G Tube route daily   Last time this was given:  300 mg on 8/10/2018  8:39 AM                                folic acid 500 mcg/mL Soln   Commonly known as:  FOLATE   2 mLs (1 mg) by Per G Tube route daily   Last time this was given:  1 mg on 8/10/2018  8:39 AM                                glycopyrrolate 1 MG tablet   Commonly known as:  ROBINUL   1 tablet (1 mg) by Per G Tube route 3 times daily   Last time this was given:  1 mg on 8/10/2018  8:39 AM                                 hypromellose-dextran 0.1-0.3 % Soln ophthalmic solution   Commonly known as:  ARTIFICAL TEARS   Place 1 drop into both eyes every hour as needed for dry eyes                                ipratropium 0.02 % neb solution   Commonly known as:  ATROVENT   Take 2.5 mLs (0.5 mg) by nebulization 4 times daily   Last time this was given:  0.5 mg on 8/10/2018 11:20 AM                                lactobacillus rhamnosus (GG) capsule   1 capsule by Per Feeding Tube route 3 times daily (before meals)   Last time this was given:  1 capsule on 8/10/2018  8:40 AM                                levalbuterol 0.63 MG/3ML neb solution   Commonly known as:  XOPENEX   Take 3 mLs (0.63 mg) by nebulization 4 times daily   Last time this was given:  0.63 mg on 8/10/2018 11:20 AM                                losartan 50 MG tablet   Commonly known as:  COZAAR   1 tablet (50 mg) by Per G Tube route daily   Last time this was given:  50 mg on 8/10/2018  8:39 AM                                methylphenidate 5 MG tablet   Commonly known as:  RITALIN   Take 0.5 tablets (2.5 mg) by mouth 2 times daily   Last time this was given:  2.5 mg on 8/10/2018  8:48 AM                                multivitamins with minerals Liqd liquid   15 mLs by Per Feeding Tube route daily   Last time this was given:  15 mLs on 8/10/2018  8:39 AM                                oxyCODONE IR 5 MG tablet   Commonly known as:  ROXICODONE   1-2 tablets (5-10 mg) by Per G Tube route every 3 hours as needed for other (pain control or improvement in physical function. Hold dose for analgesic side effects.)   Last time this was given:  5 mg on 8/7/2018 11:36 PM                                polyethylene glycol Packet   Commonly known as:  MIRALAX/GLYCOLAX   17 g by Per Feeding Tube route daily   Last time this was given:  17 g on 8/10/2018  8:39 AM                                sennosides 8.8 MG/5ML syrup   Commonly known as:  SENOKOT   5 mLs by Per Feeding Tube route  2 times daily   Last time this was given:  5 mLs on 8/10/2018  8:39 AM                                sodium chloride 0.9 % neb solution   Take 3 mLs by nebulization 3 times daily   Last time this was given:  3 mLs on 8/10/2018  7:17 AM

## 2018-07-17 NOTE — BRIEF OP NOTE
[facility], [place]    Brief Operative Note    Pre-operative diagnosis: Laryngeal Mass   Post-operative diagnosis * No post-op diagnosis entered *  Procedure: Procedure(s):  Direct Laryngoscopy With Biopsy, Tracheostomy  - Wound Class: II-Clean Contaminated   - Wound Class: II-Clean Contaminated  Surgeon: Surgeon(s) and Role:     * Cynthia Walden MD - Primary     * Diana Degroot MD - Resident - Assisting  Anesthesia: General   Estimated blood loss: 5 cc  Drains: None  Specimens:   ID Type Source Tests Collected by Time Destination   A : left false cord Tissue Throat SURGICAL PATHOLOGY EXAM Diana Degroot MD 7/17/2018  1:53 PM    B : petiole Tissue Throat SURGICAL PATHOLOGY EXAM Cynthia Walden MD 7/17/2018  2:06 PM    C : Additional Left False Vocal Cord  Tissue Throat SURGICAL PATHOLOGY EXAM Cynthia Walden MD 7/17/2018  2:10 PM    D : Right False Vocal Cord  Tissue Throat SURGICAL PATHOLOGY EXAM Cynthia Walden MD 7/17/2018  2:10 PM      Findings:   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 No subglottic extension. Tracheostomy, 6-0 cuffed Shiley placed. NG tube placed. See full op report for details.   Complications: None.  Implants: None.      Trach Care Instructions:   -Do NOT cut trach ties - they are placed tightly around the neck to avoid decannulation  -The first changing of trach tube, sponge, and/or ties will be performed by ENT post-op ~ day 3. Afterwards, other hospital staff can manage these items as needed.   -Perform regular suctioning   -RN should change disposable inner canulas every shift and/or clean it with a brush   -Keep trach tube obturator taped to wall behind the head of the bed   -Keep extra unopened 6-0 Shiley and 4-0 Shiley at bedside at all times   -Minimize the amount of air in the cuff needed to adequately apply positive pressure ventilate. If  positive pressure ventilation is not need then the cuff should be down.   -Contact ENT on-call with any questions or concerns

## 2018-07-17 NOTE — IP AVS SNAPSHOT
` ` Patient Information     Patient Name Sex     Keira Collins ([id number]) Female 1945       Room Bed    5202 5202-01      Patient Demographics     Address Phone    [address] [place] [address] [phone] (Home)  [phone] (Mobile) *Preferred*      Patient Ethnicity & Race     Ethnic Group Patient Race    Not  or        Emergency Contact(s)     Name Relation Home Work Mobile    Dar Collins  Son [phone]      Delma Mccallum Relative [phone]      Jr Nicanor Son   [phone]      Documents on File        Status Date Received Description       Documents for the Patient    Consent for EHR Access Received 16     Insurance Card Received 16 Medicare    Patient ID Scan Refused 16     [facility] Specified Other       Consent for Services/Privacy Notice - Hospital/Clinic Received () 16     Privacy Notice - [place] Received 16     External Medication Information Consent Accepted 16     HIM QUIANA Authorization - File Only   [facility], [place] - 2016    Consent to Communicate  16 AUTHORIZATION TO DISCUSS PROTECTED HEALTH INFORMATION    HIM QUIANA Authorization - File Only  08/15/16 QUIANA- [place] [facility]  [facility]  16    Immunization Record  08/15/16 [facility] IMMUNIZATIONS    Business/Insurance/Care Coordination/Health Form - [facility]  [facility] [place] [facility]    Business/Insurance/Care Coordination/Health Form - Patient  17 [place] CARE COORD[place] LETTER    Care Everywhere Prospective Auth Received 18     Consent for Services/Privacy Notice - Hospital/Clinic       Insurance Card Received 18     Consent for Services/Privacy Notice - Hospital/Clinic Received 18     Consent for Services - Hospital and Clinic Received 18     HIE Auth Received 18     Consent for Services - [facility]       Speech Therapy  Certification Received - [facility] 07/17/18 07/11/2018 - 10/09/2018    External Medication Information Consent Accepted (Deleted) 07/11/16     Patient Photo   Photo of Patient    Physical Therapy Certification Received - [facility] (Deleted) 07/17/18 07/11/2018 - 10/09/2018       Documents for the Encounter    CMS IM for Patient Signature Received 07/18/18     Photograph  07/19/18 SURGICAL IMAGES    H/P - History and Physical  07/19/18 [facility] H/P, 07/16/18    Assessment/Questionnaire  07/23/18 MRI HISTORY QUESTIONNAIRE AND CONTRAST NOTICE    Monitoring Device Output  08/03/18 INITIAL MONITORING STRIPS    Monitoring Device Output  08/03/18 MONITORING STRIPS SHEET 2    Monitoring Device Output  08/03/18 MONITORING STRIPS SHEET 2      Admission Information     Attending Provider Admitting Provider Admission Type Admission Date/Time    Amaury Estrada MD Wyman, Molly F, MD Elective 07/17/18  1134    Discharge Date Hospital Service Auth/Cert Status Service Area     Internal Medicine Incomplete [facility]    Unit Room/Bed Admission Status       UU U 5202/5202-01 Admission (Confirmed)       Admission     Complaint    Laryngeal Mass , Tracheostomy, acute management (H), Malnutrition , Caloric malnutrition (H)      Hospital Account     Name Acct ID Class Status Primary Coverage    Keira Collins [id number] Inpatient Open MEDICARE - MEDICARE            Guarantor Account (for Hospital Account #[id number])     Name Relation to Pt Service Area Active? Acct Type    Keira Collins Self FCS Yes Personal/Family    Address Phone          [address] [place] [place] [address] [phone](H)              Coverage Information (for Hospital Account #[id number])     1. MEDICARE/MEDICARE     F/O Payor/Plan Precert #    MEDICARE/MEDICARE     Subscriber Subscriber #    Keira Collins [id number]    Address Phone    ATTN CLAIMS  [address]  [facility] [place] [address] [phone]           2. BLUE PLUS/BLUE PLUS MA     F/O Payor/Plan Precert #    BLUE PLUS/BLUE PLUS MA     Subscriber Subscriber #    Keira Collins [id number]    Address Phone    [address] [address]  [place] [address] [phone]

## 2018-07-17 NOTE — OR NURSING
Pt ready for transfer to . Awaiting RN availability to take report. Will hold in PACU and continue to monitor.

## 2018-07-17 NOTE — ANESTHESIA PREPROCEDURE EVALUATION
Anesthesia Evaluation     .             ROS/MED HX    ENT/Pulmonary:     (+)tobacco use, COPD, , . .    Neurologic:       Cardiovascular:     (+) Dyslipidemia, hypertension-Peripheral Vascular Disease-- Other, --. : . . . :. .       METS/Exercise Tolerance:     Hematologic:         Musculoskeletal:         GI/Hepatic:     (+) cholecystitis/cholelithiasis,       Renal/Genitourinary:         Endo:     (+) thyroid problem (Thyroid nodule) .      Psychiatric:         Infectious Disease:         Malignancy:   (+) Malignancy History of Other  Other CA lymph nodes of head, face and neck , Laryngeal mass status post         Other:                                    Anesthesia Plan      History & Physical Review  History and physical reviewed and following examination; no interval change.    ASA Status:  4 .    NPO Status:  > 8 hours    Plan for General and ETT with Intravenous and Propofol induction. Maintenance will be Balanced.    PONV prophylaxis:  Ondansetron (or other 5HT-3) and Dexamethasone or Solumedrol  Additional equipment: Videolaryngoscope and 2nd IV      Postoperative Care  Postoperative pain management:  IV analgesics.      Consents  Anesthetic plan, risks, benefits and alternatives discussed with:  Patient.  Use of blood products discussed: Yes.   Use of blood products discussed with Patient.  .                          .

## 2018-07-17 NOTE — OR NURSING
"Full skin assesment done with Cristal KING no red areas noted. On the left buttocks a nickel size dry discolored light brown area noted. Did not look like redness or a pressure area just a \"liver spot\" looking area. Blanced like the skin around it. All other skin intact and clean. Patient looked very well cared for skin despite her very thin and bony appearance with minimal subcutaneous fat. Son signed the consent. He is Power of . Keira new name date and where she was but not sure what was being done today. Dr. Walden re explained what had been discussed with the family in the clinic. Patient said to go ahead.   "

## 2018-07-17 NOTE — OP NOTE
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    Postoperative Diagnosis: same    Anesthesia: General    Blood loss: Minimal    Specimens:   ID Type Source Tests Collected by Time Destination   A : left false cord Tissue Throat SURGICAL PATHOLOGY EXAM Diana Degroot MD 7/17/2018  1:53 PM     B : petiole Tissue Throat SURGICAL PATHOLOGY EXAM Cynthia Walden MD 7/17/2018  2:06 PM     C : Additional Left False Vocal Cord  Tissue Throat SURGICAL PATHOLOGY EXAM Cynthia Walden MD 7/17/2018  2:10 PM     D : Right False Vocal Cord  Tissue Throat SURGICAL PATHOLOGY EXAM Cynthia Walden MD 7/17/2018  2:10 PM          Implants:  6 cuffed Shiley    Complications: None    Findings:  Exophytic tumor of left false cord extending onto the left true cord and abnormalities of the epiglottic petiole  Airway was narrowed allowing a tight fit of a 5-0 endotracheal tube  6 cuffed Shiley placed in a window at the second tracheal ring  Frozen section pathology consistent with SCC of the left false cord    Indications:  Keira Collins is a 72-year-old woman who was referred for evaluation of progressive hoarseness.  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  She is indicated for direct laryngoscopy with biopsy for definitive diagnosis as well as a tracheostomy to secure her airway.    Description of Procedure:  After informed consent was obtained, the patient was brought back to the main operating room and placed in a supine position.  General anesthesia was induced.  When she was adequately deep a CMAC blade was used to obtain an good view of the larynx.  The patient had an  exophytic tumor present but a 5-0 cuffed endotracheal tube was able to be passed.  The tube was slightly snug and had some resistance when going through the cords.  Once the tube was secured the bed was turned 90  away from anesthesia.  A timeout was performed.  The Weston or scope was introduced and placed into the vallecula in order to visualize the larynx.  The patient had a normal lingual and laryngeal surface of the epiglottis with the exception of what appear to be tumor of the laryngeal surface near the petiole.  There was an exophytic tumor of the left false cord extending onto the left true cord.  This significantly narrowed the airway.  The right true cord and false cord appeared largely normal.  There was an area of abnormality at the junction between the right false cord and the epiglottis.  Photodocumentation was obtained with a 0  telescope.  The endotracheal tube was withdrawn in order to better visualize the glottis itself and the subglottis.  The 0  telescope was used to obtain photodocumentation here.  The right vocal cord appeared largely normal.  The subglottis was clear without any obvious tumor.  The patient was reintubated.  The 4 mm cup biopsies were used to obtain a biopsy of the tumor along the left false cord.  This was sent for frozen section and was consistent with a squamous cell carcinoma.  The 2 mm cup biopsy forceps were used to biopsy the abnormality along the epiglottis and a separate biopsy was sent of the righ false cord near the area that appeared slightly abnormal below the epiglottis.  These biopsies were sent for permanent pathology.  The mobility of the cords was tested and there was no significant restriction in movement. The  left false cord tumor was debulked using 4 mm cup biopsy forceps in order to help increase the airway caliber.  All this tumor was sent for permanent pathology.  At this point given the patient's preoperative airway assessment with the endotracheal  tube still being tight within the airway, her risk of bleeding of the tumor as well as her significant COPD the decision was made to still proceed with a tracheostomy.    The patient was prepped and draped in usual fashion.  Landmarks were palpated including the thyroid cartilage, cricoid and sternal notch.  The planned incision was marked just below the cricoid cartilage.  A 15 blade was used to make an incision through the skin.  The monopolar cautery was used to extend this incision through the subcutaneous fat.  The strap muscles were identified and bluntly dissected.  The midline raphae was identified and divided.  The strap muscles were retracted laterally.  The thyroid gland was identified and the isthmus was divided off the anterior tracheal wall.  Kitners were used to clean the soft tissue off the anterior surface of the tracheal wall.  The cricoid cartilage was identified and an incision was made with a 15 blade at the intercartilaginous space between the first and second tracheal ring.  Lateral cuts were made with a heavy curved scissors.  A Olive flap was attempted to be created of the cartilage was friable and fell apart.  At this point it was decided to create a tracheal window instead.  The central portion of the ring was removed and the endotracheal tube was slowly withdrawn until the distal tip was at the tracheostomy site.  A 6 cuffed Shiley was placed without difficulty and the inner cannula was secured.  The patient had good end-tidal CO2 verifying placement.  The trach was secured using 2-0 silk sutures and a Velcro trach strap.      An NG tube was placed through the nasal cavity and was verified to be going postcricoid using a Dedo laryngoscope.  Once this was passed adequately down to the stomach it was secured to the nasal septum with a 2-0 silk suture.  The patient was turned back to anesthesia for emergence.  She was transported to the recovery room in stable condition with no immediate  complications.      I was present for and participated in the entire procedure.      Cynthia Walden MD    Department of Otolaryngology

## 2018-07-17 NOTE — ANESTHESIA POSTPROCEDURE EVALUATION
Patient: Keira Collins    Procedure(s):  Direct Laryngoscopy With Biopsy, Tracheostomy  - Wound Class: II-Clean Contaminated   - Wound Class: II-Clean Contaminated    Diagnosis:Laryngeal Mass   Diagnosis Additional Information: No value filed.    Anesthesia Type:  General, ETT    Note:  Anesthesia Post Evaluation    Patient location during evaluation: PACU  Patient participation: Other/plan (See Comments)  Level of consciousness: lethargic and responsive to verbal stimuli  Pain management: adequate  Airway patency: patent  Cardiovascular status: acceptable and blood pressure returned to baseline  Respiratory status: Trach.  Hydration status: acceptable  PONV: none     Anesthetic complications: None          Last vitals:  Vitals:    07/17/18 1600 07/17/18 1615 07/17/18 1630   BP: (!) 184/94 (!) 164/98 155/75   Pulse:      Resp: 18 18 18   Temp: 36.1  C (97  F)     SpO2: 99% 98% 97%         Electronically Signed By: Raymundo Holden MD  July 17, 2018  4:51 PM

## 2018-07-17 NOTE — TELEPHONE ENCOUNTER
Left message asking patient to return call.  Please inform patient of RESULTS from Provider below.         Please contact pt with the following message:     Normal preop labs- CBC and comp panel.   Jennifer Lee PA-C

## 2018-07-17 NOTE — LETTER
Transition Communication Hand-off for Care Transitions to Next Level of Care Provider    Name: Keira Collins  : 1945  MRN #: [id number]  Primary Care Provider: Asa Elliott     Primary Clinic: [address] [facility] [address]     Reason for Hospitalization:  Malignant neoplasm of larynx (H) [C32.9]  Admit Date/Time: 2018 11:34 AM  Discharge Date: 8/10/18  Payor Source: Payor: MEDICARE / Plan: MEDICARE / Product Type: Medicare /   Keira Collins is a 72 year old female with history of COPD, HTN, HLD, etoh abuse (in remission) who was admitted to ENT service  for direct laryngoscopy with biopsy of laryngeal mess, tracheostomy and NTG placement. Course c/b L pontine CVA  and acute hypoxic respiratory failure due to aspiration PNA. Patient transferred to medicine for ongoing care        Laryngeal squamous cell carcinoma s/p biopsy and tracheostomy : [id number]. Course c/b CVA and aspiration PNA. ENT discussed case at tumor conference  and medical oncology . Surgical resection offered regardless of whether lung nodules are malignant, and would be left with permanent trach. Patient does not want surgical intervention. Treatment of lung nodules TBD at a later date based on functional status. Palliative care consulted. FiO2 needs stable. Suction needs significantly down over the 48 hours  - ENT managing  - Psychiatry consult for decision making capacity and assistance with mood  - Continue Robinul 2 mg tid, hold if secretions thickening  - Only suction for O2 sat drops or symptomatic relief. Hold off if only for gurgling   - Scheduled NS nebs tid  - SLP following; planning to trial PMSV during therapy only  - Trach cares per ENT   - Ritalin per palliative recs d/t mood  - Transfer to medicine floor when bed available   - Encourage PT/OT      Acute anemia: Hgb 9.2 (8.8, normal 8). No s/s of GIB or oral bleed. Retics 1.5 . LDH normal, haptoglobin 528 making hemolysis unlikely.  Ferritin very elevated to 1069 but in setting of cancer. Iron low, 26, IBC 10. Folate and B12 normal  - Start daily iron  - Check CRP     Acute L pontine CVA:  R facial droop noted 7/19, MRI primo acute L pontine infarct. Outside therapeutic window for TPA. Conservative management recommended with DAPT and statin. No data to support use of DAPT long term (suggest <90 days), but also no consensus in literature regarding duration of DAPT following acute CVA.   - Continue ASA and plavix, duration <90 days given increased risk of bleeding beyond this  - Cont statin  - SBP goal <160      Severe protein-calorie malnutrition: 2/2 acute on chronic medical issues including cancer, surgery, and CVA/dysphagia. PEG placed 7/27.   - Continue TF at goal rate 45 ml/h, will darline transition to cycled 8/9 vs 8/10  - Continue FWF 60 ml q4h (per nutrition would need 100 ml q4h for hydration needs)  - NPO, SLP following      Spiculated RUL nodule: Concerning for malignancy. PET 7/20 hypermetabolic pulmonary nodules c/w metastatic disease. Per d/w Medical Oncology, lung nodule biopsy non-urgent. Oncology also noted high mortality with pulmonary involvement at 1 year. Will need to continue discussion re: goals of care and possible surgical resection.   - OP follow up with ENT and Oncology to discuss timing of potential lung biopsy  - See above re: DAPT. Should be OK to hold/stop plavix prior to any scheduled lung biopsy      HTN: Stable. Goal SBP <160 per neurology in setting of recent CVA. Cont amlodipine and losartan with hold parameters.       Chronic Medical Problems:  COPD: PFTs 7/16/18 FEV-1/FVC 65% with FEV-1 0.58 L. Marina Post-op respiratory failure risk 9.91% (failure to wean of vent within 48h of surgery or unplanned intubation/reintubation). Pulmonary risks with proposed surgery discussion with treatment team and family. Cont Brovana, Pulmicort, Ipratropium/Levalbuterol.   HLD: Continue statin    H/o alcohol abuse: Currently  in remission   PAD: S/p lower extremity endarterectomy and stenting 2017. Previously on ASA and Plavix, but per family patient was not taking plavix regularly      Resolved Hospital Problems:  Tachycardia: HR up to 110s 8/6. Difficult to interpret given difficulty with communicated, but denies s/s infection. CXR no acute findings. Lactic acid normal. Afebrile. Normalized 8/8  Sepsis and acute hypoxic respiratory failure d/t aspiration pneumonia: Excessive secretions post-trach c/b acute L pontine CVA. Acute hypoxia, fevers and elevated inflammatory markers 7/24. CXR R>L bibasilar opacities with small R effusion. CT Chest pulmon angio negative for PE. Sputum 7/22 negative. ? Pulmonary edema contributing. ID consulted and transitioned to moxifloxacin, last dose 7/31. Clinically improved on antibiotics and diuresis. Currently stable        Pain Assessment:  Current Pain Score 8/9/2018   Patient currently in pain? denies   Pain score (0-10) -   Pain location -   Pain descriptors -   Keira canas pain level was assessed and she currently denies pain.       Diet: NPO for Medical/Clinical Reasons Except for: No Exceptions  Adult Formula Drip Feeding: Continuous Isosource 1.5; Gastrostomy; Goal Rate: 45; mL/hr; Medication - Tube Feeding Flush Frequency: At least 15-30 mL water before and after medication administration and with tube clogging; Amount to Send (Nutritio...  Fluids: None  DVT Prophylaxis: Enoxaparin (Lovenox) SQ  Code Status: Full Code      Readmission Assessment Measure (DEYVI) Risk Score/category: average         Reason for Communication Hand-off Referral: Fragility    Discharge Plan:  Transitional Care Unit Placement  [facility]  [address] [place] [address]   [phone], Fax: [phone]    Plan to live w/son JR when done with rehab.     Concern for non-adherence with plan of care:   Y/N No  Discharge Needs Assessment:  Needs       Most Recent Value    Equipment Currently  Used at Home walker, rolling, shower chair    Transportation Available family or friend will provide          Already enrolled in Tele-monitoring program and name of program:  no  Follow-up specialty is recommended: Yes    Follow-up plan:  Future Appointments  Date Time Provider Department Center   9/12/2018 11:00 AM Carlos Arroyo MD [facility]       Any outstanding tests or procedures:        Referrals     Future Labs/Procedures    Occupational Therapy Adult Consult     Comments:    Evaluate and treat as clinically indicated.    Reason:  Deconditioning    Physical Therapy Adult Consult     Comments:    Evaluate and treat as clinically indicated.    Reason:  Deconditioning    Speech Language Path Adult Consult     Comments:    Evaluate and treat as clinically indicated.    Reason:  Recent CVA        Supplies     Future Labs/Procedures    Pneumatic Compression Device      Comments:    Bilateral calf. Remove 30 mins BID.          Key Recommendations:      Janny Lee    AVS/Discharge Summary is the source of truth; this is a helpful guide for improved communication of patient story

## 2018-07-17 NOTE — OR NURSING
Post op abdominal xray completed for NG placement. Reviewed by Dr. Ifeoma Elliott. No new orders received. Pt can transfer to  without other labs resulted, no need to notify MD of results. Will continue to monitor.

## 2018-07-17 NOTE — ANESTHESIA CARE TRANSFER NOTE
Patient: Keira Collins    Procedure(s):  Direct Laryngoscopy With Biopsy, Tracheostomy  - Wound Class: II-Clean Contaminated   - Wound Class: II-Clean Contaminated    Diagnosis: Laryngeal Mass   Diagnosis Additional Information: No value filed.    Anesthesia Type:   General, ETT     Note:  Airway :Tracheostomy  Patient transferred to:PACU  Handoff Report: Identifed the Patient, Identified the Reponsible Provider, Reviewed the pertinent medical history, Discussed the surgical course, Reviewed Intra-OP anesthesia mangement and issues during anesthesia, Set expectations for post-procedure period and Allowed opportunity for questions and acknowledgement of understanding      Vitals: (Last set prior to Anesthesia Care Transfer)    CRNA VITALS  7/17/2018 1457 - 7/17/2018 1537      7/17/2018             Resp Rate (observed): (!)  2                Electronically Signed By: Cyndee Franklin CRNA, LORI ARECHIGA  July 17, 2018  3:37 PM

## 2018-07-18 NOTE — PLAN OF CARE
Problem: Patient Care Overview  Goal: Plan of Care/Patient Progress Review  Outcome: No Change  Status: s/p Direct laryngoscopy with biopsy, tracheostomy on 7/17  VS: VSS ex high BP but within parameters. On continuous pulse ox. Satting mid-upper 90s on HTD 40%.  Neuros: Neuros difficult to assess. Patient is unable to write/completely answer orientation questions. Nods yes or no with questions. Intermittent follows commands. BUE and LUE 4/5. Generalized weakness.   GI: NPO. Pt stated she's had a BM since she's been in hospital. NG tube in place.   : Incontinent of urine this shift. ?  IV: NS running at 75 mL/hr via PIV.  Activity: No OOB activities yet. Assist x1 with repositioning in bed.   Pain: Pain at trach site, controlled with PRN Tylenol x1  Respiratory/Trach: #6 shiley, deflated. Inner cannula change x1. Mod- strong productive cough with encouragement. Suctioned q 1 hour with moderate thick secretions. Yaunker suction at bedside for oral secretions.   Skin: Minimal redness around trach site.   Social: Pt is withdrawn with some yes/no responses. Had multiple visitors today including two sons and one daughter.   Plan of care: Meeting with [facility] tomorrow at 1:30 for trach teaching and Friday at 2:30 for tube feeding teaching.

## 2018-07-18 NOTE — PROGRESS NOTES
07/18/18 1357   General Information   Onset Date 07/17/18   Start of Care Date 07/18/18   Referring Physician Diana Degroot MD   Patient Profile Review/OT: Additional Occupational Profile Info See Profile for full history and prior level of function   Patient/Family Goals Statement Pt unable to state; family wants Pt to swallow safely   Swallowing Evaluation Bedside swallow evaluation   Behaviorial Observations Alert  (mostly cooperative; limited interest in PO (reporting pain))   Mode of current nutrition NPO   Respiratory Status Tracheostomy;O2 Supply   Type of O2 supply Trach Dome   Comments Orders received for swallow evaluation. 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 Pt seen by this service in ENT clinic in 5/2018 with recommendation for dysphagia level 3 with thin liquids, but there was some concern for aspiration risk with thin liquids at that time. Pt/family deny difficulty swallowing prior to admission. Pt is not yet a candidate for PMSV per ENT; plan to change to cuffless Shiley trach on Friday.   Clinical Swallow Evaluation   Oral Musculature anomalies present  (generalized weakness)   Structural Abnormalities none present   Dentition present and adequate   Secretion Management problems swallowing secretions   Mucosal Quality dry;sticky   Mandibular Strength and Mobility impaired   Oral Labial Strength and Mobility impaired seal;impaired coordination;impaired pursing   Lingual Strength and Mobility impaired protrusion;impaired left lateral movement;impaired right lateral movement;impaired coordination   Buccal Strength and Mobility (unable to assess)   Laryngeal Function (unable to assess; Pt not attempting to complete with cues)   Additional Documentation Yes   Swallow Eval   Feeding Assistance  dependent   Clinical Swallow Eval: Thin Liquid Texture Trial   Mode of Presentation, Thin Liquids spoon;fed by clinician   Volume of Liquid or Food Presented 1/2 tsp x1   Oral Phase of Swallow Poor AP movement;Residue in oral cavity  (poor bolus formation/control)   Pharyngeal Phase of Swallow impaired  (concern for elevated aspiration risk)   Diagnostic Statement No overt s/sx of aspiration observed with small trials of thin liquids via tsp, but Pt reporting pain with swallow and moderate amount of oral residuals observed, concerning for elevated aspiration risk   Clinical Swallow Eval: Nectar Thick Liquid Texture Trial   Mode of Presentation, Nectar spoon;fed by clinician   Volume of Nectar Presented 1/2 tsp trial x1   Oral Phase, Nectar Poor AP movement;Residue in oral cavity  (poor bolus formation/control)   Pharyngeal Phase, Nectar impaired  (elevated aspiration risk)   Diagnostic Statement No overt s/sx of aspiration observed with small trial of nectar-thick liquids via tsp, but Pt reporting significant pain with swallowing and moderate oral residuals noted; concern for elevated aspiration risk   Clinical Swallow Eval: Puree Solid Texture Trial   Diagnostic Statement Advanced PO trials not appropriate at this time   Swallow Compensations   Swallow Compensations Pacing;Reduce amounts   Esophageal Phase of Swallow   Patient reports or presents with symptoms of esophageal dysphagia No   General Therapy Interventions   Planned Therapy Interventions Dysphagia Treatment   Dysphagia treatment Oropharyngeal exercise training;Modified diet education;Instruction of safe swallow strategies   Swallow Eval: Clinical Impressions   Skilled Criteria for Therapy Intervention Skilled criteria met.  Treatment indicated.   Functional Assessment Scale (FAS) 2   Treatment Diagnosis moderate-severe oral-pharyngeal dysphagia   Diet texture recommendations NPO   Recommended Feeding/Eating Techniques alternate between small bites and  sips of food/liquid;maintain upright posture during/after eating for 30 mins;small sips/bites   Demonstrates Need for Referral to Another Service occupational therapy;physical therapy;dietitian;respiratory therapy;social work   Therapy Frequency daily   Predicted Duration of Therapy Intervention (days/wks) 3 weeks   Anticipated Discharge Disposition home w/ outpatient services  (f/u in ENT clinic)   Risks and Benefits of Treatment have been explained. Yes   Patient, family and/or staff in agreement with Plan of Care Yes   Clinical Impression Comments Clinical swallow evaluation completed per MD order. Pt presents with moderate-severe oral-pharyngeal dysphagia, characterized by reduced bolus formation/control, poor A-P movement, moderate oral residuals with trials, and elevated aspiration risk. Pt accepted only 1/2 tsp trials of thin liquids and nectar-thick liquids x1 each due to report of significant pain with swallowing. No overt s/sx of aspiration observed with intake, but suspect elevated aspiration risk. Recommend NPO Status with alternate source of nutrition. Pt may benefit from a formal swallow exam prior to initiation of PO diet, pending progress   Total Evaluation Time   Total Evaluation Time (Minutes) 12

## 2018-07-18 NOTE — PLAN OF CARE
Problem: Patient Care Overview  Goal: Plan of Care/Patient Progress Review  Discharge Planner OT   6A   Patient plan for discharge: rehab  Current status: Pt very fatigued with decreased alertness. Son present to provide PLOF. Facilitated increased mobility and IND with ADLs with EOB ADLs. Needing assist for bed mobility and set up for basic ADLs.  Family able to provide assist with ADLs and all IADLs.   Barriers to return to prior living situation: new precautions, medical needs, deconditioning  Recommendations for discharge: TCU  Rationale for recommendations: Pt sedentary with low activity tolerance at baseline, currently below her baseline. Would benefit from TCU stay to improve strength and activity tolerance for ADLs and provide family with caregiver training before return to house with family.       Entered by: Kaylee Taylor 07/18/2018 11:07 AM

## 2018-07-18 NOTE — PROGRESS NOTES
CLINICAL NUTRITION SERVICES - ASSESSMENT NOTE     Nutrition Prescription    RECOMMENDATIONS FOR MDs/PROVIDERS TO ORDER:  - Recommend continuing non-dextrose IVF's for maintenance while pt on continuous feeds since H20 flushes received with TF are only for tube patency  - Recommend supplementation with thiamin 100 mg orally daily d/t h/o ETOH     Malnutrition Status:    Severe malnutrition in the context of  acute on chronic illness.    Recommendations already ordered by Registered Dietitian (RD):   - Begin/advance TF as follows: Isosource 1.5 @ 10 ml/hr x 12 hrs. If tolerated, increase rate by 10 ml every 8 hrs to goal of 45 ml/hr. Regimen to provide 1080 ml/day, 1620 kcals (29 kcal/kg/day), 73 g PRO (1.3 g/kg/day), 821 ml free H2O, 190 g CHO and 16 g Fiber daily.  -  Order multivitamin/mineral (Certavite 15 ml/day) via TF to help ensure micronutrient needs are met due to slow TF adv and potential for interruptions to infusion.   - Order lab add on to check Mg and PO4 today and then daily check of K+, Mg and PO4 until TF adv to goal rate to evaluate for refeeding and need for lyte replacement (per already ordered lyte protocol).  - H2O flushes of 30 ml every 4 hrs.    Future/Additional Recommendations:  - Once tolerating continuous goal TF rate for at least 8 hrs, would transition to bolus TFs as follows: stop continuous TF for 1-2 hrs to let stomach empty prior to starting gravity feeding as follows: begin first bolus with 0.5 cans (125 ml) and if tolerates after approx 4 hrs without GI complaints and/or residuals < 500 ml, rec adv to goal regimen of Isosource 1.5, 1 can (250 ml) QID =  4 cans daily (1000 ml/day) = 1500 kcals (27 kcal/kg), 68 g PRO (1.2 g/kg/day), 760 ml H2O, 176 g CHO and 15 g Fiber daily.   *Do not give feedings at night while pt asleep (during the day only).  *Change H2O flushes to 90 ml H2O before and after each bolus  QID (to provide an addtl 720 ml H2O) to meet est fld needs.          REASON  "FOR ASSESSMENT  Keira Collins is a/an 72 year old female assessed by the dietitian for Admission Nutrition Risk Screen for reduced oral intake over the last month and Provider Order - Registered Dietitian to Assess and Order TF per Medical Nutrition Therapy Protocol  - NGT placed in OR, placement confirmed by AXR    NUTRITION HISTORY  Unable to obtain diet hx from pt d/t inability to communicate both verbally and written. No family members present at this time.  Per chart review (outpatient records), decreased appetite/intakes reported by pt's son.   Per chart review (7/16), pt with h/o ETOH with consumption of beer x 1 to 4 per day.    CURRENT NUTRITION ORDERS  Diet: NPO  - SLP consult obtained for swallow evaluation. Per assessment, pt with moderate-severe oral pharyngeal dysphagia with suspected elevated aspiration risk. Recommendation made for NPO status with alternative source of nutrition.    LABS  K+ 3.3 (low) today/3.5 yesterday,  no Mg or PO4 ordered today, but had been WNL limits yesterday. Due to reported decreased intakes and wt loss PTA, pt at risk for refeeding.    MEDICATIONS  Medications reviewed    ANTHROPOMETRICS  Height: 160 cm (5' 2.992\")  Most Recent Weight: 55.8 kg (123 lb 0.3 oz) - admit wt (7/17)  IBW: 52 kg  BMI: Normal BMI  Weight History: Unable to obtain from pt. Per chart review (outpt records), pt's son reporting that he thought pt had lost wt, but unable to quantify amount. Per review of EMR, noted pt's wt dropped from  132 lbs to 115 lbs since January 16 th to July 11. Wt loss of 17 lbs (12.9% loss) x 6 mos. Noted pt's wt trending upward slightly over past week PTA, but not significant.  Wt Readings from Last 10 Encounters:   07/17/18 55.8 kg (123 lb 0.3 oz)   07/16/18 53.7 kg (118 lb 4.8 oz)   07/11/18 52.2 kg (115 lb)   01/16/18 59.9 kg (132 lb)   01/31/17 56.6 kg (124 lb 12.8 oz)   01/29/17 56.6 kg (124 lb 12.8 oz)   01/16/17 56.8 kg (125 lb 4.8 oz)   01/11/17 61.7 kg (136 lb) "   01/09/17 61.7 kg (136 lb)   09/23/16 56.4 kg (124 lb 4.8 oz)       Dosing Weight: 56 kg (based on admit wt)    ASSESSED NUTRITION NEEDS  Estimated Energy Needs: 6603-3585 kcals/day (25 - 30 kcals/kg)  Justification: Maintenance  Estimated Protein Needs: 67-84 grams protein/day (1.2 - 1.5 grams of pro/kg)  Justification: Post-op, Repletion and Wound healing  Estimated Fluid Needs: (1 mL/kcal)   Justification: Maintenance    PHYSICAL FINDINGS  See malnutrition section below.  Poor dentition - endentulous    MALNUTRITION (Only upper extremities observed)  % Intake: Unable to assess  % Weight Loss: > 10% in 6 months (severe)  Subcutaneous Fat Loss: Facial region, Upper arm and Thoracic/intercostal:Moderate  Muscle Loss: Temporal, Thoracic region (clavicle, acromium bone, deltoid, trapezius, pectoral) and Upper arm (bicep, tricep): Moderate  Fluid Accumulation/Edema: None noted per chart review  Malnutrition Diagnosis: Severe malnutrition in the context of  Acute on chronic illness.    NUTRITION DIAGNOSIS  Inadequate protein-energy intake related to h/o decreased appetite with altered swallow function at present and TF therapy ordered, but not initiated d/t abnormal K+ as evidenced by decreased po intakes per family report PTA, wt los of 17 lbs (12.9% loss) x 6 mos, NPO status per SLP recommendation and no TF intakes received at this time.    INTERVENTIONS  Implementation  Nutrition Education: Not appropriate at this time due to patient condition   Enteral Nutrition - Initiate  Feeding tube flush  Multivitamin/mineral supplement therapy     Goals  Total avg nutritional intake to meet a minimum of 25 kcal/kg and 1.2 g PRO/kg daily (per dosing wt 56 kg).     Monitoring/Evaluation  Progress toward goals will be monitored and evaluated per protocol.  Cesilia Parker RD,LD  Unit pager [phone]

## 2018-07-18 NOTE — PLAN OF CARE
Problem: Patient Care Overview  Goal: Plan of Care/Patient Progress Review  Outcome: No Change  Status: s/p Direct laryngoscopy with biopsy, tracheostomy on 7/17  VS: VSS ex high BP but within parameters. On continuous pulse ox. Stating mid-upper 90s on HTD 30%.  Neuros: Patient is not answering any orientation questions. Nods yes or no with questions. Intermittent follows commands. AE 4/5. Generalized weakness.   GI: NPO. Pt not answering when last BM or if passing flatus. NG tube in place.   : Incontinent of urine this shift. Last change  And reposition was at 0545. ?  IV: D5+1/2NS+20 K @ 70mL/hr via PIV. AT 0400 PIV-SL for PET.   Activity: No OOB activities yet. Assist x1 with repositioning in bed.   Pain: Pain at trach site, controlled with PRN oxy and Tylenol   Respiratory/Trach: #6 shiley, deflated. Inner cannula change x2. Mod- strong productive cough with encouragement. Suctioned x8 with moderate thick secretions. Lavage x2. Yaunker suction at bedside for oral secretions.   Skin: Around trach site with minimal redness  Social: Son is POA.  Plan of care: Plan for PET later on today. Continue to monitor and update MD with significant changes.

## 2018-07-18 NOTE — PROGRESS NOTES
07/18/18 1000   Quick Adds   Type of Visit Initial Occupational Therapy Evaluation   Living Environment   Lives With child(best), adult   Living Arrangements house   Home Accessibility stairs to enter home   Transportation Available family or friend will provide   Living Environment Comment difficult to attain home set up information. From chart it appears she lives in a house with her sons.  She does not drive. She reports no stairs inside but 2 to enter from the back of the house.    Self-Care   Dominant Hand right   Usual Activity Tolerance fair   Current Activity Tolerance poor   Regular Exercise no   Equipment Currently Used at Home walker, rolling;shower chair   Activity/Exercise/Self-Care Comment per chart pt is pretty sedentary. Reports ambulating ~30 feet with fatigue and HAGER.    Functional Level Prior   Ambulation 1-->assistive equipment   Transferring 1-->assistive equipment   Bathing 2-->assistive person   Dressing 2-->assistive person   Fall history within last six months no   Which of the above functional risks had a recent onset or change? ambulation;transferring;toileting;bathing;dressing;cognition   Prior Functional Level Comment Family provided PLOF, pt was difficult to understand and inconsistant using white board   General Information   Onset of Illness/Injury or Date of Surgery - Date 07/17/18   Referring Physician Diana Degroot MD   Patient/Family Goals Statement none stated   Additional Occupational Profile Info/Pertinent History of Current Problem 72-year-old woman who was referred for evaluation of progressive hoarseness.  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  She is indicated for direct laryngoscopy with biopsy for definitive diagnosis as well as a tracheostomy to secure her airway.   Precautions/Limitations oxygen therapy device and L/min;other (see comments)  (trach)   General  Observations Pt supine, not able to speak, inconsistantly using white board. Trying to mouth words.   General Info Comments activity: unscheduled up w A, verbal order approved via ERIN Briseno   Cognitive Status Examination   Cognitive Comment difficult to assess, pt reports she feels like herself, appears to have some difficulty with alertness, memory and attention   Visual Perception   Visual Perception Comments YULIYA   Sensory Examination   Sensory Comments reports intact although appears to have very sensitive feet to the touch   Pain Assessment   Patient Currently in Pain No   Integumentary/Edema   Integumentary/Edema no deficits were identifed   Posture   Posture Comments poor posture sitting unsupported at EOB   Range of Motion (ROM)   ROM Comment limited AROM likely due to strength deficits as near full AAROM was attained.    Strength   Strength Comments deconditioned ~2/5 overall B UE   Hand Strength   Hand Strength Comments fair  strength   Coordination   Fine Motor Coordination Deficits noted during handwriting   Mobility   Bed Mobility Comments Min to mod A to and from EOB   Transfer Skills   Transfer Comments YULIYA due to weakness and poor alertness   Balance   Balance Comments below baseline, able to sit unsupported at EOB   Upper Body Dressing   Level of [place]: Dress Upper Body maximum assist (25% patients effort)   Lower Body Dressing   Level of [place]: Dress Lower Body maximum assist (25% patients effort)   Toileting   Level of [place]: Toilet dependent (less than 25% patients effort)   Grooming   Level of [place]: Grooming minimum assist (75% patients effort)   Physical Assist/Nonphysical Assist: Grooming set-up required   Eating/Self Feeding   Level of [place]: Eating dependent (less than 25% patients effort)   Instrumental Activities of Daily Living (IADL)   Previous Responsibilities (per son she doesn't do any IADLS, they assist with all)   Activities of Daily Living  "Analysis   Impairments Contributing to Impaired Activities of Daily Living balance impaired;cognition impaired;post surgical precautions;ROM decreased;strength decreased;coordination impaired   General Therapy Interventions   Planned Therapy Interventions ADL retraining;ROM;strengthening   Clinical Impression   Criteria for Skilled Therapeutic Interventions Met yes, treatment indicated   OT Diagnosis new trach, deconditioning   Influenced by the following impairments decreased strength, new precautions, impaired communication   Assessment of Occupational Performance 5 or more Performance Deficits   Identified Performance Deficits all ADLS are affected at this time.    Clinical Decision Making (Complexity) Low complexity   Therapy Frequency 5 times/wk   Predicted Duration of Therapy Intervention (days/wks) 3 weeks   Anticipated Discharge Disposition Transitional Care Facility   Risks and Benefits of Treatment have been explained. Yes   Patient, Family & other staff in agreement with plan of care Yes   [facility] TM \"6 Clicks\"   2016, Trustees of [facility], under license to [employer].  All rights reserved.   6 Clicks Short Forms Daily Activity Inpatient Short Form   [facility]  \"6 Clicks\" Daily Activity Inpatient Short Form   1. Putting on and taking off regular lower body clothing? 2 - A Lot   2. Bathing (including washing, rinsing, drying)? 2 - A Lot   3. Toileting, which includes using toilet, bedpan or urinal? 1 - Total   4. Putting on and taking off regular upper body clothing? 2 - A Lot   5. Taking care of personal grooming such as brushing teeth? 2 - A Lot   6. Eating meals? 1 - Total   Daily Activity Raw Score (Score out of 24.Lower scores equate to lower levels of function) 10   Total Evaluation Time   Total Evaluation Time (Minutes) 13     "

## 2018-07-18 NOTE — PLAN OF CARE
Problem: Patient Care Overview  Goal: Plan of Care/Patient Progress Review  Discharge Planner SLP   Patient plan for discharge: unknown  Current status: Clinical swallow evaluation completed per MD order. Pt presents with moderate-severe oral-pharyngeal dysphagia, characterized by reduced bolus formation/control, poor A-P movement, moderate oral residuals with trials, and elevated aspiration risk. Pt accepted only 1/2 tsp trials of thin liquids and nectar-thick liquids x1 each due to report of significant pain with swallowing. No overt s/sx of aspiration observed with intake, but suspect elevated aspiration risk. Recommend NPO Status with alternate source of nutrition. Pt may benefit from a formal swallow exam prior to initiation of PO diet, pending progress  Barriers to return to prior living situation: dysphagia; below baseline status  Recommendations for discharge: IP rehab versus home with family assist, pending plan and PT/OT recs  Rationale for recommendations: anticipate ongoing ST needs post discharge       Entered by: Kassi Ponce 07/18/2018 2:23 PM

## 2018-07-18 NOTE — CONSULTS
Patient is on IR schedule 7/19/2018 for a US guided FNA of thyroid mass.   Labs WNL for procedure.  INR pending.  No NPO required.  Medications to be held include: lovenox x1 dose  Consent will be done prior to procedure.     Please contact the IR charge RN at [phone] for estimated time of procedure.     Case discussed with Dr. Henry from IR and Samantha Burnham PA-C.    Zuly Horta, MARGARET, APRN  Interventional Radiology  Pager: [phone]

## 2018-07-18 NOTE — PROGRESS NOTES
"Otolaryngology Progress Note  2018    S: No acute events overnight. Patient incontinent of urine x2. She had some oozing at the trach site. Strong cough but requiring frequent assist with suctioning, per RN. Pain controlled with Tylenol and oxycodone.    O: /78 (BP Location: Left arm)  Pulse 80  Temp 97.1  F (36.2  C) (Axillary)  Resp 20  Ht 1.6 m (5' 2.99\")  Wt 55.8 kg (123 lb 0.3 oz)  SpO2 99%  BMI 21.8 kg/m2   General: Alert, No acute distress   HEENT: EOMI, no facial weakness. 6-0 cuffed Shiley sutured in place, cuff deflated. Trach ties are snug w/ 2 finger breadths between neck and ties. Tracheostomy site clean, no bleeding. NG tube sutured in place in the left nare.    Pulmonary: Breathing non-labored, no stridor, no accessory muscle use. T-piece in place, on 40%.       Intake/Output Summary (Last 24 hours) at 18 0523  Last data filed at 18 0055   Gross per 24 hour   Intake          1654.33 ml   Output                5 ml   Net          1649.33 ml       LABS:  ROUTINE IP LABS (Last four results)  BMP    Recent Labs  Lab 18  16218  1413    136 137   POTASSIUM 3.3* 3.5 3.7   CHLORIDE 103 102 102   ESTEFANI 9.0 8.6 9.1   CO2 24 23 28   BUN 10 12 15   CR 0.61 0.51* 0.68   * 133* 95     CBC    Recent Labs  Lab 18  1626 18  1413   WBC 13.7* 7.7 7.4   RBC 4.81 4.99 4.95   HGB 14.7 15.0 14.9   HCT 44.1 47.0 46.7   MCV 92 94 94   MCH 30.6 30.1 30.1   MCHC 33.3 31.9 31.9   RDW 12.6 12.6 12.5    166 189     TSH : 0.54  Pre albumin : pending  Albumin: 3.5    M.2  Phos 2.3    AXR: NG tube in the stomach.    A/P: Keira Collins is a 72 year old female with a past medical history of COPD and 140 pack years of smoking who is POD#1 s/p direct laryngoscopy with biopsy of laryngeal SCC and tracheostomy placement for SCC of the larynx T3N2c, and question of metastasis to the lungs.     Neuro:  - Pain control: " Scheduled tylenol. Oxycodone/dilaudid PRN.   - Per family, slight dementia at baseline.   - Alcohol abuse: MSSA protocol per pre-op recommendation  - Tobacco abuse: 140 pack year smoking history. Nicotine patch.    HEENT:  - Squamous cell cancer of the larynx now s/p trach  - Tracheostomy:   The first changing of trach tube, sponge, and/or ties will be performed by ENT post-op ~ day 3. Afterwards, other hospital staff can manage these items as needed.    - Perform regular suctioning. RN should change disposable inner canulas every shift and/or clean it with a brush.    - Keep trach tube obturator taped to wall behind the head of the bed. Keep extra unopened 6-0 Shiley and 4-0 Shiley at bedside at all times.  - PET Scan scheduled for Friday 7/20/18 PM, will make NPO at midnight tomorrow and hold all D5  - Right thyroid nodule: she has a right thyroid nodule that measures up to 3.8 cm. Discussed with IR today, will plan for US guided biopsy this week     Respiratory:   - Patient saturating well on 40% O2. Supplemental O2 PRN to keep sats >92%, wean as able  - Hx COPD: substituting pulmicort and atrovent nebs for PTA inhalers, albuterol neb PRN   - Suspicious RUL nodule, being followed by the Lung Nodule clinic; recommendation was an IR guided biopsy. Not scheduled yet.     CV/heme:   - History of HTN and PVD s/p lft femoral endarterectomy.  - Restarted PTA Norvasc, losartan, ASA 81mg. Holding Plavix  - hemodynamically stable, Postop Hgb 14.7.     FEN/GI:  - Diet: NPO due to PTA dysphagia and now new trach. SLP consult for bedside swallow study today  - Malnutrition: Nutrition consult to start TFs via NG.  - multivitamin  - Bowel regimen: Senna, colace.     :   - Voiding spontaneously, good UOP    Endo:   - No acute concerns, TSH wnl     ID:   - Slight leukocytosis today at 13.7, continue to trend. Monitor for signs/symptoms of infection. Afebrile.     PPX:   - SCDs, IS. Lovenox    Consults:   - SLP  - PT/OT  -  Nutrition  - PLC- tracheostomy and TF cares.    Disposition Plan   Expected discharge in 3-4 days to prior living arrangement once trach has been changed and tract is stable, tolerating TFs at bolus goal, independent with trach and TF care, PET scan performed.     Entered: Samantha Burnham 07/18/2018, 10:45 AM       -- Patient and above plan discussed with Dr. Win Burnham, PA-C  Otolaryngology-Head & Neck Surgery  Please contact ENT by dialing [phone] and entering job code [phone].

## 2018-07-18 NOTE — PHARMACY-CONSULT NOTE
Pharmacy Tube Feeding Consult    Medication reviewed for administration by feeding tube and for potential food/drug interactions.    Recommendation: No changes are needed at this time.     Pharmacy will continue to follow as new medications are ordered.    Jasen Ca, NickyD, BCPS

## 2018-07-18 NOTE — PLAN OF CARE
Problem: Airway, Artificial (Adult)  Goal: Signs and Symptoms of Listed Potential Problems Will be Absent, Minimized or Managed (Airway, Artificial)  Signs and symptoms of listed potential problems will be absent, minimized or managed by discharge/transition of care (reference Airway, Artificial (Adult) CPG).  Outcome: Improving  Post op DL, biopsies and placement of #6 shiley trach.  Trach inflated upon arrival to floor and shortly thereafter deflated per service.  Large amount of secretions at this time and very frequent suctioning.  Secretions thin and blood tinged.  Also large amounts of saliva from mouth and trach.  Strong productive cough.  Bloody oozing at trach site.  O2 sats in high 90's on 40%.  Feeding tube in place.  IV fluids at 70ml/hour.  Incontinent of urine x2.  Calm and cooperative and resting/sleeping between cares.  Swallow eval and nutrition consult tomorrow for tube feeds.  Continue POC.

## 2018-07-19 NOTE — PROCEDURES
Interventional Radiology Brief Post Procedure Note    Procedure: IR THYROID BIOPSY    Procedural[facility]istan Flood [facility]    Assistant: Aundrea Sanchez MD    Time Out: Prior to the start of the procedure and with procedural staff participation, I verbally confirmed the patient s identity using two indicators, relevant allergies, that the procedure was appropriate and matched the consent or emergent situation, and that the correct equipment/implants were available. Immediately prior to starting the procedure I conducted the Time Out with the procedural staff and re-confirmed the patient s name, procedure, and site/side. (The Joint Commission universal protocol was followed.)  Yes    Medications   Medication Event Details Admin User Admin Time   hydrALAZINE (APRESOLINE) injection 10 mg Medication Given Dose: 10 mg; Route: Intravenous Jennifer Moise RN 7/19/2018 11:15 AM   HYDROmorphone (DILAUDID) injection 0.2 mg Medication Given Dose: 0.2 mg; Route: Intravenous Jennifer Moise RN 7/19/2018 11:33 AM   lidocaine (PF) (XYLOCAINE) 1 % injection 1-30 mL Medication Given by Other Clinician Dose: 20 mL; Route: Subcutaneous Jennifer Moise RN 7/19/2018 11:33 AM       Sedation: None. Local Anesthestic used    Findings: U/S guided inferior right thyroid nodule FNA. 5 Passes performed with 25 gauge needles.    Estimated Blood Loss: Minimal    Fluoroscopy Time: 0 minute(s)    SPECIMENS: Fine needle aspirate for cytological analysis    Complications: 1. None     Condition: Stable    Plan: Follow up per primary team.    Comments: See dictated procedure note for full details.    Tristan Flood PA-C

## 2018-07-19 NOTE — PROGRESS NOTES
Interventional Radiology Intra-procedural Nursing Note    Patient Name: Keira Collins  Medical Record Number: [id number]  Today's Date: July 19, 2018    Start Time: 1115  End of procedure time: 1140  Procedure: Thyroid biopsy  Fire Safety Score: 2    Consent Review/Timeout Performed by: Omer Flood PA-C  Procedure Performed By: Omer Flood PA-C    Procedure start time: 1115  Puncture time: 1116  Procedure end time: 1140    Report given to: Roopa KING  Time pt departs:  1155      Other Notes:  Alert trached female transported via cart from inpatient room to IR Procedure Room 7 for planned intervention.  ID band confirmed and patient acknowledges understanding of planned procedure. Provider also notifying patients son and informing him of plan of care.  Patient son, per [facility] report, acknowledges understanding of procedure and agrees with plan.  Patient repositioned to procedure table via hover-mat and positioned supine.  Patient prepped and draped per policy see VS flowsheet, MAR for further information.    1110- SBP left arm is 199/92.  Right side is 214-101.  IR Provider informed.  Orders received.  1115- 10 mg IV hydralazine administered.  Patient hemodynamics monitored continuously.     Pathology contacted with request for presence at 1110.  Pathology present for sample evaluation at 1123.    A total of 5 x fine needle aspiration specimen obtained under sterile procedure.  Samples provided to on-site Pathology Staff for evaluation.    Post procedure, gel foam placed into insertion site.  Site cleansed and dressed per protocol.    Patient condition post procedure is stable.   Patient returned to inpatient room for post-procedure monitoring.    Jennifer Moise RN

## 2018-07-19 NOTE — PLAN OF CARE
Problem: Patient Care Overview  Goal: Plan of Care/Patient Progress Review  Outcome: No Change  Pt POD#2 direct laryngoscopy and trach. #6 trach w/ cuff deflated. VSS/A except hypertension (MD has been notified that vitals are outside parameters). Sats 96% on 40%HTD.  Tachycardic freq, HR (110 - 117), MD is aware. Alert, lethargic.  Oriented x3 (able to mouth answers, is difficult to understand/communicate with freq making neuro assessment difficult). Strength: 4/5. Intermittently follows commands. Diet: NG tube.TF on hold for thyroid bx, was stopped at 0500. TF restarted at 10mL/hr at 1420.  Per Cesilia (RD), keep TF at 10mL/hr until K+ level =3.4. K+  replacement started this shift.  Inner cannula changed x2 and site care completed x 2.  Trach site skin with mild erythma.  Suctioned Q1-2hrs. With thick white/clear secretions.  Lavaged x2.   Incontinent of bowel/bladder.  Wears attends pad.  Turned/repositioned Q2hrs.  No BM this shift.  Up with A2/lift.  Has not been OOB this shift d/t weakness, and increased BP.  OT/PT following. PIV, NS@75ML/hr.  Thyroid Bx done today, has small incision, covered on right side of trach, no drainage.  PLC trach teaching done today.  Plan:  PLC feeding teaching planned for 7/20 at 1430, family is aware.

## 2018-07-19 NOTE — PLAN OF CARE
Problem: Patient Care Overview  Goal: Plan of Care/Patient Progress Review  PT 6A: CANCEL- Per discussion with OT, pt with new R facial droop this AM. Pt in IR upon attempt for PT in late AM.

## 2018-07-19 NOTE — PLAN OF CARE
Problem: Patient Care Overview  Goal: Plan of Care/Patient Progress Review  Pt POD#1 direct laryngoscopy and trach, vs ex HTN w/in parameters, neuros are difficult to assess d/t pt's communication and LOC, she is a/o x4, mouths her answers, 4/5 throughout, denies N/T, doesn't follow or answer to all commands. PIV @ 75ml/hr, TF was started this afternoon @ 1700 @ 10ml/hr, via NG tube, K was replaced, redraw for tonight and tomorrow morning were released and ordered. Pt up AO2 w/GB and walker, RN spoke w/pt's son (Dar and he confirmed she uses walker @ home). Pt is incontinent of bladder and bowel, no BM tonight, #6 trach w/cuff deflated, suctioning L78vonp to 1hr w/thin-thick yellow-white-clear secretions, pt has strong cough not always utilized d/t ability to follow commands, HTD @ 30% w/sats in mid to upper 90s. Inner cannula changed x1, trach area was cleaned x1, new gown placed, skin around site is slightly reddened, but intact, soft-cloth wipes were placed underneath the gown in effort to protect skin. Pt doesn't call to be suctioned, RN in frequently checking in on patient, attempting to move pt closer to desk. Continue to monitor per MD orders.     Pt has PLC classes Thursday and Friday, RN spoke w/pt's son, Patti and he said he or his brother will be here to attend classes.

## 2018-07-19 NOTE — PLAN OF CARE
Problem: Patient Care Overview  Goal: Plan of Care/Patient Progress Review  Outcome: No Change  Pt POD#2 direct laryngoscopy and trach. #6 trach w/ cuff deflated. Inner cannula changed x2 and site care completedx1. Skin surrounding trach plate with mild erythema, dressing placed to protect skin. Continues to require suctioning q30 mins-1 hr with thick white/clear secretions. Vitals include: SpO2 stable on HTD 30-40%, HTN within parameters and intermittent tachycardia (100-115). A&Ox4, mouths answers to orientation questions. Neuros include: 4/5 strengths t/o, intermittently follows commands. NPO. Pt has NG tube, TF stopped @0500. Incontinent of bowel and bladder, no BM overnight. Up with A2/GB/Walker. PIV running @ 75mL/hr. K redraw WNL. Plans for thyroid biopsy @1100 and trach PLC @1330 today. PET scan @1330 and TF PLC @1430 on 7/20. Continue to monitor and follow POC.

## 2018-07-19 NOTE — PROGRESS NOTES
Care Coordinator Progress Note    Admission Date/Time:  7/17/2018  Attending MD:  Dr Cynthia Walden    Data  Chart reviewed, discussed with interdisciplinary team. In 6A Discharge Rounds CARLITA Ojeda, reported plan is to change trach tomorrow, being suctioned every 1 hour; has an NG tube. Having thyroid biopsy today. May need chemo/rads or surgery. May need to consider g-tube placement in the future. Hx of COPD. PET scan tomorrow. Discharge not anticipate until next week (today is Thursday).     Dr Walden reported family has been helping pt at home prior to admission. Pt was not very active before admission. She said family is motivated and supportive. Family may be able to take pt home instead of rehab placement. SonDar is primary contact.     Patient was admitted for:  Squamous cell carcinoma of the larynx.  Procedure:  Direct laryngoscopy with biopsy; tracheostomy; NG tube placement.   Past history includes: COPD; PVD, s/p left femoral endarterectomy. ETOH abuse. Osteoporosis; hypertension; hyperlipidemia.    Coordination of Care & Referrals  Chart reviewed. Discussed with Dr Walden. Informed KAMRAN Mosley, of info provided by Dr Walden.     Assessment  Pt with larynx cancer; s/p trach placement. Work-up continues to help determine treatment plan. Pt & family will need trach & tube feeding teaching. Not able to determine discharge disposition at this time.     Plan  Thyroid biopsy; PET scan. PLC for trach & tube feeding teaching.    RNCC will continue to follow and assess if pt is able to discharge home with family.

## 2018-07-19 NOTE — PLAN OF CARE
7/19/18 I saw the patient(pt)and her son(JR-main caregiver )in the patient's room for PLC trach education.Son correctly returned all trach cares per [facility] book using PLC supplies and model,asked a few questions,able to answer teach back,and verbalized understanding of content presented.Son will continue to practice skills on the unit with nursing supervision.Other family members observed.The pt.slept through the appointment.Also see education flow sheet.Home care to follow.

## 2018-07-19 NOTE — PLAN OF CARE
Problem: Patient Care Overview  Goal: Plan of Care/Patient Progress Review  SLP: Patient currently NPO for procedure today. Will reschedule for 7-. Discussed with RN.

## 2018-07-19 NOTE — CONSULTS
[facility], [place]    Internal Medicine Consult - Gold Service       Date of Admission:  7/17/2018  Consult Requested by: ENT, Dr. Walden  Reason for Consult: HTN, tachycardia, hypoxia, lethargy    Assessment & Plan   Keira Collins is a 72 year old female with a past medical history of COPD, HTN, HLD, chronic tobacco abuse, h/o alcohol abuse, squamous cell carcinoma of larynx admitted on 7/17/2018 for direct laryngoscopy w/ biopsy, trach and NG tube placement 07/17. Medicine was asked to consult on HTN, hypoxia and tachycardia.      #Acute hypoxic respiratory failure: Requiring 40% FiO2 via trach dome. Requiring frequent suctioning per RN. CXR w/ right lower lobe infiltrate, WBC count of 12.1. Differential includes PNA (aspiration of TF? Vs HCAP- WBC count of 12.6, afebrile, LA normal), COPD w/ exacerbation (no overt wheezing on exam), pulmonary edema (ECHO 05/2016 w/ normal LV function, EF of 65%, no regional WMA, diastolic dysfunction, weight up 8 pounds, no overt LE edema), pulmonary HTN, PE (tachy, new O2 requirements, known malignancy, no unilateral LE edema), pneumothorax (less likely).  Wt Readings from Last 3 Encounters:   07/17/18 55.8 kg (123 lb 0.3 oz)   07/16/18 53.7 kg (118 lb 4.8 oz)   07/11/18 52.2 kg (115 lb)   -Clinda 600 mg TID to treat for possible aspiration PNA  -IV lasix 20 mg x1  -Scheduled nebs  -ECHO  -Is/Os, daily weights  -Continuous pulse ox  -Tele  -Continue RT and RN frequent suctioning    #HTN, tachycardia: PTA maintained on Losartan 50 mg qday, amlodipine 5 mg qday (unclear compliance per OP notes) for tachycardia. /58 (up to 208/91 at 1020), HR in 1 teens, afebrile, sating 94% on 40% humidified trach dome. Son endorses pt consuming 3-8 beers/day, almost every day, no h/o WD or WD seizures per family. EKG w/ some ST depression in lead V6, but V5 and lead I WNL- trop negative, PVCs as below.  -[facility] protocol w/ ativan  -Tele  -Continuous pulse  ox  -Scheduled Xopenex given tachycardia  -Agree with continuing home medications  -Agree with hydralazine PRN    #Lethargy, concern for right facial droop: Right facial droop first noted at 0934 this AM per documentation, improving throughout day per family. Risk factors for stroke include tobacco abuse, alcohol abuse, HTN, HLD known PVD, inactivity. Head CT w/o acute intracranial abnormality, chronic ischemic disease.   -Continue ASA, statin  -BP control as above  -Continue plavix when safe from surgical standpoint  -Continue thiamine  -Avoid narcotics as able  -Delirium precautions    #PVCs: Frequent and q4 beats on EKG.   -Check lytes  -Tele    #Squamous cell carcinoma of larynx admitted on 7/17/2018 for direct laryngoscopy w/ biopsy, trach and NG tube placement 07/17: C/B hypoxia, lethargy.   -Management per primary team, ENT    #RUL spiculated lung nodule: Noted on 06/2018 CT scan. Agree with PET scan.   #Right lobe thyroid nodule: s/p FNA. Follow path.   #PVD s/p endarterectomy, iliac artery recannulization w/ stent, right SFA recannulization w/ stent 01/2017: PTA maintained on ASA, plavix. Plavix on hold, ASA continued. On Lovenox.  #COPD: CT chest from 06/13/2018 w/ moderate centrilobular emphysematous changes in upper lobes. PTA maintained on Spiriva qday, Advair BID w/ PRN albuterol.  #HLD: Continue Lipitor.   #Adrenal gland nodularity: Noted on CT chest 06/13/2018 w/ nodularity of bilateral adrenal glands w/ may represent mets. Agree with PET.     DVT Prophylaxis: Enoxaparin (Lovenox) SQ    The patient's care was discussed with the Attending Physician, Dr. Hendrix, Bedside Nurse and Patient.    CARLITA Quintanilla  Internal Medicine Staff Hospitalist Service  [facility]  Pager: [phone]  After 5 PM, page gold team cross cover at [phone]. If no response, page job code [phone].  ______________________________________________________________________    Chief Complaint   High blood  "pressure    History is obtained from the patient    History of Present Illness   Keira Collins is a 72 year old female with a past medical history of COPD, HTN, HLD, chronic tobacco abuse, h/o alcohol abuse, squamous cell carcinoma of larynx admitted on 7/17/2018 for direct laryngoscopy w/ biopsy, trach and NG tube placement 07/17. Medicine was asked to consult on HTN, hypoxia and tachycardia.      Patient unable to provide meaningful history. Son notes that patient is relatively independent at baseline, lives in small house but uses walker to get throughout. Doesn't go out much. Consumes alcohol frequently, mostly everyday but every once in awhile will take a day off. Can consume 3-8 beers in a day. No h/o WD but son notes \"she hasn't had chance to withdraw\" as she drinks daily. He believes last drink was evening of 07/16.     Review of Systems   The 10 point Review of Systems is negative other than noted in the HPI or here.     Past Medical History    I have reviewed this patient's medical history and updated it with pertinent information if needed.   Past Medical History:   Diagnosis Date     COPD (chronic obstructive pulmonary disease) (H) 7/28/2014    From [facility] 05/2016: bedside feng shows moderate airflow obstruction [FEV1 0.99L, 62% pred and FVC 1.7L, 82% predicted; FEV1 and FVC DROPPED by 11 and 16% respectively post BD  Diagnosis made 7/28/2014 at [facility]        Essential hypertension with goal blood pressure less than 140/90      H/O: alcohol abuse      Hyperlipidemia LDL goal <100      Laryngeal mass 6/27/2018     Laryngeal squamous cell carcinoma (H) 07/17/2018     Osteoporosis 7/14/2016     Pulmonary nodules 7/9/2018     PVD (peripheral vascular disease) (H) 7/11/2016     Tobacco abuse         Past Surgical History   I have reviewed this patient's surgical history and updated it with pertinent information if needed.  Past Surgical History:   Procedure Laterality Date     " LARYNGOSCOPY WITH BIOPSY(IES) N/A 7/17/2018    Procedure: LARYNGOSCOPY WITH BIOPSY(IES);  Direct Laryngoscopy With Biopsy, Tracheostomy ;  Surgeon: Cynthia Walden MD;  Location: [facility] OR     SURGICAL HISTORY OF -   5/13/2016    right hip fracture     TRACHEOSTOMY N/A 7/17/2018    Procedure: TRACHEOSTOMY;;  Surgeon: Cynthia Walden MD;  Location: [facility] OR     VASCULAR SURGERY Left 01/2017    ; left femoral endarterectomy        Social History   Social History   Substance Use Topics     Smoking status: Current Every Day Smoker     Packs/day: 1.00     Years: 50.00     Types: Cigarettes     Smokeless tobacco: Never Used      Comment: 1/2 a pack a day      Alcohol use 0.0 oz/week     0 Standard drinks or equivalent per week      Comment: Beer- 1-4 per day   Lives in a home, son lives with her, ambulates with walker. Alcohol as per HPI.    Family History   I have reviewed this patient's family history and updated it with pertinent information if needed.   Family History   Problem Relation Age of Onset     Chronic Obstructive Pulmonary Disease Daughter      Diabetes Daughter      HEART DISEASE Daughter        Medications   I have reviewed this patient's current medications    Allergies   Allergies   Allergen Reactions     Penicillins        Physical Exam   Vital Signs: Temp: 98.7  F (37.1  C) Temp src: Axillary BP: 180/58 Pulse: 114 Heart Rate: 116 Resp: 18 SpO2: 94 % O2 Device: Trach dome    Weight: 123 lbs .27 oz  GENERAL: Alert, unable to assess orientation as patient is unable to speak.   HEENT: Anicteric sclera. PERRL. EOMI. Mucous membranes moist. Trach in place. NJ in place. Right sided facial droop, unable to smile.   CV: Tachycardic rate, regular rhythm. S1, S2. No murmurs appreciated.   RESPIRATORY: Effort normal on 40 % FiO2 via trach dome. Lungs with diffuse coarse crackles, no wheezing.   GI: Abdomen soft and non distended, bowel sounds present. No tenderness, rebound, guarding.   MUSCULOSKELETAL:  No joint swelling or tenderness. Moves all extremities.   NEUROLOGICAL: No focal deficits. Strength 3/5 in RUE, 4/5 in LUE, unable to assess lower extremities, moves feet bilaterally.   EXTREMITIES: No peripheral edema. SCDs in place. Intact bilateral pedal pulses.   SKIN: No jaundice. No rashes.     Data   Data reviewed today: I reviewed all medications, new labs and imaging results over the last 24 hours. I personally reviewed no images or EKG's today.    Reviewed BMp, mag, phos, LA, CBC, glucose, INR

## 2018-07-19 NOTE — PROGRESS NOTES
"Otolaryngology Progress Note  July 19, 2018    S: No acute events overnight. Requiring frequent suctioning from the trach for thick clear secretions overnight. TFs stopped at 0500 for IR procedure today due to prior intolerance to FNA and possible need for sedation. Incontinent of bladder overnight. No BM.     O: /63 (BP Location: Left arm)  Pulse 105  Temp 97.4  F (36.3  C) (Axillary)  Resp 18  Ht 1.6 m (5' 2.99\")  Wt 55.8 kg (123 lb 0.3 oz)  SpO2 93%  BMI 21.8 kg/m2   General: Alert, No acute distress   HEENT: EOMI, no facial weakness. 6-0 cuffed Shiley sutured in place, cuff deflated. Trach ties are snug w/ 2 finger breadths between neck and ties. Tracheostomy site clean, no bleeding. No skin breakdown or erythema under trach ties or face plate.  NG tube sutured in place in the left nare.    Pulmonary: Breathing non-labored, no stridor, no accessory muscle use. HTD FiO2 30%      Intake/Output Summary (Last 24 hours) at 07/19/18 0732  Last data filed at 07/19/18 0400   Gross per 24 hour   Intake              330 ml   Output                0 ml   Net              330 ml       LABS:  ROUTINE IP LABS (Last four results)  BMP    Recent Labs  Lab 07/18/18  2341 07/18/18  1613 07/18/18  0728 07/17/18  1626 07/16/18  1413   NA  --   --  137 136 137   POTASSIUM 3.4 3.0* 3.3* 3.5 3.7   CHLORIDE  --   --  103 102 102   ESTEFANI  --   --  9.0 8.6 9.1   CO2  --   --  24 23 28   BUN  --   --  10 12 15   CR  --   --  0.61 0.51* 0.68   GLC  --   --  130* 133* 95     CBC    Recent Labs  Lab 07/18/18  0728 07/17/18  1626 07/16/18  1413   WBC 13.7* 7.7 7.4   RBC 4.81 4.99 4.95   HGB 14.7 15.0 14.9   HCT 44.1 47.0 46.7   MCV 92 94 94   MCH 30.6 30.1 30.1   MCHC 33.3 31.9 31.9   RDW 12.6 12.6 12.5    166 189     TSH 7/17: 0.54  Pre albumin 7/17: 16  Albumin: 3.5    A/P: Keira Collins is a 72 year old female with a past medical history of COPD and 140 pack years of smoking who is POD#2 s/p direct laryngoscopy with " biopsy of laryngeal SCC and tracheostomy placement for SCC of the larynx T3N2c, and question of metastasis to the lungs.     Neuro:  - Pain control: Scheduled tylenol. Oxycodone/dilaudid PRN.   - Per family, slight dementia at baseline.   - Alcohol abuse: MSSA protocol per pre-op recommendation  - Tobacco abuse: 140 pack year smoking history. Nicotine patch.    HEENT:  - Squamous cell cancer of the larynx now s/p trach  - Tracheostomy:   The first changing of trach tube, sponge, and/or ties will be performed by ENT post-op ~ day 3. Afterwards, other hospital staff can manage these items as needed.    - Perform regular suctioning. RN should change disposable inner canulas every shift and/or clean it with a brush.    - Keep trach tube obturator taped to wall behind the head of the bed. Keep extra unopened 6-0 Shiley and 4-0 Shiley at bedside at all times.  - PET Scan scheduled for Friday 7/20/18 PM, will make NPO at midnight and hold all D5  - IR for US guided biopsy of right thyroid nodule today. Results may impact surgical resection if surgery is pursued for cancer treatment or incompetent larynx.     Respiratory:   - Patient saturating well on 30% O2. Supplemental O2 PRN to keep sats >92%, wean as able  - Hx COPD: substituting pulmicort, atrovent, and arformoterol nebs for PTA inhalers, albuterol neb PRN   - Suspicious RUL nodule, being followed by the Lung Nodule clinic; recommendation was an IR guided biopsy. Not scheduled yet.     CV/heme:   - History of HTN and PVD s/p lft femoral endarterectomy.  - Restarted PTA Norvasc, losartan, ASA 81mg. Holding Plavix  - hemodynamically stable, Postop Hgb 14.7.     FEN/GI:  - Diet: NPO due to PTA dysphagia and now new trach   - SLP bedside swallow 7/18: recommend NPO due to risk of aspiration  - Malnutrition: Nutrition consult, TFs via NG tube. Currently held for IR procedure today    - May require g-tube for presumed prolonged need for enteral feeding, will discuss with  Dr. Walden and patient   - multivitamin  - Bowel regimen: Senna, colace.     :   - Voiding spontaneously, good UOP    Endo:   - No acute concerns, TSH wnl     ID:   - Slight leukocytosis yesterday at 13.7, continue to trend. Monitor for signs/symptoms of infection. Afebrile.     PPX:   - SCDs, IS. Lovenox    Consults:   - SLP  - PT/OT  - Nutrition  - PLC- tracheostomy and TF cares.    Disposition Plan   Expected discharge in 3-4 days to transitional care unit once trach has been changed and tract is stable, tolerating TFs at bolus goal, independent with trach and TF care, PET scan performed.     Entered: Samantha Burnham 07/19/2018, 7:32 AM       -- Patient and above plan to be discussed with Dr. Win Burnham, PA-C  Otolaryngology-Head & Neck Surgery  Please contact ENT by dialing [phone] and entering job code [phone].

## 2018-07-19 NOTE — PLAN OF CARE
Problem: Patient Care Overview  Goal: Plan of Care/Patient Progress Review    6A: Cxl - Pt noted to have R sided facial droop upon writer's arrival; BP 190s/90s - RN informed and in to assess.

## 2018-07-19 NOTE — PROGRESS NOTES
S/P thyroid biopsy in IR.  94 P110 R20 Sats 94% on 40%HTD.  Alert, lethargic.  New incision, right neck area, covered and dressing is CDI.  PCD on.

## 2018-07-20 NOTE — CONSULTS
General acute hospital, [place]      Neurology Stroke Consult    Patient Name: Keira Collins  : 1945 MRN#: [id number]    STROKE DATA    Stroke Code:  Stroke code not activated.  Time patient seen:  2018 5:15PM  Last known normal (pt's baseline):  18 10:00 AM    TPA treatment:  Not given due to stroke mimic: encephalopathy likely secondary to advanced laryngeal and lung neoplasms and possible alcohol withdrawal..     National Institutes of Health Stroke Scale (at presentation)  NIHSS done at:  time patient seen      Score    Level of consciousness:  (0)   Alert, keenly responsive     LOC questions:  (0)   Answers both questions correctly    LOC commands:  (0)   Performs both tasks correctly    Best gaze:  (0)   Normal    Visual:  (0)   No visual loss    Facial palsy:  (1)   Minor paralysis (flat nasolabial fold, smile asymmetry)    Motor arm (left):  (0)   No drift    Motor arm (right):  (0)   No drift    Motor leg (left):  (0)   No drift    Motor leg (right):  (0)   No drift    Limb ataxia:  (0)   Absent    Sensory:  (0)   Normal- no sensory loss    Best language:  (0)   Normal- no aphasia    Dysarthria:  UN Intubated or other physical barrier: recent tracheostomy placement    Extinction and inattention:  (0)   No abnormality        NIHSS Total Score:  1        Dysphagia Screen  Time of screenin2018 5:20 PM  Screening results: Currently receiving NG tube feeds.     ASSESSMENT & RECOMMENDATIONS     Ms. Collins is a 72-year-old woman with PMH of tobacco abuse (50 pack-year smoking history), alcohol abuse, hypertension, hyperlipidemia with known peripheral vascular disease, DM, and metastatic squamous cell cancer of the neck presenting with right-sided facial droop and right-sided lower>upper extremity weakness.    The patient was seen for recent change in level of energy and speaking less, as noticed by the patient's family at approximately 10:00 AM on  (31 hours  ago).  The differential diagnosis includes seizures, metabolic encephalopathy, encephalopathy due to chronic disease, alcohol withdrawal in a chronic heavy alcohol user, metastatic brain cancer, or vasculitis.    The patient's family first noted that she seemed more lethargic and less talkative after she had undergone placement of a tracheostomy tube on Tuesday, July 17.  On Thursday, July 19 at approximately 10:00 AM (31 hours prior to consultation), the family expressed that Ms. Collins seemed less responsive to conversation and that her right-sided facial droop (moderate at baseline) had worsened.    Today, July 20, the Stroke service was contacted for concern of these changes without formal consult.  On first two attempts to examine the patient, she was found to be undergoing imaging with CT of the soft tissues of the neck, PET scan of the whole body, and MRI of the brain with and without contrast.  MRI of the brain subsequently showed acute left pontine mesencephalic junction infarct; MRA showed a 3 mm right A2 aneurysm at the branch point of the frontopolar artery, and approximately 50% stenosis of the proximal right internal carotid artery, with a diminutive right vertebral artery.    Ms. Collisn's neurologic exam follows here:  General:  Awake, watching television, lying in bed.  Able to whisper around tracheostomy tube (Shiley uncuffed), but actively responds with nods/head shakes and thumbs up/down.    Neurologic:  Mental Status: GCS15. AO3: oriented to name, location, date.  Unable to assess speech due to patient discomfort speaking around new tracheostomy tube.  Cranial Nerve: CN II-XII intact.  Right-sided mouth downturn, moderate at baseline according to RN and patient's hospital admission photo.  Sensation: Intact to light touch in all CN VII distributions, bilateral upper extremities, and bilateral lower extremities.  Motor: No pronator drift in bilateral upper extremities or bilateral lower  extremities.  4/5 strength in right upper extremity, and 5/5 strength in left upper extremity.  Strength 4/5 in right lower extremity, and 5/5 strength in left lower extremity.    Neurologic exam otherwise per NIH Stroke Scale (above).         Impression:  Ms. Collins is a 72-year-old woman with PMH 50-pack-year smoking history, heavy alcohol use, hypertension, hyperlipidemia with known peripheral vascular disease, DM, and squamous cell carcinoma of the larynx with bilateral metastases to the lungs.      Recommendations:  Acute Ischemic Stroke (without tPA) Plan  - Neurochecks  - Permissive HTN; labetalol PRN for SBP > 220  - Euthermia, Euglycemia  - Daily aspirin for secondary stroke prevention  - Statin  - TTE with Bubble Study  - Telemetry, EKG  - Bedside Glucose Monitoring  - A1c, Lipid Panel, Troponin x 3  - PT/OT/SLP  - Stroke Education  - Depression Screen  - Apnea Screen     Encourage dual antiplatelet therapy with aspirin, Plavix, and atorvastatin.  May hold Plavix in the setting of patient's upcoming operative procedure by ENT.    Prophylaxis          For VTE Prevention:  - enoxaparin  - pneumatic compression device     The patient will be managed by the ENT team and  we will sign off at this time.  Thank you for the consult.  Contact the stroke team if you have any further questions.    HPI  Keira Collins is a 72 year old female with PMH of multiple vascular risk factors (smoking, cancer, htn, hld with known peripheral vascular disease) presenting with right-sided facial droop and right-sided weakness.  She was found to have an acute left-sided mesencephalic pontine infarct with no large vessel occlusion on MRI of the brain with and without contrast today, July 20, 2018.  Additional findings include right ACA2 aneurysm, 50% stenosis of the proximal right internal carotid artery, and diminutive right vertebral artery.    Pertinent Past Medical/Surgical History  Past Medical History:   Diagnosis Date      COPD (chronic obstructive pulmonary disease) (H) 7/28/2014    From [facility]ization 05/2016: bedside feng shows moderate airflow obstruction [FEV1 0.99L, 62% pred and FVC 1.7L, 82% predicted; FEV1 and FVC DROPPED by 11 and 16% respectively post BD  Diagnosis made 7/28/2014 at [facility]        Essential hypertension with goal blood pressure less than 140/90      H/O: alcohol abuse      Hyperlipidemia LDL goal <100      Laryngeal mass 6/27/2018     Laryngeal squamous cell carcinoma (H) 07/17/2018     Osteoporosis 7/14/2016     Pulmonary nodules 7/9/2018     PVD (peripheral vascular disease) (H) 7/11/2016     Tobacco abuse        Past Surgical History:   Procedure Laterality Date     LARYNGOSCOPY WITH BIOPSY(IES) N/A 7/17/2018    Procedure: LARYNGOSCOPY WITH BIOPSY(IES);  Direct Laryngoscopy With Biopsy, Tracheostomy ;  Surgeon: Cynthia Walden MD;  Location: [facility] OR     SURGICAL HISTORY OF -   5/13/2016    right hip fracture     TRACHEOSTOMY N/A 7/17/2018    Procedure: TRACHEOSTOMY;;  Surgeon: Cynthia Walden MD;  Location: [facility] OR     VASCULAR SURGERY Left 01/2017    ; left femoral endarterectomy       Medications:   I have reviewed this patient's current medications  Current Facility-Administered Medications   Medication     acetaminophen (TYLENOL) solution 650 mg     albuterol neb solution 2.5 mg     [START ON 7/21/2018] amLODIPine (NORVASC) tablet 5 mg     arformoterol (BROVANA) neb solution 15 mcg     [START ON 7/21/2018] aspirin suspension 80 mg     atorvastatin (LIPITOR) tablet 40 mg     bisacodyl (DULCOLAX) Suppository 10 mg     budesonide (PULMICORT) neb solution 0.25 mg     Calcium carb-Vitamin D 500 mg [place]-200 units (OSCAL with D;Oyster Shell Calcium) per tablet 1 tablet     cefTRIAXone (ROCEPHIN) 1 g vial to attach to  mL bag for ADULTS or NS 50 mL bag for PEDS     dextrose 10 % 1,000 mL infusion     docusate (COLACE) 50 MG/5ML liquid 50 mg     enoxaparin (LOVENOX) injection 40  mg     [START ON 7/21/2018] folic acid (FOLVITE) tablet 1 mg     hydrALAZINE (APRESOLINE) injection 10 mg     HYDROmorphone (DILAUDID) injection 0.2 mg     ipratropium (ATROVENT) 0.02 % neb solution 0.5 mg     lactobacillus rhamnosus (GG) (CULTURELL) capsule 1 capsule     levalbuterol (XOPENEX) neb solution 0.63 mg     LORazepam (ATIVAN) tablet 1-4 mg     losartan (COZAAR) tablet 50 mg     magnesium sulfate 4 g in 100 mL sterile water (premade)     melatonin tablet 1 mg     metoclopramide (REGLAN) tablet 5 mg    Or     metoclopramide (REGLAN) injection 5 mg     metroNIDAZOLE (FLAGYL) infusion 500 mg     multivitamins with minerals (CERTAVITE/CEROVITE) liquid 15 mL     naloxone (NARCAN) injection 0.1-0.4 mg     nystatin (MYCOSTATIN) suspension 500,000 Units     ondansetron (ZOFRAN-ODT) ODT tab 4 mg    Or     ondansetron (ZOFRAN) injection 4 mg     oxyCODONE IR (ROXICODONE) tablet 5-10 mg     [START ON 7/21/2018] polyethylene glycol (MIRALAX/GLYCOLAX) Packet 17 g     potassium chloride (KLOR-CON) Packet 20-40 mEq     potassium chloride 10 mEq in 100 mL intermittent infusion with 10 mg lidocaine     potassium chloride 10 mEq in 100 mL sterile water intermittent infusion (premix)     potassium chloride SA (K-DUR/KLOR-CON M) CR tablet 20-40 mEq     potassium phosphate 15 mmol in D5W 250 mL intermittent infusion     potassium phosphate 20 mmol in D5W 250 mL intermittent infusion     potassium phosphate 20 mmol in D5W 500 mL intermittent infusion     potassium phosphate 25 mmol in D5W 500 mL intermittent infusion     prochlorperazine (COMPAZINE) tablet 5 mg    Or     prochlorperazine (COMPAZINE) injection 5 mg    Or     prochlorperazine (COMPAZINE) Suppository 12.5 mg     sennosides (SENOKOT) syrup 5 mL     sodium chloride 0.9% infusion     thiamine 100 MG/ML suspension 100 mg   .    Allergies:   All allergies reviewed and addressed  Allergies   Allergen Reactions     Penicillins    .    Family History:   I have reviewed  this patient's family history  Family History   Problem Relation Age of Onset     Chronic Obstructive Pulmonary Disease Daughter      Diabetes Daughter      HEART DISEASE Daughter    .    Social History:   I have reviewed this patient's social history  Social History   Substance Use Topics     Smoking status: Current Every Day Smoker     Packs/day: 1.00     Years: 50.00     Types: Cigarettes     Smokeless tobacco: Never Used      Comment: 1/2 a pack a day      Alcohol use 0.0 oz/week     0 Standard drinks or equivalent per week      Comment: Beer- 1-4 per day   .    Tobacco use: Current smoker, 1 PPD x50 years.    ROS:  CONSTITUTIONAL: fatigue and irregular mild fever  ENT/MOUTH: NEGATIVE for ear, mouth and throat problems  RESP: NEGATIVE for significant cough or SOB  CV: NEGATIVE for chest pain, palpitations or peripheral edema    PHYSICAL EXAMINATION  Vital Signs:  B/P: 184/90,  T: 98.7,  P: 109,  R: 28    General:  patient lying in bed without any acute distress, tracheostomy in place    HEENT:  poor dental hygiene, tracheostomy present, moderate right-sided mouth downturn  Cardio:  RRR, hypertensive to SBP 180s  Pulmonary:  on mechanical ventilation  Abdomen:  soft, non-tender, non-distended, NG tube in place  Extremities:  no edema  Skin:  intact, warm/dry     Neurologic  Mental Status:  fully alert, attentive and oriented, follows commands  Cranial Nerves:  visual fields intact, PERRL, EOMI with normal smooth pursuit, facial sensation intact and symmetric, tongue protrusion midline, right mouth downturn appearing like patient's admission photo  Motor:  no pronator drift, decreased muscle bulk throughout the body, 5/5 muscle strength in RUE and LUE, 4/5 RLE, 5/5 LLE  Reflexes:  2+ and symmetric throughout, toes downgoing  Sensory:  intact and symmetric to light touch in all 3 distributions of CN VII, BLUEs, and BLLEs  Coordination:  FNF and HS intact without dysmetria  Station/Gait:  unable to  test    Labs  Labs and Imaging reviewed and used in developing the plan; pertinent results included.     Lab Results   Component Value Date     (H) 07/20/2018       The patient was discussed with the Fellow, Dr. Tate.  The staff is Dr. Walden, Department of Otolaryngology.    Taiwo Castle MD   Pager: [phone]

## 2018-07-20 NOTE — PLAN OF CARE
Problem: Patient Care Overview  Goal: Plan of Care/Patient Progress Review  Outcome: No Change  Neuro: Unable to fully assess mentation.  Pt able to follow commands, and answers yes and no questions. Unable to verbalize words today  Cardiac: ST. With frequent PVC's. BP elevated, low grade temps present.   Respiratory: see resp status  GI/: Adequate urine output. No BM   Diet/appetite: NPO tube feeds on hold  Activity:  Bedrest  Pain: At acceptable level on current regimen. Denies pain  Skin: No new deficits noted.    Plan: Pt completed PET scan as well as MRI. Continue with POC. Notify primary team with changes.      Problem: Chronic Respiratory Difficulty Comorbidity  Goal: Chronic Respiratory Difficulty  Patient comorbidity will be monitored for signs and symptoms of Respiratory Difficulty (Chronic) condition.  Problems will be absent, minimized or managed by discharge/transition of care.  Outcome: No Change  Pt trached on 30-40% trach dome, copious amounts of secretions.  Requiring Q1-2 hour suctioning

## 2018-07-20 NOTE — PROGRESS NOTES
"Otolaryngology Progress Note  July 20, 2018    S: No acute events overnight. Patient complaining of pain at site of trach. PRN dilaudid 0.2mg x2 doses total yesterday. Tachycardia and hypertensitve to 180's and hydralazine administered. Incontinent of urine. No BM. TF stopped at midnight for PET scan today.  Requiring frequent suctioning from the trach for thick clear secretions overnight.     O: /87 (BP Location: Right arm)  Pulse 109  Temp 99  F (37.2  C) (Oral)  Resp 22  Ht 1.6 m (5' 2.99\")  Wt 55.8 kg (123 lb 0.3 oz)  SpO2 96%  BMI 21.8 kg/m2   General: Lethargic but following most commands, No acute distress   Neuro: moving all extremities with diffuse and equal weakness throughout    HEENT: EOMI, difficult to perform facial nerve exam due to limited participatoin. 6-0 cuffed Shiley sutured in place, cuff deflated. Trach ties are snug w/ 2 finger breadths between neck and ties. Tracheostomy site clean, no bleeding. No skin breakdown or erythema under trach ties or face plate.  NG tube sutured in place in the left nare.    Pulmonary: Breathing non-labored, no stridor, no accessory muscle use. HTD FiO2 30%      Intake/Output Summary (Last 24 hours) at 07/20/18 0954  Last data filed at 07/20/18 0827   Gross per 24 hour   Intake          3367.08 ml   Output                0 ml   Net          3367.08 ml       LABS:  ROUTINE IP LABS (Last four results)  BMP    Recent Labs  Lab 07/20/18  0428 07/19/18  1800 07/19/18  0709 07/18/18  2341  07/18/18  0728    138 141  --   --  137   POTASSIUM 3.4 3.6 3.2* 3.4  < > 3.3*   CHLORIDE 106 108 109  --   --  103   ESTEFANI 8.9 8.9 8.8  --   --  9.0   CO2 20 21 22  --   --  24   BUN 8 7 9  --   --  10   CR 0.65 0.58 0.63  --   --  0.61   * 156* 103*  --   --  130*   < > = values in this interval not displayed.  Mg 1.9  Phos 2.2    CBC    Recent Labs  Lab 07/19/18  1800 07/19/18  0709 07/18/18  0728 07/17/18  1626   WBC 12.1* 12.6* 13.7* 7.7   RBC 4.57 4.51 " 4.81 4.99   HGB 14.0 13.7 14.7 15.0   HCT 42.8 42.8 44.1 47.0   MCV 94 95 92 94   MCH 30.6 30.4 30.6 30.1   MCHC 32.7 32.0 33.3 31.9   RDW 13.0 13.2 12.6 12.6    177 206 166     TSH 7/17: 0.54  Pre albumin 7/17: 16  Albumin: 3.5  INR 7/19: 1.16    IMAGING:  CXR 7/19  Small right pleural effusion and right basilar opacities which may  represent aspiration/infection or atelectasis.    Head CT 7/19  Impression:   1. In the left side of the cora there is ill-defined hypoattenuation  (series 2, image 25). This likely represents an old infarct, which is  described in outside radiology report dated 5/17/2016.  2. Multiple foci of chronic lacunar infarction in bilateral deep gray  nuclei.     A/P: Keira Collins is a 72 year old female with a past medical history of COPD and 140 pack years of smoking who is POD#3 s/p direct laryngoscopy with biopsy of laryngeal SCC and tracheostomy placement for SCC of the larynx T3N2c, and question of metastasis to the lungs.     Neuro:  - Lethargy:    - concern for CVA yesterday: STAT head CT with just chronic and old infarcts, no acute findings    - Continue to monitor closely  - Pain control: Scheduled tylenol. Oxycodone/dilaudid PRN.   - Slight dementia at baseline per family   - Alcohol abuse history though reported 1 beer daily pre-operatively: CIWA, per medicine - has not required any Ativan   - Tobacco abuse: 140 pack year smoking history. Nicotine patch.    HEENT:  - Squamous cell cancer of the larynx now s/p trach  - Tracheostomy care:   The first changing of trach tube, sponge, and/or ties will be performed by ENT today. Afterwards, other hospital staff can manage these items as needed.    - Perform regular suctioning.   - RN should change disposable inner canulas every shift and/or clean it with a brush.    - Keep trach tube obturator taped to wall behind the head of the bed. Keep extra unopened 6-0 Shiley and 4-0 Shiley at bedside at all times.  - PLC trach teaching 7/19  with son   - PET Scan scheduled for today for further cancer workup, has been NPO since MN and held all D5  - IR for US guided biopsy of right thyroid nodule 7/19. Results may impact surgical resection if surgery is pursued for cancer treatment or incompetent larynx. F/u results    Respiratory:   - Presumed Aspiration pneumonia on CXR: Clindamycin started 7/19  - Patient saturating well on 30% O2. Supplemental O2 PRN to keep sats >92%, wean as able  - Hx COPD: substituting pulmicort, atrovent, and arformoterol nebs for PTA inhalers, albuterol neb PRN. Added Xopenex nebs per medicine recs   - Suspicious RUL nodule, being followed by the Lung Nodule clinic; recommendation was an IR guided biopsy. Not scheduled yet.     CV/heme:   - Continuous cardiac monitoring   - Tachycardia: EKG shows sinus tachycardia with PVCs. Echo scheduled per medicine team.   - HTN: PTA Norvasc, losartan, PRN hydralazine for SBP > 160, lasix PRN per medicine team   - PTA ASA 81mg. Holding Plavix  - PVD s/p left femoral endarterectomy  - hemodynamically stable, Postop Hgb 14.7.     FEN/GI:  - Diet: NPO due to PTA dysphagia and now new trach   - SLP bedside swallow 7/18: recommend NPO due to risk of aspiration  - Severe Malnutrition in the setting of acute on chronic illness: Nutrition consult, TFs via NG tube. Currently held for PET today, will resume after PET    - May require g-tube for presumed prolonged need for enteral feeding, will discuss with Dr. Walden and patient   - multivitamin  - Electrolyte replacement as needed -- HOLDING replacement until after PT (dextrose w/ phos replacement)  - Bowel regimen: Senna, colace.     :   - Voiding spontaneously, good UOP  - Incontinent of urine - preexisting condition, wears Depends at baseline     Endo:   - Right Thyroid nodule s/p biopsy, pathology pending   - No acute concerns, TSH wnl     ID:   - Slight leukocytosis, downtrending 13.7 to 12.1, continue to trend. Monitor for signs/symptoms of  infection. Afebrile.     PPX:   - SCDs, IS. Lovenox    Consults:   - SLP  - PT/OT  - Nutrition  - PLC- tracheostomy and TF cares.    Disposition Plan   Expected discharge in 3-4 days to transitional care unit once trach has been changed and tract is stable, tolerating TFs at bolus goal, independent with trach and TF care, PET scan performed, suctioning requirements decreasing in frequency, transitioned to all enteral medications.       -- Patient and above plan to be discussed with Dr. Win Burnham PA-C  Otolaryngology-Head & Neck Surgery  Please contact ENT by dialing [phone] and entering job code [phone].

## 2018-07-20 NOTE — PLAN OF CARE
Problem: Patient Care Overview  Goal: Plan of Care/Patient Progress Review  Outcome: No Change  Pt trached; shiley #6, YULIYA to orientation. Reposition q2hrs, assist of 2. Pain at site of trach, PRN dilaudid given x1.  HR -118, SBP elevated; PRN hydralazine given. Tmax 99.2; Lung sounds coarse on 30% FiO2 trach done w/ suctioning Q1-3 hours for thick secretions. Incontinent of urine. No bm. TF remain on hold for PET scan today. Right neck dressing C/D/I. Will continue to monitor and follow plan of care.

## 2018-07-20 NOTE — PLAN OF CARE
Problem: Patient Care Overview  Goal: Plan of Care/Patient Progress Review  SLP: Patient NPO for PET scan today. New orders received 7- for swallow evaluation. Patient had bedside swallow evaluation 7- with recommendation for NPO status with alternate nutrition/hydration/medication source. Will reschedule for 7-. Discussed with RN.

## 2018-07-20 NOTE — PLAN OF CARE
Problem: Patient Care Overview  Goal: Plan of Care/Patient Progress Review  OT 6B: Cancel - pt not medically appropriate for therapies, will reschedule.

## 2018-07-20 NOTE — PLAN OF CARE
Problem: Patient Care Overview  Goal: Plan of Care/Patient Progress Review  PT 6B: Cancel- PT orders received. Patient not medically appropriate for therapy today, PET scan and MRI this afternoon. Will reschedule PT eval.

## 2018-07-20 NOTE — PROVIDER NOTIFICATION
"Transfer  Transferred from: 6A  Via:bed  Reason for transfer:Pt appropriate for 6B- needs continuous tele   improved/worsened patient condition  Family: Aware of transfer  Belongings: Received with pt  Chart: Received with pt  Medications: Meds received from old unit with pt  2 RN Skin Assessment Completed By: Oriana   Report received from:  Radha  Pt status:  BP (!) 163/91 (BP Location: Right arm)  Pulse 109  Temp 99.2  F (37.3  C) (Oral)  Resp 20  Ht 1.6 m (5' 2.99\")  Wt 55.8 kg (123 lb 0.3 oz)  SpO2 98%  BMI 21.8 kg/m2  PRN hydralazine given, suctioned for large white thin secretions. Pt oriented to room and call light. Will continue to monitor.          "

## 2018-07-20 NOTE — PLAN OF CARE
Problem: Patient Care Overview  Goal: Plan of Care/Patient Progress Review  Outcome: No Change  Pt POD#2 direct laryngoscopy w/trach placement, POD#0 thyroid biopsy, vs ex HTN and tachy, neuros include: difficult to assess d/t pt's lethargy and ability to follow commands, she's a/o x4, lethargic, 4/5 throughout, intermittently follows commands, non-verbal but mouths or points to communicate, RN provided white board but pt not able to use, R facial droop, pupils reactive, R pronator drift, MD was notified. #6 shiley w/cuff deflated in place, cleaned trach plate, inner cannula changed x1 this evening, pt requiring suctioning Q1-2hrs, pt not able to call for RN assistance, HTD @30% w/sats in mid-upper 90s. Pt incontinent of bladder, no bowel movement tonight, bowel meds supplied, pt up AO2 w/GB and pivot but hasn't been OOB d/t lack of participation, RN repositioning pt Q2hrs. Pt NPO w/TF that was stopped @ 2115 per MD orders, head CT performed this evening, ECHO, pt will have PET scan tomorrow. Family was updated regarding pt's transfer orders to higher level telemetry needs. Continue to monitor per MD orders.

## 2018-07-20 NOTE — PROGRESS NOTES
[facility], [place]    Internal Medicine Progress Note - Gold Service      Assessment & Plan   Keira Collins is a 72 year old female with a past medical history of COPD, HTN, HLD, chronic tobacco abuse, h/o alcohol abuse, squamous cell carcinoma of larynx admitted on 7/17/2018 for direct laryngoscopy w/ biopsy, trach and NG tube placement 07/17. Medicine was asked to consult on HTN, hypoxia and tachycardia.     Lethargy, Right facial droop, R sided hemiparesis: Right facial droop first noted at 0934 yesterday AM per documentation, improving throughout day per family. Risk factors for stroke include tobacco abuse, alcohol abuse, HTN, HLD known PVD, inactivity. Head CT w/o acute intracranial abnormality, chronic ischemic disease. Noted R sided hemiparesis today. Pt noted yes that weakness was chronic but d/w pt's son and he denies pt has hx of stroke or R sided weakness. MRI brain w/ and w/o contrast reveal acute cora stroke.   - D/w ENT today regarding MRI findings to consult Neurology ASAP  - Continue ASA, statin  - BP control as below  - Continue plavix when safe from surgical standpoint  - Continue thiamine  - Avoid narcotics as able  - Delirium precautions      Acute hypoxic respiratory failure most likely 2/2 aspiration PNA: Requiring 40% FiO2 via trach dome. Requiring frequent suctioning per RN. CXR w/ right lower lobe infiltrate. Started on clindamycin yesterday. WBC count this am 12.6 (12.1) but afebrile. TTE today technically difficult study but most likely normal with est EF of 60-65%.  - Continue empiric abx's but switch from oral clinda to IV Flagyl 500 mg q6hrs and ceftriaxone 1 g q24 hrs.   - Scheduled nebs  - Is/Os, daily weights  - Continue RT and RN frequent suctioning    HTN, tachycardia: PTA maintained on Losartan 50 mg qday, amlodipine 5 mg qday (unclear compliance per OP notes). BPs yesterday 180/58 (up to 208/91 at 1020), HR in 1 teens, afebrile, sating 94% on 40%  humidified trach dome. Son endorses pt consuming 3-8 beers/day, almost every day, no h/o WD or WD seizures per family. EKG w/ some ST depression in lead V6, but V5 and lead I WNL- trop negative, PVCs as below. Started on MSSA protocol with Ativan and prn hydralazine, the latter received yesterday am and again early morning last night. Most recent BP today 170s / [id number].  - Continue with PTA losartan and amlodipine for now given above acute pontine stroke. D/w neurology team regarding SBP goal within the context of above acute stroke.   - Continue with prn hydralazine as ordered.  - Continue MSSA protocol w/ ativan  - Continue tele  - Continuous pulse ox  - Scheduled Xopenex given tachycardia    Squamous cell carcinoma of larynx admitted on 7/17/2018 for direct laryngoscopy w/ biopsy, trach and NG tube placement 07/17: C/B hypoxia, lethargy.   -Management per primary team, ENT     RUL spiculated lung nodule: Noted on 06/2018 CT scan. Agree with PET scan.   Right lobe thyroid nodule: s/p FNA. Follow path.   PVD s/p endarterectomy, iliac artery recannulization w/ stent, right SFA recannulization w/ stent 01/2017: PTA maintained on ASA, plavix. Plavix on hold, ASA continued. On Lovenox.  COPD: CT chest from 06/13/2018 w/ moderate centrilobular emphysematous changes in upper lobes. PTA maintained on Spiriva qday, Advair BID w/ PRN albuterol.  HLD: Continue Lipitor.   Adrenal gland nodularity: Noted on CT chest 06/13/2018 w/ nodularity of bilateral adrenal glands w/ may represent mets. Agree with PET.       # Pain Assessment:  Current Pain Score 7/20/2018   Patient currently in pain? denies   Pain score (0-10) -   Pain location -   Pain descriptors -   Pain control per Primary Team    DVT Prophylaxis: Per Primary Team    Disposition Plan   Expected discharge: Per Primary team     Entered: Tristan Blevins 07/20/2018, 10:48 AM   Information in the above section will display in the discharge planner report.      The  patient's care was discussed with the Attending Physician, Dr. Umanzor, Bedside Nurse, Patient and Primary Team.    Omer Blevins PA-C  Internal Medicine Hospitalist   [facility]  [phone]     Interval History   No acute events overnight. Pt poor historian at this time due to lethargy.       Data reviewed today: I reviewed all medications, new labs and imaging results over the last 24 hours.     Physical Exam   Vital Signs: Temp: 98.7  F (37.1  C) Temp src: Oral BP: 151/83 Pulse: 109 Heart Rate: 109 Resp: 24 SpO2: 100 % O2 Device: Trach dome Oxygen Delivery: Other (Comments) (25LPM)  Weight: 134 lbs 4.16 oz  GEN: In NAD  HEENT: NCAT; PERRL; sclerae non-icteric; thick white coat on tongue  LUNGS: CTAB  CV: Tachy yet regular no murmurs appreciated.   ABD: +BSs; SNTND  EXT: No BLE edema  SKIN: No acute rashes noted on exposed areas.  NEURO: awake but very lethargic and does not verbally respond to questions; R sided facial droop and R sided tongue deviation noted. MS in ext's asymmetric (L 5/5; R 2/5); No tremor noted. Gait deferred.          Data   CMP  Recent Labs  Lab 07/20/18  0428 07/19/18  1800 07/19/18  0709 07/18/18  2341 07/18/18  1613 07/18/18  0728 07/17/18  2020 07/17/18  1626  07/16/18  1413    138 141  --   --  137  --  136  --  137   POTASSIUM 3.4 3.6 3.2* 3.4 3.0* 3.3*  --  3.5  --  3.7   CHLORIDE 106 108 109  --   --  103  --  102  --  102   CO2 20 21 22  --   --  24  --  23  --  28   ANIONGAP 11 9 10  --   --  10  --  10  --  7   * 156* 103*  --   --  130*  --  133*  --  95   BUN 8 7 9  --   --  10  --  12  --  15   CR 0.65 0.58 0.63  --   --  0.61  --  0.51*  --  0.68   GFRESTIMATED 89 >90 >90  --   --  >90  --  >90  --  85   GFRESTBLACK >90 >90 >90  --   --  >90  --  >90  --  >90   ESTEFANI 8.9 8.9 8.8  --   --  9.0  --  8.6  --  9.1   MAG 1.9 1.9 2.3  --  2.1  --   --  2.2  < >  --    PHOS 2.2*  --  2.3*  --  3.0  --   --  3.2  --   --    PROTTOTAL  --   --   --   --    --   --   --   --   --  8.0   ALBUMIN  --   --   --   --   --   --  3.5  --   --  3.6   BILITOTAL  --   --   --   --   --   --   --   --   --  0.4   ALKPHOS  --   --   --   --   --   --   --   --   --  92   AST  --   --   --   --   --   --   --   --   --  21   ALT  --   --   --   --   --   --   --   --   --  19   < > = values in this interval not displayed.  CBC    Recent Labs  Lab 07/20/18  1027 07/19/18  1800 07/19/18  0709 07/18/18  0728   WBC 12.6* 12.1* 12.6* 13.7*   RBC 4.44 4.57 4.51 4.81   HGB 13.5 14.0 13.7 14.7   HCT 41.4 42.8 42.8 44.1   MCV 93 94 95 92   MCH 30.4 30.6 30.4 30.6   MCHC 32.6 32.7 32.0 33.3   RDW 13.1 13.0 13.2 12.6    177 177 206     INR  Recent Labs  Lab 07/19/18  0709   INR 1.16*

## 2018-07-21 NOTE — PROGRESS NOTES
[facility], [place]    Internal Medicine Progress Note - Gold Consult Service      Assessment & Plan   Keira Collins is a 72 year old female with a past medical history of COPD, HTN, HLD, chronic tobacco abuse, h/o alcohol abuse, squamous cell carcinoma of larynx admitted on 7/17/2018 for direct laryngoscopy w/ biopsy, trach and NG tube placement 07/17. Medicine was asked to consult on HTN, hypoxia and tachycardia.     Acute L pontine stroke: Pt had a right sided facial droop first noted am of 7/19, improving throughout day per family. Risk factors for stroke include tobacco abuse, alcohol abuse, HTN, HLD known PVD, inactivity. Head CT w/o acute intracranial abnormality, chronic ischemic disease. Noted R sided hemiparesis so MRI brain obtained that revealed new pontine infarct. Stroke neurology consulted and pt out of window to receive TPA. Lipid panel this am reveals normal lipid panel.   - Continue neurochecks  - Continue ASA, statin  - D/w neurology today and SBP goal now <160  - Continue plavix when ENT states safe from surgical standpoint  - Obtain routine TTE with bubble study (ordered)  - PT/OT/SLP  - Avoid narcotics as able  - Delirium precautions   - Obtain apnea and depression screen / stroke education     Acute hypoxic respiratory failure most likely 2/2 aspiration PNA: Was requiring 40% FiO2 via trach dome, now 21% FiO2. Requiring frequent suctioning per RN. CXR w/ right lower lobe infiltrate. Started on clindamycin 7/19, switched to IV Flagyl and ceftriaxone yesterday to avoid development of Cdiff. TTE yesterday technically difficult study but most likely normal with est EF of 60-65%.  - Continue IV Flagyl 500 mg q6hrs and ceftriaxone 1 g q24 hrs.   - SLP consulted  - Scheduled nebs  - Wean O2 as able.   - Is/Os, daily weights  - Continue RT and RN frequent suctioning    HTN, tachycardia: PTA maintained on Losartan 50 mg qday, amlodipine 5 mg qday (unclear compliance per OP  notes). Son endorses pt consuming 3-8 beers/day, almost every day, no h/o WD or WD seizures per family. EKG w/ some ST depression in lead V6, but V5 and lead I WNL- trop negative, PVCs as below. Started on MSSA protocol with Ativan and prn hydralazine. Acute pontine stroke as above diagnosed yesterday.   - Continue with PTA losartan and amlodipine with goal SBP<160.  - Continue with prn hydralazine but change parameters to give for SBP>160  - Continue MSSA protocol w/ ativan  - Continue tele  - Continuous pulse ox  - Scheduled Xopenex given tachycardia    Squamous cell carcinoma of larynx admitted on 7/17/2018 for direct laryngoscopy w/ biopsy, trach and NG tube placement 07/17: C/B hypoxia, lethargy.   -Management per primary team, ENT     RUL spiculated lung nodule: Noted on 06/2018 CT scan. PET scan yesterday revealed bilateral hypermetabolic pulmonary nodules consistent with pulm metastatic disease. Also revealed small pleural effusion of which will perform bedside US tomorrow to further eval for possible thoracentesis.   Right lobe thyroid nodule: s/p FNA. Follow path.   PVD s/p endarterectomy, iliac artery recannulization w/ stent, right SFA recannulization w/ stent 01/2017: PTA maintained on ASA, plavix. Plavix on hold, ASA continued. On Lovenox.  COPD: CT chest from 06/13/2018 w/ moderate centrilobular emphysematous changes in upper lobes. PTA maintained on Spiriva qday, Advair BID w/ PRN albuterol.   HLD: Continue Lipitor.   Adrenal gland nodularity: Noted on CT chest 06/13/2018 w/ nodularity of bilateral adrenal glands w/ may represent mets. But PET scan yesterday did not reveal any abd/pelvis mets.       # Pain Assessment:  Current Pain Score 7/21/2018   Patient currently in pain? denies   Pain score (0-10) -   Pain location -   Pain descriptors -   Pain control per Primary Team    DVT Prophylaxis: Per Primary Team    Disposition Plan   Expected discharge: Per Primary team     Entered: Tristan Luke  Gen 07/21/2018, 11:09 AM   Information in the above section will display in the discharge planner report.      The patient's care was discussed with the Attending Physician, Dr. Umanzor, Bedside Nurse, Patient and Primary Team.    Omer Blevins PA-C  Internal Medicine Hospitalist   [facility]  [phone]     Interval History   No acute events overnight. Continues to be non-verbal.       Data reviewed today: I reviewed all medications, new labs and imaging results over the last 24 hours.     Physical Exam   Vital Signs: Temp: 99.5  F (37.5  C) Temp src: Axillary BP: 142/72   Heart Rate: 106 Resp: 26 SpO2: 96 % O2 Device: Trach dome Oxygen Delivery: 6 LPM  Weight: 130 lbs 15.25 oz  GEN: In NAD  HEENT: NCAT; PERRL; sclerae non-icteric; thick white coat on tongue  LUNGS: CTAB  CV: Tachy yet regular no murmurs appreciated.   ABD: +BSs; SNTND  EXT: No BLE edema  SKIN: No acute rashes noted on exposed areas.  NEURO: More awake than yesterday; Nods yes appropriately to orientation questions as does not verbally respond to questions; R sided facial droop and R sided tongue deviation noted. MS in ext's asymmetric (L 5/5; R 2/5); No tremor noted. Gait deferred.          Data   CMP  Recent Labs  Lab 07/21/18  0450 07/20/18  0428 07/19/18  1800 07/19/18  0709  07/18/18  1613  07/17/18  2020  07/16/18  1413    138 138 141  --   --   < >  --   < > 137   POTASSIUM 3.3* 3.4 3.6 3.2*  < > 3.0*  < >  --   < > 3.7   CHLORIDE 105 106 108 109  --   --   < >  --   < > 102   CO2 18* 20 21 22  --   --   < >  --   < > 28   ANIONGAP 14 11 9 10  --   --   < >  --   < > 7   * 118* 156* 103*  --   --   < >  --   < > 95   BUN 10 8 7 9  --   --   < >  --   < > 15   CR 0.52 0.65 0.58 0.63  --   --   < >  --   < > 0.68   GFRESTIMATED >90 89 >90 >90  --   --   < >  --   < > 85   GFRESTBLACK >90 >90 >90 >90  --   --   < >  --   < > >90   ESTEFANI 8.6 8.9 8.9 8.8  --   --   < >  --   < > 9.1   MAG 1.9 1.9 1.9 2.3  --  2.1   --   --   < >  --    PHOS 4.4 2.2*  --  2.3*  --  3.0  --   --   < >  --    PROTTOTAL  --   --   --   --   --   --   --   --   --  8.0   ALBUMIN  --   --   --   --   --   --   --  3.5  --  3.6   BILITOTAL  --   --   --   --   --   --   --   --   --  0.4   ALKPHOS  --   --   --   --   --   --   --   --   --  92   AST  --   --   --   --   --   --   --   --   --  21   ALT  --   --   --   --   --   --   --   --   --  19   < > = values in this interval not displayed.     CBC    Recent Labs  Lab 07/21/18  0450 07/20/18  1027 07/19/18  1800 07/19/18  0709   WBC 9.1 12.6* 12.1* 12.6*   RBC 4.33 4.44 4.57 4.51   HGB 13.1 13.5 14.0 13.7   HCT 40.2 41.4 42.8 42.8   MCV 93 93 94 95   MCH 30.3 30.4 30.6 30.4   MCHC 32.6 32.6 32.7 32.0   RDW 13.2 13.1 13.0 13.2    190 177 177     INR    Recent Labs  Lab 07/19/18  0709   INR 1.16*

## 2018-07-21 NOTE — PLAN OF CARE
"Problem: Patient Care Overview  Goal: Plan of Care/Patient Progress Review  Outcome: No Change  /71 (BP Location: Left arm)  Pulse 109  Temp 99.3  F (37.4  C) (Oral)  Resp 24  Ht 1.6 m (5' 2.99\")  Wt 59.4 kg (130 lb 15.3 oz)  SpO2 97%  BMI 23.2 kg/m2    Neuro: lethargic. YULIYA orientation. Non verbal. R sided facial droop/weakness. MSSA score 6-8. Follows commands intermediately.   Cardiac: -125 with frequent PVCs. HTN 170s-130s SBP. Hydralazine x1.    Respiratory: Sating >92% on 21% TD. Shiley 6. Suction Q2H. Thick white secretions. Cuff down. Crusty/drainage around tracheostomy site. ENT to exchange trach today.  GI/: Adequate urine output. No BM. Incontinent.   Diet/appetite: NPO. NJ w/TF @ 10ml/hr. Lytes not WNL to advance TF  Activity:  Repo Q2H.   Pain: Denies.   Skin: Trach site reddened and draining.   LDA's:  L PIV TKO. R PIV NS @ 50ml/hr. NJ w/ TF  Phos replaced    Plan: Continue with POC. Notify primary team with changes.      Problem: Chronic Respiratory Difficulty Comorbidity  Goal: Chronic Respiratory Difficulty  Patient comorbidity will be monitored for signs and symptoms of Respiratory Difficulty (Chronic) condition.  Problems will be absent, minimized or managed by discharge/transition of care.   Shiley 6 trach. 21% trach dome. Suction Q2h.       "

## 2018-07-21 NOTE — PROGRESS NOTES
"Otolaryngology Progress Note  July 21, 2018    S: Patient had PET scan performed yesterday, notable for a left pleural effusion. Patient also had MR Brain 2/2 neurologic changes, which revealed an acute ischemic pontine stroke. Patient continues to have significant amount of airway secretions and continues to be tachycardic to the 130s.    O: /74 (BP Location: Left arm)  Pulse 109  Temp 98.9  F (37.2  C) (Axillary)  Resp 26  Ht 1.6 m (5' 2.99\")  Wt 59.4 kg (130 lb 15.3 oz)  SpO2 96%  BMI 23.2 kg/m2   General: Lethargic but following most commands, No acute distress   Neuro: moving all extremities   HEENT: EOMI, difficult to perform facial nerve exam due to limited participatoin. 6-0 cuffed Shiley sutured in place, cuff inflated. Trach ties are snug w/ 2 finger breadths between neck and ties. Tracheostomy site clean, no bleeding. No skin breakdown or erythema under trach ties or face plate.  NG tube sutured in place in the left naris.    Pulmonary: breathing via 6-0 Shiley with t-piece      Intake/Output Summary (Last 24 hours) at 07/21/18 1219  Last data filed at 07/21/18 0900   Gross per 24 hour   Intake             1770 ml   Output                0 ml   Net             1770 ml       LABS:  ROUTINE IP LABS (Last four results)  BMP    Recent Labs  Lab 07/21/18  0450 07/20/18  0428 07/19/18  1800 07/19/18  0709    138 138 141   POTASSIUM 3.3* 3.4 3.6 3.2*   CHLORIDE 105 106 108 109   ESTEFANI 8.6 8.9 8.9 8.8   CO2 18* 20 21 22   BUN 10 8 7 9   CR 0.52 0.65 0.58 0.63   * 118* 156* 103*     Mg 1.9  Phos 4.4    CBC    Recent Labs  Lab 07/21/18  0450 07/20/18  1027 07/19/18  1800 07/19/18  0709   WBC 9.1 12.6* 12.1* 12.6*   RBC 4.33 4.44 4.57 4.51   HGB 13.1 13.5 14.0 13.7   HCT 40.2 41.4 42.8 42.8   MCV 93 93 94 95   MCH 30.3 30.4 30.6 30.4   MCHC 32.6 32.6 32.7 32.0   RDW 13.2 13.1 13.0 13.2    190 177 177     TSH 7/17: 0.54  Pre albumin 7/17: 16  Albumin: 3.5  INR 7/19: 1.16  Lactate 7/21: " 0.9  Troponin <0.015 (07/21)    IMAGING:  MRI: 7/20  Impression:  1. Acute left pontine mesencephalic junction infarct  2. Multiple images severely degraded by motion with postcontrast  sequences nondiagnostic.  3. Head MRA demonstrates no large vessel occlusion. There is a 3 mm  right A2 aneurysm at the branch point of the frontopolar artery.  Recommend follow-up evaluation.  4. Neck MRA demonstrates approximately 50% stenosis of the proximal  right internal carotid artery, although this is limited due to motion  artifact. Diminutive right vertebral artery.  5. Moderate parenchymal volume loss and Leukoaraiosis.     [Result: Acute left pontine infarct]    PET Scan 7/20  IMPRESSION: This patient with a history of laryngeal squamous cell  carcinoma:  1. Bilateral hypermetabolic pulmonary nodules consistent with  pulmonary metastatic disease.  a. Collapse of the right lower/middle lobes with multiple  hypermetabolic foci suggestive of pulmonary metastases. Right lower  perihilar FDG uptake suggests postobstructive atelectasis.  b. Associated small right pleural effusion.  2. No evidence of metastatic disease in the abdomen/pelvis.  3. See neuroradiology report for results of high-resolution PET CT of  the head and neck.     4. Urethral diverticulum containing stones and septations. Evaluation  is limited by urinary FDG activity. Consider urologic consult.  Prominent urinary contamination about the pelvis.  5. 4.7 cm left adnexal cystic structure. No associated FDG uptake.  Given size and metabolic process follow-up is pelvic  ultrasound/follow-up recommended.  6. Thoracic abdominal aortic ectasia. 1.9 cm right iliac aneurysm.    CXR 7/19  Small right pleural effusion and right basilar opacities which may  represent aspiration/infection or atelectasis.    Head CT 7/19  Impression:   1. In the left side of the cora there is ill-defined hypoattenuation  (series 2, image 25). This likely represents an old infarct, which  is  described in outside radiology report dated 5/17/2016.  2. Multiple foci of chronic lacunar infarction in bilateral deep gray  nuclei.     Laryngeal biopsies:  FINAL DIAGNOSIS:   A. LEFT FALSE VOCAL CORD, BIOPSY:   - INVASIVE KERATINIZING SQUAMOUS CELL CARCINOMA, moderately   differentiated.   - Perineural invasion is present.     B. PETIOLE, BIOPSY:   - AT LEAST SQUAMOUS CELL CARCINOMA IN-SITU.     C. ADDITIONAL LEFT FALSE VOCAL CORD, BIOPSY:   - INVASIVE KERATINIZING SQUAMOUS CELL CARCINOMA, moderately   differentiated.     D. RIGHT FALSE VOCAL CORD, BIOPSY:   - AT LEAST MICROINVASIVE SQUAMOUS CELL CARCINOMA, keratinizing type.     A/P: Keira Collins is a 72-year-old female with a PMH significant for COPD, 140 pack-year tobacco history, alcohol use disorder, HLD, HTN, and PVD who is now POD#4 s/p direct laryngoscopy with biopsy of laryngeal SCC and tracheotomy. Patient was recently presented at Otolaryngology Tumor Board Conference to discuss plan of care for laryngeal [id number] SCCa. Recommendations at that time were to proceed with TL + bilateral MRND vs CXRT.    Patient's hospital course has been complicated by acute ischemic pontine stroke and persistent tachycardia. Internal Medicine is following and assisting with cares. Patient was evaluated by Neurology who have placed recommendations.    Neuro:   - Slight dementia at baseline, per family  - History of alcohol use disorder- thiamine  - MR Brain revealed CVA on 07/20- Neurology consulted   - tPA not indicated   - Neuro checks ordered q4h   - Permissive HTN with PRN anti-hypertensives for SBP > 220- Internal Medicine assisting   - Euthermia, euglycemia   - Aspirin qd, statin qd   - TTE with Bubble Study- Internal Medicine assisting   - Telemtry, EKG- Internal Medicine assisting   - Continue glucose monitoring   - A1c (obtained 07/20), lipid panel, troponins x3- Internal Medicine assisting   - Will defer depression and apnea screens at this time  - Pain  control: tylenol, oxycodone, and dilaudid prn  - Ativan per [facility] protocol (has not required)    HEENT:  - [id number] SCCa of larynx  - Tracheostomy care:   - The first changing of trach tube, sponge, and/or ties will be performed by ENT when the patient is more  stable. Afterwards, other hospital staff can manage these items as needed. Please do not manipulate  straps or place sponge/gauze beneath trach plate.   - Perform regular suctioning.   - RN should change disposable inner canulas every shift and/or clean it with a brush.    - Keep trach tube obturator taped to wall behind the head of the bed. Keep extra unopened 6-0 Shiley and 4-0  Shiley at bedside at all times.  - S/p PLC trach teaching 7/19 with son  - PET Scan: concerning for lung metastasis. However, discussion with family will need to be had regarding how they would like to proceed with treatment  - IR for US guided biopsy of right thyroid nodule 7/19. Results may impact surgical resection if surgery is pursued for cancer treatment or incompetent larynx. F/u results    Respiratory:   - Presumed Aspiration pneumonia on CXR: rocephin + flagyl   - Effusion seen on PET scan- Internal Medicine assisting with management  - Patient saturating well on 21% O2. Supplemental O2 PRN to keep sats >92%, wean as able  - Hx of COPD: substituting pulmicort, atrovent, and arformoterol nebs for PTA inhalers, albuterol neb PRN. Added Xopenex nebs per medicine recs   - Suspicious RUL nodule, being followed by the Lung Nodule clinic; recommendation was an IR guided biopsy. Not scheduled yet.     CV/heme:   - Continuous cardiac monitoring   - Tachycardia: EKG shows sinus tachycardia with PVCs   - EKG ordered- Internal Medicine assisting with management   - TTE Bubble study- Internal Medicine assisting with management   - Troponins ordered x3- Internal Medicine assisting with management  - HTN: PTA Norvasc, losartan; PRN hydralazine for SBP > 220, lasix PRN per medicine team   -  PTA ASA 81mg and plaviz 75mg qd  - PVD s/p left femoral endarterectomy  - Postop Hgb 14.7    FEN/GI:  - Diet: NPO due to PTA dysphagia and now new trach  - SLP bedside swallow 7/18: recommend NPO due to risk of aspiration  - Severe Malnutrition in the setting of acute on chronic illness: Nutrition consult, TFs via NG tube. Currently held for PET today, will resume after PET   - May require g-tube for presumed prolonged need for enteral feeding   - Multivitamin  - Electrolyte replacement as needed   - Discussed increasing K+ protocol to goal of 4mg/dL and Mg++ to 2mg/dL- Internal Medicine assisting with management  - Bowel regimen: scheduled senna and docusate; miralax qd    :   - Incontinent of urine - preexisting condition, wears Depends at baseline     Endo:   - Right Thyroid nodule s/p FNA- results pending  - No acute concerns, TSH wnl     ID:   - Afebrile; WBC count wnl  - On ceftriaxone + flagyl for presumed aspiration pneumonia    PPX:   - SCDs, IS. Lovenox    Consults:   - SLP  - PT/OT  - Nutrition  - PLC- tracheostomy and TF cares.    Disposition Plan   Expected discharge in 3-4 days to transitional care unit once trach has been changed and tract is stable, tolerating TFs at bolus goal, trach and TF care, PET scan performed, suctioning requirements decreasing in frequency, transitioned to all enteral medications.       -- Patient and above plan to be discussed with Dr. Win Lyons MD  Otolaryngology-Head & Neck Surgery  Please contact ENT by dialing [phone] and entering job code [phone].

## 2018-07-21 NOTE — PLAN OF CARE
Problem: Patient Care Overview  Goal: Plan of Care/Patient Progress Review  PT 6B: PT evaluation completed, treatment initiated.   Discharge Planner PT   Patient plan for discharge: unable to state  Current status: pt needs mod assist x 1 for supine to sit transfer, CGA for seated balance, mod assist x 2 for sit to stand transfer x 2 reps. Pt limited by secretions throughout session with many instances of prolonged coughing, pt able to follow ~40% of commands, only participates in nodding to yes or no questions half the time.   Barriers to return to prior living situation: cognition, weakness, caregiver dependence  Recommendations for discharge: TCU  Rationale for recommendations: pt with limited participation in sessions but is far below baseline mobility, unable to return home safely at this time.        Entered by: Nuris Hurtado 07/21/2018 4:21 PM

## 2018-07-21 NOTE — PROGRESS NOTES
07/21/18 1338   Quick Adds   Type of Visit Initial PT Evaluation   Living Environment   Living Environment Comment pt unable to articulate her living environment, per chart pt lives in a house with her children    Self-Care   Activity/Exercise/Self-Care Comment pt unable to state   Functional Level Prior   Prior Functional Level Comment unable to state   General Information   Onset of Illness/Injury or Date of Surgery - Date 07/17/18   Referring Physician Yeni Lu MD   Patient/Family Goals Statement unable to state   Pertinent History of Current Problem (include personal factors and/or comorbidities that impact the POC) 72-year-old woman with PMH of tobacco abuse (50 pack-year smoking history), alcohol abuse, hypertension, hyperlipidemia with known peripheral vascular disease, DM, and metastatic squamous cell cancer of the neck presenting with right-sided facial droop and right-sided lower>upper extremity weakness   Precautions/Limitations fall precautions   Heart Disease Risk Factors Smoking;Medical history;Age   General Observations pt supine, trach present, pt minimally conversive    General Info Comments activity orders: up ad yaa   Cognitive Status Examination   Level of Consciousness alert   Follows Commands and Answers Questions 50% of the time   Posture    Posture Protracted shoulders;Forward head position   Range of Motion (ROM)   ROM Quick Adds No deficits were identified   ROM Comment full passive ROM    Strength   Strength Comments difficult to assess as pt inconsistently followign commands, appears to have 4+/5 strength grossly on L side, 4-/5 on right side    Bed Mobility   Bed Mobility Comments mod assist    Transfer Skills   Transfer Comments STS with mod assist x 2   Gait   Gait Comments unable to safely take steps at this time    Balance   Balance Comments needs SBA-CGA for seated balance, mod assist for standing balance   General Therapy Interventions   Planned Therapy Interventions  "balance training;bed mobility training;progressive activity/exercise;home program guidelines;risk factor education;transfer training;strengthening;gait training   Clinical Impression   Criteria for Skilled Therapeutic Intervention yes, treatment indicated   PT Diagnosis impaired mobiltiy    Influenced by the following impairments impaired cognition, strength, deconditioning    Functional limitations due to impairments impaired functional mobility    Clinical Presentation Evolving/Changing   Clinical Presentation Rationale pt with new stroke affecting function and prognosis    Clinical Decision Making (Complexity) Moderate complexity   Therapy Frequency` 5 times/week   Predicted Duration of Therapy Intervention (days/wks) 1 week    Anticipated Discharge Disposition Transitional Care Facility   Risk & Benefits of therapy have been explained Yes   Patient, Family & other staff in agreement with plan of care Yes   Clinical Impression Comments pt below baseline mobility needing further rehab to return to [facility].   [facility] [employer] TM \"6 Clicks\"   2016, Trustees of [facility], under license to [employer].  All rights reserved.   6 Clicks Short Forms Basic Mobility Inpatient Short Form   [facility] AM-PAC  \"6 Clicks\" V.2 Basic Mobility Inpatient Short Form   1. Turning from your back to your side while in a flat bed without using bedrails? 3 - A Little   2. Moving from lying on your back to sitting on the side of a flat bed without using bedrails? 2 - A Lot   3. Moving to and from a bed to a chair (including a wheelchair)? 2 - A Lot   4. Standing up from a chair using your arms (e.g., wheelchair, or bedside chair)? 2 - A Lot   5. To walk in hospital room? 1 - Total   6. Climbing 3-5 steps with a railing? 1 - Total   Basic Mobility Raw Score (Score out of 24.Lower scores equate to lower levels of function) 11   Total Evaluation Time   Total Evaluation Time (Minutes) 5     "

## 2018-07-21 NOTE — PLAN OF CARE
Problem: Patient Care Overview  Goal: Plan of Care/Patient Progress Review  Outcome: No Change  Neuro: A&Ox4. Able to mouth words and nod or shake head to communicate. R facial droop.   Cardiac: -120. No BP PRN's given this shift. VSS.   Respiratory: Sating 98%  on 21%FIO2 via Shiley 6 trach, cuff up. Needing suctions every 1-2 hours with copious secretions each time.   GI/: Adequate urine output. BM X2. Incontinent.  Diet/appetite: Tolerating NPO diet. Tube feeds via NJ @ 10mL/hr, not inceased this shift dt electrolytes outside of normal limits.  Activity:  Assist of 2 to reposition q2 hours.   Pain: At acceptable level on current regimen.   Skin: No new deficits noted.  LDA's: Bilat PIV, SL.     Plan: Continue with POC. Notify primary team with changes.

## 2018-07-21 NOTE — PROGRESS NOTES
07/21/18 1200   Quick Adds   Type of Visit Initial Occupational Therapy Evaluation   Living Environment   Living Environment Comment see initial evaluation   Self-Care   Activity/Exercise/Self-Care Comment see initial evaluation    General Information   Onset of Illness/Injury or Date of Surgery - Date 07/19/18   Referring Physician Diana Degroot MD   Patient/Family Goals Statement none stated   Additional Occupational Profile Info/Pertinent History of Current Problem Ms. Collins is a 72-year-old woman with PMH of tobacco abuse (50 pack-year smoking history), alcohol abuse, hypertension, hyperlipidemia with known peripheral vascular disease, DM, and metastatic squamous cell cancer of the neck presenting with right-sided facial droop and right-sided lower>upper extremity weakness. Pt found to have had a pontine stroke.    Cognitive Status Examination   Level of Consciousness lethargic/somnolent;confused   Able to Follow Commands success, 1-step commands   Cognitive Comment Pt following simple commands however with delayed processing. Pt lethargic/somnolent and difficult to maintain arousal.    Visual Perception   Visual Perception Comments No deficits noted. Will continue to monitor.    Posture   Posture Comments Pt with R lean and kyphotic posture    Range of Motion (ROM)   ROM Comment PROM to BUEs/LEs WFL    Strength   Strength Comments Pt grossly 2/5, pt with proximal weakness and mild pronator drift. Pt with decreased  strength in R vs L. LUE with generalized weakness ~3/5 MMT.    Mobility   Bed Mobility Comments max A x    Transfer Skill: Sit to Stand   Level of [place]: Sit/Stand maximum assist (25% patients effort)   Physical Assist/Nonphysical Assist: Sit/Stand 2 persons   Lower Body Dressing   Level of [place]: Dress Lower Body maximum assist (25% patients effort)   General Therapy Interventions   Planned Therapy Interventions ADL retraining;ROM;strengthening;balance  "training;IADL retraining;bed mobility training;cognition;fine motor coordination training;motor coordination training;neuromuscular re-education;transfer training;home program guidelines;progressive activity/exercise   Clinical Impression   Criteria for Skilled Therapeutic Interventions Met yes, treatment indicated   OT Diagnosis decreased ADL I    Influenced by the following impairments deconditioning, cog impairments, R sided weakness    Assessment of Occupational Performance 5 or more Performance Deficits   Identified Performance Deficits all ADLS are affected at this time.    Clinical Decision Making (Complexity) Low complexity   Therapy Frequency 5 times/wk   Predicted Duration of Therapy Intervention (days/wks) 3 weeks   Anticipated Discharge Disposition Transitional Care Facility   Risks and Benefits of Treatment have been explained. Yes   Patient, Family & other staff in agreement with plan of care Yes   Clinical Impression Comments Pt presents with deconditioning, cog impairments, R sided weakness leading to decreased ADL I. Pt to benefit from skilled OT intervention to address the above stated deficits.    [facility] [employer] TM \"6 Clicks\"   2016, Trustees of [facility], under license to [employer].  All rights reserved.   6 Clicks Short Forms Daily Activity Inpatient Short Form   [facility]-PAC  \"6 Clicks\" Daily Activity Inpatient Short Form   1. Putting on and taking off regular lower body clothing? 1 - Total   2. Bathing (including washing, rinsing, drying)? 1 - Total   3. Toileting, which includes using toilet, bedpan or urinal? 1 - Total   4. Putting on and taking off regular upper body clothing? 1 - Total   5. Taking care of personal grooming such as brushing teeth? 1 - Total   6. Eating meals? 1 - Total   Daily Activity Raw Score (Score out of 24.Lower scores equate to lower levels of function) 6   Total Evaluation Time   Total Evaluation Time (Minutes) 5     "

## 2018-07-21 NOTE — PLAN OF CARE
Problem: Patient Care Overview  Goal: Plan of Care/Patient Progress Review  Discharge Planner SLP   Patient plan for discharge: not discussed  Current status: The patient was seen for dysphagia tx this AM. She was lethargic, but followed simple commands with max alerting cues. Patient with marked right sided facial weakness and poor secretion management. Recommend NPO due to high aspiration risk.  Barriers to return to prior living situation: right weakness, NPO, TF, tracheostomy  Recommendations for discharge: TCU  Rationale for recommendations: ongoing SLP tx for below baseline swallowing ability, will consult ENT for appropriateness to follow for possible passy maryann speaking valve once she is changed to cuffless trach       Entered by: Brigida Arceo 07/21/2018 11:43 AM

## 2018-07-21 NOTE — PLAN OF CARE
Problem: Patient Care Overview  Goal: Plan of Care/Patient Progress Review  Discharge Planner OT   Patient plan for discharge: not addressed   Current status: OT Re-evaluation completed as pt found to have had a pontine stroke. Pt with new R facial droop and R sided weakness. Pt lethargic with delayed processing however able to follow simple commands. Pt max A for supine > EOB and max A x2 for EOB > supine. Pt dangled EOB with mod-min A. Pt attempted sit <> stand x2 with max A x2 however unable to achieve full hip extension.   Barriers to return to prior living situation: R sided weakness, cognitive deficits, s/p new trach   Recommendations for discharge: TCU vs LTACH   Rationale for recommendations: Pt previously mod I for ambulating ~30 ft and required some assist for ADLs. Pt now total A for functional transfers and max A for ADLs. Pt limited by R sided weakness, cognitive impairments, and complex medical needs. Pt likely would not be an ARU candidate due to decreased rehab potential due to complex medical needs.       Entered by: Judy Dwyer 07/21/2018 12:54 PM

## 2018-07-21 NOTE — PLAN OF CARE
Problem: Patient Care Overview  Goal: Plan of Care/Patient Progress Review  Outcome: No Change  Assumed care of pt @ 1500. Patient arrived back to unit from PET scan and MRI around 1530. Vitals and assessment completed @ 1600. Neuro assessment showed R sided weakness and facial droop which appears to be unchanged from previous shift. Per family patient is independent at baseline and very active, family does not report previous R sided deficits. Internal med team called writer around 1615 with concerns for stroke per MRI, after some discussion they were able to speak to neuro and stated that neuro would be coming to the unit to assess the patient. At 1700 RN paged stroke team neuro resident as patient had not been assessed by MD since returning to the floor and MRI results. Stroke team Neuro resident arrived to bedside around 1715 to assess pt and explained to RN that nothing should be changed about care at that time. Patient can answer yes/no questions at times, can not speak, can follow simple commands intermittently. Tachycardia w/ consistent PVC's and systolic BP between 150 and 200  - Medicine team aware and states that these are expected findings at this time. Incontinent of bowel and bladder, wears brief, incontinence care completed. R PIV infusing NS 50mL/hr w/ intermittent antibiotics. Awaiting phos replacement from pharmacy - reported to oncoming RN.

## 2018-07-22 PROBLEM — J69.0 ASPIRATION PNEUMONIA (H): Status: ACTIVE | Noted: 2018-01-01

## 2018-07-22 PROBLEM — Z86.73 HISTORY OF STROKE: Status: ACTIVE | Noted: 2018-01-01

## 2018-07-22 PROBLEM — E43 SEVERE MALNUTRITION (H): Status: ACTIVE | Noted: 2018-01-01

## 2018-07-22 PROBLEM — C78.01 SECONDARY MALIGNANT NEOPLASM OF RIGHT LUNG (H): Status: ACTIVE | Noted: 2018-01-01

## 2018-07-22 NOTE — PROVIDER NOTIFICATION
Marcela Gomez MD called to follow up on chest xray. Patient still breathing in the mid 30s. C/o SOB. Per MD not much change on xray. MD will come to beside to scope patient to make sure no obstruction is noted.  ABG ordered.  MD came to bedside. Scope was negative for any obstruction. MD inflated cuff to help with secretions.   MD told writer to contact [facility] to discuss the need for diuretics.    Dr. Laws with [facility] looked through patients chart and assessed patient and determined that patient did not need any diuretics at this time. Patient sounds more coarse then wet on assessment per MD.   Will continue to monitor.

## 2018-07-22 NOTE — PLAN OF CARE
Problem: Patient Care Overview  Goal: Plan of Care/Patient Progress Review  Outcome: No Change  Neuro: A&Ox4. Able to mouth words, answer yes/no questions and follow simple commands. More communicative today.   Cardiac: -120's, -170 systolic. T-max 101.8.   Respiratory: Sating 95% on 21% FIO2 via Shiley 6 trach, suctioning hourly with moderate amount of clear yellowish secretions.  .  GI/: Adequate urine output. BM X1. Incontinent.   Diet/appetite: NPO.   Activity:  T/R q2 hours.   Pain: At acceptable level on current regimen. Received IV dilaudid x1 for pain in her neck and throat.   Skin: No new deficits noted.   LDA's: Bilat PIV with D5NS @ 75mL/hr running in 1 and the other SL.   Plan: Continue with POC. Notify primary team with changes.

## 2018-07-22 NOTE — PROGRESS NOTES
Brief Otolaryngology Progress Note    I attempted to place an NG tube after patient had removed her previous tube earlier today. I met resistance x1 and patient declined a second attempt. Patient was informed of the importance of providing enteral access for medications and nutrition, but she still declines.    Patient also triggered sepsis protocol with temperature of 101.8 and continued tachycardia.    Assessment & Plan:  - Will discuss enteral access on 07/23 (patient had received medications per NG prior to removing tube today)  - Lactate is pending  - CXR  - Blood cultures x2, U/A, urine cx  - Low threshold for CTA for PE      Colt Lyons MD  Otolaryngology- Head and Neck Surgery, PGY-2  Pager: [phone]  Please contact ENT with questions by dialing [phone] and entering job code [phone] when prompted.

## 2018-07-22 NOTE — PROGRESS NOTES
[facility], [place]    Internal Medicine Progress Note - Gold Consult Service      Assessment & Plan   Keira Collins is a 72 year old female with a past medical history of COPD, HTN, HLD, chronic tobacco abuse, h/o alcohol abuse, squamous cell carcinoma of larynx admitted on 7/17/2018 for direct laryngoscopy w/ biopsy, trach and NG tube placement 07/17. Medicine was asked to consult on HTN, hypoxia and tachycardia.     Acute L pontine stroke: Pt had a right sided facial droop first noted am of 7/19, improving throughout day per family. Risk factors for stroke include tobacco abuse, alcohol abuse, HTN, HLD known PVD, inactivity. Head CT w/o acute intracranial abnormality, chronic ischemic disease. Noted R sided hemiparesis so MRI brain obtained that revealed new pontine infarct. Stroke neurology consulted and pt out of window to receive TPA. Lipid panel this am reveals normal lipid panel.   - Given pt is current Full Code and now has R sided hemiparesis, combined with likely metastasis to lungs, and pt refusing NJT to continue enteral feeds, recommend to consult Palliative Care to discuss with pt and family goals of care.   - Continue neurochecks  - Continue ASA, statin  - D/w neurology and SBP goal now <160; Since pt pulled out NJT, continue hydralazine 10 mg IV q4hrs prn SBP>170 or DBP>105  - Continue plavix when ENT states safe from surgical standpoint  - Obtain routine TTE with bubble study (ordered)  - PT/OT/SLP  - Avoid narcotics as able  - Delirium precautions   - Obtain apnea and depression screen / stroke education     Acute hypoxic respiratory failure most likely 2/2 aspiration PNA: Was requiring 40% FiO2 via trach dome, now 21% FiO2. Requiring frequent suctioning per RN. CXR w/ right lower lobe infiltrate. Started on clindamycin 7/19, switched to IV Flagyl and ceftriaxone 7/20 to avoid development of Cdiff. TTE yesterday technically difficult study but most likely normal with  est EF of 60-65%.  - Continue IV Flagyl 500 mg q6hrs and ceftriaxone 1 g q24 hrs.   - SLP consulted  - Scheduled nebs  - Wean O2 as able.   - Is/Os, daily weights  - Continue RT and RN frequent suctioning    HTN, tachycardia: PTA maintained on Losartan 50 mg qday, amlodipine 5 mg qday (unclear compliance per OP notes). Son endorses pt consuming 3-8 beers/day, almost every day, no h/o WD or WD seizures per family. EKG w/ some ST depression in lead V6, but V5 and lead I WNL- trop negative, PVCs as below. Started on MSSA protocol with Ativan and prn hydralazine. Acute pontine stroke as above diagnosed yesterday.   - Continue with PTA losartan and amlodipine with goal SBP<160 (if pt agrees to replace NJT)  - Continue with prn hydralazine but change parameters to give for SBP>160  - Discontinue MSSA protocol w/ ativan  - Continue tele  - Continuous pulse ox  - Scheduled Xopenex given tachycardia    Oral thrush:  Improving. Continue Nystatin via swab as ordered.     Squamous cell carcinoma of larynx admitted on 7/17/2018 for direct laryngoscopy w/ biopsy, trach and NG tube placement 07/17: C/B hypoxia, lethargy.   -Management per primary team, ENT     RUL spiculated lung nodule: Noted on 06/2018 CT scan. PET scan yesterday revealed bilateral hypermetabolic pulmonary nodules consistent with pulm metastatic disease. Also revealed small pleural effusion of which will perform bedside US to further eval for possible thoracentesis.   Right lobe thyroid nodule: s/p FNA. Follow path.   PVD s/p endarterectomy, iliac artery recannulization w/ stent, right SFA recannulization w/ stent 01/2017: PTA maintained on ASA, plavix. Plavix on hold, ASA continued. On Lovenox.  COPD: CT chest from 06/13/2018 w/ moderate centrilobular emphysematous changes in upper lobes. PTA maintained on Spiriva qday, Advair BID w/ PRN albuterol.   HLD: Continue Lipitor.   Adrenal gland nodularity: Noted on CT chest 06/13/2018 w/ nodularity of bilateral  adrenal glands w/ may represent mets. But PET scan yesterday did not reveal any abd/pelvis mets.       # Pain Assessment:  Current Pain Score 7/22/2018   Patient currently in pain? denies   Pain score (0-10) -   Pain location -   Pain descriptors -   Pain control per Primary Team    DVT Prophylaxis: Per Primary Team    Disposition Plan   Expected discharge: Per Primary team     Entered: Tristan Blevins 07/22/2018, 1:38 PM   Information in the above section will display in the discharge planner report.      The patient's care was discussed with the Attending Physician, Dr. Umanzor, Bedside Nurse, Patient and Primary Team.    Omer Blevins PA-C  Internal Medicine Hospitalist   [facility]  [phone]     Interval History   No acute events overnight. Continues to be non-verbal.       Data reviewed today: I reviewed all medications, new labs and imaging results over the last 24 hours.     Physical Exam   Vital Signs: Temp: 97.3  F (36.3  C) Temp src: Oral BP: 136/82   Heart Rate: 99 Resp: 30 SpO2: 100 % O2 Device: Trach dome Oxygen Delivery: Other (Comments) (30 LPM)  Weight: 128 lbs 15.51 oz  GEN: In NAD  HEENT: NCAT; PERRL; sclerae non-icteric; thick white coat on tongue  LUNGS: CTAB  CV: RRR  ABD: +BSs; SNTND  EXT: No BLE edema  SKIN: No acute rashes noted on exposed areas.  NEURO: Sleeping but awakes with verbal stimuli; Still non-verbal but nods yes appropriately to orientation questions as does not verbally respond to questions; R sided facial droop and R sided tongue deviation noted. MS in ext's asymmetric (L 5/5; R 2/5); No tremor noted. Gait deferred.          Data   CMP  Recent Labs  Lab 07/22/18  0501 07/21/18  1632 07/21/18  1505 07/21/18  0450 07/20/18  0428 07/19/18  1800  07/17/18  2020  07/16/18  1413   NA  --  138  --  137 138 138  < >  --   < > 137   POTASSIUM 3.8 3.7 3.6 3.3* 3.4 3.6  < >  --   < > 3.7   CHLORIDE  --  110*  --  105 106 108  < >  --   < > 102   CO2  --  16*  --   18* 20 21  < >  --   < > 28   ANIONGAP  --  12  --  14 11 9  < >  --   < > 7   GLC  --  122*  --  122* 118* 156*  < >  --   < > 95   BUN  --  12  --  10 8 7  < >  --   < > 15   CR  --  0.51*  --  0.52 0.65 0.58  < >  --   < > 0.68   GFRESTIMATED  --  >90  --  >90 89 >90  < >  --   < > 85   GFRESTBLACK  --  >90  --  >90 >90 >90  < >  --   < > >90   ESTEFANI  --  8.3*  --  8.6 8.9 8.9  < >  --   < > 9.1   MAG 2.7* 2.5*  --  1.9 1.9 1.9  < >  --   < >  --    PHOS 4.0 2.4*  --  4.4 2.2*  --   < >  --   < >  --    PROTTOTAL  --   --   --   --   --   --   --   --   --  8.0   ALBUMIN  --   --   --   --   --   --   --  3.5  --  3.6   BILITOTAL  --   --   --   --   --   --   --   --   --  0.4   ALKPHOS  --   --   --   --   --   --   --   --   --  92   AST  --   --   --   --   --   --   --   --   --  21   ALT  --   --   --   --   --   --   --   --   --  19   < > = values in this interval not displayed.     CBC    Recent Labs  Lab 07/21/18  0450 07/20/18  1027 07/19/18  1800 07/19/18  0709   WBC 9.1 12.6* 12.1* 12.6*   RBC 4.33 4.44 4.57 4.51   HGB 13.1 13.5 14.0 13.7   HCT 40.2 41.4 42.8 42.8   MCV 93 93 94 95   MCH 30.3 30.4 30.6 30.4   MCHC 32.6 32.6 32.7 32.0   RDW 13.2 13.1 13.0 13.2    190 177 177     INR    Recent Labs  Lab 07/19/18  0709   INR 1.16*

## 2018-07-22 NOTE — PROGRESS NOTES
ENT resident Eloy paged at this time  FYI: patient triggered sepsis protocol. DT temp 101.8 and tachycardia. Lactic ordered.   Response was by phone at 1715- Chest Xray ordered, Vanco ordered.   Will continue to monitor patient.

## 2018-07-22 NOTE — PLAN OF CARE
Problem: Chronic Respiratory Difficulty Comorbidity  Goal: Chronic Respiratory Difficulty  Patient comorbidity will be monitored for signs and symptoms of Respiratory Difficulty (Chronic) condition.  Problems will be absent, minimized or managed by discharge/transition of care.   Outcome: No Change  Shiley 6 trach, 21% FIO2, suctioning hourly.

## 2018-07-22 NOTE — PROGRESS NOTES
"Otolaryngology Progress Note  July 22, 2018    S: Ms Collins was tachypnic and tachycardic overnight. CXR revealed worsening right-sided pneumonia vs edema. Medicine reviewed films and did not believe that diuretics were indicated. Tracheoscopy was performed which revealed increased secretions in the right mainstem. Patient denies chest pain and SOB.    O: /84 (BP Location: Left arm)  Pulse 109  Temp 97.3  F (36.3  C) (Oral)  Resp 30  Ht 1.6 m (5' 2.99\")  Wt 58.5 kg (128 lb 15.5 oz)  SpO2 94%  BMI 22.85 kg/m2   General: Lethargic but following all commands, No acute distress   Neuro: moving all extremities   HEENT: EOMI. 6-0 cuffed Shiley sutured in place, cuff inflated. Trach ties are snug w/ 2 finger breadths between neck and ties. Tracheostomy site clean, no bleeding. No skin breakdown or erythema under trach ties or face plate.  NG tube sutured in place in the left naris- patient removed NG tube in between examinations today.   Pulmonary: breathing via 6-0 Shiley with t-piece      Intake/Output Summary (Last 24 hours) at 07/21/18 1219  Last data filed at 07/21/18 0900   Gross per 24 hour   Intake             1770 ml   Output                0 ml   Net             1770 ml       LABS:  ROUTINE IP LABS (Last four results)  BMP    Recent Labs  Lab 07/22/18  0501 07/21/18  1632 07/21/18  1505 07/21/18  0450 07/20/18  0428 07/19/18  1800   NA  --  138  --  137 138 138   POTASSIUM 3.8 3.7 3.6 3.3* 3.4 3.6   CHLORIDE  --  110*  --  105 106 108   ESTEFANI  --  8.3*  --  8.6 8.9 8.9   CO2  --  16*  --  18* 20 21   BUN  --  12  --  10 8 7   CR  --  0.51*  --  0.52 0.65 0.58   GLC  --  122*  --  122* 118* 156*     Mg 2.7  Phos 4.0    CBC    Recent Labs  Lab 07/21/18  0450 07/20/18  1027 07/19/18  1800 07/19/18  0709   WBC 9.1 12.6* 12.1* 12.6*   RBC 4.33 4.44 4.57 4.51   HGB 13.1 13.5 14.0 13.7   HCT 40.2 41.4 42.8 42.8   MCV 93 93 94 95   MCH 30.3 30.4 30.6 30.4   MCHC 32.6 32.6 32.7 32.0   RDW 13.2 13.1 13.0 13.2 "    190 177 177     TSH 7/17: 0.54  Pre albumin 7/17: 16  Albumin: 3.5  INR 7/19: 1.16  Lactate 7/21: 0.9  Troponin <0.015 x3    ABG: pH 7.48, pCO2 27, pO2 59    Sputum culture: G+ and G-; G+ cocci in clusters    IMAGING:  MRI: 7/20  Impression:  1. Acute left pontine mesencephalic junction infarct  2. Multiple images severely degraded by motion with postcontrast  sequences nondiagnostic.  3. Head MRA demonstrates no large vessel occlusion. There is a 3 mm  right A2 aneurysm at the branch point of the frontopolar artery.  Recommend follow-up evaluation.  4. Neck MRA demonstrates approximately 50% stenosis of the proximal  right internal carotid artery, although this is limited due to motion  artifact. Diminutive right vertebral artery.  5. Moderate parenchymal volume loss and Leukoaraiosis.     [Result: Acute left pontine infarct]    PET Scan 7/20  IMPRESSION: This patient with a history of laryngeal squamous cell  carcinoma:  1. Bilateral hypermetabolic pulmonary nodules consistent with  pulmonary metastatic disease.  a. Collapse of the right lower/middle lobes with multiple  hypermetabolic foci suggestive of pulmonary metastases. Right lower  perihilar FDG uptake suggests postobstructive atelectasis.  b. Associated small right pleural effusion.  2. No evidence of metastatic disease in the abdomen/pelvis.  3. See neuroradiology report for results of high-resolution PET CT of  the head and neck.     4. Urethral diverticulum containing stones and septations. Evaluation  is limited by urinary FDG activity. Consider urologic consult.  Prominent urinary contamination about the pelvis.  5. 4.7 cm left adnexal cystic structure. No associated FDG uptake.  Given size and metabolic process follow-up is pelvic  ultrasound/follow-up recommended.  6. Thoracic abdominal aortic ectasia. 1.9 cm right iliac aneurysm.    CXR 7/19  Small right pleural effusion and right basilar opacities which may  represent  aspiration/infection or atelectasis.    Head CT 7/19  Impression:   1. In the left side of the cora there is ill-defined hypoattenuation  (series 2, image 25). This likely represents an old infarct, which is  described in outside radiology report dated 5/17/2016.  2. Multiple foci of chronic lacunar infarction in bilateral deep gray  nuclei.     Laryngeal biopsies:  FINAL DIAGNOSIS:   A. LEFT FALSE VOCAL CORD, BIOPSY:   - INVASIVE KERATINIZING SQUAMOUS CELL CARCINOMA, moderately   differentiated.   - Perineural invasion is present.     B. PETIOLE, BIOPSY:   - AT LEAST SQUAMOUS CELL CARCINOMA IN-SITU.     C. ADDITIONAL LEFT FALSE VOCAL CORD, BIOPSY:   - INVASIVE KERATINIZING SQUAMOUS CELL CARCINOMA, moderately   differentiated.     D. RIGHT FALSE VOCAL CORD, BIOPSY:   - AT LEAST MICROINVASIVE SQUAMOUS CELL CARCINOMA, keratinizing type.     A/P: Keira Collins is a 72-year-old female with a PMH significant for COPD, 140 pack-year tobacco history, alcohol use disorder, HLD, HTN, and PVD who is now POD#5 s/p direct laryngoscopy with biopsy of laryngeal SCC and tracheotomy. Patient was recently presented at Otolaryngology Tumor Board Conference to discuss plan of care for laryngeal [id number] SCCa. Recommendations at that time were to proceed with TL + bilateral MRND vs CXRT.    Patient's hospital course has been complicated by acute ischemic pontine stroke and persistent tachycardia. Internal Medicine is following and assisting with cares. Patient had increased tachypnea and tachycardia on 07/21- CXR revealed increased right-sided effusion vs pneumonia.. Patient was evaluated by Neurology who have placed recommendations.    Neuro:   - Slight dementia at baseline, per family  - History of alcohol use disorder- thiamine  - MR Brain revealed CVA on 07/20- Neurology consulted   - tPA not indicated   - Neuro checks ordered q4h   - Permissive HTN with PRN anti-hypertensives for SBP > 160- Internal Medicine assisting   -  Euthermia, euglycemia   - Aspirin qd, statin qd   - TTE with Bubble Study- Internal Medicine assisting   - Telemtry, EKG- Internal Medicine assisting   - Continue glucose monitoring   - A1c, lipid panel, troponins x3- Internal Medicine assisting    - A1c normal, lipid panel normal, troponins negative x3   - Will defer depression and apnea screens at this time  - Pain control: tylenol, oxycodone, and dilaudid prn  - Ativan per [facility] protocol (has not required)    HEENT:  - [id number] SCCa of larynx  - Tracheostomy care:   - The first changing of trach tube, sponge, and/or ties will be performed by ENT when the patient is more  stable. Afterwards, other hospital staff can manage these items as needed. Please do not manipulate  straps or place sponge/gauze beneath trach plate.   - Perform regular suctioning.   - RN should change disposable inner canulas every shift and/or clean it with a brush.    - Keep trach tube obturator taped to wall behind the head of the bed. Keep extra unopened 6-0 Shiley and 4-0  Shiley at bedside at all times.  - S/p PLC trach teaching 7/19 with son  - PET Scan: concerning for lung metastasis. However, discussion with family will need to be had regarding how they would like to proceed with treatment  - IR for US guided biopsy of right thyroid nodule 7/19. Results may impact surgical resection if surgery is pursued for cancer treatment or incompetent larynx. F/u results  - Patient will discuss G-tube/NG tube with patient's family- consider replacing NG today if patient consents    Respiratory:   - Presumed Aspiration pneumonia on CXR: rocephin + flagyl   - Effusion seen on PET scan- Internal Medicine assisting with management  - Patient saturating 90+% on 21% O2. Supplemental O2 PRN to keep sats >92%, wean as able  - Hx of COPD: substituting pulmicort, atrovent, and arformoterol nebs for PTA inhalers, albuterol neb PRN. Added Xopenex nebs per medicine recs   - Suspicious RUL nodule, being followed  by the Lung Nodule clinic; recommendation was an IR guided biopsy. Not scheduled yet.     CV/heme:   - Continuous cardiac monitoring   - HTN to 178 SBP  - Tachycardia: EKG shows sinus tachycardia with PVCs   - EKG ordered- Internal Medicine assisting with management    - ST depression in lead V6 only   - TTE Bubble study- Internal Medicine assisting with management   - Troponins ordered x3- Internal Medicine assisting with management    - Negative x3  - HTN: PTA Norvasc, losartan; PRN hydralazine for SBP > 160, lasix PRN per medicine team   - PTA ASA 81mg and plaviz 75mg qd  - PVD s/p left femoral endarterectomy  - Postop Hgb 14.7    FEN/GI:  - Diet: NPO due to PTA dysphagia and now new trach  - SLP bedside swallow 7/18: recommend NPO due to risk of aspiration  - Severe Malnutrition in the setting of acute on chronic illness: Nutrition consult, TFs via NG tube. Currently held for PET today, will resume after PET   - May require g-tube for presumed prolonged need for enteral feeding   - Multivitamin  - Electrolyte replacement as needed   - Discussed increasing K+ protocol to goal of 4mg/dL and Mg++ to 2mg/dL- Internal Medicine assisting with management  - Bowel regimen: scheduled senna and docusate; miralax qd    :   - Incontinent of urine - preexisting condition, wears Depends at baseline     Endo:   - Right Thyroid nodule s/p FNA- results pending  - No acute concerns, TSH wnl     ID:   - Afebrile- low-grade temperature of 100.2 overnight that came down; WBC count wnl  - On ceftriaxone + flagyl for presumed aspiration pneumonia- CXR shows worsening right-sided pneumonia vs pulmonary edema   - Sputum culture ordered today: G+ and G-; G+ cocci in clusters    PPX:   - SCDs, IS. Lovenox    Consults:   - SLP  - PT/OT  - Nutrition  - PLC- tracheostomy and TF cares.    Disposition Plan   Expected discharge in 3-4 days to transitional care unit once trach has been changed and tract is stable, tolerating TFs at bolus goal,  trach and TF care, PET scan performed, suctioning requirements decreasing in frequency, transitioned to all enteral medications.       -- Patient and above plan to be discussed with Dr. Win Lyons MD  Otolaryngology-Head & Neck Surgery  Please contact ENT by dialing [phone] and entering job code [phone].

## 2018-07-22 NOTE — PLAN OF CARE
Problem: Patient Care Overview  Goal: Plan of Care/Patient Progress Review    SLP session cancelled: per chart review and discussion with RN, pt not appropriate for tx today due to respiratory decline, poor secretion management and need to have trach cuff inflated. Will f/u tomorrow as appropriate.

## 2018-07-22 NOTE — PHARMACY-VANCOMYCIN DOSING SERVICE
Pharmacy Vancomycin Initial Note  Date of Service 2018  Patient's  1945  72 year old, female    Indication: Aspiration Pneumonia    Current estimated CrCl = Estimated Creatinine Clearance: 89.4 mL/min (based on Cr of 0.51).    Creatinine for last 3 days  2018:  6:00 PM Creatinine 0.58 mg/dL  2018:  4:28 AM Creatinine 0.65 mg/dL  2018:  4:50 AM Creatinine 0.52 mg/dL;  4:32 PM Creatinine 0.51 mg/dL    Recent Vancomycin Level(s) for last 3 days  No results found for requested labs within last 72 hours.      Vancomycin IV Administrations (past 72 hours)      No vancomycin orders with administrations in past 72 hours.                Nephrotoxins and other renal medications (Future)    Start     Dose/Rate Route Frequency Ordered Stop    18 1745  vancomycin (VANCOCIN) 1000 mg in dextrose 5% 200 mL PREMIX      1,000 mg  200 mL/hr over 1 Hours Intravenous EVERY 24 HOURS 18 1734      18 1745  vancomycin (VANCOCIN) 1,500 mg in sodium chloride 0.9 % 250 mL intermittent infusion      1,500 mg  over 90 Minutes Intravenous ONCE 18 1734      18 1530  lidocaine 3%, phenylephrine 0.25% (non-sterile) solution for irrigation      5 mL Topical ONCE 18 1521            Contrast Orders - past 72 hours (72h ago through future)    Start     Dose/Rate Route Frequency Ordered Stop    18 1430  gadobutrol (GADAVIST) injection 7.5 mL      7.5 mL Intravenous ONCE 18 1421 18 1422    18 1245  fluorodeoxyglucose F-18 (FDG) radioisotope injection 1-18 mCi      1-18 mCi Intravenous ONCE 18 1233 18 1240    18 1245  iopamidol (ISOVUE-370) solution  mL       mL Intravenous ONCE 18 1233 18 1326    18 1245  fluorodeoxyglucose F-18 (FDG) radioisotope injection 9-18 mCi  Status:  Discontinued      9-18 mCi Intravenous ONCE 18 1239 18 1240    18 1245  iopamidol (ISOVUE-370) solution  mL  Status:   Discontinued       mL Intravenous ONCE 07/20/18 1239 07/20/18 1240                Plan:  1.  Give vancomycin 1500mg IV x1 now, then start 1000mg IV q24 hours.   2.  Goal Trough Level: 15-20 mg/L   3.  Pharmacy will check trough levels as appropriate in 1-3 Days.    4. Serum creatinine levels will be ordered daily for the first week of therapy and at least twice weekly for subsequent weeks.    5. Inlet method utilized to dose vancomycin therapy: Method 2    Chinedu Gonzales, PharmD, BCPS

## 2018-07-22 NOTE — PROGRESS NOTES
OtoHNS Progress Note  7/22/2018    Ms. Collins has been slightly more tachypnic and tachycardic over the past couple hours, though her oxygen saturation has been in the mid-90s while on room air. A CXR was obtained and reviewed with radiology, which shows evidence of worsening right sided aspiration pneumonia vs. Pulmonary edema.    A tracheoscopy was performed to evaluate the trach tube and airway. The trach tube was patent and clean without crusting. The trachea was widely patent and clear and the mainstem bronchi were open without obstruction. There was some visible fluid/frothy secretions within the right lung.    The patient did not appear to be in any respiratory distress during my examination. Her respiratory rate was 28, and heart rate in the 90s-100s.    A/P: Respiratory status changes likely 2/2 increased pulmonary edema.  -- RN will contact medicine for recs. May benefit from diuresis.  -- The patient is otherwise stable, not requiring supplemental oxygen. Appears comfortable at rest  -- Follow up JEOVANY Gomez MD  Otolaryngology PGY3

## 2018-07-22 NOTE — PLAN OF CARE
"Problem: Patient Care Overview  Goal: Plan of Care/Patient Progress Review  /72 (BP Location: Left arm)  Pulse 109  Temp 98.7  F (37.1  C) (Oral)  Resp 28  Ht 1.6 m (5' 2.99\")  Wt 58.5 kg (128 lb 15.5 oz)  SpO2 96%  BMI 22.85 kg/m2    Neuro: Alert. YULIYA orientation. Non verbal. R sided facial droop/weakness. MSSA score 6-9. Follows commands intermediately.   Cardiac: -125 with PVCs. HTN 170s-130s SBP. Max temp 100.2F             Respiratory: Sating >92% on 21% TD. Shiley 6. Cuff inflated per ENT. Suction Q2H. Thick white yellow secretions. Crusty/drainage around tracheostomy site.   GI/: Adequate urine output. BMx1. Incontinent.   Diet/appetite: NPO. NJ w/TF @ 10ml/hr. Lytes not WNL to advance TF  Activity:  Repo Q2H.   Pain: Denies.   Skin: Trach site reddened and draining.   LDA's:  L PIV. R PIV NS @ 50ml/hr. NJ w/ TF  Phos replaced. K replaced.     Plan: Continue with POC. Notify primary team with changes.      Problem: Chronic Respiratory Difficulty Comorbidity  Goal: Chronic Respiratory Difficulty  Patient comorbidity will be monitored for signs and symptoms of Respiratory Difficulty (Chronic) condition.  Problems will be absent, minimized or managed by discharge/transition of care.   Trach Shiley 6 cuff inflated. 21% TD. RR 25-40      "

## 2018-07-22 NOTE — PROGRESS NOTES
"ENT resident notified at bedside @ 1100 that patient pulled out her NG tube. She also pulled off her trach dome and mouthed \"I don't want it anymore\" I restated what she said and she nodded that it was correct, I explained the important of the trach dome and she allowed me to put it back in place. She continued to tell me throughout the day that she does not want another NG tube placed and states \"I am tired\".   "

## 2018-07-22 NOTE — PROVIDER NOTIFICATION
Massimo Cowan MD notified that patient having increase work of breathing, labored, abd muscle use,  SOB, RR 40, and Temp 100.2F. Patient on 30% TD sating 94%. 0000 nebs given, suctioned, and inner cannula changed. MD ordered chest xray. Writer will call back with results.  Will continue to monitor.

## 2018-07-23 NOTE — PLAN OF CARE
Problem: Chronic Respiratory Difficulty Comorbidity  Goal: Chronic Respiratory Difficulty  Patient comorbidity will be monitored for signs and symptoms of Respiratory Difficulty (Chronic) condition.  Problems will be absent, minimized or managed by discharge/transition of care.   Outcome: No Change  Continues to receive nebulization treatments and has frequent pulmonary hygiene needs.      Comments: Shift: [id number]  VS: Temp: 99.2  F (37.3  C) Temp src: Axillary BP: 163/83   Heart Rate: 96 Resp: 28 SpO2: 95 % O2 Device: Trach dome    Pain: Trach site pain, IV site pain, generalized pain. Tylenol supp. Given at 1730  Neuro: awake and alert most of day other than short nap, YULIYA orientation, appropriate requests, behaviors and follows commands  Cardiac:   NSR 90s  Respiratory: trach dome 21%, suction q2, frequent oral suctioning and drooling, strong cough, diminished right base  GI/Diet/Appetite: BS+ LBM today (incontinent), refusing NJ placement, TF on hold, strict NPO, oral meds on hold, oral thrush, providing frequent oral cares  :  Incontinent of urine  LDA's: PIVx1 w/ D5NS at 75, Shiley 6  Skin: trach site and generalized swelling in neck and right side of mouth/lips, +2 edema right hand  Activity: bedrest turn q2, lift to chair for 3 hours today, does have +3-4 strength in right upper and lower, encourage self cares, participated in therapies today  Tests/Procedures: none today  Pertinent Labs/Lab Collection: LA 1.3,      Plan: thoracic consult for PEG tube placement, note that was given enoxaparin and aspirin today

## 2018-07-23 NOTE — PLAN OF CARE
"Problem: Patient Care Overview  Goal: Plan of Care/Patient Progress Review  /73 (BP Location: Left arm)  Pulse 109  Temp 98.5  F (36.9  C) (Oral)  Resp 30  Ht 1.6 m (5' 2.99\")  Wt 59.9 kg (132 lb 0.9 oz)  SpO2 95%  BMI 23.4 kg/m2    Neuro: Alert. YULIYA orientation. Non verbal. R sided facial droop/weakness. Follows commands intermediately.   Cardiac: SR/ST  with PVCs. HTN 170s-130s SBP. Labetalol x1 Max temp 100.7F tylenol given             Respiratory: Sating >92% on 21% TD. Shiley 6. Cuff inflated per ENT. Suction Q1H. Thick white yellow secretions. Crusty/drainage/bleeding around tracheostomy site. Neck swollen 15 in. Trach ties are tight and red under. Per ENT leave in place.   GI/: Adequate urine output. BMx0. Incontinent.   Diet/appetite: NPO.   Activity:  Repo Q2H.   Pain: C/O headache. Tylenol and IV Dilaudid x2  Skin: Trach site reddened and draining.   LDA's:  L PIV. R PIV D5 0.9%NS @ 75ml/hr.   K replaced.      Plan: Continue with POC. Notify primary team with changes.    Problem: Chronic Respiratory Difficulty Comorbidity  Goal: Chronic Respiratory Difficulty  Patient comorbidity will be monitored for signs and symptoms of Respiratory Difficulty (Chronic) condition.  Problems will be absent, minimized or managed by discharge/transition of care.   TD 21% RR26-40 Trach shiley 6.       "

## 2018-07-23 NOTE — PROGRESS NOTES
[facility], [place]    Internal Medicine Progress Note - Gold Service      Assessment & Plan   Keira Collins is a 72 year old female admitted on 7/17/2018. She has a medical history of COPD, HTN, hyperlipidemia, chronic tobacco abuse, h/o alcohol abuse, squamous cell carcinoma of larynx admitted on 7/17/2018 for direct laryngoscopy with biopsy, tracheostomy and NGT placement.  Internal medicine consulted 7/20 for HTN, hypoxia and tachycardia.  Hospital course complicated by acute left pontine CVA on 7/19.      Sepsis and acute hypoxic respiratory failure most likely 2/2 aspiration PNA - Febrile overnight with Tmax 101.8, CXR with increase R basilar opacity.  Procal 0.11, WBC 10.  Pt is very secretional requiring frequent suctioning per RN.  Started on Clindamycin 7/19, switched to Flagyl and Ceftriaxone 7/10 to avoid development of CDiff.  TTE (7/20) was technically difficult but LVEF essentially normal (60-65%).  Antibiotics broadened overnight to IV Vanco and Meropenem for concern for sepsis.  UA w/ large LE, 11 WBC but urine cx negative.  Blood cx NGTD.  Sputum with mixed gram negative and gram positive growth preliminarily.   - For now would recc continuation of IV Vanco and Meropenem until cx results are back  - Follow up with blood, urine and sputum cx and will de-escalate as appropriate   - Wean 02 as able to maintian sp02 ?92%  - Continue scheduled nebs  - Continue I/O and daily weights   - Continue RT and RN frequent suctioning   - Check CRP as add on now and procal in AM tomorrow     Acute L pontine CVA - R sided facial droop first noted AM of 7/19, improved throughout the day.  Head CT w/o acute intracranial abnormality but subsequent MRI head revealed new L pontine infarct.  Neurology consulted, out of TPA window.  Lipid panel normal.    - Consider palliative care consult given medical complexity and intermittent refusal of medical treatments.  Per ENT, staff feels family may  not be quite ready to have palliative care discussion.  We would continue to recommend this to at least discuss goals of care and code status going forward.   - Continue rectal ASA and statin.  Continue Plavix when enteral access is established (OKd per primary team  - SBP goal <160 per neuro, continue Hydralazine 10 mg IV q 4 hours PRN SBP >170 or SBP >105  - Routine TTE with bubble study completed, awaiting results  - PT/OT/SLP  - Avoid narcotics   - Delirium precautions   - Obtain apnea and depression screen/stroke education     Severe protein-calorie malnutrition; Dysphagia - Likely was malnourished PTA 2/2 cancer, now it is likely exacerbated in setting of acute illness, recent surgery and CVA which has resulted in severe dysphagia.  An NGT was placed intra-operatively but was self D/Cd by patient and she has been refusing replacement per review of notes.  On our exam this AM she was agreeable to replacement.  ENT staff discussed with patient's son today and as her POA, he states he was interested in pursuing PEG placement.  Primary team has consulted thoracic surgery for placement.   - Agree with surgery consult for PEG tube placement  - Per ENT, once this is scheduled, son will come to bedside to discuss with patient.  If patient continues to refuse enteral feeding tube placement, will need to involve Psychiatry for formal capacity evaluation   - NPO for now, agree with SLP consult       Hypertension; Sinus tachycardia - PTA maintained on Losartan 50 mg qday, amlodipine 5 mg qday (unclear compliance per OP notes). Does have some hx of alcohol use but do not feel tachycardia is c/w etoh withdrawal.  EKG w/ some ST depression in lead V6, but V5 and lead I WNL- trop negative, PVCs as below.  Tachycardic overnight in setting of fever and concern for sepsis (as above).  SBP goal has been set to <160 per neuro in setting of recent CVA.   - Currently holding Losartan and Amlodipine given no enteral access.  Continue  IV Hydralazine to maintain SBP <160   - No need for MSSA   - PNA treatment as above   - Continue telemetry   - Xoponex for tachycardia     Oral candidiasis - Improving.  Continue Nystatin via swab as ordered.     SCC of Larynx - S/p direct laryngoscopy with biopsy, trach and NGT placement on 7/23/18 with ENT.    - Care per primary ENT team     RUL spiculated lung nodule - Noted on 6/2018 CT scan.  PET scan on 7/21 revealed bilateral hypermetabolic pulmonary nodules consistent with pulmonary metastatic diease.  Also revealed small pleural effusion.  CXR yesterday did not note effusion.     Right lobe thyroid nodule - S/p FNA which is still pending.   - Will follow FNA     PVD s/p endarterectomy iliac artery recannulization w/ stent, right SFA recannulization w/ stent (1/2017) - Managed on PTA ASA and Plavix.  Plavix initially held 2/2 surgery, now unable to given 2/2 no enteral access. OK for patient to have Plavix per surgery team.   - Continue ASA (rectal) and resume Plavix when enteral access is established    COPD - CT chest from 6/13/18 with moderate centrilobular emphysematous changes in upper lobes,  PTA managed on Spiriva daily, Advair BID with PRN Albuterol  - Continue scheduled nebs: Brovana, Pulmicort, Ipratropium and xoponex     Hyperlipidemia - Continue statin     Adrenal gland nodularity - Noted on CT chest 06/13/2018 w/ nodularity of bilateral adrenal glands w/ may represent mets. But PET scan did not reveal any abd/pelvis mets.    # Pain Assessment:  Current Pain Score 7/23/2018   Patient currently in pain? denies   Pain score (0-10) -   Pain location -   Pain descriptors -   Keira s pain level was assessed and she currently denies pain.      Diet: Adult Formula Drip Feeding: Continuous Isosource 1.5; Nasogastric tube; Goal Rate: 45; mL/hr; Medication - Tube Feeding Flush Frequency: At least 15-30 mL water before and after medication administration and with tube clogging; Amount to Send (Nut...  NPO  per Anesthesia Guidelines for Procedure/Surgery Except for: Meds  Fluids: D5NS at 75mL/hour  DVT Prophylaxis: I would recommend starting Lovenox for DVT ppx while she is not receiving her Plavix.  Would wait to start this until decision is made about timing of PEG  Code Status: Full Code    Disposition Plan   Expected discharge: 4 - 7 days, recommended to transitional care unit once antibiotic plan established, safe disposition plan/ TCU bed available and SIRS/Sepsis treated.     Entered: Rosa Nguyen 07/23/2018, 7:50 AM   Information in the above section will display in the discharge planner report.      The patient's care was discussed with the Attending Physician, Dr. Umanzor, Bedside Nurse, Care Coordinator/, Patient and ENT Consultant.    Rosa Nguyen  Internal Medicine Staff Hospitalist Service  [facility]  Pager: [phone]  Please see sticky note for cross cover information    Interval History     Febrile overnight with associated tachycardia and tachypnea.  Her IV antibiotics were broadened in response.  Patient nods yes and no appropriately to questions.  Denies chills, chest pain, shortness of breath, abdominal pain, nausea, or vomiting.  Does have ongoing secretions.     Data reviewed today: I reviewed all medications, new labs and imaging results over the last 24 hours.     Physical Exam   Vital Signs: Temp: 99.1  F (37.3  C) Temp src: Oral BP: 147/74   Heart Rate: 89 Resp: 30 SpO2: 95 % O2 Device: Trach dome    Weight: 132 lbs .89 oz    General: NAD, elderly appearing female, pleasant.  Nods yes and no to questions appropriately.  HEENT: No scleral icterus, PERRLA.  6.0 cuffed Shiley in place, cuff inflated, trach ties in place.  Tracheostomy site clean, no bleeding.  Copious, thin secretions from trach.  Tongue with thick, white debris.   NECK: No adenopathy, no asymmetry, masses, or scars, JVP not elevated  CARDIOVASCULAR: Normal rate, regular rhythm. S1/S2  normal, no murmurs, rubs or gallops   RESPIRATORY: Coarse breath sounds throughout, more diminished on RLL.  No wheezes, use of accessory muscles or retractions.    ABDOMEN: Soft, non-tender abdomen without rebound or guarding, no hepatosplenomegaly, no palpable masses, hypoactive bowel sounds present  EXTREMITIES: Peripheral pulses normal, no peripheral edema, warm  Skin: No ecchymoses, no rashes  NEURO: Lethargic but arouses to loud voice and nodding yes and no.  R sided facial droop and R sided tongue deviation.  RUE and RLE 3/5 strength, LUE and LLE 5/5 strength.     Data   Medications     IV fluid REPLACEMENT ONLY       dextrose 5% and 0.9% NaCl 75 mL/hr at 07/23/18 0838       amLODIPine  5 mg Per Feeding Tube Daily     arformoterol  15 mcg Nebulization BID     aspirin  80 mg Rectal Daily     atorvastatin  40 mg Per Feeding Tube Daily     budesonide  0.25 mg Nebulization BID     Calcium carb-Vitamin D 500 mg [place]-200 units  1 tablet Per Feeding Tube BID     clopidogrel  75 mg Per Feeding Tube Daily     docusate  50 mg Per Feeding Tube BID     folic acid  1 mg Intravenous Daily     ipratropium  0.5 mg Nebulization Q4H While awake     lactobacillus rhamnosus (GG)  1 capsule Per Feeding Tube TID AC     levalbuterol  0.63 mg Nebulization Q4H While awake     lidocaine (PF)  5 mL Subcutaneous Once     losartan  50 mg Per Feeding Tube Daily     meropenem  1 g Intravenous Q8H     multivitamins with minerals  15 mL Per Feeding Tube Daily     nystatin  500,000 Units Oral 4x Daily     polyethylene glycol  17 g Per Feeding Tube Daily     sennosides  5 mL Per Feeding Tube BID     thiamine  200 mg Intravenous TID     vancomycin (VANCOCIN) IV  1,000 mg Intravenous Q24H     Data     Recent Labs  Lab 07/23/18  0432 07/22/18  1756 07/22/18  0501 07/21/18  2200 07/21/18  1632 07/21/18  1505 07/21/18  0450  07/19/18  0709  07/17/18 2020   WBC 10.0 9.7  --   --   --   --  9.1  < > 12.6*  < >  --    HGB 11.9 13.0  --   --   --   --   13.1  < > 13.7  < >  --    MCV 94 94  --   --   --   --  93  < > 95  < >  --     187  --   --   --   --  179  < > 177  < >  --    INR  --   --   --   --   --   --   --   --  1.16*  --   --     139  --   --  138  --  137  < > 141  < >  --    POTASSIUM 3.7 3.7 3.8  --  3.7 3.6 3.3*  < > 3.2*  < >  --    CHLORIDE 112* 109  --   --  110*  --  105  < > 109  < >  --    CO2 16* 18*  --   --  16*  --  18*  < > 22  < >  --    BUN 5* 6*  --   --  12  --  10  < > 9  < >  --    CR 0.53 0.52  --   --  0.51*  --  0.52  < > 0.63  < >  --    ANIONGAP 11 12  --   --  12  --  14  < > 10  < >  --    ESTEFANI 7.3* 8.0*  --   --  8.3*  --  8.6  < > 8.8  < >  --    * 128*  --   --  122*  --  122*  < > 103*  < >  --    ALBUMIN  --   --   --   --   --   --   --   --   --   --  3.5   TROPI  --   --   --  <0.015 <0.015 <0.015 <0.015  < >  --   --   --    < > = values in this interval not displayed.  Recent Results (from the past 24 hour(s))   XR Chest Port 1 View    Narrative    Chest radiograph one view  7/22/2018 6:08 PM      HISTORY: Fever, tachycardia, known aspiration pneumonia    COMPARISON: 7/22/2018    FINDINGS: Increased right basilar opacity. Cardiac silhouette is  within normal limits. Tracheostomy tube tip is at the upper thoracic  trachea. Mild diffuse interstitial opacities. No pleural effusion or  pneumothorax.      Impression    IMPRESSION: Increased right basilar opacity, which may represent  worsening infection.     I have personally reviewed the examination and initial interpretation  and I agree with the findings.    CHASIDY POWERS MD   CT Head w/o Contrast    Narrative    CT HEAD W/O CONTRAST 7/22/2018 10:09 PM    History: recent ischemic CVA w/ HA on anti platelets, HTN- eval for  acute abnormality;     Comparison: MRI 7/20/2018, CT 7/19/2018.    Technique: Using multidetector thin collimation helical acquisition  technique, axial, coronal and sagittal CT images from the skull base  to the vertex were  obtained without intravenous contrast.     Findings:    Leukoaraiosis and sequela of several prior infarcts. Hypoattenuation  of the left hemipontine, due to evolving acute infarction.    No intracranial hemorrhage, mass effect, or midline shift. The  ventricles are proportionate to the cerebral sulci. The gray to white  matter differentiation of the cerebral hemispheres is preserved. The  basal cisterns are patent.    Mucosal thickening and debris within the right maxillary sinus,  slightly increased since prior. Left paranasal sinus is clear.  Sphenoid locules are clear. Mild mucosal thickening of the ethmoid air  cells. The mastoid air cells are clear.       Impression    Impression:   Evolving changes of left hemipontine infarction. Otherwise, no acute  intracranial pathology.    I have personally reviewed the examination and initial interpretation  and I agree with the findings.    FELIX CALDERA MD

## 2018-07-23 NOTE — PLAN OF CARE
Problem: Patient Care Overview  Goal: Plan of Care/Patient Progress Review  Discharge Planner SLP   Patient plan for discharge: unknown  Current status: Recommend continue NPO with consideration of alternate source of nutrition. Pt declining replacing NJ per report. Unable to complete volitional swallow. Not tolerating cuff deflation this date, and therefore not appropriate for PO trials  Barriers to return to prior living situation: dysphagia; deconditioning  Recommendations for discharge: inpatient rehab  Rationale for recommendations: anticipate ongoing needs       Entered by: Kassi Ponce 07/23/2018 4:13 PM

## 2018-07-23 NOTE — PLAN OF CARE
Problem: Patient Care Overview  Goal: Plan of Care/Patient Progress Review  Patient plan for discharge: not addressed   Current status: Pt with new R facial droop and R sided weakness. Pt lethargic with delayed processing however able to follow simple commands. Pt max A for supine > EOB. Pt with improved sitting balance and able to tolerate sitting EOB with CGA-min A. Pt with intermittent posterior LOB. Pt dependently lifted to bedside chair. Pt engaged in RUE AAROM with mod cues.   Barriers to return to prior living situation: R sided weakness, cognitive deficits, s/p new trach   Recommendations for discharge: TCU vs LTACH   Rationale for recommendations: Pt previously mod I for ambulating ~30 ft and required some assist for ADLs. Pt now total A for functional transfers and max A for ADLs. Pt limited by R sided weakness, cognitive impairments, and complex medical needs. Pt likely would not be an ARU candidate due to decreased rehab potential due to complex medical needs.       Entered by: Judy Dwyer 07/21/2018 12:54 PM

## 2018-07-23 NOTE — PROGRESS NOTES
Brief Otolaryngology Progress Note    Head CT reviewed with Radiology- no acute changes. EKG is stable compared to previous. Tachycardia improved. Continue plan of care.    Colt Lyons MD  Otolaryngology- Head and Neck Surgery, PGY-2  Pager: [phone]  Please contact ENT with questions by dialing [phone] and entering job code [phone] when prompted.

## 2018-07-23 NOTE — CONSULTS
Thoracic Surgery Consult Note     Patient name: Keira Collins  Date of Service: 7/23/2018  Reason for Consult: Gastrostomy tube   Requesting Physician: ENT, Dr Walden    HPI:   72F one week post direct laryngoscopy with biopsy showing SCC of larynx, now with need for enteral access for nutrition. She had previously had an NG tube which she removed in the past few days and has refused to allow another NGT to be replaced. Per chart, she is undergoing workup toward total laryngectomy with adjuvant chemoradiotherapy by the ENT and Onc teams per tumor board discussion. She is unable to take any food or liquid PO since she is high aspiration risk and has failed swallow evaluation.    Interview limited due to patient somnolence, much of the data above gathered through chart review and discussion with primary team.    PMH:   Past Medical History:   Diagnosis Date     COPD (chronic obstructive pulmonary disease) (H) 7/28/2014    From [facility]ization 05/2016: bedside feng shows moderate airflow obstruction [FEV1 0.99L, 62% pred and FVC 1.7L, 82% predicted; FEV1 and FVC DROPPED by 11 and 16% respectively post BD  Diagnosis made 7/28/2014 at [facility]        Essential hypertension with goal blood pressure less than 140/90      H/O: alcohol abuse      Hyperlipidemia LDL goal <100      Laryngeal mass 6/27/2018     Laryngeal squamous cell carcinoma (H) 07/17/2018     Osteoporosis 7/14/2016     Pulmonary nodules 7/9/2018     PVD (peripheral vascular disease) (H) 7/11/2016     Tobacco abuse      PSH:   Past Surgical History:   Procedure Laterality Date     LARYNGOSCOPY WITH BIOPSY(IES) N/A 7/17/2018    Procedure: LARYNGOSCOPY WITH BIOPSY(IES);  Direct Laryngoscopy With Biopsy, Tracheostomy ;  Surgeon: Cynthia Walden MD;  Location: [facility] OR     SURGICAL HISTORY OF -   5/13/2016    right hip fracture     TRACHEOSTOMY N/A 7/17/2018    Procedure: TRACHEOSTOMY;;  Surgeon: Cynthia Walden MD;  Location: [facility] OR     VASCULAR  SURGERY Left 01/2017    ; left femoral endarterectomy     Meds:   Prescriptions Prior to Admission   Medication Sig Dispense Refill Last Dose     albuterol (PROAIR HFA/PROVENTIL HFA/VENTOLIN HFA) 108 (90 BASE) MCG/ACT Inhaler Inhale 2 puffs into the lungs every 4 hours as needed for shortness of breath / dyspnea or wheezing 1 Inhaler 9 Past Week at Unknown time     amLODIPine (NORVASC) 5 MG tablet Take 1 tablet (5 mg) by mouth daily 30 tablet 3 Past Week at Unknown time     aspirin 81 MG tablet Take 1 tablet (81 mg) by mouth daily 30 tablet  Past Week at Unknown time     atorvastatin (LIPITOR) 40 MG tablet TAKE 1 TABLET BY MOUTH EVERY DAY 90 tablet 1 Past Week at Unknown time     clopidogrel (PLAVIX) 75 MG tablet TAKE 1 TABLET BY MOUTH ONCE DAILY 90 tablet 1 Past Week at Unknown time     fluticasone-salmeterol (ADVAIR-HFA) 45-21 MCG/ACT inhaler Inhale 2 puffs into the lungs 2 times daily 36 Inhaler 1 Past Week at Unknown time     losartan (COZAAR) 50 MG tablet Take 1 tablet (50 mg) by mouth daily 30 tablet 3 Past Week at Unknown time     umeclidinium (INCRUSE ELLIPTA) 62.5 MCG/INH oral inhaler Inhale 1 puff into the lungs daily 30 Inhaler 5 Past Week at Unknown time     Wound Dressings (SILVASORB) GEL Externally apply 1 Application topically daily 1 Tube 1 Past Month at Unknown time     acetaminophen (TYLENOL) 500 MG tablet Take 1 tablet (500 mg) by mouth every 6 hours   Unknown at Unknown time     calcium carb 1250 mg, 500 mg [place],/vitamin D 200 units (OSCAL WITH D) 500-200 MG-UNIT per tablet Take 1 tablet by mouth 3 times daily (with meals) Reported on 2/24/2017   Unknown at Unknown time     NUTRITIONAL SUPPLEMENT LIQD Take 4 oz by mouth 2 times daily Reported on 2/24/2017   Taking     Allergies:   Allergies   Allergen Reactions     Penicillins      FamHx:   Family History   Problem Relation Age of Onset     Chronic Obstructive Pulmonary Disease Daughter      Diabetes Daughter      HEART DISEASE Daughter   "    SocHx:   Social History     Social History     Marital status: Single     Spouse name: N/A     Number of children: N/A     Years of education: N/A     Occupational History     Not on file.     Social History Main Topics     Smoking status: Current Every Day Smoker     Packs/day: 1.00     Years: 50.00     Types: Cigarettes     Smokeless tobacco: Never Used      Comment: 1/2 a pack a day      Alcohol use 0.0 oz/week     0 Standard drinks or equivalent per week      Comment: Beer- 1-4 per day     Drug use: No     Sexual activity: No     Other Topics Concern     Not on file     Social History Narrative    Live with Son who helps with her care            ROS: 10-pt ROS negative except as noted above.     Objective:   /80  Pulse 109  Temp 99.2  F (37.3  C) (Axillary)  Resp 28  Ht 1.6 m (5' 2.99\")  Wt 59.9 kg (132 lb 0.9 oz)  SpO2 97%  BMI 23.4 kg/m2  General - no acute distress, sleepy, comfortable  HEENT - normocephalic, atraumatic, EOMI. Opens mouth spontaneously.  Cardio - regular rate, regular rhythm  Pulm - trach in place.  Abdomen - soft, nondistended, nontender. Well-healed low midline incisional scar.  Extremities - warm, well-perfused  Skin - no rash or bruising     Labs: Reviewed    Imaging:  PET CT reviewed.      Assessment/Plan:   72F with laryngeal SCC s/p trach currently undergoing treatment for PNA and overall workup toward laryngectomy by ENT team. Thoracic consulted for gastrostomy tube placement.    - Plan for this week in OR for PEG vs laparoscopic G tube placement. Date and attending pending.    Discussed with staff, Dr. Mariee.    Sukhwinder Crump MD  PGY-3 General Surgery    ------------------------------------------------------------------------    "

## 2018-07-23 NOTE — PROGRESS NOTES
[facility], [place]   Antimicrobial Management Team (AMT) Note            To: Gold 9  Unit: UUU6B 6221-01  Allergies: Penicillins    Brief Summary:   Keira Collins is a 72 year old female with a past medical history of COPD and everyday smoker and squamous cell cancer who received a direct laryngoscopy, tracheostomy and nasogastric tube placement on 07/17/18. Her medical record states an allergy with penicillin with no reactions recorded. She was given clindamycin for perioperative pharmacoprophylaxis.     Interval History:   On 07/18 pt s WBC elevated to 13.7, previously from 7.7 the day before. She remained afebrile at 99.6  F with an elevated heart rate of 105. On 07/19 a chest X ray found small right pleural effusion, confirming aspiration/infection. Her WBC remained elevated at 12.1 with a temperature at 99.7 F with an elevated heart rate of 109. On 07/20 due to the elevated WBC along with consistent trach suctioning, pt was empirically started on ceftriaxone for gram negative and metronidazole for anaerobic coverage for two days.   On 07/22 Vancomycin and meropenem was initiated for aspiration pneumonia and ceftriaxone/metronidazole was discontinued due to pt s elevated temperature of 100.2 F on 07/21 and respiratory rate of 40, indicating sepsis. A gram stain of her sputum showed gram positive and gram negative growth with gram positive cocci predominating. Preliminary results of her aerobic sputum culture show moderate growth of normal [place] to date. Blood cultures taken from her right hand show negative growth after 12 hours. Urine cultures currently shows negative results and lab will reincubate.    Two more chest x rays were conducted on 07/22 indicating worsening infection. Pt s respiratory rate has steadily declined to 28 breaths per minute after empiric treatment with ceftriaxone and metronidazole.   Assessment:  Sepsis/Aspiration Pneumonia:   Today, pt s is afebrile with  elevated blood pressure, but clinically stable. Due to the pt s previous sepsis event with elevated respiration, ceftriaxone and metronidazole was an appropriate choice for anaerobic coverage. Strep or anaerobes are common pathogens for aspiration pneumonia. Meropenem provides broad antibiotic spectrum coverage, but should typically be used for ESBL positive bacteria patients. Pt is not positive for previous ESBL infection. Furthermore, carbapenems in general are reserved for patients with both a penicillin and cephalosporin allergy. There is minimal of cross-reactivity to penicillins with later generation cephalosporins. Given the gap in coverage with ceftriaxone/metronidazole is pseudomonas - it is reasonable to provice antipseudomonal coverage with cefepime given her recent decompensation. Vancomycin is reserved for MRSA and resistant gram positive coverage. Consider MRSA PCR of the nares to evaluate MRSA colonization as it has a high negative predictive value which can warrant discontinuation of vancomycin.      Recommendation/Interventions:   1. Discontinue Meropenem  2. Initiate cefepime 2g IV q8H and metronidazole 500 mg IV q6H for 7 days  3. Obtain MRSA PCR nares to determine colonization status to assist with ongoing vancomycin utilization.     Discussed w/ ID Staff-Dr. Leroy Bates MD and Deedee Singh PharmD    Deedee Singh, PharmD  Antimicrobial Stewardship & Infectious Diseases Pharmacist  Office Ph: [phone]  Pager: [phone]    Vital Signs/Clinical Features:  Vitals       07/21 0700  -  07/22 0659 07/22 0700  -  07/23 0659 07/23 0700  -  07/23 1524   Most Recent    Temp ( F) 97.7 -  100.2    97.3 -  101.8    99.1 -  99.4     99.4 (37.4)    Heart Rate 96 -  113    89 -  117    88 -  95     95    Resp 24 -  (!)40    24 -  (!)36    24 -  30     24    /74 -  175/83    126/66 -  186/83    147/74 -  158/79     158/79    SpO2 (%) 94 -  99    94 -  100    93 -  97     97          Labs  Estimated  Creatinine Clearance: 88.1 mL/min (based on Cr of 0.53).  Recent Labs   Lab Test  07/19/18   1800  07/20/18   0428  07/21/18   0450  07/21/18   1632  07/22/18   1756 07/23/18   0432   CR  0.58  0.65  0.52  0.51*  0.52  0.53       Recent Labs   Lab Test  02/24/17   1229   07/19/18   0709  07/19/18   1800  07/20/18   1027  07/21/18   0450  07/22/18   1756 07/23/18   0432   WBC  10.3   < >  12.6*  12.1*  12.6*  9.1  9.7  10.0   ANEU  5.9   --    --    --   9.5*   --    --    --    ALYM  3.2   --    --    --   1.9   --    --    --    GI  0.7   --    --    --   1.1   --    --    --    AEOS  0.4   --    --    --   0.1   --    --    --    HGB  12.7   < >  13.7  14.0  13.5  13.1  13.0  11.9   HCT  40.4   < >  42.8  42.8  41.4  40.2  40.2  37.5   MCV  93   < >  95  94  93  93  94  94   PLT  246   < >  177  177  190  179  187  179    < > = values in this interval not displayed.       Recent Labs   Lab Test  07/11/16   1506  02/24/17   1229  01/16/18   1056  06/11/18   1041  07/16/18   1413  07/17/18   2020   BILITOTAL  0.3  0.4  0.4  0.4  0.4   --    ALKPHOS  171*  105  103  100  92   --    ALBUMIN  4.0  3.9  4.0  3.7  3.6  3.5   AST  57*  18 18 21 21   --    ALT  63*  17 20 18 19   --        Recent Labs   Lab Test  07/22/18 1756   PCAL  0.11             Culture Results:  7-Day Micro Results     Procedure Component Value Units Date/Time    Urine Culture Aerobic Bacterial [[id number]] Collected:  07/22/18 5134    Order Status:  Completed Lab Status:  Preliminary result Updated:  07/23/18 1104    Specimen:  Unspecified Urine      Specimen Description Unspecified Urine     Special Requests Specimen received in preservative     Culture Micro Culture negative < 24 hours, reincubate    Blood culture [[id number]] Collected:  07/22/18 1903    Order Status:  Completed Lab Status:  Preliminary result Updated:  07/23/18 0723    Specimen:  Blood      Specimen Description Blood Right Hand     Special Requests Received in aerobic  bottle only     Culture Micro No growth after 10 hours    Blood culture [[id number]] Collected:  07/22/18 1756    Order Status:  Completed Lab Status:  Preliminary result Updated:  07/23/18 0723    Specimen:  Blood      Specimen Description Blood Right Hand     Special Requests Received in aerobic bottle only     Culture Micro No growth after 12 hours    Sputum Culture Aerobic Bacterial [[id number]] Collected:  07/22/18 0935    Order Status:  Completed Lab Status:  Preliminary result Updated:  07/23/18 1055    Specimen:  Sputum      Specimen Description Sputum     Special Requests Endotracheal     Culture Micro Moderate growth  Normal [place] to date      Gram stain [[id number]]  (Abnormal) Collected:  07/22/18 0935    Order Status:  Completed Lab Status:  Final result Updated:  07/22/18 1236    Specimen:  Sputum      Specimen Description Sputum     Special Requests Endotracheal     Gram Stain <25 PMNs/low power field      Few  Mixed gram positive and gram negative bacteria present.  Predominance of  Gram positive cocci in clusters   (A)          Recent Labs   Lab Test  01/16/18   1113  07/22/18   2245   URINEPH  7.0  6.0   NITRITE  Negative  Negative   LEUKEST  Moderate*  Large*   WBCU  O - 2  11*                         Imaging: Xr Chest Port 1 View    Result Date: 7/22/2018  Chest radiograph one view  7/22/2018 6:08 PM  HISTORY: Fever, tachycardia, known aspiration pneumonia COMPARISON: 7/22/2018 FINDINGS: Increased right basilar opacity. Cardiac silhouette is within normal limits. Tracheostomy tube tip is at the upper thoracic trachea. Mild diffuse interstitial opacities. No pleural effusion or pneumothorax.     IMPRESSION: Increased right basilar opacity, which may represent worsening infection. I have personally reviewed the examination and initial interpretation and I agree with the findings. CHASIDY POWERS MD    Xr Chest Port 1 View    Result Date: 7/22/2018  XR CHEST PORT 1 VW  7/22/2018 12:34 AM  HISTORY: SOB and  increase work of breathing; COMPARISON: 7/19/2018, 7/20/2018 FINDINGS: Enteric tube passes inferior to the left hemidiaphragm, tip of the field-of-view. Tracheostomy tube tip projects over the mid trachea at the T5-6 level. Cardiac silhouette does not appear enlarged. No pneumothorax. Bilateral pleural effusions. Increased mixed bibasilar opacities. No significant volume loss. Fracture deformity of the right humeral neck, better demonstrated on PET scan 7/20/2018. No acute fracture.     IMPRESSION: 1. Increased mixed bibasilar opacities, which may represent aspiration pneumonitis/pneumonia or pulmonary edema. 2. Bilateral pleural effusions. I have personally reviewed the examination and initial interpretation and I agree with the findings. ANNA ADAM MD    Xr Chest Port 1 View    Result Date: 7/19/2018  Exam: XR CHEST PORT 1 VW, 7/19/2018 9:00 PM Indication: Hypoxia, eval for PNA, pulm edema Comparison: 6/13/2018, 6/11/2018 Findings: A single portable AP view the chest was obtained. The tracheostomy tube tip projects over the mid trachea. The feeding tube tip projects over the stomach. Elevation right hemidiaphragm with small right pleural effusion and right basilar opacities.     Impression: 1. Small right pleural effusion and right basilar opacities which may represent aspiration/infection or atelectasis. 2. The feeding tube tip projects over the stomach. I have personally reviewed the examination and initial interpretation and I agree with the findings. EDMOND SANDS MD    Xr Abdomen Port 2 Views    Result Date: 7/17/2018  Exam: XR ABDOMEN PORT 2 VW, 7/17/2018 6:12 PM Indication: Post NG placement Comparison: CT 6/13/2018, 8/31/2016 Findings: Single portable supine AP view the abdomen was obtained. The feeding tube tip projects over the stomach. Cholelithiasis. Left common/external iliac artery stent. Surgical clips project over the left groin. Partially imaged right hip arthroplasty with unchanged  horizontal alignment/lateral tilting of the acetabular component. Nonobstructive bowel gas pattern without pneumatosis.     Impression: The feeding tube tip projects over the stomach. Recommend repositioning if postpyloric placement is desired. I have personally reviewed the examination and initial interpretation and I agree with the findings. IRAJ SANTACRUZ MD    Pet Oncology Whole Body    Result Date: 7/20/2018  Combined Report of:    PET and CT on  7/20/2018 2:16 PM : 1. PET of the neck, chest, abdomen, and pelvis. 2. PET CT Fusion for Attenuation Correction and Anatomical Localization:  3. Diagnostic CT scan of the chest, abdomen, and pelvis with intravenous contrast for interpretation. 3. CT of the chest, abdomen and pelvis obtained for diagnostic interpretation. 4. 3D MIP and PET-CT fused images were processed on an independent workstation and archived to PACS and reviewed by a radiologist. Technique:  1. PET: The patient received 10.28 mCi of F-18-FDG; the serum glucose was 120 prior to administration, body weight was 60.9 kg. Images were evaluated in the axial, sagittal, and coronal planes as well as the rotational whole body MIP. Images were acquired from the Vertex to the Feet. UPTAKE WAS MEASURED AT 62 MINUTES. BACKGROUND:  Liver SUV max= 3.93,   Aorta Blood SUV Max: 3.65. 2. CT: Volumetric acquisition for clinical interpretation of the chest, abdomen, and pelvis acquired at 3 mm sections . The chest, abdomen, and pelvis were evaluated at 5 mm sections in bone, soft tissue, and lung windows.  The patient received 82 cc. Of Isovue 370 intravenously for the examination.  High resolution images of the neck were obtained with multiple oblique projection reformats. 3. 3D MIP and PET-CT fused images were processed on an independent workstation and archived to PACS and reviewed by a radiologist. INDICATION: 72 year old woman with laryngeal mass s/p biopsy consistent with SCC, also with lung nodules on CT  concerning for metastatic disease vs second primary; ADDITIONAL INFORMATION OBTAINED FROM EMR: 72-year-old female with a history of laryngeal mass status post biopsy, squamous cell carcinoma, lung nodules concerning for metastatic disease. COMPARISON: CT head 7/19/2018, CT soft tissue neck 6/13/2018. FINDINGS: HEAD/NECK: See separate neuroradiology report for results of dedicated high-resolution PET CT of the head and neck. CHEST: Postoperative changes of tracheostomy with tracheostomy tube in the high thoracic trachea. Mild associated debris. Nasojejunal tube courses through the thoracic esophagus with its tip in the stomach. Multiple hypermetabolic solid pulmonary nodules, for example: - Large hypermetabolic irregular solid pulmonary nodule of the right upper lobe measuring 12 x 15 mm (series 3, image 111 with a max SUV of 10.80 - Irregular opacity of the medial right suprahilar region of the right upper lobe (series 3, image 111), measuring approximately 6 x 15 mm with a max SUV of 8.84 - Subpleural left upper lobe 6 mm solid pulmonary nodule (series 3, image 107) max SUV of 4.60. - 5 mm solid pulmonary nodule of the left upper lobe (series 3, image 108) with a max SUV of 1.33 -5 mm solid nodule of the inferior right upper lobe (series 3, image 105) with a max SUV of 4.66 Additional small scattered solid pulmonary nodules consistent with pulmonary metastatic disease. Layering debris of the right bronchus intermedius with poor visualization of the proximal right lobar bronchi. Complete collapse of the right middle and right lower lobes with scattered air bronchograms. Numerous foci of increased FDG activity within the collapsed lobes, for example 12.76 Max SUV focus about the perihilar right lower lobe (series 3, image 138), and peribronchial within the right middle lobe posterior to the pericardium measuring 12.63 (series 3, image 133), likely representing additional metastatic foci within the collapsed lungs.  Additional areas of focal FDG uptake in the collapsed right middle and lower lobes. Small amount moderate right pleural effusion. Trace left pleural effusion with mildly focal FDG avid activity within the atelectasis. No significant mediastinal lymphadenopathy. Left paratracheal 10 mm lymph node without significant FDG activity. Subcentimeter left infrahilar lymph nodes with mild nonspecific FDG activity. No significant axillary lymphadenopathy. Heart is within normal limits. Trace pericardial fluid. Major vasculature are within normal limits. No large pulmonary artery filling defect. Moderate calcifications of the coronary arteries, aortic annulus and thoracic aortic arch. Normal cervical branching pattern. ABDOMEN AND PELVIS: There is no suspicious FDG uptake in the abdomen or pelvis. There are no suspicious hepatic lesions. Subcentimeter hypodensities of the liver, too small accurately characterize, likely a small hepatic cyst. Hypodense nodular thickening of the bilateral adrenal glands with minimal FDG uptake. There is no splenomegaly or evidence for splenic or pancreatic mass lesion. Layering cholelithiasis. No gallbladder wall thickening or CT evidence of cholecystitis. Small bilateral renal cortical hypodensities, too small accurately characterize but likely representing small renal cysts. No focal FDG uptake. No hydronephrosis or hydroureter. The bladder is unremarkable. Large urethral diverticulum of numerous calcifications, favor diverticulum. Contained within the terminal septations. FDG urine activity collecting within the dependent portion of the urethral diverticulum. There is large amount of pooling FDG urine activity along the crura, posteriorly and multiple cutaneous areas most consistent with urinary contamination. No dilated loops of bowel. Moderate colonic stool. 4.7 cm cystic structure adjacent to the left ovary on the left pelvis (series 3, image 228), this does not demonstrate significant  associated FDG activity. [Ectatic thoracoabdominal aorta with moderate calcified and noncalcified atherosclerotic plaque. Right common iliac aneurysm measuring up to 1.9 cm (series 10, image 41). Extensive calcified atherosclerotic plaque of the bilateral iliac arteries. Left common iliac and superficial femoral stents. LOWER EXTREMITIES: Right total hip arthroplasty with heterotopic bone formation. No suspicious FDG uptake. BONES: There are no suspicious lytic or blastic osseous lesions.  There is no abnormal FDG uptake in the skeleton. Healed right posterior left anterior rib fractures. Sclerotic focus in the right second rib without significant FDG avidity. Osteonecrosis of the right humeral head. Multilevel degenerative changes of the spine.     IMPRESSION: This patient with a history of laryngeal squamous cell carcinoma: 1. Bilateral hypermetabolic pulmonary nodules consistent with pulmonary metastatic disease. a. Collapse of the right lower/middle lobes with multiple hypermetabolic foci suggestive of pulmonary metastases. Right lower perihilar FDG uptake suggests postobstructive atelectasis. b. Associated small right pleural effusion. 2. No evidence of metastatic disease in the abdomen/pelvis. 3. See neuroradiology report for results of high-resolution PET CT of the head and neck. 4. Urethral diverticulum containing stones and septations. Evaluation is limited by urinary FDG activity. Consider urologic consult. Prominent urinary contamination about the pelvis. 5. 4.7 cm left adnexal cystic structure. No associated FDG uptake. Given size and metabolic process follow-up is pelvic ultrasound/follow-up recommended. 6. Thoracic abdominal aortic ectasia. 1.9 cm right iliac aneurysm. I have personally reviewed the examination and initial interpretation and I agree with the findings. ANNA ADAM MD    Mr Brain For Stroke [facility]    Result Date: 7/20/2018  MRI brain without and with contrast MRA of the  head without contrast Neck MRA without and with contrast Provided History:  Acute R sided weakness; Eval for stroke; . ICD-10: Comparison:  Head CT 7/19 2018, CT soft tissue neck 6/13/2018, outside CT scan report from 2016  Technique: Brain MRI:  Axial diffusion, FLAIR, T2-weighted, susceptibility, and coronal T1-weighted images were obtained without intravenous contrast. Following intravenous gadolinium-based contrast administration, axial and coronal T1-weighted images were obtained.  Head MRA: 3D time-of-flight MRA of the [place] of [place] was performed without intravenous contrast. Neck MRA:  Limited non contrast 2DTOF images were obtained of the mid-cervical region. Following intravenous gadolinium-based contrast administration, a contrast enhanced MRA of the neck/cervical vessels was performed. Three-dimensional reconstructions of the neck and head MRA were created, which were reviewed by the radiologist. Dose: 6mL Gadavist Findings: Several images degraded by motion. Brain MRI: There is a focus of abnormal T2 hyperintensity in the left pontomesencephalic junction with corresponding diffusion restriction. No other areas of abnormal diffusion restriction. On the FLAIR images, there is nonspecific mild periventricular T2-hyperintensity, which are most likely related to chronic small vessel ischemic disease. There is no definite intracranial hemorrhage on susceptibility images. Postcontrast images are nondiagnostic due to motion. Mucosal thickening right maxillary sinus. Moderate parenchymal volume loss. Ventricles are proportionate to the cerebral sulci. Cavum septum pellucida. Bilateral old pontine lacunar infarcts. Head MRA demonstrates no large vessel occlusion or stenosis of the major intracranial arteries. There is a 3 x 2 mm aneurysm of the A2 segment of the right anterior cerebral artery at the branch point of the frontopolar artery. The anterior communicating artery is patent. Right fetal PCA. Left  posterior communicating artery is not visualized. Neck MRA demonstrates patent major cervical arteries. There is approximately 50% narrowing of the proximal right internal carotid artery. The normal distal right internal carotid artery measures 5 mm. The normal distal left internal carotid artery measures 5 mm. Antegrade flow in the major cervical vasculature. Diminutive right vertebral artery.     Impression: 1. Acute left pontine mesencephalic junction infarct 2. Multiple images severely degraded by motion with postcontrast sequences nondiagnostic. 3. Head MRA demonstrates no large vessel occlusion. There is a 3 mm right A2 aneurysm at the branch point of the frontopolar artery. Recommend follow-up evaluation. 4. Neck MRA demonstrates approximately 50% stenosis of the proximal right internal carotid artery, although this is limited due to motion artifact. Diminutive right vertebral artery. 5. Moderate parenchymal volume loss and Leukoaraiosis. [Result: Acute left pontine infarct] Finding was identified on 7/20/2018 3:34 PM. Tristan Blevins was contacted by Dr. Monte at 7/20/2018 3:45 PM and verbalized understanding of the urgent finding. [Access Center: 3 mm right A2 aneurysm] This report will be copied to the [facility] to ensure a provider acknowledges the finding. [facility] Center is available Monday through Friday 8am-3:30 pm. I have personally reviewed the examination and initial interpretation and I agree with the findings. TRACY CONDON MD    Ct Head W/o Contrast    Result Date: 7/23/2018  CT HEAD W/O CONTRAST 7/22/2018 10:09 PM History: recent ischemic CVA w/ HA on anti platelets, HTN- eval for acute abnormality; Comparison: MRI 7/20/2018, CT 7/19/2018. Technique: Using multidetector thin collimation helical acquisition technique, axial, coronal and sagittal CT images from the skull base to the vertex were obtained without intravenous contrast. Findings:  Leukoaraiosis and sequela of several  prior infarcts. Hypoattenuation of the left hemipontine, due to evolving acute infarction. No intracranial hemorrhage, mass effect, or midline shift. The ventricles are proportionate to the cerebral sulci. The gray to white matter differentiation of the cerebral hemispheres is preserved. The basal cisterns are patent. Mucosal thickening and debris within the right maxillary sinus, slightly increased since prior. Left paranasal sinus is clear. Sphenoid locules are clear. Mild mucosal thickening of the ethmoid air cells. The mastoid air cells are clear.      Impression: Evolving changes of left hemipontine infarction. Otherwise, no acute intracranial pathology. I have personally reviewed the examination and initial interpretation and I agree with the findings. FELIX CALDERA MD    Ct Head W/o Contrast    Result Date: 7/19/2018  CT HEAD W/O CONTRAST 7/19/2018 6:49 PM Provided History: Lethargy, concern for right sided facial droop, eval for CVA. Comparison: None available. Outside radiology dictation 5/17/2016. Technique: Using multidetector thin collimation helical acquisition technique, axial, coronal and sagittal CT images from the skull base to the vertex were obtained without intravenous contrast. Findings: Several hypoattenuating foci are seen in the right caudate head, right lentiform includes, left lentiform nucleus, along the anterior limb of the left internal capsule and left thalamus. These likely represent multiple chronic lacunar infarctions. In the left side of the cora there is ill-defined hypoattenuation (series 2, image 25). Moderate to advanced periventricular and subcortical white matter hypoattenuation in the cerebral hemispheres bilaterally. No intracranial hemorrhage, mass effect, or midline shift. The ventricles are proportionate to the cerebral sulci. The basal cisterns are patent. Atherosclerotic calcifications in the carotid siphons. Bilateral antrostomies with fluid level in the right  maxillary sinus.     Impression: 1. In the left side of the cora there is ill-defined hypoattenuation (series 2, image 25). This likely represents an old infarct, which is described in outside radiology report dated 5/17/2016. 2. Multiple foci of chronic lacunar infarction in bilateral deep gray nuclei. I have personally reviewed the examination and initial interpretation and I agree with the findings. LACEY CASH MD    Ct Soft Tissue Neck W Contrast    Result Date: 7/20/2018  PET CT fusion examination 7/20/2018 2:17 PM 1. Neck CT with contrast 2. PET study of the neck 3. PET CT fusion study of the neck History:  72-year-old female with a history of laryngeal mass status post biopsy, squamous cell carcinoma, lung nodules concerning for metastatic disease. Comparison: CT neck 6/13/2018. Technique: Please refer to the accompanying whole body PET-CT for report of the dose and whole body PET-CT findings. Regarding the neck, axial images were obtained after nonionic intravenous contrast administration, with sagittal and coronal reconstructions performed. Neck CT images were reviewed in bone, soft tissue, and lung windows, with review of the fused PET-CT images as well in multiple planes. Findings: Large FDG avid lobulated enhancing mass of the larynx which involves the bilateral anterior false vocal folds, anterior true vocal folds and anterior commissioner which invades the preepiglottic fat and left greater than right. The mass abuts the inner margin of and distorts the left strap muscles. The mass extends superiorly along the left aryepiglottic fold/left epiglottic base. The mass measures approximately 2.6 x 3.5 x 3.2 cm (AP by transverse by CC) with a max SUV uptake measuring 28.77.  The subglottic larynx is unremarkable. Postoperative changes of tracheostomy with mild associated debris. Intense FDG uptake along the proximal esophagus at C5-6. This could be reactive to present NG tube. Multiple metastatic lymph  nodes, including: -8mm right level 2 lymph node (series 1, image 66), max SUV measuring 12.19 -12 mm left level 3 necrotic lymph node (series 1, image 73), max SUV measuring 10.18. This is inseparable from the inner margin of the left SCM. -9 mm left level 3 lymph node posterior lateral to the left jugular (series 1, image 70), max SUV measuring 12.51 -8 mm right level 4 lymph node (series 1, image 81), max SUV measuring 17.66 The major salivary glands are normal. Enlarged multinodular heterogeneous thyroid without focal uptake. There are bilateral palatine tonsillar small hypodense foci with symmetric FDG uptake (max SUV: 6.3). These could be infectious in etiology. There is FDG uptake along lingual tonsillar tissue slightly more on the right. There is asymmetric FDG uptake along the mucosa covering the right side of the hard palate (max SUV: 7.3). Inflammatory mucosal thickening and FDG uptake along the right maxillary sinus. Limited evaluation of the cervical vertebral column demonstrates no evident spinal canal stenosis. Moderate circumferential mucosal thickening of the right maxillary sinus. The mastoid air cells are clear. The major vascular structures in the neck are patent. Bilateral moderate calcified plaque of the carotid bulbs. Postoperative changes of tracheostomy. Small amount of layering debris. Numerous hypermetabolic solid pulmonary nodules. See PET CT by report for additional details.     Impression: 1. Large hypermetabolic laryngeal mass involving the bilateral true/false vocal cords, anterior measure, epiglottis left aryepiglottic fold which effaces the left peripancreatic and preepiglottic fat. The mass abuts and displaces the left strap muscle. 2. Metastatic bilateral cervical lymphadenopathy as described above.. 3. Please refer to the whole body PET CT performed as a separate report, for the findings of the remainder of the body. I have personally reviewed the examination and initial  interpretation and I agree with the findings. LACEY CASH MD    Ir Thyroid Biopsy    Result Date: 7/19/2018  Procedure date: 7/19/2018. PROCEDURE: ULTRASOUND GUIDED right THYROID NODULE FINE NEEDLE ASPIRATION HISTORY: Patient is a pleasant 61-year-old lady with a history of laryngeal cancer, status post tracheostomy creation on 7/17/2018. Patient with ultrasound notable for large heterogeneous thyroid nodule of the inferior right lobe. Patient's team requesting biopsy. Preprocedure diagnosis: Laryngeal cancer, thyroid nodule. Postprocedure diagnosis: Same. PROCEDURE: Ultrasound-guided thyroid nodule fine needle aspiration. : Omer Flood PA-C. Assist: Aundrea Sanchez MD. Nursing: Patient was monitored by IR nursing staff under my supervision, and remained stable throughout the procedure. Medications: 2 cc 1% lidocaine for local anesthesia Consent: verbal and written informed consent obtained prior to procedure. Procedure details: Patient placed in supine position on the gurney. Color ultrasound evaluation identified the thyroid nodule in the inferior right lobe. Taking care to avoid the recent tracheostomy, the area overlying the right inferior lobe nodule was prepped and draped in usual sterile fashion. The skin and subcutaneous tissue were anesthetized with 1% lidocaine. Under ultrasound guidance, a 25-gauge needle was inserted into the right inferior lobe thyroid nodule via an anteriomedial approach. Fine needle aspiration was performed under ultrasound guidance, and the specimen was given to pathology staff present outside the room. Total of 5 routine passes were made with 5 separate 25-gauge needles. Adequate sample was confirmed by the pathology service. Manual compression was held at the site for approximately 5 minutes. Repeat color ultrasound evaluation identified no signs of acute bleeding. Images were saved throughout the procedure. Patient tolerated the procedure well, with no immediate  complications. Estimate blood loss: Less than 1 cc. Specimens: 5 prepared slides of tissue aspirated from inferior right lobe thyroid nodule given to pathology staff present.     Impression: Ultrasound guided fine needle aspiration of right inferior thyroid nodule. Pathology pending. Plan: Followup with primary team. Attestation: The physician assistant (PA) who performed this procedure and signed the above report is licensed to practice in the [facility] pursuant to [place] Statute 147A.09.  This includes meeting the Statute and [place] Board of [employer] requirement of an active Delegation Agreement, which documents delegation of services by primary and alternate supervising physicians. All services rendered are performed under a collaborative agreement with Dr. Paradise Whiteside, Director of Interventional Radiology, [facility]. SUDEEP PAN PA-C

## 2018-07-23 NOTE — PROGRESS NOTES
"Asked to evaluate for continued fevers, tachycardia, hypertension. High risk for aspiration and patient removed NG tube today, refusing to have another replaced. Has IV access, unable to take PO, requesting assistance with BP and continued infectious w/u.    She is awake, tracks to look at you, shakes head yes/no on command, shakes head yes to head pain, no to chest pain.    /83 (BP Location: Left arm)  Pulse 109  Temp 100.7  F (38.2  C) (Oral)  Resp (!) 36  Ht 1.6 m (5' 2.99\")  Wt 58.5 kg (128 lb 15.5 oz)  SpO2 97%  BMI 22.85 kg/m2  GEN: Awake, unable to assess orientation, non verbal  CV: Tachycardic rate, regular rhythm  PULM: Lung sounds diminished in right lung base, left lung sounds clear  EXT: SCDs in place, no peripheral edema    #HTN: PTA maintained on Losartan 50 mg qday, amlodipine 5 mg qday (unclear compliance per OP notes). Acute pontine stroke found on 07/19.   -Add labetalol 10 mg q4 hours PRN for SBP >180  -Continue hydralazine PRN for SBP >160  -Head CT  -Treat pain and fever, giving tylenol and dilaudid now  -Consider nitroglycerin prn if continues to be elevated despite above measures    #Sepsis: On flagyl and rocephin for aspiration PNA- repeat CXR w/ evolving right lobe infiltrate but clinically improving from respiratory standpoint, on humidified RA. ENT evaluated neck w/o concern for infx or acute abnormality. HA and difficult neuro exam due to AMS, recent CVA. Considering worsening PNA, also considering UTI, cellulitis, Cdiff, meningitis (less likely), PE (less likely- no unilateral edema, O2 requirements improving).   -Agree with broadening to vanc, meropenem  -Blood cultures x2 ordered  -Add on procal  -UA ordered  -Cdiff if any diarrhea  -Consider LP  -Consider CTPE, would recommend resumption of lovenox (DCed 07/22)  -Continue NPO, continue frequent suctioning    #Acute left pontine stroke: Diagnosed 07/19. See neurology consult note.   -Head CT   -Continue ASA, statin, " plavix  -BP control as above    Continue to monitor closely. If BP not well controlled with initiation of labetalol recommend consideration of ICU for drip.    CARLITA Quintanilla

## 2018-07-23 NOTE — PROVIDER NOTIFICATION
Colt Lyons MD notified that patient RR 30-38, tachy 120-130, abd mucle use, temp 100.7, BP 180s after hydralazine, and trach site is oozing blood, warm, red, and neck is swollen.   MD will come to bedside within the hour and call medicine to come assess patient.

## 2018-07-23 NOTE — PROGRESS NOTES
Brief Otolaryngology Progress Note    Subjective:  I was paged regarding hypertension to SBP of 190 that decreased to 180 s/p hydralazine. There was also concern regarding right neck swelling and trach ties being too tight.    Objective:  Patient is sleeping comfortably in bed. Patient is tachycardic to 110s and hypertensive to 170s systolic while in the room. Trach ties appropriately tight. Mild swelling of right neck. Skin is non-erythematous and normal temperature. No induration or fluctuance appreciated.    Assessment & Plan:  - IV labetalol prn ordered  - Head CT for concern for hemorrhagic transformation, per Medicine recs  - Antibiotics broadened to vanc + meropenem given persistent fever and tachycardia, per Medicine recs (can consider re-narrowing on 07/23)      Colt Lyons MD  Otolaryngology- Head and Neck Surgery, PGY-2  Pager: [phone]  Please contact ENT with questions by dialing * [phone] and entering job code [phone] when prompted.

## 2018-07-24 PROBLEM — Z71.89 ADVANCED DIRECTIVES, COUNSELING/DISCUSSION: Chronic | Status: ACTIVE | Noted: 2018-01-01

## 2018-07-24 NOTE — PLAN OF CARE
Problem: Patient Care Overview  Goal: Plan of Care/Patient Progress Review  Patient plan for discharge: not addressed   Current status: Pt with improved alertness and command following, pt following 100% of simple commands. Pt max A for bed mobility, assist required to negotiate RLE. Pt completed x3 sit > stands with mod A x2 and blocking at R knee. Pt required min A-mod A and FWW for standing balance. Pt completed pivot transfer with max A x2 and FWW, pt needing total A for negotiating RLE. Pt completed face/UB sponge bath with mod A and max A for UB/LB dressing.   Barriers to return to prior living situation: R sided weakness, cognitive deficits, s/p new trach   Recommendations for discharge: TCU vs LTACH   Rationale for recommendations: Pt previously mod I for ambulating ~30 ft and required some assist for ADLs. Pt now requires Ax2 for mobility and mod A for ADLs. Pt limited by R sided weakness, cognitive impairments, and complex medical needs.

## 2018-07-24 NOTE — PROGRESS NOTES
"Otolaryngology Progress Note  July 24, 2018    S:  Patient's family would like to proceed with PEG, as patient refusing NG tube. Afebrile.       O: .Blood pressure 159/77, pulse 109, temperature 99.8  F (37.7  C), temperature source Axillary, resp. rate 28, height 1.6 m (5' 2.99\"), weight 59.8 kg (131 lb 13.4 oz), SpO2 94 %, not currently breastfeeding.     General: Drowsy but easily arousable, no acute distress   Neuro: moving all extremities   HEENT: EOMI. 6-0 cuffed Shiley sutured in place, cuff inflated. Trach ties are snug w/ 2 finger breadths between neck and ties. Tracheostomy site clean, no bleeding. No skin breakdown or erythema under trach ties or face plate. Thick secretions from trach site   Pulmonary: breathing via 6-0 Shiley with t-piece    Intake/Output Summary (Last 24 hours) at 07/24/18 0508  Last data filed at 07/24/18 0400   Gross per 24 hour   Intake           2487.5 ml   Output                0 ml   Net           2487.5 ml       LABS:  ROUTINE IP LABS (Last four results)  BMP    Recent Labs  Lab 07/23/18  0432 07/22/18  1756 07/22/18  0501 07/21/18  1632  07/21/18  0450    139  --  138  --  137   POTASSIUM 3.7 3.7 3.8 3.7  < > 3.3*   CHLORIDE 112* 109  --  110*  --  105   ESTEFANI 7.3* 8.0*  --  8.3*  --  8.6   CO2 16* 18*  --  16*  --  18*   BUN 5* 6*  --  12  --  10   CR 0.53 0.52  --  0.51*  --  0.52   * 128*  --  122*  --  122*   < > = values in this interval not displayed.    CBC    Recent Labs  Lab 07/23/18  0432 07/22/18  1756 07/21/18  0450 07/20/18  1027   WBC 10.0 9.7 9.1 12.6*   RBC 4.00 4.29 4.33 4.44   HGB 11.9 13.0 13.1 13.5   HCT 37.5 40.2 40.2 41.4   MCV 94 94 93 93   MCH 29.8 30.3 30.3 30.4   MCHC 31.7 32.3 32.6 32.6   RDW 13.6 13.6 13.2 13.1    187 179 190     iCal 4.2  Procalcitonin: 0.15  CRP 7/23: 110  Lactate 7/23: 1.3    UA: +ketones, +leuk estrases, +WBC, +amourphous crystals  Urine cx: pending    Blood cx 7/22: NGTD    Sputum culture: G+ and G- bacteria; " G+ cocci in clusters, normal [place] to date    TSH 7/17: 0.54  Pre albumin 7/17: 16  Albumin: 3.5  INR 7/19: 1.16    IMAGING:  MRI: 7/20  Impression:  1. Acute left pontine mesencephalic junction infarct  2. Multiple images severely degraded by motion with postcontrast  sequences nondiagnostic.  3. Head MRA demonstrates no large vessel occlusion. There is a 3 mm  right A2 aneurysm at the branch point of the frontopolar artery.  Recommend follow-up evaluation.  4. Neck MRA demonstrates approximately 50% stenosis of the proximal  right internal carotid artery, although this is limited due to motion  artifact. Diminutive right vertebral artery.  5. Moderate parenchymal volume loss and Leukoaraiosis.     [Result: Acute left pontine infarct]    PET Scan 7/20  IMPRESSION: This patient with a history of laryngeal squamous cell  carcinoma:  1. Bilateral hypermetabolic pulmonary nodules consistent with  pulmonary metastatic disease.  a. Collapse of the right lower/middle lobes with multiple  hypermetabolic foci suggestive of pulmonary metastases. Right lower  perihilar FDG uptake suggests postobstructive atelectasis.  b. Associated small right pleural effusion.  2. No evidence of metastatic disease in the abdomen/pelvis.  3. See neuroradiology report for results of high-resolution PET CT of  the head and neck.     4. Urethral diverticulum containing stones and septations. Evaluation  is limited by urinary FDG activity. Consider urologic consult.  Prominent urinary contamination about the pelvis.  5. 4.7 cm left adnexal cystic structure. No associated FDG uptake.  Given size and metabolic process follow-up is pelvic  ultrasound/follow-up recommended.  6. Thoracic abdominal aortic ectasia. 1.9 cm right iliac aneurysm.    CXR 7/19  Small right pleural effusion and right basilar opacities which may  represent aspiration/infection or atelectasis.    CXR 7/22  IMPRESSION: Increased right basilar opacity, which may  represent  worsening infection.     Head CT 7/19  Impression:   1. In the left side of the cora there is ill-defined hypoattenuation  (series 2, image 25). This likely represents an old infarct, which is  described in outside radiology report dated 5/17/2016.  2. Multiple foci of chronic lacunar infarction in bilateral deep gray  nuclei.     Head CT 7/22  Impression:   Evolving changes of left hemipontine infarction. Otherwise, no acute  intracranial pathology.    ECHO (7/17)  Interpretation Summary  Technically difficult study.  Global and regional left ventricular function is probably normal with an EF of  60-65%.  The right ventricle is normal size. Global right ventricular function is  normal.  Unable to assess mean RA pressure given the patient is on a ventilator.  No pericardial effusion is present.  No significnat valve disease.  Previous study not available for comparison.    Laryngeal biopsies:  FINAL DIAGNOSIS:   A. LEFT FALSE VOCAL CORD, BIOPSY:   - INVASIVE KERATINIZING SQUAMOUS CELL CARCINOMA, moderately   differentiated.   - Perineural invasion is present.     B. PETIOLE, BIOPSY:   - AT LEAST SQUAMOUS CELL CARCINOMA IN-SITU.     C. ADDITIONAL LEFT FALSE VOCAL CORD, BIOPSY:   - INVASIVE KERATINIZING SQUAMOUS CELL CARCINOMA, moderately   differentiated.     D. RIGHT FALSE VOCAL CORD, BIOPSY:   - AT LEAST MICROINVASIVE SQUAMOUS CELL CARCINOMA, keratinizing type.     FNA R thyroid nodule  - Atypica of undetermined significance. Recommend repeat FNA in 4-6 weeks.     A/P: Keira Collins is a 72-year-old female with a PMH significant for COPD, 140 pack-year tobacco history, alcohol use disorder, HLD, HTN, and PVD who is now POD#7 s/p direct laryngoscopy with biopsy of laryngeal SCC and tracheotomy. Patient was recently presented at Otolaryngology Tumor Board Conference to discuss plan of care for laryngeal [id number] SCCa. Recommendations at that time were to proceed with TL + bilateral MRND vs  CXRT.    Patient's hospital course has been complicated by acute ischemic pontine stroke, fevers, and persistent tachycardia. Internal Medicine has been following and will take over as the primary team. Patient was evaluated by Neurology who have placed recommendations.    Febrile this weekend, infectious workup obtained. UA positive for LE, WBCs. UC pending. Treating empirically with vanc/meropenem, appears to be improving.    Patient removed NG. After discussion with family, plan will be to place G-tube. Thoracic surgery consulted, appreciate help.     Neuro:   - Slight dementia at baseline, per family  - History of alcohol use disorder- thiamine  - Head CT 7/22, setting of new headache/HTN: negative for hemorrhagic transformation  - MR Brain revealed CVA on 07/20- Neurology consulted   - tPA not indicated   - Neuro checks ordered q4h   - Permissive HTN with PRN anti-hypertensives for SBP > 160   - Euthermia, euglycemia   - Aspirin qd, statin qd   - TTE with Bubble Study- Internal Medicine assisting   - Telemtry, EKG- Internal Medicine assisting   - Continue glucose monitoring   - A1c, lipid panel, troponins x3- Internal Medicine assisting    - A1c normal, lipid panel normal, troponins negative x3   - Will defer depression and apnea screens at this time  - Pain control: tylenol, oxycodone, and dilaudid prn  - Ativan per MSSA protocol (has not required)    HEENT:  - [id number] SCCa of larynx  - Tracheostomy care:   - The first changing of trach tube, sponge, and/or ties will be performed by ENT. Afterwards, other hospital staff can manage these items as needed. Please do not manipulate straps or place sponge/gauze beneath trach plate.   - Perform regular suctioning.   - RN should change disposable inner canulas every shift and/or clean it with a brush.    - Keep trach tube obturator taped to wall behind the head of the bed. Keep extra unopened 6-0 Shiley and 4-0 Shiley at bedside at all times.   - S/p PLC trach  teaching 7/19 with son  - PET Scan: concerning for lung metastasis. However, discussion with family will need to be had regarding how they would like to proceed with treatment  - FNA: Atypia of undetermined significance. Recommend repeat FNA in 4-6 weeks.    Respiratory:   - Presumed Aspiration pneumonia on CXR   - vanc/meropenem  - Supplemental O2 PRN to keep sats >92%, wean as able  - Hx of COPD: PTA inhalers, albuterol neb PRN. Added Xopenex nebs per medicine recs   - Suspicious RUL nodule, being followed by the Lung Nodule clinic; recommendation was an IR guided biopsy. Not scheduled yet.     CV/heme:   - Cites Chest Pain this AM   - repeat EKG   - repeat troponins   -  add on Mg  - Continuous cardiac monitoring   - PTA Norvasc, losartan; PRN hydralazine, labetalol IV for SBP >160; lasix PRN per medicine team  - PTA ASA 81mg and plavix 75mg (when able) qd  - PVD s/p left femoral endarterectomy  - Postop Hgb 14.7    FEN/GI:  - After discussion with family, plan for G-tube placement by thoracic surgery later this week  - Diet: NPO due to PTA dysphagia and now new trach  - SLP bedside swallow 7/18: recommend NPO due to risk of aspiration  - Severe Malnutrition in the setting of acute on chronic illness: Nutrition consult, will discuss NG/G-tube placement with family.  - Multivitamin  - Electrolyte replacement as needed   - per primary team  - Bowel regimen: scheduled senna and docusate; miralax qd    :   - UA positive for infection, on vanc + meropenem. Awaiting culture  - UA positive for crystals, internal medicine assisting with this work up (Lactate dehydrogenase and uric acid WNL)  - Urethral diverticulum containing stones and septations, will discuss with medicine  - Incontinent of urine - preexisting condition, wears Depends at baseline     Endo:   - Right Thyroid nodule s/p FNA- results pending  - No acute concerns, TSH wnl     ID:   - Afebrile - low-grade temperature of 101.8 7/22; WBC count wnl, CRP  wnl  - vanc + meropenem   - pharmacy rec'd: discontinue meropenem and initiate cefepime 2g IV q8H and metronidazole 500 mg IV q6H for 7 days   - defer to primary team  - Presumed aspiration pneumonia - CXR shows worsening right-sided pneumonia vs pulmonary edema   - Sputum culture ordered today: G+ and G-; G+ cocci in clusters   - cultures pending  - UTI, UA +WBC and + Leuk estrases    PPX:   - SCDs, IS. Lovenox    Consults:   - Nutrition  - PLC- tracheostomy and TF cares  - PT/OT/SLP rec TCU vs LTACH  - Thoracic surgery for G-tube    Disposition Plan   Medicine will take over as primary team. We will continue to be closely involved in her care during her hospitalization. We would like to be present for any goals of care discussions.     Expected discharge TCU vs LTACH once trach has been changed and tract is stable, tolerating TFs at bolus goal, trach and TF care, suctioning requirements decreasing in frequency, transitioned to all enteral medications.        -- Patient and above plan to be discussed with Dr. Win Thosmon MD  Otolaryngology-Head & Neck Surgery  Please contact ENT by dialing * [phone] and entering job code [phone].    I, Yeni Barrett, am serving as a scribe to document services personally performed by Mor Thomson MD, based upon my observations and the provider's statements to me. All documentation has been reviewed by the aforementioned doctor prior to being entered into the official medical record.     I was present with the medical student who participated in the service and in the documentation of the note. I have verified the history and personally performed the physical exam and medical decision making. I agree with the assessment and plan of care as documented in the note.    Mor Thomson MD PGY1  Otolaryngology-Head & Neck Surgery  Please contact ENT by dialing * [phone] and entering job code [phone].

## 2018-07-24 NOTE — PLAN OF CARE
Problem: Patient Care Overview  Goal: Plan of Care/Patient Progress Review  Discharge Planner SLP   Patient plan for discharge: Unknown   Current status: Pt remains at high risk for aspiration with current level of skills.  Pt with poor secretion management and unable to tolerate cuff deflation. Pharyngeal swallow response unable to be elicited on command.  Recommend continue NPO and cuff inflated due to lack of tolerance.    Barriers to return to prior living situation: Below baseline   Recommendations for discharge: Rehab   Rationale for recommendations: Anticipate ongoing needs        Entered by: Vilma Tolliver 07/24/2018 3:13 PM

## 2018-07-24 NOTE — PROGRESS NOTES
"Notified by  that when she entered room, both PIVs were removed and laying on the bed along with minimal bleeding on the sheets. Patient confirmed that she removed the IVs. She pointed to the swelling on her right hand when asked why she removed the IV. Restated that without IVs the patient would not receive water and would not survive more than some days. When asked if she wanted another IV so should could get water she stated yes. She was able to write the following on her white board in regards to orientation questions; \"[facility], 7979, July 24.\" She stated no, when asked if she knew why she was here. PIV order placed.  "

## 2018-07-24 NOTE — PROVIDER NOTIFICATION
MD Thomson paged at 0630: RN at bedside continuously suctioning trach after ENT left room w/ thick/frothy sputum (cuff was deflated by team). RR increased to 40-45, pt c/o severe L-sided lung/rib pain and visually restless/uncomforable. O2 sats 93-95% on 21% FiO2.     Cuff was re-inflated by RN. Pt RR decreased to 20-30, O2 sats 96-97% on 21% FiO2 and visually looks more comfortable in bed.

## 2018-07-24 NOTE — PROGRESS NOTES
[facility], [place]    Internal Medicine Progress Note - Gold Service      Assessment & Plan   Keira Collins is a 72 year old female admitted on 7/17/2018. She has a medical history of COPD, HTN, hyperlipidemia, chronic tobacco abuse, h/o alcohol abuse, squamous cell carcinoma of larynx admitted on 7/17/2018 for direct laryngoscopy with biopsy, tracheostomy and NGT placement.  Internal medicine consulted 7/20 for HTN, hypoxia and tachycardia.  Hospital course complicated by acute left pontine CVA on 7/19.      Sepsis and acute hypoxic respiratory failure most likely 2/2 aspiration PNA - Started on Clindamycin 7/19, switched to Flagyl and Ceftriaxone 7/10 to avoid development of CDiff.  Febrile overnight 7/22 101.9 with associated tachycardia and tachypnea therefore broadened to Meropenem and Vanco.  Lactate negative, Procal 0.11, WBC 9.9, .  CXR (7/23) with increase R basilar opacity, UA (7/22) with large LE and 11 WBC but cx negative, Blood cx (7/22) NGTD.  Sputum (7/22) with moderate growth of normal [place]. TTE (7/20) was technically difficult but LVEF essentially negative (60-65%). HD stable.  Initially fever thought to be 2/2 aspiration PNA but also may be 2/2 aspiration pneumonitis.  Clinically euvolemic, weight on admission 123 lbs, currently 131 lbs but stable since 7/20 (134 lbs).   - MRSA nares negative --> discontinue Vanco today   - No h/o ESBL --> discontinue Meropenem   - Given no WBC and no procal, will trial de-escalation to Cefepime and Flagyl today to complete 7 day course (7/21-7/27)  - If febrile overnight or HD instability would re-broaden to IV Meropenem  - CXR today  - Follow up blood cx   - Humidified 02 via trach dome  - Continue scheduled nebs   - Continue I/O and daily weights    - Continue RT and RN frequent suctioning   - CRP today and tomorrow       Chest Pain - Noted on exam today.  EKG without ischemic changes, troponin flat, Mag 2.4. LIkely mSK in  origin from coughing as cardiac workup unrevealing and symptoms have since resolved.   - Continue to monitor   - Telemetry   - Pain control: Lidocaine patches & wean PRN dilaudid to q 6 hours from q 2 hours     Hypokalemia; Hypocalcemia - Likely 2/2 malnutrition due to no enteral access and inablity to give nutrition.    - Replace K per elyte protocol   - Calcium gluconate 1 gm IV x 1 today, repeat in AM     Acute L pontine CVA - R sided facial droop first noted AM of 7/19, improved throughout the day.  Head CT w/o acute intracranial abnormality but subsequent MRI head revealed new L pontine infarct.  Neurology consulted, out of TPA window.  Lipid panel normal.    - Continue rectal ASA and statin.  Continue Plavix when enteral access is established.  - SBP goal <160 per neuro, change Labtetolol to 10 mg IV q 4 hours and continue Hydralazine PRN SBP >170 or DBP >105  - TTE with bubble study not yet completed, re-ordered today   - PT/OT/SLP  - Delirium precautions     Severe protein-calorie malnutrition; Dysphagia - Likely was malnourished PTA 2/2 cancer, now it is likely exacerbated in setting of acute illness, recent surgery and CVA which has resulted in severe dysphagia.  An NGT was placed intra-operatively but was self D/Cd by patient and she has been refusing replacement per review of notes.  Thoracic surgery consulted 7/23 for G tube placement, pending date and surgeon.  Family in agreement.    - Thoracic surgery following for G tube placement, pending date and surgeon, will need Lovenox held prior to procedure.   - NPO   - Albumin/prealbumin q Monday   - Nutrition team consulted and following, appreciate reccs   - Continue thiamine, folic acid IV, resume MVI when enteral access established    Hypertension; Sinus tachycardia - PTA maintained on Losartan 50 mg qday, amlodipine 5 mg qday (unclear compliance per OP notes). Does have some hx of alcohol use but do not feel tachycardia is c/w etoh withdrawal.  EKG w/  some ST depression in lead V6, but V5 and lead I WNL- trop negative, PVCs as below.  Becomes tachycardic with fevers.  SBP goal has been set to <160 per neuro in setting of recent CVA.   - Currently holding Losartan and Amlodipine given no enteral access.    - Labetalol scheduled and PRN Hydralazine as above 4 hours  - Discontinue MSSA    - PNA treatment as above   - Continue telemetry   - Xoponex for tachycardia     Oral candidiasis - Improving.  Continue Nystatin via swab as ordered.     [id number] SCC of Larynx - S/p direct laryngoscopy, with biopsy of laryngeal SCC, tracheostomy  and NGT placement.  Recently presented to ENT tumor board conference to discuss plan of care and recommendation at that time was to proceed with total laryngectomy and bilateral MRND vs CXRT.  Post op course c/b ischemic pontine CVA, fevers and persistent tachycardia.  First trach change was today, 7/24. Per ENT, case will be discussed at Tumor board on Friday, 7/27.   - Per ENT, please do not manipulate straps or place sponge/gauze beneath trach plate  - Perform regular suctioning   - RN to change disposable inner cannulas q shift and/or clean it with a brush   - Keep trach tube obturator taped to wall behind the head of the bed. Keep extra unopened 6-0 Shiley and 4-0 Shiley at bedside at all times  - S/p PLC trach teaching 7/19 with son    JARETT spiculated lung nodule - Noted on 6/2018 CT scan.  PET scan on 7/21 revealed bilateral hypermetabolic pulmonary nodules consistent with pulmonary metastatic diease.  She is being followed by the lung nodule clinic and recommendation was an IR guided biopsy which has not yet been scheduled.  Also revealed small pleural effusion.  CXR yesterday did not note effusion.  - To be discussed at tumor board on Friday   - Has an OP referral to oncology, not yet completed    Right lobe thyroid nodule - S/p FNA showing atypia of undetermined significant.   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  UA negative, Cr normal and making urine although patient is incontinent.  Discussed with urology, no need for IP consultation unless develops UTI or LINDY.    - She should have OP Urology follow up.    PVD s/p endarterectomy iliac artery recannulization w/ stent, right SFA recannulization w/ stent (1/2017) - Managed on PTA ASA and Plavix.  Plavix initially held 2/2 surgery, now unable to given 2/2 no enteral access. OK for patient to have Plavix per surgery team.   - Continue ASA (rectal) and resume Plavix when enteral access is established    COPD - CT chest from 6/13/18 with moderate centrilobular emphysematous changes in upper lobes,  PTA managed on Spiriva daily, Advair BID with PRN Albuterol  - Continue scheduled nebs: Brovana, Pulmicort, Ipratropium and xoponex     Hyperlipidemia - Continue statin     Adrenal gland nodularity - Noted on CT chest 06/13/2018 w/ nodularity of bilateral adrenal glands w/ may represent mets. But PET scan did not reveal any abd/pelvis mets.    Physical Deconditioning - PT recommending discharge to TCU when medically ready .    # Pain Assessment:  Current Pain Score 7/24/2018   Patient currently in pain? yes   Pain score (0-10) -   Pain location Rib cage   Pain descriptors Constant   Keira s pain level was assessed and she is having pain in her left chest.  She is receiving Dilaudid as needed for this pain.     Diet: Adult Formula Drip Feeding: Continuous Isosource 1.5; Nasogastric tube; Goal Rate: 45; mL/hr; Medication - Tube Feeding Flush Frequency: At least 15-30 mL water before and after medication administration and with tube clogging; Amount to Send (Nut...  NPO per Anesthesia Guidelines for Procedure/Surgery Except for: Meds  Fluids: D5NS at 75mL/hour  DVT Prophylaxis: Lovenox daily   Code Status: Full Code    Disposition Plan   Expected discharge: 4 - 7 days, recommended to transitional care unit once  antibiotic plan established, safe disposition plan/ TCU bed available and SIRS/Sepsis treated.     Entered: Rosa FABY Nguyen 07/24/2018, 10:07 AM   Information in the above section will display in the discharge planner report.      The patient's care was discussed with the Attending Physician, Dr. Umanzor, Bedside Nurse, Care Coordinator/, Patient and ENT Consultant.    Rosa CCésar Nguyen  Internal Medicine Staff Hospitalist Service  [facility]  Pager: [phone]  Please see sticky note for cross cover information    Interval History     Chief complaint of pain in her left chest, over her rib cage.  Denies fever, chills, shortness of breath, abdominal pain, nausea or vomiting.  Having thick secretions still.      Data reviewed today: I reviewed all medications, new labs and imaging results over the last 24 hours.     Physical Exam   Vital Signs: Temp: 98.3  F (36.8  C) Temp src: Oral BP: 147/79   Heart Rate: 90 Resp: 28 SpO2: 96 % O2 Device: Trach dome    Weight: 131 lbs 13.36 oz    General: NAD, elderly appearing female, pleasant.  Nods yes and no to questions appropriately.  HEENT: No scleral icterus, PERRLA.  Tongue with thick, white debris.   NECK:   6.0 cuffed Shiley in place, cuff inflated, trach ties in place.  Tracheostomy site clean, no bleeding.  Copious, thin secretions from trach.   CARDIOVASCULAR: Normal rate, regular rhythm. S1/S2 normal, no murmurs, rubs or gallops   RESPIRATORY:CTAB, diminished breath sounds RLL.  No wheezes, use of accessory muscles or retractions.    ABDOMEN: Soft, non-tender abdomen without rebound or guarding, no hepatosplenomegaly, no palpable masses, hypoactive bowel sounds present  EXTREMITIES: Peripheral pulses normal, no peripheral edema, warm  Skin: No ecchymoses, no rashes  NEURO: Lethargic but arouses to loud voice and nodding yes and no.  R sided facial droop and R sided tongue deviation.  RUE and RLE 3+/5 strength, LUE and LLE 5/5  strength. She is voluntarily pickup up her RUE up to scratch head.     Data   Medications     IV fluid REPLACEMENT ONLY       dextrose 5% and 0.9% NaCl 75 mL/hr at 07/24/18 0920       amLODIPine  5 mg Per Feeding Tube Daily     arformoterol  15 mcg Nebulization BID     aspirin  80 mg Rectal Daily     atorvastatin  40 mg Per Feeding Tube Daily     budesonide  0.25 mg Nebulization BID     Calcium carb-Vitamin D 500 mg [place]-200 units  1 tablet Per Feeding Tube BID     calcium gluconate  1 g Intravenous Once     ceFEPIme (MAXIPIME) IV  2 g Intravenous Q12H     clopidogrel  75 mg Per Feeding Tube Daily     docusate  50 mg Per Feeding Tube BID     enoxaparin  30 mg Subcutaneous Q24H     folic acid  1 mg Intravenous Daily     ipratropium  0.5 mg Nebulization Q4H While awake     labetalol  10 mg Intravenous Q4H     lactobacillus rhamnosus (GG)  1 capsule Per Feeding Tube TID AC     levalbuterol  0.63 mg Nebulization Q4H While awake     [START ON 7/25/2018] lidocaine  1 patch Transdermal Q24H     lidocaine (PF)  5 mL Subcutaneous Once     lidocaine   Transdermal Q8H     lidocaine   Transdermal Q24h     losartan  50 mg Per Feeding Tube Daily     metroNIDAZOLE  500 mg Intravenous Q6H     multivitamins with minerals  15 mL Per Feeding Tube Daily     nystatin  500,000 Units Oral 4x Daily     polyethylene glycol  17 g Per Feeding Tube Daily     sennosides  5 mL Per Feeding Tube BID     [START ON 7/25/2018] thiamine  200 mg Intravenous Daily     [START ON 7/27/2018] thiamine  100 mg Intravenous Daily     Data     Recent Labs  Lab 07/24/18  0518 07/23/18  0432 07/22/18  1756  07/21/18  2200 07/21/18  1632  07/19/18  0709  07/17/18 2020   WBC 9.9 10.0 9.7  --   --   --   < > 12.6*  < >  --    HGB 12.3 11.9 13.0  --   --   --   < > 13.7  < >  --    MCV 94 94 94  --   --   --   < > 95  < >  --     179 187  --   --   --   < > 177  < >  --    INR  --   --   --   --   --   --   --  1.16*  --   --     139 139  --   --  138   < > 141  < >  --    POTASSIUM 3.1* 3.7 3.7  < >  --  3.7  < > 3.2*  < >  --    CHLORIDE 112* 112* 109  --   --  110*  < > 109  < >  --    CO2 17* 16* 18*  --   --  16*  < > 22  < >  --    BUN 4* 5* 6*  --   --  12  < > 9  < >  --    CR 0.44* 0.53 0.52  --   --  0.51*  < > 0.63  < >  --    ANIONGAP 11 11 12  --   --  12  < > 10  < >  --    ESTEFANI 7.6* 7.3* 8.0*  --   --  8.3*  < > 8.8  < >  --    * 121* 128*  --   --  122*  < > 103*  < >  --    ALBUMIN  --   --   --   --   --   --   --   --   --  3.5   TROPI <0.015  --   --   --  <0.015 <0.015  < >  --   --   --    < > = values in this interval not displayed.  No results found for this or any previous visit (from the past 24 hour(s)).

## 2018-07-24 NOTE — PROGRESS NOTES
Otolaryngology Trach Change Note  July 24, 2018    Type of trach removed: 6-0 cuffed shiley  Type of trach placed: 6-0 cuffed shiley    Procedure note: Patient was suctioned via trach and secretions were removed.  Patient was appropriately positioned flat and supine. Trach ties/sutures were removed while holding trach in place. Patient was then hyperoxygenated via trach dome. Trach was removed without difficulty. Stoma appeared patent without obstruction or secretions. New trach was inserted with obturator without difficulty or resistance. Obturator was removed and inner cannula locked in place. Correct positioning of trach was confirmed by easily passing a suction through the trach and obvious air movement was noted with good oxygen saturations. Trach ties were placed and secured. Patient tolerated the trach change well and without complication.     Continue routine trach cares as directed.   Future trach changes will be performed as needed.     Samantha Steinberg PA-C  Otolaryngology-Head & Neck Surgery  Please contact ENT by dialing [phone] and entering job code [phone] when prompted.

## 2018-07-24 NOTE — PROGRESS NOTES
Thoracic Surgery Note    Currently coordinating timing and surgeon availability for gastrostomy tube placement. Will plan for OR tomorrow afternoon (7/25) with Dr. Durant, though depending upon schedule may instead change to Thursday (7/26) with Dr Nash. Plan for percutaneous endoscopic gastrostomy, though may have to perform G tube laparoscopically if we cannot achieve a safe window for PEG.    Sukhwinder Crump MD  PGY-3 General Surgery

## 2018-07-24 NOTE — PLAN OF CARE
"Problem: Patient Care Overview  Goal: Plan of Care/Patient Progress Review  Outcome: Improving  Temp: 99.8  F (37.7  C) Temp src: Axillary BP: 159/77   Heart Rate: 98 Resp: 28 SpO2: 94 % O2 Device: Trach dome    Neuro: Alert, Disorientated to situation. Follows all commands. R-sided facial droop unchanged. Slight RUE-side weakness. VPM for safety. Afebrile. MSSA 4. Neck 14 inches.   Cardiac:  SR, with frequent PAC's. -160s/80-90s, PRN Hydralazine given x1 with minimal effect.   Resp: Shiley #6 with cuff inflated. On trach dome at 21% FiO2. Suctioning q2-4 hours with clear/yellow secretions. Trach cares preformed q4h d/t thick secretions around trach. Lungs sounds clear/crackles.   GI/: Incontinent of urine and stool.   Diet/Appetite: Strict NPO. Possible PEG placement this week.   Skin: Generalized swelling of neck/mouth.   Access: L/R-PIV. D5NS at 75 cc/hr.   Activity: T/R q2h.   Pain: C/o trach site pain, relieved with Dilaudid x1 this shift.   Labs: MRSA swab negative.       ENT at bedside: Plan to trial cuff deflated-\"if pt can handle, then plan to changed trach out with cuffless.\"   Will continue with plan of care and notify MD of any changes.         "

## 2018-07-25 NOTE — PLAN OF CARE
Problem: Patient Care Overview  Goal: Plan of Care/Patient Progress Review  PT / 6B - Cancel. Pt not medically appropriate this AM secondary to increased respiratory needs. Will check back as schedule allows or re-scheduled per POC.

## 2018-07-25 NOTE — PROVIDER NOTIFICATION
Per tele, pt had multiple 4 beat runs of afib vs vtach over the last 8 hours. NP Patrick notified. Will continue to monitor.

## 2018-07-25 NOTE — PROVIDER NOTIFICATION
Dr. Barahona notified about pt work of breathing, Gasping for air sitting straight up, RR now 40s. FiO2 increased to 50% to maintain sats >95%. Suctioning requirements [id number]. -125. /100.     Plan: Will try BiPAP, dc IV fluid, get ABG, give 20mg Lasix and 20mg Labetalol.    K 4.0 per protocol pt needs 20mEq replacement (200ml) d/t pt NPO. MD ok with holding off on replacement.       MD called to bedside at 0730: Pt respiratory status remains unstable: gasping on BiPAP at 50% FiO2. O2 sats maintaining at %. Pt also more lethargic. Per MD continue with current cares, will assess at bedside with primary team ~20mins.

## 2018-07-25 NOTE — PROGRESS NOTES
[facility], [place]    Internal Medicine Progress Note - Gold Service      Assessment & Plan   Keira Collins is a 72 year old female admitted on 7/17/2018. She has a medical history of COPD, HTN, hyperlipidemia, chronic tobacco abuse, h/o alcohol abuse, squamous cell carcinoma of larynx admitted on 7/17/2018 for direct laryngoscopy with biopsy, tracheostomy and NGT placement.  Internal medicine consulted 7/20 for HTN, hypoxia and tachycardia.  Hospital course complicated by acute left pontine CVA on 7/19.      Sepsis and acute hypoxic respiratory failure most likely 2/2 aspiration PNA - Started on Clindamycin 7/19, switched to Flagyl and Ceftriaxone 7/10 to avoid development of CDiff.  Febrile overnight 7/22 101.9 with associated tachycardia and tachypnea therefore broadened to Meropenem and Vancomycin. Lactate, procal, WBC unremarkable at the time therefore de-escalated again to Cefepime and Flagyl on 7/24 with improvement in ->84->68.  Urine cx, blood cx (7/22) negative.  Sputum (7/22) with moderate growth of normal [place]. TTE (7/24) technically difficult, LVEF normal (60-65%).  Acute resp distress overnight with increased secretions and need for Bipap.  Afebrile, however, WBC now elevated 14.8 with associated tachycardia and tachypnea.  EKG without ischemic changes and troponin flat.  Appears mildly hypervolemic with elevated JVP and increased weight (132 lbs from admit 123 lbs).  CXR this AM small R pleural effusion, pulmonary edema and R>L bibasilar opacities.  ABG 7.23/34/104/21, procal 0.16, BNP 1932.  F/u CT chest: no PE, stable pulm nodules, new nodular thickening ALBARO, increased R>L moderate pleural effusion w/ atelectasis.  Differentials of acute decompensation likely multifactorial including worsening aspiration PNA vs CHF/pulmonary edema.       - Would continue bipap: IPAP 13, EPAP 6, fio2 50%.  Wean fi02 as able to maintain sp02 >92%.  If she remains stable today, can  consider weaning to trach dome (humidified 02), however, would favor placing back on BiPAP overnight.   - Discontinue Flagyl/Cefepime and re-broaden to Meropenem   - Lasix 40 mg IV x 1 this AM, repeat as needed to keep negative fluid balance.   - Send blood and UA/UC  - Continue scheduled nebs   - Place chin for strict I/O, continue daily weights   - Continue RT and RN frequent suctioning      Hypokalemia; Hypocalcemia - Likely 2/2 malnutrition due to no enteral access and inablity to give nutrition.    - Replace K per elyte protocol   - Calcium gluconate 2 gm IV x 1    AMS - Most consistent with hospital acquired delirium due to infection, poor sleep hygeine and hypoxia as well as recent CVA.  This AM appears to be more delirious but has improved throughout the day.   - Correct hypoxia and underlying medical components  - Consider Seroquel at at bedtime   - Delirium precautions ordered      Acute L pontine CVA - R sided facial droop first noted AM of 7/19, improved throughout the day.  Head CT w/o acute intracranial abnormality but subsequent MRI head revealed new L pontine infarct.  Neurology consulted, out of TPA window.  Lipid panel normal.    - Continue rectal ASA and statin.  Continue Plavix when enteral access is established.  TTE w/ bubble study (7/24) with atrial septum intact.   - SBP goal <160 per neuro.  Continue Labtetolol to 10 mg IV q 4 hours and continue Hydralazine PRN SBP >170 or DBP >105  - PT/OT/SLP    Severe protein-calorie malnutrition; Dysphagia - Likely was malnourished PTA 2/2 cancer, now it is likely exacerbated in setting of acute illness, recent surgery and CVA which has resulted in severe dysphagia.  An NGT was placed intra-operatively but was self D/Cd by patient and she has been refusing replacement per review of notes.  Thoracic surgery consulted 7/23 for G tube placement, pending date and surgeon.  Family in agreement.    - Thoracic surgery following for G tube placement, to be done  either today or tomorrow.  Her tenuous respiratory status may affect this plan.   - NPO   - Albumin/prealbumin q Monday   - Nutrition team consulted and following, appreciate reccs   - Continue thiamine, folic acid IV, resume MVI when enteral access established    Hx of Hypertension - PTA maintained on Losartan 50 mg qday, amlodipine 5 mg qday (unclear compliance per OP notes). Does have some hx of alcohol use but do not feel c/w etoh withdrawal.  EKG w/ some ST depression in lead V6, but V5 and lead I WNL- trop negative, PVCs as below.  Becomes tachycardic with fevers.  SBP goal has been set to <160 per neuro in setting of recent CVA.   - Currently holding Losartan and Amlodipine given no enteral access.    - Labetalol scheduled and PRN Hydralazine as above 4 hours    - PNA treatment as above   - Continue telemetry     Oral candidiasis - Improving.  Continue Nystatin via swab as ordered.     [id number] SCC of Larynx - S/p direct laryngoscopy, with biopsy of laryngeal SCC, tracheostomy  and NGT placement.  Recently presented to ENT tumor board conference to discuss plan of care and recommendation at that time was to proceed with total laryngectomy and bilateral MRND vs CXRT.  Post op course c/b ischemic pontine CVA, fevers and persistent tachycardia.  First trach change was today, 7/24. Per ENT, case will be discussed at Tumor board on Friday, 7/27.   - Keep trach cuff up   - Per ENT, please do not manipulate straps or place sponge/gauze beneath trach plate  - Perform regular suctioning   - RN to change disposable inner cannulas q shift and/or clean it with a brush   - Keep trach tube obturator taped to wall behind the head of the bed. Keep extra unopened 6-0 Shiley and 4-0 Shiley at bedside at all times  - S/p PLC trach teaching 7/19 with son  - I do feel there is a role for palliative care consultation in this case acute worsening and possible mets noted on PET scan, discussed with ENT who would like to hold off on  this for now and would like to direct goals of cares discussion.   - I think pt/family/RN staff would benefit from care conference early next week after tumor board meeting and to discuss plan--6B RNCC aware    RUL spiculated lung nodule - Noted on 6/2018 CT scan.  PET scan on 7/21 revealed bilateral hypermetabolic pulmonary nodules consistent with pulmonary metastatic diease.  She is being followed by the lung nodule clinic and recommendation was an IR guided biopsy which has not yet been scheduled.  Also revealed small pleural effusion.  CXR yesterday did not note effusion.  - To be discussed at tumor board on Friday   - Has an OP referral to oncology, not yet completed    Right lobe thyroid nodule - S/p FNA showing atypia of undetermined significant.   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  UA negative, Cr normal and making urine although patient is incontinent.  Discussed with urology, no need for IP consultation unless develops UTI or LINDY.    - She should have OP Urology follow up with Dr. Arroyo or Dr. Heller for possible cystoscopy and possible surgical excision of diverticulum, message sent to schedulers     PVD s/p endarterectomy iliac artery recannulization w/ stent, right SFA recannulization w/ stent (1/2017) - Managed on PTA ASA and Plavix.  Plavix initially held 2/2 surgery, now unable to given 2/2 no enteral access. OK for patient to have Plavix per surgery team.   - Continue ASA (rectal) and resume Plavix when enteral access is established    COPD - CT chest from 6/13/18 with moderate centrilobular emphysematous changes in upper lobes,  PTA managed on Spiriva daily, Advair BID with PRN Albuterol  - CT Chest today as above   - Continue scheduled nebs: Brovana, Pulmicort, Ipratropium and xoponex     Hyperlipidemia - Continue statin     Adrenal gland nodularity - Noted on CT chest  06/13/2018 w/ nodularity of bilateral adrenal glands w/ may represent mets. But PET scan did not reveal any abd/pelvis mets.    Physical Deconditioning - PT recommending discharge to TCU when medically ready .    # Pain Assessment:  Current Pain Score 7/25/2018   Patient currently in pain? denies   Pain score (0-10) -   Pain location -   Pain descriptors -   Keira s pain level was assessed and she is having pain in her left chest.  She is receiving Dilaudid as needed for this pain.     Diet: NPO until enteral access is established   DVT Prophylaxis: Lovenox daily   Code Status: Full Code    Disposition Plan   Expected discharge: 4 - 7 days, recommended to transitional care unit once antibiotic plan established, safe disposition plan/ TCU bed available and SIRS/Sepsis treated.     Entered: Rosa Nguyen 07/25/2018, 8:19 AM   Information in the above section will display in the discharge planner report.      The patient's care was discussed with the Attending Physician, Dr. Wu, Bedside Nurse, Care Coordinator/, Patient and ENT Consultant.    Rsoa Nguyen  Internal Medicine Staff Hospitalist Service  [facility]  Pager: [phone]  Please see sticky note for cross cover information    Interval History     Keira had respiratory distress last night in to this AM with tachycardia, hypertension, tachypnea.  Per nursing report, she was showing signs of air hunger as well.  This morning she was in acute distress and was endorsing shortness of breath.  She appeared somewhat confused this AM and was picking at 02 tubing and trach.  On afternoon rounds, she appears comfortable and interactive.  Her eldest son, JACQUELINE was at the bedside and she was playing a game with him.  She currently is having some stabbing pain in her left chest but notes that her breathing is more comfortable.  Denies fever, chills, abdominal pain, nausea or vomiting.     Data reviewed today: I reviewed all  medications, new labs and imaging results over the last 24 hours.     Physical Exam   Vital Signs: Temp: 98.8  F (37.1  C) Temp src: Axillary BP: 157/90   Heart Rate: 99 Resp: 22 SpO2: 100 % O2 Device: BiPAP/CPAP    Weight: 132 lbs 11.47 oz    General: NAD, elderly appearing female, in acute respiratory distress, gasping for air.   HEENT: No scleral icterus, PERRLA.  Tongue with thin, white debris.   NECK:   6.0 cuffed Shiley in place, cuff inflated, trach ties in place.  Tracheostomy site clean, no bleeding.   CARDIOVASCULAR: Normal rate, regular rhythm. S1/S2 normal, no murmurs, rubs or gallops   RESPIRATORY:CTAB, diminished breath sounds RLL.  No wheezes, use of accessory muscles or retractions.    ABDOMEN: Soft, non-tender abdomen without rebound or guarding, no hepatosplenomegaly, no palpable masses, hypoactive bowel sounds present  EXTREMITIES: Peripheral pulses normal, no peripheral edema, warm  Skin: No ecchymoses, no rashes  NEURO: Lethargic but arouses to loud voice and nodding yes and no.  R sided facial droop and R sided tongue deviation.  RUE and RLE 3+/5 strength, LUE and LLE 5/5 strength. She is voluntarily pickup up her RUE up to scratch head.     Data   Medications     IV fluid REPLACEMENT ONLY       - MEDICATION INSTRUCTIONS -         amLODIPine  5 mg Per Feeding Tube Daily     arformoterol  15 mcg Nebulization BID     aspirin  80 mg Rectal Daily     atorvastatin  40 mg Per Feeding Tube Daily     budesonide  0.25 mg Nebulization BID     Calcium carb-Vitamin D 500 mg [place]-200 units  1 tablet Per Feeding Tube BID     clopidogrel  75 mg Per Feeding Tube Daily     docusate  50 mg Per Feeding Tube BID     enoxaparin  30 mg Subcutaneous Q24H     folic acid  1 mg Intravenous Daily     ipratropium  0.5 mg Nebulization Q4H While awake     labetalol  10 mg Intravenous Q4H     lactobacillus rhamnosus (GG)  1 capsule Per Feeding Tube TID AC     levalbuterol  0.63 mg Nebulization Q4H While awake     lidocaine   1 patch Transdermal Q24H     lidocaine (PF)  5 mL Subcutaneous Once     lidocaine   Transdermal Q8H     lidocaine   Transdermal Q24h     losartan  50 mg Per Feeding Tube Daily     meropenem  1 g Intravenous Q8H     multivitamins with minerals  15 mL Per Feeding Tube Daily     nystatin  500,000 Units Oral 4x Daily     polyethylene glycol  17 g Per Feeding Tube Daily     sennosides  5 mL Per Feeding Tube BID     sodium chloride (PF)  10 mL Intracatheter Once     sodium chloride bacteriostatic  12 mL Intravenous Once     thiamine  200 mg Intravenous Daily     [START ON 7/27/2018] thiamine  100 mg Intravenous Daily     Data     Recent Labs  Lab 07/25/18  0551 07/25/18  0434 07/24/18  2102 07/24/18  1544 07/24/18  1228 07/24/18  0518 07/23/18  0432  07/19/18  0709   WBC 14.8*  --   --   --   --  9.9 10.0  < > 12.6*   HGB 14.0  --   --   --   --  12.3 11.9  < > 13.7   MCV 91  --   --   --   --  94 94  < > 95     --   --   --   --  189 179  < > 177   INR  --   --   --   --   --   --   --   --  1.16*   NA  --  138  --   --   --  140 139  < > 141   POTASSIUM  --  4.0  --  4.0  --  3.1* 3.7  < > 3.2*   CHLORIDE  --  110*  --   --   --  112* 112*  < > 109   CO2  --  19*  --   --   --  17* 16*  < > 22   BUN  --  4*  --   --   --  4* 5*  < > 9   CR  --  0.45*  --   --   --  0.44* 0.53  < > 0.63   ANIONGAP  --  9  --   --   --  11 11  < > 10   ESTEFANI  --  8.3*  --   --   --  7.6* 7.3*  < > 8.8   GLC  --  154*  --   --   --  124* 121*  < > 103*   TROPI  --  <0.015 <0.015  --  <0.015 <0.015  --   < >  --    < > = values in this interval not displayed.  Recent Results (from the past 24 hour(s))   XR Chest Port 1 View    Narrative    EXAM: XR CHEST PORT 1 VW  7/24/2018 1:05 PM     HISTORY:  Hypoxia, increased secretions.  Eval for worsening PNA;        COMPARISON:  7/20/2018    TECHNIQUE: No frontal radiograph of the chest.    FINDINGS:   The patient is rotated. Tracheostomy tube projects over the trachea.    The trachea is  midline and patent. The cardiomediastinal silhouette is  within the normal limits of shape and size. The pulmonary vasculature  are distinct.     The visualized abdomen and soft tissues are unremarkable. Evidence of  prior right humeral head trauma.     Lung fields demonstrate continued bibasilar streaky/hazy opacities  right greater than left. No pneumothorax.      Impression    IMPRESSION:   Findings described above continue to represent edema versus infection.    I have personally reviewed the examination and initial interpretation  and I agree with the findings.    CHASIDY POWERS MD   XR Chest Port 1 View    Narrative    Single view of the chest  7/25/2018 5:11 AM      HISTORY: acute worsening in dyspnea, secretions    COMPARISON: 7/24/2018    FINDINGS: Tracheostomy. Stable cardiomediastinal silhouette and  pulmonary vasculature. No significant change in small layering right  pleural effusion and compressive atelectasis. No significant change in  bibasilar right greater than left. Kerley B lines present. No  pneumothorax.      Impression    IMPRESSION:   1. No significant change in small right pleural effusion and  compressive atelectasis.  2. Stable interstitial opacities and Kerley B lines, suggestive of  pulmonary edema.  3. Stable right greater than left bibasilar opacities, likely  atelectasis, infection and/or pulmonary edema.    I have personally reviewed the examination and initial interpretation  and I agree with the findings.    ANNA ADAM MD   CT Chest Pulmonary Embolism w Contrast    Narrative    1.  No CT evidence for pulmonary embolism as questioned.  2.  No significant change of multiple pulmonary nodules, including  right upper lobe spiculated 1.3 cm pulmonary nodule.  3.  New nodular thickening of left upper lobe laterally measures 1.7 x  0.7 cm, favors infectious/inflammatory etiology, however malignancy  cannot be completely excluded.  4.  Increased moderate right greater than left  pleural effusion with  associated atelectasis.  5.  Unchanged enlarged heterogeneous appearance of right thyroid gland  with coarse calcifications, follow-up with ultrasound is recommended  if have not done so.   6.  Enlarged mediastinal and right hilum lymphadenopathy.

## 2018-07-25 NOTE — PROVIDER NOTIFICATION
Gold cross cover text paged about new rash on pt's back/neck area. Pt states it does not itch.     Spoke with  around 0230 about rash and pt increased need for suctioning ([id number]) in the last 2 hours, RR 25-35, pt visually uncomfortable. Remains on  TD at 21% FiO2. RT up giving PRN neb.     MD up to assess pt at 0355: discussed rash and respiratory status: RR 28-35, O2 sats 96% on 30% FiO2, remains visually uncomfortable and suctioning q1h.   Plan: CXR and HOLD Cefepime until further discussion with pharmacy.

## 2018-07-25 NOTE — PROGRESS NOTES
I rounded on the patient this morning.  She appears significantly worse than any of the rest of her previous hospitalization.  She has increased work of breathing even on the BiPAP that was started overnight.  The medicine team had been notified based on the notes in the chart.  She is tachypneic and tachycardic.  She is labored even on the BiPAP.  To me this is concerning for a potential PE given her length of hospitalization, her lack of mobility, her malignancy diagnosis.  Our team will contact the general medicine team as well to make them aware of our concerns.    I called and spoke with her son Dar this morning and gave him an update on the situation.  I explained that the antibiotics had to be stopped due to concern of rash.  It appears that the medicine team is gone ahead and ordered a CT of the chest which I think is appropriate to evaluate for PE.  He is aware of this.  Have the medicine team improve her over the course of the morning she may still be able to have the procedure but I think her priority right now is sorting out what is causing the sudden change in her respiratory status.  Certainly if it is a PE we may need to move forward with anticoagulation.  This will obviously also impact her plan for G-tube placement and we may need to think of alternative options.  I explained that given her acute change based on how she looked this morning she probably will not be able to have a G-tube placed.  Certainly if the medicine team can get her improved she may be able to move forward with the G tube. If she needs to be anticoagulated we may need to think of alternative plans with regards to the G tube and her nutrition.    Cynthia Walden MD    Department of Otolaryngology      I spent 20 minutes in rounding on the patient, reviewing imaging and labs, and updating the family.

## 2018-07-25 NOTE — PLAN OF CARE
Problem: Patient Care Overview  Goal: Plan of Care/Patient Progress Review  OT 6B: Cancel - Pt not medically appropriate this AM due to worsening respiratory status overnight/this morning. Will check back as appropriate.

## 2018-07-25 NOTE — PHARMACY-CONSULT NOTE
Pharmacy Consult:   Please review med list to look for new medications that might cause cutaneous drug eruption      Patient's antibiotic changed from meropenem to cefepime/metronidazole 7/24.  Patient has history of penicillin allergy with no reaction listed, but it is most likely the rash occurred from the administration of cefepime.   Cefepime/metronidazole was discontinued and meropenem was reinitiated.     Ignacia Meraz, NickyD

## 2018-07-25 NOTE — PROGRESS NOTES
"Otolaryngology Progress Note  July 24, 2018    S:  Worsening respiratory status this AM requiring BiPAP. Patient appears uncomfortable.  Had a new rash as well, so cefepime held due to concern for allergic reaction. Primary team is aware and working this up.      O: .Blood pressure 139/77, pulse 109, temperature 98.8  F (37.1  C), temperature source Axillary, resp. rate 22, height 1.6 m (5' 2.99\"), weight 60.2 kg (132 lb 11.5 oz), SpO2 100 %, not currently breastfeeding.     General: Uncomfortable appearing, on BiPAP   Neuro: moving all extremities   HEENT: EOMI. 6-0 cuffed Shiley in place, cuff inflated. Trach ties are snug w/ 2 finger breadths between neck and ties. Tracheostomy site clean, no bleeding. No skin breakdown or erythema under trach ties or face plate. Thick secretions from trach site   Pulmonary: Tachypneic, labored breathing via 6-0 Shiley with t-piece on BiPAP    Intake/Output Summary (Last 24 hours) at 07/24/18 0508  Last data filed at 07/24/18 0400   Gross per 24 hour   Intake           2487.5 ml   Output                0 ml   Net           2487.5 ml       LABS:  ROUTINE IP LABS (Last four results)  BMP    Recent Labs  Lab 07/25/18  0434 07/24/18  1544 07/24/18  0518 07/23/18  0432 07/22/18  1756     --  140 139 139   POTASSIUM 4.0 4.0 3.1* 3.7 3.7   CHLORIDE 110*  --  112* 112* 109   ESTEFANI 8.3*  --  7.6* 7.3* 8.0*   CO2 19*  --  17* 16* 18*   BUN 4*  --  4* 5* 6*   CR 0.45*  --  0.44* 0.53 0.52   *  --  124* 121* 128*       CBC    Recent Labs  Lab 07/25/18  0551 07/24/18  0518 07/23/18  0432 07/22/18  1756   WBC 14.8* 9.9 10.0 9.7   RBC 4.60 4.11 4.00 4.29   HGB 14.0 12.3 11.9 13.0   HCT 42.0 38.5 37.5 40.2   MCV 91 94 94 94   MCH 30.4 29.9 29.8 30.3   MCHC 33.3 31.9 31.7 32.3   RDW 13.4 13.5 13.6 13.6    189 179 187     Troponin neg X 3  iCal 4.3  Procalcitonin: 0.15  CRP: 110 > 68  Lactate: 1.3    Urine cx: pending  Blood cx 7/22: NGTD  Sputum culture: G+ and G- bacteria; G+ " cocci in clusters, normal [place] to date    TSH 7/17: 0.54  Pre albumin 7/17: 16  Albumin: 3.5  INR 7/19: 1.16    IMAGING:  MRI: 7/20  Impression:  1. Acute left pontine mesencephalic junction infarct  2. Multiple images severely degraded by motion with postcontrast  sequences nondiagnostic.  3. Head MRA demonstrates no large vessel occlusion. There is a 3 mm  right A2 aneurysm at the branch point of the frontopolar artery.  Recommend follow-up evaluation.  4. Neck MRA demonstrates approximately 50% stenosis of the proximal  right internal carotid artery, although this is limited due to motion  artifact. Diminutive right vertebral artery.  5. Moderate parenchymal volume loss and Leukoaraiosis.     [Result: Acute left pontine infarct]    PET Scan 7/20  IMPRESSION: This patient with a history of laryngeal squamous cell  carcinoma:  1. Bilateral hypermetabolic pulmonary nodules consistent with  pulmonary metastatic disease.  a. Collapse of the right lower/middle lobes with multiple  hypermetabolic foci suggestive of pulmonary metastases. Right lower  perihilar FDG uptake suggests postobstructive atelectasis.  b. Associated small right pleural effusion.  2. No evidence of metastatic disease in the abdomen/pelvis.  3. See neuroradiology report for results of high-resolution PET CT of  the head and neck.     4. Urethral diverticulum containing stones and septations. Evaluation  is limited by urinary FDG activity. Consider urologic consult.  Prominent urinary contamination about the pelvis.  5. 4.7 cm left adnexal cystic structure. No associated FDG uptake.  Given size and metabolic process follow-up is pelvic  ultrasound/follow-up recommended.  6. Thoracic abdominal aortic ectasia. 1.9 cm right iliac aneurysm.    CXR 7/19  Small right pleural effusion and right basilar opacities which may  represent aspiration/infection or atelectasis.    CXR 7/22  IMPRESSION: Increased right basilar opacity, which may represent  worsening  infection.     Head CT 7/19  Impression:   1. In the left side of the cora there is ill-defined hypoattenuation  (series 2, image 25). This likely represents an old infarct, which is  described in outside radiology report dated 5/17/2016.  2. Multiple foci of chronic lacunar infarction in bilateral deep gray  nuclei.     Head CT 7/22  Impression:   Evolving changes of left hemipontine infarction. Otherwise, no acute  intracranial pathology.    ECHO (7/17)  Interpretation Summary  Technically difficult study.  Global and regional left ventricular function is probably normal with an EF of  60-65%.  The right ventricle is normal size. Global right ventricular function is  normal.  Unable to assess mean RA pressure given the patient is on a ventilator.  No pericardial effusion is present.  No significnat valve disease.  Previous study not available for comparison.    Laryngeal biopsies:  FINAL DIAGNOSIS:   A. LEFT FALSE VOCAL CORD, BIOPSY:   - INVASIVE KERATINIZING SQUAMOUS CELL CARCINOMA, moderately   differentiated.   - Perineural invasion is present.     B. PETIOLE, BIOPSY:   - AT LEAST SQUAMOUS CELL CARCINOMA IN-SITU.     C. ADDITIONAL LEFT FALSE VOCAL CORD, BIOPSY:   - INVASIVE KERATINIZING SQUAMOUS CELL CARCINOMA, moderately   differentiated.     D. RIGHT FALSE VOCAL CORD, BIOPSY:   - AT LEAST MICROINVASIVE SQUAMOUS CELL CARCINOMA, keratinizing type.     FNA R thyroid nodule  - Atypica of undetermined significance. Recommend repeat FNA in 4-6 weeks.       A/P: Keira Collins is a 72-year-old female with a PMH significant for COPD, 140 pack-year tobacco history, alcohol use disorder, HLD, HTN, and PVD who is now POD#8 s/p direct laryngoscopy with biopsy of laryngeal SCC and tracheotomy. Patient was recently presented at Otolaryngology Tumor Board Conference to discuss plan of care for laryngeal [id number] SCCa. Recommendations at that time were to proceed with TL + bilateral MRND vs CXRT.    Trach change was performed  on 7/24 and was uncomplicated, trach site healing well.    Patient's hospital course has been complicated by acute ischemic pontine stroke, fevers, and persistent tachycardia, and now worsening respiratory status. Internal Medicine is now the primary team, although we continue to follow along closely. Discussed concern for PE given her being off her plavix, decreased mobility, recent stroke, and underlying malignancy with the primary team, they are aware and working up the situation.     Neuro:   - Slight dementia at baseline, per family  - MR Brain revealed CVA on 07/20- Neurology consulted   - tPA not indicated   - Neuro checks ordered q4h   - Permissive HTN with PRN anti-hypertensives for SBP > 160   - Euthermia, euglycemia   - Aspirin qd, statin qd   - TTE with Bubble Study- Internal Medicine assisting   - Telemtry, EKG- Internal Medicine assisting   - Continue glucose monitoring   - A1c, lipid panel, troponins x3- Internal Medicine assisting    - A1c normal, lipid panel normal, troponins negative x3   - Will defer depression and apnea screens at this time  - Pain control: tylenol, oxycodone, and dilaudid prn    HEENT:  - [id number] SCCa of larynx  - Tracheostomy care:   - Trach changed yesterday, in good position healing well   - Perform regular suctioning.   - RN should change disposable inner canulas every shift and/or clean it with a brush.    - Keep trach tube obturator taped to wall behind the head of the bed. Keep extra unopened 6-0 Shiley and 4-0 Shiley at bedside at all times.   - S/p PLC trach teaching 7/19 with son  - PET Scan: concerning for lung metastasis. However, discussion with family will need to be had regarding how they would like to proceed with treatment  - FNA: Atypia of undetermined significance. Recommend repeat FNA in 4-6 weeks.    Respiratory:   - Requiring BiPAP - workup in progress by primary team  - Hx of COPD: PTA inhalers, albuterol neb PRN. Added Xopenex nebs per medicine recs   -  Suspicious RUL nodule, being followed by the Lung Nodule clinic; recommendation was an IR guided biopsy. Not scheduled yet.     CV/heme:   - Continuous cardiac monitoring   - PTA Norvasc, losartan; PRN hydralazine, labetalol IV for SBP >160; lasix PRN per medicine team  - PTA ASA 81mg and plavix 75mg (when able) qd  - PVD s/p left femoral endarterectomy  - Postop Hgb 14.7    FEN/GI:  - After discussion with family, plan for G-tube placement by thoracic surgery   - scheduled for 7/25 or 7/26 pending respiratory status  - Diet: NPO due to PTA dysphagia and now new trach  - SLP bedside swallow 7/18: recommend NPO due to risk of aspiration  - Severe Malnutrition in the setting of acute on chronic illness: Nutrition consult, will discuss NG/G-tube placement with family.  - Multivitamin  - Electrolyte replacement as needed   - per primary team  - Bowel regimen: scheduled senna and docusate; miralax qd    :   - UA positive for infection crystals, cultures pending  - Urethral diverticulum containing stones and septations, will discuss with medicine  - Incontinent of urine - preexisting condition, wears Depends at baseline     Endo:   - Right Thyroid nodule s/p FNA- results pending  - No acute concerns, TSH wnl     ID:   - Afebrile - low-grade temperature of 101.8 7/22; WBC count wnl, CRP wnl  - Broadening to meropenem per primary team  - Presumed aspiration pneumonia    - Sputum culture ordered today: G+ and G-; G+ cocci in clusters   - cultures pending  - UTI, UA +WBC and + Leuk estrases    PPX:   - SCDs, IS. Lovenox    Consults:   - Nutrition  - PLC- tracheostomy and TF cares  - PT/OT/SLP rec TCU vs LTACH  - Thoracic surgery for G-tube    Disposition Plan   Medicine primary. We will continue to be closely involved in her care during her hospitalization. We would like to be present for any goals of care discussions.     Expected discharge TCU vs LTACH once trach has been changed and tract is stable, tolerating TFs at  bolus goal, trach and TF care, suctioning requirements decreasing in frequency, transitioned to all enteral medications.        -- Patient and above plan to be discussed with Dr. Win Thomson MD  Otolaryngology-Head & Neck Surgery  Please contact ENT by dialing [phone] and entering job code [phone].

## 2018-07-25 NOTE — PLAN OF CARE
Problem: Chronic Respiratory Difficulty Comorbidity  Goal: Chronic Respiratory Difficulty  Patient comorbidity will be monitored for signs and symptoms of Respiratory Difficulty (Chronic) condition.  Problems will be absent, minimized or managed by discharge/transition of care.   Outcome: No Change  Continues to receive multiple nebulizations and require pulmonary hygiene support.    Comments:   Shift: [id number]  VS: Temp: 98.8  F (37.1  C) Temp src: Oral BP: 184/86   Heart Rate: 102 Resp: 26 SpO2: 93 % O2 Device: Trach dome    Pain: left rib cage lateral/anterior, left wrist (EKG, trops and cxray done - lidocaine patches ordered to start tmrw), given dilaudid IV (q6hrs), toradol x1, tylenol supp x1 given  Neuro: AAOx3, all but situation, unclear if doesn't want to talk about situation or doesn't know  Cardiac:   SR-ST 90s, hypertensive, labetalol now scheduled, no response to hydralazine, continue to monitor  Respiratory: RR upper 20s, TD 21%, suctioning q1-2 hours, does not seem to tolerate cuff down on trach, restless most of day with frequent suctioning needs and pain in left chest with coughing. 1cc added to cuff at 1800 after pt. Still had not calmed down post-trach exchange (1430).   GI/Diet/Appetite: NPO, refused nasal feeding tube, on OR schedule for PEG tub tmrw afternoon  :  Incontinent of urine  LDA's: PIVx1 (intentionally pulled 2 PIVs at 1730 today), shiley 6  Skin: drainage from trach site, optifoam in place, ties changed today as well as trach exchanged  Activity: stook at EOB today, pivot transfer to chair with therapy, lift back to bed, remained in chair from 0830 to 1400, worked with PT, OT and speech today  Tests/Procedures: ECHO, EKG, chest xray  Pertinent Labs/Lab Collection: see flowsheet, watching K, phos, calcium, troponin negative, lactic acid stable     Plan: PEG tube placement in OR tomorrow.

## 2018-07-25 NOTE — PLAN OF CARE
Problem: Patient Care Overview  Goal: Plan of Care/Patient Progress Review  Outcome: Declining  Temp: 98.8  F (37.1  C) Temp src: Axillary BP: 157/90   Heart Rate: 99 Resp: 22 SpO2: 100 % O2 Device: BiPAP/CPAP    Neuro: Lethargic, opens eyes spontaneously. Difficult to assess orientation d/t pt work of breathing. Previously was able to answer all questions and follow all commands. R-sided facial droop and weakness, unchanged. Very restless overnight. VPM in place for safety.    Cardiac: SR with frequent PVC's (10-20/min), rates . BP elevated 140-200/90-100s. Scheduled labetalol given + 20mg 1x dose. Hydralazine not given d/t HR 120s.   Resp: Shiley #6 w/ cuff inflated. Is currently on BiPAP at 50% FiO2 w/ O2 sats %. Suctioning q1-2h. Lung sounds crackles throughout.   GI/: Incontinent of urine and stool.   Diet/Appetite: Strict NPO. IV fluids d/c'd. Pt currently not receiving any form of nutrition. Possible PEG placement.   Skin: Redness around trach site, dressing in place.   Access: L-PIV saline locked.   Activity: T/R q2h or more frequently d/t pt being very restless.   Pain: C/o left chest/rib pain, Dilaudid and suppository Tylenol given x1.   Labs: K+ and Phos replaced overnight. K+ 4.0 and Phos 3.1 this am.  Per MD ok to hold of on replacing K+ per protocol.     Plan for possible PEG placement today at 450pm or tomorrow 7/26.   Will continue with plan of care and notify MD of any changes.

## 2018-07-25 NOTE — PHARMACY-CONSULT NOTE
Pharmacy Consult:  After consulting with provider, pharmacist to review medications and provide recommendations on potentially high risk medications for development of delirium.    Discussed with Rosa Nguyen (NP), patient currently has not medication with high likelihood of causing delirium, enoxaparin, hydromorphone, and metronidazole have listed cases of confusion per micromedex.    Thank you for consult,   Kt Beth, NickyD

## 2018-07-25 NOTE — PROGRESS NOTES
"CLINICAL NUTRITION SERVICES - REASSESSMENT NOTE     Nutrition Prescription    RECOMMENDATIONS FOR MDs/PROVIDERS TO ORDER:  Pt does not want NJ replaced, may need to consider palliative to discuss decisions.  Please consult RD with \"TF assess and order\" to reorder tube feeds once access gained.      Malnutrition Status:    Malnutrition Diagnosis: Severe malnutrition in the context of acute on chronic illness    Recommendations already ordered by Registered Dietitian (RD):  None at this time    Future/Additional Recommendations:  Monitor for enteral access and ability to restart TF     EVALUATION OF THE PROGRESS TOWARD GOALS   Diet: NPO  Nutrition Support: 7/18-7/21: Isosource 1.5 @ 10 ml/hr x 12 hrs. If tolerated, increase rate by 10 ml every 8 hrs to goal of 45 ml/hr. Regimen to provide 1080 ml/day, 1620 kcals (29 kcal/kg/day), 73 g PRO (1.3 g/kg/day), 821 ml free H2O, 190 g CHO and 16 g Fiber daily.  ** 7/21: pt pulled FT, states she does not want it reinserted and that she will just pull it out again.   Intake: Pt has been NPO with TF that were stopped on July 21. Over the past 7 days, pt has received 82ml of formula, 123 kcals and 6 g pro on average.      NEW FINDINGS   Per thoracic: Currently coordinating timing and surgeon availability for gastrostomy tube placement. Will plan for OR tomorrow afternoon (7/25) with Dr. Durant, though depending upon schedule may instead change to Thursday (7/26) with Dr Nash. Plan for percutaneous endoscopic gastrostomy, though may have to perform G tube laparoscopically if we cannot achieve a safe window for PEG.  - note pt may be transferring to ICU, may not be stable for surgery. Pt respiratory status remains unstable: gasping on BiPAP at 50% FiO2. O2 sats maintaining at %. Pt also more lethargic.   - new rash on pt's back/neck area. Pt states it does not itch.   - Weight gain since admit, suspect fluids    MALNUTRITION  % Intake: </= 50% for >/= 5 days (severe)  % " Weight Loss: Unable to assess  Subcutaneous Fat Loss: Upper arm:  moderate, Lower arm: severe and Thoracic/intercostal:  Mild- moderate  Muscle Loss: Temporal: moderate-severe, Facial & jaw region:  moderate, Upper arm (bicep, tricep):  severe, Lower arm  (forearm):  severe, Dorsal hand:  severe, Upper leg (quadricep, hamstring):  severe and Posterior calf: severe  Fluid Accumulation/Edema: Does not meet criteria  Malnutrition Diagnosis: Severe malnutrition in the context of acute on chronic illness    Previous Goals   Total avg nutritional intake to meet a minimum of 25 kcal/kg and 1.2 g PRO/kg daily (per dosing wt 56 kg).  Evaluation: Not met    Previous Nutrition Diagnosis  Inadequate protein-energy intake related to h/o decreased appetite with altered swallow function at present and TF therapy ordered, but not initiated d/t abnormal K+ as evidenced by decreased po intakes per family report PTA, wt loss of 17 lbs (12.9% loss) x 6 mos, NPO status per SLP recommendation and no TF intakes received at this time.  Evaluation: Declining    CURRENT NUTRITION DIAGNOSIS  Inadequate protein-energy intake related to NPO and NJ pulled out with minimal TF intakes when FT was in as evidenced by FT providing 123 kcals and 6 g pro daily on average over the day 7 days.       INTERVENTIONS  Implementation  Collaboration with other providers  Enteral Nutrition     Goals  Total avg nutritional intake to meet a minimum of 25 kcal/kg and 1.2 g PRO/kg daily (per dosing wt 56 kg).    Monitoring/Evaluation  Progress toward goals will be monitored and evaluated per protocol.      Tracie Lan, RD, MS, LD  6B- Pager: [phone]

## 2018-07-25 NOTE — PROGRESS NOTES
[facility] Crosscover Note    Was called by SONNY Nuno RN and informed that Ms. Collins had a new rash on her back and neck. Her suctioning needs had also increased. Ms. Nuno worked with the patient last night, and these were new findings since that time.     On exam, the patient was afebrile with HR 89, /77. She was satting in the high 90s on ~30% FiO2 (this was increased from 21% earlier in the shift due to work of breathing). She was mildly tachypnic with coarse breath sounds. She had a morbilliform rash along the lower posterior neck and upper back that was nontender and nonpruritic.     - will obtain CXR to evaluate increased work of breathing and secretions  - morbilliform rash raises concerns for drug reaction; cefepime started today and is potential culprit (although pt was on ceftriaxone earlier this admission  - will stop cefepime for now and order pharmacy consult for med list review to screen for possible offending meds    ADDENDUM: Ms. Collins's respiratory rate remains elevated. HR up as well. Will discontinue IV fluids and give one-time dose of IV lasix due to concern for possible volume overload/pulm edema. Will obtain ABG and consult RT for possible NIPPV.

## 2018-07-25 NOTE — PLAN OF CARE
Problem: Patient Care Overview  Goal: Plan of Care/Patient Progress Review  SLP: Dysphagia therapy cancelled.  Pt not appropriate for participation this date.  In addition, pt scheduled for PEG.  ST to follow as indicated on POC.

## 2018-07-26 NOTE — PROGRESS NOTES
[facility], [place]    Internal Medicine Progress Note - Gold Service      Assessment & Plan   Keira Collins is a 72 year old female admitted on 7/17/2018. She has a medical history of COPD, HTN, hyperlipidemia, chronic tobacco abuse, h/o alcohol abuse, squamous cell carcinoma of larynx admitted on 7/17/2018 for direct laryngoscopy with biopsy, tracheostomy and NGT placement.  Internal medicine consulted 7/20 for HTN, hypoxia and tachycardia.  Hospital course complicated by acute left pontine CVA on 7/19.      Sepsis and acute hypoxic respiratory failure most likely 2/2 aspiration PNA - Started on Clindamycin 7/19, switched to Flagyl and Ceftriaxone 7/10 for presumed aspiration PNA  Febrile overnight 7/22 w/ tachycardia and tachypnea therefore broadened to Meropenem and Vancomycin.  Inflammatory markers unremarkable at the time with negative urine, blood and sputum cx.  TTE 7/24 with normal LVEF (60-65%). Acute respiratory distress overnight 7/24-7/25 requiring bipap.  Remained afebrile but WBC elevated to 14.8 with procal 0.14 therefore re-broadened to Meropenem.  EKG w/o ischemic changes and troponin flat.  CXR (7/25) with small R effusion, pulm edema and R>L bibasilar opacities.  BNP 1932 with increased weight (132 lbs from 123 lbs on admission).  CT Chest: no PE, stable pulm nodules, new nodular thickening ALBARO, increased pleural effusions (R>L) with atelectasis.  Acute decompensation likely multifactorial including worsening PNA (aspiration vs HCAP) vs pulmonary edema.  Weaned to trach mask today after broadening of antibx, diuresis and NIPPV. Clinically improved, however, worsening leukocytosis, CRP increase (68->99) and steep elevation in procal (0.16->1.41).  - Given clinical improvement and no PO intake, would hold off on further diuresis at this time unless positive I/O and/or clinical decompensation.  Can give Lasix 20 mg IV x 1 if needed.    - Wean to humidified trach mask  today but Bipap overnight (IPAP 13, EPAP 6, Fi02 25%)  - ID consult today, appreciate reccs.    - Follow up urine, sputum and blood cx from today   - Continue Meropenem   - Lasix 40 mg IV x 1 this AM, repeat as needed to keep negative fluid balance.   - Continue scheduled nebs   - Continue chin catheter for strict I/O, daily weights   - Continue RT and RN frequent suctioning   - CBC, procal and CRP in AM      Hypokalemia; Hypocalcemia - Likely 2/2 malnutrition due to no enteral access and inablity to give nutrition.  S/p several doses of calcium gluconate.  K being repleted per protocol. Ical now normal.   - Replace K per elyte protocol   - BMP daily     AMS - Most consistent with hospital acquired delirium due to infection, poor sleep hygeine and hypoxia as well as recent CVA. Improved from yesterday with treatment of infection and correction of hypoxemia.   - Correct hypoxia and underlying medical components as outlined above  - Delirium precautions ordered      Acute L pontine CVA - R sided facial droop first noted AM of 7/19, improved throughout the day.  Head CT w/o acute intracranial abnormality but subsequent MRI head revealed new L pontine infarct.  Neurology consulted, out of TPA window.  Lipid panel normal.    - Continue rectal ASA and statin.  Continue Plavix when enteral access is established.  TTE w/ bubble study (7/24) with atrial septum intact.   - SBP goal <160 per neuro.  Continue Labtetolol to 10 mg IV q 4 hours and continue Hydralazine PRN SBP >170 or DBP >105  - PT/OT/SLP    Severe protein-calorie malnutrition; Dysphagia - Likely was malnourished PTA 2/2 cancer, now it is likely exacerbated in setting of acute illness, recent surgery and CVA which has resulted in severe dysphagia.  An NGT was placed intra-operatively but was self D/Cd by patient and she has been refusing replacement per review of notes.  Thoracic surgery consulted 7/23.  - Plan for PEG vs lap G tube with thoracic surgery  tomorrow   - Hold Lovenox tonight  - NPO   - Albumin/prealbumin q Monday   - Nutrition team consulted and following, appreciate reccs   - Continue thiamine, folic acid IV, resume MVI when enteral access established    Hx of Hypertension - PTA maintained on Losartan 50 mg qday, amlodipine 5 mg qday (unclear compliance per OP notes). Does have some hx of alcohol use but do not feel c/w etoh withdrawal.  EKG w/ some ST depression in lead V6, but V5 and lead I WNL- trop negative, PVCs as below.  Becomes tachycardic with fevers.  SBP goal has been set to <160 per neuro in setting of recent CVA.   - Currently holding Losartan and Amlodipine given no enteral access.    - Labetalol scheduled and PRN Hydralazine as above 4 hours    - PNA treatment as above   - Continue telemetry     Oral candidiasis - Improving.  Continue Nystatin via swab as ordered.     [id number] SCC of Larynx - S/p direct laryngoscopy, with biopsy of laryngeal SCC, tracheostomy  and NGT placement.  Recently presented to ENT tumor board conference to discuss plan of care and recommendation at that time was to proceed with total laryngectomy and bilateral MRND vs CXRT.  Post op course c/b ischemic pontine CVA, fevers and persistent tachycardia.  First trach change was 7/24 which patient tolerated. Per ENT, case will be discussed at Tumor board on Friday, 7/27.   - Keep trach cuff up   - Per ENT, please do not manipulate straps or place sponge/gauze beneath trach plate  - Perform regular suctioning   - RN to change disposable inner cannulas q shift and/or clean it with a brush   - Keep trach tube obturator taped to wall behind the head of the bed. Keep extra unopened 6-0 Shiley and 4-0 Shiley at bedside at all times  - S/p PLC trach teaching 7/19 with son  - I do feel there is a role for palliative care consultation in this case acute worsening and possible mets noted on PET scan, discussed with ENT who would like to hold off on this for now and would like to  direct goals of cares discussion.   - I think pt/family/RN staff would benefit from care conference early next week after tumor board meeting and to discuss plan--6B RN[facility] aware possible next Tuesday.     Rash - Appears to be maculopapular on exam which began after starting Cefepime the other day.  Cefepime has since been D/Cd.  Has rash located on upper back and left leg.  RN outlined rash today to determine extension.     RUL spiculated lung nodule - Noted on 6/2018 CT scan.  PET scan on 7/21 revealed bilateral hypermetabolic pulmonary nodules consistent with pulmonary metastatic diease.  She is being followed by the lung nodule clinic and recommendation was an IR guided biopsy which has not yet been scheduled.  Also revealed small pleural effusion.  CXR yesterday did not note effusion.  - To be discussed at tumor board on Friday   - Has an OP referral to oncology, not yet completed    Right lobe thyroid nodule - S/p FNA showing atypia of undetermined significant.   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  UA negative, Cr normal and making urine although patient is incontinent.  Discussed with urology, no need for IP consultation unless develops UTI or LINDY.    - She should have OP Urology follow up with Dr. Arroyo or Dr. Heller for possible cystoscopy and possible surgical excision of diverticulum, message sent to schedulers     PVD s/p endarterectomy iliac artery recannulization w/ stent, right SFA recannulization w/ stent (1/2017) - Managed on PTA ASA and Plavix.  Plavix initially held 2/2 surgery, now unable to given 2/2 no enteral access. OK for patient to have Plavix per surgery team.   - Continue ASA (rectal) and resume Plavix when enteral access is established    COPD - CT chest from 6/13/18 with moderate centrilobular emphysematous changes in upper lobes,  PTA managed on Spiriva daily,  Advair BID with PRN Albuterol  - Continue scheduled nebs: Brovana, Pulmicort, Ipratropium and xoponex     Hyperlipidemia - Continue statin     Adrenal gland nodularity - Noted on CT chest 06/13/2018 w/ nodularity of bilateral adrenal glands w/ may represent mets. But PET scan did not reveal any abd/pelvis mets.    Physical Deconditioning - PT recommending discharge to TCU when medically ready .    # Pain Assessment:  Current Pain Score 7/26/2018   Patient currently in pain? denies   Pain score (0-10) -   Pain location -   Pain descriptors -   Keira canas pain level was assessed and she is having no pain.     Diet: NPO until enteral access is established   DVT Prophylaxis: Lovenox daily   Code Status: Full Code    Disposition Plan   Expected discharge: 4 - 7 days, recommended to transitional care unit once antibiotic plan established, safe disposition plan/ TCU bed available and SIRS/Sepsis treated.     Entered: Rosa Nguyen 07/26/2018, 8:34 AM   Information in the above section will display in the discharge planner report.      The patient's care was discussed with the Attending Physician, Dr. Wu, Bedside Nurse, Care Coordinator/, Patient and Thoracic surgery  Consultant.    Rosa Nguyen  Internal Medicine Staff Hospitalist Service  [facility]  Pager: [phone]  Please see sticky note for cross cover information    Interval History     Had a better night with no overnight events, feels her breathing is much better today.  Denies fever, chills, nausea, vomiting, diarrhea, abdominal pain.  Denies any itching or heat in areas of rash.      Data reviewed today: I reviewed all medications, new labs and imaging results over the last 24 hours.     Physical Exam   Vital Signs: Temp: 98  F (36.7  C) Temp src: Oral BP: 143/74   Heart Rate: 79 Resp: 21 SpO2: 97 % O2 Device: BiPAP/CPAP    Weight: 130 lbs 4.67 oz    General: NAD, elderly appearing female, NAD.  Smiling watching  TV.  HEENT: No scleral icterus, PERRLA.  Tongue with thin, white debris.   NECK:   6.0 cuffed Shiley in place, cuff inflated, trach ties in place.  Tracheostomy site clean, no bleeding.   CARDIOVASCULAR: Normal rate, regular rhythm. S1/S2 normal, no murmurs, rubs or gallops   RESPIRATORY:CTAB, diminished breath bilaterally.  No wheezes, use of accessory muscles or retractions.    ABDOMEN: Soft, non-tender abdomen without rebound or guarding, no hepatosplenomegaly, no palpable masses, hypoactive bowel sounds present  EXTREMITIES: Peripheral pulses normal, no peripheral edema, warm  Skin: No ecchymoses, no rashes  NEURO: Lethargic but arouses to loud voice and nodding yes and no.  R sided facial droop and R sided tongue deviation.  RUE and RLE 4/5 strength, LUE and LLE 5/5 strength.     Data   Medications     IV fluid REPLACEMENT ONLY       - MEDICATION INSTRUCTIONS -         amLODIPine  5 mg Per Feeding Tube Daily     arformoterol  15 mcg Nebulization BID     aspirin  80 mg Rectal Daily     atorvastatin  40 mg Per Feeding Tube Daily     budesonide  0.25 mg Nebulization BID     Calcium carb-Vitamin D 500 mg Jena-200 units  1 tablet Per Feeding Tube BID     clopidogrel  75 mg Per Feeding Tube Daily     docusate  50 mg Per Feeding Tube BID     enoxaparin  30 mg Subcutaneous Q24H     folic acid  1 mg Intravenous Daily     heparin lock flush  5-10 mL Intracatheter Q24H     ipratropium  0.5 mg Nebulization Q4H While awake     labetalol  10 mg Intravenous Q4H     lactobacillus rhamnosus (GG)  1 capsule Per Feeding Tube TID AC     levalbuterol  0.63 mg Nebulization Q4H While awake     lidocaine  1 patch Transdermal Q24H     lidocaine (PF)  5 mL Subcutaneous Once     lidocaine   Transdermal Q8H     lidocaine   Transdermal Q24h     losartan  50 mg Per Feeding Tube Daily     meropenem  1 g Intravenous Q8H     multivitamins with minerals  15 mL Per Feeding Tube Daily     nystatin  500,000 Units Oral 4x Daily     polyethylene  glycol  17 g Per Feeding Tube Daily     sennosides  5 mL Per Feeding Tube BID     sodium chloride (PF)  10 mL Intracatheter Q8H     sodium chloride (PF)  10 mL Intracatheter Once     sodium chloride bacteriostatic  12 mL Intravenous Once     thiamine  200 mg Intravenous Daily     [START ON 7/27/2018] thiamine  100 mg Intravenous Daily     Data     Recent Labs  Lab 07/26/18  0515 07/25/18  0551 07/25/18  0434 07/24/18  2102 07/24/18  1544 07/24/18  1228 07/24/18  0518   WBC 16.9* 14.8*  --   --   --   --  9.9   HGB 11.3* 14.0  --   --   --   --  12.3   MCV 90 91  --   --   --   --  94    230  --   --   --   --  189     --  138  --   --   --  140   POTASSIUM 3.1*  --  4.0  --  4.0  --  3.1*   CHLORIDE 104  --  110*  --   --   --  112*   CO2 21  --  19*  --   --   --  17*   BUN 11  --  4*  --   --   --  4*   CR 0.66  --  0.45*  --   --   --  0.44*   ANIONGAP 12  --  9  --   --   --  11   ESTEFANI 8.1*  --  8.3*  --   --   --  7.6*   GLC 91  --  154*  --   --   --  124*   TROPI  --   --  <0.015 <0.015  --  <0.015 <0.015     Recent Results (from the past 24 hour(s))   CT Chest Pulmonary Embolism w Contrast    Narrative    Examination:  CT CHEST PULMONARY EMBOLISM W CONTRAST 7/25/2018 10:24  AM     Comparison: Chest CT dated 6/13/2018, PET/CT dated 7/20/2018    History: Evaluate for PE; history of laryngeal squamous cell carcinoma    TECHNIQUE: Volumetric helical acquisition of CT images of the chest  from the lung apices to the kidneys were acquired in arterial phase  after the administration of IV contrast. Contrast dose: 53 cc of  isovue 370    FINDINGS:    Chest:    There is adequate opacification of the main and lobar pulmonary  arteries. No filling defect in the lobar and main segmental pulmonary  arteries to suggest pulmonary embolism.  There is no right-sided heart  strain.    Endotracheal tube with the tip presence at 6.7 cm above anton. No  signal change in multiple spiculated and smooth margin  pulmonary  nodules. For example, 1.2 x 1.3 cm solid pulmonary nodule in the right  upper lobe with increased pleural tenting. New nodular thickening of  left upper lobe laterally measures 1.7 x 0.7 cm. New moderate right  greater than left pleural effusion with associated atelectasis. No  pneumothorax.    Unchanged enlarged heterogeneous appearance of right thyroid gland  with coarse calcifications. Heart size is at upper normal limits. No  pericardial effusion. Atherosclerotic calcifications along the  thoracic aorta and the coronary arteries. Increased mediastinal and  hilum lymph nodes. For example, left paratracheal lymph node measures  up to 1.1 cm, previously 0.8 cm; right hilum lymph nodes measures up  to 2.4 cm, previously 2.0 cm on 6/13/2018. Unchanged axillary lymph  nodes.    Upper Abdomen:    Limited. Unchanged diffuse thickening of bilateral adrenal glands.    Bones and soft tissues:    Multiple chronic appearing rib fractures bilaterally and sternum  fracture. No suspicious bone lesions.    1.      Impression    IMPRESSION:   2.  No CT evidence for pulmonary embolism as questioned.  3.  No significant change of multiple pulmonary nodules, including  right upper lobe spiculated 1.3 cm pulmonary nodule.  4.  Prominent nodular thickening of left upper lobe laterally measures  1.7 x 0.7 cm, favors infectious/inflammatory etiology, however  malignancy cannot be completely excluded.  5.  Increased moderate right greater than left pleural effusion with  associated atelectasis. Right lower lobe atelectasis slightly improved  from 7/20/2018.  6.  Unchanged enlarged heterogeneous appearance of right thyroid gland  with coarse calcifications, follow-up with ultrasound is recommended  if have not done so.   7.  Enlarged mediastinal and right hilum lymphadenopathy.    I have personally reviewed the examination and initial interpretation  and I agree with the findings.    CHASIDY POWERS MD

## 2018-07-26 NOTE — CONSULTS
[facility] ID SERVICE: NEW CONSULTATION   Keira Collins : 1945 Sex: female:   Medical record number [id number]  Date of Admission: 2018  Consult Requester:Rochelle Wu MD  Date of Service: 2018    REASON FOR CONSULT: I was asked to see Ms. Collins by Rosa Nguyen for fever, leukocytosis, elevated inflammatory markers and acute hypoxic respiratory failure in the setting of squamous cell carcinoma.    PROBLEM LIST:   1. Suspected aspiration pneumonitis vs. pneumonia  2. Oral candidiasis on nystatin  3. Rash of uncertain etiology  4. Penicillin allergy by history   5. Squamous cell carcinoma of larynx (Biopsy confirmed on 18, Stage T3N2c)  6. Spiculated RUL lung nodule with multiple bilateral PET avid nodules of uncertain etiology but concerning for malignancy  7. Right thyroid nodule s/p non-diagnostic FNA  8. Dysphasia with abnormal swallow study  9. Acute left pontine CVA      DISCUSSION:    is a 72-year-old woman who is been admitted since  for diagnostic workup of a laryngeal mass that has been found to be squamous cell carcinoma.  Her hospital course has been complicated by a new left pontine stroke with right hemiparesis and a fluctuating respiratory status with several episodes of acute hypoxia, the most recent of which occurred on  and required BiPAP for approximately 24 hours.  She also had one episode of fever on  that has now resolved.  Clinically she seems improved over the past 24h, though with rising WBCs and inflammatory markers.    Her clinical course is most suggestive of acute aspiration events as a cause of her hypoxemia.  She has multiple risk factors including her laryngeal cancer and recent stroke with documented dysphagia on clinical swallow evaluation.  Initial sputum culture from  grew only normal madison with no resistant organisms.  More recent repeat sputum from  showed evidence of inflammatory cells without any organisms seen.   Based on review of her culture showing no growth of resistant organisms, at this point it seems most likely that her antibiotics have been suppressing microbial growth but she may be having an acute inflammatory insult from recurrent aspiration events.  Thus it would be reasonable to narrow her coverage at this point; ceftriaxone and metronidazole would be reasonable coverage but can be adjusted if she were to grow any new organisms on her recent blood or sputum cultures.      Considered other possible sources of infection.  Central line was placed yesterday for catheter associated bloodstream infection seems unlikely.  Similarly, a Pfeiffer catheter was placed yesterday for close monitoring of I's and O's so likely to recently to account for her rising inflammatory markers.  She is at risk for C. difficile colitis based on her broad-spectrum antibiotic use but think this is unlikely as she is currently not having diarrhea nor does she have any abdominal distention or signs of ileus.    The etiology of her rash is uncertain.  Certainly could be secondary to a drug as she has been on multiple antibiotics over the past few days but temporal link between the rash and a particular drug is not clear.  She has not had symptoms of a more significant allergic reactions so it would be reasonable to monitor after going back to her previous regimen.    RECOMMENDATIONS:   1. Stop meropenem  2. Resume ceftriaxone 2g daily and metronidazole 500mg q6h   3. Agree with nystatin per primary team since thrush seems to be improving  4. Would remove Pfeiffer catheter as soon as able to minimize risk of catheter associated UTI  5. Would be reasonable to consider diagnostic/therapeutic thoracentesis if sufficient fluid    Staffed with Dr. Davis.    ID will continue to follow.      Katiana Laura MD, PhD  Adult & Pediatric Infectious Diseases Fellow PGY7, [facility]  Pager: [phone]    Attending Attestation and Findings   Patient  seen and examined by me with fellow Dr. Laura. I agree with her findings, assessment and recommendations. I have reviewed today's vital signs, medications, labs and imaging. Recs were discussed with primary team today. Feel free to call with questions. ID will continue to follow.     Susana Davis MD  [phone]      HPI:   Ms. Ellison is a 72-year-old woman with history of hypertension, COPD, hypertension, hyperlipidemia, and heavy tobacco and alcohol use was admitted on 7/17 is a scheduled admission for diagnostic evaluation for laryngeal mass.  She underwent scheduled laryngoscopy with biopsy, tracheostomy, and NG tube tube placement.  Operative findings notable for exophytic mass on left false cord extending into the left true cord.  The pathology on biopsy was positive for squamous cell carcinoma.    On prior imaging of the chest, a right upper lobe spiculated lung nodule is noted in June 2018.  PET scan was performed during this admission and showed multiple PET avid pulmonary nodules.  A biopsy of 1 of these nodules is planned but has not yet been completed. Post-operative clinical swallow evaluation from 7/18 showed moderate-severe oral-pharyngeal dysphagia    In the postoperative period, she developed hypoxemia requiring 40% FiO2 by trach dome.  Chest x-ray was performed and showed right sided infiltrate.  Medicine team was consulted and recommended clindamycin for aspiration pneumonia.  She was noted to have right facial droop and hemiparesis so stat CT was obtained and negative.  Subsequent MRI on 7/20 showed new left pontine stroke.    Medicine team recommended switched to ceftriaxone and metronidazole on 7/20 for aspiration pneumonia.    By  7/21, she had weaned back to trach dome 21%.    On 7/22, she was febrile to 101.8.  Her antibiotics were broadened to meropenem and vancomycin at this time.   NG was also pulled out by patient.    On 7/24, medicine team recommended de-escalation to cefepime  and metronazole.    On 7/25, she became acutely more tachycardic to low 100s and hypoxemic requiring BIPAP.  She was continued on BIPAP through the day.  CTA of the chest was performed and was negative for PE.  Multiple previously noted nodules were noted. She was also noted to have a morbilliform rash just after cefepime was started so this was discontinued.     Today she has been able to wean back to trach dome of 40%.   She has had no additional fevers since 7/22.  Gradually rising WBCs from annalisa 9.1 on 7/21 to 16.9 today.  She is having about 1 formed stool per day.  Pfeiffer placed yesterday for close monitoring of I&Os.  Rash has extended slightly more onto flank.  No significant skin breakdown.    ANTI-INFECTIVES:   Current:   Meropenem 7/22-present    Previous:   Metronidazole 7/20-7/22, 7/24-7/25  Clindamycin 7/17, 7/19-7/20  Ceftriaxone 7/20-7/22  Vancomycin 7/23    ROS: (Recommend ? 10 systems)   A ten point review of systems was obtained and was negative with the exception of that which is described in the HPI.    PMH:   1. Squamous cell carcinoma of larynx   - Biopsy confirmed on 7/17/18  - Stage T3N2c  2. Spiculated RUL lung nodule followed in lung nodule clinic with multiple bilateral PET avid nodules  3. Right thyroid nodule s/p non-diagnostic FNA  4. COPD   5. Hypertension  6. Hyperlipidemia  7. Tobacco use  8. Daily alcohol abuse  9. History of right hip fracture on 5/13/16    PSH:  1. Direct laryngoscopy with biopsy, tracheostomy and NG tube placement on 7/17/18  2. Left femoral endarterectomy in 1/2017    SOCIAL HISTORY AND RISK FACTORS   Social History   Substance Use Topics     Smoking status: Current Every Day Smoker     Packs/day: 1.00     Years: 50.00     Types: Cigarettes     Smokeless tobacco: Never Used      Comment: 1/2 a pack a day      Alcohol use 0.0 oz/week     0 Standard drinks or equivalent per week      Comment: Beer- 1-4 per day     History   Sexual Activity     Sexual activity: No  "      FAMILY HISTORY:   EHR reviewed.  Patient unable to provide additional history due to tracheostomy.  Family History   Problem Relation Age of Onset     Chronic Obstructive Pulmonary Disease Daughter      Diabetes Daughter      HEART DISEASE Daughter        EXAMINATION: (Recommend ? 8 systems)   /70 (BP Location: Left arm)  Pulse 109  Temp 98.4  F (36.9  C) (Oral)  Resp 24  Ht 1.6 m (5' 2.99\")  Wt 59.1 kg (130 lb 4.7 oz)  SpO2 98%  BMI 23.09 kg/m2  GENERAL:  well-developed, well-nourished, in bed in no acute distress.   HEENT:  Head is normocephalic, atraumatic   EYES:  Eyes have anicteric sclerae without conjunctival injection or stigmata of endocarditis.    ENT: Tacky mucous membranes.  No oral lesions.  White coating on tongue but no evidence of buccal candidiasis.  NECK: Tracheostomy in place and intermittently coughing large amount of clear mucus.  LUNGS: Rhonchi and crackles bilaterally  CARDIOVASCULAR:  Regular rate and rhythm with no murmurs, gallops or rubs.  ABDOMEN:  Normal bowel sounds, soft, nontender. No appreciable hepatosplenomegaly  SKIN:  No acute rashes.  Line(s) are in place without any surrounding erythema or exudate. No stigmata of endocarditis.  NEUROLOGIC: Flattened nasolabial folds on right.  Slight pronator drift on the right upper extremity.  T/L/D: Midline catheter in left arm, tracheostomy, Pfeiffer    DATA/RESULTS:   Creatinine 0.66 Up from 0.45 yesterday    CULTURES:   12/29/16 left heel light growth Enterococcus faecalis (sensitive to ampicillin and vancomycin) and light growth Proteus mirabilis (resistant to tetracycline but otherwise sensitive)  7/22 Sputum Gram stain with less than 25 PMNs/lpf and mixed gram-positive and gram-negative bacteria with a predominance of GPC's in clusters, culture with growth of normal madison  7/22 Blood culture x2 NGTD  7/26 Blood culture NGTD  7/26 Urine culture NGTD  7/26 sputum Gram stain with greater than 25 PMNslpf and no organisms, " culture pending    OTHER ID STUDIES:   WBCs 9.1 on 7/21 > 9.7 > 10.0 > 9.9 > 14.8 > 16.9   > 84 > 68 >99  Pro-calcitonin 0.11 >0 0.15 > 0.16 > 1.41    Imaging results:   7/25 CT:   2.  No CT evidence for pulmonary embolism as questioned.  3.  No significant change of multiple pulmonary nodules, including  right upper lobe spiculated 1.3 cm pulmonary nodule.  4.  Prominent nodular thickening of left upper lobe laterally measures  1.7 x 0.7 cm, favors infectious/inflammatory etiology, however  malignancy cannot be completely excluded.  5.  Increased moderate right greater than left pleural effusion with  associated atelectasis. Right lower lobe atelectasis slightly improved  from 7/20/2018.  6.  Unchanged enlarged heterogeneous appearance of right thyroid gland  with coarse calcifications, follow-up with ultrasound is recommended  if have not done so.   7.  Enlarged mediastinal and right hilum lymphadenopathy.    7/20 PET CT:  1. Bilateral hypermetabolic pulmonary nodules consistent with  pulmonary metastatic disease.  a. Collapse of the right lower/middle lobes with multiple  hypermetabolic foci suggestive of pulmonary metastases. Right lower  perihilar FDG uptake suggests postobstructive atelectasis.  b. Associated small right pleural effusion.  2. No evidence of metastatic disease in the abdomen/pelvis.  3. See neuroradiology report for results of high-resolution PET CT of  the head and neck.     4. Urethral diverticulum containing stones and septations. Evaluation  is limited by urinary FDG activity. Consider urologic consult.  Prominent urinary contamination about the pelvis.  5. 4.7 cm left adnexal cystic structure. No associated FDG uptake.  Given size and metabolic process follow-up is pelvic  ultrasound/follow-up recommended.  6. Thoracic abdominal aortic ectasia. 1.9 cm right iliac aneurysm.

## 2018-07-26 NOTE — PROVIDER NOTIFICATION
[employer] cover notified at 0030: pt UOP 50 cc in last 4hrs and BG 89. Pt is NPO with no IVF running.    No new orders at this time, just continue to monitor UOP and BG. Will notify MD of any changes.      updated at 0500: Pt UOP remains 60cc/q4h and BG 86. No new orders continue to monitor. MD also notified of bloody secretions with suctioning.

## 2018-07-26 NOTE — PLAN OF CARE
Problem: Patient Care Overview  Goal: Plan of Care/Patient Progress Review  Outcome: No Change  Temp: 97.8  F (36.6  C) Temp src: Oral BP: 129/65   Heart Rate: 79 Resp: 21 SpO2: 97 % O2 Device: BiPAP/CPAP    Neuro: Lethargic, Disorientated to situation and time. Slept majority of shift. Right facial droop.   Cardiac: SR, 70s. Rare PVC's. -130/70s. scheduled labetalol.   Resp: Shiley #6, trach cares done. On BiPAP at 25% FiO2 with O2 sat 95-97%. Suctioning q2-4hrs, w/ some lucinda red blood. Lung sounds clear/diminished in bases.   GI/: Pfeiffer patent w/ low uop, MD aware. No Bm overnight.   Diet/Appetite: Strict NPO.   Skin: Blanchable redness around trach.   Access: L-midline and L PIV saline locked.   Activity: T/R q2h in bed.   Pain: No c/o pain this shift.     Will continue with plan of care and notify MD of any changes.

## 2018-07-26 NOTE — PROGRESS NOTES
Thoracic Surgery Note    Case cancelled yesterday due to surgeon availability and acute respiratory and hemodynamic issues.   Patient still somnolent today, interview limited by trached status and somnolence.   She is rescheduled for PEG possible lap G tube tomorrow (Friday) afternoon.     Sukhwinder Crump MD  PGY-3 General Surgery

## 2018-07-26 NOTE — PROGRESS NOTES
"Otolaryngology Progress Note  July 26, 2018    S:  Continues on BiPAP, CT negative for PE.  Had a new rash as well, so cefepime held due to concern for allergic reaction, rebroadened to meropenem. Has been needing trach cuff up for BiPAP and secretion control.     O: .Blood pressure 129/65, pulse 109, temperature 97.8  F (36.6  C), temperature source Oral, resp. rate 21, height 1.6 m (5' 2.99\"), weight 59.1 kg (130 lb 4.7 oz), SpO2 97 %, not currently breastfeeding.     General: resting in bed, on BiPAP, easily arousable   Neuro: moving all extremities   HEENT: EOMI. 6-0 cuffed Shiley in place, cuff inflated. Trach ties are snug w/ 2 finger breadths between neck and ties. Tracheostomy site clean, no bleeding. No skin breakdown or erythema under trach ties or face plate. Thick secretions from trach site   Pulmonary: breathing comfortably via 6-0 Shiley on BiPAP    Intake/Output Summary (Last 24 hours) at 07/26/18 0552  Last data filed at 07/26/18 0400   Gross per 24 hour   Intake              450 ml   Output             1320 ml   Net             -870 ml       LABS:  ROUTINE IP LABS (Last four results)  BMP    Recent Labs  Lab 07/26/18  0515 07/25/18  0434 07/24/18  1544 07/24/18  0518 07/23/18  0432    138  --  140 139   POTASSIUM 3.1* 4.0 4.0 3.1* 3.7   CHLORIDE 104 110*  --  112* 112*   ESTEFANI 8.1* 8.3*  --  7.6* 7.3*   CO2 21 19*  --  17* 16*   BUN 11 4*  --  4* 5*   CR 0.66 0.45*  --  0.44* 0.53   GLC 91 154*  --  124* 121*       CBC    Recent Labs  Lab 07/26/18  0515 07/25/18  0551 07/24/18  0518 07/23/18  0432   WBC 16.9* 14.8* 9.9 10.0   RBC 3.74* 4.60 4.11 4.00   HGB 11.3* 14.0 12.3 11.9   HCT 33.7* 42.0 38.5 37.5   MCV 90 91 94 94   MCH 30.2 30.4 29.9 29.8   MCHC 33.5 33.3 31.9 31.7   RDW 13.3 13.4 13.5 13.6    230 189 179     Blood cx 7/22: NGTD  Sputum culture: Normal madison  Urine culture: no growth    IMAGING:  MRI: 7/20  Impression:  1. Acute left pontine mesencephalic junction infarct  2. " Multiple images severely degraded by motion with postcontrast  sequences nondiagnostic.  3. Head MRA demonstrates no large vessel occlusion. There is a 3 mm  right A2 aneurysm at the branch point of the frontopolar artery.  Recommend follow-up evaluation.  4. Neck MRA demonstrates approximately 50% stenosis of the proximal  right internal carotid artery, although this is limited due to motion  artifact. Diminutive right vertebral artery.  5. Moderate parenchymal volume loss and Leukoaraiosis.     [Result: Acute left pontine infarct]    PET Scan 7/20  IMPRESSION: This patient with a history of laryngeal squamous cell  carcinoma:  1. Bilateral hypermetabolic pulmonary nodules consistent with  pulmonary metastatic disease.  a. Collapse of the right lower/middle lobes with multiple  hypermetabolic foci suggestive of pulmonary metastases. Right lower  perihilar FDG uptake suggests postobstructive atelectasis.  b. Associated small right pleural effusion.  2. No evidence of metastatic disease in the abdomen/pelvis.  3. See neuroradiology report for results of high-resolution PET CT of  the head and neck.     4. Urethral diverticulum containing stones and septations. Evaluation  is limited by urinary FDG activity. Consider urologic consult.  Prominent urinary contamination about the pelvis.  5. 4.7 cm left adnexal cystic structure. No associated FDG uptake.  Given size and metabolic process follow-up is pelvic  ultrasound/follow-up recommended.  6. Thoracic abdominal aortic ectasia. 1.9 cm right iliac aneurysm.    CXR 7/19  Small right pleural effusion and right basilar opacities which may  represent aspiration/infection or atelectasis.    CXR 7/22  IMPRESSION: Increased right basilar opacity, which may represent  worsening infection.     Head CT 7/19  Impression:   1. In the left side of the cora there is ill-defined hypoattenuation  (series 2, image 25). This likely represents an old infarct, which is  described in  outside radiology report dated 5/17/2016.  2. Multiple foci of chronic lacunar infarction in bilateral deep gray  nuclei.     Head CT 7/22  Impression:   Evolving changes of left hemipontine infarction. Otherwise, no acute  intracranial pathology.    ECHO (7/17)  Interpretation Summary  Technically difficult study.  Global and regional left ventricular function is probably normal with an EF of  60-65%.  The right ventricle is normal size. Global right ventricular function is  normal.  Unable to assess mean RA pressure given the patient is on a ventilator.  No pericardial effusion is present.  No significnat valve disease.  Previous study not available for comparison.    Laryngeal biopsies:  FINAL DIAGNOSIS:   A. LEFT FALSE VOCAL CORD, BIOPSY:   - INVASIVE KERATINIZING SQUAMOUS CELL CARCINOMA, moderately   differentiated.   - Perineural invasion is present.     B. PETIOLE, BIOPSY:   - AT LEAST SQUAMOUS CELL CARCINOMA IN-SITU.     C. ADDITIONAL LEFT FALSE VOCAL CORD, BIOPSY:   - INVASIVE KERATINIZING SQUAMOUS CELL CARCINOMA, moderately   differentiated.     D. RIGHT FALSE VOCAL CORD, BIOPSY:   - AT LEAST MICROINVASIVE SQUAMOUS CELL CARCINOMA, keratinizing type.     FNA R thyroid nodule  - Atypica of undetermined significance. Recommend repeat FNA in 4-6 weeks.       A/P: Keira Collins is a 72-year-old female with a PMH significant for COPD, 140 pack-year tobacco history, alcohol use disorder, HLD, HTN, and PVD who is now POD#9 s/p direct laryngoscopy with biopsy of laryngeal SCC and tracheotomy. Patient was recently presented at Otolaryngology Tumor Board Conference to discuss plan of care for laryngeal [id number] SCCa. Recommendations at that time were to proceed with TL + bilateral MRND vs CXRT.    Trach change was performed on 7/24 and was uncomplicated, trach site healing well.    Patient's hospital course has been complicated by acute ischemic pontine stroke, fevers, and persistent tachycardia, and now worsening  respiratory status. Internal Medicine is now the primary team, although we continue to follow along closely. Discussed concern for PE given her being off her plavix, decreased mobility, recent stroke, and underlying malignancy with the primary team. CT PE negative.     Neuro:   - Slight dementia at baseline, per family  - MR Brain revealed CVA on 07/20- Neurology consulted   - tPA not indicated   - Neuro checks ordered q4h   - Permissive HTN with PRN anti-hypertensives for SBP > 160   - Euthermia, euglycemia   - Aspirin qd, statin qd   - TTE with Bubble Study- Internal Medicine assisting   - Telemtry, EKG- Internal Medicine assisting   - Continue glucose monitoring   - A1c, lipid panel, troponins x3- Internal Medicine assisting    - A1c normal, lipid panel normal, troponins negative x3   - Will defer depression and apnea screens at this time  - Pain control: tylenol, oxycodone, and dilaudid prn    HEENT:  - [id number] SCCa of larynx   - will discuss patient at tumor board on Friday   - care conference next week  - Tracheostomy care:   - Trach changed 7/24, in good position healing well   - cuff can be up or down depending on patient tolerance   - Perform regular suctioning.   - RN should change disposable inner canulas every shift and/or clean it with a brush.    - Keep trach tube obturator taped to wall behind the head of the bed. Keep extra unopened 6-0 Shiley and 4-0 Shiley at bedside at all times.   - S/p PLC trach teaching 7/19 with son  - PET Scan: concerning for lung metastasis. However, discussion with family will need to be had regarding how they would like to proceed with treatment  - FNA: Atypia of undetermined significance. Recommend repeat FNA in 4-6 weeks.    Respiratory:   - Requiring BiPAP - workup in progress by primary team: negative for PE. Per primary, CHF vs aspiration pneumonia  - Hx of COPD: PTA inhalers, albuterol neb PRN. Added Xopenex nebs per medicine recs   - Suspicious RUL nodule, being  followed by the Lung Nodule clinic; recommendation was an IR guided biopsy. Not scheduled yet.     CV/heme:   - Continuous cardiac monitoring   - PTA Norvasc, losartan; PRN hydralazine, labetalol IV for SBP >160; lasix PRN per medicine team  - PTA ASA 81mg and plavix 75mg (when able) qd  - PVD s/p left femoral endarterectomy  - Postop Hgb 14.7    FEN/GI:  - After discussion with family, plan for G-tube placement by thoracic surgery   - plan on PEG placement on 7/27 with thoracic  - Diet: NPO due to PTA dysphagia  - SLP bedside swallow 7/18: recommend NPO due to risk of aspiration  - Severe Malnutrition in the setting of acute on chronic illness: Nutrition consult with NTD until G tube placed  - Multivitamin  - Electrolyte replacement as needed   - per primary team  - Bowel regimen: scheduled senna and docusate; miralax qd    :   - UA positive for infection crystals, cultures pending  - Urethral diverticulum containing stones and septations, will discuss with medicine  - Incontinent of urine - preexisting condition, wears Depends at baseline     Endo:   - Right Thyroid nodule s/p FNA- results pending  - No acute concerns, TSH wnl     ID:   - Afebrile - low-grade temperature of 101.8 7/22; WBC count wnl, CRP wnl  - Re-broadened to meropenem   - Presumed aspiration pneumonia    - Sputum culture ordered today: G+ and G-; G+ cocci in clusters   - cultures pending  - UTI, UA +WBC and + Leuk estrases    PPX:   - SCDs, IS. Lovenox    Consults:   - Nutrition: NTD until G tube placed  - PLC- tracheostomy and TF cares  - PT/OT/SLP rec TCU vs LTACH  - Thoracic surgery for G-tube    Disposition Plan   Medicine primary. We will continue to be closely involved in her care during her hospitalization. We would like to be present for any goals of care discussions.           -- Patient and above plan to be discussed with Dr. Win Thomson MD PGY1  Otolaryngology-Head & Neck Surgery  Please contact ENT by dialing * [phone]  and entering job code [phone].    I, Yeni Barrett, am serving as a scribe to document services personally performed by Mor Thomson MD, based upon my observations and the provider's statements to me. All documentation has been reviewed by the aforementioned doctor prior to being entered into the official medical record.     I was present with the medical student who participated in the service and in the documentation of the note. I have verified the history and personally performed the physical exam and medical decision making. I agree with the assessment and plan of care as documented in the note.    Mor Thomson MD PGY1  Otolaryngology-Head & Neck Surgery  Please contact ENT by dialing * [phone] and entering job code [phone].

## 2018-07-26 NOTE — PLAN OF CARE
Problem: Patient Care Overview  Goal: Plan of Care/Patient Progress Review  Patient plan for discharge: rehab  Current status: Facilitated increased independence with functional transfers. Pt alert and oriented to place/time of day. Pt following all simple commands. Pt completed sit > stands with mod-max A. Pt with increased tone on R side needing blocking of R knee/tactile cues for foot placement. Pt total A for jose cares and LB dressing and mod A for UB dressing. Pt dependently lifted to bed. Pt on trach dome 40% FiO2, VSS.   Barriers to return to prior living situation: R sided weakness, cognitive deficits, s/p new trach   Recommendations for discharge: TCU vs LTACH   Rationale for recommendations: Pt previously mod I for ambulating ~30 ft and required some assist for ADLs. Pt now requires Ax2 for mobility and mod A for ADLs. Pt limited by R sided weakness, cognitive impairments, and complex medical needs.

## 2018-07-26 NOTE — PLAN OF CARE
Problem: Patient Care Overview  Goal: Plan of Care/Patient Progress Review  Outcome: No Change  Neuro: Pt is alert throughout the majority of the shift, orientated to self and location. Pt is following commands. R sided facial droop unchanged.   Cardiac: SR with PVCs. HR 80-90s. SBP stable. Labetalol Q4 hours scheduled. Afebrile.   Respiratory: Shiley 6. Sating>90% on BiPAP settings, FiO2 35%. Suctioning Q1-2 hours.  GI/: Pfeiffer placed. Adequate urine output. No BM this shift.  Diet/appetite: NPO. Plan for peg tube placement possibly tomorrow.  Activity:  Assist of 2, repositioning in bed.  Pain: At acceptable level on current regimen.   Skin: No new deficits noted.  LDA's: Midline placed.    CT negative for PE. Lactic 1.3, Procal 0.16, BNP 1932. Team aware. Merrem restarted. Blood sugars checked q4 hours. Family at bedside this afternoon. Will continue to monitor.     Plan: Continue with POC. Notify primary team with changes.

## 2018-07-27 PROBLEM — E46 CALORIC MALNUTRITION (H): Status: ACTIVE | Noted: 2018-01-01

## 2018-07-27 NOTE — PROGRESS NOTES
"Otolaryngology Progress Note  July 26, 2018    S:  No acute events overnight. Patient remains on BiPAP at night. ID consulted. She is NPO now for PEG placement later today.     O: .Blood pressure 144/70, pulse 109, temperature 99.7  F (37.6  C), temperature source Oral, resp. rate 24, height 1.6 m (5' 2.99\"), weight 57.9 kg (127 lb 10.3 oz), SpO2 97 %, not currently breastfeeding.     General: resting in bed, on BiPAP, easily arousable, interactive   Neuro: moving all extremities   HEENT: EOMI. 6-0 cuffed Shiley in place, cuff inflated. Trach ties are snug w/ 2 finger breadths between neck and ties. Tracheostomy site clean, no bleeding. No skin breakdown or erythema under trach ties or face plate. Thick secretions from trach site   Pulmonary: breathing comfortably via 6-0 Shiley on BiPAP    Intake/Output Summary (Last 24 hours) at 07/26/18 0552  Last data filed at 07/26/18 0400   Gross per 24 hour   Intake              450 ml   Output             1320 ml   Net             -870 ml       LABS:  ROUTINE IP LABS (Last four results)  BMP    Recent Labs  Lab 07/27/18  0530 07/26/18  1707 07/26/18  0515 07/25/18  0434  07/24/18  0518     --  137 138  --  140   POTASSIUM 3.8 3.7 3.1* 4.0  < > 3.1*   CHLORIDE 112*  --  104 110*  --  112*   ESTEFANI 8.1*  --  8.1* 8.3*  --  7.6*   CO2 22  --  21 19*  --  17*   BUN 11  --  11 4*  --  4*   CR 0.54  --  0.66 0.45*  --  0.44*   *  --  91 154*  --  124*   < > = values in this interval not displayed.    CBC    Recent Labs  Lab 07/27/18  0530 07/26/18  0515 07/25/18  0551 07/24/18  0518   WBC 17.7* 16.9* 14.8* 9.9   RBC 3.92 3.74* 4.60 4.11   HGB 11.5* 11.3* 14.0 12.3   HCT 37.1 33.7* 42.0 38.5   MCV 95 90 91 94   MCH 29.3 30.2 30.4 29.9   MCHC 31.0* 33.5 33.3 31.9   RDW 13.5 13.3 13.4 13.5    224 230 189     iCal 4.5  CRP 47      IMAGING:  MRI: 7/20  Impression:  1. Acute left pontine mesencephalic junction infarct  2. Multiple images severely degraded by motion with " postcontrast  sequences nondiagnostic.  3. Head MRA demonstrates no large vessel occlusion. There is a 3 mm  right A2 aneurysm at the branch point of the frontopolar artery.  Recommend follow-up evaluation.  4. Neck MRA demonstrates approximately 50% stenosis of the proximal  right internal carotid artery, although this is limited due to motion  artifact. Diminutive right vertebral artery.  5. Moderate parenchymal volume loss and Leukoaraiosis.     [Result: Acute left pontine infarct]    PET Scan 7/20  IMPRESSION: This patient with a history of laryngeal squamous cell  carcinoma:  1. Bilateral hypermetabolic pulmonary nodules consistent with  pulmonary metastatic disease.  a. Collapse of the right lower/middle lobes with multiple  hypermetabolic foci suggestive of pulmonary metastases. Right lower  perihilar FDG uptake suggests postobstructive atelectasis.  b. Associated small right pleural effusion.  2. No evidence of metastatic disease in the abdomen/pelvis.  3. See neuroradiology report for results of high-resolution PET CT of  the head and neck.     4. Urethral diverticulum containing stones and septations. Evaluation  is limited by urinary FDG activity. Consider urologic consult.  Prominent urinary contamination about the pelvis.  5. 4.7 cm left adnexal cystic structure. No associated FDG uptake.  Given size and metabolic process follow-up is pelvic  ultrasound/follow-up recommended.  6. Thoracic abdominal aortic ectasia. 1.9 cm right iliac aneurysm.    CXR 7/19  Small right pleural effusion and right basilar opacities which may  represent aspiration/infection or atelectasis.    CXR 7/22  IMPRESSION: Increased right basilar opacity, which may represent  worsening infection.     Head CT 7/19  Impression:   1. In the left side of the cora there is ill-defined hypoattenuation  (series 2, image 25). This likely represents an old infarct, which is  described in outside radiology report dated 5/17/2016.  2. Multiple  foci of chronic lacunar infarction in bilateral deep gray  nuclei.     Head CT 7/22  Impression:   Evolving changes of left hemipontine infarction. Otherwise, no acute  intracranial pathology.    ECHO (7/17)  Interpretation Summary  Technically difficult study.  Global and regional left ventricular function is probably normal with an EF of  60-65%.  The right ventricle is normal size. Global right ventricular function is  normal.  Unable to assess mean RA pressure given the patient is on a ventilator.  No pericardial effusion is present.  No significnat valve disease.  Previous study not available for comparison.    Laryngeal biopsies:  FINAL DIAGNOSIS:   A. LEFT FALSE VOCAL CORD, BIOPSY:   - INVASIVE KERATINIZING SQUAMOUS CELL CARCINOMA, moderately   differentiated.   - Perineural invasion is present.     B. PETIOLE, BIOPSY:   - AT LEAST SQUAMOUS CELL CARCINOMA IN-SITU.     C. ADDITIONAL LEFT FALSE VOCAL CORD, BIOPSY:   - INVASIVE KERATINIZING SQUAMOUS CELL CARCINOMA, moderately   differentiated.     D. RIGHT FALSE VOCAL CORD, BIOPSY:   - AT LEAST MICROINVASIVE SQUAMOUS CELL CARCINOMA, keratinizing type.     FNA R thyroid nodule  - Atypica of undetermined significance. Recommend repeat FNA in 4-6 weeks.       A/P: Keira Collins is a 72-year-old female with a PMH significant for COPD, 140 pack-year tobacco history, alcohol use disorder, HLD, HTN, and PVD who is now POD#10 s/p direct laryngoscopy with biopsy of laryngeal SCC and tracheotomy. Patient was recently presented at Otolaryngology Tumor Board Conference to discuss plan of care for laryngeal [id number] SCCa. Recommendations at that time were to proceed with TL + bilateral MRND vs CXRT. This will be rediscussed at tumor board today.     Trach change was performed on 7/24 and was uncomplicated, trach site healing well.    Patient's hospital course has been complicated by acute ischemic pontine stroke, fevers, and persistent tachycardia, and worsening respiratory  status. Internal Medicine is now the primary team, although we continue to follow along closely.     Neuro:   - Slight dementia at baseline, per family  - CVA on 07/20 - management per primary team  - Pain control: tylenol, oxycodone, and dilaudid prn    HEENT:  - [id number] SCCa of larynx   - will discuss patient at tumor board on Friday   - care conference next week  - Tracheostomy care:   - Trach changed 7/24, in good position healing well   - cuff can be up or down depending on patient tolerance   - Perform regular suctioning.   - RN should change disposable inner canulas every shift and/or clean it with a brush.    - Keep trach tube obturator taped to wall behind the head of the bed. Keep extra unopened 6-0 Shiley and 4-0 Shiley at bedside at all times.   - S/p PLC trach teaching 7/19 with son  - PET Scan: concerning for lung metastasis. However, discussion with family will need to be had regarding how they would like to proceed with treatment  - FNA: Atypia of undetermined significance. Recommend repeat FNA in 4-6 weeks.    Respiratory:   - Requiring BiPAP - workup in progress by primary team  - Hx of COPD: PTA inhalers, albuterol neb PRN. Added Xopenex nebs per medicine recs   - Suspicious RUL nodule, being followed by the Lung Nodule clinic; recommendation was an IR guided biopsy. Not scheduled yet.     CV/heme:   - Continuous cardiac monitoring   - PTA Norvasc, losartan; PRN hydralazine, labetalol IV for SBP >160; lasix PRN per medicine team  - PTA ASA 81mg and plavix 75mg (when able) qd  - PVD s/p left femoral endarterectomy  - Postop Hgb 14.7    FEN/GI:  - After discussion with family, plan for G-tube placement by thoracic surgery   - plan on PEG placement on 7/27 with thoracic  - Diet: NPO due to PTA dysphagia  - SLP bedside swallow 7/18: recommend NPO due to risk of aspiration  - Severe Malnutrition in the setting of acute on chronic illness: Nutrition consult with NTD until G tube placed  -  Multivitamin  - Electrolyte replacement as needed   - per primary team  - Bowel regimen: scheduled senna and docusate; miralax qd    :   - UA positive for infection crystals, cultures pending  - Urethral diverticulum containing stones and septations, will discuss with medicine  - Incontinent of urine - preexisting condition, wears Depends at baseline     Endo:   - Right Thyroid nodule s/p FNA- results pending  - No acute concerns, TSH wnl     ID:   - Afebrile - low-grade temperature of 101.8 7/22; WBC count wnl, CRP wnl  - Re-broadened to meropenem   - Presumed aspiration pneumonia    - Sputum culture ordered today: G+ and G-; G+ cocci in clusters   - cultures pending  - UTI, UA +WBC and + Leuk estrases    PPX:   - SCDs, IS. Lovenox    Consults:   - Nutrition after G tube placed  - PLC- tracheostomy and TF cares  - PT/OT/SLP rec TCU vs LTACH  - Thoracic surgery for G-tube    Dispo:     -- Patient and above plan to be discussed with Dr. Win Thomson MD PGY1  Otolaryngology-Head & Neck Surgery  Please contact ENT by dialing [phone] and entering job code [id number].

## 2018-07-27 NOTE — ANESTHESIA CARE TRANSFER NOTE
Patient: Keira Collins    Procedure(s):  Percutaneous Insertion Gastrostomy Tube - Wound Class: I-Clean    Diagnosis: Malnutrition   Diagnosis Additional Information: No value filed.    Anesthesia Type:   General, ETT     Note:  Airway :Tracheostomy  Patient transferred to:PACU  Handoff Report: Identifed the Patient, Identified the Reponsible Provider, Reviewed the pertinent medical history, Discussed the surgical course, Reviewed Intra-OP anesthesia mangement and issues during anesthesia, Set expectations for post-procedure period and Allowed opportunity for questions and acknowledgement of understanding      Vitals: (Last set prior to Anesthesia Care Transfer)    CRNA VITALS  7/27/2018 1419 - 7/27/2018 1449      7/27/2018             Resp Rate (observed): 12    EKG: Sinus rhythm                Electronically Signed By: Cyndee Franklin CRNA, LORI ARECHIGA  July 27, 2018  2:49 PM

## 2018-07-27 NOTE — BRIEF OP NOTE
[facility], [place]    Brief Operative Note    Pre-operative diagnosis: Malnutrition   Post-operative diagnosis same  Procedure: Procedure(s):  Percutaneous Insertion Gastrostomy Tube - Wound Class: I-Clean  Surgeon: Surgeon(s) and Role:     * Rai Nash MD - Primary        Elder Gusman MD - Assist  Anesthesia: General   Estimated blood loss: Minimal  Drains: None  Specimens: * No specimens in log *  Findings:   None.  Complications: None.  Implants: Gastrostomy tube

## 2018-07-27 NOTE — PLAN OF CARE
Problem: Patient Care Overview  Goal: Plan of Care/Patient Progress Review  OT 6B: Cancel - pt off the unit for PEG placement, will reschedule per POC.

## 2018-07-27 NOTE — PHARMACY-CONSULT NOTE
Pharmacy Tube Feeding Consult    Medications initially reviewed 7/18/18 for administration feeding feeding tube. Duplicate consult placed today and meds reviewed again for administration by feeding tube and for potential food/drug interactions.    Recommendation: No changes are needed at this time.     Pharmacy will continue to follow as new medications are ordered.    Samantha Nguyen, Pharm.D., [facility]  Pager [phone]

## 2018-07-27 NOTE — ANESTHESIA POSTPROCEDURE EVALUATION
Patient: Keira Collins    Procedure(s):  Percutaneous Insertion Gastrostomy Tube - Wound Class: I-Clean    Diagnosis:Malnutrition   Diagnosis Additional Information: No value filed.    Anesthesia Type:  General, ETT    Note:  Anesthesia Post Evaluation    Patient location during evaluation: PACU  Patient participation: Able to fully participate in evaluation  Level of consciousness: awake and alert  Pain management: adequate  Airway patency: patent  Cardiovascular status: hemodynamically stable  Respiratory status: ventilator and BIPAP  Hydration status: acceptable  PONV: none     Anesthetic complications: None    Comments: Patient is dependent on BiPAP via trach. Plan to transfer her back to the floor on BiPAP        Last vitals:  Vitals:    07/27/18 1200 07/27/18 1445 07/27/18 1500   BP: 134/76 (!) 133/107    Pulse:      Resp: (!) 32 22 23   Temp: 37.1  C (98.7  F) 36.9  C (98.4  F)    SpO2: 99% 96%          Electronically Signed By: Iglesia Alford MD  July 27, 2018  3:14 PM

## 2018-07-27 NOTE — PROGRESS NOTES
[facility], [place]    Internal Medicine Progress Note - Gold Service      Assessment & Plan   Keira Collins is a 72 year old female admitted on 7/17/2018. She has a medical history of COPD, HTN, hyperlipidemia, chronic tobacco abuse, h/o alcohol abuse, squamous cell carcinoma of larynx admitted on 7/17/2018 for direct laryngoscopy with biopsy, tracheostomy and NGT placement.  Internal medicine consulted 7/20 for HTN, hypoxia and tachycardia.  Hospital course complicated by acute left pontine CVA on 7/19.      Sepsis and acute hypoxic respiratory failure most likely 2/2 aspiration PNA - Excessive secretions post-operatively causing likely aspiration PNA.  Intermittently spiking temps this hospitalization.  Received Clindamycin (7/19-7/20), broadened to Ceftriaxone/Flagyl for fever (7/20-7/22), broadened to Meropenem for fever (7/22-7/23), deescalated to Cefepime/Falgyl (7/24) but re-escalated to Meropenem (7/25-7/26) for acute hypoxic respiratory failure necessitating Bipap.  TTE 7/24 with normal LVEF (60-65%). EKG unremarkable.  CXR (7/25) with small R effusion, pulm edema and R>L bibasilar opacities. CT Chest: no PE, stable pulm nodules, new nodular thickening ALBARO, increased pleural effusions (R>L) with atelectasis.  Acute decompensation 7/24 evening felt to be multifactorial 2/2 worsening aspiration PNA vs volume overload (BNP 1932 and weight up).   Sputum 7/22 negative, repeated 7/26 NGTD.  Blood cx 7/22, 7/26 NGTD.  Urine cx 7/26 with 10-50k candida albicans.  WBC spiked today 17.7 (16.9) but with improvement in inflammatory markers (CRP 99->47 and procal 1.41->0.71).  Her overall clinical status improved greatly with Lasix (7/24 & 7/25) and antibiotics.  ID consulted on 7/26 for leucocytosis and elevated procal.  Weight down to 127 bs from 132 lbs.   - ID recommended discontinuing Meropenem and starting Moxifloxacin and Flagyl (drug rash with Ceftriaxone), appreciate ongoing  input  - Trend fever curve   - CBC with diff, procal and CRP in AM   - Wean to humidified trach mask as able but would start Bipap overnight (IPAP 13, EPAP 6, Fi02 25%).    - Continue to follow cx data    - No further lasix today unless clinical decompensation given NPO status and decreased weight   - Continue scheduled nebs   - Continue chin catheter for strict I/O, daily weights   - Continue RT and RN frequent suctioning      Hypokalemia; Hypocalcemia - Likely 2/2 malnutrition due to no enteral access and inablity to give nutrition.  S/p several doses of calcium gluconate.  K being repleted per protocol.  - Replace K per elyte protocol   - BMP daily     Acute L pontine CVA - R sided facial droop first noted AM of 7/19, improved throughout the day.  Head CT w/o acute intracranial abnormality but subsequent MRI head revealed new L pontine infarct.  Neurology consulted, out of TPA window.  Lipid panel normal.    - Continue rectal ASA and statin.  Continue Plavix when enteral access is established.  TTE w/ bubble study (7/24) with atrial septum intact.   - SBP goal <160 per neuro.  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  An NGT was placed intra-operatively but was self D/Cd by patient. Thoracic surgery consulted 7/23.  - Plan for PEG vs lap G tube today with thoracic surgery   - Meds/TF per surgical team.  If able, will start TF tonight per nutrition reccs   - Nutrition consult re-ordered    - Resume lovenox for DVT ppx 7/28   - NPO   - Albumin/prealbumin q Monday   - Continue thiamine, folic acid IV, resume MVI when enteral access established    Hypertension - PTA maintained on Losartan 50 mg qday, amlodipine 5  mg qday (unclear compliance per OP notes). Does have some hx of alcohol use but do not feel c/w etoh withdrawal.  EKG w/ some ST depression in lead V6, but V5 and lead I WNL- trop negative, PVCs as below.  Becomes tachycardic with fevers.  SBP goal has been set to <160 per neuro in setting of recent CVA.   - Currently holding Losartan and Amlodipine given no enteral access, resume when PEG placed     - Labetalol scheduled and PRN Hydralazine as above 4 hours    - PNA treatment as above   - Continue telemetry     Oral candidiasis - Improving.  Continue Nystatin via swab as ordered.     [id number] SCC of Larynx - S/p direct laryngoscopy, with biopsy of laryngeal SCC, tracheostomy  and NGT placement.  Post op course c/b ischemic pontine CVA, hypoxic resp failure and aspiration PNA.  First trach change was 7/24.  Discussed at tumor board today with ongoing reccs to proceed with lung biopsy as this will determine medical oncology plan.  Per review of ENTs note, treatment would be total laryngectomy vs chemorads but concern from surgical team about doing procedure on Plavix as well as concern about patient;s tolerance of both surgery and chemo/rads.  ENT would like to proceed with feeding tube and possible lung biopsy and plan for care conference this coming week.   - RNCC will arrange care conference with Dr Walden and IM next week depending on OR schedule or Dr. Walden.    - Keep trach cuff up while having resp distress   - Per ENT, please do not manipulate straps or place sponge/gauze beneath trach plate  - Perform regular suctioning   - RN to change disposable inner cannulas q shift and/or clean it with a brush   - Keep trach tube obturator taped to wall behind the head of the bed. Keep extra unopened 6-0 Shiley and 4-0 Shiley at bedside at all times  - S/p PLC trach teaching 7/19 with son    Maculopapular rash likely 2/2 drug rash - Rash started after cefepime dose, worsening now on Ceftriaxone.  ID following and have  recc Moxifloxacin instead of Ceftriaxone.  RN has outlined rash.  Appears asymptomatic.  - Cephalosporins added to allergy list      RUL spiculated lung nodule - Noted on 6/2018 CT scan.  PET scan on 7/21 revealed bilateral hypermetabolic pulmonary nodules consistent with pulmonary metastatic diease.  She is being followed by the lung nodule clinic and recommendation was an IR guided biopsy which has not yet been scheduled.  Also revealed small pleural effusion.   - Once feeding tube placed and resp status more stable will consider IR consult for lung nodule biopsy biopsy to determine medical oncology plan   - Has an OP referral to oncology, not yet completed    Right lobe thyroid nodule - S/p FNA (7/19) showing atypia of undetermined significant.   - Repeat FNA in 4-6 weeks but possibly sooner     Mild normocytic anemia - In setting of critical illness and active infection which is most likely etiology.  Hgb currently 11.5 from baseline ~14.  No current s/s of bleeding.   - Follow daily CBC    Physical Deconditioning - PT recommending discharge to TCU when medically ready .    Resolved hospital issues:  Delirium.  Noted on 7/24 in setting of hypoxia and exacerbated by recent CVA and insomnia.  Resolved when her respiratory status improved.      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 Noted on PET scan, containing stones and septations.  UA negative, Cr normal and making urine although patient is incontinent.  Discussed with urology, no need for IP consultation unless develops UTI or LINDY.  She should have OP Urology follow up with Dr. Arroyo or Dr. Heller for possible cystoscopy and possible surgical excision of diverticulum, message sent to schedulers.  PVD s/p endarterectomy iliac artery recannulization w/ stent, right SFA recannulization w/ stent (1/2017) - Managed on PTA ASA and Plavix.  Plavix initially held 2/2 surgery, now unable to given 2/2 no enteral access. OK for patient to have Plavix  per surgery team.  Continue ASA (rectal) and resume Plavix when enteral access is established  COPD.  CT chest from 6/13/18 with moderate centrilobular emphysematous changes in upper lobes,  PTA managed on Spiriva daily, Advair BID with PRN Albuterol.  Continue scheduled nebs: Brovana, Pulmicort, Ipratropium and xoponex   Hyperlipidemia.  Continue statin   Adrenal gland nodularity.  Noted on CT chest 06/13/2018 w/ nodularity of bilateral adrenal glands w/ may represent mets. But PET scan did not reveal any abd/pelvis mets.    # Pain Assessment:  Current Pain Score 7/27/2018   Patient currently in pain? denies   Pain score (0-10) -   Pain location -   Pain descriptors -   Keira canas pain level was assessed and she is having no pain.     Diet: NPO until enteral access is established   DVT Prophylaxis: Lovenox daily   Code Status: Full Code    Disposition Plan   Expected discharge: 4 - 7 days, recommended to transitional care unit once antibiotic plan established, safe disposition plan/ TCU bed available and SIRS/Sepsis treated.     Entered: Rosa Nguyen 07/27/2018, 1:34 PM   Information in the above section will display in the discharge planner report.      The patient's care was discussed with the Attending Physician, Dr. Wu, Bedside Nurse, Care Coordinator/, Patient and Thoracic surgery  Consultant.    Rosa Nguyen  Internal Medicine Staff Hospitalist Service  [facility]  Pager: [phone]  Please see sticky note for cross cover information    Interval History     Tolerated bipap overnight and slept well.  Requires q 2 hours suctioning.  Appears more fatigued today but comfortable.  She denies fever, chills, nausea, vomiting, diarrhea.  No chest pain or shortness of breath.     Data reviewed today: I reviewed all medications, new labs and imaging results over the last 24 hours.     Physical Exam   Vital Signs: Temp: 98.7  F (37.1  C) Temp src: Oral BP: 134/76   Heart  Rate: 83 Resp: (!) 32 SpO2: 99 % O2 Device: BiPAP/CPAP    Weight: 127 lbs 10.34 oz    General: NAD, elderly appearing female. Resting comfortably in NAD.  HEENT: No scleral icterus, PERRLA.  Tongue with thin, white debris.   NECK:   6.0 cuffed Shiley in place, cuff inflated, trach ties in place.  Tracheostomy site clean, no bleeding. On bipap.   CARDIOVASCULAR: RRR.  S1/S2 normal, no murmurs, rubs or gallops   RESPIRATORY: Coarse breath sounds bilaterally. Appears comfortable on Bipap.  No wheezes, use of accessory muscles or retractions.    ABDOMEN: Soft, non-tender abdomen without rebound or guarding, no hepatosplenomegaly, no palpable masses, hypoactive bowel sounds present  EXTREMITIES: Peripheral pulses normal, no peripheral edema, warm  Skin: Maculopapular rash on upper back, chest and neck, left arm and left lower extremity over thigh and knee extending to back of leg. Outlined by RN   NEURO: Lethargic but arouses to loud voice and nodding yes and no.  R sided facial droop and R sided tongue deviation.  RUE and RLE 3+/5 strength, LUE and LLE 5/5 strength.     Data   Medications     IV fluid REPLACEMENT ONLY       - MEDICATION INSTRUCTIONS -         [Auto Hold] amLODIPine  5 mg Per Feeding Tube Daily     [Auto Hold] arformoterol  15 mcg Nebulization BID     [Auto Hold] aspirin  80 mg Rectal Daily     [Auto Hold] atorvastatin  40 mg Per Feeding Tube Daily     [Auto Hold] budesonide  0.25 mg Nebulization BID     bupivacaine liposome  10 mL Infiltration DURING SURGERY     [Auto Hold] Calcium carb-Vitamin D 500 mg [place]-200 units  1 tablet Per Feeding Tube BID     [Auto Hold] clopidogrel  75 mg Per Feeding Tube Daily     [Auto Hold] docusate  50 mg Per Feeding Tube BID     [Auto Hold] enoxaparin  30 mg Subcutaneous Q24H     [Auto Hold] folic acid  1 mg Intravenous Daily     [Auto Hold] heparin lock flush  5-10 mL Intracatheter Q24H     [Auto Hold] ipratropium  0.5 mg Nebulization Q4H While awake     [Auto Hold]  labetalol  10 mg Intravenous Q4H     [Auto Hold] lactobacillus rhamnosus (GG)  1 capsule Per Feeding Tube TID AC     [Auto Hold] levalbuterol  0.63 mg Nebulization Q4H While awake     [Auto Hold] lidocaine  1 patch Transdermal Q24H     [Auto Hold] lidocaine (PF)  5 mL Subcutaneous Once     [Auto Hold] lidocaine   Transdermal Q8H     [Auto Hold] lidocaine   Transdermal Q24h     [Auto Hold] losartan  50 mg Per Feeding Tube Daily     [Auto Hold] metroNIDAZOLE  500 mg Intravenous Q6H     [Auto Hold] moxifloxacin  400 mg Intravenous Q24H     [Auto Hold] multivitamins with minerals  15 mL Per Feeding Tube Daily     [Auto Hold] nystatin  500,000 Units Oral 4x Daily     [Auto Hold] polyethylene glycol  17 g Per Feeding Tube Daily     [Auto Hold] sennosides  5 mL Per Feeding Tube BID     [Auto Hold] sodium chloride (PF)  10 mL Intracatheter Q8H     [Auto Hold] sodium chloride (PF)  10 mL Intracatheter Once     [Auto Hold] sodium chloride bacteriostatic  12 mL Intravenous Once     thiamine  200 mg Intravenous Daily     [Auto Hold] thiamine  100 mg Intravenous Daily     Data     Recent Labs  Lab 07/27/18  0530 07/26/18  1707 07/26/18  0515 07/25/18  0551 07/25/18  0434 07/24/18  2102  07/24/18  1228   WBC 17.7*  --  16.9* 14.8*  --   --   --   --    HGB 11.5*  --  11.3* 14.0  --   --   --   --    MCV 95  --  90 91  --   --   --   --      --  224 230  --   --   --   --      --  137  --  138  --   --   --    POTASSIUM 3.8 3.7 3.1*  --  4.0  --   < >  --    CHLORIDE 112*  --  104  --  110*  --   --   --    CO2 22  --  21  --  19*  --   --   --    BUN 11  --  11  --  4*  --   --   --    CR 0.54  --  0.66  --  0.45*  --   --   --    ANIONGAP 8  --  12  --  9  --   --   --    ESTEFANI 8.1*  --  8.1*  --  8.3*  --   --   --    *  --  91  --  154*  --   --   --    TROPI  --   --   --   --  <0.015 <0.015  --  <0.015   < > = values in this interval not displayed.  No results found for this or any previous visit (from the  past 24 hour(s)).

## 2018-07-27 NOTE — PLAN OF CARE
Problem: Patient Care Overview  Goal: Plan of Care/Patient Progress Review  SLP: Dysphagia therapy cancelled.  Pt NPO for PEG placement.  ST to follow as indication on POC.

## 2018-07-27 NOTE — PROGRESS NOTES
CLINICAL NUTRITION SERVICES - BRIEF NOTE  RD to assess and order TF per MNT- Rosa Nguyen, NP    PEG placed on 7/27. (see previous RD note for more detail)  Pt has been NPO and without TF for 6 days.    INTERVENTIONS  Recommendations / Nutrition Prescription  Continue to monitor tolerance to new PEG tube and formula    Implementation  1. Once new TF is placed and confirmed by XR, begin TF with IsoSource 1.5 @ 10 ml/hr and adv by 10 ml Q8, ( ONLY advance if K+/mg++ WNL and Phos >1.9) to goal @ 45 ml/hr.       - IsoSource 1.5 @ goal of 45ml/hr, 1080 ml/day, 1620 kcals (29 kcal/kg/day), 73 g PRO (1.3 g/kg/day), 821 ml free H2O, 190 g CHO and 16 g Fiber daily.        2. Monitor K+/Mg++/Phos daily with TF start and advancement to goal infusion to evaluate for refeeding risk, replace per protocol       3. Order free water flushes 30 ml every 4 hours for tube patency.      4. Recommend Certavite (15 ml/day via FT) to ensure adequate micronutrient needs being met.          Tracie Lan, RD, MS, LD  6B- Pager: [phone]

## 2018-07-27 NOTE — PROGRESS NOTES
[facility] ID SERVICE: ONGOING CONSULTATION   Keira Collins : 1945 Sex: female:   Medical record number [id number] Attending Physician: Rochelle Wu MD  Date of Service: 2018  PROBLEM LIST:   1. Suspected aspiration pneumonitis vs. pneumonia  2. Oral candidiasis on nystatin  3. Suspected drug rash and new eosinophilia with most likely culprit cephalosporins  4. Penicillin allergy by history   5. Squamous cell carcinoma of larynx (Biopsy confirmed on 18, Stage T3N2c)  6. Spiculated RUL lung nodule with multiple bilateral PET avid nodules of uncertain etiology but concerning for malignancy  7. Right thyroid nodule s/p non-diagnostic FNA  8. Dysphasia with abnormal swallow study  9. Acute left pontine CVA    DISCUSSION:    is a 72-year-old woman who is been admitted since  for diagnostic workup of a laryngeal mass that has been found to be squamous cell carcinoma.  Her hospital course has been complicated by a new left pontine stroke with right hemiparesis and a fluctuating respiratory status with several episodes of acute hypoxia, the most recent of which occurred on  and required BiPAP for approximately 24 hours.  She also had one episode of fever on  that has now resolved.  Clinically she seems improved over the past 48h, with no further fevers, stable respiratory status and downtrending CRP though WBC still rising.     Her clinical course is most suggestive of acute aspiration events as a cause of her hypoxemia and rising inflammatory markers.  She has multiple risk factors including her laryngeal cancer and recent stroke with documented dysphagia on clinical swallow evaluation.  Initial sputum culture from  grew only normal madison with no resistant organisms.  More recent repeat sputum from  showed evidence of inflammatory cells without any organisms seen, culture pending.  Urine cultures growing Candida albicans, likely colonization.      Over the past  24h, she has had progression of her rash and worseninng eosinophilia now up to 1.7.  Cephalosporins are the leading culprit since she received ceftriaxine, cefepime and then ceftriaxone again. She also has a history of PCN allergy.  With this information, we would recommend switching away from beta-lactam.  Moxifloxacin would be reasonable choice for typical respiratory pathogens with better anaerobic coverage than levofloxacin or ciprofloxacin.    RECOMMENDATIONS:   1. Stop ceftriaxone and metronidazole   2. Suggest vancomycin for jose-op antibiotics for planned PEG today due to suspicion for cephalosporin allergy  3. Moxifloxacin has better anaerobic spectrum but reasonable to switch to levofloxacin 750mg daily since moxi is not on our formulary.  4. Agree with nystatin per primary team since thrush seems to be improving  5. Would remove Pefiffer catheter as soon as able to minimize risk of catheter associated UTI  6. Would be reasonable to consider diagnostic/therapeutic thoracentesis if sufficient fluid     Staffed with Dr. aDvis.    ID will continue to follow.     Katiana Laura MD, PhD  Adult & Pediatric Infectious Diseases Fellow PGY7, [facility]  Pager: [phone]    Attending Attestation and Findings   Patient seen and examined by me with fellow Dr. Laura. I agree with her findings, assessment and recommendations. I have reviewed today's vital signs, medications, labs and imaging. Recs were discussed with primary team today. Feel free to call with questions. We will continue to follow.     Susana Davis MD  [phone]       SUBJECTIVE: (Recommend ? 4 systems)   The interval history was reviewed. On BIPAP 40% through trach.  No fevers.  Planning for PEG today.  Denies diarrhea.    ROS:(Recommend ? 2systems)   A five-point review of systems was obtained and was negative with the exception of that which is described above.  Allergies   Allergen Reactions     Penicillins      Cephalosporins Rash  "    No current outpatient prescriptions on file.     CURRENT ANTI-INFECTIVES:   Curent:   Ceftriaxone 7/20-7/22, 7/26-present   Metronidazole 7/20-7/22, 7/24-7/25, 7/26-present    Previous:   Meropenem 7/22-7/26  Clindamycin 7/17, 7/19-7/20  Vancomycin 7/23    EXAMINATION: (Recommend ? 5 systems)   Vital Signs: /76 (BP Location: Right arm)  Pulse 109  Temp 98.7  F (37.1  C) (Oral)  Resp (!) 32  Ht 1.6 m (5' 2.99\")  Wt 57.9 kg (127 lb 10.3 oz)  SpO2 99%  BMI 22.62 kg/m2   GENERAL:  well-developed, well-nourished, in bed in no acute distress.   HEENT:  Head is normocephalic, atraumatic   EYES:  Eyes have anicteric sclerae without conjunctival injection.    ENT: Tacky mucous membranes.  No oral lesions.  White coating on tongue but no evidence of buccal candidiasis.  NECK: On BIPAP through trach.  LUNGS: Rhonchi and crackles bilaterally  CARDIOVASCULAR:  Regular rate and rhythm with no murmurs.  ABDOMEN:  Normal bowel sounds, soft, nontender.   SKIN:  More extensive macular rash extending onto chest, neck and thighs.  NEUROLOGIC: Flattened nasolabial folds on right.   T/L/D: Midline catheter in left arm, tracheostomy, Pfeiffer    DATA/RESULTS:   Cr  0.54    WBCs 9.1 on 7/21 > 9.7 > 10.0 > 9.9 > 14.8 > 16.9 > 17.7   > 84 > 68 >99 > 47  Pro-calcitonin 0.11 >0 0.15 > 0.16 > 1.41 > 0.71    CULTURES:   12/29/16 left heel light growth Enterococcus faecalis (sensitive to ampicillin and vancomycin) and light growth Proteus mirabilis (resistant to tetracycline but otherwise sensitive)  7/22 Sputum Gram stain with less than 25 PMNs/lpf and mixed gram-positive and gram-negative bacteria with a predominance of GPC's in clusters, culture with growth of normal madison  7/22 Blood culture x2 NGTD  7/26 Blood culture NGTD  7/26 Urine culture 10-50K Candida albicans  7/26 sputum Gram stain with greater than 25 PMNslpf and no organisms, culture pending    Imaging results:   No new imaging.  "

## 2018-07-27 NOTE — PLAN OF CARE
Problem: Patient Care Overview  Goal: Plan of Care/Patient Progress Review  Outcome: No Change  Neuro: Pt is alert throughout the shift, orientated to self and location. Pt is following commands. R sided facial droop unchanged.   Cardiac: SR with PVCs. HR 80-90s. SBP stable. Labetalol Q4 hours scheduled. Afebrile.                      Respiratory: Shiley 6. Sating>90% on BiPAP settings, FiO2 25-40%. Pt trach domed for 5 hours at FiO2 40%. Suctioning Q1-2 hours. Trach cares performed.  GI/: Pfeiffer in place. -150mls every 4 hours. 1 BM this shift.  Diet/appetite: NPO. Plan for peg tube placement tomorrow.  Activity:  Assist of 2, repositioning in bed.  Pain: At acceptable level on current regimen.   Skin: No new deficits noted.  LDA's: Midline placed.    Blood cultures, UA, and sputum sample sent this AM. ID consult placed. CRP elevated at 99. Procal 1.41. Team is aware. K+ 3.1 this AM, replaced, rechecked 3.7. Will need further replacement.  Plan for possible peg tube placement tomorrow.

## 2018-07-27 NOTE — PLAN OF CARE
Problem: Patient Care Overview  Goal: Plan of Care/Patient Progress Review    PT 6B: Cancel- patient in OR for PEG placement. Will reschedule.

## 2018-07-27 NOTE — PLAN OF CARE
"Problem: Patient Care Overview  Goal: Plan of Care/Patient Progress Review  Outcome: Improving  /79  Pulse 109  Temp 98.5  F (36.9  C) (Oral)  Resp 29  Ht 1.6 m (5' 2.99\")  Wt 57.9 kg (127 lb 10.3 oz)  SpO2 95%  BMI 22.62 kg/m2    Neuro: Hard to assess orientation, confused intermittently. VPM for line control   Cardiac: SR. VSS. Hypertensive x1- resolved with Hydralazine    Respiratory: 35% Bipap. Shiley 6 trach. Suctioned q3 hours.   GI/: Adequate urine output per chin catheter, no BM   Diet/appetite: NPO. New G tube to gravity (for 24 hours then begin TF - 7/28 1600)  Activity:  Repositioning q2h in bed.   Pain: At acceptable level on current regimen. IV Dilaudid given x1  Skin: No new deficits noted. Rash marked with skin marker   LDA's: Left midline heparin locked.     Plan: Continue with POC. Notify primary team with changes.      Problem: Chronic Respiratory Difficulty Comorbidity  Goal: Chronic Respiratory Difficulty  Patient comorbidity will be monitored for signs and symptoms of Respiratory Difficulty (Chronic) condition.  Problems will be absent, minimized or managed by discharge/transition of care.   Outcome: Improving  Patient on 30-40% bipap today due to OR procedure, tachypnea in the 30's       "

## 2018-07-27 NOTE — PLAN OF CARE
Problem: Patient Care Overview  Goal: Plan of Care/Patient Progress Review  Outcome: No Change  Problem: Patient Care Overview  Goal: Plan of Care/Patient Progress Review  Outcome: No Change  Neuro: Pt's mentation clear with exception to situation at times. R sided facial droop unchanged.   Cardiac: SR. HR 80-90s. SBP stable.                   Respiratory: Shiley 6. Sating>90% on BiPAP settings, FiO2 40%. RR flux from 22 - 32. Frequent suctioning q 1 hour. Pt denies SOA.  GI/:  Pfeiffer intact. 3 BMs this shift.  Diet/appetite: NPO. Plan for peg tube placement today. BG 73 responding to D 50.   Activity:  Assist of 2, repositioning in bed.  Pain: At acceptable level on current regimen.   Skin: No new deficits noted.  LDA's: Midline placed.     Pt's shampoo and body scrub x 1 completed.

## 2018-07-28 NOTE — PLAN OF CARE
"Problem: Patient Care Overview  Goal: Plan of Care/Patient Progress Review  Outcome: Improving  /77 (BP Location: Right arm)  Pulse 109  Temp 98.4  F (36.9  C) (Oral)  Resp 18  Ht 1.6 m (5' 2.99\")  Wt 57.9 kg (127 lb 10.3 oz)  SpO2 96%  BMI 22.62 kg/m2       Neuro: Confused intermittently. Oriented to self and situation. VPM for line control   Cardiac: SR. VSS. Bp's better controlled today.               Respiratory: 30% Trach dome. Shiley 6 trach. Suctioned q1-3 hours. Copious amounts of clear secretions   GI/: Adequate urine output per chin catheter, no BM   Diet/appetite: TF started into G tube with a goal of 45ml/hour. Tolerating well   Activity:  Repositioning q2h in bed.   Pain: At acceptable level on current regimen. Denies pain   Skin: No new deficits noted. Rash marked with skin marker   LDA's: Left midline heparin locked.      Plan: Continue with POC. Notify primary team with changes.       Problem: Chronic Respiratory Difficulty Comorbidity  Goal: Chronic Respiratory Difficulty  Patient comorbidity will be monitored for signs and symptoms of Respiratory Difficulty (Chronic) condition.  Problems will be absent, minimized or managed by discharge/transition of care.   Outcome: Improving  Trach dome on 30% with moderate to large amounts of secretions, clear. Tolerating trach dome well all day today       "

## 2018-07-28 NOTE — PROGRESS NOTES
Thoracic Surgery Note    Reassessed patient. G tube site clean, no significant drainage or erythema. OK to start using today.     Will plan to check tube again later this week, will follow peripherally in the meantime. Call with any question.    Sukhwinder Crump MD  PGY-3 General Surgery

## 2018-07-28 NOTE — PROGRESS NOTES
[facility] [place]    Internal Medicine Progress Note - Gold Service      Assessment & Plan   Keira Collins is a 72 year old female admitted on 7/17/2018. She has a medical history of COPD, HTN, hyperlipidemia, chronic tobacco abuse, h/o alcohol abuse, squamous cell carcinoma of larynx admitted on 7/17/2018 for direct laryngoscopy with biopsy, tracheostomy and NGT placement.  Internal medicine consulted 7/20 for HTN, hypoxia and tachycardia.  Hospital course complicated by acute left pontine CVA on 7/19.      Sepsis and acute hypoxic respiratory failure most likely 2/2 aspiration PNA and hypervolemia - Excessive secretions post-operatively causing likely aspiration PNA.  Intermittently spiking temps this hospitalization with elevated inflammatory markers and acute hypoxic respiratory failure on 7/24-7/25 requiring bipap.  Received Clindamycin (7/19-7/20), Ceftriaxone/Flagyl (7/20-7/22), Meropenem (7/22-7/23), Cefepime/Falgyl (7/24) but re-escalated to Meropenem (7/24-7/26).  Seen by ID 7/26 who recc Moxifloxacin and Flagyl (pt had notable drug rash with Ceftriaxone) which she remains on.  TTE 7/24 with normal LVEF (60-65%). EKG unremarkable.  CXR (7/25) with small R effusion, pulm edema and R>L bibasilar opacities. CT Chest: no PE, stable pulm nodules, new nodular thickening ALBARO, increased pleural effusions (R>L) with atelectasis.   Sputum 7/22 negative, repeated 7/26 NGTD.  Blood cx 7/22, 7/26 NGTD.  Urine cx 7/26 with 10-50k candida albicans.  BNP elevated 1932 7/25.  Resp failure likely 2/2 aspiration PNA and hypervolemia.  Has improved with IV antibioics and intermittent diuresis (7/24-7/25).  Weight 127 lbs from 132 lbs.  Weaned to trach mask.  WBC 17.7->14.6.  CRP 99->47->75.  Procal 1.41->0.71->0.25.  Weight down to 127 bs from 132 lbs.   - Per ID, continue Moxifloxaxin and Flagyl given drug rash w/ Ceftriaxone, appears as though PharmD has autosubbed Moxifloxacin for Levaquin,  will change back.   - Trend fever curve   - CBC with diff, procal and CRP in AM   - Wean to humidified trach mask as able but would cont Bipap overnight (IPAP 13, EPAP 6, Fi02 25%).    - Continue to follow cx data    - No further lasix today unless clinical decompensation given NPO status and decreased weight   - Continue scheduled nebs   - Continue chin catheter for strict I/O, daily weights   - Continue RT and RN frequent suctioning     Hypokalemia; Hypocalcemia - Likely 2/2 malnutrition due to no enteral access and inablity to give nutrition.  S/p several doses of calcium gluconate.  K being repleted per protocol.  - Calcium gluconate 1 gm IV x 1 today   - Replace K per elyte protocol   - BMP daily     Acute L pontine CVA - R sided facial droop first noted AM of 7/19, improved throughout the day.  Head CT w/o acute intracranial abnormality but subsequent MRI head revealed new L pontine infarct.  Neurology consulted, out of TPA window.  Lipid panel normal.    - Continue rectal ASA and statin.  Continue Plavix when enteral access is established.  TTE w/ bubble study (7/24) with atrial septum intact.   - SBP goal <160 per neuro.  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  An NGT was placed intra-operatively but was self D/Cd by patient. Thoracic surgery consulted 7/23 and is now s/p PEG on 7/27 without complications.   - Start TF at 1600 today per nutrition reccs   - Resume lovenox for DVT ppx 7/28   - NPO   - Albumin/prealbumin q Monday   - Continue thiamine, folic acid IV, resume MVI when enteral access established    Hypertension - PTA maintained on Losartan 50 mg qday, amlodipine 5 mg qday (unclear compliance  per OP notes). Does have some hx of alcohol use but do not feel c/w etoh withdrawal.  EKG w/ some ST depression in lead V6, but V5 and lead I WNL- trop negative, PVCs as below.  Becomes tachycardic with fevers.  SBP goal has been set to <160 per neuro in setting of recent CVA.  She has been receiving IV Labetalol 2/2 no enteral access.    - Continue Amlodipine 5 and Losartan.  Change IV Labetalol to PRN SBP >160     - PNA treatment as above   - Continue telemetry     Oral candidiasis - Improving.  Continue Nystatin via swab as ordered.     [id number] SCC of Larynx - S/p direct laryngoscopy, with biopsy of laryngeal SCC, tracheostomy  and NGT placement.  Post op course c/b ischemic pontine CVA, hypoxic resp failure and aspiration PNA.  First trach change was 7/24.  Discussed at tumor board today with ongoing reccs to proceed with lung biopsy as this will determine medical oncology plan.  Per review of ENTs note, treatment would be total laryngectomy vs chemorads but concern from surgical team about doing procedure on Plavix as well as concern about patient;s tolerance of both surgery and chemo/rads.  ENT would like to proceed with feeding tube and possible lung biopsy and plan for care conference this coming week.   - RNCC will arrange care conference with Dr Walden and IM next week depending on OR schedule or Dr. Walden.    - Keep trach cuff up while having resp distress   - Per ENT, please do not manipulate straps or place sponge/gauze beneath trach plate  - Perform regular suctioning   - RN to change disposable inner cannulas q shift and/or clean it with a brush   - Keep trach tube obturator taped to wall behind the head of the bed. Keep extra unopened 6-0 Shiley and 4-0 Shiley at bedside at all times  - S/p PLC trach teaching 7/19 with son    Maculopapular rash likely 2/2 drug rash - Rash started after cefepime dose worsened with Ceftriaxone.  ID following and have recc Moxifloxacin instead of Ceftriaxone.  RN has  outlined rash.  Appears asymptomatic.  - Cephalosporins added to allergy list      RUL spiculated lung nodule - Noted on 6/2018 CT scan.  PET scan on 7/21 revealed bilateral hypermetabolic pulmonary nodules consistent with pulmonary metastatic diease.  She is being followed by the lung nodule clinic and recommendation was an IR guided biopsy which has not yet been scheduled.  Also revealed small pleural effusion.   - Once feeding tube placed and resp status more stable will consider IR consult for lung nodule biopsy biopsy to determine medical oncology plan  - Has an OP referral to oncology, not yet completed    Right lobe thyroid nodule - S/p FNA (7/19) showing atypia of undetermined significant.   - Repeat FNA in 4-6 weeks but possibly sooner     Mild normocytic anemia - In setting of critical illness and active infection which is most likely etiology.  Hgb currently 11.5 from baseline ~14.  No current s/s of bleeding.   - Follow daily CBC    Physical Deconditioning - PT recommending discharge to TCU when medically ready .    Resolved hospital issues:  Delirium.  Noted on 7/24 in setting of hypoxia and exacerbated by recent CVA and insomnia.  Resolved when her respiratory status improved.      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 Noted on PET scan, containing stones and septations.  UA negative, Cr normal and making urine although patient is incontinent.  Discussed with urology, no need for IP consultation unless develops UTI or LINDY.  She should have OP Urology follow up with Dr. Arroyo or Dr. Heller for possible cystoscopy and possible surgical excision of diverticulum, message sent to schedulers.  PVD s/p endarterectomy iliac artery recannulization w/ stent, right SFA recannulization w/ stent (1/2017) - Managed on PTA ASA and Plavix.  Plavix initially held 2/2 surgery, now unable to given 2/2 no enteral access. OK for patient to have Plavix per surgery team.  Continue ASA (rectal) and resume  Plavix when enteral access is established  COPD.  CT chest from 6/13/18 with moderate centrilobular emphysematous changes in upper lobes,  PTA managed on Spiriva daily, Advair BID with PRN Albuterol.  Continue scheduled nebs: Brovana, Pulmicort, Ipratropium and xoponex   Hyperlipidemia.  Continue statin   Adrenal gland nodularity.  Noted on CT chest 06/13/2018 w/ nodularity of bilateral adrenal glands w/ may represent mets. But PET scan did not reveal any abd/pelvis mets.    # Pain Assessment:  Current Pain Score 7/28/2018   Patient currently in pain? denies   Pain score (0-10) -   Pain location -   Pain descriptors -   Keira canas pain level was assessed and she is having no pain.     Diet: TF per nutrition reccs  DVT Prophylaxis: Lovenox daily   Code Status: Full Code    Disposition Plan   Expected discharge: 4 - 7 days, recommended to transitional care unit once antibiotic plan established, safe disposition plan/ TCU bed available and SIRS/Sepsis treated.     Entered: Rosa Nguyen 07/28/2018, 2:49 PM   Information in the above section will display in the discharge planner report.      The patient's care was discussed with the Attending Physician, Dr. Wu, Bedside Nurse, Care Coordinator/, Patient and Thoracic surgery  Consultant.    Rosa Nguyen  Internal Medicine Staff Hospitalist Service  [facility]  Pager: [phone]  Please see sticky note for cross cover information    Interval History     Doing much dayanna today, notes shortness of breath much improved.  Denies fever, chills, nausea, vomiting, diarrhea.     Data reviewed today: I reviewed all medications, new labs and imaging results over the last 24 hours.     Physical Exam   Vital Signs: Temp: 98.7  F (37.1  C) Temp src: Oral BP: 172/74   Heart Rate: 87 Resp: 18 SpO2: 99 % O2 Device: Trach dome    Weight: 127 lbs 10.34 oz    General: NAD, elderly appearing female. Resting comfortably in NAD.  HEENT: No scleral  icterus, PERRLA.  Tongue with thin, white debris.   NECK:   6.0 cuffed Shiley in place, cuff inflated, trach ties in place.  Tracheostomy site clean, no bleeding. On bipap.   CARDIOVASCULAR: RRR.  S1/S2 normal, no murmurs, rubs or gallops   RESPIRATORY: Coarse breath sounds bilaterally. Appears comfortable on Bipap.  No wheezes, use of accessory muscles or retractions.    ABDOMEN: Soft, non-tender abdomen without rebound or guarding, no hepatosplenomegaly, no palpable masses, hypoactive bowel sounds present  EXTREMITIES: Peripheral pulses normal, no peripheral edema, warm  Skin: Maculopapular rash on upper back, chest and neck, left arm and left lower extremity over thigh and knee extending to back of leg receeding from marked borders.   NEURO: Lethargic but arouses to loud voice and nodding yes and no.  R sided facial droop and R sided tongue deviation.  RUE and RLE 3+/5 strength, LUE and LLE 5/5 strength.     Data   Medications     IV fluid REPLACEMENT ONLY       - MEDICATION INSTRUCTIONS -         amLODIPine  5 mg Per Feeding Tube Daily     arformoterol  15 mcg Nebulization BID     aspirin  81 mg Per Feeding Tube Daily     atorvastatin  40 mg Per Feeding Tube Daily     budesonide  0.25 mg Nebulization BID     Calcium carb-Vitamin D 500 mg [place]-200 units  1 tablet Per Feeding Tube BID     clopidogrel  75 mg Per Feeding Tube Daily     docusate  50 mg Per Feeding Tube BID     enoxaparin  30 mg Subcutaneous Q24H     folic acid  1 mg Intravenous Daily     heparin lock flush  5-10 mL Intracatheter Q24H     ipratropium  0.5 mg Nebulization 4x daily     labetalol  10 mg Intravenous Q4H     lactobacillus rhamnosus (GG)  1 capsule Per Feeding Tube TID AC     levalbuterol  0.63 mg Nebulization 4x daily     levofloxacin  750 mg Intravenous Q24H     lidocaine  1 patch Transdermal Q24H     lidocaine (PF)  5 mL Subcutaneous Once     lidocaine   Transdermal Q8H     lidocaine   Transdermal Q24h     losartan  50 mg Per Feeding  Tube Daily     multivitamins with minerals  15 mL Per Feeding Tube Daily     nystatin  500,000 Units Oral 4x Daily     polyethylene glycol  17 g Per Feeding Tube Daily     sennosides  5 mL Per Feeding Tube BID     sodium chloride (PF)  10 mL Intracatheter Q8H     sodium chloride (PF)  10 mL Intracatheter Once     sodium chloride bacteriostatic  12 mL Intravenous Once     thiamine  100 mg Intravenous Daily     Data     Recent Labs  Lab 07/28/18  0555 07/27/18  0530 07/26/18  1707 07/26/18  0515  07/25/18  0434 07/24/18  2102  07/24/18  1228   WBC 14.6* 17.7*  --  16.9*  < >  --   --   --   --    HGB 11.1* 11.5*  --  11.3*  < >  --   --   --   --    MCV 94 95  --  90  < >  --   --   --   --     216  --  224  < >  --   --   --   --     142  --  137  --  138  --   --   --    POTASSIUM 4.0 3.8 3.7 3.1*  --  4.0  --   < >  --    CHLORIDE 105 112*  --  104  --  110*  --   --   --    CO2 23 22  --  21  --  19*  --   --   --    BUN 13 11  --  11  --  4*  --   --   --    CR 0.56 0.54  --  0.66  --  0.45*  --   --   --    ANIONGAP 11 8  --  12  --  9  --   --   --    ESTEFANI 8.0* 8.1*  --  8.1*  --  8.3*  --   --   --    * 101*  --  91  --  154*  --   --   --    TROPI  --   --   --   --   --  <0.015 <0.015  --  <0.015   < > = values in this interval not displayed.  No results found for this or any previous visit (from the past 24 hour(s)).

## 2018-07-28 NOTE — PROGRESS NOTES
THORACIC SURGERY POST-OP CHECK  07/27/2018 10:04 PM    Patient: Keira Collins  MRN: [id number]    SUBJECTIVE  UO appropriate, Pfeiffer in place. G-tube to gravity. Pain controlled. C/O abdominal pain near PEG site, but relieved by current pain medications.    OBJECTIVE  Temp:  [98.4  F (36.9  C)-99.7  F (37.6  C)] 99.5  F (37.5  C)  Heart Rate:  [75-96] 95  Resp:  [22-34] 28  BP: (109-166)/() 158/74  FiO2 (%):  [35 %-40 %] 35 %  SpO2:  [95 %-99 %] 97 %     I/O last 3 completed shifts:  In: 640 [I.V.:640]  Out: 885 [Urine:880; Blood:5]    Physical Exam:  General: NAD, confused at times  CV: RRR,   Pulm:Trach in place on BiPAP  Abd: soft, appropriately tender to palpation, mild distension, PEG tube to gravity/  : Pfeiffer in place collecting urine  Extremities: warm, well-perfused without edema    Recent Labs  Lab 07/27/18  0530   WBC 17.7*   RBC 3.92   HGB 11.5*   HCT 37.1   MCV 95   MCH 29.3   MCHC 31.0*   RDW 13.5            Recent Labs  Lab 07/27/18  0530  07/25/18  1154     < >  --    POTASSIUM 3.8  < >  --    CHLORIDE 112*  < >  --    CO2 22  < >  --    BUN 11  < >  --    CR 0.54  < >  --    *  < >  --    ESTEFANI 8.1*  < >  --    MAG  --   --  2.0   PHOS  --   --  3.8   < > = values in this interval not displayed.      ASSESSMENT/PLAN  Keira Collins is a 72 year old femalePOD#0 s/p PEG tube placement. Patient recovering as expected.    -Neuro: Continue current pain management   -CV: Hemodynamically stable. Continue monitoring vitals.  -FEN: PEG tube to gravity for 24 hours and then cleared for use.  -Disposition per primary team's decision.      Elder Gusman MD  General Surgery (PGY-1)  [phone] (pager)

## 2018-07-28 NOTE — PROGRESS NOTES
"Otolaryngology Progress Note  July 28, 2018    S:  No acute events overnight. Patient remains on BiPAP at night. PEG placed yesterday. Required less tracheal suctioning overnight. Switched to Levofloxacin on 7/27.     O: ./74 (BP Location: Right arm)  Pulse 109  Temp 98.7  F (37.1  C) (Oral)  Resp 18  Ht 1.6 m (5' 2.99\")  Wt 57.9 kg (127 lb 10.3 oz)  SpO2 99%  BMI 22.62 kg/m2     General: resting in bed, interactive, answering questions appropriately (mouthing nods/shakes her head)   Neuro: moving all extremities   HEENT: EOMI. 6-0 cuffed Shiley in place, cuff inflated. Trach ties are snug w/ 2 finger breadths between neck and ties. Tracheostomy site clean, no bleeding. No skin breakdown or erythema under trach ties or face plate. Thick secretions from trach site   Pulmonary: breathing comfortably via 6-0 Shiley on BiPAP    Intake/Output Summary (Last 24 hours) at 07/28/18 1149  Last data filed at 07/28/18 0800   Gross per 24 hour   Intake              480 ml   Output              615 ml   Net             -135 ml       LABS:  ROUTINE IP LABS (Last four results)  BMP    Recent Labs  Lab 07/28/18  0555 07/27/18  0530 07/26/18  1707 07/26/18  0515 07/25/18  0434    142  --  137 138   POTASSIUM 4.0 3.8 3.7 3.1* 4.0   CHLORIDE 105 112*  --  104 110*   ESTEFANI 8.0* 8.1*  --  8.1* 8.3*   CO2 23 22  --  21 19*   BUN 13 11  --  11 4*   CR 0.56 0.54  --  0.66 0.45*   * 101*  --  91 154*       CBC    Recent Labs  Lab 07/28/18  0555 07/27/18  0530 07/26/18  0515 07/25/18  0551   WBC 14.6* 17.7* 16.9* 14.8*   RBC 3.71* 3.92 3.74* 4.60   HGB 11.1* 11.5* 11.3* 14.0   HCT 34.9* 37.1 33.7* 42.0   MCV 94 95 90 91   MCH 29.9 29.3 30.2 30.4   MCHC 31.8 31.0* 33.5 33.3   RDW 13.7 13.5 13.3 13.4    216 224 230     CRP 75 (47, 99)  Procalcitonin 0.25 (0.71, 1.41)      IMAGING:    MRI: 7/20:  Acute left pontine mesencephalic junction infarct    PET Scan 7/20:   Large hypermetabolic laryngeal mass involving " bilateral VF, anterior commissure, epiglottis, left AE fold, effacement of preepiglottic fat, bilateral LAD.   hypermetabolic pulmonary nodules consistent with metastatic disease, small right pleural effusion.  urethral diverticulum containing stones and septations, 4.7 cm left adnexal cystic structure, thoracic abdominal aortic ectasia. 1.9 cm right iliac aneurysm.    Head CT 7/22  Impression:   Evolving changes of left hemipontine infarction. Otherwise, no acute  intracranial pathology.    ECHO 7/24:  Technically difficult study.  Global and regional left ventricular function is probably normal with an EF of  60-65%. No significnat valve disease.    CT Pulm: No PE    PATHOLOGY:    Laryngeal biopsies:  FINAL DIAGNOSIS:   A. LEFT FALSE VOCAL CORD, BIOPSY:   - INVASIVE KERATINIZING SQUAMOUS CELL CARCINOMA, moderately   differentiated.   - Perineural invasion is present.     B. PETIOLE, BIOPSY:   - AT LEAST SQUAMOUS CELL CARCINOMA IN-SITU.     C. ADDITIONAL LEFT FALSE VOCAL CORD, BIOPSY:   - INVASIVE KERATINIZING SQUAMOUS CELL CARCINOMA, moderately   differentiated.     D. RIGHT FALSE VOCAL CORD, BIOPSY:   - AT LEAST MICROINVASIVE SQUAMOUS CELL CARCINOMA, keratinizing type.     FNA R thyroid nodule  - Atypica of undetermined significance. Recommend repeat FNA in 4-6 weeks.       A/P: Keira Collins is a 72-year-old female with a [id number] SCCa of larynx PMH significant for COPD, 140 pack-year tobacco history, alcohol use disorder, HLD, HTN, and PVD who is now POD#11 s/p direct laryngoscopy with biopsy of laryngeal SCC and tracheotomy. Patient was recently presented at Otolaryngology Tumor Board Conference to discuss plan of care for laryngeal [id number] SCCa. Recommendations at that time were to proceed with TL + bilateral MRND vs CXRT. This will be rediscussed at tumor board today.     Trach change was performed on 7/24 and was uncomplicated, trach site healing well.    Patient's hospital course has been complicated by  acute ischemic pontine stroke, fevers, and persistent tachycardia, and worsening respiratory status. Internal Medicine is now the primary team, although we continue to follow along closely. ID was consulted and following, switch to levofloxacin for better anaerobic coverage on 7/27. She is was discussed at Tumor Board on 7/27.     Plan:   - Will plan to have a care conference with Keira, family members, Dr. Walden present and IM next week.   - Tracheostomy care:   - Trach changed 7/24, in good position healing well   - cuff can be up or down depending on patient tolerance/need for positive pressure   - Perform regular suctioning.   - RN should change disposable inner canulas every shift and/or clean it with a brush.    - Keep trach tube obturator taped to wall behind the head of the bed. Keep extra unopened 6-0 Shiley and 4-0 Shiley at bedside at all times.   - S/p PLC trach teaching 7/19 with son  - PET Scan: concerning for lung metastasis. However, discussion with family will need to be had regarding how they would like to proceed with treatment  - FNA: Atypia of undetermined significance.Plan will depend on treatment option selected after care conference.   - PLC for TF and trach cares performed.     -- Patient and above seen with Dr. Win Elliott  Otolaryngology-Head & Neck Surgery  Please contact ENT by dialing * [phone] and entering job code [phone].

## 2018-07-28 NOTE — PLAN OF CARE
Problem: Patient Care Overview  Goal: Plan of Care/Patient Progress Review  Outcome: No Change  Neuro: Disoriented to time and situation. Alert. PERRLA. R side facial droop (unchanged). Some R side weakness.   Cardiac: NSR with HR in 80s-90s. BPs 130s-150s/70s. Afebrile.   Respiratory: Shiley 6 to BiPAP at 30% FiO2. Suction q4hour. Requiring more oral than tracheal suctioning. Trach cares completed at 0100.   GI/: Pfeiffer with decreased urine output to 50 and 30cc over 8hours. Bladder scanned for 50 ml. MD notified, orders for LR bolus 250cc/4hours. No BM this shift, hypoactive bs.   Diet/Appetite: Strict NPO. TF to start on days. Q4hour BS WNL.   Activity: AST of 2 for turning q2hour.   Pain: C/o gas discomfort. Denies the need for intervention.   Skin: No new changes noted. Generalized rash marked and seems to be improving.   LDAs: L double midline, grey lumen heparin locked and purple to LR bolus. Lab having difficulty with blood return this AM. PEG tube open to gravity. Shiley 6 to BiPAP.     Continue with POC. Notify primary team with changes.   Adriana Boykin RN            Problem: Chronic Respiratory Difficulty Comorbidity  Goal: Chronic Respiratory Difficulty  Patient comorbidity will be monitored for signs and symptoms of Respiratory Difficulty (Chronic) condition.  Problems will be absent, minimized or managed by discharge/transition of care.   Outcome: Improving  Down to 30% FiO2 from 35%. Decreased tracheal suctioning needs.

## 2018-07-29 NOTE — PROGRESS NOTES
[facility], [place]    Internal Medicine Progress Note - Gold Service      Assessment & Plan   Keira Collins is a 72 year old female admitted on 7/17/2018. She has a medical history of COPD, HTN, hyperlipidemia, chronic tobacco abuse, h/o alcohol abuse, squamous cell carcinoma of larynx admitted on 7/17/2018 for direct laryngoscopy with biopsy, tracheostomy and NGT placement.  Internal medicine consulted 7/20 for HTN, hypoxia and tachycardia.  Hospital course complicated by acute left pontine CVA on 7/19 and acute hypoxic respiratory failure 2/2 aspiration PNA.     Sepsis and acute hypoxic respiratory failure most likely 2/2 aspiration PNA and hypervolemia - Excessive secretions post-operatively causing likely aspiration PNA.  Intermittently spiking temps this hospitalization with elevated inflammatory markers and acute hypoxic respiratory failure on 7/24-7/25 requiring bipap.  Received Clindamycin (7/19-7/20), Ceftriaxone/Flagyl (7/20-7/22), Meropenem (7/22-7/23), Cefepime/Falgyl (7/24) but re-escalated to Meropenem (7/24-7/26).  Seen by ID 7/26 who recc Moxifloxacin and Flagyl (pt had notable drug rash with Ceftriaxone) which she remains on.  TTE 7/24 with normal LVEF (60-65%). EKG unremarkable.  CXR (7/25) with small R effusion, pulm edema and R>L bibasilar opacities. CT Chest: no PE, stable pulm nodules, new nodular thickening ALBARO, increased pleural effusions (R>L) with atelectasis.   Sputum 7/22 negative, repeated 7/26 NGTD.  Blood cx 7/22, 7/26 NGTD.  Urine cx 7/26 with 10-50k candida albicans.  BNP elevated 1932 7/25.  Resp failure likely 2/2 aspiration PNA and hypervolemia.  Has improved with IV antibioics and intermittent diuresis (7/24-7/25).  Weight 128 lbs from 132 lbs.  Weaned to trach mask during the day, bipap at night.  WBC now normalized 10.2.  CRP improved 75->44.  Procal 1.41->0.71->0.25.   - Per ID, continue Moxifloxaxin and Flagyl.  Will touch base with team on  Monday to discuss length of treatment  - Trend fever curve   - CBC with diff in AM tomorrow with Procal and CRP   - Wean to humidified trach mask as able but would cont Bipap overnight (IPAP 13, EPAP 6, Fi02 25%).    - Continue to follow cx data    - Continue scheduled nebs   - Continue RT and RN frequent suctioning     Oliguria - Appears somewhat volume down on exam today.  Has been NPO for several days prior to this 2/2 lack of enteral access. Received lasix dosing as above.  Kidney function remains WNL. TF running at 30mL/hour.  UOP 90mL in past 4 hours.   - Gentle IVF bolus 250mL this AM with improvement  - Discontinue chin today, cont I/O    Acute L pontine CVA - R sided facial droop first noted AM of 7/19, improved throughout the day.  Head CT w/o acute intracranial abnormality but subsequent MRI head revealed new L pontine infarct.  Neurology consulted, out of TPA window.  Lipid panel normal.    - Continue ASA, statin and Plavix   - SBP goal <160 per neuro.  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  An NGT was placed intra-operatively but was self D/Cd by patient. Thoracic surgery consulted 7/23 and is now s/p PEG on 7/27 without complications. Tolerating TF ay 30mL/hour with elytes WNL.   - Increase TF to goal rate per nutrition reccs  - Follow daily K, mag, phos while advancing as high risk re-feeding syndrome   - NPO, SLP following   - Albumin/prealbumin q Monday   - Continue thiamine, folic acid IV, resume MVI when enteral access established    Hypertension - PTA maintained on Losartan 50 mg qday, amlodipine 5 mg qday (unclear compliance per OP notes). Does have some hx of alcohol use but do not feel  c/w etoh withdrawal.  EKG w/ some ST depression in lead V6, but V5 and lead I WNL- trop negative, PVCs as below.  Becomes tachycardic with fevers.  SBP goal has been set to <160 per neuro in setting of recent CVA.  She has been receiving IV Labetalol 2/2 no enteral access.    - Continue Amlodipine 5 and Losartan.  - Continue IV Labetalol PRN SBP >160     - PNA treatment as above   - Continue telemetry     Oral candidiasis - Improving.  Continue Nystatin via swab as ordered.     [id number] SCC of Larynx - S/p direct laryngoscopy, with biopsy of laryngeal SCC, tracheostomy  and NGT placement.  Post op course c/b ischemic pontine CVA, hypoxic resp failure and aspiration PNA.  First trach change was 7/24.  Discussed at tumor board on Friday 7/27 with ongoing reccs to proceed with lung biopsy as this will determine medical oncology plan.  Per review of ENTs note, treatment would be total laryngectomy vs chemorads but concern from surgical team about doing procedure on Plavix as well as concern about patient's tolerance of both surgery and chemo/rads.  ENT would like to proceed with possible lung biopsy and plan for care conference this coming week.   - RNCC will arrange care conference with Dr Walden and IM next week depending on OR schedule or Dr. Walden.    - Keep trach cuff up while having resp distress   - Per ENT, please do not manipulate straps or place sponge/gauze beneath trach plate  - Perform regular suctioning   - RN to change disposable inner cannulas q shift and/or clean it with a brush   - Keep trach tube obturator taped to wall behind the head of the bed. Keep extra unopened 6-0 Shiley and 4-0 Shiley at bedside at all times  - S/p PLC trach teaching 7/19 with son    RUL spiculated lung nodule - Noted on 6/2018 CT scan.  PET scan on 7/21 revealed bilateral hypermetabolic pulmonary nodules consistent with pulmonary metastatic diease.  She is being followed by the lung nodule clinic and recommendation was an IR  guided biopsy which has not yet been scheduled.  Also revealed small pleural effusion.   - ENT requesting IR consult this admission to discuss possibility of biopsy this admission vs OP.  Have placed consult for tomorrow AM.   - Has an OP referral to oncology, not yet completed    Right lobe thyroid nodule - S/p FNA (7/19) showing atypia of undetermined significant.   - Repeat FNA in 4-6 weeks but possibly sooner     Mild normocytic anemia - In setting of critical illness and active infection which is most likely etiology.  Hgb currently 10.4 from baseline ~14.  No current s/s of bleeding.   - Follow daily CBC    Physical Deconditioning - PT recommending discharge to TCU when medically ready .    Resolved hospital issues:  Delirium.  Noted on 7/24 in setting of hypoxia and exacerbated by recent CVA and insomnia.  Resolved when her respiratory status improved.    Hypokalemia; Hypocalcemia.  Likely 2/2 malnutrition due to no enteral access and inablity to give nutrition.  S/p several doses of calcium gluconate and potassium.  Most recent ionized calcium 4.6, K now normal.  Continue to check ical q MWF and K per protocol.  Continue elyte repletion protocol.   Drug Rash. Rash started after cefepime dose worsened with Ceftriaxone.  ID following and have recc Moxifloxacin instead of Ceftriaxone.  Improved.  Cephalosporins added to allergy list.   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 Noted on PET scan, containing stones and septations.  UA negative, Cr normal and making urine although patient is incontinent.  Discussed with urology, no need for IP consultation unless develops UTI or LINDY.  She should have OP Urology follow up with Dr. Arroyo or Dr. Heller for possible cystoscopy and possible surgical excision of diverticulum, message sent to schedulers.    Stable Medical Conditions:  PVD s/p endarterectomy iliac artery recannulization w/ stent, right SFA recannulization w/ stent (1/2017) - Managed on PTA ASA and Plavix.  Plavix  initially held 2/2 surgery, now unable to given 2/2 no enteral access. OK for patient to have Plavix per surgery team.  Continue ASA (rectal) and resume Plavix when enteral access is established  COPD.  CT chest from 6/13/18 with moderate centrilobular emphysematous changes in upper lobes,  PTA managed on Spiriva daily, Advair BID with PRN Albuterol.  Continue scheduled nebs: Brovana, Pulmicort, Ipratropium and xoponex   Hyperlipidemia.  Continue statin   Adrenal gland nodularity.  Noted on CT chest 06/13/2018 w/ nodularity of bilateral adrenal glands w/ may represent mets. But PET scan did not reveal any abd/pelvis mets.    # Pain Assessment:  Current Pain Score 7/29/2018   Patient currently in pain? denies   Pain score (0-10) -   Pain location -   Pain descriptors -   Keira s pain level was assessed and she is having no pain.     Diet: TF per nutrition reccs  DVT Prophylaxis: Lovenox daily   Code Status: Full Code    Disposition Plan   Expected discharge: 4 - 7 days, recommended to transitional care unit once antibiotic plan established, safe disposition plan/ TCU bed available and SIRS/Sepsis treated.     Entered: Rosa Nguyen 07/29/2018, 8:10 AM   Information in the above section will display in the discharge planner report.      The patient's care was discussed with the Attending Physician, Dr. Wu, Bedside Nurse and Patient.    Rosa Nguyen  Internal Medicine Staff Hospitalist Service  [facility]  Pager: [phone]  Please see sticky note for cross cover information    Interval History     Keira is doing well today, her rash is improving and her breathing is comfortable.  Denies abdominal pain, chest pain, SOB, nausea or vomiting.  Tolerating her tube feedings.       Data reviewed today: I reviewed all medications, new labs and imaging results over the last 24 hours.     Physical Exam   Vital Signs: Temp: 99.2  F (37.3  C) Temp src: Oral BP: 132/67   Heart Rate: 78 Resp: 24  SpO2: 98 % O2 Device: BiPAP/CPAP    Weight: 128 lbs 11.98 oz    General: NAD, elderly appearing female. Resting comfortably in NAD watching TV  HEENT: No scleral icterus, PERRLA.  Tongue with thin, white debris.   NECK:   6.0 cuffed Shiley in place, cuff inflated, trach ties in place.  Tracheostomy site clean, no bleeding. On bipap.   CARDIOVASCULAR: RRR.  S1/S2 normal, no murmurs, rubs or gallops   RESPIRATORY: CTAB, decreased at bases. Appears comfortable on trach mask.  Thin secretions.  No wheezes, use of accessory muscles or retractions.    ABDOMEN: Soft, non-tender abdomen without rebound or guarding, no hepatosplenomegaly, no palpable masses, hypoactive bowel sounds present.  PEG tube CDI.  No pain around insertion site.   EXTREMITIES: Peripheral pulses normal, no peripheral edema, warm  Skin: Maculopapular rash faintly visible, nearly completely resolved. Midline catheter CDI.   NEURO:Alert and smiling.  R sided facial droop and R sided tongue deviation.  RUE 4/5, RLE 3/5. LUE and LLE 5/5 strength.     Data   Medications     IV fluid REPLACEMENT ONLY       - MEDICATION INSTRUCTIONS -         amLODIPine  5 mg Per Feeding Tube Daily     arformoterol  15 mcg Nebulization BID     aspirin  81 mg Per Feeding Tube Daily     atorvastatin  40 mg Per Feeding Tube Daily     budesonide  0.25 mg Nebulization BID     Calcium carb-Vitamin D 500 mg Platinum-200 units  1 tablet Per Feeding Tube BID     clopidogrel  75 mg Per Feeding Tube Daily     docusate  50 mg Per Feeding Tube BID     enoxaparin  30 mg Subcutaneous Q24H     folic acid  1 mg Oral Daily     heparin lock flush  5-10 mL Intracatheter Q24H     ipratropium  0.5 mg Nebulization 4x daily     lactobacillus rhamnosus (GG)  1 capsule Per Feeding Tube TID AC     levalbuterol  0.63 mg Nebulization 4x daily     lidocaine  1 patch Transdermal Q24H     lidocaine (PF)  5 mL Subcutaneous Once     lidocaine   Transdermal Q8H     lidocaine   Transdermal Q24h     losartan  50 mg  Per Feeding Tube Daily     moxifloxacin  400 mg Intravenous Q24H     multivitamins with minerals  15 mL Per Feeding Tube Daily     nystatin  500,000 Units Oral 4x Daily     polyethylene glycol  17 g Per Feeding Tube Daily     sennosides  5 mL Per Feeding Tube BID     sodium chloride (PF)  10 mL Intracatheter Q8H     sodium chloride (PF)  10 mL Intracatheter Once     sodium chloride bacteriostatic  12 mL Intravenous Once     thiamine  100 mg Oral Daily     Data     Recent Labs  Lab 07/29/18  0523 07/28/18  0555 07/27/18  0530 07/26/18  1707 07/26/18  0515  07/25/18  0434 07/24/18  2102  07/24/18  1228   WBC 10.2 14.6* 17.7*  --  16.9*  < >  --   --   --   --    HGB 10.4* 11.1* 11.5*  --  11.3*  < >  --   --   --   --    MCV 95 94 95  --  90  < >  --   --   --   --     243 216  --  224  < >  --   --   --   --    NA  --  139 142  --  137  --  138  --   --   --    POTASSIUM  --  4.0 3.8 3.7 3.1*  --  4.0  --   < >  --    CHLORIDE  --  105 112*  --  104  --  110*  --   --   --    CO2  --  23 22  --  21  --  19*  --   --   --    BUN  --  13 11  --  11  --  4*  --   --   --    CR  --  0.56 0.54  --  0.66  --  0.45*  --   --   --    ANIONGAP  --  11 8  --  12  --  9  --   --   --    ESTEFANI  --  8.0* 8.1*  --  8.1*  --  8.3*  --   --   --    GLC  --  100* 101*  --  91  --  154*  --   --   --    TROPI  --   --   --   --   --   --  <0.015 <0.015  --  <0.015   < > = values in this interval not displayed.  No results found for this or any previous visit (from the past 24 hour(s)).

## 2018-07-29 NOTE — PROGRESS NOTES
[facility] ID SERVICE: ONGOING CONSULTATION   Keira Collins : 1945 Sex: female:   Medical record number [id number] Attending Physician: Rochelle Wu MD  Date of Service: 2018  PROBLEM LIST:   1. Suspected aspiration pneumonitis vs. pneumonia  2. Oral candidiasis on nystatin  3. Suspected drug rash and new eosinophilia with most likely culprit cephalosporins  4. Penicillin allergy by history   5. Squamous cell carcinoma of larynx (Biopsy confirmed on 18, Stage T3N2c)  6. Spiculated RUL lung nodule with multiple bilateral PET avid nodules of uncertain etiology but concerning for malignancy  7. Right thyroid nodule s/p non-diagnostic FNA  8. Dysphasia with abnormal swallow study  9. Acute left pontine CVA    DISCUSSION:    is a 72-year-old woman who is been admitted since  for diagnostic workup of a laryngeal mass that has been found to be squamous cell carcinoma.  Her hospital course was  complicated by a new left pontine stroke with right hemiparesis and a fluctuating respiratory status with several episodes of acute hypoxia, the most recent of which occurred on  and required BiPAP for approximately 24 hours.  She also had one episode of fever on  that has now resolved.      Her clinical course is most suggestive of acute aspiration events as a cause of her hypoxemia and rising inflammatory markers with respiratory cultures growing only normal madison and no resistant organisms.  The coagulase-negative staph from recent sputum and Candida from urine likely represent colonizers and not significant pathogens.    Her course was also complicated by what appears to be a fairly convincing drug rash with associated eosinophilia.  The cephalosporin seem to be the most likely culprit, although she was on metronidazole at the same time.  She had prompt improvement in her rash after switching to fluoroquinolone therapy.  She is currently clinically stable with consistently  downtrending inflammatory markers and no further fevers and respiratory status stable on trach dome.    RECOMMENDATIONS:   1. Continue moxifloxacin 400 mg daily to complete 7-day course from recent respiratory decompensation to treat for aspiration pneumonia (7/25-7/31).  Would be reasonable to switch to enteral route now that has PEG in place.    2. If she is discharged before the course is complete and her insurance does not cover moxifloxacin, reasonable to complete her course with levofloxacin.      Staffed with Dr. Davis.    ID will sign off.  Please call if additional questions.     Katiana Laura MD, PhD  Adult & Pediatric Infectious Diseases Fellow PGY7, [facility]  Pager: [phone]    Attending Attestation and Findings   Patient seen and examined by me with fellow Dr. Laura. I agree with her findings, assessment and recommendations. I have reviewed today's vital signs, medications, labs and imaging. Recs were discussed with primary team today.   We will sign off but don't hesitate to call with questions.    Susana Davis MD  [phone]     SUBJECTIVE: (Recommend ? 4 systems)   The interval history was reviewed. On trach dome.  POD#1 from PEG placement.  Tolerating tube feeds.  No fevers.    ROS:(Recommend ? 2systems)   A five-point review of systems was obtained and was negative with the exception of that which is described above.  Allergies   Allergen Reactions     Penicillins      Cefepime Rash     Provider entered as cephalosporins (pt had cefepime - rash this admission)     Cephalosporins Rash     No current outpatient prescriptions on file.     CURRENT ANTI-INFECTIVES:   Curent:   Moxifloxacin 7/29-present    Previous:   Levofloxacin 7/27-7/28  Ceftriaxone 7/20-7/22, 7/26  Metronidazole 7/20-7/22, 7/24-7/25, 7/26-7/27  Meropenem 7/22-7/26  Clindamycin 7/17, 7/19-7/20  Vancomycin 7/23    EXAMINATION: (Recommend ? 5 systems)   Vital Signs: /71 (BP Location: Right arm)  Pulse 109   "Temp 99.3  F (37.4  C) (Oral)  Resp 16  Ht 1.6 m (5' 2.99\")  Wt 58.4 kg (128 lb 12 oz)  SpO2 97%  BMI 22.81 kg/m2   GENERAL:  well-developed, in bed watching TV  HEENT:  Head is normocephalic, atraumatic   EYES:  Eyes have anicteric sclerae without conjunctival injection.    ENT: Tacky mucous membranes.   NECK: On trach dome.  LUNGS: Rhonchi and crackles bilaterally  CARDIOVASCULAR:  Regular rate and rhythm with no murmurs.  ABDOMEN:  Interval placement of PEG.  Diffuse tenderness to palpation.  SKIN:  Markedly improved rash on neck, chest and thighs.  NEUROLOGIC: Flattened nasolabial folds on right.   T/L/D: Midline catheter in left arm, tracheostomy, PEG    DATA/RESULTS:   Cr  0.52    WBCs 9.1 on 7/21 > 9.7 > 10.0 > 9.9 > 14.8 > 16.9 > 17.7 > 14.6 > 10.2   > 84 > 68 >99 > 47 > 75 > 44  Pro-calcitonin 0.11 >0 0.15 > 0.16 > 1.41 > 0.71 > 0.25 > 0.27    CULTURES:   12/29/16 left heel light growth Enterococcus faecalis (sensitive to ampicillin and vancomycin) and light growth Proteus mirabilis (resistant to tetracycline but otherwise sensitive)  7/22 Sputum Gram stain with less than 25 PMNs/lpf and mixed gram-positive and gram-negative bacteria with a predominance of GPC's in clusters, culture with growth of normal madison  7/22 Blood culture x2 negative  7/26 Blood culture NGTD  7/26 Urine culture 10-50K Candida albicans  7/26 sputum Gram stain with greater than 25 PMNslpf and no organisms, culture CoNS    Imaging results:   No new imaging.  "

## 2018-07-29 NOTE — PLAN OF CARE
"Problem: Patient Care Overview  Goal: Plan of Care/Patient Progress Review  Outcome: Improving  /71 (BP Location: Right arm)  Pulse 109  Temp 99.3  F (37.4  C) (Oral)  Resp 16  Ht 1.6 m (5' 2.99\")  Wt 58.4 kg (128 lb 12 oz)  SpO2 97%  BMI 22.81 kg/m2      Neuro: Oriented to self and situation.   Cardiac: SR. VSS.     Respiratory 21-30% Trach dome. Shiley 6 trach. Suctioned q1-3 hours. Moderate amounts of clear secretions   GI/: Adequate urine, catheter DC'ed, no BM   Diet/appetite: TF started into G tube with a goal of 45ml/hour. Tolerating well. No residuals   Activity:  Repositioning q2h in bed.   Pain: At acceptable level on current regimen. Denies pain   Skin: No new deficits noted. Rash marked with skin marker, much improvement    LDA's: Left midline heparin locked.   Plan: Continue with POC. Notify primary team with changes.      "

## 2018-07-29 NOTE — PLAN OF CARE
Problem: Patient Care Overview  Goal: Plan of Care/Patient Progress Review  Outcome: No Change  Neuro: Disoriented to time and situation. Alert. PERRLA. R side facial droop (unchanged). Some R side weakness.   Cardiac: NSR with HR in 80s-90s. BPs 120s-150s/60-70s. Afebrile.   Respiratory: Shiley 6 to BiPAP at 28% FiO2. Suction q2-4hour. Secretions now clear/white and thin. Trach cares completed at 0100.   GI/: Chin marginal output. 190cc out for shift total, however, have been finding urine in brief leaking around chin. No BM this shift, but bowel sounds becoming more active.   Diet/Appetite: Strict NPO. TF to G tube advanced to 20cc at 0000. Q4hour BS WNL.   Activity: AST of 2 for turning q2hour.   Pain: C/o abdominal discomfort. Gave PRN tylenol and dilaudid x 1.   Skin: No new changes noted. Generalized rash marked and seems to be improving.   LDAs: L double midline, grey lumen heparin locked and purple saline locked. PEG tube to enteral feeds. Shiley 6 to BiPAP.      Continue with POC. Notify primary team with changes.   Adriana Boykin RN    Problem: Chronic Respiratory Difficulty Comorbidity  Goal: Chronic Respiratory Difficulty  Patient comorbidity will be monitored for signs and symptoms of Respiratory Difficulty (Chronic) condition.  Problems will be absent, minimized or managed by discharge/transition of care.   Outcome: Improving  TD trials during days going very well on 30% FiO2. To BiPAP overnight on 28% FiO2. RR in 20s. More frequent suctioning of thinner secretions. Strong, productive cough with stimulation.

## 2018-07-29 NOTE — PROGRESS NOTES
"Otolaryngology Progress Note  July 29, 2018    S:  No acute events overnight. Patient remains on BiPAP at night. Requiring suctioning for thin secretions    O: /66 (BP Location: Right arm)  Pulse 109  Temp 98.8  F (37.1  C) (Oral)  Resp 18  Ht 1.6 m (5' 2.99\")  Wt 58.4 kg (128 lb 12 oz)  SpO2 96%  BMI 22.81 kg/m2     General: resting in bed, interactive, answering questions appropriately (mouthing nods/shakes her head)   Neuro: moving all extremities   HEENT: EOMI. 6-0 cuffed Shiley in place, cuff inflated. Trach ties are snug w/ 2 finger breadths between neck and ties. Tracheostomy site clean, no bleeding. No skin breakdown or erythema under trach ties or face plate. Thick secretions from trach site   Pulmonary: breathing comfortably via 6-0 Shiley on 28% FiO2      Intake/Output Summary (Last 24 hours) at 07/29/18 1312  Last data filed at 07/29/18 1200   Gross per 24 hour   Intake              820 ml   Output              815 ml   Net                5 ml       LABS:  ROUTINE IP LABS (Last four results)  BMP    Recent Labs  Lab 07/29/18  0523 07/28/18  0555 07/27/18  0530 07/26/18  1707 07/26/18  0515    139 142  --  137   POTASSIUM 3.6  3.6 4.0 3.8 3.7 3.1*   CHLORIDE 104 105 112*  --  104   ESTEFANI 8.3* 8.0* 8.1*  --  8.1*   CO2 24 23 22  --  21   BUN 12 13 11  --  11   CR 0.52 0.56 0.54  --  0.66   * 100* 101*  --  91       CBC    Recent Labs  Lab 07/29/18  0523 07/28/18  0555 07/27/18  0530 07/26/18  0515   WBC 10.2 14.6* 17.7* 16.9*   RBC 3.56* 3.71* 3.92 3.74*   HGB 10.4* 11.1* 11.5* 11.3*   HCT 33.7* 34.9* 37.1 33.7*   MCV 95 94 95 90   MCH 29.2 29.9 29.3 30.2   MCHC 30.9* 31.8 31.0* 33.5   RDW 13.3 13.7 13.5 13.3    243 216 224     CRP 44 (75, 47, 99)  Procalcitonin 0.27 (0.25, 0.71, 1.41)      IMAGING:    MRI: 7/20:  Acute left pontine mesencephalic junction infarct    PET Scan 7/20:   Large hypermetabolic laryngeal mass involving bilateral VF, anterior commissure, epiglottis, " left AE fold, effacement of preepiglottic fat, bilateral LAD.   hypermetabolic pulmonary nodules consistent with metastatic disease, small right pleural effusion.  urethral diverticulum containing stones and septations, 4.7 cm left adnexal cystic structure, thoracic abdominal aortic ectasia. 1.9 cm right iliac aneurysm.    Head CT 7/22  Impression:   Evolving changes of left hemipontine infarction. Otherwise, no acute  intracranial pathology.    ECHO 7/24:  Technically difficult study.  Global and regional left ventricular function is probably normal with an EF of  60-65%. No significnat valve disease.    CT Pulm: No PE    PATHOLOGY:    Laryngeal biopsies:  FINAL DIAGNOSIS:   A. LEFT FALSE VOCAL CORD, BIOPSY:   - INVASIVE KERATINIZING SQUAMOUS CELL CARCINOMA, moderately   differentiated.   - Perineural invasion is present.     B. PETIOLE, BIOPSY:   - AT LEAST SQUAMOUS CELL CARCINOMA IN-SITU.     C. ADDITIONAL LEFT FALSE VOCAL CORD, BIOPSY:   - INVASIVE KERATINIZING SQUAMOUS CELL CARCINOMA, moderately   differentiated.     D. RIGHT FALSE VOCAL CORD, BIOPSY:   - AT LEAST MICROINVASIVE SQUAMOUS CELL CARCINOMA, keratinizing type.     FNA R thyroid nodule  - Atypica of undetermined significance. Recommend repeat FNA in 4-6 weeks.       A/P: Keira Collins is a 72-year-old female with a [id number] SCCa of larynx PMH significant for COPD, 140 pack-year tobacco history, alcohol use disorder, HLD, HTN, and PVD who is now POD#12 s/p direct laryngoscopy with biopsy of laryngeal SCC and tracheotomy. Patient was recently presented at Otolaryngology Tumor Board Conference to discuss plan of care for laryngeal [id number] SCCa. Recommendations at that time were to proceed with TL + bilateral MRND vs CXRT. This will be rediscussed at tumor board today.     Trach change was performed on 7/24 and was uncomplicated, trach site healing well.    Patient's hospital course has been complicated by acute ischemic pontine stroke, fevers, and  persistent tachycardia, and worsening respiratory status. Internal Medicine is now the primary team, although we continue to follow along closely. ID was consulted and following, switch to levofloxacin for better anaerobic coverage on 7/27. She is was discussed at Tumor Board on 7/27.     Plan:   - Will plan to have a care conference with [place], family members, Dr. Walden present and IM next week.   - Tracheostomy care:   - Trach changed 7/24, in good position healing well   - cuff can be up or down depending on patient tolerance/need for positive pressure   - Perform regular suctioning.   - RN should change disposable inner canulas every shift and/or clean it with a brush.    - Keep trach tube obturator taped to wall behind the head of the bed. Keep extra unopened 6-0 Shiley and 4-0 Shiley at bedside at all times.   - S/p PLC trach teaching 7/19 with son  - PET Scan: concerning for lung metastasis. However, discussion with family will need to be had regarding how they would like to proceed with treatment  - FNA: Atypia of undetermined significance.Plan will depend on treatment option selected after care conference.   - PLC for TF and trach cares performed.     -- Patient and above seen with Dr. Win Elliott  Otolaryngology-Head & Neck Surgery  Please contact ENT by dialing [phone] and entering job code [phone].

## 2018-07-30 NOTE — PLAN OF CARE
Problem: Patient Care Overview  Goal: Plan of Care/Patient Progress Review  Outcome: No Change  Neuro: Pt is alert, orientated to self and location. Pt is following commands. Nodding approprietly to questions. SUMIT.   Cardiac: SR with PVC. -140s. No PRNs needed for BP. Afebrile.   Respiratory: Shiley 6. Sating>90% on trach dome 30%. Requiring suctioning approx Q1 hours. Trach cares performed.  GI/: Incontinent of urine and stool. Adequate urine output. BM X1  Diet/appetite: NPO + TF. Tolerating TF @ goal of 45mls/hr.   Activity: Assist of 2, repositioning in bed. Working with PT.   Pain: Complaints of abdominal pain. Tylenol given x1. At acceptable level on current regimen.   Skin: Rash is resolving.  LDA's: 2R peripherals.     K+ 3.9 this AM. Pt family at bedside throughout the morning.     Plan: Continue with POC. Notify primary team with changes.

## 2018-07-30 NOTE — OP NOTE
Preoperative diagnosis:                       Laryngeal cancer   Postoperative diagnosis:                     Laryngeal cancer    Procedure:   1. EGD  2. PEG insertion    Anesthesia: General   Surgeon: Rai Nash   Resident surgeon: Elder Gusman  EBL:  Less than 5 cc    Complications: none immediate  Findings:  PEG at 3.5 cm at the skin     Description of procedure  The patient was brought to the room and placed supine upon the table.  After confirming the patient's identity and the consent, appropriate monitoring devices were placed.  General anesthesia was administered and the patient was intubated.   The endoscope was passed into the posterior pharynx and into the esophagus without difficulty.   The GE junction was located at 40 cm from the incisors and there was no evidence of a hernia.  The stomach was easily entered.  The stomach was insufflated and the proposed PEG site showed 1 to 1 transmission of pressure.  The finder needle entered the stomach easily and a snare was used to pull the guide wire out of the mouth.  A 20 Fr PEG tube was then passed through the esophagus into the stomach and the PEG was secured with a bumper at 3.5 cm at the skin.  The PEG appeared appropriately placed against the stomach wall.  The air was aspirated and the endoscope was withdrawn.  A sterile dressing was placed.  The catheter was placed to gravity drainage.

## 2018-07-30 NOTE — PLAN OF CARE
Problem: Patient Care Overview  Goal: Plan of Care/Patient Progress Review  Outcome: No Change  Neuro: Disoriented to time and situation. Alert. PERRLA. R side facial droop (unchanged). Some R side weakness.   Cardiac: NSR with HR in 80s-90s. BPs 120s-130s/70s. Afebrile.   Respiratory: Shiley 6 to BiPAP at 28% FiO2. Suction q2-4hour. Secretions now clear/white and thin. Trach cares completed at 2100 and 0600.    GI/: Incontinent of urine x2. No BM this shift. Active bs and passing gas.   Diet/Appetite: Strict NPO. TF to G tube at goal of 45cc/hour with standard FWF. Q4hour BS slightly elevated to 130s-40s.  Activity: AST of 2 for turning q2hour.   Pain: C/o abdominal discomfort. Gave PRN tylenol.    Skin: No new changes noted. Generalized rash marked and seems to be improving.   LDAs: R PIV, SL. PEG tube to enteral feeds. Shiley 6 to BiPAP.       Continue with POC. Notify primary team with changes.   Adriana Boykin RN       Problem: Chronic Respiratory Difficulty Comorbidity  Goal: Chronic Respiratory Difficulty  Patient comorbidity will be monitored for signs and symptoms of Respiratory Difficulty (Chronic) condition.  Problems will be absent, minimized or managed by discharge/transition of care.   Outcome: No Change  TD 28% during day. BiPAP 28% at night. RR 20s to 30s. Suction q2-4.

## 2018-07-30 NOTE — CONSULTS
Consulted for Right upper lobe spiculated lung mass biopsy.  CT imaging 7/25/18 confirmed the lung mass with a moderate right pleural effusion .   Patient is receiving ASA/Plavix for CVA ,this increases the risk of bleeding  IR protocol is to hold plavix for 5 days.  P2Y12 lab value is recommended to see platelet function.  This mass is amendable to a biopsy although at a high risk, a diagnostic thoracentesis may be considered.  Discussed with Dr. Burgos Interventional Radiologist and Khari Avery PA-C [id number]     Carlita Kathleen  RPA  [phone] Call pager  [phone] pager

## 2018-07-30 NOTE — PLAN OF CARE
Problem: Patient Care Overview  Goal: Plan of Care/Patient Progress Review  Discharge Planner PT   Patient plan for discharge: TCU  Current status:  VSS on trach dome 30% FIO2.  Supine<>sit with min-mod Ax2 to sit at EOB.  Sits initially with min A x2 to CGAx1 x7 min.  STS x3 with min-mod Ax2 with BHHA of 2.   Initial posterior lean present. Unable to fully correct. Requires 4 min rest break sitting on bed in between each STS rep.  Declines marching or transfer to chair.    Barriers to return to prior living situation: weakness, O2 needs, A with all mobility  Recommendations for discharge: TCU  Rationale for recommendations: Requires Ax1-2 for all mobility, demonstrates increased participation today.        Entered by: Walter Gordon 07/30/2018 3:23 PM

## 2018-07-30 NOTE — PROGRESS NOTES
Consulted for Right upper lobe spiculated lung mass biopsy.  CT imaging 7/25/18 confirmed the lung mass with a moderate right pleural effusion .   Patient is receiving ASA/Plavix for CVA ,this increases the risk of bleeding  IR protocol is to hold plavix for 5 days.  P2Y12 lab value is recommended to see platelet function.  This mass is amendable to a biopsy although at a high risk, a diagnostic thoracentesis may be considered.  Discussed with     Carlita Kathleen IR [facility]  [phone] Call pager  [phone] pager

## 2018-07-30 NOTE — PROGRESS NOTES
Care Coordinator Progress Note    Admission Date/Time:  7/17/2018  Attending MD:  Rochelle Wu MD    Data  Chart reviewed, discussed with interdisciplinary team.   Patient was admitted for: Data Unavailable.      Coordination of Care :   TF are at goal, on Trach Dome, requiring suction 2-4 times. Has PEG tube placed.  ENT requesting to take lead on case, if care conference scheduled do not want palliative involved.  Family may still want to take home, will discuss options at care Conference         Plan  Anticipated Discharge Date:  TBD  Anticipated Discharge Plan:  *TBD       Salma Maciel RN

## 2018-07-30 NOTE — PROGRESS NOTES
Social Work: Assessment with Discharge Plan    Patient Name:  Keira Collins  :  1945  Age:  72 year old  MRN:  [id number]  Risk/Complexity Score:  Filed Complexity Screen Score: 9  Completed assessment with:  Pt's son Dar    Presenting Information   Reason for Referral:  Discharge plan  Date of Intake:  2018  Referral Source:  Chart Review  Decision Maker:  Pt intermittently disoriented so pt's children help make decisions.  Alternate Decision Maker:  Son Dar Collins (P: [phone]) is the primary family contact. When Dar is not available, son  (P: [phone]) is the primary contact.  Health Care Directive:  No HCD and pt is not appropriate to complete at this time.  Living Situation:  Prior to admission pt was living with her son Dar and dtr in [facility] in Dar's home in [place], [place]. When completed with rehab, pt will likely go to her son 's home in [place] as  and JR's SO are not working and can provide 24/7 care.  Previous Functional Status:  Independent but not very active.  Patient and family understanding of hospitalization:  Pt's family understands pt's medical issues and plan of care.  Cultural/Language/Spiritual Considerations:  Pt speaks English, but now communicates with head nods and gestures after tracheostomy.  Adjustment to Illness:  Unable to assess pt.    Physical Health  Reason for Admission:  SCC of the larynx  Services Needed/Recommended:  TCU    Mental Health/Chemical Dependency  Diagnosis:  Smoked cigarettes prior to admission, up to one pack per day. Alcohol use, one beer per day, sometimes up to 4-5 per day.  Support/Services in Place:  none  Services Needed/Recommended:  TBD    Support System  Significant relationship at present time:  Pt is single and has 4 adult children, sons Dar, , and Alex, and daughter Elizabeth.   Family of origin is available for support:  Pt's family can provide 24/7 care.  Other support available:   none  Gaps in support system:  none  Patient is caregiver to:  None     Provider Information   Primary Care Physician:  Asa Elliott   [phone]   Clinic:  [id number] [facility] / [place] [address]      :  none    Financial   Income Source:  Social Security  Financial Concerns:  none  Insurance:    Payor/Plan Subscriber Name Rel Member # Group #   MEDICARE - MEDICARE SHAHRIAR GARCÍA  [id number]       ATTN CLAIMS, [address]   BLUE PLUS - BLUE PLUS* SHAHRIAR GARCÍA  [id number]      [address] [address]       Discharge Plan   Patient and family discharge goal:  TCU  Provided education on discharge plan:  YES  Patient agreeable to discharge plan:  YES  A list of Medicare Certified Facilities was provided to the patient and/or family to encourage patient choice. Patient's choices for facility are:    Referral sent via [facility] on Dem[employer] to:  1. [facility] (P: [phone])    Pt's family will review TCU list for additional referral preferences. Dar thinks pt would want placement near hospitals to be close to family and follow up.  Will NH provide Skilled rehabilitation or complex medical:  YES  General information regarding anticipated insurance coverage and possible out of pocket cost was discussed. Patient and patient's family are aware patient may incur the cost of transportation to the facility, pending insurance payment: YES  Barriers to discharge:  Medical clearance and safe dc plan.    Discharge Recommendations   Anticipated Disposition:  Possible care conference this week. Eladio Dodge is out of town 7/31-8/5. Eladio DUBON would be attending the care conf. Pt will likely go to TCU at discharge, then go to 's home after rehab. Tracheostomy was placed 7/17/18. New trach (less than 6 months old) will be a barrier to many facilities. Eladio Dodge would like referral to  TCU then will have eladio DUBON pick additional referrals. Pt is currently being suctioned Q2-4 hr. Most TCUs cannot manage suctioning more  than Q4 hr.    Transportation Needs:  TBD  Name of Transportation Company and Phone:  N/A    SHAKIR Simmons, [facility]  6B Intermediate Care Unit  Phone: [phone]  Pager: [phone]

## 2018-07-30 NOTE — PROGRESS NOTES
Consulted for Right upper lobe spiculated lung mass biopsy.  CT imaging 7/25/18 confirmed the lung mass with a moderate right pleural effusion .   Patient is receiving ASA/Plavix for CVA ,this increases the risk of bleeding  IR protocol is to hold plavix for 5 days.  P2Y12 lab value is recommended to see platelet function.  This mass is amendable to a biopsy although at a high risk, a diagnostic thoracentesis may be considered.  Discussed with     Carlita Kathleen IR [facility]  [phone] Call pager  [phone] pager

## 2018-07-30 NOTE — PROGRESS NOTES
THORACIC & FOREGUT SURGERY     PEG tube evaluated, bumper loosened 1 cm with appropriate residual tension    -Please feel free to call with questions      BERTHA RamosC  Thoracic Surgery

## 2018-07-30 NOTE — PROGRESS NOTES
Internal Medicine Progress Note - Gold Service       Assessment & Plan    Keira Collins is a 72 year old female admitted on 7/17/2018. She has a history of COPD, HTN, hyperlipidemia, chronic tobacco abuse, and alcohol abuse (in remission) who was admitted on 7/17/2018 for direct laryngoscopy with biopsy of laryngeal mass, tracheostomy and NGT placement. Pathology showed squamous cell carcinoma of larynx. Internal medicine consulted 7/20 for HTN, hypoxia and tachycardia. Hospital course complicated by acute left pontine CVA on 7/19 and acute hypoxic respiratory failure d/t aspiration PNA.    *Squamous cell carcinoma of larynx s/p biopsy and tracheostomy (7/17/18):  [id number]. Post-op course c/b acute CVA and aspiration pneumonia. ENT discussed case at tumor conference 7/27. Treatment recommendations pending biopsy of pulmonary nodules to further assess extent of metastatic disease. Evidence of pulmonary mets would likely rule out surgical options. Higher risk for complications w surgery given co-morbidities (CVA, COPD, aspiration risk, PVD). Per discussion with IR would need to hold DAPT x 5 days for biopsy of lung nodules to further evaluate, which would be risky given recent CVA. I feel it is important to start discussing goals of care with patient and family. If surgery not desired, then would not need to pursue biopsy. ENT to discuss treatment options with oncology today. Continues to have excessive secretions from trach. Discussed robinul with ENT but would like to avoid anticholinergic side effects.   - Care conference scheduled tomorrow AM with Dr. Walden  - Palliative care consulted to assist with goals of care discussion  - See below re: lung biopsy  - Perform regular suctioning and trach cares per ENT orders  - Further management per ENT    *Sepsis and acute hypoxic respiratory failure d/t aspiration pneumonia:  Excessive secretions post-tracheostomy c/b acute L pontine CVA. Acute hypoxia, fevers and  elevated inflammatory markers on 7/24. Required BIPAP intermittently. CXR showed R>L bibasilar opacities with small R effusion. CT Chest pulmon angio negative for PE. Sputum 7/22 negative. ? Pulmonary edema contributing. Has been on a number of antibiotics and probable drug rash from cephalosporins. ID consulted and transitioned to moxifloxacin. Clinically improved on antibiotics and intermittent diuresis. Weaned off BIPAP completely. Sats stable on FiO2 30%. Afebrile. WBC normalized. CRP trending toward normal.   - Cont moxifloxacin with stop date 7/31 (total 7 days)  - Ongoing risk for aspiration d/t secretions; cont aspiration precautions  - Consider ABG and BIPAP for worsening respiratory status  - Cont to wean O2 as able    *Acute L pontine CVA:  R facial droop noted 7/19 with MRI primo showing acute L pontine infarct. Neurology consulted. Outside therapeutic window for TPA. Conservative management recommended with resumption of DAPT and statin. Holding plavix for biopsy felt to be high risk given recent CVA. Discussed with neurology. Unfortunately no specific guidelines to follow, and literature without consensus on DAPT following CVA. For the most part DAPT most effective following CVA if continued for 3 weeks. Specific risks associated with holding DAPT prior to 3 weeks not clear, but in general neurology feels comfortable holding for procedures d/t lack of literature recommending long term DAPT following CVA. Will discuss relative risks of holding plavix during care conference tomorrow.   - Cont ASA, plavix, and statin  - SBP goal <160  - PT/OT/SLP consulted     *Severe protein-calorie malnutrition:  Secondary to acute on chronic medical issues including cancer, recent surgery, and CVA which has resulted in dysphagia. NG feeding tube placed in surgery, discontinued by patient. PEG placed 7/27. Restarted on TF, currently at goal rate 45 ml/h. Per nutrition, no significant risk of refeeding at this time.  -  Cont TF at goal rate 45 ml/h  - NPO  - SLP following    *Spiculated RUL nodule:  Concerning for malignancy. PET 7/20 with hypermetabolic pulmonary nodules c/w metastatic disease. ENT recommends biopsy to confirm metastatic disease vs second primary. IR consulted, however unable to biopsy in setting of DAPT (needs to be held 5 days). Concern that holding plavix would be risky given recent CVA, although per discussion with neurology no guidelines to make formal recommendations. See above.   - Care conference scheduled 8/1, at which time will discuss potential risks (see above)  - If patient/family not interested in surgery, then would not pursue biopsy.    *HTN:  Stable. Goal SBP <160 per neurology in setting of recent CVA. Cont amlodipine 10mg daily and losartan 50mg daily.     Chronic Medical Problems:  *COPD:  Currently no wheezing. Most recent PFTs 7/16/18 showed FEV-1/FVC 65% with FEV-1 0.58 L. Cont Brovana, Pulmicort, Ipratropium/Levalbuterol QID.   *HLD:  Cont statin.   *Hx Alcohol abuse:  Currently in remission.   *PAD:  S/p lower extremity endarterectomy and stenting in 2017. Previously on DAPT pre-op.       Pain Assessment:  Current Pain Score 7/31/2018   Patient currently in pain? yes   Pain score (0-10) -   Pain location Abdomen   Pain descriptors -   Keira s pain level was assessed and she currently denies pain.      Diet: Adult Formula Drip Feeding: Continuous Isosource 1.5; Gastrostomy; Goal Rate: 45; mL/hr; Medication - Tube Feeding Flush Frequency: At least 15-30 mL water before and after medication administration and with tube clogging; Amount to Send (Nutritio...  Fluids: None  DVT Prophylaxis: Enoxaparin (Lovenox) SQ  Code Status: Full Code    Disposition Plan   Expected discharge: 2 - 3 days, recommended to TBD once mental status at baseline, safe disposition plan/ TCU bed available and O2 needs stable.     Entered: Shyam Avery 07/30/2018, 10:45 AM   Information in the above section will  display in the discharge planner report.      The patient's care was discussed with the Attending Physician, Dr. Barahona.    Shyam Avery PA-C  Internal Medicine Staff Hospitalist Service  [facility]  Pager: [phone]  Please see sticky note for cross cover information  _____________________________________________________________    Interval History   No acute events overnight, but patient was awake for most of the night per RN. O2 sats stable off BIPAP.   Continues to have secretions from trach, requiring suctioning every 1 hr.   RN reports several soft stools today, but no watery diarrhea. Bowel meds were held this AM.   Patient currently very sleepy. She reports pain at trach and PEG tube, but otherwise denies complaints.     ROS:  Pulm, CV, GI and  performed and negative unless otherwise noted above.       Data reviewed today: I have personally reviewed all medications, new labs and imaging results over the last 24 hours. .    Physical Exam   Vital Signs: Temp: 98.8  F (37.1  C) Temp src: Oral BP: 149/70   Heart Rate: 96 Resp: 22 SpO2: 93 % O2 Device: Trach dome    Weight: 131 lbs 9.83 oz    GENERAL:  Awake. Alert. Oriented x 3. NAD.   HEENT:  No scleral icterus. Mucous membranes moist. No purulent discharge or erythema surrounding trach.  CV:  Regularly irregular. S1, S2. No murmurs appreciated. PAC's noted on bedside telemetry  RESPIRATORY:  Lungs CTAB with no wheezing, rales, rhonchi.   GI:  Abdomen soft, non-distended. Active bowel sounds. Mildly tender around G tube.    NEUROLOGICAL:  Slight R facial droop. Moves all extremities but w persistent R sided weakness.    EXTREMITIES:  No peripheral edema. No calf tenderness. Intact bilateral pedal pulses.   SKIN:  No jaundice, rashes, wounds or lesions.            Data     ROUTINE IP LABS (Last four results)  CMP     Recent Labs  Lab 07/31/18  0445 07/30/18  0450 07/29/18  0523 07/28/18  0555    141 139 139   POTASSIUM 3.8 3.9 3.6   3.6 4.0   CHLORIDE 107 107 104 105   CO2 24 26 24 23   ANIONGAP 9 8 11 11   * 142* 114* 100*   BUN 12 12 12 13   CR 0.45* 0.48* 0.52 0.56   ESTEFANI 8.5 8.4* 8.3* 8.0*   MAG 2.2 2.2 2.2 2.6*   PHOS 2.3* 2.5 3.1 3.9   ALBUMIN  --  2.2*  --   --      CBC     Recent Labs  Lab 07/31/18  0445 07/30/18  0450 07/29/18  0523 07/28/18  0555   WBC 13.1* 9.9 10.2 14.6*   RBC 3.99 3.62* 3.56* 3.71*   HGB 11.8 10.6* 10.4* 11.1*   HCT 38.0 34.4* 33.7* 34.9*   MCV 95 95 95 94   MCH 29.6 29.3 29.2 29.9   MCHC 31.1* 30.8* 30.9* 31.8   RDW 13.5 13.5 13.3 13.7    247 236 243     INR No lab results found in last 7 days.      Recent Labs  Lab 07/31/18  0752 07/31/18  0445 07/31/18  0403 07/30/18  1917 07/30/18  1522 07/30/18  1134 07/30/18  0734  07/30/18  0450  07/29/18  0523  07/28/18  0555  07/27/18  0530  07/26/18  0515   GLC  --  139*  --   --   --   --   --   --  142*  --  114*  --  100*  --  101*  --  91   *  --  123* 126* 128* 126* 141*  < >  --   < >  --   < >  --   < >  --   < >  --    < > = values in this interval not displayed.       All labs personally reviewed in [employer].  See A&P for additional results.     Unresulted Labs Ordered in the Past 30 Days of this Admission     Date and Time Order Name Status Description    7/26/2018 0828 Blood culture Preliminary     7/26/2018 0828 Blood culture Preliminary

## 2018-07-30 NOTE — PROVIDER NOTIFICATION
After the start of moxifloxacin at approx 1215, pt having complaints of itching and had redness at IV site. Antibiotic stopped and team notified/ to bedside. Per team, get new IV and try to restart antibiotic. Restarted in new IV with no issues. Will continue to monitor.

## 2018-07-30 NOTE — PLAN OF CARE
Problem: Patient Care Overview  Goal: Plan of Care/Patient Progress Review  ST 6B: Cx- Pt continues to have copious secretions, requiring frequent suctioning. Not appropriate for swallow tx at this time per discussion with RN. Will follow

## 2018-07-30 NOTE — PLAN OF CARE
Problem: Patient Care Overview  Goal: Plan of Care/Patient Progress Review  Patient plan for discharge: rehab  Current status: Facilitated therapeutic exercise focused on RUE neuromuscular re-education to increase independence with ADLs. Pt with improved strength on R sided, grossly 4/5 MMT. Pt's VSS on trach dome 30% FiO2.   Barriers to return to prior living situation: R sided weakness, cognitive deficits, s/p new trach   Recommendations for discharge: TCU vs LTACH   Rationale for recommendations: Pt previously mod I for ambulating ~30 ft and required some assist for ADLs. Pt now requires Ax2 for mobility and mod A for ADLs. Pt limited by R sided weakness, cognitive impairments, and complex medical needs.

## 2018-07-30 NOTE — PROGRESS NOTES
"Otolaryngology Progress Note  July 30, 2018    S:  No acute events overnight. Patient remains on BiPAP at night. Improving clinically, more interactive.    O: /70 (BP Location: Right arm)  Pulse 109  Temp 98.8  F (37.1  C) (Oral)  Resp 22  Ht 1.6 m (5' 2.99\")  Wt 59.7 kg (131 lb 9.8 oz)  SpO2 93%  BMI 23.32 kg/m2     General: resting in bed, interactive, answering questions appropriately (mouthing, nods/shakes her head)   Neuro: moving all extremities   HEENT: EOMI. 6-0 cuffed Shiley in place, cuff inflated. Trach ties are snug w/ 2 finger breadths between neck and ties. Tracheostomy site clean, no bleeding. No skin breakdown or erythema under trach ties or face plate.   Pulmonary: breathing comfortably via 6-0 Shiley on 28% FiO2      Intake/Output Summary (Last 24 hours) at 07/29/18 1312  Last data filed at 07/29/18 1200   Gross per 24 hour   Intake              820 ml   Output              815 ml   Net                5 ml       LABS:  ROUTINE IP LABS (Last four results)  BMP    Recent Labs  Lab 07/30/18  0450 07/29/18  0523 07/28/18  0555 07/27/18  0530    139 139 142   POTASSIUM 3.9 3.6  3.6 4.0 3.8   CHLORIDE 107 104 105 112*   ESTEFANI 8.4* 8.3* 8.0* 8.1*   CO2 26 24 23 22   BUN 12 12 13 11   CR 0.48* 0.52 0.56 0.54   * 114* 100* 101*       CBC    Recent Labs  Lab 07/30/18  0450 07/29/18  0523 07/28/18  0555 07/27/18  0530   WBC 9.9 10.2 14.6* 17.7*   RBC 3.62* 3.56* 3.71* 3.92   HGB 10.6* 10.4* 11.1* 11.5*   HCT 34.4* 33.7* 34.9* 37.1   MCV 95 95 94 95   MCH 29.3 29.2 29.9 29.3   MCHC 30.8* 30.9* 31.8 31.0*   RDW 13.5 13.3 13.7 13.5    236 243 216     CRP 23  Procalcitonin 0.15    Alb 2.2  Mg 2.2  Phos 2.5  iCal 4.6    IMAGING:    MRI: 7/20:  Acute left pontine mesencephalic junction infarct    PET Scan 7/20:   Large hypermetabolic laryngeal mass involving bilateral VF, anterior commissure, epiglottis, left AE fold, effacement of preepiglottic fat, bilateral LAD.   hypermetabolic " pulmonary nodules consistent with metastatic disease, small right pleural effusion.  urethral diverticulum containing stones and septations, 4.7 cm left adnexal cystic structure, thoracic abdominal aortic ectasia. 1.9 cm right iliac aneurysm.    Head CT 7/22  Impression:   Evolving changes of left hemipontine infarction. Otherwise, no acute  intracranial pathology.    ECHO 7/24:  Technically difficult study.  Global and regional left ventricular function is probably normal with an EF of  60-65%. No significnat valve disease.    CT Pulm: No PE    PATHOLOGY:  Laryngeal biopsies:  FINAL DIAGNOSIS:   A. LEFT FALSE VOCAL CORD, BIOPSY:   - INVASIVE KERATINIZING SQUAMOUS CELL CARCINOMA, moderately   differentiated.   - Perineural invasion is present.   B. PETIOLE, BIOPSY:   - AT LEAST SQUAMOUS CELL CARCINOMA IN-SITU.   C. ADDITIONAL LEFT FALSE VOCAL CORD, BIOPSY:   - INVASIVE KERATINIZING SQUAMOUS CELL CARCINOMA, moderately   differentiated.   D. RIGHT FALSE VOCAL CORD, BIOPSY:   - AT LEAST MICROINVASIVE SQUAMOUS CELL CARCINOMA, keratinizing type.     FNA R thyroid nodule  - Atypica of undetermined significance. Recommend repeat FNA in 4-6 weeks.       A/P: Keira Collins is a 72-year-old female with a [id number] SCCa of larynx PMH significant for COPD, 140 pack-year tobacco history, alcohol use disorder, HLD, HTN, and PVD who is now POD#13 s/p direct laryngoscopy with biopsy of laryngeal SCC and tracheotomy. Patient was recently presented at Otolaryngology Tumor Board Conference to discuss plan of care for laryngeal [id number] SCCa. Recommendations at that time were to proceed with TL + bilateral MRND vs CXRT. This will be rediscussed at tumor board today.     Trach change was performed on 7/24 and was uncomplicated, trach site healing well.    Patient's hospital course has been complicated by acute ischemic pontine stroke, fevers, and persistent tachycardia, and worsening respiratory status. Internal Medicine is now the primary  team, although we continue to follow along closely. ID was consulted, switch to moxifloxacin for better anaerobic coverage on 7/27. She is was discussed at Tumor Board on 7/27. Plan on family care conference this week.    Plan:  - Will plan to have a care conference with [place], family members, Dr. Walden present and IM this week.   - Tracheostomy care:   - Trach changed 7/24, in good position healing well   - cuff can be up or down depending on patient tolerance/need for positive pressure   - Perform regular suctioning.   - RN should change disposable inner canulas every shift and/or clean it with a brush.    - Keep trach tube obturator taped to wall behind the head of the bed. Keep extra unopened 6-0 Shiley and 4-0 Shiley at bedside at all times.   - S/p PLC trach teaching 7/19 with son  - PET Scan: concerning for lung metastasis. However, discussion with family will need to be had regarding how they would like to proceed with treatment  - FNA: Atypia of undetermined significance. Plan will depend on treatment option selected after care conference.   - PLC for TF and trach cares performed.       -- Patient and above seen with Dr. Win Thomson MD PGY1  Otolaryngology-Head & Neck Surgery  Please contact ENT by dialing * [phone] and entering job code [phone].

## 2018-07-31 NOTE — PROGRESS NOTES
"Otolaryngology Progress Note  July 31, 2018     S:  No acute events overnight. Patient tolerated being off BiPAP last night, although still requiring frequent suctioning. She is interactive. Tolerating TFs through PEG.     O: /81  Pulse 109  Temp 99  F (37.2  C) (Oral)  Resp 20  Ht 1.6 m (5' 2.99\")  Wt 58.7 kg (129 lb 6.6 oz)  SpO2 96%  BMI 22.93 kg/m2     General: resting in bed, interactive, answering questions appropriately by nodding head   Neuro: moving all extremities   HEENT: EOMI. 6-0 cuffed Shiley in place, cuff inflated. Trach ties are snug w/ 2 finger breadths between neck and ties. Tracheostomy site clean, no bleeding. Some thin frothy secretions were suctioned with catheter suction. No skin breakdown or erythema under trach ties or face plate.   Pulmonary: breathing comfortably via 6-0 Shiley on 28% FiO2      Intake/Output Summary (Last 24 hours) at 07/29/18 1312  Last data filed at 07/29/18 1200   Gross per 24 hour   Intake              820 ml   Output              815 ml   Net                5 ml       LABS:  ROUTINE IP LABS (Last four results)  BMP    Recent Labs  Lab 07/31/18  0445 07/30/18  0450 07/29/18  0523 07/28/18  0555    141 139 139   POTASSIUM 3.8 3.9 3.6  3.6 4.0   CHLORIDE 107 107 104 105   ESTEFANI 8.5 8.4* 8.3* 8.0*   CO2 24 26 24 23   BUN 12 12 12 13   CR 0.45* 0.48* 0.52 0.56   * 142* 114* 100*       CBC    Recent Labs  Lab 07/31/18  0445 07/30/18  0450 07/29/18  0523 07/28/18  0555   WBC 13.1* 9.9 10.2 14.6*   RBC 3.99 3.62* 3.56* 3.71*   HGB 11.8 10.6* 10.4* 11.1*   HCT 38.0 34.4* 33.7* 34.9*   MCV 95 95 95 94   MCH 29.6 29.3 29.2 29.9   MCHC 31.1* 30.8* 30.9* 31.8   RDW 13.5 13.5 13.3 13.7    247 236 243     CRP 19 (23)  Lactate 1.4    Mg 2.2  Phos 2.3    IMAGING:    MRI: 7/20:  Acute left pontine mesencephalic junction infarct    PET Scan 7/20:   Large hypermetabolic laryngeal mass involving bilateral VF, anterior commissure, epiglottis, left AE fold, " effacement of preepiglottic fat, bilateral LAD.   hypermetabolic pulmonary nodules consistent with metastatic disease, small right pleural effusion.  urethral diverticulum containing stones and septations, 4.7 cm left adnexal cystic structure, thoracic abdominal aortic ectasia. 1.9 cm right iliac aneurysm.    Head CT 7/22  Impression:   Evolving changes of left hemipontine infarction. Otherwise, no acute  intracranial pathology.    ECHO 7/24:  Technically difficult study.  Global and regional left ventricular function is probably normal with an EF of  60-65%. No significnat valve disease.    CT Pulm: No PE    PATHOLOGY:  Laryngeal biopsies:  FINAL DIAGNOSIS:   A. LEFT FALSE VOCAL CORD, BIOPSY:   - INVASIVE KERATINIZING SQUAMOUS CELL CARCINOMA, moderately   differentiated.   - Perineural invasion is present.   B. PETIOLE, BIOPSY:   - AT LEAST SQUAMOUS CELL CARCINOMA IN-SITU.   C. ADDITIONAL LEFT FALSE VOCAL CORD, BIOPSY:   - INVASIVE KERATINIZING SQUAMOUS CELL CARCINOMA, moderately   differentiated.   D. RIGHT FALSE VOCAL CORD, BIOPSY:   - AT LEAST MICROINVASIVE SQUAMOUS CELL CARCINOMA, keratinizing type.     FNA R thyroid nodule  - Atypica of undetermined significance. Recommend repeat FNA in 4-6 weeks.       A/P: Keira Collins is a 72-year-old female with a [id number] SCCa of larynx PMH significant for COPD, 140 pack-year tobacco history, alcohol use disorder, HLD, HTN, and PVD who is now POD#13 s/p direct laryngoscopy with biopsy of laryngeal SCC and tracheotomy. Patient was recently presented at Otolaryngology Tumor Board Conference to discuss plan of care for laryngeal [id number] SCCa. Recommendations at that time were to proceed with TL + bilateral MRND vs CXRT. This will be rediscussed at tumor board today.     Trach change was performed on 7/24 and was uncomplicated, trach site healing well.    Patient's hospital course has been complicated by acute ischemic pontine stroke, fevers, and persistent tachycardia, and  worsening respiratory status. Internal Medicine is now the primary team, although we continue to follow along closely. ID was consulted, switch to moxifloxacin for better anaerobic coverage on 7/27, her last dose is today. She is was discussed at Tumor Board on 7/27. Plan on family care conference this week.      - Will plan to have a care conference with Keira, family members, Dr. Walden and IM this week   - Will coordinate scheduling with PA taking care of Ms. Collins  - Tracheostomy care:   - Trach changed 7/24, in good position healing well   - cuff can be up or down depending on patient tolerance/need for positive pressure   - Perform regular suctioning.   - RN should change disposable inner canulas every shift and/or clean it with a brush.    - Keep trach tube obturator taped to wall behind the head of the bed. Keep extra unopened 6-0 Shiley and 4-0 Shiley at bedside at all times.   - S/p PLC trach teaching 7/19 with son      -- Patient and above seen with Dr. Win Thomson MD PGY1  Otolaryngology-Head & Neck Surgery  Please contact ENT by dialing * [phone] and entering job code [phone].

## 2018-07-31 NOTE — CONSULTS
[facility]  Palliative Care Consultation         Keira Collins MRN# [id number]   Age: 72 year old YOB: 1945   Date of Admission: 7/17/2018    Reason for consult: Goals of care       Requesting physician/service: Gold 9       Recommendations        As of the time of evaluation, Keira's level of attention and her limited ability to communicate did not permit us to perform an extended interview and exploration of her goals of care. At this time, we agree with a family conference to explore the family's understanding of her diagnosis, her prognosis in the setting of her comorbidities, and what her expressed goals may have been prior to this episode. We will support the primary teams in this to the extent desired.     - Palliative will continue to follow with you, and will participate in goals of care conversations   - To the extent desired by the patient, her family, and the treatment teams, we will update our recommendations going forward  - Palliative  to visit  - POLST - not completed     Disease Process/es & Symptoms     SCC of larynx s/p tracheostomy  Acute L pontine CVA (doi 7/19/18)  Sepsis  Acute hypoxic respiratory failure secondary to aspiration pneumonia  Severe protein-calorie malnutrition- on enteral feedings  HTN  COPD  PVD s/p L femoral endarterectomy  Pain  Lethargy     Support/Coping   Unable to obtain clear answers to usual questions around support/coping due to patient mental status, tracheostomy, and limited ability to write at this time secondary to recent CVA. We plan to continue to address these as the patient and her family are able to participate.     It is clear from our limited conversation that her children are her primary support, and that they are involved in her life and her care. She was able to make clear that she is Orthodox, and would appreciate support from  services.     Plan for psychosocial/spiritual support/follow-up  from palliative team:  to visit at bedside     Decision-Making & Goals of Care     Discussion/counseling today about prognosis/goals of care/decisions:   Limited by patient mental status/ability to participate  Patient has a completed health care directive available in the chart (Y/N): N  Health care agent (only if patient has an available, complete HCD): N/A  There is no POLST (Physician orders for life-sustaining treatment) form on file for this patient  Code Status: Full code   Patient has decision-making capacity (Y/N): Y    Coordination of Care     Findings & plan of care discussed with: Patient, primary team  Follow-up plan from palliative team: We will continue to follow  Thank you for involving us in the patient's care.     Raymundo SANDOVAL NP ACHPN  Nurse Practitioner- Lead Advanced Practice Provider  [facility] Palliative Medicine Consult Service   [phone]  Greater than 55 minutes was spent in care coordination and counseling, physical exam possibly 15 minutes patient family present.          Chief Complaint     Loss of voice, voice change         History of Present Illness     History obtained from: chart, care team, and patient at bedside    Keira Collins is a 72 year old woman with a long-time smoking history, who initially presented with loss of voice and voice change over the last 1-2 months, as well as pain in her left ear and left upper neck. Her PCP ordered imaging that was concerning for neoplastic process. She was admitted on 7/17/2018 for direct laryngoscopy with biopsy, as well as tracheostomy. PET scan obtained on 7/20 demonstrated her primary laryngeal tumor, as well as neck LAD, and multiple lung nodules concerning for metastatic disease. Final pathology of her biopsy was consistent with invasive SCC, [id number]. She was presented at Otolaryngology Tumor Board Conference, and recommendations were for total laryngectomy with modified radical neck dissection vs CXRT.     Her  hospital course has been complicated by aspiration pneumonia and an acute pontine stroke. She developed heavy secretions and acute hypoxic respiratory failure following tracheostomy placement, with RLL infiltrate on XR thought to be consistent with aspiration. She was started on antibiotics for presumed aspiration pneumonia, complicated by a possible drug reaction to cephalosporins. On the morning of 7/19, she was noted to have a subtle R facial droop, which improved throughout the day. CT head was negative for acute intracranial abnormality. However, she was noted to be lethargic later that day, and developed R-sided hemiparesis. MRI brain revealed an acute pontine stroke, but she was outside of the window for TPA.     She is noted to have severe protein-calorie malnutrition, and originally had an NG tube placed for enteral access, but she removed this tube herself. The NG was attempted to be replaced, but after a failed first attempt, she refused another. After discussion with ENT, she agreed to have a G tube placed to allow for enteral feeding and medication administration. She is currently maintained on tube feedings via her PEG.     As of the time of evaluation, her level of attention and alertness waxes and wanes from minute to minute. She is unable to provide more than head shaking/nodding to yes/no questions, and will attempt to mouth limited responses to questions that require other than yes/no answers. She frequently appears to fall asleep after being asked questions, and is unable to participate in any discussion of goals of care at this time.           Past Medical History:   I have reviewed this patient's past medical history  Past Medical History:   Diagnosis Date     COPD (chronic obstructive pulmonary disease) (H) 7/28/2014    From [facility] Hospitalization 05/2016: bedside feng shows moderate airflow obstruction [FEV1 0.99L, 62% pred and FVC 1.7L, 82% predicted; FEV1 and FVC DROPPED by 11 and 16%  "respectively post BD  Diagnosis made 7/28/2014 at [facility]        Essential hypertension with goal blood pressure less than 140/90      H/O: alcohol abuse      Hyperlipidemia LDL goal <100      Laryngeal mass 6/27/2018     Laryngeal squamous cell carcinoma (H) 07/17/2018     Osteoporosis 7/14/2016     Pulmonary nodules 7/9/2018     PVD (peripheral vascular disease) (H) 7/11/2016     Tobacco abuse               Past Surgical History:   I have reviewed this patient's past surgical history  Past Surgical History:   Procedure Laterality Date     LARYNGOSCOPY WITH BIOPSY(IES) N/A 7/17/2018    Procedure: LARYNGOSCOPY WITH BIOPSY(IES);  Direct Laryngoscopy With Biopsy, Tracheostomy ;  Surgeon: Cynthia Walden MD;  Location: [facility] OR     SURGICAL HISTORY OF -   5/13/2016    right hip fracture     TRACHEOSTOMY N/A 7/17/2018    Procedure: TRACHEOSTOMY;;  Surgeon: Cynthia Walden MD;  Location: [facility] OR     VASCULAR SURGERY Left 01/2017    ; left femoral endarterectomy               Social/Spiritual History:     Living situation: Lives at home with son, feels safe in home  Family system: close with multiple adult children who live locally  Functional status (needs help with ADLs or IADLs): chart notes \"independent but not very active\"; per nursing she spends much of the day watching TV, and sons bring her food  Employment/education: unknown  Use of community resources: unknown  Activities/interests: unknown   History of substance use/abuse: alcohol - drank 1-5 drinks per day PTA; tobacco - smoked up to 1 ppd cigarettes PTA            Family History:   I have reviewed this patient's family history  Family History   Problem Relation Age of Onset     Chronic Obstructive Pulmonary Disease Daughter      Diabetes Daughter      HEART DISEASE Daughter      Family history reviewed           Allergies:   All allergies reviewed and addressed  Allergies   Allergen Reactions     Penicillins      Cefepime Rash     Provider " entered as cephalosporins (pt had cefepime - rash this admission)     Cephalosporins Rash              Medications:   I have reviewed this patient's medication profile and medications during this hospitalization           Review of Systems:   The comprehensive review of systems is unreliable due to patient mental status, limited ability to participate due to tracheostomy. Answers to limited ROS as below:    Palliative Symptom Review (0=no symptom/no concern, 1=mild, 2=moderate, 3=severe):      Pain: 1      Nausea: 0      Constipation: 0      Diarrhea: 0      Shortness of Breath: 1      Overall (0 good/no concerns, 3 very poor):  0            Physical Exam:   Vitals were reviewed, and notable for the following:  Afebrile last 24 hours. Intermittently tachycardic to low 100s. RR 18-22, O2 sats % on 30% FiO2 via trach dome. BP 130s-160s/60s-80s.     General: wakes to voice, touch, in NAD, cachectic-appearing; frequently falls asleep during questioning  HEENT: NC/AT. PERRL, EOMI, no scleral icterus. Oropharynx moist, without exudates or lesions. Trach in place, dressing c/d/i without surrounding erythema.   CV: RRR, no M/R/G  Resp: CTAB in lung fields but with some transmitted sounds from tracheostomy, no wheezes or crackles  Abd: soft, non-distended. Tender to palpation over PEG. Bowel sounds present and normoactive.  Extremities: no LE edema  Skin: no jaundice, rashes, or wounds on examined skin. Area on left thigh marked with skin pen without erythema or rash.   Neuro: subtle R facial droop. Moving all extremities purposefully, but with diminished  strength and biceps/triceps strength in RUE         Data Reviewed:   Labs and imaging reviewed in CPRS. Pertinent recent data include the following:    CBC notable for leukocytosis to 13, up from 10 over the last two days, stable to rising hemoglobin, rising platelets - possibly consistent with some hemoconcentration.     Metabolic panel notable for  hypophosphatemia, otherwise wnl.     CRP elevated but downtrending. Lactate 1.4.

## 2018-07-31 NOTE — PLAN OF CARE
"Problem: Surgery Nonspecified (Adult)  Goal: Signs and Symptoms of Listed Potential Problems Will be Absent, Minimized or Managed (Surgery Nonspecified)  Signs and symptoms of listed potential problems will be absent, minimized or managed by discharge/transition of care (reference Surgery Nonspecified (Adult) CPG).   /80  Pulse 109  Temp 98.3  F (36.8  C) (Oral)  Resp 22  Ht 1.6 m (5' 2.99\")  Wt 58.7 kg (129 lb 6.6 oz)  SpO2 98%  BMI 22.93 kg/m2    Neuro: Alert. Orientated to self and place. Forgetful.   Cardiac: SR/ST . Hypertension 160-170 Hydralazine and labetalol given. Afebrile.   Respiratory: Sating >92 on 30% TD. Shiley 6. Suction Q1H. Copious secretions.   GI/: Adequate urine output. BM X3. Incontinent.   Diet/appetite: NPO. TF @ 45ml/hr w/30 FWF  Activity:  Repo Q2H  Pain: C/o pain at trach site and Peg tube site. Tylenol x1.  Skin: No new deficits noted.  LDA's: Peg tube. PIV SL  Replaced 20meq K. Phos ordered to be replaced.    Plan: Continue with POC. Notify primary team with changes. Care conference today.       Problem: Chronic Respiratory Difficulty Comorbidity  Goal: Chronic Respiratory Difficulty  Patient comorbidity will be monitored for signs and symptoms of Respiratory Difficulty (Chronic) condition.  Problems will be absent, minimized or managed by discharge/transition of care.   Patient trached. TD 30%. Suctioning Q1H      "

## 2018-07-31 NOTE — PROGRESS NOTES
Care Coordinator Progress Note    Admission Date/Time:  7/17/2018  Attending MD:  Felix Barahona MD    Data  Chart reviewed, discussed with interdisciplinary team.   Patient was admitted for: Cancer of Larynx         Assessment  Dr Saunders is available for a care conference tomorrow, Pts Son Dar is out of town. I have attempted to call Pts JR Eladio, his voice mail is full.    Addendum: I reached Pts JR Eladio on Pts home phone and he states he will be in tomorrow by about 9:15am.     Plan  Anticipated Discharge Date:  TBD  Anticipated Discharge Plan:  TBD    Isabel Singh RN   6B care coordinator #[phone]

## 2018-08-01 NOTE — PROGRESS NOTES
"SPIRITUAL HEALTH SERVICES  SPIRITUAL ASSESSMENT Progress Note (Palliative Focus)  [facility] 6B    PRIMARY FOCUS:     Goals of care    Symptom/pain management    Emotional/spiritual/Samaritan distress    Support for coping    REFERRAL SOURCE: Palliative care initial spiritual assessment/care conference.    ILLNESS CIRCUMSTANCES:   Reviewed documentation. Participated in care conference with sons Dar and . Please see KAMRAN Lee's note for full details of care conference. Reflective conversation following care conference shared with patient Keira Collins and son  which integrated elements of illness and family narratives.     Context of Serious Illness/Symptom(s) - Keira Collins is a 72 year old female who was admitted for direct laryngoscopy with biopsy and tracheostomy, found to have invasive SCC, complicated during hospital stay by aspiration pneumonia and an acute pontine stroke.     Resources for Support -   o Family: four sons living, extended family  o Khushboo: Sabianism    DISTRESS:     Emotional/Spiritual/Existential Distress - Son  described Keira as \"miserable\" and said that she had not wanted a tracheostomy. He is angry with amber Dodge for making such a decision. He described eating by mouth, especially drinking coffee, as important to Keria's quality of life.  talked about the deaths of his father and sister as traumatic, while also saying that Keira has told him that she is \"tired\" and \"wants to be with Soraida...to go home\" to [place]. Keira herself mouthed \"kids\" when I asked whether she was living for herself or her children and nodded  emphatically when I asked whether that meant she was staying alive only for her children.     Muslim Distress - None named in visit.    Social/Cultural/Economic Distress - Keira mouthed that hospitalization was \"hard\" and JR talked about how much he misses living with and caring for Keira.    SPIRITUAL/Restorationist COPING:     Restoration/Khushboo - " Mosque    Spiritual Practice(s) - Both  and Keira named relying on God as important, and  said private prayer was important to him.    Emotional/Relational/Existential Connections - Family are most significant to Keira.    GOALS OF CARE:    Goals of Care - Family plans to discuss further, and hopes that Keira will be able to participate in conversation.    Meaning/Sense-Making - Both Keira and  endorsed a sense of suffering earthly pain that could only be relieved by resting in heaven after death.    PLAN: I will follow for spiritual support.    Tarsha Sullivan  Palliative   Pager [phone]  [facility] Inpatient Team Consult pager [phone] (M-F 8-4:30)  After-hours Answering Service [phone]

## 2018-08-01 NOTE — PROGRESS NOTES
Internal Medicine Progress Note - Gold Service       Assessment & Plan    Keira Collins is a 72 year old female admitted on 7/17/2018. She has a history of COPD, HTN, hyperlipidemia, chronic tobacco abuse, and alcohol abuse (in remission) who was admitted on 7/17/2018 for direct laryngoscopy with biopsy of laryngeal mass, tracheostomy and NGT placement. Pathology showed squamous cell carcinoma of larynx. Internal medicine consulted 7/20 for HTN, hypoxia and tachycardia. Hospital course complicated by acute left pontine CVA on 7/19 and acute hypoxic respiratory failure d/t aspiration PNA.    Squamous cell carcinoma of larynx s/p biopsy and tracheostomy (7/17/18):  [id number]. Post-op course c/b acute CVA and aspiration pneumonia. ENT discussed case at tumor conference 7/27 and case discussed w medical oncology 7/31. Dr. Lord present at care conference today as well. Surgical resection offered regardless of whether lung nodules are malignant. Surgery would offer up option for PO intake in the future, but would be left with permanent trach. Surgery would be extensive (approx 9 hours) and jose-operative risks discussed with family (including CVA and pulmonary complications). Further treatment of lung nodules would be determined at a later date based on her functional status. Palliative care consulted to assist with goals of care discussion. Patient's son (ROWDY) was present at care conference, however POA (Dar) had to call in. We have encouraged ongoing discussion between patient and family re: goals of care. Questions answered to the best of our ability.   - SLP consulted; will see if able to start Passy Ayden valve  - Cont regular suctioning and trach cares per ENT orders  - Will consider Robinul for secretions if inhibiting ability to place PM valve  - Further management per ENT    Sepsis and acute hypoxic respiratory failure d/t aspiration pneumonia:  Excessive secretions post-tracheostomy c/b acute L pontine  CVA. Acute hypoxia, fevers and elevated inflammatory markers on 7/24. Required BIPAP intermittently. CXR showed R>L bibasilar opacities with small R effusion. CT Chest pulmon angio negative for PE. Sputum 7/22 negative. ? Pulmonary edema contributing. Has been on a number of antibiotics and probable drug rash from cephalosporins. ID consulted and transitioned to moxifloxacin. Clinically improved on antibiotics and intermittent diuresis. Weaned off BIPAP completely. Sats stable on FiO2 30%. Clinically improved w treatment. Last dose of moxifloxacin 7/31 to complete a 7 day course of abx. ? Component of chronic hypoxia d/t COPD.  - Cont to wean O2 as able    Acute L pontine CVA:  R facial droop noted 7/19 with MRI primo showing acute L pontine infarct. Neurology consulted. Outside therapeutic window for TPA. Conservative management recommended with DAPT and statin. Discussed risks of holding plavix for procedures/surgery with neurology 7/31. No data to support use of DAPT long term (suggest <90 days), but also no consensus in literature regarding duration of DAPT following acute CVA. One study showed reduced risk of recurrent CVA with 3 week duration. Given ongoing discussion re: goals of care, no urgent need for lung biopsy or surgery at this time.  - Cont ASA and plavix through 8/10/18, at which time benefit for secondary prevention unclear.   - Cont statin  - SBP goal <160  - PT/OT/SLP consulted     Severe protein-calorie malnutrition:  Secondary to acute on chronic medical issues including cancer, recent surgery, and CVA which has resulted in dysphagia. NG feeding tube placed in surgery, discontinued by patient. PEG placed 7/27. Restarted on TF, currently at goal rate 45 ml/h. Per nutrition, no significant risk of refeeding at this time.  - Cont TF at goal rate 45 ml/h  - NPO  - SLP following    Spiculated RUL nodule:  Concerning for malignancy. PET 7/20 with hypermetabolic pulmonary nodules c/w metastatic  disease. Per discussion with Medical Oncology, biopsy of lung nodules non-urgent. Patient may elect to have surgical resection of laryngeal mass, but treatment of lung cancer (primary or mets) would come at a later date and be based more on functional status at that time. Also noted high mortality with pulmonary involvement at 1 year. Will need to continue discussion re: goals of care and possible surgical resection.   - Recommend outpatient follow up with ENT and Oncology to discuss timing of potential lung biopsy  - See above re: DAPT. Should be OK to hold/stop plavix prior to any scheduled lung biopsy    HTN:  Stable. Goal SBP <160 per neurology in setting of recent CVA. Cont amlodipine 10mg daily and losartan 50mg daily.     Chronic Medical Problems:  COPD:  Currently no wheezing. Most recent PFTs 7/16/18 showed FEV-1/FVC 65% with FEV-1 0.58 L. Gray Post-op Respiratory failure risk calculated at 9.91% (failure to wean of vent within 48h of surgery or unplanned intubation/reintubation). Pulmonary risks with proposed surgery discussion with treatment team and family. Cont Brovana, Pulmicort, Ipratropium/Levalbuterol QID.   HLD:  Cont statin.   Hx Alcohol abuse:  Currently in remission.   PAD:  S/p lower extremity endarterectomy and stenting in 2017. Previously prescribed ASA and Plavix, but per family patient was not taking plavix regularly.      Pain Assessment:  Current Pain Score 8/1/2018   Patient currently in pain? denies   Pain score (0-10) -   Pain location -   Pain descriptors -   Keira canas pain level was assessed and she currently denies pain.      Diet: Adult Formula Drip Feeding: Continuous Isosource 1.5; Gastrostomy; Goal Rate: 45; mL/hr; Medication - Tube Feeding Flush Frequency: At least 15-30 mL water before and after medication administration and with tube clogging; Amount to Send (Nutritio...  Fluids: None  DVT Prophylaxis: Enoxaparin (Lovenox) SQ  Code Status: Full Code    Disposition Plan    Expected discharge: 2 - 3 days, recommended to TBD once mental status at baseline, safe disposition plan/ TCU bed available and O2 needs stable.     Entered: Shyam Avery 08/01/2018, 12:08 PM   Information in the above section will display in the discharge planner report.      The patient's care was discussed with the Attending Physician, Dr. Barahona.    Shyam Avery PA-C  Internal Medicine Staff Hospitalist Service  [facility]  Pager: [phone]  Please see sticky note for cross cover information  _____________________________________________________________    Interval History   Son at bedside. Care conference earlier today. Patient updated with what was discussed at care conference including surgical options, lung biopsy, possible chemotherapy, rehabilitation, risks, etc. Patient and family would like to discuss. Overall, Keira is feeling better. She continues to have mild pain at trach site. Pain from PEG has improved. She denies any dyspnea at rest, chest pain, fevers, chills, nausea, or urinary complaints. No abdominal pain. Nursing report several loose stools overnight, but none today.     ROS:  Pulm, CV, GI and  performed and negative unless otherwise noted above.       Data reviewed today: I have personally reviewed all medications, new labs and imaging results over the last 24 hours. .    Physical Exam   Vital Signs: Temp: 98.5  F (36.9  C) Temp src: Oral BP: 137/72 Pulse: 89 Heart Rate: 80 Resp: 22 SpO2: 100 % O2 Device: Trach dome    Weight: 129 lbs 6.56 oz    GENERAL:  Awake. Alert. Oriented x 3. NAD.   HEENT:  No scleral icterus. Mucous membranes moist. No purulent discharge or erythema surrounding trach.  CV:  Regularly irregular. S1, S2. No murmurs appreciated. PAC's noted on bedside telemetry  RESPIRATORY:  Lungs CTAB with no wheezing, rales, rhonchi.   GI:  Abdomen soft, non-distended. Active bowel sounds. Mildly tender around G tube.    NEUROLOGICAL:  Slight R facial  droop. Moves all extremities but w persistent R sided weakness.    EXTREMITIES:  No peripheral edema. No calf tenderness. Intact bilateral pedal pulses.   SKIN:  No jaundice, rashes, wounds or lesions.            Data     ROUTINE IP LABS (Last four results)  CMP     Recent Labs  Lab 08/01/18  0515 07/31/18  0445 07/30/18  0450 07/29/18  0523    140 141 139   POTASSIUM 4.3 3.8 3.9 3.6  3.6   CHLORIDE 106 107 107 104   CO2 26 24 26 24   ANIONGAP 9 9 8 11   * 139* 142* 114*   BUN 16 12 12 12   CR 0.50* 0.45* 0.48* 0.52   ESTEFANI 8.7 8.5 8.4* 8.3*   MAG 2.4* 2.2 2.2 2.2   PHOS 4.7* 2.3* 2.5 3.1   ALBUMIN  --   --  2.2*  --      CBC     Recent Labs  Lab 08/01/18  0515 07/31/18  0445 07/30/18  0450 07/29/18  0523   WBC 12.2* 13.1* 9.9 10.2   RBC 4.11 3.99 3.62* 3.56*   HGB 12.4 11.8 10.6* 10.4*   HCT 38.7 38.0 34.4* 33.7*   MCV 94 95 95 95   MCH 30.2 29.6 29.3 29.2   MCHC 32.0 31.1* 30.8* 30.9*   RDW 13.5 13.5 13.5 13.3    283 247 236     INR No lab results found in last 7 days.      Recent Labs  Lab 08/01/18  0515 08/01/18  0501 08/01/18  0001 07/31/18  1912 07/31/18  1524 07/31/18  1116 07/31/18  0752 07/31/18  0445  07/30/18  0450  07/29/18  0523  07/28/18  0555  07/27/18  0530   *  --   --   --   --   --   --  139*  --  142*  --  114*  --  100*  --  101*   BGM  --  132* 133* 120* 126* 145* 131*  --   < >  --   < >  --   < >  --   < >  --    < > = values in this interval not displayed.       All labs personally reviewed in Epic.  See A&P for additional results.     Unresulted Labs Ordered in the Past 30 Days of this Admission     No orders found from 5/18/2018 to 7/18/2018.

## 2018-08-01 NOTE — PLAN OF CARE
Problem: Patient Care Overview  Goal: Plan of Care/Patient Progress Review  Outcome: No Change  Neuro: Disoriented to time and situation. PERRLA. R side facial droop (unchanged). Some R side weakness.   Cardiac: NSR (frequent PVC/PACs) with HR in 80s-90s. BPs 130-40s/70s. Afebrile.   Respiratory: Shiley 6 to TD 30% overnight. Suction q1-2hour. Secretions now clear/white and thin. Trach cares completed at 2100 and 0600.    GI/: Incontinent of urine x4 and bowel x3. Bowel meds held this evening. +BS and passing gas.    Diet/Appetite: Strict NPO. TF to G tube at goal of 45cc/hour with standard FWF. Q4hour BS.   Activity: AST of 2 for turning q2hour.   Pain: C/o abdominal discomfort. Gave PRN tylenol.    Skin: No new changes noted. Generalized rash marked and seems to be improving.   LDAs: R PIV x2, SL. PEG tube to enteral feeds. Shiley 6 to TD 30% FiO2.      Continue with POC. Notify primary team with changes.   Adriana Boykin RN    Problem: Chronic Respiratory Difficulty Comorbidity  Goal: Chronic Respiratory Difficulty  Patient comorbidity will be monitored for signs and symptoms of Respiratory Difficulty (Chronic) condition.  Problems will be absent, minimized or managed by discharge/transition of care.   Outcome: Improving  Now TD overnight 30% FiO2. More frequent suctioning q1-2hour. Good, strong productive cough.

## 2018-08-01 NOTE — PLAN OF CARE
Problem: Patient Care Overview  Goal: Plan of Care/Patient Progress Review  OT: Pt was soundly sleeping when attempted in PM, when waking pt declined therapy today. OT will reschedule for 8/2/18.

## 2018-08-01 NOTE — PROGRESS NOTES
Social Work Services Progress Note    Hospital Day: 16  Date of Initial Social Work Evaluation:  7/30/18  Collaborated with:  75 minute care conference held with pt's son , pt's son Dar (via phone), Dr. Walden and Samantha Burnham PA with ENT, Dr. Barahona and Khari Avery PA with Internal Medicine, Dr. Higgins with Medical Oncology, Raymundo Vega NP and resident Tim and chaplain Willingham with Palliative, and writer.    Data:  Keira Collins is a 73 yo female admitted to [facility] 7/17/18 for acute hypoxic respiratory failure d/t aspiration PNA.     Intervention:  Care conference    Assessment:  Medical team reviewed events throughout hospitalization. Pt has squamous cell carcinoma of larynx, s/p tracheostomy 7/17/18 and PEG tube for TF 7/27/18. Pt had a stroke during this hospitalization and has nodules on lung which Oncology suspects is cancer but cannot confirm without biopsy. Per MD, pt is tolerating trach dome, participating more with therapies, and level of consciousness is improving.    MD discussed multiple options moving forward, including total laryngectomy, radiation/chemo, and comfort approach. MD discussed with family what a laryngectomy is, and that this surgery would not treat the potential cancer in pt's lung, it is a big surgery with almost 10 hrs in OR, would need to be off plavix which has risk of stroke, and that it's possible pt's lungs couldn't recover meaning risk of long term bipap or ventilator use given pt had respiratory issues even prior to admission, and the potential lung cancer would still require chemo or radiation and pt might not even be a candidate for chemo/rad.    Family asked about timeframe for a potential laryngectomy, and per ENT, the sooner the better but ideally pt would be as optimized as possible, and plavix would need to be held for at least one week prior to surgery. Per Oncology's review of scans, lung biopsy is not a priority at this time because there does not appear to  "be very fast change, so Oncology would like to see how much pt can improve and participate in therapy over the next 1-2 wks. Per Oncology, prognosis for lung cancer is approximately one year, but explained to family that each pt is different.     Pt's son  discussed that pt has communicated to him that she is \"so miserable she doesn't care what happens to her\", is in pain, and that pt did not even want the tracheostomy in the first place, and was angry at amber Dodge for making this decision. Son  discussed that pt most enjoyed eating breakfast, drinking coffee, and smoking.  discussed that their father  from throat cancer and it was traumatic because he spit up large amounts of blood before dying. Additionally, pt's daughter Soraida  17 from COPD and cancer and was in the hospital \"with tubes and holes all over her.\" Son  stated pt has expressed to him that she is \"tired\", \"she wants to be with Soraida\", and \"she wants to go home\" which he clarified to mean [place]. Comfort care approach was discussed, and encouragement was given to family to further discuss pt's wishes, goals, and what quality of life would be for pt.    ENT MD discussed that pt is off bipap now, and they will have SLP see to evaluate for a speaking valve to see if that could help pt with quality of life. Per medical team, pt would likely be ready soon to discharge to TCU. Pt does not want to go to rehab, but understands this would be necessary to gain strength. Dr. Walden with ENT stated that if pt is discharged soon, she will follow up with pt and family in clinic on . Plan after rehab would be to go to son 's home where  and 's jesusance can provide  care.    Plan:    Anticipated Disposition:  Pt will need TCU prior to returning to son's home. Referral is out to [facility] per family request, unclear if [facility] would accept pt. If [facility] declines, will speak to family about other TCU referrals, although options will be " very limited d/t new trach.    Barriers to d/c plan:  Medical clearance and safe dc plan. Per RN note, pt is being suctioned Q1-2 hr. Most TCUs can only accommodate Q4 hr suctioning. Pt will have to be off all IV push meds for 24 hrs prior to dc.    Follow Up:  SW will continue to follow and assist as needed.    SHAKIR Simmons, [facility]  6B Intermediate Care Unit  Phone: [phone]  Pager: [phone]

## 2018-08-01 NOTE — PROGRESS NOTES
[facility], [place]   Palliative Care Daily Progress Note          Recommendations, Patient/Family Counseling & Coordination     Care conference today (see notes below) presented clinical picture to family members, severity of disease, introduced options available, and began process of identifying Keira's values and goals. Keira's sons plan to have further discussions with her about her care to date, and her wishes going forward, though she appears during our discussions to be reluctant to pursue more aggressive curative therapies. We will continue to support Keira, her family members, and the treatment teams as her treatment options become more clear, and as the recommendations of the treatment teams develop. We will assist as desired in helping her assess these options, as she weighs them against her goals.     - Palliative will continue to follow with you, and will participate in goals of care conversations  - We will update our recommendations going forward  - Palliative  to continue to support as desired  - POLST not completed     Thank you for the opportunity to continue to participate in the care of this patient and family.  Please feel free to contact on-call palliative provider with any emergent needs.  We can be reached via team pager [phone] (answered 8-4:30 Monday-Friday); after-hours answering service ([phone]); Main Palliative Clinic - [facility]: [phone].     Raymundo SANDOVAL NP ACHPN  Nurse Practitioner- Lead Advanced Practice Provider  [facility] Palliative Medicine Consult Service   [phone]  Greater than 85 minutes was spent in care coordination and counseling, physical exam with patient family present for greater than 55 minutes of this.  Care conference time IN: 0925  OUT: 1010- see below.  Exam in room 10 minutes      Assessment      1) Diagnoses & symptoms:        SCC of larynx s/p tracheostomy  Acute L pontine CVA (doi  "7/19/18)  Sepsis  Acute hypoxic respiratory failure secondary to aspiration pneumonia  Severe protein-calorie malnutrition- on enteral feedings  HTN  COPD  PVD s/p L femoral endarterectomy  Pain  Lethargy     2)  Psychosocial/Spiritual Needs:   Ongoing:  support  New: none        Is new assessment/intervention required by palliative team?: Yes         Interval History:   Care conference this AM with primary and consulting teams, along with one son present () and the other (Dar) available via telephone.     Dr. Walden  ENT presented a summary of the case and the interventions to date, with input from Samantha Burnham of ENT, Dr. Lord of hematology/oncology and Dr. Barahona of hospital medicine. Also present were Lelia Sullivan (, palliative service), Shyam JOE (hospital medicine), Janny Lee (social work), Surjit Wilson (resident, palliative service) and myself.    As explained to family today, Keira has laryngeal squamous cell carcinoma, likely metastatic to her lungs. At this time, that leaves her with three basic ways forward. These include surgery (total laryngectomy with neck/LN dissection), chemo/rads, or palliative care/hospice. Dr. Walden presented the major risks and benefits of each potential pathway forward, with careful consideration of Keira's underlying comorbidities and with reference to her goals and preferences. Please see excellent Social Work Service Progress Note dated 8/1/2018 for further documentation of the care conference.     As the discussion progressed, while her sons were interested in pursuing more aggressive measures, her son  revealed that Keira is already \"miserable\" with the interventions to date (particularly trach/PEG), and has expressed regret that her son Dar \"put her through this.\" According to , Keira has expressed that she \"wants to go home,\" by which she meant heaven. It is apparent from this that there remain some " differences between the patient's goals and those of her sons, and that further discussion between the patient and her family, along with the treatment teams, will hopefully yield some clarity on the path forward.     At the bedside this afternoon, we addressed the basic outlines of the above with Keira. At the mention of further surgical intervention, she rolled her eyes and tossed her head back in apparent frustration or disdain. While we did not press the issue of what her wishes might be, it appears that she may be leaning toward a less aggressive course. We are hopeful that with changes to her trach (to a cuffless version) and work with speech that she will be a candidate for a speaking valve, to facilitate her more easily expressing her goals.     She endorses moderate ongoing discomfort with breathing, but denies that she has pain that is not well-controlled. She denies any chest pain, diarrhea or constipation, or dysuria. She continues to have some sensitivity around her PEG site, but denies abdominal pain.              Review of Systems:   A comprehensive ROS was unable to be performed due to patient factors (limited ability to participate due to tracheostomy, decreased attention). A more focused ROS is found below:    Palliative Symptom Review (0=no symptom/no concern, 1=mild, 2=moderate, 3=severe):      Pain: 1      Nausea: 0      Constipation: 0      Diarrhea: 0      Depressive Symptoms: 0-1      Anxiety: 0      Shortness of Breath: 1      Insomnia: 0      Overall (0 good/no concerns, 3 very poor):  1            Medications:   I have reviewed this patient's medication profile and medications given in past 24 hours.      Vitals were reviewed, and notable for the following:  Afebrile. Not tachycardic. RR 18-22, O2 sats % on 30% FiO2 via trach dome. BP 130s-160s/60s-80s.      General: wakes to voice, in NAD, cachectic-appearing; intermittently distracted or does not answer during questioning  HEENT:  NC/AT. EOMI, no scleral icterus. Oropharynx moist, without exudates or lesions. Trach in place, dressing c/d/i without surrounding erythema.   CV: RRR, no M/R/G  Resp: CTAB in lung fields but with some transmitted sounds from tracheostomy, no wheezes or crackles  Abd: soft, non-distended. Tender to palpation over PEG. Bowel sounds present and normoactive.  Extremities: no LE edema  Skin: no jaundice, rashes, or wounds on examined skin.              Data Reviewed:   Labs and imaging reviewed in CPRS. Pertinent recent data include the following:    Resolving leukocytosis (12.2 from 13.1)  Resolved anemia (Hgb 12.4 from 10.4)  No thrombocytopenia or thrombocytosis    BMP wnl  Mild hypermagnesemia (2.4)  Mild hyperphosphatemia (4.7)  Lactate wnl

## 2018-08-01 NOTE — PLAN OF CARE
Problem: Patient Care Overview  Goal: Plan of Care/Patient Progress Review  PT-6B- Cancel, pt reports significant fatigue after care conference meeting, declines PT.

## 2018-08-01 NOTE — PROGRESS NOTES
CLINICAL NUTRITION SERVICES - REASSESSMENT NOTE     Nutrition Prescription    RECOMMENDATIONS FOR MDs/PROVIDERS TO ORDER:  Fluids per team. Continue with discussion of goals of care and prognosis.  Decrease TF interruptions as able, pt not meeting TF goals.     Malnutrition Status:    Severe    Recommendations already ordered by Registered Dietitian (RD):  None    Future/Additional Recommendations:  Monitor for GOC/POC     EVALUATION OF THE PROGRESS TOWARD GOALS   Diet: NPO  Nutrition Support: 7/27-___: IsoSource 1.5 @ goal of 45ml/hr, 1080 ml/day, 1620 kcals (29 kcal/kg/day), 73 g PRO (1.3 g/kg/day), 821 ml free H2O, 190 g CHO and 16 g Fiber daily. (PEG placed)  Intake: Average TF intakes overt the past 4 days since TF restarted: 704 ml, 1056 kcals (19- 75%), 39 g pro (.7- 58%)      NEW FINDINGS   Weight: ~2% in the past 1 week. However weight up since admit.   Labs: Cr: .5 (L)- suspect d.t low LBM, Phos: 4.7 (H)  Meds: folic acid, culturelle, multivitamin, thiamine,   GI: BM+ noted. Complaints of abdominal pain..   Skin: Generalized rash marked and seems to be improving.   - See SW note for further GOC conversations  - PEG placed on 7/27.     MALNUTRITION  % Intake: < 75% for > 7 days (non-severe)  % Weight Loss: > 2% in 1 week (severe)  Subcutaneous Fat Loss: Upper arm:  moderate, Lower arm: severe and Thoracic/intercostal:  Mild- moderate  Muscle Loss: Temporal: moderate-severe, Facial & jaw region:  moderate, Upper arm (bicep, tricep):  severe, Lower arm  (forearm):  severe, Dorsal hand:  severe, Upper leg (quadricep, hamstring):  severe and Posterior calf: severe - no change  Fluid Accumulation/Edema: Does not meet criteria  Malnutrition Diagnosis: Severe malnutrition in the context of acute on chronic illness    Previous Goals   Total avg nutritional intake to meet a minimum of 25 kcal/kg and 1.2 g PRO/kg daily (per dosing wt 56 kg).  Evaluation: Not met    Previous Nutrition Diagnosis  Inadequate  protein-energy intake related to NPO and NJ pulled out with minimal TF intakes when FT was in as evidenced by FT providing 123 kcals and 6 g pro daily on average over the day 7 days.   Evaluation: Improving    CURRENT NUTRITION DIAGNOSIS  Inadequate protein-energy intake related to delayed placement of PEG, NPO and slow advancement of TF as evidenced by pt NPO for the past 7 days + with TF only started 4 days ago and total TF intakes > goal.       INTERVENTIONS  Implementation  Collaboration with other providers    Goals  Total avg nutritional intake to meet a minimum of 25 kcal/kg and 1.2 g PRO/kg daily (per dosing wt 56 kg).    Monitoring/Evaluation  Progress toward goals will be monitored and evaluated per protocol.      Tracie Lan, DAYNA, MS, LD  6B- Pager: [phone]

## 2018-08-01 NOTE — PROGRESS NOTES
Otolaryngology Trach Change Note  August 1, 2018    Type of trach removed: 6-0 cuffed shiley  Type of trach placed: 6-0 cuffless shiley     Procedure note: Patient was suctioned via trach and secretions were removed.  Patient was appropriately positioned flat and supine. Trach ties were removed while holding trach in place. Patient was then hyperoxygenated via trach dome. Trach was removed without difficulty. Stoma appeared patent without obstruction or secretions. New trach was inserted with obturator without difficulty or resistance. Obturator was removed and inner cannula locked in place. Correct positioning of trach was confirmed by easily passing a suction through the trach and obvious air movement was noted with good oxygen saturations. Trach ties were placed and secured. Patient tolerated the trach change well and without complication.     Continue routine trach cares as directed.   Future trach changes will be performed as needed.   SLP consult for PMSV trial     Samantha Steinberg PA-C  Otolaryngology-Head & Neck Surgery  Please contact ENT by dialing * [phone] and entering job code [phone] when prompted.

## 2018-08-01 NOTE — PROGRESS NOTES
"Otolaryngology Progress Note  August 1, 2018     S:  No acute events overnight. Patient on HTD overnight. Has thin clear secretions. She is interactive. Tolerating TFs through PEG.    O: /71 (BP Location: Left arm)  Pulse 89  Temp 99.5  F (37.5  C)  Resp 22  Ht 1.6 m (5' 2.99\")  Wt 58.7 kg (129 lb 6.6 oz)  SpO2 98%  BMI 22.93 kg/m2     General: resting in bed, interactive, answering questions appropriately by nodding head   Neuro: moving all extremities   HEENT: EOMI. 6-0 cuffed Shiley in place, cuff inflated. Trach ties are snug w/ 2 finger breadths between neck and ties. Tracheostomy site clean, no bleeding. No skin breakdown or erythema under trach ties or face plate.   Pulmonary: breathing comfortably via 6-0 Shiley on HTD    Intake/Output Summary (Last 24 hours) at 07/29/18 1312  Last data filed at 07/29/18 1200   Gross per 24 hour   Intake              820 ml   Output              815 ml   Net                5 ml       LABS:  ROUTINE IP LABS (Last four results)  BMP    Recent Labs  Lab 08/01/18  0515 07/31/18  0445 07/30/18  0450 07/29/18  0523    140 141 139   POTASSIUM 4.3 3.8 3.9 3.6  3.6   CHLORIDE 106 107 107 104   ESTEFANI 8.7 8.5 8.4* 8.3*   CO2 26 24 26 24   BUN 16 12 12 12   CR 0.50* 0.45* 0.48* 0.52   * 139* 142* 114*       CBC    Recent Labs  Lab 08/01/18  0515 07/31/18  0445 07/30/18  0450 07/29/18  0523   WBC 12.2* 13.1* 9.9 10.2   RBC 4.11 3.99 3.62* 3.56*   HGB 12.4 11.8 10.6* 10.4*   HCT 38.7 38.0 34.4* 33.7*   MCV 94 95 95 95   MCH 30.2 29.6 29.3 29.2   MCHC 32.0 31.1* 30.8* 30.9*   RDW 13.5 13.5 13.5 13.3    283 247 236     CRP 19 (23)  Lactate 1.3    Mg 2.4  Phos 4.7  ical 4.6    IMAGING:    MRI: 7/20:  Acute left pontine mesencephalic junction infarct    PET Scan 7/20:   Large hypermetabolic laryngeal mass involving bilateral VF, anterior commissure, epiglottis, left AE fold, effacement of preepiglottic fat, bilateral LAD.   hypermetabolic pulmonary nodules " consistent with metastatic disease, small right pleural effusion.  urethral diverticulum containing stones and septations, 4.7 cm left adnexal cystic structure, thoracic abdominal aortic ectasia. 1.9 cm right iliac aneurysm.    Head CT 7/22  Impression:   Evolving changes of left hemipontine infarction. Otherwise, no acute  intracranial pathology.    ECHO 7/24:  Technically difficult study.  Global and regional left ventricular function is probably normal with an EF of  60-65%. No significnat valve disease.    CT Pulm: No PE    PATHOLOGY:  Laryngeal biopsies:  FINAL DIAGNOSIS:   A. LEFT FALSE VOCAL CORD, BIOPSY:   - INVASIVE KERATINIZING SQUAMOUS CELL CARCINOMA, moderately   differentiated.   - Perineural invasion is present.   B. PETIOLE, BIOPSY:   - AT LEAST SQUAMOUS CELL CARCINOMA IN-SITU.   C. ADDITIONAL LEFT FALSE VOCAL CORD, BIOPSY:   - INVASIVE KERATINIZING SQUAMOUS CELL CARCINOMA, moderately   differentiated.   D. RIGHT FALSE VOCAL CORD, BIOPSY:   - AT LEAST MICROINVASIVE SQUAMOUS CELL CARCINOMA, keratinizing type.     FNA R thyroid nodule  - Atypica of undetermined significance. Recommend repeat FNA in 4-6 weeks.       A/P: Keira Collins is a 72-year-old female with a [id number] SCCa of larynx PMH significant for COPD, 140 pack-year tobacco history, alcohol use disorder, HLD, HTN, and PVD who is now POD#15 s/p direct laryngoscopy with biopsy of laryngeal SCC and tracheotomy. Patient was recently presented at Otolaryngology Tumor Board Conference to discuss plan of care for laryngeal [id number] SCCa. Recommendations at that time were to proceed with TL + bilateral MRND vs CXRT.    Trach change was performed on 7/24 and was uncomplicated, trach site healing well.    Patient's hospital course has been complicated by acute ischemic pontine stroke, fevers, and persistent tachycardia, and worsening respiratory status. Internal Medicine is now the primary team, although we continue to follow along closely. ID was  consulted, switch to moxifloxacin for better anaerobic coverage on 7/27, her last dose is today. She is was discussed at Tumor Board on 7/27. Plan on family care conference this week.      - Care conference at 9:15 AM with [place], family members, Dr. Walden, , and palliative this week  - Tracheostomy care:   - Trach changed 7/24, in good position healing well   - cuff can be up or down depending on patient tolerance/need for positive pressure   - Perform regular suctioning.   - RN should change disposable inner canulas every shift and/or clean it with a brush.    - Keep trach tube obturator taped to wall behind the head of the bed. Keep extra unopened 6-0 Shiley and 4-0 Shiley at bedside at all times.   - S/p PLC trach teaching 7/19 with son      -- Patient and above seen with Dr. Win Thomson MD PGY1  Otolaryngology-Head & Neck Surgery  Please contact ENT by dialing * [phone] and entering job code [phone].

## 2018-08-02 NOTE — PROGRESS NOTES
"[facility], [place]   Palliative Care Daily Progress Note          Recommendations, Patient/Family Counseling & Coordination     Keira continues to deny significant pain or discomfort. On discussion of the care conference yester and possible ways forward, she continues to express that her primary goal is to return home with her son. She repeatedly shook her head \"no\" when asked if she would want a surgical procedure, even with the potential benefits as outlined during yesterday's conference. We note that she appears to have more of a flat affect today, and despite being more interactive, she is demonstrating more signs of depressed mood and/or lethargy. We would consider using low-dose methylphenidate in an attempt to help her focus on rehabilitation and potentially to lift her mood. We will continue to follow and to help facilitate conversations around goals of care.   - consider low dose methylphenidate (2.5mg, up to twice a day, with last dose about noon) help with mood/alertness   - palliative will continue to follow with you  - palliative  to continue with support as desired  - POLST not completed     Thank you for the opportunity to continue to participate in the care of this patient and family.  Please feel free to contact on-call palliative provider with any emergent needs.  We can be reached via team pager [phone] (answered 8-4:30 Monday-Friday); after-hours answering service ([phone])    Raymundo SANDOVAL NP ACHPN  Nurse Practitioner- Lead Advanced Practice Provider  [facility] Palliative Medicine Consult Service   [phone]  Greater than 35 minutes was spent in care coordination, counseling and physical examination. Time in room greater than 25 minutes of this.       Assessment      1) Diagnoses & symptoms:        SCC of larynx s/p tracheostomy  Acute L pontine CVA (doi 7/19/18)  Sepsis  Acute hypoxic respiratory failure secondary to aspiration " "pneumonia  Severe protein-calorie malnutrition- on enteral feedings  HTN  COPD  PVD s/p L femoral endarterectomy  Pain  Lethargy     2)  Psychosocial/Spiritual Needs:   Ongoing:  support  New: none        Is new assessment/intervention required by palliative team?:  Yes         Interval History:   No acute events overnight. Declined PT yesterday. Trach exchanged for cuffless Shiley to facilitate use of PMSV. Worked with speech today, tolerated very short trials of PMSV. Continues to require frequent suctioning for copious secretions.     Discussed basic outlines of care conference and options with Keira at bedside today. When asked specifically about whether she would want surgery, she shook her head (\"no\"). When revisiting some of the options for her care going forward, she mouthed the words \"I want to go home.\" She clarified today that this signified going home with her son, , rather than going \"home\" to heaven as had been stated previously. Affect more flat today, more interactive but with eyes more downcast throughout.            Review of Systems:   A comprehensive ROS was unable to be performed due to patient factors (limited ability to express symptoms due to tracheostomy, decreased attention). Focused ROS as below:    Palliative Symptom Review (0=no symptom/no concern, 1=mild, 2=moderate, 3=severe):      Pain: 1      Fatigue: 1      Nausea: 0      Constipation: 0      Diarrhea: 0      Depressive Symptoms: 0-1      Anxiety: 0      Shortness of Breath: 1      Insomnia: 0      Overall (0 good/no concerns, 3 very poor):  1            Medications:   I have reviewed this patient's medication profile and medications given in past 24 hours.  Acetaminophen 650mg q6h prn; none since 2100 on 8/1  Methlyphenidate 2.5mg 2x every day; first given 1445 on 8/2  Oxycodone 5-10mg q3h prn; none given in last 72h           Physical Exam:   Vitals were reviewed. In brief (last 24 hours): Afebrile. Not tachycardic. RR " 24. BP 130s-140s/60s-80s. SpO2 % on 30 -> 21% FiO2 via TD.    General: wakes to voice, in NAD, cachectic-appearing. Intermittently distracted or does not answer questions.  HEENT: NC/AT. PERRL, EOMI, no scleral icterus. Subtle R facial droop. Oropharynx moist with some evidence of thrush. Tracheostomy in place, dressing c/d/i without surrounding erythema.   CV: RRR, no MRG  Resp: CTAB laterally, coarse sounds centrally transmitted from tracheostomy; no wheezes or crackles  Abd: soft, non-distended, non-tender. Bowel sounds present, normoactive.  Ext: No LE edema  Skin: no jaundice, rashes, wounds on examined skin            Data Reviewed:   BMP wnl. Mild hypermagnesemia (2.5 from 2.4), resolved hyperphosphatemia. Glucose under good control.   CBC with improved leukocytosis, stable anemia, no thrombocytopenia or thrombocytosis.

## 2018-08-02 NOTE — PLAN OF CARE
"Problem: Patient Care Overview  Goal: Plan of Care/Patient Progress Review  Discharge Planner SLP   Patient plan for discharge: Unknown; pt stated, \"I want to go home.\"   Current status: Pt seen bedside for communication evaluation for possible PMSV placement.  Pt presents with cuffless Shiley #6 on 21% FiO2 via high flow TD.  Upon finger occlusion, pt able to demonstrate adequate air passage around trach.  PMSV placed using adapter with high flow trach dome.  Pt tolerated PMSV placement in two 2 minute segments.  O2 sats maintained in 90's but coughing increased as well as RR.  PMSV removed for deep suctioning.  Recommend PMSV use with ST only at this time.  Suspect difficulty due to presence of laryngeal tumor and excessive secretions.  Anticipate progress to PMSV use with RN supervision should secretions reduce; suspect pt will not be able to independently manage PMSV.  ST to follow to trial PMSV use and educate pt/caregivers.    Barriers to return to prior living situation: Trach/FT   Recommendations for discharge: Rehab   Rationale for recommendations: Anticipate ongoing needs        Entered by: Vilma Tolliver 08/02/2018 10:56 AM           "

## 2018-08-02 NOTE — PLAN OF CARE
Problem: Patient Care Overview  Goal: Plan of Care/Patient Progress Review  Outcome: No Change  Neuro: Disoriented to situation. PERRLA. R side facial droop (unchanged). Some R side weakness.   Cardiac: NSR (frequent PVC/PACs) with HR in 80s-90s. BPs 130-40s/70s. Afebrile.   Respiratory: Shiley 6 cuff-less to TD 30% overnight. Suction qhour. Secretions clear/white and thin. Trach cares completed at 0000.  GI/: Incontinent of urine x4 and bowel x3. Bowel meds held this evening. +BS and passing gas.    Diet/Appetite: Strict NPO. TF to G tube at goal of 45cc/hour with standard FWF. Q4hour BS.   Activity: AST of 2 for turning q2hour.    Pain: C/o abdominal discomfort. Gave PRN tylenol.    Skin: Redness on buttocks appears to be worsening but still blanchable. Placed sacral mepilex. Continue incontinence cares and q2hour turns.   LDAs: R PIV x1, SL. PEG tube to enteral feeds. Shiley 6 cuff-less to TD 30% FiO2.      Continue with POC. Notify primary team with changes.   Adriana Boykin RN    Problem: Chronic Respiratory Difficulty Comorbidity  Goal: Chronic Respiratory Difficulty  Patient comorbidity will be monitored for signs and symptoms of Respiratory Difficulty (Chronic) condition.  Problems will be absent, minimized or managed by discharge/transition of care.   Outcome: No Change  More frequent suction since trach change 08/01. TD overnight at 30% FiO2. LS coarse.

## 2018-08-02 NOTE — PROGRESS NOTES
08/02/18 1047   General Information   Patient Profile Review See Profile for full history and prior level of function   Onset of Illness/Injury, or Date of Surgery - Date 07/17/18   Start of Care Date 08/02/18   Date of Tracheostomy Placement 07/17/18   Referring Physician Samantha Burnham PA-C    Patient/Family Goals Statement Goals not stated secondary to aphonia     Pertinent History of Current Problem Keira Collins is a 72-year-old female with a [id number] SCCa of larynx PMH significant for COPD, 140 pack-year tobacco history, alcohol use disorder, HLD, HTN, and PVD who is now POD#15 s/p direct laryngoscopy with biopsy of laryngeal SCC and tracheotomy. Stroke code following procedure.  Patient was recently presented at Otolaryngology Tumor Board Conference to discuss plan of care for laryngeal [id number] SCCa. Recommendations at that time were to proceed with TL + bilateral MRND vs CXRT.  Pallative care involved with GOC planning.     Treatment Diagnosis Aphonia    Precautions/Limitations Fall precautions;Tracheostomy;Swallowing precautions   Handedness Right   Current Communication Mouthing words;Written expression   Observations Alert;Follows commands;Other (see comments)  (Withdrawn )   Evaluation   Type of Trach Shiley   Size of Trach 6 mm  (Cuffless )   Oxygen Supply Trach Dome  (High flow )   Cuff Inflated at Onset of Evaluation No   Orders received to deflate cuff for PMSV trial Yes   Oxygen saturation before cuff deflation 94 %   Suctioning required after cuff deflation Yes   Air movement around trach found to be Adequate upon finger occlusion   Voicing noted after PMSV placement: Yes  (Whisper, strangled quality )   Oxygen saturation with PMSV placement 97 %   Respiratory Rate with PMSV placement 30 Per Minute   Total amount of time with PMSV placement: 2 minutes x2    Cuff re-inflated after PMSV trials: No   Prognosis / Impression   Criteria for Skilled Therapeutic Interventions Met Yes   SLP Diagnosis  Aphonia secondary to trach placement    Functional limitations due to impairments Limited ablity to communicate wants and needs    Rehab Potential Good, to achieve stated therapy goals   Therapy Frequency 5 times/wk   Predicted Duration of Therapy Intervention (days/wks) 2 weeks   Anticipated Discharge Disposition inpatient rehabilitation facility   Risks and Benefits of Treatment have been explained. yes   Patient, Family & other staff in agreement with plan of care yes   Clinical Impression Comments Pt seen bedside for communication evaluation for possible PMSV placement.  Pt presents with cuffless Shiley #6 on 21% FiO2 via high flow TD.  Upon finger occlusion, pt able to demonstrate adequate air passage around trach.  PMSV placed using adapter with high flow trach dome.  Pt tolerated PMSV placement in two 2 minute segments.  O2 sats maintained in 90's but coughing increased as well as RR.  PMSV removed for deep suctioning.  Recommend PMSV use with ST only at this time.  Suspect difficulty due to presence of laryngeal tumor and excessive secretions.  Anticipate progress to PMSV use with RN supervision should secretions reduce; suspect pt will not be able to independently manage PMSV.  ST to follow to trial PMSV use and educate pt/caregivers.     Total Evaluation Time   Total Evaluation Time (Minutes) 9

## 2018-08-02 NOTE — CONSULTS
[facility], Orthopaedic Surgery Consultation    Keira Collins MRN# [id number]   Age: 72 year old YOB: 1945     Date of Admission:  7/17/2018    Reason for consult: Toenails       Requesting physician: Shyam Avery         Assessment and Plan:   Assessment:  Onychomycosis  Prolonged hospital stay  Squamous cell carcinoma of larynx    Plan:  Toenails debrided x 10. F/u as needed.          History of Present Illness:   Patient was seen and examined by me. History, PMH, Meds, SH, ROS and PE reviewed with patient and prior medical records.      Keira is a 72 year old female who was admitted to [facility] on 7/17 for biopsy of laryngeal mass, tracheostomy and NGT placement. Podiatry was consulted for toenail trim, as nails are curving over toes and into skin. She denies foot or LE pain. Nursing has not noticed open skin lesions or rashes to LEs.          Past Medical History:     Past Medical History:   Diagnosis Date     COPD (chronic obstructive pulmonary disease) (H) 7/28/2014    From [facility]ization 05/2016: bedside feng shows moderate airflow obstruction [FEV1 0.99L, 62% pred and FVC 1.7L, 82% predicted; FEV1 and FVC DROPPED by 11 and 16% respectively post BD  Diagnosis made 7/28/2014 at [facility]        Essential hypertension with goal blood pressure less than 140/90      H/O: alcohol abuse      Hyperlipidemia LDL goal <100      Laryngeal mass 6/27/2018     Laryngeal squamous cell carcinoma (H) 07/17/2018     Osteoporosis 7/14/2016     Pulmonary nodules 7/9/2018     PVD (peripheral vascular disease) (H) 7/11/2016     Tobacco abuse              Past Surgical History:     Past Surgical History:   Procedure Laterality Date     LARYNGOSCOPY WITH BIOPSY(IES) N/A 7/17/2018    Procedure: LARYNGOSCOPY WITH BIOPSY(IES);  Direct Laryngoscopy With Biopsy, Tracheostomy ;  Surgeon: Cynthia Walden MD;  Location: [facility] OR     SURGICAL HISTORY OF -   5/13/2016    right hip fracture     TRACHEOSTOMY N/A  7/17/2018    Procedure: TRACHEOSTOMY;;  Surgeon: Cynthia Walden MD;  Location: [facility] OR     VASCULAR SURGERY Left 01/2017    ; left femoral endarterectomy             Social History:     Social History     Social History     Marital status: Single     Spouse name: N/A     Number of children: N/A     Years of education: N/A     Social History Main Topics     Smoking status: Current Every Day Smoker     Packs/day: 1.00     Years: 50.00     Types: Cigarettes     Smokeless tobacco: Never Used      Comment: 1/2 a pack a day      Alcohol use 0.0 oz/week     0 Standard drinks or equivalent per week      Comment: Beer- 1-4 per day     Drug use: No     Sexual activity: No     Other Topics Concern     None     Social History Narrative    Live with Son who helps with her care                 Family History:     Family History   Problem Relation Age of Onset     Chronic Obstructive Pulmonary Disease Daughter      Diabetes Daughter      HEART DISEASE Daughter               Medications:     Current Facility-Administered Medications   Medication     acetaminophen (TYLENOL) tablet 650 mg     albuterol neb solution 2.5 mg     amLODIPine (NORVASC) tablet 5 mg     arformoterol (BROVANA) neb solution 15 mcg     aspirin chewable tablet 81 mg     atorvastatin (LIPITOR) tablet 40 mg     bisacodyl (DULCOLAX) Suppository 10 mg     budesonide (PULMICORT) neb solution 0.25 mg     Calcium carb-Vitamin D 500 mg [place]-200 units (OSCAL with D;Oyster Shell Calcium) per tablet 1 tablet     clopidogrel (PLAVIX) tablet 75 mg     dextrose 10 % 1,000 mL infusion     glucose gel 15-30 g    Or     dextrose 50 % injection 25-50 mL    Or     glucagon injection 1 mg     docusate (COLACE) 50 MG/5ML liquid 50 mg     enoxaparin (LOVENOX) injection 30 mg     folic acid (FOLATE) oral solution 1 mg     glycopyrrolate (ROBINUL) tablet 1 mg     heparin lock flush 10 UNIT/ML injection 5-10 mL     heparin lock flush 10 UNIT/ML injection 5-10 mL      hydrALAZINE (APRESOLINE) injection 10 mg     hypromellose-dextran (ARTIFICAL TEARS) 0.1-0.3 % ophthalmic solution 1 drop     ipratropium (ATROVENT) 0.02 % neb solution 0.5 mg     labetalol (NORMODYNE/TRANDATE) injection 10 mg     lactobacillus rhamnosus (GG) (CULTURELL) capsule 1 capsule     levalbuterol (XOPENEX) neb solution 0.63 mg     Lidocaine (LIDOCARE) 4 % Patch 1 patch     lidocaine (LMX4) cream     lidocaine (LMX4) cream     lidocaine (PF) (XYLOCAINE) 1 % injection 5 mL     lidocaine 1 % 0.5-5 mL     lidocaine 1 % 1 mL     lidocaine patch in PLACE     lidocaine patch REMOVAL     losartan (COZAAR) tablet 50 mg     magnesium sulfate 4 g in 100 mL sterile water (premade)     melatonin tablet 3 mg     methylphenidate (RITALIN) half-tab 2.5 mg     metoclopramide (REGLAN) tablet 5 mg    Or     metoclopramide (REGLAN) injection 5 mg     multivitamins with minerals (CERTAVITE/CEROVITE) liquid 15 mL     naloxone (NARCAN) injection 0.1-0.4 mg     No lozenges or gum should be given while patient on BIPAP/AVAPS/AVAPS AE     nystatin (MYCOSTATIN) suspension 500,000 Units     ondansetron (ZOFRAN-ODT) ODT tab 4 mg    Or     ondansetron (ZOFRAN) injection 4 mg     oxyCODONE IR (ROXICODONE) tablet 5-10 mg     polyethylene glycol (MIRALAX/GLYCOLAX) Packet 17 g     potassium chloride (KLOR-CON) Packet 20-40 mEq     potassium chloride 10 mEq in 100 mL intermittent infusion with 10 mg lidocaine     potassium chloride 10 mEq in 100 mL sterile water intermittent infusion (premix)     potassium chloride SA (K-DUR/KLOR-CON M) CR tablet 20-40 mEq     potassium phosphate 15 mmol in D5W 250 mL intermittent infusion     potassium phosphate 20 mmol in D5W 250 mL intermittent infusion     potassium phosphate 20 mmol in D5W 500 mL intermittent infusion     potassium phosphate 25 mmol in D5W 500 mL intermittent infusion     prochlorperazine (COMPAZINE) tablet 5 mg    Or     prochlorperazine (COMPAZINE) injection 5 mg    Or      "prochlorperazine (COMPAZINE) Suppository 12.5 mg     sennosides (SENOKOT) syrup 5 mL     sodium chloride (PF) 0.9% PF flush 10 mL     sodium chloride (PF) 0.9% PF flush 10 mL     sodium chloride (PF) 0.9% PF flush 10-20 mL     sodium chloride bacteriostatic 0.9 % flush 12 mL     thiamine 100 MG/ML suspension 100 mg             Allergies:      Allergies   Allergen Reactions     Penicillins      Cefepime Rash     Provider entered as cephalosporins (pt had cefepime - rash this admission)     Cephalosporins Rash            Review of Systems:   As mentioned in HPI          Physical Exam:   COMPLETE EXAMINATION:   VITAL SIGNS: /76  Pulse 91  Temp 98.4  F (36.9  C) (Oral)  Resp 24  Ht 1.6 m (5' 2.99\")  Wt 56.7 kg (125 lb)  SpO2 99%  BMI 22.15 kg/m2  General: Patient is found alert and cooperative lying in bed, in NAD.   Vascular: DP and PT pulses 2/4. Absent pedal hair growth. No LE edema.  Skin: No open lesions, mildly dry. Toenails are elongated, thickened and incurvated with subungual debris.           Data:   All pertinent laboratory data reviewed  All imaging studies reviewed by me.    Recent Labs   Lab Test  08/02/18   0457  08/01/18   0515  07/31/18   0445  07/30/18   0450  07/29/18   0523   HGB  12.0  12.4  11.8  10.6*  10.4*   CRP   --    --   19.0*  23.0*  44.0*   WBC  11.4*  12.2*  13.1*  9.9  10.2     No results for input(s): FTYP, FNEU, FOTH, FCOL, FAPR, FWBC in the last 12608 hours.    Signed:  Shama Strange PA-C  [phone]    "

## 2018-08-02 NOTE — PROGRESS NOTES
"Otolaryngology Progress Note  August 2, 2018     S:  No acute events overnight. Tolerating cuffless trach well. Continues to require frequent suctioning for clear thin secretions. Tolerating TFs at goal.     O: /75 (BP Location: Left arm)  Pulse 89  Temp 98.7  F (37.1  C) (Oral)  Resp 24  Ht 1.6 m (5' 2.99\")  Wt 56.7 kg (125 lb)  SpO2 99%  BMI 22.15 kg/m2     General: resting in bed, interactive, answering questions appropriately by nodding head   Neuro: moving all extremities   HEENT: EOMI. 6-0 cuffless Shiley in place, optifoam dressing under trach flange. Trach ties are snug w/ 2 finger breadths between neck and ties. Tracheostomy site clean, no bleeding. No skin breakdown or erythema under trach ties or face plate.   Pulmonary: breathing comfortably via 6-0 Shiley on HTD    Intake/Output Summary (Last 24 hours) at 08/02/18 1055  Last data filed at 08/02/18 1000   Gross per 24 hour   Intake             1455 ml   Output                0 ml   Net             1455 ml     LABS:  ROUTINE IP LABS (Last four results)  BMP    Recent Labs  Lab 08/02/18  0457 08/01/18  0515 07/31/18  0445 07/30/18  0450    141 140 141   POTASSIUM 4.0 4.3 3.8 3.9   CHLORIDE 107 106 107 107   ESTEFANI 8.8 8.7 8.5 8.4*   CO2 26 26 24 26   BUN 18 16 12 12   CR 0.45* 0.50* 0.45* 0.48*   * 128* 139* 142*       CBC    Recent Labs  Lab 08/02/18  0457 08/01/18  0515 07/31/18  0445 07/30/18  0450   WBC 11.4* 12.2* 13.1* 9.9   RBC 4.04 4.11 3.99 3.62*   HGB 12.0 12.4 11.8 10.6*   HCT 38.2 38.7 38.0 34.4*   MCV 95 94 95 95   MCH 29.7 30.2 29.6 29.3   MCHC 31.4* 32.0 31.1* 30.8*   RDW 13.5 13.5 13.5 13.5    310 283 247     Mg 2.5  Phos 3.7    IMAGING:    MRI: 7/20:  Acute left pontine mesencephalic junction infarct    PET Scan 7/20:   Large hypermetabolic laryngeal mass involving bilateral VF, anterior commissure, epiglottis, left AE fold, effacement of preepiglottic fat, bilateral LAD.   hypermetabolic pulmonary nodules " consistent with metastatic disease, small right pleural effusion.  urethral diverticulum containing stones and septations, 4.7 cm left adnexal cystic structure, thoracic abdominal aortic ectasia. 1.9 cm right iliac aneurysm.    Head CT 7/22  Impression:   Evolving changes of left hemipontine infarction. Otherwise, no acute  intracranial pathology.    ECHO 7/24:  Technically difficult study.  Global and regional left ventricular function is probably normal with an EF of  60-65%. No significnat valve disease.    CT PE protocol 7/25: No PE    PATHOLOGY:  Laryngeal biopsies:  FINAL DIAGNOSIS:   A. LEFT FALSE VOCAL CORD, BIOPSY:   - INVASIVE KERATINIZING SQUAMOUS CELL CARCINOMA, moderately   differentiated.   - Perineural invasion is present.   B. PETIOLE, BIOPSY:   - AT LEAST SQUAMOUS CELL CARCINOMA IN-SITU.   C. ADDITIONAL LEFT FALSE VOCAL CORD, BIOPSY:   - INVASIVE KERATINIZING SQUAMOUS CELL CARCINOMA, moderately   differentiated.   D. RIGHT FALSE VOCAL CORD, BIOPSY:   - AT LEAST MICROINVASIVE SQUAMOUS CELL CARCINOMA, keratinizing type.     FNA R thyroid nodule  - Atypica of undetermined significance. Recommend repeat FNA in 4-6 weeks.       A/P: Keira Collins is a 72-year-old female with a [id number] SCCa of larynx PMH significant for COPD, 140 pack-year tobacco history, alcohol use disorder, HLD, HTN, and PVD who is now s/p direct laryngoscopy with biopsy of laryngeal SCC and tracheotomy on 7/17/18. Hospital course has been complicated by acute ischemic pontine stroke, fevers, and persistent tachycardia, and worsening respiratory status, now improving.    - Tracheostomy care:   - Trach changed 7/24 and 8/1, in good position healing well   - Perform regular suctioning PRN   - RN should clean inner cannula with a brush Qshift and PRN.    - Keep trach tube obturator taped to wall behind the head of the bed. Keep extra unopened 6-0 Shiley and 4-0 Shiley at bedside at all times.   - S/p PLC trach teaching 7/19 with son  -  SLP consulted for PMSV - recommending use only with SLP present  - OK to start Robnul for secretions, monitor closely for increased viscosity of secretions and increased risk of mucus plugging  - Planning for discharge to TCU once secretions and suctioning needs decrease.   - Will plan for ENT follow up as an outpatient for further discussions of plan of care. Options presented at family conference 8/1 include:   1.) surgery (total laryngectomy) to address laryngeal cancer, likely followed by chemo for lung disease   2.) palliative chemo/radiation   3.) comfort cares  - Will need to obtain lung biopsy when/if feasible and patient/family wanted to proceed with treatment. Plavix will need to be held for 5 days prior to biopsy.       -- Patient and above discussed with Dr. Win Burnham, PA-C  Otolaryngology-Head & Neck Surgery  Please contact ENT by dialing [phone] and entering job code [phone].

## 2018-08-02 NOTE — PROGRESS NOTES
Internal Medicine Progress Note  Gold Service       Assessment & Plan    Keira Collins is a 72 year old female admitted on 7/17/2018. She has a history of COPD, HTN, hyperlipidemia, chronic tobacco abuse, and alcohol abuse (in remission) who was admitted on 7/17/2018 for direct laryngoscopy with biopsy of laryngeal mass, tracheostomy and NGT placement. Pathology showed squamous cell carcinoma of larynx. Internal medicine consulted 7/20 for HTN, hypoxia and tachycardia. Hospital course complicated by acute left pontine CVA on 7/19 and acute hypoxic respiratory failure d/t aspiration PNA.    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 Post-op course c/b acute CVA and aspiration pneumonia. ENT discussed case at tumor conference 7/27 and case discussed w medical oncology 7/31. Dr. Lord present at care conference today as well. Surgical resection offered regardless of whether lung nodules are malignant. Surgery would offer up option for PO intake in the future, but would be left with permanent trach. Surgery would be extensive (approx 9 hours) and jose-operative risks discussed with family (ana laura concern for CVA and pulmonary complications). Further treatment of lung nodules would be determined at a later date based on her functional status. Palliative care consulted to assist with goals of care discussion. Patient remains stable. Anxious to discharge home but discussed that TCU is currently recommended. Patient not ready to make any decisions re: surgery. Patient does appear somewhat flat today and is likely having some depressed mood d/t current medical issues, etc.   - SLP consulted to assess if ready for [place] valve  - Cont regular suctioning and trach cares per ENT orders  - Start Robinul 1mg BID; monitor for anticholinergic side effects  - Start Ritalin 2.5mg BID per palliative recs  - Further management per ENT    Sepsis and acute hypoxic respiratory failure d/t  aspiration pneumonia:  Excessive secretions post-tracheostomy c/b acute L pontine CVA. Acute hypoxia, fevers and elevated inflammatory markers on 7/24. Required BIPAP intermittently. CXR showed R>L bibasilar opacities with small R effusion. CT Chest pulmon angio negative for PE. Sputum 7/22 negative. ? Pulmonary edema contributing. Has been on a number of antibiotics and probable drug rash from cephalosporins. ID consulted and transitioned to moxifloxacin. Clinically improved on antibiotics and intermittent diuresis. Weaned off BIPAP completely. Clinically improved w treatment. Last dose of moxifloxacin 7/31 to complete a 7 day course of abx. ? Component of chronic hypoxia d/t COPD.   - Cont to wean O2 as able    Acute L pontine CVA:  R facial droop noted 7/19 with MRI primo showing acute L pontine infarct. Neurology consulted. Outside therapeutic window for TPA. Conservative management recommended with DAPT and statin. Discussed risks of holding plavix for procedures/surgery with neurology 7/31. No data to support use of DAPT long term (suggest <90 days), but also no consensus in literature regarding duration of DAPT following acute CVA. One study showed reduced risk of recurrent CVA with 3 week duration. Given ongoing discussion re: goals of care, no urgent need for lung biopsy or surgery at this time.  - Cont ASA and plavix through 8/10/18, at which time benefit for secondary prevention unclear.   - Cont statin  - SBP goal <160  - PT/OT/SLP consulted     Severe protein-calorie malnutrition:  Secondary to acute on chronic medical issues including cancer, recent surgery, and CVA which has resulted in dysphagia. NG feeding tube placed in surgery, discontinued by patient. PEG placed 7/27. Restarted on TF, currently at goal rate 45 ml/h. Per nutrition, no significant risk of refeeding at this time.  - Cont TF at goal rate 45 ml/h  - NPO  - SLP following    Spiculated RUL nodule:  Concerning for malignancy. PET 7/20  with hypermetabolic pulmonary nodules c/w metastatic disease. Per discussion with Medical Oncology, biopsy of lung nodules non-urgent. Patient may elect to have surgical resection of laryngeal mass, but treatment of lung cancer (primary or mets) would come at a later date and be based more on functional status at that time. Also noted high mortality with pulmonary involvement at 1 year. Will need to continue discussion re: goals of care and possible surgical resection.   - Recommend outpatient follow up with ENT and Oncology to discuss timing of potential lung biopsy  - See above re: DAPT. Should be OK to hold/stop plavix prior to any scheduled lung biopsy    HTN:  Stable. Goal SBP <160 per neurology in setting of recent CVA. Cont amlodipine 10mg daily and losartan 50mg daily.     Chronic Medical Problems:  COPD:  Currently no wheezing. Most recent PFTs 7/16/18 showed FEV-1/FVC 65% with FEV-1 0.58 L. Marina Post-op Respiratory failure risk calculated at 9.91% (failure to wean of vent within 48h of surgery or unplanned intubation/reintubation). Pulmonary risks with proposed surgery discussion with treatment team and family. Cont Brovana, Pulmicort, Ipratropium/Levalbuterol QID.   HLD:  Cont statin.   Hx Alcohol abuse:  Currently in remission.   PAD:  S/p lower extremity endarterectomy and stenting in 2017. Previously prescribed ASA and Plavix, but per family patient was not taking plavix regularly.      Pain Assessment:  Current Pain Score 8/2/2018   Patient currently in pain? denies   Pain score (0-10) -   Pain location -   Pain descriptors -   Keira canas pain level was assessed and she currently denies pain.      Diet: Adult Formula Drip Feeding: Continuous Isosource 1.5; Gastrostomy; Goal Rate: 45; mL/hr; Medication - Tube Feeding Flush Frequency: At least 15-30 mL water before and after medication administration and with tube clogging; Amount to Send (Nutritio...  Fluids: None  DVT Prophylaxis: Enoxaparin (Lovenox)  SQ  Code Status: Full Code    Disposition Plan   Expected discharge: 2 - 3 days, recommended to TBD once mental status at baseline, safe disposition plan/ TCU bed available and O2 needs stable.     Entered: Shyam Avery 08/02/2018, 10:56 AM   Information in the above section will display in the discharge planner report.      The patient's care was discussed with the Attending Physician, Dr. Barahona.    Shyam Avery PA-C  Internal Medicine Staff Hospitalist Service  [facility]  Pager: [phone]  Please see sticky note for cross cover information  _____________________________________________________________    Interval History   Patient able to answer yes/no questions and mouth words. Deferred use of bedside whiteboard. Denies any pain, dyspnea, n/v, abdominal discomfort. Continues to have loose stools per RN, but no watery diarrhea. Continues to require suctioning q 1-2 hr for secretions. Patient continues to state that she would like to discharge home. She is still not sure what to do about surgery yet. Support offered. Patient did not have any follow up questions.     ROS:  Pulm, CV, GI and  performed and negative unless otherwise noted above.       Data reviewed today: I have personally reviewed all medications, new labs and imaging results over the last 24 hours. .    Physical Exam   Vital Signs: Temp: 98.7  F (37.1  C) Temp src: Oral BP: 142/75   Heart Rate: 85 Resp: 24 SpO2: 99 % O2 Device: Trach dome    Weight: 125 lbs .01 oz    GENERAL:  Awake. Alert. Oriented x 3. NAD.   HEENT:  No scleral icterus. Mucous membranes moist. Trach present with clear secretions present.  CV:  RRR. S1, S2. No murmurs appreciated.   RESPIRATORY:  Lungs coarse bilaterally. No wheezing.  GI:  Abdomen soft, non-distended. Active bowel sounds. G tube intact w/o skin changes or discharge.   NEUROLOGICAL:  Slight R facial droop. Moves all extremities. Mild R sided weakness.    EXTREMITIES:  No peripheral  edema. No calf tenderness. Intact bilateral pedal pulses.   SKIN:  No jaundice, rashes, wounds or lesions.            Data     ROUTINE IP LABS (Last four results)  CMP     Recent Labs  Lab 08/02/18  0457 08/01/18  0515 07/31/18  0445 07/30/18  0450    141 140 141   POTASSIUM 4.0 4.3 3.8 3.9   CHLORIDE 107 106 107 107   CO2 26 26 24 26   ANIONGAP 7 9 9 8   * 128* 139* 142*   BUN 18 16 12 12   CR 0.45* 0.50* 0.45* 0.48*   ESTEFANI 8.8 8.7 8.5 8.4*   MAG 2.5* 2.4* 2.2 2.2   PHOS 3.7 4.7* 2.3* 2.5   ALBUMIN  --   --   --  2.2*     CBC     Recent Labs  Lab 08/02/18 0457 08/01/18  0515 07/31/18 0445 07/30/18  0450   WBC 11.4* 12.2* 13.1* 9.9   RBC 4.04 4.11 3.99 3.62*   HGB 12.0 12.4 11.8 10.6*   HCT 38.2 38.7 38.0 34.4*   MCV 95 94 95 95   MCH 29.7 30.2 29.6 29.3   MCHC 31.4* 32.0 31.1* 30.8*   RDW 13.5 13.5 13.5 13.5    310 283 247     INR No lab results found in last 7 days.      Recent Labs  Lab 08/02/18  0711 08/02/18  0457 08/02/18  0322 08/02/18  0050 08/01/18  1939 08/01/18  1526 08/01/18  1245 08/01/18  0515  07/31/18  0445  07/30/18  0450  07/29/18  0523  07/28/18  0555   GLC  --  135*  --   --   --   --   --  128*  --  139*  --  142*  --  114*  --  100*   *  --  127* 112* 118* 135* 130*  --   < >  --   < >  --   < >  --   < >  --    < > = values in this interval not displayed.       All labs personally reviewed in Epic.  See A&P for additional results.     Unresulted Labs Ordered in the Past 30 Days of this Admission     No orders found from 5/18/2018 to 7/18/2018.

## 2018-08-02 NOTE — PLAN OF CARE
Problem: Patient Care Overview  Goal: Plan of Care/Patient Progress Review  OT 6B: Cancel - pt lethargic/somnolent this AM when attempted, pt busy with other healthcare providers upon later attempt. Will reschedule per POC.

## 2018-08-03 NOTE — PROGRESS NOTES
"Otolaryngology Progress Note  August 3, 2018     S:  No acute events overnight. Some blood tinged secretions suctioned from trach, likely from suction trauma    O: /60 (BP Location: Right arm)  Pulse 87  Temp 98.3  F (36.8  C) (Oral)  Resp 24  Ht 1.6 m (5' 2.99\")  Wt 57.2 kg (126 lb 1.7 oz)  SpO2 98%  BMI 22.34 kg/m2     General: resting in bed, interactive, answering questions appropriately by nodding head   Neuro: moving all extremities   HEENT: EOMI. 6-0 cuffless Shiley in place, optifoam dressing under trach flange. Trach ties are snug w/ 2 finger breadths between neck and ties. Tracheostomy site clean, no bleeding. No skin breakdown or erythema under trach ties or face plate.   Pulmonary: breathing comfortably via 6-0 Shiley on HTD, no blood tinged secretions    Intake/Output Summary (Last 24 hours) at 08/02/18 1055  Last data filed at 08/02/18 1000   Gross per 24 hour   Intake             1455 ml   Output                0 ml   Net             1455 ml     LABS:  ROUTINE IP LABS (Last four results)  BMP    Recent Labs  Lab 08/02/18  0457 08/01/18  0515 07/31/18  0445 07/30/18  0450    141 140 141   POTASSIUM 4.0 4.3 3.8 3.9   CHLORIDE 107 106 107 107   ESTEFANI 8.8 8.7 8.5 8.4*   CO2 26 26 24 26   BUN 18 16 12 12   CR 0.45* 0.50* 0.45* 0.48*   * 128* 139* 142*       CBC    Recent Labs  Lab 08/02/18  0457 08/01/18  0515 07/31/18  0445 07/30/18  0450   WBC 11.4* 12.2* 13.1* 9.9   RBC 4.04 4.11 3.99 3.62*   HGB 12.0 12.4 11.8 10.6*   HCT 38.2 38.7 38.0 34.4*   MCV 95 94 95 95   MCH 29.7 30.2 29.6 29.3   MCHC 31.4* 32.0 31.1* 30.8*   RDW 13.5 13.5 13.5 13.5    310 283 247     Mg 2.8  Phos 4.2    IMAGING:    MRI: 7/20:  Acute left pontine mesencephalic junction infarct    PET Scan 7/20:   Large hypermetabolic laryngeal mass involving bilateral VF, anterior commissure, epiglottis, left AE fold, effacement of preepiglottic fat, bilateral LAD.   hypermetabolic pulmonary nodules consistent " with metastatic disease, small right pleural effusion.  urethral diverticulum containing stones and septations, 4.7 cm left adnexal cystic structure, thoracic abdominal aortic ectasia. 1.9 cm right iliac aneurysm.    Head CT 7/22  Impression:   Evolving changes of left hemipontine infarction. Otherwise, no acute  intracranial pathology.    ECHO 7/24:  Technically difficult study.  Global and regional left ventricular function is probably normal with an EF of  60-65%. No significnat valve disease.    CT PE protocol 7/25: No PE    PATHOLOGY:  Laryngeal biopsies:  FINAL DIAGNOSIS:   A. LEFT FALSE VOCAL CORD, BIOPSY:   - INVASIVE KERATINIZING SQUAMOUS CELL CARCINOMA, moderately   differentiated.   - Perineural invasion is present.   B. PETIOLE, BIOPSY:   - AT LEAST SQUAMOUS CELL CARCINOMA IN-SITU.   C. ADDITIONAL LEFT FALSE VOCAL CORD, BIOPSY:   - INVASIVE KERATINIZING SQUAMOUS CELL CARCINOMA, moderately   differentiated.   D. RIGHT FALSE VOCAL CORD, BIOPSY:   - AT LEAST MICROINVASIVE SQUAMOUS CELL CARCINOMA, keratinizing type.     FNA R thyroid nodule  - Atypica of undetermined significance. Recommend repeat FNA in 4-6 weeks.       A/P: Keira Collins is a 72-year-old female with a [id number] SCCa of larynx PMH significant for COPD, 140 pack-year tobacco history, alcohol use disorder, HLD, HTN, and PVD who is now s/p direct laryngoscopy with biopsy of laryngeal SCC and tracheotomy on 7/17/18. Hospital course has been complicated by acute ischemic pontine stroke, fevers, and persistent tachycardia, and worsening respiratory status, now improving.    - Tracheostomy care:   - Trach changed 7/24 and 8/1, in good position healing well   - Perform regular suctioning PRN   - RN should clean inner cannula with a brush Qshift and PRN.    - Keep trach tube obturator taped to wall behind the head of the bed. Keep extra unopened 6-0 Shiley and 4-0 Shiley at bedside at all times.   - S/p PLC trach teaching 7/19 with son  - SLP  consulted for PMSV - recommending use only with SLP present  - OK to start Robnul for secretions, monitor closely for increased viscosity of secretions and increased risk of mucus plugging  - Planning for discharge to TCU once secretions and suctioning needs decrease.   - Will plan for ENT follow up as an outpatient for further discussions of plan of care. Options presented at family conference 8/1 include:   1.) surgery (total laryngectomy) to address laryngeal cancer, likely followed by chemo for lung disease   2.) palliative chemo/radiation   3.) comfort cares  - Will need to obtain lung biopsy when/if feasible and patient/family wanted to proceed with treatment. Plavix will need to be held for 5 days prior to biopsy.       -- Patient and above discussed with Dr. Win Alfred MD  Otolaryngology-Head & Neck Surgery  Please contact ENT by dialing [phone] and entering job code [phone].

## 2018-08-03 NOTE — PLAN OF CARE
Problem: Patient Care Overview  Goal: Plan of Care/Patient Progress Review  Neuro: Disoriented to situation, otherwise oriented x's 3. Right side facial droop right some right sided weakness (unchanged).   Cardiac: Tele d/c'd. HR in 80s. BPs stable. Afebrile.   Respiratory: Shiley 6 cuff-less via TD 21% throughout the day. Suctioning q1hr. Secretions clear/intermittently red-streaked.   GI/: Incontinent of urine. 2 BM this shift.   Diet/Appetite: Strict NPO. TF to G tube at goal of 45cc/hour with standard FWF.  Activity: Up to chair for 4 hours. Turn q2hrs in bed.   Pain: No complaints of pain.    Skin: Sacral mepilex in place on blanchable redness. Rash on lower extremities nearly gone.   LDAs: New left PIV, SL.   Will continue to monitor.

## 2018-08-03 NOTE — PROGRESS NOTES
"[facility], [place]   Palliative Care Daily Progress Note          Recommendations, Patient/Family Counseling & Coordination     Keira continues to deny significant pain or discomfort. Her primary motivation continues to be to discharge to home, as soon as possible. Her gestures indicate a strong expectation that her son () will be able to care for her, not seeming to fully appreciate the level or intensity of cares that she will likely require going forward. The frequency of suctioning (q1h, around the clock) preclude TCU consideration at this time.    It is not completely clear how much she appreciates her situation, as attempts to discuss prognosis or treatment options are largely met with her pointing away from the bed and mouthing \"Home\"   Perhaps related to methylphenidate, she seems more alert and engaged today. We would continue this medication for now.  - REC: continuing low dose methylphenidate (2.5mg, up to twice a day, with last dose about noon) help with mood/alertness   - schedule glycopyrolate X2 days to assess impact on secretion volume.  - palliative will continue to follow with you, to facilitate goals of care, family support as desired  - palliative  to continue with support as desired  - POLST not completed     Thank you for the opportunity to continue to participate in the care of this patient and family.  Please feel free to contact on-call palliative provider with any emergent needs.  We can be reached via team pager [phone] (answered 8-4:30 Monday-Friday); after-hours answering service ([phone])    Raymundo SANDOVAL NP ACHPN  Nurse Practitioner- Lead Advanced Practice Provider  [facility] Palliative Medicine Consult Service   [phone]  Greater than 35 minutes was spent in care coordination, counseling and physical examination. Time in room greater than 25 minutes of this.       Assessment      1) Diagnoses & symptoms:        SCC of larynx " "s/p tracheostomy  Acute L pontine CVA (doi 7/19/18)  Sepsis  Acute hypoxic respiratory failure secondary to aspiration pneumonia  Severe protein-calorie malnutrition- on enteral feedings  HTN  COPD  PVD s/p L femoral endarterectomy  Pain  Lethargy     2)  Psychosocial/Spiritual Needs:   Ongoing:  support  New: none        Is new assessment/intervention required by palliative team?:  Yes         Interval History:   No acute events overnight. Worked with speech again today, tolerated short trial of PMSV. Continues to require frequent suctioning for copious secretions (hourly).     Revisited Keira's understanding of prognosis and options going forward. Again, mouths \"No\" and shakes head when asked about surgery. Her response to virtually every other question or suggestion from that point forward was to mouth \"Home\" and point away from the bed. When it was suggested that she may not be strong enough or that she may require more cares (PEG tube, enteral feedings, toileting, transfers and trach) than her family could provide, she points to her son, , at bedside indicating he will provide this assistance.           Review of Systems:   Palliative Symptom Review (0=no symptom/no concern, 1=mild, 2=moderate, 3=severe):      Pain: 1      Fatigue: 0      Nausea: 0      Constipation: 0      Diarrhea: 0      Depressive Symptoms: 1      Anxiety: 0      Shortness of Breath: 1      Insomnia: 0      Overall (0 good/no concerns, 3 very poor):  1            Medications:   I have reviewed this patient's medication profile and medications given in past 24 hours.  Acetaminophen 650mg q6h prn; none since 2100 on 8/1  Glycopyrrolate 1mg BID  Methlyphenidate 2.5mg 2x every day; first given 1445 on 8/2  Oxycodone 5-10mg q3h prn; none since 7/19           Physical Exam:   Vitals were reviewed. In brief (last 24 hours): Afebrile. Not tachycardic. RR 18-26. BP 120s-140s/60s-80s. SpO2 % on 21% FiO2 via TD.    General: sitting in " bedside chair. Family present for interview and exam- she is  cachectic, awake, alert intermittently.  HEENT: NC/AT, EOMI, no scleral icterus. Subtle R facial droop. Tracheostomy in place, dressing c/d/i without surrounding erythema.   CV: RRR, no MRG  Resp: CTAB laterally, coarse sounds centrally transmitted from tracheostomy, clearing after suctioning; no wheezes or crackles  Abd: soft, non-distended, non-tender. Bowel sounds present, normoactive.  Ext: No LE edema  Skin: no jaundice, rashes, wounds on examined skin            Data Reviewed:   Hypermagnesemia (2.8 from 2.5), normal phos, normal iCal

## 2018-08-03 NOTE — PLAN OF CARE
Problem: Patient Care Overview  Goal: Plan of Care/Patient Progress Review    6B / Discharge Planner PT   Patient plan for discharge: patient strongly prefers to discharge home  Current status: Patient initially declining therapy but reluctantly agreed with encouragement and reviewing patient's ultimate goal to discharge home. Requires mod A supine>sit, sits EOB ~10 min with CGA/min A, fatigues quickly. Completes sit<>stand x2 reps from elevated bed with max Ax2, blocking R knee, patient unable to achieve full upright posture, standing tolerance <10 sec with mod Ax2. Not safe for pivot transfers, transferred bed>chair with ceiling lift. Completed seated LE exercises up in chair, provided handout and strongly encouraged patient to complete exercises 2x/day to increase strength. VSS throughout on trach dome FiO2 21%, RN suctioned x1.  Barriers to return to prior living situation: medical needs, trach/suctioning, cognition, significantly weak and deconditioned, caregiver dependence, currently requiring mechanical lift for transfers  Recommendations for discharge: TCU  Rationale for recommendations: Prior to admission patient was able to ambulate ~30' with walker, now patient is requiring strong Ax2 or dependent for all mobility. Participation in therapy throughout hospitalization has been inconsistent, often declining or requiring encouragement. Patient is adamant she discharge home however at this time she would be completely dependent on caregivers for all functional mobility and ADLs.      Entered by: Chinedu Olivarez 08/03/2018 11:44 AM

## 2018-08-03 NOTE — PLAN OF CARE
Problem: Patient Care Overview  Goal: Plan of Care/Patient Progress Review  Outcome: No Change  Neuro: alert, trached, uses communication board.   Cardiac: SR. VSS.   Respiratory: Sating >95% on 21% trach dome. Suctioning Q 1 hr, copious amount of secretions at times. Blood streaked/clear throughout day, bright red blood this evening (see provider notification).   GI/: incontinent of urine. BM x 2  Diet/appetite: NPO, TF at 45 ml/hr  Activity:  Assist of 2, repo Q 2 hrs, mech lift. Up to chair this afternoon  Pain: At acceptable level on current regimen.   Skin: No new deficits noted.  LDA's: Gtube    Plan: Continue with POC. Notify primary team with changes.    Problem: Chronic Respiratory Difficulty Comorbidity  Goal: Chronic Respiratory Difficulty  Patient comorbidity will be monitored for signs and symptoms of Respiratory Difficulty (Chronic) condition.  Problems will be absent, minimized or managed by discharge/transition of care.   Outcome: No Change  Suction Q 1 hr, 21% trach domeSujey started. Continuous SpO2 monitoring

## 2018-08-03 NOTE — PLAN OF CARE
Problem: Patient Care Overview  Goal: Plan of Care/Patient Progress Review  Discharge Planner SLP   Patient plan for discharge: Unknown   Current status: Pt remains appropriate for PMSV use with ST only.  PMSV placement limited by laryngeal tumor, secretions, and reduced breath support. PMSV tolerated for 5 minutes with O2 sats stable but increased RR and audible, wet respirations.  Minimal voicing elicited with voicing noted to be a harsh whisper.  Pt preferring to gesture despite ability to speak but overall withdrawn and indifferent through session.    Barriers to return to prior living situation: Below baseline  Recommendations for discharge: Rehab   Rationale for recommendations: Anticipate ongoing needs        Entered by: Vilma Tolliver 08/03/2018 9:38 AM

## 2018-08-03 NOTE — PROGRESS NOTES
Brief ENT Progress Note  8/3/218    Flexible tracheoscopy performed at bedside due to blood tinged secretions from trach overnight. The scope was passed through the trach tube, no active bleeding was noted. There was a small excoriation along the posterior tracheal wall that was not bleeding. The trach was pulled back to reveal some erythematous tissue and thick secretions along the anterior tracheal wall under where the trach had been sitting. The trach was replaced and trach ties tightened so that the distal end of the trach tube was in good concentric position within the trachea.     - bleeding likely 2/2 trauma to the posterior tracheal wall from suctioning  - Suction only within the lumen of the trach tube, avoid deep suctioning   - Keep trach ties tight around the neck with only 2 finger breadth between skin and ties. If the ties are loose the trach tube will sit along the anterior tracheal wall which could lead to erosion and potential life threatening bleeding from tracheo-innominate fistula if persistently rubbing.     Samantha Burnham PA-C  Otolaryngology-Head & Neck Surgery  Please contact ENT by dialing [phone] and entering job code [phone].

## 2018-08-03 NOTE — PROGRESS NOTES
Internal Medicine Progress Note  [facility] Service       Assessment & Plan    Keira Collins is a 72 year old female admitted on 7/17/2018. She has a history of COPD, HTN, hyperlipidemia, chronic tobacco abuse, and alcohol abuse (in remission) who was admitted on 7/17/2018 for direct laryngoscopy with biopsy of laryngeal mass, tracheostomy and NGT placement. Pathology showed squamous cell carcinoma of larynx. Internal medicine consulted 7/20 for HTN, hypoxia and tachycardia. Hospital course complicated by acute left pontine CVA on 7/19 and acute hypoxic respiratory failure d/t aspiration PNA.    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 Post-op course c/b acute CVA and aspiration pneumonia. ENT discussed case at tumor conference 7/27 and case discussed w medical oncology 7/31. Dr. Lord present at care conference today as well. Surgical resection offered regardless of whether lung nodules are malignant. Surgery would offer up option for PO intake in the future, but would be left with permanent trach. Surgery would be extensive (approx 9 hours) and jose-operative risks discussed with family (ana laura concern for CVA and pulmonary complications). Further treatment of lung nodules would be determined at a later date based on her functional status. Palliative care consulted to assist with goals of care discussion. Patient remains stable. Anxious to discharge home but discussed that TCU is currently recommended given level of care. Patient not ready to make any decisions re: surgery. Blood secretions noted today. Discussed w ENT, possibly trauma from suctioning. Planning endoscopy today to further evaluate.   - SLP consulted to assess if ready for [place] valve; not quite ready per visit 8/2  - Cont regular suctioning and trach cares per ENT orders  - Cont Robinul 1mg BID; monitor for anticholinergic side effects  - Cont Ritalin 2.5mg BID per palliative recs  - Encouraged PT/OT  today  - Further management per ENT    *Sepsis and acute hypoxic respiratory failure d/t aspiration pneumonia:  Excessive secretions post-tracheostomy c/b acute L pontine CVA. Acute hypoxia, fevers and elevated inflammatory markers on 7/24. Required BIPAP intermittently. CXR showed R>L bibasilar opacities with small R effusion. CT Chest pulmon angio negative for PE. Sputum 7/22 negative. ? Pulmonary edema contributing. Has been on a number of antibiotics and probable drug rash from cephalosporins. ID consulted and transitioned to moxifloxacin. Clinically improved on antibiotics and intermittent diuresis. Weaned off BIPAP completely. Clinically improved w treatment. Last dose of moxifloxacin 7/31 to complete a 7 day course of abx. Currently stable on FiO2 21% via trach dome.    *Acute L pontine CVA:  R facial droop noted 7/19 with MRI primo showing acute L pontine infarct. Neurology consulted. Outside therapeutic window for TPA. Conservative management recommended with DAPT and statin. Discussed risks of holding plavix for procedures/surgery with neurology 7/31. No data to support use of DAPT long term (suggest <90 days), but also no consensus in literature regarding duration of DAPT following acute CVA. One study showed reduced risk of recurrent CVA with 3 week duration. Given ongoing discussion re: goals of care, no urgent need for lung biopsy or surgery at this time.  - Cont ASA and plavix, duration <90 days given increased risk of bleeding beyond this.   - OK to hold plavix at this time for any surgery/procedures  - Cont statin  - SBP goal <160  - PT/OT/SLP consulted     *Severe protein-calorie malnutrition:  Secondary to acute on chronic medical issues including cancer, recent surgery, and CVA which has resulted in dysphagia. NG feeding tube placed in surgery, discontinued by patient. PEG placed 7/27. Restarted on TF, currently at goal rate 45 ml/h. Per nutrition, no significant risk of refeeding at this  time.  - Cont TF at goal rate 45 ml/h  - NPO  - SLP following    *Spiculated RUL nodule:  Concerning for malignancy. PET 7/20 with hypermetabolic pulmonary nodules c/w metastatic disease. Per discussion with Medical Oncology, biopsy of lung nodule non-urgent. Patient may elect to have surgical resection of laryngeal mass, but treatment of lung cancer (primary or mets) would come at a later date and be based more on functional status at that time. Oncology also noted high mortality with pulmonary involvement at 1 year. Will need to continue discussion re: goals of care and possible surgical resection.   - Recommend outpatient follow up with ENT and Oncology to discuss timing of potential lung biopsy  - See above re: DAPT. Should be OK to hold/stop plavix prior to any scheduled lung biopsy    *HTN:  Stable. Goal SBP <160 per neurology in setting of recent CVA. Cont amlodipine 10mg daily and losartan 50mg daily.     Chronic Medical Problems:  *COPD:  Currently no wheezing. Most recent PFTs 7/16/18 showed FEV-1/FVC 65% with FEV-1 0.58 L. Gray Post-op Respiratory failure risk calculated at 9.91% (failure to wean of vent within 48h of surgery or unplanned intubation/reintubation). Pulmonary risks with proposed surgery discussion with treatment team and family. Cont Brovana, Pulmicort, Ipratropium/Levalbuterol QID.   *HLD:  Cont statin.   *Hx Alcohol abuse:  Currently in remission.   *PAD:  S/p lower extremity endarterectomy and stenting in 2017. Previously prescribed ASA and Plavix, but per family patient was not taking plavix regularly.      Pain Assessment:  Current Pain Score 8/3/2018   Patient currently in pain? yes   Pain score (0-10) -   Pain location Incision   Pain descriptors Patient unable to describe   Keira s pain level was assessed and she currently denies pain.      Diet: NPO for Medical/Clinical Reasons Except for: No Exceptions  Adult Formula Drip Feeding: Continuous Isosource 1.5; Gastrostomy; Goal Rate:  45; mL/hr; Medication - Tube Feeding Flush Frequency: At least 15-30 mL water before and after medication administration and with tube clogging; Amount to Send (Nutritio...  Fluids: None  DVT Prophylaxis: Enoxaparin (Lovenox) SQ  Code Status: Full Code    Disposition Plan   Expected discharge: 2 - 3 days, recommended to TBD once mental status at baseline, safe disposition plan/ TCU bed available and O2 needs stable.  Will not be able to transfer off 6B until suctioning needs improve, which makes placement difficult. Patient continues to insist on discharge home, however still assist of 2 for transfers. Discussed with Keira that current care needs may be too much for family to handle. Encouraged PT/OT to better define level of care if patient were to discharge home. Will update sonJR, with recommendations. If patient were to transition to palliative care, then discharge home would be more reasonable.      Entered: Shyam Avery 08/03/2018, 10:30 AM   Information in the above section will display in the discharge planner report.      The patient's care was discussed with the Attending Physician, Dr. Barahona.    Shyam Avery PA-C  Internal Medicine Staff Hospitalist Service  [facility]  Pager: [phone]  Please see sticky note for cross cover information  _____________________________________________________________    Interval History   Patient able to answer yes/no questions and mouth words. Overall frustrated and wanting to go home. Discussed ongoing therapies, care needs, and ability of family to take care of her in present state. She expressed understanding but is adamant about not going to TCU on discharge. Currently denies any pain. Occasional dyspnea but FiO2 needs stable. Blood tinged secretions noted overnight, ENT aware. No fevers or chills. No chest pain. No GI or  complaints.     Patient noted to be more withdrawn today. Discussed mood with patient but she did not indicate yes/no  "whether she was depressed. She admits that she is frustrated but unclear if current mood is simply reflective of situation vs desire to \"give up\". Patient still not able to make decision re: surgery. Support offered.       Data reviewed today: I have personally reviewed all medications, new labs and imaging results over the last 24 hours. .    Physical Exam   Vital Signs: Temp: 98.3  F (36.8  C) Temp src: Oral BP: 127/68 Pulse: 87 Heart Rate: 92 Resp: 24 SpO2: 98 % O2 Device: Trach dome    Weight: 126 lbs 1.65 oz    GENERAL:  Awake. Alert. Oriented x 3. NAD.   HEENT:  No scleral icterus. Mucous membranes moist. Trach present with thin blood tinged secretions.  CV:  RRR. S1, S2. No murmurs appreciated.   RESPIRATORY:  Lungs coarse bilaterally. No wheezing.  GI:  Abdomen soft, non-distended. Active bowel sounds. G tube intact w/o skin changes or discharge.   NEUROLOGICAL:  Slight R facial droop. Moves all extremities. Mild R sided weakness.    EXTREMITIES:  No peripheral edema. No calf tenderness. Intact bilateral pedal pulses.   SKIN:  No jaundice, rashes, wounds or lesions.            Data     ROUTINE IP LABS (Last four results)  CMP     Recent Labs  Lab 08/03/18  0451 08/02/18 0457 08/01/18 0515 07/31/18 0445 07/30/18  0450   NA  --  140 141 140 141   POTASSIUM  --  4.0 4.3 3.8 3.9   CHLORIDE  --  107 106 107 107   CO2  --  26 26 24 26   ANIONGAP  --  7 9 9 8   GLC  --  135* 128* 139* 142*   BUN  --  18 16 12 12   CR  --  0.45* 0.50* 0.45* 0.48*   ESTEFANI  --  8.8 8.7 8.5 8.4*   MAG 2.8* 2.5* 2.4* 2.2 2.2   PHOS 4.2 3.7 4.7* 2.3* 2.5   ALBUMIN  --   --   --   --  2.2*     CBC     Recent Labs  Lab 08/02/18 0457 08/01/18 0515 07/31/18 0445 07/30/18  0450   WBC 11.4* 12.2* 13.1* 9.9   RBC 4.04 4.11 3.99 3.62*   HGB 12.0 12.4 11.8 10.6*   HCT 38.2 38.7 38.0 34.4*   MCV 95 94 95 95   MCH 29.7 30.2 29.6 29.3   MCHC 31.4* 32.0 31.1* 30.8*   RDW 13.5 13.5 13.5 13.5    310 283 247     INR No lab results found in " last 7 days.      Recent Labs  Lab 08/02/18  1556 08/02/18  0711 08/02/18  0457 08/02/18  0322 08/02/18  0050 08/01/18  1939 08/01/18  1526  08/01/18  0515  07/31/18  0445  07/30/18  0450  07/29/18  0523  07/28/18  0555   GLC  --   --  135*  --   --   --   --   --  128*  --  139*  --  142*  --  114*  --  100*   * 115*  --  127* 112* 118* 135*  < >  --   < >  --   < >  --   < >  --   < >  --    < > = values in this interval not displayed.       All labs personally reviewed in Epic.  See A&P for additional results.     Unresulted Labs Ordered in the Past 30 Days of this Admission     No orders found from 5/18/2018 to 7/18/2018.

## 2018-08-03 NOTE — PROVIDER NOTIFICATION
Gold cross cover notified changed in secretions. Suctioned clear - blood tinged/streaked secretions throughout day, copious at times, now moderate amount of bright red bloody secretions. VSS, 21% trach domdmitri. Will continue to monitor.

## 2018-08-03 NOTE — PLAN OF CARE
Problem: Patient Care Overview  Goal: Plan of Care/Patient Progress Review  Outcome: No Change  Neuro: Disoriented to situation. PERRLA. R side facial droop (unchanged). Some R side weakness.   Cardiac: Tele d/c'd on previous shift. HR in 80s-90s. BPs stable. Afebrile.   Respiratory: Shiley 6 cuff-less to TD 21% overnight. Suction qhour. Secretions clear/red-streaked (team aware). Trach cares completed at 0000.  GI/: Incontinent of urine x3 and bowel x1.  +BS and passing gas.    Diet/Appetite: Strict NPO. TF to G tube at goal of 45cc/hour with standard FWF. Q4hour BS.   Activity: AST of 2 for turning q2hour.    Pain: No complaints of pain.   Skin: Sacral mepilex in place on blanchable redness. Continue incontinence cares and q2hour turns.   LDAs: R PIV x1, SL. PEG tube to enteral feeds. Shiley 6 cuff-less to TD 21% FiO2.      Continue with POC. Notify primary team with changes.   Adriana Boykin RN    Problem: Chronic Respiratory Difficulty Comorbidity  Goal: Chronic Respiratory Difficulty  Patient comorbidity will be monitored for signs and symptoms of Respiratory Difficulty (Chronic) condition.  Problems will be absent, minimized or managed by discharge/transition of care.   Outcome: No Change  Last trach change 08/01. Red-streaked secretions. Suction q1hour. Trach cares completed at 0000.

## 2018-08-04 NOTE — PLAN OF CARE
Problem: Patient Care Overview  Goal: Plan of Care/Patient Progress Review  Neuro: Disoriented to situation, otherwise oriented x3. Right side facial droop and right some right sided weakness (unchanged).   Cardiac: HR in 80s. BPs stable. Afebrile.   Respiratory: Shiley 6 cuffless via TD 21%. Suctioning q1hr. Secretions red this morning, now yellow/rust tinged.   GI/: Incontinent of urine. 2 BM this shift.   Diet/Appetite: Strict NPO. TF to G tube at goal of 45cc/hour with standard FWF.  Activity: Up to chair for 3 hours. Turn q2hrs in bed.   Pain: No complaints of pain.    Skin: Sacral mepilex in place on blanchable redness. Rash on lower extremities nearly gone.   LDAs: Left PIV, SL.   Will continue to monitor.     Problem: Chronic Respiratory Difficulty Comorbidity  Goal: Chronic Respiratory Difficulty  Patient comorbidity will be monitored for signs and symptoms of Respiratory Difficulty (Chronic) condition.  Problems will be absent, minimized or managed by discharge/transition of care.   Shiley 6 cuffless in place. Suction every hour required. Secretions lucinda red this morning, now tan/rust tinged.

## 2018-08-04 NOTE — PROGRESS NOTES
"Otolaryngology Progress Note  August 4, 2018     S:  No acute events overnight. Copious secretions suctioned from trach every hour. No bleeding from stoma.    O: /70 (BP Location: Right arm)  Pulse 88  Temp 98.3  F (36.8  C) (Oral)  Resp 20  Ht 1.6 m (5' 2.99\")  Wt 58 kg (127 lb 13.9 oz)  SpO2 98%  BMI 22.66 kg/m2     General: resting in bed, interactive, answering questions appropriately by nodding head   Neuro: moving all extremities   HEENT: EOMI. 6-0 cuffless Shiley in place, optifoam dressing under trach flange. Trach ties are snug w/ 2 finger breadths between neck and ties. Tracheostomy site clean, no bleeding. No skin breakdown or erythema under trach ties or face plate.   Pulmonary: breathing comfortably via 6-0 Shiley on HTD, no blood tinged secretions    Intake/Output Summary (Last 24 hours) at 08/04/18 0832  Last data filed at 08/04/18 0400   Gross per 24 hour   Intake             1335 ml   Output                0 ml   Net             1335 ml     Labs from today:  Phos 4.5  Mg 2.8     IMAGING:    MRI: 7/20:  Acute left pontine mesencephalic junction infarct    PET Scan 7/20:   Large hypermetabolic laryngeal mass involving bilateral VF, anterior commissure, epiglottis, left AE fold, effacement of preepiglottic fat, bilateral LAD.   hypermetabolic pulmonary nodules consistent with metastatic disease, small right pleural effusion.  urethral diverticulum containing stones and septations, 4.7 cm left adnexal cystic structure, thoracic abdominal aortic ectasia. 1.9 cm right iliac aneurysm.    Head CT 7/22  Impression:   Evolving changes of left hemipontine infarction. Otherwise, no acute  intracranial pathology.    ECHO 7/24:  Technically difficult study.  Global and regional left ventricular function is probably normal with an EF of  60-65%. No significnat valve disease.    CT PE protocol 7/25: No PE    PATHOLOGY:  Laryngeal biopsies:  FINAL DIAGNOSIS:   A. LEFT FALSE VOCAL CORD, BIOPSY:   - " INVASIVE KERATINIZING SQUAMOUS CELL CARCINOMA, moderately   differentiated.   - Perineural invasion is present.   B. PETIOLE, BIOPSY:   - AT LEAST SQUAMOUS CELL CARCINOMA IN-SITU.   C. ADDITIONAL LEFT FALSE VOCAL CORD, BIOPSY:   - INVASIVE KERATINIZING SQUAMOUS CELL CARCINOMA, moderately   differentiated.   D. RIGHT FALSE VOCAL CORD, BIOPSY:   - AT LEAST MICROINVASIVE SQUAMOUS CELL CARCINOMA, keratinizing type.     FNA R thyroid nodule  - Atypica of undetermined significance. Recommend repeat FNA in 4-6 weeks.     A/P: Keira Collins is a 72-year-old female with a [id number] SCCa of larynx PMH significant for COPD, 140 pack-year tobacco history, alcohol use disorder, HLD, HTN, and PVD who is now s/p direct laryngoscopy with biopsy of laryngeal SCC and tracheotomy on 7/17/18. Hospital course has been complicated by acute ischemic pontine stroke, fevers, and persistent tachycardia, and worsening respiratory status, now improving.    - Tracheostomy care:   - Trach changed 7/24 and 8/1, in good position healing well.   - Tracheoscopy on 8/3 showed small excoriation along posterior tracheal wall likely 2/2 trauma from suctioning.   - Suction only within the lumen of the trach tube, avoid deep suctioning   - Keep trach ties tight around the neck with only 2 finger breadth between skin and ties. If the ties are loose the trach tube will sit along the anterior tracheal wall which could lead to erosion and potential life threatening bleeding from tracheo-innominate fistula if persistently rubbing.    - Perform regular suctioning PRN   - RN should clean inner cannula with a brush Qshift and PRN.    - Keep trach tube obturator taped to wall behind the head of the bed. Keep extra unopened 6-0 Shiley and 4-0 Shiley at bedside at all times.   - S/p PLC trach teaching 7/19 with son  - SLP consulted for PMSV - recommending use only with SLP present  - OK to start Robnul for secretions, monitor closely for increased viscosity of  secretions and increased risk of mucus plugging  - Planning for discharge to TCU once secretions and suctioning needs decrease.   - Will plan for ENT follow up as an outpatient for further discussions of plan of care. Options presented at family conference 8/1 include:   1.) surgery (total laryngectomy) to address laryngeal cancer, likely followed by chemo for lung disease   2.) palliative chemo/radiation   3.) comfort cares  - Will need to obtain lung biopsy when/if feasible and patient/family wanted to proceed with treatment. Plavix will need to be held for 5 days prior to biopsy.      Diana Elliott, PGY-3  Otolaryngology- Head and Neck Surgery  Pager: [phone]  Please contact ENT with questions by dialing [phone] and entering job code [phone] when prompted.

## 2018-08-04 NOTE — PROGRESS NOTES
Internal Medicine Progress Note  Gold Service       Assessment & Plan    Keira Collins is a 72 year old female admitted on 7/17/2018. She has a history of COPD, HTN, hyperlipidemia, chronic tobacco abuse, and alcohol abuse (in remission) who was admitted on 7/17/2018 for direct laryngoscopy with biopsy of laryngeal mass, tracheostomy and NGT placement. Pathology showed squamous cell carcinoma of larynx. Internal medicine consulted 7/20 for HTN, hypoxia and tachycardia. Hospital course complicated by acute left pontine CVA on 7/19 and acute hypoxic respiratory failure d/t aspiration PNA.    *Squamous cell carcinoma of larynx s/p biopsy and tracheostomy (7/17/18):  [id number]. Post-op course c/b acute CVA and aspiration pneumonia. ENT discussed case at tumor conference 7/27 and case discussed w medical oncology 7/31. Dr. Lord present at care conference today as well. Surgical resection offered regardless of whether lung nodules are malignant. Surgery would offer up option for PO intake in the future, but would be left with permanent trach. Surgery would be extensive (approx 9 hours) and jose-operative risks discussed with family (ana laura concern for CVA and pulmonary complications). Further treatment of lung nodules would be determined at a later date based on her functional status. Palliative care consulted to assist with goals of care discussion. Patient spoke with Dr. Walden 8/3 and stated that she did not wish to proceed with surgery. Patient remains comfortable. FiO2 21%. Continues to require hourly suctioning d/t secretions.  - SLP following; planning to trial PMSV  - Cont regular suctioning and trach cares per ENT orders  - Consult RT re: humidity delivery. ? Condensation from current set up contributing to frequent suctioning.  - Increase Robinul to 1mg TID; monitor for anticholinergic side effects  - Cont Ritalin 2.5mg BID per palliative recs  - Cont PT/OT today  - Further management per ENT    *Sepsis and  acute hypoxic respiratory failure d/t aspiration pneumonia:  Excessive secretions post-tracheostomy c/b acute L pontine CVA. Acute hypoxia, fevers and elevated inflammatory markers on 7/24. Required BIPAP intermittently. CXR showed R>L bibasilar opacities with small R effusion. CT Chest pulmon angio negative for PE. Sputum 7/22 negative. ? Pulmonary edema contributing. Has been on a number of antibiotics and probable drug rash from cephalosporins. ID consulted and transitioned to moxifloxacin. Clinically improved on antibiotics and intermittent diuresis. Weaned off BIPAP completely. Clinically improved w treatment. Last dose of moxifloxacin 7/31 to complete a 7 day course of abx. Currently stable on FiO2 21% via trach dome.    *Acute L pontine CVA:  R facial droop noted 7/19 with MRI primo showing acute L pontine infarct. Neurology consulted. Outside therapeutic window for TPA. Conservative management recommended with DAPT and statin. Discussed risks of holding plavix for procedures/surgery with neurology 7/31. No data to support use of DAPT long term (suggest <90 days), but also no consensus in literature regarding duration of DAPT following acute CVA. One study showed reduced risk of recurrent CVA with 3 week duration. Given ongoing discussion re: goals of care, no urgent need for lung biopsy or surgery at this time.  - Cont ASA and plavix, duration <90 days given increased risk of bleeding beyond this.   - OK to hold plavix at this time for any surgery/procedures  - Cont statin  - SBP goal <160  - PT/OT/SLP consulted     *Severe protein-calorie malnutrition:  Secondary to acute on chronic medical issues including cancer, recent surgery, and CVA which has resulted in dysphagia. NG feeding tube placed in surgery, discontinued by patient. PEG placed 7/27. Restarted on TF, currently at goal rate 45 ml/h. Per nutrition, no significant risk of refeeding at this time.  - Cont TF at goal rate 45 ml/h  - NPO  - SLP  following    *Spiculated RUL nodule:  Concerning for malignancy. PET 7/20 with hypermetabolic pulmonary nodules c/w metastatic disease. Per discussion with Medical Oncology, biopsy of lung nodule non-urgent. Patient may elect to have surgical resection of laryngeal mass, but treatment of lung cancer (primary or mets) would come at a later date and be based more on functional status at that time. Oncology also noted high mortality with pulmonary involvement at 1 year. Will need to continue discussion re: goals of care and possible surgical resection.   - Recommend outpatient follow up with ENT and Oncology to discuss timing of potential lung biopsy  - See above re: DAPT. Should be OK to hold/stop plavix prior to any scheduled lung biopsy    *HTN:  Stable. Goal SBP <160 per neurology in setting of recent CVA. Cont amlodipine 10mg daily and losartan 50mg daily.     Chronic Medical Problems:  *COPD:  Currently no wheezing. Most recent PFTs 7/16/18 showed FEV-1/FVC 65% with FEV-1 0.58 L. Gray Post-op Respiratory failure risk calculated at 9.91% (failure to wean of vent within 48h of surgery or unplanned intubation/reintubation). Pulmonary risks with proposed surgery discussion with treatment team and family. Cont Brovana, Pulmicort, Ipratropium/Levalbuterol QID.   *HLD:  Cont statin.   *Hx Alcohol abuse:  Currently in remission.   *PAD:  S/p lower extremity endarterectomy and stenting in 2017. Previously prescribed ASA and Plavix, but per family patient was not taking plavix regularly.      Pain Assessment:  Current Pain Score 8/3/2018   Patient currently in pain? no   Pain score (0-10) -   Pain location -   Pain descriptors -   Keira s pain level was assessed and she currently denies pain.      Diet: NPO for Medical/Clinical Reasons Except for: No Exceptions  Adult Formula Drip Feeding: Continuous Isosource 1.5; Gastrostomy; Goal Rate: 45; mL/hr; Medication - Tube Feeding Flush Frequency: At least 15-30 mL water before  and after medication administration and with tube clogging; Amount to Send (Nutritio...  Fluids: None  DVT Prophylaxis: Enoxaparin (Lovenox) SQ  Code Status: Full Code    Disposition Plan   Expected discharge: 2 - 3 days, recommended to TBD once safe disposition plan/ TCU bed available.  Will not be able to transfer off 6B until suctioning needs improve, which makes placement difficult. Patient continues to insist on discharge home, however requires ceiling lift for transfers. Discussed with Keira and sonThomas, that current care needs may be too much for family to handle at home. Encouraged PT/OT to better define level of care if patient were to discharge home, although at this time currently recommending TCU.       Entered: Shyam Avery 08/04/2018, 12:27 PM   Information in the above section will display in the discharge planner report.      The patient's care was discussed with the Attending Physician, Dr. Barahona.    Shyam Avery PA-C  Internal Medicine Staff Hospitalist Service  [facility]  Pager: [phone]  Please see sticky note for cross cover information  _____________________________________________________________    Interval History   Patient up in chair today for our visit. Required ceiling lift for transfer. SonThomas, is at bedside. Keira continues to request discharge home. She is not interested in surgery at this time. Unfortunately due to frequency of trach suctioning she would not be safe to discharge home at this point, even if mobility wasn't an issue (which it is). Overall, amount of bedside care would be too much for family to handle at home.     Keira denies any pain, dyspnea, n/v/d, or urinary complaints. RN reports that her cough is strong but unable to completely clear sputum/secretions out of proximal trach. She has not required any deep suctioning.       Data reviewed today: I have personally reviewed all medications, new labs and imaging results over the last  24 hours. .    Physical Exam   Vital Signs: Temp: 98.3  F (36.8  C) Temp src: Oral BP: 138/70 Pulse: 88 Heart Rate: 93 Resp: 20 SpO2: 99 % O2 Device: Trach dome    Weight: 127 lbs 13.87 oz    GENERAL:  Awake. Alert. Oriented x 3. NAD.   HEENT:  No scleral icterus. Mucous membranes moist. Trach present with thin blood tinged secretions.  CV:  RRR. S1, S2. No murmurs appreciated.   RESPIRATORY:  Lungs coarse bilaterally. No wheezing.  GI:  Abdomen soft, non-distended. Active bowel sounds. G tube intact w/o skin changes or discharge.   NEUROLOGICAL:  Slight R facial droop. Moves all extremities. Mild R sided weakness.    EXTREMITIES:  No peripheral edema. No calf tenderness. Intact bilateral pedal pulses.   SKIN:  No jaundice, rashes, wounds or lesions.            Data     ROUTINE IP LABS (Last four results)  CMP     Recent Labs  Lab 08/04/18 0530 08/03/18 0451 08/02/18 0457 08/01/18 0515 07/31/18 0445 07/30/18  0450   NA  --   --  140 141 140 141   POTASSIUM  --   --  4.0 4.3 3.8 3.9   CHLORIDE  --   --  107 106 107 107   CO2  --   --  26 26 24 26   ANIONGAP  --   --  7 9 9 8   GLC  --   --  135* 128* 139* 142*   BUN  --   --  18 16 12 12   CR  --   --  0.45* 0.50* 0.45* 0.48*   ESTEFANI  --   --  8.8 8.7 8.5 8.4*   MAG  --  2.8* 2.5* 2.4* 2.2 2.2   PHOS 4.5 4.2 3.7 4.7* 2.3* 2.5   ALBUMIN  --   --   --   --   --  2.2*     CBC     Recent Labs  Lab 08/02/18 0457 08/01/18 0515 07/31/18 0445 07/30/18  0450   WBC 11.4* 12.2* 13.1* 9.9   RBC 4.04 4.11 3.99 3.62*   HGB 12.0 12.4 11.8 10.6*   HCT 38.2 38.7 38.0 34.4*   MCV 95 94 95 95   MCH 29.7 30.2 29.6 29.3   MCHC 31.4* 32.0 31.1* 30.8*   RDW 13.5 13.5 13.5 13.5    310 283 247     INR No lab results found in last 7 days.      Recent Labs  Lab 08/02/18  1556 08/02/18  0711 08/02/18  0457 08/02/18  0322 08/02/18  0050 08/01/18  1939 08/01/18  1526  08/01/18  0515  07/31/18  0445  07/30/18  0450  07/29/18  0523   GLC  --   --  135*  --   --   --   --   --  128*  --   139*  --  142*  --  114*   * 115*  --  127* 112* 118* 135*  < >  --   < >  --   < >  --   < >  --    < > = values in this interval not displayed.       All labs personally reviewed in Epic.  See A&P for additional results.     Unresulted Labs Ordered in the Past 30 Days of this Admission     No orders found from 5/18/2018 to 7/18/2018.

## 2018-08-04 NOTE — PLAN OF CARE
"Problem: Patient Care Overview  Goal: Plan of Care/Patient Progress Review  SLP session cancelled: the patient shook her head \"no\" and refused SLP tx despite encouragement.      "

## 2018-08-04 NOTE — PLAN OF CARE
"Problem: Patient Care Overview  Goal: Plan of Care/Patient Progress Review  Outcome: No Change  /72 (BP Location: Right arm)  Pulse 88  Temp 98.5  F (36.9  C) (Axillary)  Resp 20  Ht 1.6 m (5' 2.99\")  Wt 58 kg (127 lb 13.9 oz)  SpO2 98%  BMI 22.66 kg/m2  Neuro: Alert to self, follows simple commands.    Cardiac: SR. VSS.   Respiratory: Sating at 97% on 21% TD- Shiley 6 cuff-less. Copious secretion, suctioning every hour.   GI/: Incontinent, addequate urine output. BM X2  Diet/appetite: NPO, on TF 45ml/hr .  Activity:  Assist of 2, turned q2h  Pain: At acceptable level on current regimen.   Skin: No new deficits noted.  LDA's: PIV     Plan: Continue with POC. Notify primary team with changes.      "

## 2018-08-04 NOTE — PLAN OF CARE
"Problem: Patient Care Overview  Goal: Plan of Care/Patient Progress Review  OT/6B - Discharge Planner OT   Patient plan for discharge: pt strongly prefers home per chart review  Current status: Pt initially shaking head \"no\" regarding working with therapy, but agreeable with encouragement. Required dependent ceiling lift for transfer to bedside chair. Facilitated UE AROM exercises while seated in chair; pt tolerates 3 exercises 5-10 reps bilaterally with consistent verbal cues and demonstration due to limited sustained attention. Hand over hand and proximal support at elbow required to wash face with affected RUE. SpO2 99% on trach dome fio2 21%, HR 95 bpm.  Barriers to return to prior living situation: strength, activity tolerance, cognition, trach/medical cares  Recommendations for discharge: TCU  Rationale for recommendations: Pt is far below functional baseline and is currently requiring mechanical lift for OOB activity. Pt would require max A-dependent for all ADL and functional mobility if DC to home, which is pts preference.       Entered by: Leann Zaragoza 08/04/2018 10:02 AM           "

## 2018-08-05 NOTE — PROGRESS NOTES
"Otolaryngology Progress Note  August 5, 2018     S:  No acute events overnight. Still requiring frequent suctioning, secretions are now rust colored and occasionally blood tinged. No bleeding from stoma.    O: /72 (BP Location: Right arm)  Pulse 92  Temp 98.1  F (36.7  C) (Oral)  Resp 22  Ht 1.6 m (5' 2.99\")  Wt 58.5 kg (128 lb 15.5 oz)  SpO2 93%  BMI 22.85 kg/m2     General: resting in bed, interactive, answering questions appropriately by nodding head   Neuro: moving all extremities   HEENT: EOMI. 6-0 cuffless Shiley in place, optifoam dressing under trach flange. Trach ties are snug w/ 2 finger breadths between neck and ties. Tracheostomy site clean, no bleeding. No skin breakdown or erythema under trach ties or face plate.   Pulmonary: breathing comfortably via 6-0 Shiley on HTD, no blood tinged secretions    Intake/Output Summary (Last 24 hours) at 08/04/18 0832  Last data filed at 08/04/18 0400   Gross per 24 hour   Intake             1335 ml   Output                0 ml   Net             1335 ml     Labs from today:  Phos 4.5  Mg 2.8     IMAGING:    MRI: 7/20:  Acute left pontine mesencephalic junction infarct    PET Scan 7/20:   Large hypermetabolic laryngeal mass involving bilateral VF, anterior commissure, epiglottis, left AE fold, effacement of preepiglottic fat, bilateral LAD.   hypermetabolic pulmonary nodules consistent with metastatic disease, small right pleural effusion.  urethral diverticulum containing stones and septations, 4.7 cm left adnexal cystic structure, thoracic abdominal aortic ectasia. 1.9 cm right iliac aneurysm.    Head CT 7/22  Impression:   Evolving changes of left hemipontine infarction. Otherwise, no acute  intracranial pathology.    ECHO 7/24:  Technically difficult study.  Global and regional left ventricular function is probably normal with an EF of  60-65%. No significnat valve disease.    CT PE protocol 7/25: No PE    PATHOLOGY:  Laryngeal biopsies:  FINAL " DIAGNOSIS:   A. LEFT FALSE VOCAL CORD, BIOPSY:   - INVASIVE KERATINIZING SQUAMOUS CELL CARCINOMA, moderately   differentiated.   - Perineural invasion is present.   B. PETIOLE, BIOPSY:   - AT LEAST SQUAMOUS CELL CARCINOMA IN-SITU.   C. ADDITIONAL LEFT FALSE VOCAL CORD, BIOPSY:   - INVASIVE KERATINIZING SQUAMOUS CELL CARCINOMA, moderately   differentiated.   D. RIGHT FALSE VOCAL CORD, BIOPSY:   - AT LEAST MICROINVASIVE SQUAMOUS CELL CARCINOMA, keratinizing type.     FNA R thyroid nodule  - Atypica of undetermined significance. Recommend repeat FNA in 4-6 weeks.     A/P: Keira Collins is a 72-year-old female with a [id number] SCCa of larynx PMH significant for COPD, 140 pack-year tobacco history, alcohol use disorder, HLD, HTN, and PVD who is now s/p direct laryngoscopy with biopsy of laryngeal SCC and tracheotomy on 7/17/18. Hospital course has been complicated by acute ischemic pontine stroke, fevers, and persistent tachycardia, and worsening respiratory status, now improving.    - Tracheostomy care:   - Trach changed 7/24 and 8/1, in good position healing well.   - Tracheoscopy on 8/3 showed small excoriation along posterior tracheal wall likely 2/2 trauma from suctioning.   - Suction only within the lumen of the trach tube, avoid deep suctioning   - Keep trach ties tight around the neck with only 2 finger breadth between skin and ties. If the ties are loose the trach tube will sit along the anterior tracheal wall which could lead to erosion and potential life threatening bleeding from tracheo-innominate fistula if persistently rubbing.    - Perform regular suctioning PRN   - RN should clean inner cannula with a brush Qshift and PRN.    - Keep trach tube obturator taped to wall behind the head of the bed. Keep extra unopened 6-0 Shiley and 4-0 Shiley at bedside at all times.   - S/p PLC trach teaching 7/19 with son  - SLP consulted for PMSV - recommending use only with SLP present  - OK to start Robnul for secretions,  monitor closely for increased viscosity of secretions and increased risk of mucus plugging  - Planning for discharge to TCU once secretions and suctioning needs decrease.   - Will plan for ENT follow up as an outpatient for further discussions of plan of care. Options presented at family conference 8/1 include:   1.) surgery (total laryngectomy) to address laryngeal cancer, likely followed by chemo for lung disease   2.) palliative chemo/radiation   3.) comfort cares  - Will need to obtain lung biopsy when/if feasible and patient/family wanted to proceed with treatment. Plavix would need to be held for 5 days prior to biopsy.      Mor Thomson MD, PGY-1  Otolaryngology- Head and Neck Surgery  Please contact ENT with questions by dialing [phone] and entering job code [phone] when prompted.

## 2018-08-05 NOTE — PROGRESS NOTES
Internal Medicine Progress Note  Gold Service       Assessment & Plan    Keira Collins is a 72 year old female admitted on 7/17/2018. She has a history of COPD, HTN, hyperlipidemia, chronic tobacco abuse, and alcohol abuse (in remission) who was admitted on 7/17/2018 for direct laryngoscopy with biopsy of laryngeal mass, tracheostomy and NGT placement. Pathology showed squamous cell carcinoma of larynx. Internal medicine consulted 7/20 for HTN, hypoxia and tachycardia. Hospital course complicated by acute left pontine CVA on 7/19 and acute hypoxic respiratory failure d/t aspiration PNA.    For Today:  1. Trial trach collar to help with secretions  2. Would not escalate meds at this time d/t risk of mucous plugging  3. 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 Post-op course c/b acute CVA and aspiration pneumonia. ENT discussed case at tumor conference 7/27 and case discussed w medical oncology 7/31. Dr. Lord present at care conference today as well. Surgical resection offered regardless of whether lung nodules are malignant. Surgery would offer up option for PO intake in the future, but would be left with permanent trach. Surgery would be extensive (approx 9 hours) and jose-operative risks discussed with family (ana laura concern for CVA and pulmonary complications). Further treatment of lung nodules would be determined at a later date based on her functional status. Palliative care consulted to assist with goals of care discussion. Patient spoke with Dr. Walden 8/3 and stated that she did not wish to proceed with surgery. Patient remains comfortable. FiO2 needs stable 21%. Continues to require hourly suctioning d/t secretions. ? Current trach dome contributing.   - Will trial trach collar today to see if this helps w secretions; RT to initiate and monitor  - SLP following; planning to trial PMSV during therapy only  - Cont regular suctioning and trach  cares per ENT orders  - Cont Robinul to 1mg TID; monitor for anticholinergic side effects and mucous plugging  - Cont Ritalin 2.5mg BID per palliative recs d/t mood  - Encourage PT/OT  - Further management per ENT  - Palliative following; may need discuss code status and ongoing goals of care pending dispo    *Acute L pontine CVA:  R facial droop noted 7/19 with MRI primo showing acute L pontine infarct. Neurology consulted. Outside therapeutic window for TPA. Conservative management recommended with DAPT and statin. Discussed risks of holding plavix for procedures/surgery with neurology 7/31. No data to support use of DAPT long term (suggest <90 days), but also no consensus in literature regarding duration of DAPT following acute CVA. One study showed reduced risk of recurrent CVA with 3 week duration.   - Cont ASA and plavix, duration <90 days given increased risk of bleeding beyond this.   - Cont statin  - SBP goal <160  - PT/OT/SLP consulted     *Severe protein-calorie malnutrition:  Secondary to acute on chronic medical issues including cancer, recent surgery, and CVA which has resulted in dysphagia. NG feeding tube placed in surgery, discontinued by patient. PEG placed 7/27. Restarted on TF, currently at goal rate 45 ml/h. Per nutrition, no significant risk of refeeding at this time.  - Cont TF at goal rate 45 ml/h  - NPO  - SLP following    *Spiculated RUL nodule:  Concerning for malignancy. PET 7/20 with hypermetabolic pulmonary nodules c/w metastatic disease. Per discussion with Medical Oncology, biopsy of lung nodule non-urgent. Patient may elect to have surgical resection of laryngeal mass, but treatment of lung cancer (primary or mets) would come at a later date and be based more on functional status at that time. Oncology also noted high mortality with pulmonary involvement at 1 year. Will need to continue discussion re: goals of care and possible surgical resection.   - Recommend outpatient follow up  with ENT and Oncology to discuss timing of potential lung biopsy  - See above re: DAPT. Should be OK to hold/stop plavix prior to any scheduled lung biopsy    *HTN:  Stable. Goal SBP <160 per neurology in setting of recent CVA. Cont amlodipine 10mg daily and losartan 50mg daily.     Chronic Medical Problems:  *COPD:  Currently no wheezing. Most recent PFTs 7/16/18 showed FEV-1/FVC 65% with FEV-1 0.58 L. Gray Post-op Respiratory failure risk calculated at 9.91% (failure to wean of vent within 48h of surgery or unplanned intubation/reintubation). Pulmonary risks with proposed surgery discussion with treatment team and family. Cont Brovana, Pulmicort, Ipratropium/Levalbuterol QID.   *HLD:  Cont statin.   *Hx Alcohol abuse:  Currently in remission.   *PAD:  S/p lower extremity endarterectomy and stenting in 2017. Previously prescribed ASA and Plavix, but per family patient was not taking plavix regularly.    Resolved Hospital Problems:  *Sepsis and acute hypoxic respiratory failure d/t aspiration pneumonia:  Excessive secretions post-tracheostomy c/b acute L pontine CVA. Acute hypoxia, fevers and elevated inflammatory markers on 7/24. Required BIPAP intermittently. CXR showed R>L bibasilar opacities with small R effusion. CT Chest pulmon angio negative for PE. Sputum 7/22 negative. ? Pulmonary edema contributing. Has been on a number of antibiotics and probable drug rash from cephalosporins. ID consulted and transitioned to moxifloxacin. Clinically improved on antibiotics and intermittent diuresis. Weaned off BIPAP completely. Clinically improved w treatment. Last dose of moxifloxacin 7/31 to complete a 7 day course of abx. Currently stable on FiO2 21% via trach dome.           Pain Assessment:  Current Pain Score 8/5/2018   Patient currently in pain? no   Pain score (0-10) -   Pain location -   Pain descriptors -   Keira s pain level was assessed and she currently denies pain.      Diet: NPO for Medical/Clinical  Reasons Except for: No Exceptions  Adult Formula Drip Feeding: Continuous Isosource 1.5; Gastrostomy; Goal Rate: 45; mL/hr; Medication - Tube Feeding Flush Frequency: At least 15-30 mL water before and after medication administration and with tube clogging; Amount to Send (Nutritio...  Fluids: None  DVT Prophylaxis: Enoxaparin (Lovenox) SQ  Code Status: Full Code    Disposition Plan   Expected discharge: 2 - 3 days, recommended to TBD once safe disposition plan/ TCU bed available.  Will not be able to transfer off 6B until suctioning needs improve, which also makes placement difficult. Patient continues to insist on discharge home, however requires ceiling lift for transfers and current level of care too much for family to provide. Recommending TCU.       Entered: Shyam Avery 08/05/2018, 11:49 AM   Information in the above section will display in the discharge planner report.      The patient's care was discussed with the Attending Physician, Dr. Barahona.    Shyam Avery PA-C  Internal Medicine Staff Hospitalist Service  [facility]  Pager: [phone]  Please see sticky note for cross cover information  _____________________________________________________________    Interval History   Patient appears frustrated today. Continues to request discharge home. Also noted by RN that she frequently takes trach dome off without telling staff. RT concerned that she will dry out. Continues to require hourly suctioning. Patient denies any pain or dyspnea. No other complaints or concerns.     ROS:  Pulm, CV, GI and  performed and negative unless otherwise noted above.       Data reviewed today: I have personally reviewed all medications, new labs and imaging results over the last 24 hours. .    Physical Exam   Vital Signs: Temp: 98.1  F (36.7  C) Temp src: Oral BP: 138/72 Pulse: 92 Heart Rate: 98 Resp: 22 SpO2: 93 % O2 Device: Trach dome    Weight: 128 lbs 15.51 oz    GENERAL:  Awake. Alert. Oriented x  3. NAD.   HEENT:  No scleral icterus. Mucous membranes moist. Trach looks good.  CV:  RRR. S1, S2. No murmurs appreciated.   RESPIRATORY:  Lungs coarse bilaterally. No wheezing.  GI:  Abdomen soft, non-distended. Active bowel sounds. G tube intact w/o skin changes or discharge.   NEUROLOGICAL:  Slight R facial droop. Moves all extremities. 4/5 strength in RUE and RLE.    EXTREMITIES:  No peripheral edema. No calf tenderness. Intact bilateral pedal pulses.   SKIN:  No jaundice, rashes, wounds or lesions.            Data     ROUTINE IP LABS (Last four results)  CMP     Recent Labs  Lab 08/05/18  0602 08/04/18  0530 08/03/18  0451 08/02/18  0457 08/01/18  0515 07/31/18  0445 07/30/18  0450   NA  --   --   --  140 141 140 141   POTASSIUM  --   --   --  4.0 4.3 3.8 3.9   CHLORIDE  --   --   --  107 106 107 107   CO2  --   --   --  26 26 24 26   ANIONGAP  --   --   --  7 9 9 8   GLC  --   --   --  135* 128* 139* 142*   BUN  --   --   --  18 16 12 12   CR  --   --   --  0.45* 0.50* 0.45* 0.48*   ESTEFANI  --   --   --  8.8 8.7 8.5 8.4*   MAG  --   --  2.8* 2.5* 2.4* 2.2 2.2   PHOS 4.6* 4.5 4.2 3.7 4.7* 2.3* 2.5   ALBUMIN  --   --   --   --   --   --  2.2*     CBC     Recent Labs  Lab 08/02/18  0457 08/01/18  0515 07/31/18  0445 07/30/18  0450   WBC 11.4* 12.2* 13.1* 9.9   RBC 4.04 4.11 3.99 3.62*   HGB 12.0 12.4 11.8 10.6*   HCT 38.2 38.7 38.0 34.4*   MCV 95 94 95 95   MCH 29.7 30.2 29.6 29.3   MCHC 31.4* 32.0 31.1* 30.8*   RDW 13.5 13.5 13.5 13.5    310 283 247     INR No lab results found in last 7 days.      Recent Labs  Lab 08/02/18  1556 08/02/18  0711 08/02/18  0457 08/02/18  0322 08/02/18  0050 08/01/18  1939 08/01/18  1526  08/01/18  0515  07/31/18  0445  07/30/18  0450   GLC  --   --  135*  --   --   --   --   --  128*  --  139*  --  142*   * 115*  --  127* 112* 118* 135*  < >  --   < >  --   < >  --    < > = values in this interval not displayed.       All labs personally reviewed in [facility].  See A&P for  additional results.     Unresulted Labs Ordered in the Past 30 Days of this Admission     No orders found from 5/18/2018 to 7/18/2018.

## 2018-08-05 NOTE — PLAN OF CARE
"Problem: Patient Care Overview  Goal: Plan of Care/Patient Progress Review  Outcome: No Change  /73 (BP Location: Right arm)  Pulse 92  Temp 97  F (36.1  C) (Axillary)  Resp 20  Ht 1.6 m (5' 2.99\")  Wt 58.5 kg (128 lb 15.5 oz)  SpO2 98%  BMI 22.85 kg/m2  Neuro: Alert to self and place, intermittently confused,  (R) side facial droop .   Cardiac: VSS. Pulse 80-90.   Respiratory:  Sating at 98% on  TD 21% - Trached -Shiley 6 cuffless . Requiring  Suctioning q1hr, copious secretion.  GI/: Incontinent of urine. x3 BM this shift.   Diet/Appetite:NPO on TF to at 45ml/hr   Activity: Turn q2hrs    Pain: Denies   Skin:  No new deficits noted, mapilex on coccyx.   LDAs: (L) PIV, SL.     Plan:Continue with POC. Notify primary team with changes        Problem: Chronic Respiratory Difficulty Comorbidity  Goal: Chronic Respiratory Difficulty  Patient comorbidity will be monitored for signs and symptoms of Respiratory Difficulty (Chronic) condition.  Problems will be absent, minimized or managed by discharge/transition of care.   Outcome: No Change  Copious secretion, requiring hourly suctioning. Shiley 6 cuffless. Cont pulsox      "

## 2018-08-05 NOTE — PLAN OF CARE
Problem: Patient Care Overview  Goal: Plan of Care/Patient Progress Review    6B / Discharge Planner PT   Patient plan for discharge: patient adamantly refusing TCU stay, wants to discharge home  Current status: Patient initially declining therapy but reluctantly agreed with encouragement from therapist and family, continued to require max encouragement for participation throughout session. Requires mod A supine>sit, completes sit<>stand x3 reps with max Ax2 and blocking bilateral knees, patient unable to achieve full upright posture, limited standing tolerance. Not safe for pivot transfers, transferred bed>chair with ceiling lift. VSS throughout on trach dome FiO2 21%.  Barriers to return to prior living situation: medical needs, trach/suctioning, cognition, significantly weak and deconditioned, caregiver dependence, depression, unmotivated, currently requiring mechanical lift for transfers   Recommendations for discharge: TCU  Rationale for recommendations: Prior to admission patient was able to ambulate ~30' with walker, now patient is requiring strong Ax2 or dependent for all mobility. Patient continues to consistently decline participation in therapy despite education and encouragement from therapy, nursing, medical team and family; reluctantly participates but very unmotivated. Patient is adamant she discharge home however this would not be a safe option from mobility standpoint (in addition to TF, trach/suctioning assistance). She would be completely dependent on caregivers for all transfers and would require additional DME including skip lift, wheelchair, bedside commode, hospital bed, and 24 hour caregiver assistance of 1-2 people as well caregiver education and training for mobility.

## 2018-08-06 NOTE — PROGRESS NOTES
[facility], [place]    Internal Medicine Progress Note - Gold Service      Assessment & Plan   Keira Collins is a 72 year old female with history of COPD, HTN, HLD, etoh abuse (in remission) who was admitted to ENT service 7/17 for direct laryngoscopy with biopsy of laryngeal mess, tracheostomy and NTG placement. Course c/b L pontine CVA 7/19 and acute hypoxic respiratory failure due to aspiration PNA. Patient transferred to medicine for ongoing care.       Laryngeal squamous cell carcinoma s/p biopsy and tracheostomy 7/17: [id number]. Course c/b CVA and aspiration PNA. ENT discussed case at tumor conference 7/27 and medical oncology 7/31. Surgical resection offered regardless of whether lung nodules are malignant, and would be left with permanent trach. Surgery would be extensive (approx 9 hours) and jose-operative risks high. Further treatment of lung nodules would be determined at a later date based on functional status. Palliative care consulted. Patient spoke with Dr. Walden 8/3 and stated that she did not wish to proceed with surgery. FiO2 needs stable. Continues to require hourly suctioning d/t secretions.   - ENT managing, d/w ENT 8/6 who reported secretions are often seen d/t mass itself and exacerbated by probable mets. Suggested increasing Robinul to 2 mg tid with hold for thick secretions   - SLP following; planning to trial PMSV during therapy only  - Continue regular suctioning and trach cares per ENT   - Ritalin per palliative recs d/t mood  - Encourage PT/OT  - Palliative following; may need discuss code status and ongoing goals of care pending dispo     New tachycardia: HR up to 110s. Difficult to interpret given difficulty with communicated, but denies s/s infection. Possibly elevated in setting of recent suction while at bedside vs PNA vs pain vs other  - CXR today    Acute L pontine CVA:  R facial droop noted 7/19, MRI primo acute L pontine infarct. Neurology consulted.  Outside therapeutic window for TPA. Conservative management recommended with DAPT and statin. Discussed risks of holding plavix for procedures/surgery with neurology 7/31. No data to support use of DAPT long term (suggest <90 days), but also no consensus in literature regarding duration of DAPT following acute CVA.   - Continue ASA and plavix, duration <90 days given increased risk of bleeding beyond this  - Cont statin  - SBP goal <160     Severe protein-calorie malnutrition: 2/2 acute on chronic medical issues including cancer, surgery, and CVA/dysphagia. PEG placed 7/27. Restarted on TF, currently at goal rate  - Cont TF at goal rate 45 ml/h  - NPO, SLP following     Spiculated RUL nodule: Concerning for malignancy. PET 7/20 hypermetabolic pulmonary nodules c/w metastatic disease. Per d/w Medical Oncology, biopsy of lung nodule non-urgent. Patient may elect to have surgical resection of laryngeal mass, but treatment of lung cancer (primary or mets) would come at a later date and based on functional status at that time. Oncology also noted high mortality with pulmonary involvement at 1 year. Will need to continue discussion re: goals of care and possible surgical resection.   - OP follow up with ENT and Oncology to discuss timing of potential lung biopsy  - See above re: DAPT. Should be OK to hold/stop plavix prior to any scheduled lung biopsy     HTN:  Stable. Goal SBP <160 per neurology in setting of recent CVA. Cont amlodipine and losartan with hold parameters.      Chronic Medical Problems:  COPD: PFTs 7/16/18 FEV-1/FVC 65% with FEV-1 0.58 L. Gray Post-op respiratory failure risk 9.91% (failure to wean of vent within 48h of surgery or unplanned intubation/reintubation). Pulmonary risks with proposed surgery discussion with treatment team and family. Cont Brovana, Pulmicort, Ipratropium/Levalbuterol.   HLD:  Cont statin.   Hx Alcohol abuse:  Currently in remission.   PAD:  S/p lower extremity endarterectomy  and stenting 2017. Previously on ASA and Plavix, but per family patient was not taking plavix regularly     Resolved Hospital Problems:  Sepsis and acute hypoxic respiratory failure d/t aspiration pneumonia: Excessive secretions post-trach c/b acute L pontine CVA. Acute hypoxia, fevers and elevated inflammatory markers 7/24. CXR R>L bibasilar opacities with small R effusion. CT Chest pulmon angio negative for PE. Sputum 7/22 negative. ? Pulmonary edema contributing. ID consulted and transitioned to moxifloxacin, last dose 7/31. Clinically improved on antibiotics and diuresis. Currently stable      Pain Assessment:  Current Pain Score 8/6/2018   Patient currently in pain? no   Pain score (0-10) -   Pain location -   Pain descriptors -   Keira canas pain level was assessed and she currently denies pain.      Diet: NPO for Medical/Clinical Reasons Except for: No Exceptions  Adult Formula Drip Feeding: Continuous Isosource 1.5; Gastrostomy; Goal Rate: 45; mL/hr; Medication - Tube Feeding Flush Frequency: At least 15-30 mL water before and after medication administration and with tube clogging; Amount to Send (Nutritio...  Fluids: None  DVT Prophylaxis: Enoxaparin (Lovenox) SQ  Code Status: Full Code    Disposition Plan   Expected discharge: 2-5 days, recommended to TCU vs other once bed available. Needs onging 6B due to frequent suctioning. Patient wants to d/c home however required suction q1h and ceiling lift.     Entered: Sandie Grace 08/06/2018, 2:17 PM   Information in the above section will display in the discharge planner report.      The patient's care was discussed with the patient, nursing, and attending physician, Dr. Brooklyn Grace  Internal Medicine Staff Hospitalist Service  [facility]  Pager: [phone]  Please see sticky note for cross cover information    Interval History   Frustrated today. Would like to go home asap. Denies racing heart rate, chest pain. No  dyspnea. Frequent suction at least q1h per nursing. Denies constipation/diarrhea.     Attempted to reach family (Dar [phone]) for update.     Data reviewed today: I reviewed all medications, new labs and imaging results over the last 24 hours.    Physical Exam   Vital Signs: Temp: 98.1  F (36.7  C) Temp src: Oral BP: 137/74 Pulse: 92 Heart Rate: 104 Resp: 24 SpO2: 98 % O2 Device: Trach dome    Weight: 128 lbs 3.16 oz  General Appearance: Alert, whispers answers. Appears frustrated. Trach dome   Respiratory: Bilateral course lungs. No wheezing or crackles  Cardiovascular: RRR, S1, S2. No murmur noted  GI: Abdomen soft, initially tender to light palpation but very minimal tenderness in the epigastrium with deep palpation. GT in place, CDI  Skin: No rash or jaundice  Other: No peripheral edema, distal pulses palpable     Data   Data     Recent Labs  Lab 08/06/18  0904 08/06/18  0514 08/02/18  0457 08/01/18  0515   WBC 13.8*  --  11.4* 12.2*   HGB 11.2*  --  12.0 12.4   MCV 94  --  95 94     --  314 310     --  140 141   POTASSIUM 4.4  --  4.0 4.3   CHLORIDE 105  --  107 106   CO2 26  --  26 26   BUN 37*  --  18 16   CR 0.60  --  0.45* 0.50*   ANIONGAP 5  --  7 9   ESTEFANI 9.1  --  8.8 8.7   *  --  135* 128*   ALBUMIN  --  2.6*  --   --      No results found for this or any previous visit (from the past 24 hour(s)).

## 2018-08-06 NOTE — PLAN OF CARE
"Problem: Patient Care Overview  Goal: Plan of Care/Patient Progress Review  Outcome: No Change  /82 (BP Location: Right arm)  Pulse 92  Temp 98.5  F (36.9  C) (Axillary)  Resp 18  Ht 1.6 m (5' 2.99\")  Wt 58.2 kg (128 lb 3.2 oz)  SpO2 96%  BMI 22.71 kg/m2  Neuro: Alert to self and place,  (R) side facial droop .   Cardiac: VSS. Hypertensive improved after hydralazine given.   Respiratory:  Sating at 98% on  TD 21% - Trached -Shiley 6 cuffless . Requiring  Suctioning q1-2hr, copious secretion, secretion is thick.   GI/: Incontinent of urine. x2 BM this shift.   Diet/Appetite:NPO on TF to at 45ml/hr   Activity: Turn q2hrs    Pain: Denies   Skin:  No new deficits noted, mapilex on coccyx.   LDAs: (L) PIV, SL.    Plan:hydralazine given, effective to keep BP <160. Continue with POC. Notify primary team with changes       Problem: Chronic Respiratory Difficulty Comorbidity  Goal: Chronic Respiratory Difficulty  Patient comorbidity will be monitored for signs and symptoms of Respiratory Difficulty (Chronic) condition.  Problems will be absent, minimized or managed by discharge/transition of care.   Outcome: No Change  On TD 21-30% , suction Q1-2Hr.       "

## 2018-08-06 NOTE — PLAN OF CARE
Problem: Patient Care Overview  Goal: Plan of Care/Patient Progress Review    6B / Discharge Planner PT   Patient plan for discharge: patient adamantly refusing TCU stay, wants to discharge home  Current status: Patient continues to require max encouragement for participation in therapy. Requires mod A supine>sit, completes sit<>stand x4 reps with max Ax2 and blocking bilateral knees, once standing walker placed in front of patient, tolerated standing 10-15 sec with FWW + min Ax2, improved upright posture today but fatigues quickly, unable to lateral weight shift or take steps therefore not safe for pivot transfers, transferred bed>chair with ceiling lift. VSS throughout on trach dome FiO2 21%.  Barriers to return to prior living situation: medical needs, trach/suctioning, depressed and unmotivated, significantly weak and deconditioned, caregiver dependence, currently requiring mechanical lift for transfers,   Recommendations for discharge: TCU  Rationale for recommendations: Prior to admission patient was able to ambulate ~30' with walker, now patient is requiring mechanical lift for transfers. Patient continues to consistently decline participation in therapy despite education and encouragement from therapy, nursing, medical team and family; reluctantly participates but very unmotivated. Patient is adamant she discharge home however this would not be a safe option from mobility standpoint. At her current functional level she would be completely dependent on caregivers for all transfers and would require additional DME including skip lift, wheelchair, bedside commode, hospital bed, and 24 hour caregiver assistance of 1-2 people as well caregiver education and training for mobility.

## 2018-08-06 NOTE — TELEPHONE ENCOUNTER
Patient with history of urethral diverticulum coming in to discuss removal (cysto??). Patient chart reviewed, no need for call, all records available and ready for appointment.

## 2018-08-06 NOTE — PROGRESS NOTES
"[facility], [place]   Palliative Care Daily Progress Note          Recommendations, Patient/Family Counseling & Coordination     Keira continues to deny significant pain or discomfort. Family not present for interview.  Her motivation continues to be to discharge to home, again not seeming to fully appreciate the level or intensity of cares that she will likely require going forward. The frequency of suctioning (q1h, around the clock) continue to preclude TCU consideration at this time.   Her understanding of her prognosis met with her pointing away from the bed and mouthing \"Home\"   Perhaps related to methylphenidate, she is consistently alert and engaged. We would continue this medication for now.  - REC: Pt consistent in declining surgical options. Wants discharge home as above.  - continuing low dose methylphenidate (2.5mg, up to twice a day, with last dose about noon) help with mood/alertness   - schedule glycopyrolate X2 days to assess impact on secretion volume. Observe for more tenacious secretions.   - palliative will continue to follow with you, to facilitate goals of care, family support as desired  - palliative  to continue with support as desired  - POLST not completed     Thank you for the opportunity to continue to participate in the care of this patient and family.  Please feel free to contact on-call palliative provider with any emergent needs.  We can be reached via team pager [phone] (answered 8-4:30 Monday-Friday); after-hours answering service ([phone])    Raymundo SANDOVAL NP ACHPN  Nurse Practitioner- Lead Advanced Practice Provider  [facility] Palliative Medicine Consult Service   [phone]    Greater than 33 minutes was spent in care coordination, counseling and physical examination. Time in room greater than 17 minutes of this.       Assessment      1) Diagnoses & symptoms:        SCC of larynx s/p tracheostomy  Acute L pontine CVA (doi " "7/19/18)  Sepsis- improved  Acute hypoxic respiratory failure secondary to aspiration pneumonia- improved  Severe protein-calorie malnutrition- on enteral feedings  HTN  COPD  PVD s/p L femoral endarterectomy  Pain  Lethargy     2)  Psychosocial/Spiritual Needs:   Ongoing:  support  New: none        Is new assessment/intervention required by palliative team?:  Yes         Interval History:   No acute events over weekend. Sitting up in bedside chair. She smiles and seems to have good receptive skills. She expresses frustration with ongoing hospitalization and lack of perceived progress. She remains focused on her desire to return home.   When it was suggested that she may not be strong enough or that she may require more cares (PEG tube, enteral feedings, toileting, transfers and trach) than her family could provide, she \"shoos\" me away with her hands.           Review of Systems:   Palliative Symptom Review (0=no symptom/no concern, 1=mild, 2=moderate, 3=severe):      Pain: 1      Fatigue: 0      Nausea: 0      Constipation: 0      Diarrhea: 0      Depressive Symptoms: 1      Anxiety: 0      Shortness of Breath: 1      Insomnia: 0      Overall (0 good/no concerns, 3 very poor):  1            Medications:   I have reviewed this patient's medication profile and medications given in past 24 hours.  Acetaminophen 650mg q6h prn; none since 2100 on 8/1  Glycopyrrolate BID  Methlyphenidate 2.5mg 2x every day; first given 1445 on 8/2  Oxycodone 5-10mg q3h prn; none since 7/19           Physical Exam:   Vitals: /60 (BP Location: Left arm)  Pulse 92  Temp 98.5  F (36.9  C) (Oral)  Resp 24  Ht 1.6 m (5' 2.99\")  Wt 58.2 kg (128 lb 3.2 oz)  SpO2 98%  BMI 22.71 kg/m2  BMI= Body mass index is 22.71 kg/(m^2).    General: sitting in bedside chair. Family not present for interview and exam- she is  cachectic, awake.   HEENT: NC/AT, EOMI, no scleral icterus. Subtle R facial droop. Tracheostomy in place, dressing " c/d/i without surrounding erythema. Thin copious secretions via trach with cough  CV: RRR, no MRG  Resp: CTAB laterally, coarse sounds centrally transmitted from tracheostomy, no wheezes or crackles  Abd: soft, non-distended, non-tender. Bowel sounds present, normoactive.  Ext: No LE edema  Skin: no jaundice, rashes, wounds on examined skin            Data Reviewed:   ROUTINE LABS (Last four results)  BMP  Recent Labs  Lab 08/06/18  0904 08/02/18 0457 08/01/18  0515 07/31/18  0445    140 141 140   POTASSIUM 4.4 4.0 4.3 3.8   CHLORIDE 105 107 106 107   ESTEFANI 9.1 8.8 8.7 8.5   CO2 26 26 26 24   BUN 37* 18 16 12   CR 0.60 0.45* 0.50* 0.45*   * 135* 128* 139*     CBC  Recent Labs  Lab 08/06/18 0904 08/02/18 0457 08/01/18  0515 07/31/18  0445   WBC 13.8* 11.4* 12.2* 13.1*   RBC 3.74* 4.04 4.11 3.99   HGB 11.2* 12.0 12.4 11.8   HCT 35.1 38.2 38.7 38.0   MCV 94 95 94 95   MCH 29.9 29.7 30.2 29.6   MCHC 31.9 31.4* 32.0 31.1*   RDW 13.3 13.5 13.5 13.5    314 310 283     INRNo lab results found in last 7 days.

## 2018-08-06 NOTE — PLAN OF CARE
"Problem: Patient Care Overview  Goal: Plan of Care/Patient Progress Review  Outcome: No Change  /60 (BP Location: Left arm)  Pulse 92  Temp 98.5  F (36.9  C) (Oral)  Resp 24  Ht 1.6 m (5' 2.99\")  Wt 58.2 kg (128 lb 3.2 oz)  SpO2 98%  BMI 22.71 kg/m2    Neuro: A&Ox4.   Cardiac: No tele- slightly tachy per pulse ox (110s). VSS. AF   Respiratory: Sating >95% on 21% FIO2   GI/: Incont of urine x3. BM X1  Diet/appetite: NPO. TF @ 45 ml/hr   Activity:  Assist of 2, up to chair and to reposition.  Pain: At acceptable level on current regimen.   Skin: No new deficits noted.  LDA's: PIV- SL     Plan: Continue with POC. Notify primary team with changes.      Problem: Chronic Respiratory Difficulty Comorbidity  Goal: Chronic Respiratory Difficulty  Patient comorbidity will be monitored for signs and symptoms of Respiratory Difficulty (Chronic) condition.  Problems will be absent, minimized or managed by discharge/transition of care.   Outcome: No Change  Pt still having copious secretions. Thin. With suctioning q1h        "

## 2018-08-06 NOTE — PLAN OF CARE
"Problem: Patient Care Overview  Goal: Plan of Care/Patient Progress Review  Discharge Planner SLP   Patient plan for discharge: patient wants to discharge to home  Current status: With encouragement the patient agreed to SLP tx this afternoon. RN completed deep suctioning prior to SLP session due to ongoing excessive secretions. The patient tolerated PMSV on cuffless Shiley #6 x5 min x2 (with a 3 min break in between). Progressive WOB noted with ongoing PMSV placement, so session then ended. With PMSV in place the patient's voicing is very quiet and hoarse. She was minimally engaged in this session, repeatedly telling SLP she \"just wants to go home to [place].\"  Recommend PMSV with SLP only.  Barriers to return to prior living situation: tracheostomy with excessive secretions, TF, weakness  Recommendations for discharge: TCU  Rationale for recommendations: SLP is following for communication tx with passGroupFlierir speaking valve       Entered by: Brigida Arceo 08/06/2018 3:55 PM           "

## 2018-08-06 NOTE — PLAN OF CARE
Problem: Patient Care Overview  Goal: Plan of Care/Patient Progress Review  Neuro: Disoriented to situation, otherwise oriented x3. Flat affect. Right side facial droop and right some right sided weakness (unchanged).   Cardiac: HR in 80s. BPs stable. T-max 99.5.   Respiratory: Shiley 6 cuffless via TD 21% (now on actual 'dome' to try and reduce secretions). Suctioning q1-2hr. Mostly white/yellow thin secretions, intermittently pink tinged.   GI/: Incontinent of urine. 2 BM this shift.   Diet/Appetite: Strict NPO. TF to G tube at goal of 45cc/hour with standard FWF.  Activity: Up to chair for 3 hours. Turn q2hrs in bed.   Pain: No complaints of pain.    Skin: Sacral mepilex in place on blanchable redness. Barrier cream to bottom. No new deficits.   LDAs: Left PIV, SL.   Plans to go home when secretions are controlled. Family updated. Will continue to monitor.

## 2018-08-06 NOTE — PROGRESS NOTES
"Otolaryngology Progress Note  August 5, 2018     S:  No acute events overnight. Still requiring frequent suctioning. No bleeding from stoma. Has strong cough, switched to HTD to try to thin secretions. Awaiting disposition plan, difficulty with requirements for frequent suctioning.    O: /82 (BP Location: Right arm)  Pulse 92  Temp 98.5  F (36.9  C) (Axillary)  Resp 18  Ht 1.6 m (5' 2.99\")  Wt 58.2 kg (128 lb 3.2 oz)  SpO2 96%  BMI 22.71 kg/m2     General: resting in bed, interactive, answering questions appropriately by nodding head   Neuro: moving all extremities   HEENT: EOMI. 6-0 cuffless Shiley in place, optifoam dressing under trach flange. Trach ties are snug w/ 2 finger breadths between neck and ties. Tracheostomy site clean, no bleeding. No skin breakdown or erythema under trach ties or face plate.   Pulmonary: breathing comfortably via 6-0 Shiley on HTD, no blood tinged secretions    Intake/Output Summary (Last 24 hours) at 08/04/18 0832  Last data filed at 08/04/18 0400   Gross per 24 hour   Intake             1335 ml   Output                0 ml   Net             1335 ml     Labs from today:  Phos 4.5  Mg 2.8     IMAGING:    MRI: 7/20:  Acute left pontine mesencephalic junction infarct    PET Scan 7/20:   Large hypermetabolic laryngeal mass involving bilateral VF, anterior commissure, epiglottis, left AE fold, effacement of preepiglottic fat, bilateral LAD.   hypermetabolic pulmonary nodules consistent with metastatic disease, small right pleural effusion.  urethral diverticulum containing stones and septations, 4.7 cm left adnexal cystic structure, thoracic abdominal aortic ectasia. 1.9 cm right iliac aneurysm.    Head CT 7/22  Impression:   Evolving changes of left hemipontine infarction. Otherwise, no acute  intracranial pathology.    ECHO 7/24:  Technically difficult study.  Global and regional left ventricular function is probably normal with an EF of  60-65%. No significnat valve " disease.    CT PE protocol 7/25: No PE    PATHOLOGY:  Laryngeal biopsies:  FINAL DIAGNOSIS:   A. LEFT FALSE VOCAL CORD, BIOPSY:   - INVASIVE KERATINIZING SQUAMOUS CELL CARCINOMA, moderately   differentiated.   - Perineural invasion is present.   B. PETIOLE, BIOPSY:   - AT LEAST SQUAMOUS CELL CARCINOMA IN-SITU.   C. ADDITIONAL LEFT FALSE VOCAL CORD, BIOPSY:   - INVASIVE KERATINIZING SQUAMOUS CELL CARCINOMA, moderately   differentiated.   D. RIGHT FALSE VOCAL CORD, BIOPSY:   - AT LEAST MICROINVASIVE SQUAMOUS CELL CARCINOMA, keratinizing type.     FNA R thyroid nodule  - Atypica of undetermined significance. Recommend repeat FNA in 4-6 weeks.     A/P: Keira Collins is a 72-year-old female with a [id number] SCCa of larynx PMH significant for COPD, 140 pack-year tobacco history, alcohol use disorder, HLD, HTN, and PVD who is now s/p direct laryngoscopy with biopsy of laryngeal SCC and tracheotomy on 7/17/18. Hospital course has been complicated by acute ischemic pontine stroke.    Today's changes:  - switched from T-piece to HTD to try to thin secretions    - Tracheostomy care:   - Trach changed 7/24 and 8/1, in good position healing well.   - Tracheoscopy on 8/3 showed small excoriation along posterior tracheal wall likely 2/2 trauma from suctioning.   - Suction only within the lumen of the trach tube, avoid deep suctioning   - Keep trach ties tight around the neck with only 2 finger breadth between skin and ties. If the ties are loose the trach tube will sit along the anterior tracheal wall which could lead to erosion and potential life threatening bleeding from tracheo-innominate fistula if persistently rubbing.    - Perform regular suctioning PRN   - RN should clean inner cannula with a brush Qshift and PRN.    - Keep trach tube obturator taped to wall behind the head of the bed. Keep extra unopened 6-0 Shiley and 4-0 Shiley at bedside at all times.  - SLP consulted for PMSV - recommending use only with SLP present  -  Fanny for secretions, monitor closely for increased viscosity of secretions and increased risk of mucus plugging  - Planning for discharge to TCU once secretions and suctioning needs decrease.   - Will plan for ENT follow up as an outpatient for further discussions of plan of care. Options presented at family conference 8/1 include:   1) surgery (total laryngectomy) to address laryngeal cancer, likely followed by chemo for lung disease   2) palliative chemo/radiation   3) comfort cares  - Will need to obtain lung biopsy when/if feasible and patient/family wanted to proceed with treatment. Plavix would need to be held for 5 days prior to biopsy.        Discussed assessment and plan with Dr. Walden.    Mor Thomson MD, PGY-1  Otolaryngology- Head and Neck Surgery  Please contact ENT with questions by dialing [phone] and entering job code [phone] when prompted.

## 2018-08-07 NOTE — PLAN OF CARE
Problem: Patient Care Overview  Goal: Plan of Care/Patient Progress Review  Outcome: No Change  Neuro: Disoriented to time and situation. PERRLA. R side facial droop (unchanged). Some R side weakness.   Cardiac: No tele. HR in [id number]. BPs stable. Afebrile.   Respiratory: Shiley 6 cuff-less to TD 21% overnight. Suction qhour. Secretions white/thin. Trach cares completed at 0000.  GI/: Incontinent of urine x3 and bowel x2.  +BS and passing gas.    Diet/Appetite: Strict NPO. TF to G tube at goal of 45cc/hour with standard FWF. Q4hour BS.   Activity: AST of 2 for turning q2hour.    Pain: Discomfort at G-tube site. Placed lidocaine patch and gave PRN tylenol x1.   Skin: Sacral mepilex changed/in place on blanchable redness. Continue incontinence cares and q2hour turns.   LDAs: L PIV x1, SL. PEG tube to enteral feeds. Shiley 6 cuff-less to TD 21% FiO2.     Consider WOC consult for redness on bottom? Appears to be related to incontinence, not pressure.       Continue with POC. Notify primary team with changes.   Adriana Boykin RN    Problem: Chronic Respiratory Difficulty Comorbidity  Goal: Chronic Respiratory Difficulty  Patient comorbidity will be monitored for signs and symptoms of Respiratory Difficulty (Chronic) condition.  Problems will be absent, minimized or managed by discharge/transition of care.   Outcome: No Change  Still large-copious amounts of secretions from trach. Strong, productive cough between qhour suctioning. White, thin secretions. TD 21% FiO2, SpO2 > 95%. LS coarse throughout.

## 2018-08-07 NOTE — PROGRESS NOTES
"[facility], [place]   Palliative Care Daily Progress Note          Recommendations, Patient/Family Counseling & Coordination     Keira continues to focus on discharging to home, and denies any pain or significant discomfort. She continues to deflect any attempted discussions of prognosis, values, or goals by mouthing \"home\" and throwing up her hand. She remains alert, though she clearly does not care to engage with this writer on topics of greater import. She continues consistently to decline any sort of surgical intervention.   REC:  - continue low dose methylphenidate (2.5mg up to twice a day, last dose around noon) to help with mood/alertness  - continue to monitor secretions in volume and tenacity while on glycopyrrolate; while it does not appear to have made a significant reduction in volume to date, we would recommend continuing this medication for now  - palliative will continue to follow with you, to facilitate goals of care, family support as desired. Primary team was updated.   - palliative  to continue with support as desired  - POLST not completed     Thank you for the opportunity to continue to participate in the care of this patient and family.  Please feel free to contact on-call palliative provider with any emergent needs.  We can be reached via team pager [phone] (answered 8-4:30 Monday-Friday); after-hours answering service ([phone])    Raymundo SANDOVAL NP ACHPN  Nurse Practitioner- Lead Advanced Practice Provider  [facility] Palliative Medicine Consult Service   [phone]     Greater than 25 minutes was spent in care coordination, counseling and physical examination. Time in room greater than 15 minutes of this.         Assessment      1) Diagnoses & symptoms:        SCC of larynx s/p tracheostomy  Acute L pontine CVA (doi 7/19/18)  Sepsis - resolved  Acute hypoxic respiratory failure secondary to aspiration pneumonia - improved  Severe " "protein-calorie malnutrition - on enteral feedings  HTN  COPD  PVD s/p L femoral endarterectomy  Pain, resolved  Lethargy, improved     2)  Psychosocial/Spiritual Needs:   Ongoing:  support  New: none        Is new assessment/intervention required by palliative team?:  yes         Interval History:   No acute events overnight. Found in room seated upright in chair at bedside. Family not present.     1 soft bowel movement yesterday, and 2 loose overnight. No melenic or bloody stools. Secretions neither bloody nor blood-tinged. Per nursing, had some pain around G-tube overnight, improved with lidocaine patch; possible that urine output somewhat lower in last day, but still with multiple wet briefs today. Given some acetaminophen this AM for pain and temperature of 99.0.     By time of interview, denies any pain or discomfort. Does not have any requests, other than to voice her continued desire to return \"home.\"            Review of Systems:   Palliative Symptom Review (0=no symptom/no concern, 1=mild, 2=moderate, 3=severe):      Pain: 0      Fatigue: 0      Nausea: 0      Constipation: 0      Diarrhea: 0      Depressive Symptoms: 1      Anxiety: 0-1      Drowsiness: 0      Poor Appetite: 0      Shortness of Breath: 1      Insomnia: 0      Overall (0 good/no concerns, 3 very poor):  1            Medications:   I have reviewed this patient's medication profile and medications given in past 24 hours.  Acetaminophen 650mg q6h prn; none from 2100 on 8/1 until 1945 on 8/6; also received at 0835 on 8/7  Glycopyrrolate BID on 8/4, TID since  Methlyphenidate 2.5mg 2x every day; first given 1445 on 8/2  Oxycodone 5-10mg q3h prn; none since 7/19           Physical Exam:   Vitals were reviewed.     /62 (BP Location: Left arm)  Pulse 101  Temp 99  F (37.2  C) (Oral)  Resp 22  Ht 1.6 m (5' 2.99\")  Wt 58.2 kg (128 lb 3.2 oz)  SpO2 97%  BMI 22.71 kg/m2    General: Lying in bed, alone in room watching TV. " Cachectic. Awake, alert  HEENT: NC/AT, EOMI, no scleral icterus. Subtle R facial droop. Tracheostomy in place, dressing c/d/i without surrounding erythema. Thick copious secretions via trach with cough  CV: RRR, no MRG  Resp: CTAB laterally, coarse sounds centrally transmitted from tracheostomy, no wheezes or crackles  Abd: soft, non-distended, non-tender. Bowel sounds present, normoactive.  Ext: No LE edema  Skin: no jaundice, rashes, wounds on examined skin            Data Reviewed:   Metabolic panel with elevated BUN over last two days (39), with nl Cr, mild hyperphos (4.7), otherwise wnl.   CBC with leukocytosis, improved from prior. Worsening anemia over last two days (9.4 from 11.2, down from peak of 12.4 on 8/1). No thrombocytosis or thrombocytopenia.

## 2018-08-07 NOTE — PROGRESS NOTES
[facility], [place]    Internal Medicine Progress Note - Gold Service      Assessment & Plan   Keira Collins is a 72 year old female with history of COPD, HTN, HLD, etoh abuse (in remission) who was admitted to ENT service 7/17 for direct laryngoscopy with biopsy of laryngeal mess, tracheostomy and NTG placement. Course c/b L pontine CVA 7/19 and acute hypoxic respiratory failure due to aspiration PNA. 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 Course c/b CVA and aspiration PNA. ENT discussed case at tumor conference 7/27 and medical oncology 7/31. Surgical resection offered regardless of whether lung nodules are malignant, and would be left with permanent trach. Patient does not want surgical intervention. Treatment of lung nodules TBD at a later date based on functional status. Palliative care consulted. FiO2 needs stable. Continues to require frequent suctioning d/t secretions.   - ENT managing  - Continue Robinul 2 mg tid, hold if secretions thickening  - Only suction for O2 sat drops or symptomatic relief. Hold off if only for gurgling   - Scheduled NS nebs tid  - SLP following; planning to trial PMSV during therapy only  - Trach cares per ENT   - Ritalin per palliative recs d/t mood  - Encourage PT/OT  - Palliative following; may need discuss code status and ongoing goals of care pending dispo     Anemia: Hgb 9.4 (11.2). BUN up but no s/s bleeding. Concern for hemolysis  - CBC, peripheral smear, LDH, haptoglobin today    Acute L pontine CVA:  R facial droop noted 7/19, MRI primo acute L pontine infarct. Outside therapeutic window for TPA. Conservative management recommended with DAPT and statin. No data to support use of DAPT long term (suggest <90 days), but also no consensus in literature regarding duration of DAPT following acute CVA.   - Continue ASA and plavix, duration <90 days given  increased risk of bleeding beyond this  - Cont statin  - SBP goal <160     Severe protein-calorie malnutrition: 2/2 acute on chronic medical issues including cancer, surgery, and CVA/dysphagia. PEG placed 7/27.   - Continue TF at goal rate 45 ml/h  - NPO, SLP following     Tachycardia: HR up to 110s 8/6. Difficult to interpret given difficulty with communicated, but denies s/s infection. CXR no acute findings. Lactic acid normal. Afebrile. HR improved to 90s-100s 8/7    Spiculated RUL nodule: Concerning for malignancy. PET 7/20 hypermetabolic pulmonary nodules c/w metastatic disease. Per d/w Medical Oncology, lung nodule biopsy non-urgent. Oncology also noted high mortality with pulmonary involvement at 1 year. Will need to continue discussion re: goals of care and possible surgical resection.   - OP follow up with ENT and Oncology to discuss timing of potential lung biopsy  - See above re: DAPT. Should be OK to hold/stop plavix prior to any scheduled lung biopsy     HTN: Stable. Goal SBP <160 per neurology in setting of recent CVA. Cont amlodipine and losartan with hold parameters.      Chronic Medical Problems:  COPD: PFTs 7/16/18 FEV-1/FVC 65% with FEV-1 0.58 L. Marina Post-op respiratory failure risk 9.91% (failure to wean of vent within 48h of surgery or unplanned intubation/reintubation). Pulmonary risks with proposed surgery discussion with treatment team and family. Cont Brovana, Pulmicort, Ipratropium/Levalbuterol.   HLD: Continue statin    Hx Alcohol abuse: Currently in remission   PAD: S/p lower extremity endarterectomy and stenting 2017. Previously on ASA and Plavix, but per family patient was not taking plavix regularly     Resolved Hospital Problems:  Sepsis and acute hypoxic respiratory failure d/t aspiration pneumonia: Excessive secretions post-trach c/b acute L pontine CVA. Acute hypoxia, fevers and elevated inflammatory markers 7/24. CXR R>L bibasilar opacities with small R effusion. CT Chest pulmon  angio negative for PE. Sputum 7/22 negative. ? Pulmonary edema contributing. ID consulted and transitioned to moxifloxacin, last dose 7/31. Clinically improved on antibiotics and diuresis. Currently stable      Pain Assessment:  Current Pain Score 8/7/2018   Patient currently in pain? sleeping: patient not able to self report   Pain score (0-10) -   Pain location -   Pain descriptors -   Keira canas pain level was assessed and she currently denies pain.      Diet: NPO for Medical/Clinical Reasons Except for: No Exceptions  Adult Formula Drip Feeding: Continuous Isosource 1.5; Gastrostomy; Goal Rate: 45; mL/hr; Medication - Tube Feeding Flush Frequency: At least 15-30 mL water before and after medication administration and with tube clogging; Amount to Send (Nutritio...  Fluids: None  DVT Prophylaxis: Enoxaparin (Lovenox) SQ  Code Status: Full Code    Disposition Plan   Expected discharge: 2-5 days, recommended to TCU vs other once suction needs determined, ongoing improvement, etc      Entered: Sandie Grace 08/07/2018, 10:55 AM   Information in the above section will display in the discharge planner report.      The patient's care was discussed with the patient, nursing, ENT, and attending physician, Dr. Sean Grace  Internal Medicine Staff Hospitalist Service  [facility]  Pager: [phone]  Please see sticky note for cross cover information    Interval History   Agreeable to decrease suction to only with symptomatic needs, O2 changes, or acute changes. Hopes to go home soon. No chest pain or dyspnea. Denies s/s of active bleeding. Denies pain    Attempted to reach family ( [phone] and Daniela [phone]) for update.     Data reviewed today: I reviewed all medications, new labs and imaging results over the last 24 hours.    Physical Exam   Vital Signs: Temp: 99  F (37.2  C) Temp src: Oral BP: 131/62 Pulse: 101 Heart Rate: 102 Resp: 22 SpO2: 97 % O2 Device: Trach  dome    Weight: 128 lbs 3.16 oz  General Appearance: Alert, whispers answers. Appears frustrated. Trach dome   Respiratory: Bilateral course lungs. No wheezing or crackles  Cardiovascular: RRR, S1, S2. No murmur noted  GI: Abdomen soft, non-tender with bowel sounds active. No guarding or rebound. GT in place, CDI  Skin: No rash or jaundice  Other: No peripheral edema, distal pulses palpable     Data   Data     Recent Labs  Lab 08/07/18  0450 08/06/18  0904 08/06/18  0514 08/02/18  0457   WBC 12.3* 13.8*  --  11.4*   HGB 9.4* 11.2*  --  12.0   MCV 94 94  --  95    247  --  314    137  --  140   POTASSIUM 4.2 4.4  --  4.0   CHLORIDE 104 105  --  107   CO2 25 26  --  26   BUN 39* 37*  --  18   CR 0.54 0.60  --  0.45*   ANIONGAP 8 5  --  7   ESTEFANI 9.1 9.1  --  8.8   * 179*  --  135*   ALBUMIN  --   --  2.6*  --      Recent Results (from the past 24 hour(s))   XR Chest Port 1 View    Narrative    Exam: XR CHEST PORT 1 VW, 8/6/2018 3:43 PM    Indication: New tachycardia, trached patient, concern for PNA;     Comparison: 7/25/2018 CT chest and chest radiograph    Findings:   Upright AP view of the chest. The patient is rotated right.  Tracheostomy tube tip at the upper to mid thoracic trachea. The  cardiomediastinal silhouette and pulmonary vasculature are within  normal limits. Stable trace left pleural effusion and adjacent  opacities. Slightly decreased appearance of a small right pleural  effusion and adjacent basilar opacities. Unchanged spiculated nodule  in the peripheral right upper lobe. No new focal airspace opacity. No  pneumothorax. 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 Decreased small right pleural effusion and adjacent  atelectasis/consolidation since 7/25/2018.  2. Stable trace left pleural effusion and adjacent  atelectasis/consolidation.  3. Unchanged spiculated nodule in the right upper lobe.    MOSHE PALMA, DO

## 2018-08-07 NOTE — PROGRESS NOTES
"SPIRITUAL HEALTH SERVICES  SPIRITUAL ASSESSMENT Progress Note (Palliative Focus)  [facility] 6B    REFERRAL SOURCE: Palliative care follow up.    Visit with patient Keira Collins in her room. Keira mouthed conversation, stating that she wanted to go home, and clarifying that she wanted to return home to live by herself, and that she did not mean heaven and dying, something family had expressed concern about. Keira giggled when told sons were concerned that she wanted to die. She was emphatic that she wished to return to independent living, while also describing her time in the hospital as \"very tough\".     Plan: I will follow for spiritual support.    Tarsha Sullivan  Palliative   Pager [phone]  [facility] Inpatient Team Consult pager [phone] (M-F 8-4:30)  After-hours Answering Service [phone]   "

## 2018-08-07 NOTE — PLAN OF CARE
Problem: Patient Care Overview  Goal: Plan of Care/Patient Progress Review  SLP: Session cancelled today as patient was too lethargic to participate in therapy. Given prognosis and limited participation in skilled therapy will reduce frequency to 3x/week

## 2018-08-07 NOTE — PLAN OF CARE
Problem: Patient Care Overview  Goal: Plan of Care/Patient Progress Review  Discharge Planner OT   Patient plan for discharge: pt strongly prefers home per chart review  Current status: Pt needing max encouragement for participation in therapy and continuous re-education on reason for participating in functional transfers if pt were to discharge home. Pt mod A for bed mobility and max A x2 for sit <> stands. Pt with decreased standing tolerance and forward flexed posture, pt unable to tolerate standing for pivot transfer to commode. Pt dependently lifted to chair. VSS on trach dome 21% FiO2.   Barriers to return to prior living situation: strength, activity tolerance, cognition, trach/medical cares  Recommendations for discharge: TCU  Rationale for recommendations: Pt is far below functional baseline and is currently requiring mechanical lift for OOB activity. Pt would require max A-dependent for all ADL and functional mobility if DC to home, which is pts preference.

## 2018-08-08 NOTE — PLAN OF CARE
Temp: 98.8  F (37.1  C) Temp src: Oral BP: 119/71 Heart Rate: 75 Resp: 20 SpO2: 95 % O2 Device: Trach dome  room air.     Patient napped on and off today. Has remained vitally stable. Denies pain. Up to chair for 3-4 hours today per lift. Tolerated well. Continues to be incontinent of urine and stool in diaper. Trach secretions seemed to have lessened compared to previous RN assessment. Patient suctioned 3-4x this shift. She is mostly able to expectoration secretions; clear/white. Tube feeds at goal, tolerating well. Possible discharge soon to rehab. Possible care conference tomorrow or next.     Will continue with plan of care and close monitoring.     Monica Mckeon RN

## 2018-08-08 NOTE — PLAN OF CARE
Problem: Patient Care Overview  Goal: Plan of Care/Patient Progress Review    6B / Discharge Planner PT   Patient plan for discharge: patient is hopeful to d/c home  Current status: Patient continues to require max encouragement for participation in therapy and continuous re-education on need to participate in functional transfers in order before she would be able to discharge home. Overall patient is making gradual improvements in strength, bed mobility with mod A, sit<>stand x5 reps with mod Ax2 and blocking bilateral knees, once standing walker is placed in front of patient and pt tolerates standing 10-15 sec with FWW + min Ax2, was able to  each foot x1 but not yet able to take steps or pivot transfer. Dependent transfer bed>chair with ceiling lift. VSS throughout on trach dome FiO2 21%.  Barriers to return to prior living situation: medical needs, trach/suctioning, depressed and unmotivated, significantly weak and deconditioned, caregiver dependence, currently requiring mechanical lift for transfers  Recommendations for discharge: TCU  Rationale for recommendations: Pt is far below functional baseline and is currently requiring mechanical lift for transfers. Currently pt would require max A-dependent for all functional mobility and ADLs. Would benefit from continued skilled therapy to progress strength, activity tolerance and independence with functional mobility prior to discharge home.

## 2018-08-08 NOTE — PROGRESS NOTES
Social Work Services Progress Note    Hospital Day: 23  Date of Initial Social Work Evaluation:  7/30/18  Collaborated with:  Pt's son/NIRAJ Dodge    Data:  Keira Collins is a 71 yo female admitted to [facility] 7/17/18 for acute hypoxic respiratory failure d/t aspiration PNA.     Intervention:  SW following for DC plans.    Assessment:  Pt's son and NIRAJ Dodge would like pt placed in TCU near [place] to be close to son . Tracheostomy was placed 7/17/18. New trach (less than 6 months old) will be a barrier to many facilities. Pt has Shiley 6 cuff-less to trach dome 21%. Pt has TF via G tube. Dar is agreeable to having referrals placed to all facilities in or around [facility] that are listed in the [facility] as possibly accepting new tracheostomies, then son will choose from the facilities that accept pt. After DC from TCU, pt will live with son  and 's SO who are both available 24/7.  has already completed one PLC course on 7/19 for trach cares.    Referrals sent via DC on Demand to:  1. [facility] (P: [phone]) : Deneen in admissions is continuing to follow and review.  2. [facility] (P: [phone], F: [phone]):   3. [facility] (P: [phone], F: [phone])  4. [facility] (P: [phone], F: [phone])  5. [facility] (P: [phone], F: [phone])  6. [facility] (P: [phone], F: [phone])  7. [facility] (P: [phone], F: [phone])  8. [facility] [place] (P: [phone], F: [phone])  9. [facility] (P: [phone], F: [phone])  10. [facility] (P: [phone], F: [phone])    Plan:    Anticipated Disposition:  Per PA-C, possible DC 1-2 days. Referrals are out to TCUs; no facility has accepted yet.    Barriers to d/c plan:  TCU placement    Follow Up:  KAMRAN will  continue to follow and assist as needed.    SHAKIR Simmons, [facility]  6B Intermediate Care Unit  Phone: [phone]  Pager: [phone]

## 2018-08-08 NOTE — PROGRESS NOTES
CLINICAL NUTRITION SERVICES - REASSESSMENT NOTE     Nutrition Prescription    RECOMMENDATIONS FOR MDs/PROVIDERS TO ORDER:  Fluids per team - current water flushes for tube patency only (do not meet hydration needs - would need ~100 mL flushes q 4 hrs to meet needs). Continue with discussion of goals of care and prognosis.    Malnutrition Status:    Severe malnutrition in the context of chronic illness    Recommendations already ordered by Registered Dietitian (RD):  None    Future/Additional Recommendations:  - Monitor for GOC/POC - poss care conference tomorrow ?  - If pt to discharge on tube feed, contact RD to cycle tube feeds - would recommend cycle @ 60 mL/hr x 18 hrs to start and then possibly transition to 90 mL/hr x 12 hrs pending tolerance to initial cycle     Addendum @ 12:32 - Per discussion with team, will increase water flushes to 60 ml q 4 hrs today and increase to 100 ml q 4 hrs 8/9 to meet hydration needs. Will hold off on cycling feeds until tomorrow to ensure pt tolerates increased water flushes.     EVALUATION OF THE PROGRESS TOWARD GOALS   Diet: NPO    Nutrition Support: 7/27-___: IsoSource 1.5 @ goal of 45ml/hr, 1080 ml/day, 1620 kcals (29 kcal/kg/day), 73 g PRO (1.3 g/kg/day), 821 ml free H2O, 190 g CHO and 16 g Fiber daily. (PEG placed)    Intake: Per I/Os documentation, pt avg intake over past 6 days 1058 mL/day which provides 1587 kcal/day (28 kcal/kg, 100% lower end estimated needs) and 72 g PRO/day (1.3 g/kg, 100% lower end estimated needs).     NEW FINDINGS   Weight: up ~3lbs over past week - suspect fluid related given pt fluid up since admit per I/O's  Labs: Cr: 0.54 (WNL) - improved from low last week; Phos: 4.8 (H)  Meds: folic acid, culturelle, multivitamin, thiamine  GI: BM+ and passing gas. Incontinent of urine x6 and one smear BM this AM  Skin: sacrum- blanchable reddness  - See SW note for further GOC conversations  - PEG placed on 7/27.     MALNUTRITION  % Intake: No decreased  intake noted  % Weight Loss: None noted  Subcutaneous Fat Loss: Upper arm:  moderate, Lower arm: severe and Thoracic/intercostal:  Mild- moderate  Muscle Loss: Temporal: moderate-severe, Facial & jaw region:  moderate, Upper arm (bicep, tricep):  severe, Lower arm  (forearm):  severe, Dorsal hand:  severe, Upper leg (quadricep, hamstring):  severe and Posterior calf: severe - no change  Fluid Accumulation/Edema: None noted  Malnutrition Diagnosis: Severe malnutrition in the context of chronic illness    Previous Goals   Total avg nutritional intake to meet a minimum of 25 kcal/kg and 1.2 g PRO/kg daily (per dosing wt 56 kg).  Evaluation: Met    Previous Nutrition Diagnosis  Inadequate protein-energy intake related to delayed placement of PEG, NPO and slow advancement of TF as evidenced by pt NPO for the past 7 days + with TF only started 4 days ago and total TF intakes > goal.    Evaluation: Improving    CURRENT NUTRITION DIAGNOSIS  Inadequate oral intake related to inability to swallow PO intake as evidenced by pt reliant on tube feeds to meet 100% nutrition and hydration needs.       INTERVENTIONS  Implementation  Collaboration with other providers - 6B rounds    Goals  Total avg nutritional intake to meet a minimum of 25 kcal/kg and 1.2 g PRO/kg daily (per dosing wt 56 kg).    Monitoring/Evaluation  Progress toward goals will be monitored and evaluated per protocol.      Shwetha Hercules MS, RD, LD  Unit 6B pgr: [phone]

## 2018-08-08 NOTE — PLAN OF CARE
Problem: Patient Care Overview  Goal: Plan of Care/Patient Progress Review  Outcome: No Change  Neuro: Disoriented to time and situation. PERRLA. R side facial droop (unchanged). Some R side weakness.   Cardiac: No tele. HR in 80s-90s. BPs stable. Afebrile.   Respiratory: Shiley 6 cuff-less to TD 21% overnight. Patient car order to NOT suction unless O2 requires or patient requests. Suctioned 3 times total overnight. Strong, productive cough. Intermittent red-streaked secretions. Trach cares completed at 0000.  GI/: Incontinent of urine x6 and one smear BM this AM.  +BS and passing gas.    Diet/Appetite: Strict NPO. TF to G tube at goal of 45cc/hour with standard FWF.   Activity: AST of 2 for turning q2hour.    Pain: Discomfort at G-tube site. Placed lidocaine patch and gave PRN tylenol x1 and oxycodone x1.  Skin: Sacral mepilex removed d/t holding in moisture. Barrier cream application and good incontinence cares. Bottom does appear less pink tonight.  LDAs: L PIV x1, SL. PEG tube to enteral feeds. Shiley 6 cuff-less to TD 21% FiO2.      Repeated requests to go home. Required more reorientation than previous shifts.        Continue with POC. Notify primary team with changes.   Adriana Boykin RN    Problem: Chronic Respiratory Difficulty Comorbidity  Goal: Chronic Respiratory Difficulty  Patient comorbidity will be monitored for signs and symptoms of Respiratory Difficulty (Chronic) condition.  Problems will be absent, minimized or managed by discharge/transition of care.   Outcome: No Change  Much less frequent suctioning d/t patient care order. Intermittent red-streaked secretions, MD aware. Denies SOB. LS coarse throughout.

## 2018-08-08 NOTE — PLAN OF CARE
Problem: Patient Care Overview  Goal: Plan of Care/Patient Progress Review  Discharge Planner SLP   Patient plan for discharge: Possibly TCU?  Current status: Pt seen for PMSV treatment session in combination with palliative care per their request for pt to attempt to discuss goals of care. Pt agreeable to treatment session, but requires encouragment to participate. She was deep suctioned by RN prior to PMSV trials - extracting small amount of secretions. Pt tolerated the speaking valve in place for less than 5 minutes. Towards the end of the 5 minutes she was becoming somewhat restless and appeared short of breath. She did maintain O2 sats in the mid 90s t/o session. When valve was removed audible air emission escaped from the trach tube indicating an inability to fully pass air through the upper airway. Allowed pt a break without the valve and then she refused to any additional trials. Her vocal quality remains very weak, hoarse, and only partially intelligible.     Continue to recommend speaking valve placement only with SLP. Plan of care now changed to 3x/week given pt's prognosis, and reduced participation/motivation      Barriers to return to prior living situation: Trach, PEG, deconditioned   Recommendations for discharge: TCU with ongoing speech services  Rationale for recommendations: Trach, PEG, PMSV        Entered by: Nelly Castellon 08/08/2018 2:29 PM

## 2018-08-08 NOTE — PLAN OF CARE
"Problem: Patient Care Overview  Goal: Plan of Care/Patient Progress Review  Outcome: Improving  /78 (BP Location: Left arm)  Pulse 101  Temp 97.5  F (36.4  C) (Oral)  Resp 22  Ht 1.6 m (5' 2.99\")  Wt 58.2 kg (128 lb 3.2 oz)  SpO2 94%  BMI 22.71 kg/m2    Neuro: A&Ox3 (waxes and wanes) alert and awake most of day   Cardiac: HR 80-90s. VSS. T max: 99 (tylenol given)     Respiratory: Sating 98%  on 21% FIO2 trach dome, pt clearing secretions well today. Suctioned x1. Per orders only suction if pt desats or is in distress   GI/: incont of urine. No BM   Diet/appetite: Tolerating TF @ 45 ml/hr   Activity:  Assist of 1-2, up to chair and to reposition   Pain: At acceptable level on current regimen. Tylenol given x1 for temp of 99   Skin: sacrum- blanchable reddness, barrier cream applied   LDA's: PIV- SL     Plan: Continue with POC. Notify primary team with changes.      Problem: Chronic Respiratory Difficulty Comorbidity  Goal: Chronic Respiratory Difficulty  Patient comorbidity will be monitored for signs and symptoms of Respiratory Difficulty (Chronic) condition.  Problems will be absent, minimized or managed by discharge/transition of care.   Outcome: Improving  Pt required suctioning x1 today. Clearing secretions well       "

## 2018-08-08 NOTE — PROGRESS NOTES
[facility], [place]    Internal Medicine Progress Note - Gold Service      Assessment & Plan   Keira Collins is a 72 year old female with history of COPD, HTN, HLD, etoh abuse (in remission) who was admitted to ENT service 7/17 for direct laryngoscopy with biopsy of laryngeal mess, tracheostomy and NTG placement. Course c/b L pontine CVA 7/19 and acute hypoxic respiratory failure due to aspiration PNA. 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 Course c/b CVA and aspiration PNA. ENT discussed case at tumor conference 7/27 and medical oncology 7/31. Surgical resection offered regardless of whether lung nodules are malignant, and would be left with permanent trach. Patient does not want surgical intervention. Treatment of lung nodules TBD at a later date based on functional status. Palliative care consulted. FiO2 needs stable. Suction needs significantly down over the past 24-36 hours  - ENT managing  - Continue Robinul 2 mg tid, hold if secretions thickening  - Only suction for O2 sat drops or symptomatic relief. Hold off if only for gurgling   - Scheduled NS nebs tid  - SLP following; planning to trial PMSV during therapy only  - Trach cares per ENT   - Ritalin per palliative recs d/t mood  - Encourage PT/OT     Worsening anemia: Hgb 8.8 (9.2, normal 12.0). No s/s of GIB or oral bleed. Retics 1.5 8/7. LDH normal, haptoglobin 528 making hemolysis unlikely. Possible nutrition etiology vs anemia of chronic disease  - Follow peripheral smear  - Folate, B12, iron studies, ferritin today    Acute L pontine CVA:  R facial droop noted 7/19, MRI primo acute L pontine infarct. Outside therapeutic window for TPA. Conservative management recommended with DAPT and statin. No data to support use of DAPT long term (suggest <90 days), but also no consensus in literature regarding duration of DAPT following acute CVA.    - Continue ASA and plavix, duration <90 days given increased risk of bleeding beyond this  - Cont statin  - SBP goal <160     Severe protein-calorie malnutrition: 2/2 acute on chronic medical issues including cancer, surgery, and CVA/dysphagia. PEG placed 7/27.   - Continue TF at goal rate 45 ml/h, will darline transition to cycled 8/9 vs 8/10  - Increase FWF to 60 ml q4h (per nutrition would need 100 ml q4h for hydration needs)  - NPO, SLP following     Spiculated RUL nodule: Concerning for malignancy. PET 7/20 hypermetabolic pulmonary nodules c/w metastatic disease. Per d/w Medical Oncology, lung nodule biopsy non-urgent. Oncology also noted high mortality with pulmonary involvement at 1 year. Will need to continue discussion re: goals of care and possible surgical resection.   - OP follow up with ENT and Oncology to discuss timing of potential lung biopsy  - See above re: DAPT. Should be OK to hold/stop plavix prior to any scheduled lung biopsy     HTN: Stable. Goal SBP <160 per neurology in setting of recent CVA. Cont amlodipine and losartan with hold parameters.      Chronic Medical Problems:  Tachycardia: HR up to 110s 8/6. Difficult to interpret given difficulty with communicated, but denies s/s infection. CXR no acute findings. Lactic acid normal. Afebrile. Normalized 8/8  COPD: PFTs 7/16/18 FEV-1/FVC 65% with FEV-1 0.58 L. Gray Post-op respiratory failure risk 9.91% (failure to wean of vent within 48h of surgery or unplanned intubation/reintubation). Pulmonary risks with proposed surgery discussion with treatment team and family. Cont Brovana, Pulmicort, Ipratropium/Levalbuterol.   HLD: Continue statin    Hx Alcohol abuse: Currently in remission   PAD: S/p lower extremity endarterectomy and stenting 2017. Previously on ASA and Plavix, but per family patient was not taking plavix regularly     Resolved Hospital Problems:  Sepsis and acute hypoxic respiratory failure d/t aspiration pneumonia: Excessive  secretions post-trach c/b acute L pontine CVA. Acute hypoxia, fevers and elevated inflammatory markers 7/24. CXR R>L bibasilar opacities with small R effusion. CT Chest pulmon angio negative for PE. Sputum 7/22 negative. ? Pulmonary edema contributing. ID consulted and transitioned to moxifloxacin, last dose 7/31. Clinically improved on antibiotics and diuresis. Currently stable      Pain Assessment:  Current Pain Score 8/8/2018   Patient currently in pain? denies   Pain score (0-10) -   Pain location -   Pain descriptors -   Keira canas pain level was assessed and she currently denies pain.      Diet: NPO for Medical/Clinical Reasons Except for: No Exceptions  Adult Formula Drip Feeding: Continuous Isosource 1.5; Gastrostomy; Goal Rate: 45; mL/hr; Medication - Tube Feeding Flush Frequency: At least 15-30 mL water before and after medication administration and with tube clogging; Amount to Send (Nutritio...  Fluids: None  DVT Prophylaxis: Enoxaparin (Lovenox) SQ  Code Status: Full Code    Disposition Plan   Expected discharge: 1-2 days, recommended to TCU vs other once suction needs determined, ongoing improvement, etc      Entered: Sandie Grace 08/08/2018, 12:19 PM   Information in the above section will display in the discharge planner report.      The patient's care was discussed with the patient, nursing, ENT, SW,  family, nutrition, and attending physician, Dr. Sean Grace  Internal Medicine Staff Hospitalist Service  [facility]  Pager: [phone]  Please see sticky note for cross cover information    Interval History   No acute changes with decreased suction. Per nursing, clearing her own secretions. Denies pain today. Claps hands when discharge discussed. Does not object to TCU at this time. Denies racing HR, chest pain, dyspnea. Per nursing stools have been loose and brownish-green. No melena, hematochezia, hematemesis. Some slightly pink/red tinged secretions  overnight that have resolved today per nursing.     SonDar ([phone]) updated via phone    Data reviewed today: I reviewed all medications, new labs and imaging results over the last 24 hours.    Physical Exam   Vital Signs: Temp: 98.3  F (36.8  C) Temp src: Oral BP: 134/72 Pulse: 98 Heart Rate: 100 Resp: 22 SpO2: 95 % O2 Device: Trach dome    Weight: 128 lbs 11.98 oz  General Appearance: Alert, whispers answers. Appears frustrated. Trach dome   Respiratory: Bilateral course lungs. No wheezing or crackles  Cardiovascular: RRR, S1, S2. No murmur noted  GI: Abdomen soft, non-tender with bowel sounds active. No guarding or rebound. GT in place, CDI  Skin: No rash or jaundice  Other: No peripheral edema, distal pulses palpable     Data   Data     Recent Labs  Lab 08/08/18  0915 08/07/18  1625 08/07/18  0450 08/06/18  0904 08/06/18  0514   WBC 12.6* 12.7* 12.3* 13.8*  --    HGB 8.8* 9.2* 9.4* 11.2*  --    MCV 95 95 94 94  --     250 235 247  --      --  137 137  --    POTASSIUM 4.4  --  4.2 4.4  --    CHLORIDE 105  --  104 105  --    CO2 24  --  25 26  --    BUN 30  --  39* 37*  --    CR 0.53  --  0.54 0.60  --    ANIONGAP 8  --  8 5  --    ESTEFANI 9.0  --  9.1 9.1  --    *  --  155* 179*  --    ALBUMIN  --   --   --   --  2.6*     No results found for this or any previous visit (from the past 24 hour(s)).

## 2018-08-08 NOTE — PLAN OF CARE
Problem: Patient Care Overview  Goal: Plan of Care/Patient Progress Review  Discharge Planner OT   Patient plan for discharge: Pt would like to discharge home.   Current status: Pt needed maximum encouragement for participation in any activity this session. Th was eventually able to convince pt to engage in BUE exercises while sitting upright, pillow placed behind pts back to improve upright posture. Pt then completed shoulder flex/ext, add/abd, elbow flex/ext, forward punches, and int/ext rotation of shoulder, x10 reps of each with rest breaks in between and extended time for pt to perform after Th demo.    Barriers to return to prior living situation: deconditioning, fatigue, Ax2, balance and coordination deficits   Recommendations for discharge: TCU  Rationale for recommendations: Pt is well below baseline level of function and will need additional therapy to increase safety and independence with ADLs and functional mobility.        Entered by: Chiqui Isaacs 08/08/2018 4:09 PM

## 2018-08-08 NOTE — PROGRESS NOTES
"[facility], [place]    Palliative Care Progress Note    Patient: Keira Collins  Date of Admission:  7/17/2018    Recommendations:  - palliative medicine will continue to follow 1-2 times per week, please page if needs arise  - continue methylphenidate (2.5mg up to bid prior to noon)  - continue glycopyrrolate 2mg tid for secretions  - palliative  will continue to support intermittently or as desired  - recommend POLST prior to discharge    Assessment  Keira Collins is a 72 year old woman with laryngeal squamous cell carcinoma s/p tracheostomy, whose hospital stay has been complicated by an acute L pontine stroke. Her secretions are improving with glycopyrrolate and requiring less frequent suctioning. However, she is far below her functional baseline, and requiring significant assistance with activity, mobility, and transfers.     Symptoms:   - Secretions  - Discomfort around PEG site, improved with lidocaine patch and acetaminophen  Social:              Living situation: previously at home       Support system: sara       Actual/Potential Caregiver: JR sara/Dar.  has completed coursework for trach cares per recent SW note       Functional status: previously independent; noted to be below functional status at this time       Substance use disorder (past / present): AUD    Prognostic Information: Previously discussed with patient, sara via care conference.   Advance Care Planning: ongoing        Decision making capacity: Y       Disease understanding: fair understanding of disease, limited appreciation of functional limitations and care needs       Goals of Care: \"Home\"       Health care directive: N       Code Status:  Full Code       POLST There is no POLST (Physician orders for life-sustaining treatment) form on file for this patient    These recommendations have been discussed with the primary team.    LORI Neville CNS  Palliative Care Consult Team  Pager: " "[phone]    [facility] Inpatient Team Consult pager [phone] (M-F 8-4:30)  After-hours Answering Service [phone]   Palliative Clinic: [phone]       Interval History:      Didn't sleep well overnight. Not sure why - denies intrusive thoughts, difficulty breathing, pain, interruptions overnight. Denies pain. Endorses some continued discomfort around her PEG, improved with lidocaine patch and acetaminophen. Denies worsening shortness of breath, diarrhea/constipation, dysuria. Wants to go \"home.\" Continues to have loose bowel movements, but no melenic or bloody stools per nursing.      Medications:   I have reviewed this patient's medication profile and medications during this hospitalization    Acetaminophen 650mg q6h prn  Glycopyrrolate BID on 8/4, TID since  Methlyphenidate 2.5mg 2x every day; first given 1445 on 8/2  Oxycodone 5-10mg q3h prn; none since 7/19      Review of Systems:   A comprehensive ROS has been negative other than stated in the HPI and below:   Palliative Symptom Review (0=no symptom/no concern, 1=mild, 2=moderate, 3=severe):      Pain: 0      Fatigue: 1      Nausea: 0      Constipation: 0      Diarrhea: 0      Depressive Symptoms: 0      Anxiety: 0      Drowsiness: 0      Poor Appetite: 0      Shortness of Breath: 1      Insomnia: 1      Overall (0 good/no concerns, 3 very poor):  1          Physical Exam:     Vital Signs: Temp: 98.3  F (36.8  C) Temp src: Oral BP: 134/72 Pulse: 98 Heart Rate: 100 Resp: 22 SpO2: 95 % O2 Device: Trach dome    Weight: 128 lbs 11.98 oz    Physical Exam:  Gen: Resting in bed, in NAD, watching TV  HEENT: EOMI, no scleral icterus. Mild R facial droop, unchanged from prior. Tracheostomy in place, no surrounding erythema. Thick secretions via trach with cough, able to clear  CV: RRR, no MRG  Resp: CTAB after clearing secretions, no wheezes or crackles  Abd: Soft, non-distended, non-tender. BS+, normoactive  Ext: WWP, no LE edema  Skin: No jaundice, " tony    Data Reviewed:   BMP wnl. Phos elevated (4.8)  Lactic acid 1.5  CBC with stable leukocytosis, worsening anemia (8.8), no thrombocytopenia    LORI Neville CNS  Palliative Care Consult Team  Pager: [phone]    [facility] Inpatient Team Consult pager [phone] (M-F 8-4:30)  After-hours Answering Service [phone]  Palliative Clinic: [phone]     Total time spent was 35 minutes,  >50% of time was spent counseling and/or coordination of care regarding goals of care and symptom management.

## 2018-08-09 NOTE — PLAN OF CARE
Problem: Patient Care Overview  Goal: Plan of Care/Patient Progress Review  Outcome: No Change  Neuro: Disoriented to time, place and situation. Confused, but redirectable. Pulled out inner cannula x1, mitts applied.  Cardiac: Afebrile. SR w/HR in the 90's. VSS.   Respiratory: Shiley #6, cuffless. Sating >92% on 21% TD. Per order, only suction for O2 or symptomatic relief. Suctioned x1. PMSV w/speech only.   GI/: Incontinent w/adequate UOP. BM X3.  Diet/appetite: NPO with TF's at 45mL/hr. Denies N/V.  Activity:  Assist of 2, repositioned q2hrs.  Pain: Denied pain overnight.  Skin: Blanchable redness on buttocks. Protective cream and barrier applied.  LDA's: L PIV - SL, PEG    Plan: Continue with POC. Notify primary team with changes.    Problem: Chronic Respiratory Difficulty Comorbidity  Goal: Chronic Respiratory Difficulty  Patient comorbidity will be monitored for signs and symptoms of Respiratory Difficulty (Chronic) condition.  Problems will be absent, minimized or managed by discharge/transition of care.   Outcome: No Change  Suctioned x1 for symptomatic relief.

## 2018-08-09 NOTE — PLAN OF CARE
Transfer.    Transferred to:  5a  Via: bed with RN  Reason for transfer: Pt no longer appropriate for 6B- improved patient condition  Family: Aware of transfer  Belongings: Packed and sent with pt  Chart: Delivered with pt to next unit  Medications: Meds sent to new unit with pt  Report given to: Buddy KING  Pt status: Stable on Room air trach dome    Temp: 97.6  F (36.4  C) Temp src: Oral BP: 133/61 Heart Rate: 105 Resp: 28 SpO2: 100 % O2 Device: Trach dome  21%FiO2.

## 2018-08-09 NOTE — PLAN OF CARE
Problem: Patient Care Overview  Goal: Plan of Care/Patient Progress Review  Outcome: No Change  Pt arrived from 6B around 17:20. Pt has new trach in that was placed 7/17. Pt cannot speak and does not have speaking valve. Pt finished abx on 7/31 for poss asp PNA. Pt had CVA while hospitalized as wellNPO permanently. Pt has PEG tube that was placed 7/26. Infusing TF at GR of 45mL/hr continuously. L PIV SL. Pt has trach with trach dome on 21% FIO2 and no O2. Obturator and extra cannulas taped behind pts bed. Pt has excessive secretions post trach placement. Per [place], we are to limit trach suctioning to 1-2x a shift (q4 PRN). Excessive suctioning will increase risk of damaging tissues. Trach last suctioned when pt arrived to unit around 17:45. Pt to have inner canula cleaned with trach kit q8hr. Last done around 14:00 per report. Pt has soft restraints on d/t taking inner canula out of trach and leaving it on her chest. Pt seen by psych to assess mental status decline. Possible DC to TCU tomorrow, pt has been accepted at x2 locs per SW note.

## 2018-08-09 NOTE — PROGRESS NOTES
Social Work Services Progress Note    Hospital Day: 24  Date of Initial Social Work Evaluation:  7/30/18  Collaborated with:  Left  for son Dar (P: [phone]).    Data:  Keira Collins is a 71 yo female admitted to [facility] 7/17/18 for acute hypoxic respiratory failure d/t aspiration PNA.     Intervention:  SW following for DC plans.    Assessment:  Per RN note, pt continues to be confused and had pulled out inner cannula x1 so mitts were applied overnight, pt no longer in mitts this AM. Pt's son and NIRAJ Dodge would like pt placed in TCU near [place] to be close to son . Tracheostomy was placed 7/17/18. New trach (less than 6 months old) will be a barrier to many facilities. Pt has Shiley 6 cuff-less to trach dome 21%. Pt has TF via G tube. Dar is agreeable to having referrals placed to all facilities in or around [facility] that are listed in the [facility] as possibly accepting new tracheostomies, then son will choose from the facilities that accept pt. After DC from TCU, pt will live with son  and 's SO who are both available 24/7.  has already completed one PLC course on 7/19 for trach cares.    Referrals sent via DC on Demand to:  1. [facility] (P: [phone]) : Declined d/t feeling like pt will need a longer rehab course.  2. [facility] (P: [phone], F: [phone])  3. [facility] (P: [phone], F: [phone]) : Still reviewing, sent updated therapy notes.  4. [facility] [place] (P: [phone], F: [phone]) : Declined, cannot accept trach placed less than 90 days ago.  5. [place] [place] (P: [phone], F: [phone]) : Accepted, could offer private room at no extra charge.  6. [facility] (P: [phone], F: [phone])  7. [facility] (P: [phone], F: [phone])  8. [facility] [place] (P: [phone], F:  [phone])  9. [facility] (P: [phone], F: [phone])  10. [facility] (P: [phone], F: [phone]) : Accepted, could offer private room at no extra charge. Spoke to Martina in admissions.     Plan:    Anticipated Disposition:  Likely DC Fri 8/10. Two TCUs accepted pt. Eladio Dodge is going to speak to eladio DUBON, but is leaning towards the TCU in [place]. SW will touch base with Dar Friday morning.    Barriers to d/c plan:  none    Follow Up:  SW will continue to follow and assist as needed.    SHAKIR Simmons, [facility]  6B Intermediate Care Unit  Phone: [phone]  Pager: [phone]

## 2018-08-09 NOTE — PROGRESS NOTES
"Otolaryngology Progress Note  August 9, 2018     S:  No acute events overnight. Requiring less frequent suctioning with robinul. Awaiting disposition plan.     O: /74 (BP Location: Left arm)  Pulse 100  Temp 99.7  F (37.6  C) (Axillary)  Resp 28  Ht 1.6 m (5' 2.99\")  Wt 57.8 kg (127 lb 6.8 oz)  SpO2 99%  BMI 22.58 kg/m2     General: sleeping comfortably supine in bed   HEENT: 6-0 cuffless Shiley in place, optifoam dressing under trach flange, some scant secretions wiped away with gauze. Trach ties are snug w/ 2 finger breadths between neck and ties. Tracheostomy site clean, no bleeding. No skin breakdown or erythema under trach ties or face plate.   Pulmonary: breathing comfortably via 6-0 Shiley on HTD, no blood tinged secretions    Intake/Output Summary (Last 24 hours) at 08/04/18 0832  Last data filed at 08/04/18 0400   Gross per 24 hour   Intake             1335 ml   Output                0 ml   Net             1335 ml     Labs from today:  Phos 4.5  Mg 2.8     IMAGING:    MRI: 7/20:  Acute left pontine mesencephalic junction infarct    PET Scan 7/20:   Large hypermetabolic laryngeal mass involving bilateral VF, anterior commissure, epiglottis, left AE fold, effacement of preepiglottic fat, bilateral LAD.   hypermetabolic pulmonary nodules consistent with metastatic disease, small right pleural effusion.  urethral diverticulum containing stones and septations, 4.7 cm left adnexal cystic structure, thoracic abdominal aortic ectasia. 1.9 cm right iliac aneurysm.    Head CT 7/22  Impression:   Evolving changes of left hemipontine infarction. Otherwise, no acute  intracranial pathology.    ECHO 7/24:  Technically difficult study.  Global and regional left ventricular function is probably normal with an EF of  60-65%. No significnat valve disease.    CT PE protocol 7/25: No PE    PATHOLOGY:  Laryngeal biopsies:  FINAL DIAGNOSIS:   A. LEFT FALSE VOCAL CORD, BIOPSY:   - INVASIVE KERATINIZING SQUAMOUS CELL " CARCINOMA, moderately   differentiated.   - Perineural invasion is present.   B. PETIOLE, BIOPSY:   - AT LEAST SQUAMOUS CELL CARCINOMA IN-SITU.   C. ADDITIONAL LEFT FALSE VOCAL CORD, BIOPSY:   - INVASIVE KERATINIZING SQUAMOUS CELL CARCINOMA, moderately   differentiated.   D. RIGHT FALSE VOCAL CORD, BIOPSY:   - AT LEAST MICROINVASIVE SQUAMOUS CELL CARCINOMA, keratinizing type.     FNA R thyroid nodule  - Atypica of undetermined significance. Recommend repeat FNA in 4-6 weeks.       A/P: Keira Collins is a 72-year-old female with a [id number] SCCa of larynx PMH significant for COPD, 140 pack-year tobacco history, alcohol use disorder, HLD, HTN, and PVD who is now s/p direct laryngoscopy with biopsy of laryngeal SCC and tracheotomy on 7/17/18. Hospital course has been complicated by acute ischemic pontine stroke.    - Tracheostomy care:   - Trach changed 7/24 and 8/1, in good position healing well.   - Tracheoscopy on 8/3 showed small excoriation along posterior tracheal wall likely 2/2 trauma from suctioning.   - Suction only within the lumen of the trach tube, avoid deep suctioning   - Keep trach ties tight around the neck with only 2 finger breadth between skin and ties. If the ties are loose the trach tube will sit along the anterior tracheal wall which could lead to erosion and potential life threatening bleeding from tracheo-innominate fistula if persistently rubbing.    - Perform regular suctioning PRN   - RN should clean inner cannula with a brush Qshift and PRN.    - Keep trach tube obturator taped to wall behind the head of the bed. Keep extra unopened 6-0 Shiley and 4-0 Shiley at bedside at all times.  - SLP consulted for PMSV  - Robniul for secretions, monitor closely for increased viscosity of secretions and increased risk of mucus plugging  - Planning for discharge to TCU once secretions and suctioning needs decrease.   - Will plan for ENT follow up as an outpatient for further discussions of plan of care.  Options presented at family conference 8/1 include:   1) surgery (total laryngectomy) to address laryngeal cancer, likely followed by chemo for lung disease   2) palliative chemo/radiation   3) comfort cares  - Will need to obtain lung biopsy when/if feasible and patient/family wanted to proceed with treatment. Plavix would need to be held for 5 days prior to biopsy.        Discussed assessment and plan with Dr. Walden.    Mor Thomson MD, PGY-1  Otolaryngology- Head and Neck Surgery  Please contact ENT with questions by dialing [phone] and entering job code [phone] when prompted.

## 2018-08-09 NOTE — PROGRESS NOTES
[facility], [place]    Internal Medicine Progress Note - Gold Service      Assessment & Plan   Keira Collins is a 72 year old female with history of COPD, HTN, HLD, etoh abuse (in remission) who was admitted to ENT service 7/17 for direct laryngoscopy with biopsy of laryngeal mess, tracheostomy and NTG placement. Course c/b L pontine CVA 7/19 and acute hypoxic respiratory failure due to aspiration PNA. 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 Course c/b CVA and aspiration PNA. ENT discussed case at tumor conference 7/27 and medical oncology 7/31. Surgical resection offered regardless of whether lung nodules are malignant, and would be left with permanent trach. Patient does not want surgical intervention. Treatment of lung nodules TBD at a later date based on functional status. Palliative care consulted. FiO2 needs stable. Suction needs significantly down over the 48 hours  - ENT managing  - Psychiatry consult for decision making capacity and assistance with mood  - Continue Robinul 2 mg tid, hold if secretions thickening  - Only suction for O2 sat drops or symptomatic relief. Hold off if only for gurgling   - Scheduled NS nebs tid  - SLP following; planning to trial PMSV during therapy only  - Trach cares per ENT   - Ritalin per palliative recs d/t mood  - Transfer to medicine floor when bed available   - Encourage PT/OT     Acute anemia: Hgb 9.2 (8.8, normal 8/2). No s/s of GIB or oral bleed. Retics 1.5 8/7. LDH normal, haptoglobin 528 making hemolysis unlikely. Ferritin very elevated to 1069 but in setting of cancer. Iron low, 26, IBC 10. Folate and B12 normal  - Start daily iron  - Check CRP    Acute L pontine CVA:  R facial droop noted 7/19, MRI primo acute L pontine infarct. Outside therapeutic window for TPA. Conservative management recommended with DAPT and statin. No data to support use of  DAPT long term (suggest <90 days), but also no consensus in literature regarding duration of DAPT following acute CVA.   - Continue ASA and plavix, duration <90 days given increased risk of bleeding beyond this  - Cont statin  - SBP goal <160     Severe protein-calorie malnutrition: 2/2 acute on chronic medical issues including cancer, surgery, and CVA/dysphagia. PEG placed 7/27.   - Continue TF at goal rate 45 ml/h, will darline transition to cycled 8/9 vs 8/10  - Continue FWF 60 ml q4h (per nutrition would need 100 ml q4h for hydration needs)  - NPO, SLP following     Spiculated RUL nodule: Concerning for malignancy. PET 7/20 hypermetabolic pulmonary nodules c/w metastatic disease. Per d/w Medical Oncology, lung nodule biopsy non-urgent. Oncology also noted high mortality with pulmonary involvement at 1 year. Will need to continue discussion re: goals of care and possible surgical resection.   - OP follow up with ENT and Oncology to discuss timing of potential lung biopsy  - See above re: DAPT. Should be OK to hold/stop plavix prior to any scheduled lung biopsy     HTN: Stable. Goal SBP <160 per neurology in setting of recent CVA. Cont amlodipine and losartan with hold parameters.      Chronic Medical Problems:  COPD: PFTs 7/16/18 FEV-1/FVC 65% with FEV-1 0.58 L. Gray Post-op respiratory failure risk 9.91% (failure to wean of vent within 48h of surgery or unplanned intubation/reintubation). Pulmonary risks with proposed surgery discussion with treatment team and family. Cont Brovana, Pulmicort, Ipratropium/Levalbuterol.   HLD: Continue statin    H/o alcohol abuse: Currently in remission   PAD: S/p lower extremity endarterectomy and stenting 2017. Previously on ASA and Plavix, but per family patient was not taking plavix regularly     Resolved Hospital Problems:  Tachycardia: HR up to 110s 8/6. Difficult to interpret given difficulty with communicated, but denies s/s infection. CXR no acute findings. Lactic acid  normal. Afebrile. Normalized 8/8  Sepsis and acute hypoxic respiratory failure d/t aspiration pneumonia: Excessive secretions post-trach c/b acute L pontine CVA. Acute hypoxia, fevers and elevated inflammatory markers 7/24. CXR R>L bibasilar opacities with small R effusion. CT Chest pulmon angio negative for PE. Sputum 7/22 negative. ? Pulmonary edema contributing. ID consulted and transitioned to moxifloxacin, last dose 7/31. Clinically improved on antibiotics and diuresis. Currently stable      Pain Assessment:  Current Pain Score 8/9/2018   Patient currently in pain? denies   Pain score (0-10) -   Pain location -   Pain descriptors -   Keira canas pain level was assessed and she currently denies pain.      Diet: NPO for Medical/Clinical Reasons Except for: No Exceptions  Adult Formula Drip Feeding: Continuous Isosource 1.5; Gastrostomy; Goal Rate: 45; mL/hr; Medication - Tube Feeding Flush Frequency: At least 15-30 mL water before and after medication administration and with tube clogging; Amount to Send (Nutritio...  Fluids: None  DVT Prophylaxis: Enoxaparin (Lovenox) SQ  Code Status: Full Code    Disposition Plan   Expected discharge: 1-2 days, recommended to TCU vs other once suction needs determined, ongoing improvement, etc      Entered: Sandie Grace 08/09/2018, 11:37 AM   Information in the above section will display in the discharge planner report.      The patient's care was discussed with the patient, nursing, ENT, SW,  family, nutrition, and attending physician, Dr. Sean Grace  Internal Medicine Staff Hospitalist Service  [facility]  Pager: [phone]  Please see sticky note for cross cover information    Interval History   Patient very frustrated today. Does not want to be here. Per nursing, attempted to pull trach out last night. Unclear if this was done accidentally/due to confusion or if patient was acting out/frustrated. She currently denies pain. She  does not want to discuss barriers to discharge. Further information limited due to patient frustration. Attempted to reach son, Dar ([phone]), but he was unavailable as he was on his motorcycle     Data reviewed today: I reviewed all medications, new labs and imaging results over the last 24 hours.    Physical Exam   Vital Signs: Temp: 97.7  F (36.5  C) Temp src: Oral BP: 135/79 Pulse: 100 Heart Rate: 101 Resp: 28 SpO2: 99 % O2 Device: Trach dome    Weight: 127 lbs 6.81 oz  General Appearance: Alert and oriented x3, whispers answers. Appears frustrated. Trach dome in place  Respiratory: Bilateral course lungs. No wheezing or crackles  Cardiovascular: RRR, S1, S2. No murmur noted  GI: Abdomen soft, non-tender with bowel sounds active. No guarding or rebound. GT in place, CDI  Skin: No rash or jaundice  Other: No peripheral edema, distal pulses palpable     Data   Data     Recent Labs  Lab 08/09/18  0547 08/08/18  0915 08/07/18  1625 08/07/18  0450 08/06/18  0904  08/06/18  0514   WBC 12.1* 12.6* 12.7* 12.3* 13.8*  < >  --    HGB 9.2* 8.8* 9.2* 9.4* 11.2*  < >  --    MCV 94 95 95 94 94  < >  --     225 250 235 247  < >  --    NA  --  137  --  137 137  --   --    POTASSIUM  --  4.4  --  4.2 4.4  --   --    CHLORIDE  --  105  --  104 105  --   --    CO2  --  24  --  25 26  --   --    BUN  --  30  --  39* 37*  --   --    CR  --  0.53  --  0.54 0.60  --   --    ANIONGAP  --  8  --  8 5  --   --    ESTEFANI  --  9.0  --  9.1 9.1  --   --    GLC  --  170*  --  155* 179*  --   --    ALBUMIN  --   --   --   --   --   --  2.6*   < > = values in this interval not displayed.  No results found for this or any previous visit (from the past 24 hour(s)).

## 2018-08-10 NOTE — PLAN OF CARE
"Problem: Patient Care Overview  Goal: Plan of Care/Patient Progress Review  Outcome: Improving  /65 (BP Location: Left arm)  Pulse 100  Temp 99.7  F (37.6  C) (Oral)  Resp 24  Ht 1.6 m (5' 2.99\")  Wt 57.8 kg (127 lb 6.8 oz)  SpO2 98%  BMI 22.58 kg/m2    Time 7496-8290      Reason for admission: Laryngoscopy w/biopsy of mass and trach placement; c/b aspirational pneumonia and CVA  Vitals: temp elevated to 99.7 F. Patient denied feeling feverish, just hot. Blankets removed and fan placed at bedside.   Activity: Up with Lift; turns in bed with assist of 1-2  Pain: denies  Neuro: Unable to assess orientation d/t nonverbal communication; PERRLA, strength equal bilaterally   Cardiac: WNL  Respiratory: Moderate amount of secretions, suctioned x1 at HS.  GI/: Patient incontinent of bowel and bladder. Has voided and stooled this shift  Diet: NPO, TF running at goal rate  Lines: PIV SL  Wounds: Bottom excoriated, barrier cream applied  Labs/imaging:     New changes this shift: Patient transferred to , in stable condition. Mitts were placed prior to shift, but patient seemed agitated. Mitts removed and patient compliant with care thus far.         Plan: anticipate possible discharge tomorrow      Continue to monitor and follow POC        "

## 2018-08-10 NOTE — PROGRESS NOTES
Social Work Services Discharge Note      Patient Name:  Keira Collins     Anticipated Discharge Date: Discharge Friday 8/10/18 at 1400 to [place] via [facility] stretcher transport.     Discharge Disposition:    Transitional Care Unit Placement  -SNF:   [facility]  [address] [place] [address]  (Ph: [phone], Fax: [phone])  -RN to RN report:   Please call [phone] and ask for 4th floor TCU nurse.     Pre-Admission Screening (PAS) online form has been completed.  The Level of Care (LOC) is: Determined  Confirmation Code is:  [id number]  Patient/caregiver informed of referral to [facility] for Pre-Admission Screening for skilled nursing facility (SNF) placement and to expect a phone call post discharge from SNF.     Additional Services/Equipment Arranged: [facility] Transportation (Ph: [phone]) via stretcher arranged for  8/10 at 1400. Ambulance PCS completed, stretcher required d/t tracheostomy with trach dome 21%, suctioning needs, and pt's inability to self-manage cares.     Patient / Family response to discharge plan: In agreement w/plan.     Persons notified of above discharge plan: Pt, pt's son/NIRAJ Dodge (P: [phone]), Juan F Carineison- Raúl (P: [phone]), Akua MELÉNDEZ RN [phone], paged Danielle Fine PA-C ([phone]).     DC orders faxed to TCU at [phone]. Received call from TCU saying they need pt to be sent with extra trach supplies, suctioning kits, and 2 days worth of tube feeding. SW updated 5A RN.    -Staff Discharge Instructions:  Please fax discharge orders and signed hard scripts for any controlled substances.  Please print a packet and send with patient.     SHAKIR Simmons, LICSW  6B Intermediate Care Unit  Phone: [phone]  Pager: [phone]

## 2018-08-10 NOTE — PLAN OF CARE
Problem: Patient Care Overview  Goal: Plan of Care/Patient Progress Review  Occupational Therapy Discharge Summary    Reason for therapy discharge:    Discharged to transitional care facility.    Progress towards therapy goal(s). See goals on Care Plan in [facility] electronic health record for goal details.  Goals partially met.  Barriers to achieving goals:   discharge from facility.    Therapy recommendation(s):    Continued therapy is recommended.  Rationale/Recommendations:  to increase ADl/IADL IND.

## 2018-08-10 NOTE — PLAN OF CARE
Problem: Patient Care Overview  Goal: Plan of Care/Patient Progress Review  Outcome: No Change  Pt alert, non-verbal. YULIYA orientation. VSS. sats remaining in upper 90's all night. Trach in place with trach dome FiO2 21 %. Trach cares completed x 1 at 0530. Pt with good, productive cough. Moderate-large amount of secretions coughed up. No need for suctioning, pt clearing secretions well independently. Calm and cooperative. Pt dislodged the humidity on trach x 2 overnight, unclear if on accident or on purpose, otherwise not pulling at lines. Large incontinent void x 2, no BM. Turned and repositioned q2h. PCD's on. Strict NPO. TF at goal of 45 ml/hr to PEG tube. Plan for psych consult and possible discharge today to TCU.

## 2018-08-10 NOTE — PLAN OF CARE
Problem: Patient Care Overview  Goal: Plan of Care/Patient Progress Review  Outcome: Adequate for Discharge Date Met: 08/10/18  Pt nonverbal, tach in place. Spontaneously producing sputum, required no suction. TF at goal of 45 mL/hr. DC to TCU via [employer] at 1400. RN to RN report given. Trach supplies and TF sent with patient. Family updated. Packet sent with patient, PIV removed.

## 2018-08-10 NOTE — PLAN OF CARE
Problem: Patient Care Overview  Goal: Plan of Care/Patient Progress Review  Physical Therapy Discharge Summary    Reason for therapy discharge:    Discharged to transitional care facility.    Progress towards therapy goal(s). See goals on Care Plan in [facility] electronic health record for goal details.  Goals not met.  Barriers to achieving goals:   limited tolerance for therapy and discharge from facility.    Therapy recommendation(s):    Continued therapy is recommended.  Rationale/Recommendations:  increase strength, balance, and functional mobility.

## 2018-08-10 NOTE — DISCHARGE SUMMARY
[facility], [place]    Internal Medicine Discharge Summary- Gold Service    Date of Admission:  7/17/2018  Date of Discharge:  8/10/2018  Discharging Provider: Sandie Grace/Dr. 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 Course c/b L pontine CVA 7/19 and acute hypoxic respiratory failure due to aspiration PNA. 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 Course c/b CVA and aspiration PNA. ENT discussed case at tumor conference 7/27 and medical oncology 7/31. Surgical resection offered regardless of whether lung nodules are malignant, and would be left with permanent trach. Patient does not want surgical intervention. Treatment of lung nodules TBD at a later date based on functional status. Palliative care consulted. FiO2 needs stable. Suction needs significantly down prior to discharge to TCU  - Follow up with ENT and oncology on discharge to discuss ongoing treatment options  - Continue Robinul, scheduled NS nebs on discharge  - Trach cares per ENT, they will place in discharge encounter (confirmed 8/10)  - Suggest only suctioning for O2 sat drops or symptomatic relief. Hold off if only for gurgling. Patient has been successful managing her own secretions   - Strict NPO, SLP to follow at  TCU  - Continue Ritalin per palliative recs d/t mood  - PT/OT at TCU      Acute anemia: Hgb labile. Hgb 8.4 (9.2, 8.8, normal 8/2). No s/s of GIB or oral bleed. Retics 1.5 8/7. LDH normal, haptoglobin 528 making hemolysis unlikely. Ferritin very elevated to 1069 but in setting of cancer and . Iron low, 26, IBC 10. Folate and B12 normal. Continue iron on discharge      Acute L pontine CVA:  R facial droop noted 7/19, MRI primo acute L pontine infarct. Outside therapeutic window for TPA. Conservative management recommended with DAPT and statin. No data to support use of DAPT long term (suggest <90 days), but also no consensus in literature regarding duration of DAPT following acute CVA.   - Continue ASA and plavix with plan to stop DAPT (continue ASA) 10/17/2018.Discuss with PCP prior to this  - Continue statin  - SBP goal <160      Persistent leukocytosis: without left shift on most recent diff. Afebrile. Respiratory status stable. No s/s infection. No inciting meds. Significant stress and noted with high ferritin and CRP. Known metastatic malignancy. WBC fluctuates, 11.4 to 13.9 over the past week. Suggest infectious workup if acute decompensation     Severe protein-calorie malnutrition: 2/2 acute on chronic medical issues including cancer, surgery, and CVA/dysphagia. PEG placed 7/27.   - Continue TF at goal rate 45 ml/h, can consider transition to bolus at TCU if appropriate   - Continue FWF 60 ml q4h  - NPO, SLP consult at TCU suggested      Spiculated RUL nodule: Concerning for malignancy. PET 7/20 hypermetabolic pulmonary nodules c/w metastatic disease. Per d/w Medical Oncology, lung nodule biopsy non-urgent. Oncology also noted high mortality with pulmonary involvement at 1 year. Will need to continue discussion re: goals of care and possible surgical resection.   - OP follow up with ENT and Oncology to discuss timing of potential lung biopsy  - See above re: DAPT. Should be OK to hold/stop plavix prior  to any scheduled lung biopsy      HTN: Stable. Goal SBP <160 per neurology in setting of recent CVA. Continue amlodipine and losartan with hold parameters.       Chronic Medical Problems:  COPD: PFTs 7/16/18 FEV-1/FVC 65% with FEV-1 0.58 L. Marina Post-op respiratory failure risk 9.91% (failure to wean of vent within 48h of surgery or unplanned intubation/reintubation). Pulmonary risks with proposed surgery discussion with treatment team and family. Continue Brovana, Pulmicort, Ipratropium/Levalbuterol.   HLD: Continue statin    H/o alcohol abuse: Currently in remission   PAD: S/p lower extremity endarterectomy and stenting 2017. ASA/plavix as above      Resolved Hospital Problems:  Tachycardia: HR up to 110s 8/6. Difficult to interpret given difficulty with communicated, but denies s/s infection. CXR no acute findings. Lactic acid normal. Afebrile. Normalized 8/8  Sepsis and acute hypoxic respiratory failure d/t aspiration pneumonia: Excessive secretions post-trach c/b acute L pontine CVA. Acute hypoxia, fevers and elevated inflammatory markers 7/24. CXR R>L bibasilar opacities with small R effusion. CT Chest pulmon angio negative for PE. Sputum 7/22 negative. ? Pulmonary edema contributing. ID consulted and transitioned to moxifloxacin, last dose 7/31. Clinically improved on antibiotics and diuresis. Currently stable       Discharge Pain Plan:   - During her hospitalization, Keira experienced pain due to chronic pain.  The pain plan for discharge was discussed with Keira and the plan was created in a collaborative fashion.    - Short oxycodone script given on discharge    Consultations This Hospital Stay   ENT, oncology, palliative care, SLP, PT, OT, nutrition, IR, pharmacy, neurology,    Code Status   Full Code    Time Spent on this Encounter   I, Sandie Grace, personally saw the patient today and spent greater than 30 minutes discharging this patient.       Sandie Grace  Internal Medicine Staff  Hospitalist Service  [facility]  Pager: [phone]  ______________________________________________________________________    Physical Exam   Vital Signs: Temp: 98.8  F (37.1  C) Temp src: Oral BP: 139/63 Pulse: 96 Heart Rate: 105 Resp: 22 SpO2: 98 % O2 Device: Trach dome    Weight: 127 lbs 6.81 oz    General Appearance: Alert and oriented x3, whispers and moves hands to answer. Appears frustrated  Respiratory: Bilateral course lungs. No wheezing or crackles. Tach on room air  Cardiovascular: RRR, S1, S2. No murmur noted  GI: Abdomen soft, non-tender with bowel sounds active. No guarding or rebound. GT in place with lidoderm patch surrounding  Skin: No rash or jaundice  Other: No peripheral edema, distal pulses palpable     Significant Results and Procedures   Most Recent 3 CBC's:  Recent Labs   Lab Test  08/10/18   0724  08/09/18   0547  08/08/18   0915   WBC  13.9*  12.1*  12.6*   HGB  8.4*  9.2*  8.8*   MCV  94  94  95   PLT  250  251  225     Most Recent 3 BMP's:  Recent Labs   Lab Test  08/10/18   0724  08/08/18   0915  08/07/18   0450   NA  134  137  137   POTASSIUM  4.3  4.4  4.2   CHLORIDE  103  105  104   CO2  21  24  25   BUN  27  30  39*   CR  0.51*  0.53  0.54   ANIONGAP  10  8  8   ESTEFANI  9.3  9.0  9.1   GLC  164*  170*  155*     Most Recent 3 INR's:  Recent Labs   Lab Test  07/19/18   0709  09/23/16   1115   INR  1.16*  0.95       Pending Results   Unresulted Labs Ordered in the Past 30 Days of this Admission     No orders found from 5/18/2018 to 7/18/2018.             Primary Care Physician   Asa Elliott    Discharge Disposition   Discharged to rehabilitation facility  Condition at discharge: Stable    Discharge Orders     General info for SNF   Length of Stay Estimate: Short Term Care: Estimated # of Days <30  Condition at Discharge: Stable  Level of care:skilled   Rehabilitation Potential: Fair  Admission H&P remains valid and up-to-date: Yes  Recent Chemotherapy: N/A  Use  Nursing Home Standing Orders: Yes     Mantoux instructions   Give two-step Mantoux (PPD) Per Facility Policy Yes     Reason for your hospital stay   You were admitted for a biopsy of the larynx. You had a stroke after surgery which was further complicated by pneumonia, tracheostomy needs and severe deconditioning. You discharged to transitional care unit for ongoing care and rehabilitation     Tracheostomy care by nursing   Standard Cares. Suggest suction for O2 sat changes or symptoms rather than gurgling as patient is often able to clear her own secretions     Activity - Up with assistive device   HOB elevated to 30 degrees     Neuro checks   q4h     Full Code     Physical Therapy Adult Consult   Evaluate and treat as clinically indicated.    Reason:  Deconditioning     Occupational Therapy Adult Consult   Evaluate and treat as clinically indicated.    Reason:  Deconditioning     Speech Language Path Adult Consult   Evaluate and treat as clinically indicated.    Reason:  Recent CVA     Adult Formula Bolus Feeding   Specify:     Fall precautions     Pneumatic Compression Device    Bilateral calf. Remove 30 mins BID.       Discharge Medications   Current Discharge Medication List      START taking these medications    Details   arformoterol (BROVANA) 15 MCG/2ML NEBU neb solution Take 2 mLs (15 mcg) by nebulization 2 times daily  Qty: 120 mL    Associated Diagnoses: Tracheostomy, acute management (H)      budesonide (PULMICORT) 0.25 MG/2ML neb solution Take 2 mLs (0.25 mg) by nebulization 2 times daily    Associated Diagnoses: Tracheostomy, acute management (H)      docusate (COLACE) 50 MG/5ML liquid 5 mLs (50 mg) by Per Feeding Tube route 2 times daily  Qty: 300 mL    Comments: Hold for loose stools  Associated Diagnoses: Constipation, unspecified constipation type      ferrous sulfate 300 (60 Fe) MG/5ML syrup Take 5 mLs (300 mg) by mouth daily  Qty: 75 mL    Associated Diagnoses: Anemia, unspecified type      folic  acid (FOLATE) 500 mcg/mL SOLN Take 2 mLs (1 mg) by mouth daily    Associated Diagnoses: Anemia, unspecified type      glycopyrrolate (ROBINUL) 1 MG tablet Take 1 tablet (1 mg) by mouth 3 times daily    Comments: Hold if secretions start to thicken  Associated Diagnoses: Tracheostomy, acute management (H)      hypromellose-dextran (ARTIFICAL TEARS) 0.1-0.3 % SOLN ophthalmic solution Place 1 drop into both eyes every hour as needed for dry eyes    Associated Diagnoses: Dry eyes      ipratropium (ATROVENT) 0.02 % neb solution Take 2.5 mLs (0.5 mg) by nebulization 4 times daily  Qty: 225 mL    Associated Diagnoses: Tracheostomy, acute management (H)      lactobacillus rhamnosus, GG, (CULTURELL) capsule 1 capsule by Per Feeding Tube route 3 times daily (before meals)    Associated Diagnoses: Diarrhea, unspecified type      levalbuterol (XOPENEX) 0.63 MG/3ML neb solution Take 3 mLs (0.63 mg) by nebulization 4 times daily  Qty: 360 mL    Associated Diagnoses: Tracheostomy, acute management (H)      multivitamins with minerals (CERTAVITE/CEROVITE) LIQD liquid 15 mLs by Per Feeding Tube route daily    Associated Diagnoses: Malnutrition, unspecified type (H)      polyethylene glycol (MIRALAX/GLYCOLAX) Packet 17 g by Per Feeding Tube route daily  Qty: 7 packet    Comments: Hold for loose stools  Associated Diagnoses: Constipation, unspecified constipation type      sennosides (SENOKOT) 8.8 MG/5ML syrup 5 mLs by Per Feeding Tube route 2 times daily  Qty: 105 mL    Comments: Hold for loose stools  Associated Diagnoses: Anemia, unspecified type      sodium chloride 0.9 % neb solution Take 3 mLs by nebulization 3 times daily    Associated Diagnoses: Tracheostomy, acute management (H)         CONTINUE these medications which have NOT CHANGED    Details   albuterol (PROAIR HFA/PROVENTIL HFA/VENTOLIN HFA) 108 (90 BASE) MCG/ACT Inhaler Inhale 2 puffs into the lungs every 4 hours as needed for shortness of breath / dyspnea or  wheezing  Qty: 1 Inhaler, Refills: 9    Associated Diagnoses: Chronic obstructive pulmonary disease, unspecified COPD type (H)      amLODIPine (NORVASC) 5 MG tablet Take 1 tablet (5 mg) by mouth daily  Qty: 30 tablet, Refills: 3    Associated Diagnoses: Hyperlipidemia LDL goal <100      aspirin 81 MG tablet Take 1 tablet (81 mg) by mouth daily  Qty: 30 tablet    Associated Diagnoses: PVD (peripheral vascular disease) (H)      atorvastatin (LIPITOR) 40 MG tablet TAKE 1 TABLET BY MOUTH EVERY DAY  Qty: 90 tablet, Refills: 1    Associated Diagnoses: PAD (peripheral artery disease) (H); Hyperlipidemia LDL goal <100      clopidogrel (PLAVIX) 75 MG tablet TAKE 1 TABLET BY MOUTH ONCE DAILY  Qty: 90 tablet, Refills: 1    Associated Diagnoses: PAD (peripheral artery disease) (H)      losartan (COZAAR) 50 MG tablet Take 1 tablet (50 mg) by mouth daily  Qty: 30 tablet, Refills: 3    Associated Diagnoses: Essential hypertension with goal blood pressure less than 140/90      acetaminophen (TYLENOL) 500 MG tablet Take 1 tablet (500 mg) by mouth every 6 hours    Associated Diagnoses: Ulcer of heel, left, with unspecified severity (H)      calcium carb 1250 mg, 500 mg Hoopa,/vitamin D 200 units (OSCAL WITH D) 500-200 MG-UNIT per tablet Take 1 tablet by mouth 3 times daily (with meals) Reported on 2/24/2017         STOP taking these medications       fluticasone-salmeterol (ADVAIR-HFA) 45-21 MCG/ACT inhaler Comments:   Reason for Stopping:         NUTRITIONAL SUPPLEMENT LIQD Comments:   Reason for Stopping:         umeclidinium (INCRUSE ELLIPTA) 62.5 MCG/INH oral inhaler Comments:   Reason for Stopping:         Wound Dressings (SILVASORB) GEL Comments:   Reason for Stopping:             Allergies   Allergies   Allergen Reactions     Penicillins      Cefepime Rash     Provider entered as cephalosporins (pt had cefepime - rash this admission)     Cephalosporins Rash

## 2018-08-10 NOTE — DISCHARGE INSTRUCTIONS
Trach Care Instructions:   1) Perform regular suctioning   2) RN should change disposable inner canulas every shift and/or clean it with a brush   3) Keep trach tube obturator taped to wall behind the head of the bed   4) Keep extra unopened 6 Shiley and 4 Shiley at bedside at all times   5) Minimize the amount of air in the cuff needed to adequately apply positive pressure ventilate. If positive pressure ventilation is not need then the cuff should be down.

## 2018-08-10 NOTE — PROGRESS NOTES
Clinic Care Coordination Contact    Situation: Patient chart reviewed by care coordinator.    Background: RN[facility] received CTS referral.    Assessment: Patient remains inpatient with plans to discharge today.    Plan/Recommendations: RN[facility] will monitor for patient discharge and follow-up within 1-2 business days.    Melissa Behl BSN, RN, N  [facility] Care Coordinator  [phone]

## 2018-08-12 NOTE — PLAN OF CARE
Problem: Patient Care Overview  Goal: Plan of Care/Patient Progress Review  Speech Language Therapy Discharge Summary    Reason for therapy discharge:    Discharged to transitional care facility.    Progress towards therapy goal(s). See goals on Care Plan in [facility] electronic health record for goal details.  Goals not met.  Barriers to achieving goals:   discharge from facility.    Therapy recommendation(s):    Continued therapy is recommended.  Rationale/Recommendations:  Pt would benefit from going ST for communication and dysphagia.  .     Discharge diet: NPO

## 2018-08-12 NOTE — PROGRESS NOTES
Discharge Disposition:    Transitional Care Unit Placement  -SNF:   [facility]  [address] [place] [address]  (Ph: [phone], Fax: [phone])  -RN to RN report:   Please call [phone] and ask for 4th floor TCU nurse.       Patient discharge to Care Facility   No discharge call was made

## 2018-08-13 NOTE — PROGRESS NOTES
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     Transition to Facility:              Facility Name: [facility]               Contact name and phone number/fax: Emely ANDREW [phone]    Plan: RN/SW Care Coordinator will await notification from facility staff informing RN/SW Care Coordinator of patient's discharge plans/needs. RN/SW Care Coordinator will review chart and outreach to facility staff every 4 weeks and as needed.     Melissa Behl BSN, RN, PHN  [facility] Care Coordinator  [phone]

## 2018-08-16 PROBLEM — R04.89: Status: ACTIVE | Noted: 2018-01-01

## 2018-08-16 NOTE — IP AVS SNAPSHOT
MRN:[id number]                      After Visit Summary   8/16/2018    Keira Collins    MRN: [id number]           Thank you!     Thank you for choosing [facility] for your care. Our goal is always to provide you with excellent care. Hearing back from our patients is one way we can continue to improve our services. Please take a few minutes to complete the written survey that you may receive in the mail after you visit with us. Thank you!        Patient Information     Date Of Birth          1945        Designated Caregiver       Most Recent Value    Caregiver    Will someone help with your care after discharge? yes    Name of designated caregiver Dar    Phone number of caregiver [phone]    Caregiver address 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  For non-urgent questions about your medical care, please call your primary care provider or clinic, [phone]  For questions related to your surgery, please call your surgery clinic        Attending Provider     Provider Riri Damon MD Emergency Medicine    [facility]       Primary Care Provider Office Phone # Fax #    Asa Elliott [phone]      After Care Instructions     Activity       Your activity upon discharge: Activity as tolerated            Diet       You may eat or drink by mouth if you wish. There is risk of food/drink getting into the lungs which could cause infection or difficulty breathing. Continue tube feeding through Gastrostomy (g-tube). Formula: Isosource 1.5. Give 1 can 4 times daily per bolus feed. Separate each feeding by 3-4 hours. Flush tube with 90  ml of water before and after each feeding. Flush tube with 30ml of water before and after giving medications.            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 Keira does not want any invasive interventions for bleeding and does not wish to be brought back to the hospital. Follow these instructions in the event of bleeding:  - Keep her head elevated to make it easier to cough up the blood and secretions  - Allow Keira to cough up blood and secretions through the trach tube  - Assist with suctioning through the trach tube by inserting the suction catheter 2-3 inches into the tube. Repeat as often as needed if she is unable to cough up the secretions   - Use dark towels and sheets to catch bleeding   - We recommend changing the inner cannula of the trach tube every 8 hours, if able, to prevent dried blood and secretions from building up inside the tube which could lead to difficulty breathing. By keeping the trach tube clean it will help Keira to breathe easier. TO change the inner cannula pinch sides of the inner cannula and remove. Throw away old inner cannula and place a new clean inner cannula, making sure to lock into place.   - If she appears anxious you may give comfort medications as prescribed (Ativan)  - If she develops difficulty breathing give comfort medications as prescribed (morphine)    Robinol was intentionally discontinued due to thick secretions.            Tracheostomy care       Please refer to tracheostomy care handout for complete details on how to care for the trach tube at home.                  Follow-up Appointments     Adult [facility] Follow-up and recommended labs and tests       No follow up is required. You may follow up with Dr. Walden as needed in ENT clinic. Please call to schedule an appointment: [phone].    Appointments on [place] and/or [facility] (with [facility] or [facility] provider or service). Call [phone] if you  "haven't heard regarding these appointments within 7 days of discharge.                  Additional Services     Home care nursing referral       [facility] #[phone]  Home Hospice Care    Your provider has ordered home care nursing services. If you have not been contacted within 2 days of your discharge please call the inpatient department phone number at [phone] .            Home infusion referral       [facility]  #[phone]  Bolus tube feeding support            Medication Therapy Management Referral       MTM referral reason            Patient had a hospital or ED visit in last 6 months and has more than 10   PTA or Discharge medications    Patient has 5 PTA or Discharge Medications AND one of the following   diagnoses: DM,HF,COPD,AMI DX,PULM HTN       This service is designed to help you get the most from your medications.  A specially trained pharmacist will work closely with you and your doctors  to solve any problems related to your medications and to help you get the   best results from taking them.      The Medication Therapy Management staff will call you to schedule an appointment.                  Pending Results     No orders found from 8/14/2018 to 8/17/2018.            Statement of Approval     Ordered          09/01/18 1020  I have reviewed and agree with all the recommendations and orders detailed in this document.  EFFECTIVE NOW     Approved and electronically signed by:  Colt Lyons MD             Admission Information     Date & Time Provider Department Dept. Phone    8/16/2018 Cynthia Walden MD [address] [facility] [phone]      Your Vitals Were     Blood Pressure Pulse Temperature Respirations Height Weight    112/80 100 98.1  F (36.7  C) (Axillary) 20 1.702 m (5' 7\") 55 kg (121 lb 4.1 oz)    Pulse Oximetry BMI (Body Mass Index)                98% 18.99 kg/m2          Care EveryWhere ID     This is your Care EveryWhere ID. This could be used by " other organizations to access your [place] medical records  [id number]        Equal Access to Services     CHRIS MAGALLANES : Bree Lopes, judith alvarado, garrick retana, stas waters. So [facility] [phone].    ATENCIÓN: Si habla español, tiene a smart disposición servicios gratuitos de asistencia lingüística. Llame al [phone].    We comply with applicable federal civil rights laws and [place] laws. We do not discriminate on the basis of race, color, national origin, age, disability, sex, sexual orientation, or gender identity.               Review of your medicines      START taking        Dose / Directions    LORazepam 0.5 mg/mL NON-STANDARD dilution 0.5 MG/ML Soln solution   Used for:  Adjustment disorder with anxious mood        Dose:  0.5-1 mg   1-2 mLs (0.5-1 mg) by Per G Tube route every 4 hours as needed for anxiety   Quantity:  30 mL   Refills:  0       morphine sulfate (high concentrate) 20 MG/ML concentrated solution   Commonly known as:  ROXANOL-CONCENTRATED   Used for:  Laryngeal cancer (H), Tracheostomy hemorrhage (H)        Dose:  5-10 mg   Place 0.25-0.5 mLs (5-10 mg) under the tongue every 3 hours as needed (air hunger)   Quantity:  15 mL   Refills:  0       order for DME        Nebulizer machine  Diagnosis: COPD Associated Rx: arformoterol, budesonide, ipratropium, levalbuterol   Quantity:  3 Month   Refills:  1       order for DME   Used for:  Tracheostomy hemorrhage (H)        Equipment being ordered: 6-0 Shiley inner cannulas   Quantity:  20 Information only   Refills:  1       predniSONE 20 MG tablet   Commonly known as:  DELTASONE   Used for:  Tracheostomy hemorrhage (H)        Dose:  20 mg   Take 1 tablet (20 mg) by mouth daily   Quantity:  30 tablet   Refills:  1         CONTINUE these medicines which may have CHANGED, or have new prescriptions. If we are uncertain of the size of tablets/capsules you have at home, strength may be  listed as something that might have changed.        Dose / Directions    acetaminophen 500 MG tablet   Commonly known as:  TYLENOL   This may have changed:    - when to take this  - reasons to take this   Used for:  Laryngeal cancer (H)        Dose:  500 mg   1 tablet (500 mg) by Per G Tube route every 4 hours as needed for mild pain   Quantity:  100 tablet   Refills:  0       albuterol (2.5 MG/3ML) 0.083% neb solution   This may have changed:    - when to take this  - reasons to take this  - Another medication with the same name was removed. Continue taking this medication, and follow the directions you see here.        Dose:  2.5 mg   Take 1 vial (2.5 mg) by nebulization every 4 hours as needed for shortness of breath / dyspnea or wheezing   Quantity:  360 mL   Refills:  0       * docusate 50 MG/5ML liquid   Commonly known as:  COLACE   This may have changed:  Another medication with the same name was added. Make sure you understand how and when to take each.   Used for:  Constipation, unspecified constipation type        Dose:  50 mg   5 mLs (50 mg) by Per Feeding Tube route 2 times daily   Quantity:  300 mL   Refills:  0       * docusate 50 MG/5ML liquid   Commonly known as:  COLACE   This may have changed:  You were already taking a medication with the same name, and this prescription was added. Make sure you understand how and when to take each.   Used for:  Drug-induced constipation        Dose:  50 mg   5 mLs (50 mg) by Per Feeding Tube route 2 times daily as needed for constipation   Quantity:  300 mL   Refills:  0       methylphenidate 5 MG tablet   Commonly known as:  RITALIN   This may have changed:    - how to take this  - Another medication with the same name was removed. Continue taking this medication, and follow the directions you see here.   Used for:  Somnolence        Dose:  2.5 mg   0.5 tablets (2.5 mg) by Per G Tube route 2 times daily   Quantity:  30 tablet   Refills:  0       polyethylene  glycol Packet   Commonly known as:  MIRALAX/GLYCOLAX   This may have changed:    - when to take this  - reasons to take this   Used for:  Drug-induced constipation        Dose:  17 g   17 g by Per Feeding Tube route daily as needed for constipation   Quantity:  14 packet   Refills:  0       * Notice:  This list has 2 medication(s) that are the same as other medications prescribed for you. Read the directions carefully, and ask your doctor or other care provider to review them with you.      CONTINUE these medicines which have NOT CHANGED        Dose / Directions    amLODIPine 5 MG tablet   Commonly known as:  NORVASC   Used for:  Hyperlipidemia LDL goal <100        Dose:  5 mg   1 tablet (5 mg) by Per G Tube route daily   Quantity:  30 tablet   Refills:  3       arformoterol 15 MCG/2ML Nebu neb solution   Commonly known as:  BROVANA        Dose:  15 mcg   Take 2 mLs (15 mcg) by nebulization 2 times daily   Quantity:  120 mL   Refills:  0       budesonide 0.25 MG/2ML neb solution   Commonly known as:  PULMICORT        Dose:  0.25 mg   Take 2 mLs (0.25 mg) by nebulization 2 times daily   Quantity:  60 ampule   Refills:  0       hypromellose-dextran 0.1-0.3 % Soln ophthalmic solution   Commonly known as:  ARTIFICAL TEARS   Used for:  Dry eyes        Dose:  1 drop   Place 1 drop into both eyes every hour as needed for dry eyes   Quantity:  1 Bottle   Refills:  1       ipratropium 0.02 % neb solution   Commonly known as:  ATROVENT        Dose:  0.5 mg   Take 2.5 mLs (0.5 mg) by nebulization 4 times daily   Quantity:  225 mL   Refills:  0       levalbuterol 0.63 MG/3ML neb solution   Commonly known as:  XOPENEX        Dose:  0.63 mg   Take 3 mLs (0.63 mg) by nebulization 4 times daily   Quantity:  360 mL   Refills:  0       losartan 50 MG tablet   Commonly known as:  COZAAR   Used for:  Essential hypertension with goal blood pressure less than 140/90        Dose:  50 mg   1 tablet (50 mg) by Per G Tube route daily    Quantity:  30 tablet   Refills:  3         STOP taking     aspirin 81 MG tablet           atorvastatin 40 MG tablet   Commonly known as:  LIPITOR           clopidogrel 75 MG tablet   Commonly known as:  PLAVIX           ferrous sulfate 300 (60 Fe) MG/5ML syrup           folic acid 1 MG tablet   Commonly known as:  FOLVITE           glycopyrrolate 1 MG tablet   Commonly known as:  ROBINUL           lactobacillus rhamnosus (GG) capsule           nystatin 569155 UNIT/ML suspension   Commonly known as:  MYCOSTATIN           oxyCODONE IR 5 MG tablet   Commonly known as:  ROXICODONE           sennosides 8.8 MG/5ML syrup   Commonly known as:  SENOKOT                Where to get your medicines      These medications were sent to [facility] [place], [place] - [address] [facility] [address] [facility]  [address] [address]     Phone:  [phone]     acetaminophen 500 MG tablet    albuterol (2.5 MG/3ML) 0.083% neb solution    arformoterol 15 MCG/2ML Nebu neb solution    budesonide 0.25 MG/2ML neb solution    docusate 50 MG/5ML liquid    hypromellose-dextran 0.1-0.3 % Soln ophthalmic solution    ipratropium 0.02 % neb solution    levalbuterol 0.63 MG/3ML neb solution    polyethylene glycol Packet    predniSONE 20 MG tablet         Some of these will need a paper prescription and others can be bought over the counter. Ask your nurse if you have questions.     Bring a paper prescription for each of these medications     LORazepam 0.5 mg/mL NON-STANDARD dilution 0.5 MG/ML Soln solution    methylphenidate 5 MG tablet    morphine sulfate (high concentrate) 20 MG/ML concentrated solution    order for DME    order for DME                Protect others around you: Learn how to safely use, store and throw away your medicines at www.disposemymeds.org.        Information about OPIOIDS     PRESCRIPTION OPIOIDS: WHAT YOU NEED TO KNOW   We gave you an opioid (narcotic) pain medicine. It is important to manage your pain,  but opioids are not always the best choice. You should first try all the other options your care team gave you. Take this medicine for as short a time (and as few doses) as possible.    Some activities can increase your pain, such as bandage changes or therapy sessions. It may help to take your pain medicine 30 to 60 minutes before these activities. Reduce your stress by getting enough sleep, working on hobbies you enjoy and practicing relaxation or meditation. Talk to your care team about ways to manage your pain beyond prescription opioids.    These medicines have risks:    DO NOT drive when on new or higher doses of pain medicine. These medicines can affect your alertness and reaction times, and you could be arrested for driving under the influence (DUI). If you need to use opioids long-term, talk to your care team about driving.    DO NOT operate heavy machinery    DO NOT do any other dangerous activities while taking these medicines.    DO NOT drink any alcohol while taking these medicines.     If the opioid prescribed includes acetaminophen, DO NOT take with any other medicines that contain acetaminophen. Read all labels carefully. Look for the word  acetaminophen  or  Tylenol.  Ask your pharmacist if you have questions or are unsure.    You can get addicted to pain medicines, especially if you have a history of addiction (chemical, alcohol or substance dependence). Talk to your care team about ways to reduce this risk.    All opioids tend to cause constipation. Drink plenty of water and eat foods that have a lot of fiber, such as fruits, vegetables, prune juice, apple juice and high-fiber cereal. Take a laxative (Miralax, milk of magnesia, Colace, Senna) if you don t move your bowels at least every other day. Other side effects include upset stomach, sleepiness, dizziness, throwing up, tolerance (needing more of the medicine to have the same effect), physical dependence and slowed breathing.    Store your  pills in a secure place, locked if possible. We will not replace any lost or stolen medicine. If you don t finish your medicine, please throw away (dispose) as directed by your pharmacist. The [place] Pollution Control Agency has more information about safe disposal: https://www.pca.[facility].[place].us/living-green/managing-unwanted-medications             Medication List: This is a list of all your medications and when to take them. Check marks below indicate your daily home schedule. Keep this list as a reference.      Medications           Morning Afternoon Evening Bedtime As Needed    acetaminophen 500 MG tablet   Commonly known as:  TYLENOL   1 tablet (500 mg) by Per G Tube route every 4 hours as needed for mild pain   Last time this was given:  500 mg on 8/18/2018  4:01 AM                                albuterol (2.5 MG/3ML) 0.083% neb solution   Take 1 vial (2.5 mg) by nebulization every 4 hours as needed for shortness of breath / dyspnea or wheezing   Last time this was given:  2.5 mg on 8/25/2018  1:35 PM                                amLODIPine 5 MG tablet   Commonly known as:  NORVASC   1 tablet (5 mg) by Per G Tube route daily   Last time this was given:  5 mg on 9/1/2018  8:53 AM                                arformoterol 15 MCG/2ML Nebu neb solution   Commonly known as:  BROVANA   Take 2 mLs (15 mcg) by nebulization 2 times daily   Last time this was given:  15 mcg on 9/1/2018  9:24 AM                                budesonide 0.25 MG/2ML neb solution   Commonly known as:  PULMICORT   Take 2 mLs (0.25 mg) by nebulization 2 times daily   Last time this was given:  0.25 mg on 9/1/2018  9:24 AM                                * docusate 50 MG/5ML liquid   Commonly known as:  COLACE   5 mLs (50 mg) by Per Feeding Tube route 2 times daily   Last time this was given:  50 mg on 8/21/2018  7:38 AM                                * docusate 50 MG/5ML liquid   Commonly known as:  COLACE   5 mLs (50 mg) by Per Feeding  Tube route 2 times daily as needed for constipation   Last time this was given:  50 mg on 8/21/2018  7:38 AM                                hypromellose-dextran 0.1-0.3 % Soln ophthalmic solution   Commonly known as:  ARTIFICAL TEARS   Place 1 drop into both eyes every hour as needed for dry eyes                                ipratropium 0.02 % neb solution   Commonly known as:  ATROVENT   Take 2.5 mLs (0.5 mg) by nebulization 4 times daily   Last time this was given:  0.5 mg on 9/1/2018  9:24 AM                                levalbuterol 0.63 MG/3ML neb solution   Commonly known as:  XOPENEX   Take 3 mLs (0.63 mg) by nebulization 4 times daily   Last time this was given:  0.63 mg on 9/1/2018  9:24 AM                                LORazepam 0.5 mg/mL NON-STANDARD dilution 0.5 MG/ML Soln solution   1-2 mLs (0.5-1 mg) by Per G Tube route every 4 hours as needed for anxiety                                losartan 50 MG tablet   Commonly known as:  COZAAR   1 tablet (50 mg) by Per G Tube route daily   Last time this was given:  50 mg on 9/1/2018  8:54 AM                                methylphenidate 5 MG tablet   Commonly known as:  RITALIN   0.5 tablets (2.5 mg) by Per G Tube route 2 times daily   Last time this was given:  2.5 mg on 9/1/2018  8:50 AM                                morphine sulfate (high concentrate) 20 MG/ML concentrated solution   Commonly known as:  ROXANOL-CONCENTRATED   Place 0.25-0.5 mLs (5-10 mg) under the tongue every 3 hours as needed (air hunger)                                order for DME   Nebulizer machine  Diagnosis: COPD Associated Rx: arformoterol, budesonide, ipratropium, levalbuterol                                order for DME   Equipment being ordered: 6-0 Shiley inner cannulas                                polyethylene glycol Packet   Commonly known as:  MIRALAX/GLYCOLAX   17 g by Per Feeding Tube route daily as needed for constipation   Last time this was given:  17 g on  8/20/2018  7:47 AM                                predniSONE 20 MG tablet   Commonly known as:  DELTASONE   Take 1 tablet (20 mg) by mouth daily                                * Notice:  This list has 2 medication(s) that are the same as other medications prescribed for you. Read the directions carefully, and ask your doctor or other care provider to review them with you.

## 2018-08-16 NOTE — ED NOTES
Bed: ED04  Expected date: 8/16/18  Expected time: 1:04 PM  Means of arrival: Ambulance  Comments:  [place]---bleeding from trach, 72 yrs old, female. Triaged as yellow.

## 2018-08-16 NOTE — ED PROVIDER NOTES
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 History is provided by EMS as the patient is nonverbal. It is reported by EMS that the patient pulled out her tracheostomy and her nursing home staff put it back in. EMS states that the patient then started having significant bleeding through her tracheostomy. Here, the patient coughed up a significant sized blood clot through her mouth.     I have reviewed the Medications, Allergies, Past Medical and Surgical History, and Social History in the Epic system.    Review of Systems   Unable to perform ROS: Patient nonverbal     Physical Exam   BP: 139/82  Pulse: 114  Heart Rate: 124  Temp: 98.6  F (37  C)  Resp: 22  Height: 170.2 cm (5' 7\")  Weight: 56.7 kg (125 lb)  SpO2: 99 %    Physical Exam   Constitutional: She appears well-developed and well-nourished. No distress.   HENT:   Head: Normocephalic and atraumatic.   Mouth/Throat: Oropharynx is clear and moist. No oropharyngeal exudate.   Eyes: Conjunctivae and EOM are normal. Pupils are equal, round, and reactive to light. No scleral icterus.   Neck: Normal range of motion. Neck supple.   Tracheostomy in place with minor bleeding from the site.  Airways patent.   Cardiovascular: Normal rate, normal heart sounds and intact distal pulses.    Pulmonary/Chest: Effort normal and breath sounds normal. No respiratory distress. She has no wheezes. She has no rales.   Abdominal: Soft. Bowel sounds are normal. She exhibits no distension. There is no tenderness. There is no rebound and no guarding.   Musculoskeletal: Normal range of motion. She exhibits no edema or tenderness.   Lymphadenopathy:     She has " no cervical adenopathy.   Neurological: She is alert. No cranial nerve deficit.   Unable to perform full neurologic exam due to patient being nonverbal and acuity of situation.   Skin: Skin is warm. No rash noted. She is not diaphoretic.   Psychiatric:   Unable to perform psychiatric exam due to patient's being nonverbal.   Nursing note and vitals reviewed.      ED Course   1:25 PM  The patient was seen and examined by Dr. Gallego in Room 04.    ED Course   Comment Time   ENT service at bedside evaluating the patient. 08/16 1418     Procedures             Critical Care time:  none             Labs Ordered and Resulted from Time of ED Arrival Up to the Time of Departure from the ED   BASIC METABOLIC PANEL - Abnormal; Notable for the following:        Result Value    Glucose 141 (*)     Urea Nitrogen 31 (*)     All other components within normal limits   HEMOGLOBIN - Abnormal; Notable for the following:     Hemoglobin 7.5 (*)     All other components within normal limits   INR   PERIPHERAL IV CATHETER   PULSE OXIMETRY NURSING   ABO/RH TYPE AND SCREEN   PLATELETS PREPARE ORDER UNIT   BLOOD COMPONENT            Assessments & Plan (with Medical Decision Making)   72 year old woman BIBA from her nursing home due to bleeding from her tracheostomy site. Differential diagnosis: tracheostomy dislodgment, wound hemorrhage, tumor hemorrhage.     After the recent physical exam, the patient appears to be in no acute distress. She will be placed on continuous monitoring and laboratory studies will be obtained, along with IV access. ENT service was consulted immediately and they will come and evaluate the patient.    Patient's laboratory studies returned significant for anemia, with hemoglobin of 7.5. INR was normal at 1.11. Electrolytes show no evidence of dehydration, creatinine is normal at 0.6. Patient was seen and evaluated by ENT service and the plan was made for her to be admitted and taken to the operating room as she is  bleeding from her laryngeal mass. They did replace her tracheostomy in the ED.     This part of the document was transcribed by Juan Francisco Garcia, Medical Scribe.      I have reviewed the nursing notes.    I have reviewed the findings, diagnosis, plan and need for follow up with the patient.    New Prescriptions    No medications on file       Final diagnoses:   Tracheostomy hemorrhage (H)   Laryngeal cancer (H)   I, Last Lopez, am serving as a trained medical scribe to document services personally performed by iRri Gallego MD, based on the provider's statements to me.   IRiri MD, was physically present and have reviewed and verified the accuracy of this note documented by Last Lopez.     8/16/2018   [facility], [place], EMERGENCY DEPARTMENT     Riri Gallego MD  08/16/18 7618

## 2018-08-16 NOTE — ED NOTES
Bed: IN05  Expected date: 18  Expected time: 12:47 PM  Means of arrival: Ambulance  Comments:  Keira Dennis    [birth year]  Laryngeal cancer, trach, G-tube with tube feeds   Blood-tinged sputum started yesterday  Agitated today; pulled her trach out last night, replaced it   Apneic episodes   Family talked palliative   Gus blood 4-6 ounces out from trach today   Coming from [facility]

## 2018-08-16 NOTE — IP AVS SNAPSHOT
"    [address] [facility]: [phone]                                              INTERAGENCY TRANSFER FORM - PHYSICIAN ORDERS   2018                    Hospital Admission Date: 2018  SHAHRIAR GARCÍA   : 1945  Sex: Female        Attending Provider: Cynthia Walden MD     Allergies:  Penicillins, Cefepime, Cephalosporins    Infection:  None   Service:  OTOLARYNGOLO    Ht:  1.702 m (5' 7\")   Wt:  55 kg (121 lb 4.1 oz)   Admission Wt:  56.7 kg (125 lb)    BMI:  18.99 kg/m 2   BSA:  1.61 m 2            Patient PCP Information     Provider PCP Type    Asa Elliott MD General      ED Clinical Impression     Diagnosis Description Comment Added By Time Added    Tracheostomy hemorrhage (H) [J95.01] Tracheostomy hemorrhage (H) [J95.01]  Riri Gallego MD 2018  2:28 PM    Laryngeal cancer (H) [C32.9] Laryngeal cancer (H) [C32.9]  Riri Gallego MD 2018  2:29 PM      Hospital Problems as of 2018              Priority Class Noted POA    COPD (chronic obstructive pulmonary disease) (H) Medium  2014 Yes    Essential hypertension with goal blood pressure less than 140/90 Medium  Unknown Yes    Hyperlipidemia LDL goal <100 Medium  Unknown Yes    PVD (peripheral vascular disease) (H) Medium  2016 Yes    Physical deconditioning Medium  9/15/2016 Yes    Laryngeal mass Medium  2018 Yes    Pulmonary nodules Medium  2018 Yes    Malignant neoplasm of larynx (H) Medium  2018 Yes    Secondary malignancy of lymph nodes of head, face and neck (H) Medium  2018 Yes    History of stroke Medium  2018 Yes    Severe malnutrition (H) Medium  2018 Yes    Blood in upper airway Medium  2018 Yes    * (Principal)Acute posthemorrhagic anemia Medium  2018 Yes      Non-Hospital Problems as of 2018              Priority Class Noted    H/O: alcohol abuse Medium  Unknown    Tobacco abuse Medium  2016    Cholelithiasis Medium  Unknown    Osteoporosis Medium  " 7/14/2016    Elevated serum free T4 level Medium  1/17/2018    Thyroid nodule Medium  6/15/2018    Tracheostomy, acute management (H) Medium  7/17/2018    Aspiration pneumonia (H) Medium  7/22/2018    Secondary malignant neoplasm of right lung (H) Medium  7/22/2018    Caloric malnutrition (H) Medium  7/27/2018      Code Status History     Date Active Date Inactive Code Status Order ID Comments User Context    8/31/2018  2:41 PM 8/31/2018  4:35 PM DNR/DNI [id number]  Samantha Burnham PA-C Outpatient    8/16/2018  9:35 PM 8/31/2018  2:41 PM Full Code [id number]  Diana Degroot MD Inpatient    8/9/2018  3:45 PM 8/16/2018  9:35 PM Full Code [id number]  Sandie Grace PA-C Outpatient    8/3/2018  3:03 AM 8/9/2018  3:45 PM Full Code [id number]  Elder Gusman MD Inpatient    7/17/2018  7:22 PM 8/3/2018  3:03 AM Full Code [id number]  Diana Degroot MD Inpatient         Medication Review      START taking        Dose / Directions Comments    LORazepam 0.5 mg/mL NON-STANDARD dilution 0.5 MG/ML Soln solution   Used for:  Adjustment disorder with anxious mood        Dose:  0.5-1 mg   1-2 mLs (0.5-1 mg) by Per G Tube route every 4 hours as needed for anxiety   Quantity:  30 mL   Refills:  0        morphine sulfate (high concentrate) 20 MG/ML concentrated solution   Commonly known as:  ROXANOL-CONCENTRATED   Used for:  Laryngeal cancer (H), Tracheostomy hemorrhage (H)        Dose:  5-10 mg   Place 0.25-0.5 mLs (5-10 mg) under the tongue every 3 hours as needed (air hunger)   Quantity:  15 mL   Refills:  0        order for DME        Nebulizer machine  Diagnosis: COPD Associated Rx: arformoterol, budesonide, ipratropium, levalbuterol   Quantity:  3 Month   Refills:  1        predniSONE 20 MG tablet   Commonly known as:  DELTASONE   Used for:  Tracheostomy hemorrhage (H)        Dose:  20 mg   Take 1 tablet (20 mg) by mouth daily   Quantity:  30 tablet   Refills:  1          CONTINUE these  medications which may have CHANGED, or have new prescriptions. If we are uncertain of the size of tablets/capsules you have at home, strength may be listed as something that might have changed.        Dose / Directions Comments    acetaminophen 500 MG tablet   Commonly known as:  TYLENOL   This may have changed:    - when to take this  - reasons to take this   Used for:  Laryngeal cancer (H)        Dose:  500 mg   1 tablet (500 mg) by Per G Tube route every 4 hours as needed for mild pain   Quantity:  100 tablet   Refills:  0        albuterol (2.5 MG/3ML) 0.083% neb solution   This may have changed:    - when to take this  - reasons to take this  - Another medication with the same name was removed. Continue taking this medication, and follow the directions you see here.        Dose:  2.5 mg   Take 1 vial (2.5 mg) by nebulization every 4 hours as needed for shortness of breath / dyspnea or wheezing   Quantity:  360 mL   Refills:  0        * docusate 50 MG/5ML liquid   Commonly known as:  COLACE   This may have changed:  Another medication with the same name was added. Make sure you understand how and when to take each.   Used for:  Constipation, unspecified constipation type        Dose:  50 mg   5 mLs (50 mg) by Per Feeding Tube route 2 times daily   Quantity:  300 mL   Refills:  0    Hold for loose stools       * docusate 50 MG/5ML liquid   Commonly known as:  COLACE   This may have changed:  You were already taking a medication with the same name, and this prescription was added. Make sure you understand how and when to take each.   Used for:  Drug-induced constipation        Dose:  50 mg   5 mLs (50 mg) by Per Feeding Tube route 2 times daily as needed for constipation   Quantity:  300 mL   Refills:  0        methylphenidate 5 MG tablet   Commonly known as:  RITALIN   This may have changed:    - how to take this  - Another medication with the same name was removed. Continue taking this medication, and follow the  directions you see here.   Used for:  Somnolence        Dose:  2.5 mg   0.5 tablets (2.5 mg) by Per G Tube route 2 times daily   Quantity:  30 tablet   Refills:  0        polyethylene glycol Packet   Commonly known as:  MIRALAX/GLYCOLAX   This may have changed:    - when to take this  - reasons to take this   Used for:  Drug-induced constipation        Dose:  17 g   17 g by Per Feeding Tube route daily as needed for constipation   Quantity:  14 packet   Refills:  0        * Notice:  This list has 2 medication(s) that are the same as other medications prescribed for you. Read the directions carefully, and ask your doctor or other care provider to review them with you.      CONTINUE these medications which have NOT CHANGED        Dose / Directions Comments    amLODIPine 5 MG tablet   Commonly known as:  NORVASC   Used for:  Hyperlipidemia LDL goal <100        Dose:  5 mg   1 tablet (5 mg) by Per G Tube route daily   Quantity:  30 tablet   Refills:  3        arformoterol 15 MCG/2ML Nebu neb solution   Commonly known as:  BROVANA        Dose:  15 mcg   Take 2 mLs (15 mcg) by nebulization 2 times daily   Quantity:  120 mL   Refills:  0        budesonide 0.25 MG/2ML neb solution   Commonly known as:  PULMICORT        Dose:  0.25 mg   Take 2 mLs (0.25 mg) by nebulization 2 times daily   Quantity:  60 ampule   Refills:  0        hypromellose-dextran 0.1-0.3 % Soln ophthalmic solution   Commonly known as:  ARTIFICAL TEARS   Used for:  Dry eyes        Dose:  1 drop   Place 1 drop into both eyes every hour as needed for dry eyes   Quantity:  1 Bottle   Refills:  1        ipratropium 0.02 % neb solution   Commonly known as:  ATROVENT        Dose:  0.5 mg   Take 2.5 mLs (0.5 mg) by nebulization 4 times daily   Quantity:  225 mL   Refills:  0        levalbuterol 0.63 MG/3ML neb solution   Commonly known as:  XOPENEX        Dose:  0.63 mg   Take 3 mLs (0.63 mg) by nebulization 4 times daily   Quantity:  360 mL   Refills:  0         losartan 50 MG tablet   Commonly known as:  COZAAR   Used for:  Essential hypertension with goal blood pressure less than 140/90        Dose:  50 mg   1 tablet (50 mg) by Per G Tube route daily   Quantity:  30 tablet   Refills:  3          STOP taking     aspirin 81 MG tablet           atorvastatin 40 MG tablet   Commonly known as:  LIPITOR           clopidogrel 75 MG tablet   Commonly known as:  PLAVIX           ferrous sulfate 300 (60 Fe) MG/5ML syrup           folic acid 1 MG tablet   Commonly known as:  FOLVITE           glycopyrrolate 1 MG tablet   Commonly known as:  ROBINUL           lactobacillus rhamnosus (GG) capsule           nystatin 177573 UNIT/ML suspension   Commonly known as:  MYCOSTATIN           oxyCODONE IR 5 MG tablet   Commonly known as:  ROXICODONE           sennosides 8.8 MG/5ML syrup   Commonly known as:  SENOKOT                   Summary of Visit     Reason for your hospital stay       Bleeding from laryngeal cancer tumor             After Care     Activity       Your activity upon discharge: Activity as tolerated       Diet       You may eat or drink by mouth if you wish. There is risk of food/drink getting into the lungs which could cause infection or difficulty breathing. Continue tube feeding through Gastrostomy (g-tube). Formula: Isosource 1.5. Give 1 can 4 times daily per bolus feed. Separate each feeding by 3-4 hours. Flush tube with 90 ml of water before and after each feeding. Flush tube with 30ml of water before and after giving medications.       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 Keira does not want any invasive interventions for bleeding and does not wish to be brought back to the hospital. Follow these instructions in the event of bleeding:  - Keep her head elevated to make it easier to cough up the blood and secretions  - Allow Keira to cough up blood and secretions through the trach tube  - Assist with suctioning  through the trach tube by inserting the suction catheter 2-3 inches into the tube. Repeat as often as needed if she is unable to cough up the secretions   - Use dark towels and sheets to catch bleeding   - We recommend changing the inner cannula of the trach tube every 8 hours, if able, to prevent dried blood and secretions from building up inside the tube which could lead to difficulty breathing. By keeping the trach tube clean it will help Keira to breathe easier. TO change the inner cannula pinch sides of the inner cannula and remove. Throw away old inner cannula and place a new clean inner cannula, making sure to lock into place.   - If she appears anxious you may give comfort medications as prescribed (Ativan)  - If she develops difficulty breathing give comfort medications as prescribed (morphine)    Robinol was intentionally discontinued due to thick secretions.       Tracheostomy care       Please refer to tracheostomy care handout for complete details on how to care for the trach tube at home.             Referrals     Home care nursing referral       [facility] #[phone]  Home Hospice Care    Your provider has ordered home care nursing services. If you have not been contacted within 2 days of your discharge please call the inpatient department phone number at [phone] .       Home infusion referral       [facility]  #[phone]  Bolus tube feeding support       Medication Therapy Management Referral       MTM referral reason            Patient had a hospital or ED visit in last 6 months and has more than 10   PTA or Discharge medications    Patient has 5 PTA or Discharge Medications AND one of the following   diagnoses: DM,HF,COPD,AMI DX,PULM HTN       This service is designed to help you get the most from your medications.  A specially trained pharmacist will work closely with you and your doctors  to solve any problems related to your medications and to help you get the   best  results from taking them.      The Medication Therapy Management staff will call you to schedule an appointment.             Follow-Up Appointment Instructions     Future Labs/Procedures    Adult [facility] Follow-up and recommended labs and tests     Comments:    No follow up is required. You may follow up with Dr. Walden as needed in ENT clinic. Please call to schedule an appointment: [phone].    Appointments on [place] and/or [facility] (with [facility] or [facility] provider or service). Call [phone] if you haven't heard regarding these appointments within 7 days of discharge.      Follow-Up Appointment Instructions     Adult [facility] Follow-up and recommended labs and tests       No follow up is required. You may follow up with Dr. Walden as needed in ENT clinic. Please call to schedule an appointment: [phone].    Appointments on [place] and/or [facility] (with [facility] or [facility] provider or service). Call [phone] if you haven't heard regarding these appointments within 7 days of discharge.             Statement of Approval     Ordered          09/01/18 1020  I have reviewed and agree with all the recommendations and orders detailed in this document.  EFFECTIVE NOW     Approved and electronically signed by:  Colt Lyons MD

## 2018-08-16 NOTE — ED TRIAGE NOTES
Pt presents to the ER with complaints of lucinda bleeding after pulling out trach while agitated last night. Pt coughing up clots from trach on arrival

## 2018-08-16 NOTE — ANESTHESIA CARE TRANSFER NOTE
Patient: Keira Collins    Procedure(s):  COMBINED DIRECT LARYNGOSCOPY, BRONCHOSCOPY, CONTROL OF OROPHARYNGEAL HEMORRHAGE - Wound Class: II-Clean Contaminated    Diagnosis: febrile hermeridge  Diagnosis Additional Information: No value filed.    Anesthesia Type:   General, ETT, Trach     Note:  Airway :Tracheostomy  Patient transferred to:PACU  Comments: VSS. Breathing spont via trach. Report to RN at bedside.Handoff Report: Identifed the Patient, Identified the Reponsible Provider, Reviewed the pertinent medical history, Discussed the surgical course, Reviewed Intra-OP anesthesia mangement and issues during anesthesia, Set expectations for post-procedure period and Allowed opportunity for questions and acknowledgement of understanding      Vitals: (Last set prior to Anesthesia Care Transfer)    CRNA VITALS  8/16/2018 1629 - 8/16/2018 1659      8/16/2018             Resp Rate (observed): (!)  2                Electronically Signed By: LORI Paulson CRNA  August 16, 2018  4:59 PM

## 2018-08-16 NOTE — PROGRESS NOTES
I was called in regarding a medicine consult    73 y/o F with PMH of [id number] squamous cell carcinoma of the larynx s/p tracheotomy for upper airway obstruction 7/17/18 who presents to the ED for bleeding from the mouth and trach site. Patient pulled his trach in TCU and TCU staff replaced it and bleeding started shortly afterward. Patient had procedure for glottic tumor bleeding as performed by ENT. Also was recently diagnosed with left pontine stroke and on ASA and plavix currently   - question regarding how long DAPT can be safely held in setting of glottic tumor bleeding; recommended to hold DAPT overnight and watch closely and also recommended to talk to neurology on question of how long it can be safely held  -pt has other comorbid ites and previous h/o malnutrition , h/o lung nodules which ENT wants medicine to be involved   -ok for seeing patient in the day time and no urgent questions for medicine at this time   -consult team will see patient tomorrow

## 2018-08-16 NOTE — IP AVS SNAPSHOT
` `     UNIT 6B [facility] BANK: [phone]            Medication Administration Report for Keira Collins as of 09/01/18 1117   Legend:    Given Hold Not Given Due Canceled Entry Other Actions    Time Time (Time) Time  Time-Action       Inactive    Active    Linked        Medications 08/26/18 08/27/18 08/28/18 08/29/18 08/30/18 08/31/18 09/01/18    0.9% sodium chloride BOLUS  Dose: 1-250 mL  Freq: EVERY 1 HOUR PRN Route: IV  PRN Comment: To prime infusion tubing AND flush blood through tubing POST blood component administration.  Start: 08/26/18 1030   Admin Instructions: IF blood component ordered, nurse to administer rate and volume of fluid pre-blood component administration to PRIME tubing AND to FLUSH blood through tubing post blood component administration UNTIL tubing is clear of visible blood.  Use MINIMUM volume necessary for pre and post blood component administration.  IF duplicate order nurse to discontinue the duplicate order.    Admin. Amount: 1-250 mL  Dispense Loc: [facility] Floor Stock  Volume: 250 mL               acetaminophen (TYLENOL) tablet 500 mg  Dose: 500 mg  Freq: EVERY 6 HOURS PRN Route: PER G TUBE  PRN Reason: fever  Start: 08/16/18 2134   Admin Instructions: Maximum acetaminophen dose from all sources = 75 mg/kg/day not to exceed 4 gram    Admin. Amount: 1 tablet (1 × 500 mg tablet)  Last Admin: 08/18/18 0401  Dispense Loc: [facility] ADS 6B1               albuterol neb solution 2.5 mg  Dose: 2.5 mg  Freq: EVERY 4 HOURS PRN Route: NEBULIZATION  PRN Reason: wheezing  Start: 08/28/18 0900   Admin. Amount: 2.5 mg = 3 mL Conc: 2.5 mg/3 mL  Dispense Loc: [facility] ADS 6B1  Volume: 3 mL               amLODIPine (NORVASC) tablet 5 mg  Dose: 5 mg  Freq: DAILY Route: PER G TUBE  Start: 08/17/18 0800   Admin. Amount: 2 tablet (2 × 2.5 mg tablet)  Last Admin: 09/01/18 0853  Dispense Loc: [facility] ADS 6B1     0852 (5 mg)-Given        0726 (5 mg)-Given        0823 (5 mg)-Given        0837 (5 mg)-Given         0944 (5 mg)-Given        0906 (5 mg)-Given        0853 (5 mg)-Given           arformoterol (BROVANA) neb solution 15 mcg  Dose: 15 mcg  Freq: 2 TIMES DAILY Route: NEBULIZATION  Start: 08/16/18 2145   Admin. Amount: 15 mcg = 2 mL Conc: 15 mcg/2 mL  Last Admin: 09/01/18 0924  Dispense Loc: [facility]  Volume: 2 mL     0713 (15 mcg)-Given       2104 (15 mcg)-Given        0900 (15 mcg)-Given       2015 (15 mcg)-Given        (0844)-Not Given       1220 (15 mcg)-Given       2045 (15 mcg)-Given        0754 (15 mcg)-Given       2034 (15 mcg)-Given        0744 (15 mcg)-Given       2021 (15 mcg)-Given        0838 (15 mcg)-Given       1947 (15 mcg)-Given        0924 (15 mcg)-Given       [ ] 2000           atorvastatin (LIPITOR) tablet 40 mg  Dose: 40 mg  Freq: DAILY Route: PER G TUBE  Start: 08/17/18 0800   Admin. Amount: 1 tablet (1 × 40 mg tablet)  Last Admin: 09/01/18 0850  Dispense Loc: [facility] ADS 6B1     0852 (40 mg)-Given        0726 (40 mg)-Given        0824 (40 mg)-Given        0837 (40 mg)-Given        0945 (40 mg)-Given        0906 (40 mg)-Given        0850 (40 mg)-Given           budesonide (PULMICORT) neb solution 0.25 mg  Dose: 0.25 mg  Freq: 2 TIMES DAILY Route: NEBULIZATION  Start: 08/16/18 2145   Admin. Amount: 0.25 mg = 2 mL Conc: 0.25 mg/2 mL  Last Admin: 09/01/18 0924  Dispense Loc: [facility] ADS 6B1     0713 (0.25 mg)-Given       2104 (0.25 mg)-Given        0900 (0.25 mg)-Given       2015 (0.25 mg)-Given        0839 (0.25 mg)-Given       2050 (0.25 mg)-Given        0754 (0.25 mg)-Given       2019 (0.25 mg)-Given        0744 (0.25 mg)-Given       2020 (0.25 mg)-Given        0838 (0.25 mg)-Given       1947 (0.25 mg)-Given        0924 (0.25 mg)-Given       [ ] 2000           ciprofloxacin-dexamethasone (CIPRODEX) 0.3-0.1 % otic suspension 4 drop  Dose: 4 drop  Freq: 2 TIMES DAILY Route: Top  Start: 08/22/18 2000   Admin Instructions: SHAKE WELL. Apply to skin surround tracheotomy stoma.    Admin.  Amount: 4 drop  Last Admin: 09/01/18 0857  Dispense Loc: [facility]  Volume: 7.5 mL     0853 (4 drop)-Given       2002 (4 drop)-Given        0725 (4 drop)-Given       2024 (4 drop)-Given        0824 (4 drop)-Given       1957 (4 drop)-Given        0838 (4 drop)-Given       2115 (4 drop)-Given        0950 (4 drop)-Given       2004 (4 drop)-Given        0906 (4 drop)-Given       2041 (4 drop)-Given        0857 (4 drop)-Given       [ ] 2000           dextrose 10 % 1,000 mL infusion  Freq: CONTINUOUS PRN Route: IV  PRN Comment: Hypoglycemia prevention  Start: 08/17/18 1033   Admin Instructions: For Hypoglycemia Prevention for patients on long-acting subcutaneous basal insulin (Glargine, Detemir, NPH) or continuous insulin infusion. Whenever nutrition support is held or interrupted:   1) Infuse IV D10W at nutrition support rate  2) Notify provider for further instructions    Dispense Loc: [facility] Floor Stock  Volume: 1,000 mL   Mixture Administration Information:   Medication Type Amount   dextrose 10 % SOLN Base 1,000 mL                       docusate (COLACE) 50 MG/5ML liquid 50 mg  Dose: 50 mg  Freq: 2 TIMES DAILY PRN Route: PER FEEDING   PRN Reason: constipation  Start: 08/22/18 1100   Admin Instructions: Should be administered in milk or fruit juice to mask the taste.    Admin. Amount: 50 mg = 5 mL Conc: 10 mg/mL  Dispense Loc: [facility] ADS 6B1  Volume: 10 mL               ferrous sulfate 300 (60 Fe) MG/5ML syrup 300 mg  Dose: 300 mg  Freq: DAILY Route: PER G TUBE  Start: 08/17/18 0800   Admin Instructions: Absorbed best on an empty stomach. If stomach upset occurs, can take with meals.    Admin. Amount: 300 mg = 5 mL Conc: 300 mg/5 mL  Last Admin: 09/01/18 0856  Dispense Loc: [facility]  Volume: 5 mL     0853 (300 mg)-Given        0726 (300 mg)-Given        0823 (300 mg)-Given        0836 (300 mg)-Given        1117 (300 mg)-Given        0906 (300 mg)-Given        0856 (300 mg)-Given            folic acid (FOLATE) oral solution 1 mg  Dose: 1 mg  Freq: DAILY Route: PER G TUBE  Start: 08/17/18 0800   Admin. Amount: 1 mg = 2 mL Conc: 0.5 mg/mL  Last Admin: 09/01/18 0856  Dispense Loc: [facility]  Volume: 2 mL     0853 (1 mg)-Given        0730 (1 mg)-Given        0823 (1 mg)-Given        0838 (1 mg)-Given        1118 (1 mg)-Given        0906 (1 mg)-Given        0856 (1 mg)-Given           folic acid (FOLVITE) tablet 1 mg  Dose: 1 mg  Freq: DAILY Route: PER G TUBE  Start: 08/28/18 0800   Admin. Amount: 1 tablet (1 × 1 mg tablet)  Last Admin: 08/31/18 0911  Dispense Loc: [facility] ADS 6B1       0823 (1 mg)-Given        0836 (1 mg)-Given        0945 (1 mg)-Given        0911 (1 mg)-Given        (0921)-Not Given [C]           HYDROmorphone (DILAUDID) injection 0.2 mg  Dose: 0.2 mg  Freq: EVERY 3 HOURS PRN Route: IV  PRN Reason: other  PRN Comment: pain control or improvement in physical function. Hold dose for analgesic side effects.  Start: 08/16/18 2134   Admin Instructions: Notify provider to assess for uncontrolled pain or analgesic side effects. Hold while on PCA or with regular IV opioid dosing    Admin. Amount: 0.2 mg = 0.2 mL Conc: 1 mg/mL  Dispense Loc: [facility] ADS 6B1  Volume: 0.2 mL               hypromellose-dextran (ARTIFICAL TEARS) 0.1-0.3 % ophthalmic solution 1 drop  Dose: 1 drop  Freq: EVERY 1 HOUR PRN Route: Both Eyes  PRN Reason: dry eyes  Start: 08/16/18 2134   Admin. Amount: 1 drop  Dispense Loc: [facility]  Volume: 15 mL               ipratropium (ATROVENT) 0.02 % neb solution 0.5 mg  Dose: 0.5 mg  Freq: 4 TIMES DAILY Route: NEBULIZATION  Start: 08/16/18 2145   Admin. Amount: 0.5 mg = 2.5 mL Conc: 0.5 mg/2.5 mL  Last Admin: 09/01/18 0924  Dispense Loc: [facility] ADS 6B1  Volume: 2.5 mL     0713 (0.5 mg)-Given       1113 (0.5 mg)-Given       1600 (0.5 mg)-Given       2104 (0.5 mg)-Given        0900 (0.5 mg)-Given       1209 (0.5 mg)-Given       1535 (0.5 mg)-Given       2015  (0.5 mg)-Given        0840 (0.5 mg)-Given       1219 (0.5 mg)-Given       1633 (0.5 mg)-Given       2045 (0.5 mg)-Given        0754 (0.5 mg)-Given       1148 (0.5 mg)-Given       1526 (0.5 mg)-Given       2018 (0.5 mg)-Given        0744 (0.5 mg)-Given       1126 (0.5 mg)-Given       1522 (0.5 mg)-Given       2020 (0.5 mg)-Given        0838 (0.5 mg)-Given       1204 (0.5 mg)-Given       1648 (0.5 mg)-Given       1947 (0.5 mg)-Given        0924 (0.5 mg)-Given       [ ] 1200       [ ] 1600       [ ] 2000           lactobacillus rhamnosus (GG) (CULTURELL) capsule 1 capsule  Dose: 1 capsule  Freq: 3 TIMES DAILY BEFORE MEALS Route: PER FEEDING   Start: 08/17/18 0730   Admin Instructions: Administer at least 2 hours before or after oral antibiotics. Capsules may be opened.    Admin. Amount: 1 capsule  Last Admin: 09/01/18 0849  Dispense Loc: [facility] ADS 6B1     0853 (1 capsule)-Given       1232 (1 capsule)-Given       1843 (1 capsule)-Given        0726 (1 capsule)-Given       1142 (1 capsule)-Given       1644 (1 capsule)-Given        0823 (1 capsule)-Given       1157 (1 capsule)-Given       1856 (1 capsule)-Given        0836 (1 capsule)-Given       1248 (1 capsule)-Given       1819 (1 capsule)-Given        0945 (1 capsule)-Given       1339 (1 capsule)-Given       1704 (1 capsule)-Given        0906 (1 capsule)-Given       1239 (1 capsule)-Given       1651 (1 capsule)-Given        0849 (1 capsule)-Given       [ ] 1200       [ ] 1700           levalbuterol (XOPENEX) neb solution 0.63 mg  Dose: 0.63 mg  Freq: 4 TIMES DAILY Route: NEBULIZATION  Start: 08/16/18 2145   Order specific questions:  Levalbuterol orders will be substituted to albuterol unless intolerance is documented here Other reaction (documented in order comments)     Admin. Amount: 0.63 mg = 3 mL Conc: 0.63 mg/3 mL  Last Admin: 09/01/18 0924  Dispense Loc: [facility] ADS 6B1  Volume: 3 mL     0713 (0.63 mg)-Given       1113 (0.63 mg)-Given       1600 (0.63  "mg)-Given       2104 (0.63 mg)-Given        0900 (0.63 mg)-Given       1210 (0.63 mg)-Given       1535 (0.63 mg)-Given       2015 (0.63 mg)-Given        0841 (0.63 mg)-Given       1218 (0.63 mg)-Given       1632 (0.63 mg)-Given       2045 (0.63 mg)-Given        0754 (0.63 mg)-Given       1148 (0.63 mg)-Given       1526 (0.63 mg)-Given       2018 (0.63 mg)-Given        0745 (0.63 mg)-Given       1126 (0.63 mg)-Given       1521 (0.63 mg)-Given       2020 (0.63 mg)-Given        0838 (0.63 mg)-Given       1204 (0.63 mg)-Given       1947 (0.63 mg)-Given               0924 (0.63 mg)-Given       [ ] 1200       [ ] 1600       [ ] 2000           levofloxacin (LEVAQUIN) tablet 500 mg  Dose: 500 mg  Freq: DAILY Route: PO  Indications of Use: ASPIRATION PNEUMONIA  Start: 08/26/18 1115   Admin Instructions: Administer at least 2 hrs before or 4 hrs after aluminum, calcium, iron, zinc or magnesium containing products.<br><br>If the enteral route must be used, hold tube feeding 1 hour before and 1 hour after administration of medication. <br><br>Therapeutic interchanged moxifloxacin 400 mg to levofloxacin 500 mg per policy    Admin. Amount: 2 tablet (2 × 250 mg tablet)  Last Admin: 09/01/18 0850  Dispense Loc: [facility] ADS 6B1     1232 (500 mg)-Given        0726 (500 mg)-Given        0824 (500 mg)-Given        0838 (500 mg)-Given        0945 (500 mg)-Given        0906 (500 mg)-Given        0850 (500 mg)-Given           lidocaine (LMX4) cream  Freq: EVERY 1 HOUR PRN Route: Top  PRN Reason: pain  PRN Comment: with VAD insertion or accessing implanted port.  Start: 08/26/18 8840   Admin Instructions: Do NOT give if patient has a history of allergy to any local anesthetic or any \"rosario\" product.  Apply 30 minutes prior to VAD insertion or port access. MAX Dose: 2.5 g (  of 5 g tube).    Dispense Loc: [facility] ADS 6B1               lidocaine 1 % 1 mL  Dose: 1 mL  Freq: EVERY 1 HOUR PRN Route: OTHER  PRN Comment: mild pain with VAD " "insertion or accessing implanted port  Start: 18   Admin Instructions: Do NOT give if patient has a history of allergy to any local anesthetic or any \"rosario\" product. MAX dose 1 mL subcutaneous OR intradermal in divided doses.    Admin. Amount: 1 mL  Dispense Loc: [facility] ADS 6B1  Volume: 2 mL               lidocaine 3%, phenylephrine 0.25% (non-sterile) solution for irrigation  Dose: 5 mL  Freq: ONCE Route: Top  Start: 18 0815   Admin. Amount: 5 mL  Dispense Loc: [facility]  Administrations Remainin  Volume: 5 mL   Mixture Administration Information:   Medication Type Amount   lidocaine 4 % SOLN Medications 3.75 mL   phenylephrine 10 MG/ML SOLN Medications 1.25 mL                       losartan (COZAAR) tablet 50 mg  Dose: 50 mg  Freq: DAILY Route: PER G TUBE  Start: 18 0800   Admin. Amount: 1 tablet (1 × 50 mg tablet)  Last Admin: 18 0854  Dispense Loc: [facility] ADS 6B1     0852 (50 mg)-Given        0726 (50 mg)-Given        0823 (50 mg)-Given        0837 (50 mg)-Given        0945 (50 mg)-Given        0906 (50 mg)-Given        0854 (50 mg)-Given           magnesium sulfate 4 g in 100 mL sterile water (premade)  Dose: 4 g  Freq: EVERY 4 HOURS PRN Route: IV  PRN Reason: magnesium supplementation  Start: 18   Admin Instructions: For serum Mg++ less than 1.6 mg/dL  Give 4 g and recheck magnesium level 2 hours after dose, and next AM.    Admin. Amount: 4 g = 100 mL Conc: 4 g/100 mL  Dispense Loc: [facility] ADS 6B1  Infused Over: 120 Minutes  Volume: 100 mL               melatonin tablet 1 mg  Dose: 1 mg  Freq: AT BEDTIME PRN Route: PO  PRN Reason: sleep  Start: 18   Admin Instructions: Do not give unless at least 6 hours of uninterrupted sleep is expected.    Admin. Amount: 1 tablet (1 × 1 mg tablet)  Last Admin: 18  Dispense Loc: [facility] ADS 6B1      (1 mg)-Given                 methylphenidate (RITALIN) half-tab 2.5 mg  Dose: 2.5 mg  Freq: " 2 TIMES DAILY Route: PO  Start: 08/16/18 2145   Admin. Amount: 1 half-tab (1 × 2.5 mg half-tab)  Last Admin: 09/01/18 0850  Dispense Loc: [facility] ADS 6B1     (0920)-Not Given [C]       (2011)-Not Given        0730 (2.5 mg)-Given       2025 (2.5 mg)-Given        0832 (2.5 mg)-Given       1957 (2.5 mg)-Given        1003 (2.5 mg)-Given       2115 (2.5 mg)-Given        0950 (2.5 mg)-Given       2004 (2.5 mg)-Given        0911 (2.5 mg)-Given       2035 (2.5 mg)-Given        0850 (2.5 mg)-Given       [ ] 2000           metoclopramide (REGLAN) tablet 5 mg  Dose: 5 mg  Freq: EVERY 6 HOURS PRN Route: PO  PRN Comment: nausea and vomiting  Start: 08/16/18 2134   Admin Instructions: This is Step 3 of nausea and vomiting management.  Give if nausea not resolved 15 minutes after giving prochlorperazine (COMPAZINE).  If nausea not resolved in 15-30 minutes, Notify provider.    Admin. Amount: 1 tablet (1 × 5 mg tablet)  Dispense Loc: [facility] ADS 6B1              Or  metoclopramide (REGLAN) injection 5 mg  Dose: 5 mg  Freq: EVERY 6 HOURS PRN Route: IV  PRN Comment: nausea and vomiting  Start: 08/16/18 2134   Admin Instructions: This is Step 3 of nausea and vomiting management.  Give if nausea not resolved 15 minutes after giving prochlorperazine (COMPAZINE).  If nausea not resolved in 15-30 minutes, Notify provider.  Avoid use if patient has full bowel obstruction or perforation. Irritant. For ordered doses up to 10 mg, give IV Push undiluted over 2 minutes.    Admin. Amount: 5 mg = 1 mL Conc: 5 mg/mL  Dispense Loc: [facility] ADS 6B1  Infused Over: 2 Minutes  Volume: 2 mL               multivitamins with minerals (CERTAVITE/CEROVITE) liquid 15 mL  Dose: 15 mL  Freq: DAILY Route: PER FEEDING   Start: 08/17/18 0800   Admin. Amount: 15 mL  Last Admin: 09/01/18 0855  Dispense Loc: [facility] ADS 6B1  Volume: 15 mL     0853 (15 mL)-Given        0726 (15 mL)-Given        0823 (15 mL)-Given        0836 (15 mL)-Given        0944 (15  mL)-Given        0906 (15 mL)-Given        0855 (15 mL)-Given           naloxone (NARCAN) injection 0.1-0.4 mg  Dose: 0.1-0.4 mg  Freq: EVERY 2 MIN PRN Route: IV  PRN Reason: opioid reversal  Start: 08/16/18 2134   Admin Instructions: For respiratory rate LESS than or EQUAL to 8.  Partial reversal dose:  0.1 mg titrated q 2 minutes for Analgesia Side Effects Monitoring Sedation Level of 3 (frequently drowsy, arousable, drifts to sleep during conversation).Full reversal dose:  0.4 mg bolus for Analgesia Side Effects Monitoring Sedation Level of 4 (somnolent, minimal or no response to stimulation).  For ordered doses up to 2mg give IVP. Give each 0.4mg over 15 seconds in emergency situations. For non-emergent situations further dilute in 9mL of NS to facilitate titration of response.    Admin. Amount: 0.1-0.4 mg = 0.25-1 mL Conc: 0.4 mg/mL  Dispense Loc: [facility] ADS 6B1  Volume: 1 mL               ondansetron (ZOFRAN-ODT) ODT tab 4 mg  Dose: 4 mg  Freq: EVERY 6 HOURS PRN Route: PO  PRN Reasons: nausea,vomiting  Start: 08/16/18 2134   Admin Instructions: This is Step 1 of nausea and vomiting management.  If nausea not resolved in 15 minutes, go to Step 2 prochlorperazine (COMPAZINE). Do not push through foil backing. Peel back foil and gently remove. Place on tongue immediately. Administration with liquid unnecessary  With dry hands, peel back foil backing and gently remove tablet; do not push oral disintegrating tablet through foil backing; administer immediately on tongue and oral disintegrating tablet dissolves in seconds; then swallow with saliva; liquid not required.    Admin. Amount: 1 tablet (1 × 4 mg tablet)  Dispense Loc: [facility] ADS 6B1              Or  ondansetron (ZOFRAN) injection 4 mg  Dose: 4 mg  Freq: EVERY 6 HOURS PRN Route: IV  PRN Reasons: nausea,vomiting  Start: 08/16/18 2134   Admin Instructions: This is Step 1 of nausea and vomiting management.  If nausea not resolved in 15 minutes, go to Step 2  prochlorperazine (COMPAZINE).  Irritant. For ordered doses up to 4 mg, give IV Push undiluted over 2-5 minutes.    Admin. Amount: 4 mg = 2 mL Conc: 4 mg/2 mL  Dispense Loc: [facility] ADS 6B1  Infused Over: 2-5 Minutes  Volume: 2 mL               oxyCODONE IR (ROXICODONE) tablet 5-10 mg  Dose: 5-10 mg  Freq: EVERY 3 HOURS PRN Route: PER G TUBE  PRN Reason: other  PRN Comment: pain control or improvement in physical function. Hold dose for analgesic side effects.  Start: 08/16/18 2134   Admin. Amount: 1-2 tablet (1-2 × 5 mg tablet)  Dispense Loc: [facility] ADS 6B1               oxymetazoline (AFRIN) 0.05 % spray 2 spray  Dose: 2 spray  Freq: 4 TIMES DAILY PRN Route: Top  PRN Reason: congestion  Start: 08/24/18 2251   Admin Instructions: Please swish/gargle afrin every few hours as needed to help aid in hemostasis.    Admin. Amount: 2 spray  Last Admin: 08/31/18 2035  Dispense Loc: [facility]  Volume: 30 mL          2035 (2 spray)-Given            polyethylene glycol (MIRALAX/GLYCOLAX) Packet 17 g  Dose: 17 g  Freq: DAILY PRN Route: PER FEEDING   PRN Reason: constipation  Start: 08/22/18 1100   Admin Instructions: 1 Packet = 17 grams. Mixed prescribed dose in 8 ounces of water. Follow with 8 oz. of water.    Admin. Amount: 17 g  Dispense Loc: [facility] ADS 6B1               potassium chloride (KLOR-CON) Packet 20-40 mEq  Dose: 20-40 mEq  Freq: EVERY 2 HOURS PRN Route: ORAL OR FEED  PRN Reason: potassium supplementation  Start: 08/16/18 2134   Admin Instructions: Use if unable to tolerate tablets.  If Serum K+ 3.0-3.3, dose = 60 mEq po total dose (40 mEq x1 followed in 2 hours by 20 mEq x1). Recheck K+ level 4 hours after dose and the next AM.  If Serum K+ 2.5-2.9, dose = 80 mEq po total dose (40 mEq Q2H x2). Recheck K+ level 4 hours after dose and the next AM.  If Serum K+ less than 2.5, See IV order.  Dissolve packet contents in 4-8 ounces of cold water or juice.    Admin. Amount: 20-40 mEq  Dispense Loc: [facility]  ADS 6B1               potassium chloride 10 mEq in 100 mL intermittent infusion with 10 mg lidocaine  Dose: 10 mEq  Freq: EVERY 1 HOUR PRN Route: IV  PRN Reason: potassium supplementation  Start: 08/16/18 2134   Admin Instructions: Infuse via PERIPHERAL LINE. Use potassium with lidocaine for pain with peripheral administration.  If Serum K+ 3.0-3.3, dose = 10 mEq/hr x4 doses (40 mEq IV total dose). Recheck K+ level 2 hours after dose and the next AM.  If Serum K+ less than 3.0, dose = 10 mEq/hr x6 doses (60 mEq IV total dose). Recheck K+ level 2 hours after dose and the next AM.    Admin. Amount: 10 mEq = 100 mL Conc: 10 mEq/100 mL  Dispense Loc: [facility]  Infused Over: 1 Hours  Volume: 100 mL               potassium chloride 10 mEq in 100 mL sterile water intermittent infusion (premix)  Dose: 10 mEq  Freq: EVERY 1 HOUR PRN Route: IV  PRN Reason: potassium supplementation  Start: 08/16/18 2134   Admin Instructions: Infuse via PERIPHERAL LINE or CENTRAL LINE. Use for central line replacement if patient weight less than 65 kg, if patient is on TPN with high potassium content or if unit does not stock 20 mEq bags.   If Serum K+ 3.0-3.3, dose = 10 mEq/hr x4 doses (40 mEq IV total dose). Recheck K+ level 2 hours after dose and the next AM.   If Serum K+ less than 3.0, dose = 10 mEq/hr x6 doses (60 mEq IV total dose). Recheck K+ level 2 hours after dose and the next AM.    Admin. Amount: 10 mEq = 100 mL Conc: 10 mEq/100 mL  Dispense Loc: [facility] ADS 6B1  Infused Over: 60 Minutes  Volume: 100 mL               potassium chloride 20 mEq in 50 mL intermittent infusion  Dose: 20 mEq  Freq: EVERY 1 HOUR PRN Route: IV  PRN Reason: potassium supplementation  Start: 08/16/18 2134   Admin Instructions: Infuse via CENTRAL LINE Only. May need EKG if less than 65 kg or on TPN - Max rate is 0.3 mEq/kg/hr for patients not on EKG monitoring.   If Serum K+ 3.0-3.3, dose = 20 mEq/hr x2 doses (40 mEq IV total dose). Recheck K+  level 2 hours after dose and the next AM.  If Serum K+ less than 3.0, dose = 20 mEq/hr x3 doses (60 mEq IV total dose). Recheck K+ level 2 hours after dose and the next AM.    Admin. Amount: 20 mEq = 50 mL Conc: 20 mEq/50 mL  Dispense Loc: [facility] ADS 6B1  Infused Over: 120 Minutes  Volume: 50 mL               potassium chloride SA (K-DUR/KLOR-CON M) CR tablet 20-40 mEq  Dose: 20-40 mEq  Freq: EVERY 2 HOURS PRN Route: PO  PRN Reason: potassium supplementation  Start: 08/16/18 2134   Admin Instructions: Use if able to take PO.   If Serum K+ 3.0-3.3, dose = 60 mEq po total dose (40 mEq x1 followed in 2 hours by 20 mEq x1). Recheck K+ level 4 hours after dose and the next AM.  If Serum K+ 2.5-2.9, dose = 80 mEq po total dose (40 mEq Q2H x2). Recheck K+ level 4 hours after dose and the next AM.  If Serum K+ less than 2.5, See IV order.  DO NOT CRUSH.    Admin. Amount: 2-4 tablet (2-4 × 10 mEq tablet)  Dispense Loc: [facility] ADS 6B1               prochlorperazine (COMPAZINE) injection 5 mg  Dose: 5 mg  Freq: EVERY 6 HOURS PRN Route: IV  PRN Reasons: nausea,vomiting  Start: 08/16/18 2134   Admin Instructions: This is Step 2 of nausea and vomiting management. Give if nausea not resolved 15 minutes after giving ondansetron (ZOFRAN). If nausea not resolved in 15 minutes, go to Step 3 metoclopramide (REGLAN), if ordered.  For ordered doses up to 10 mg, give IV Push undiluted. Each 5mg over 1 minute.    Admin. Amount: 5 mg = 1 mL Conc: 5 mg/mL  Dispense Loc: [facility] ADS 6B1  Infused Over: 1-2 Minutes  Volume: 1 mL              Or  prochlorperazine (COMPAZINE) tablet 5 mg  Dose: 5 mg  Freq: EVERY 6 HOURS PRN Route: PO  PRN Reason: vomiting  Start: 08/16/18 2134   Admin Instructions: This is Step 2 of nausea and vomiting management. Give if nausea not resolved 15 minutes after giving ondansetron (ZOFRAN). If nausea not resolved in 15 minutes, go to Step 3 metoclopramide (REGLAN), if ordered.    Admin. Amount: 1 tablet (1 × 5 mg  tablet)  Dispense Loc: [facility] ADS 6B1              Or  prochlorperazine (COMPAZINE) Suppository 12.5 mg  Dose: 12.5 mg  Freq: EVERY 12 HOURS PRN Route: RE  PRN Reasons: nausea,vomiting  Start: 08/16/18 2134   Admin Instructions: This is Step 2 of nausea and vomiting management. Give if nausea not resolved 15 minutes after giving ondansetron (ZOFRAN). If nausea not resolved in 15 minutes, go to Step 3 metoclopramide (REGLAN), if ordered.    Admin. Amount: 0.5 suppository (0.5 × 25 mg suppository)  Dispense Loc: [facility]               sennosides (SENOKOT) syrup 5 mL  Dose: 5 mL  Freq: 2 TIMES DAILY PRN Route: PER FEEDING   PRN Reason: constipation  Start: 08/22/18 1100   Admin. Amount: 5 mL  Dispense Loc: [facility]  Volume: 5 mL               sodium chloride (PF) 0.9% PF flush 3 mL  Dose: 3 mL  Freq: EVERY 8 HOURS Route: IK  Start: 08/26/18 2300   Admin Instructions: And Q1H PRN, to lock peripheral IV dormant line.    Admin. Amount: 3 mL  Last Admin: 09/01/18 0858  Dispense Loc: [facility] Floor Stock  Volume: 3 mL   Current Line: Peripheral IV 08/23/18 Left;Medial Lower forearm     0151 (3 mL)-Given       0730 (3 mL)-Given               0012 (3 mL)-Given       0825 (3 mL)-Given       1559 (3 mL)-Given       2335 (3 mL)-Given        1248 (3 mL)-Given [C]       (1503)-Not Given       2333 (3 mL)-Given        0947 (3 mL)-Given               (0035)-Not Given       0912 (3 mL)-Given              2333 (3 mL)-Given        0858 (3 mL)-Given       [ ] 1600           sodium chloride (PF) 0.9% PF flush 3 mL  Dose: 3 mL  Freq: EVERY 1 HOUR PRN Route: IK  PRN Reason: line flush  Start: 08/26/18 2249   Admin Instructions: for peripheral IV flush post IV meds    Admin. Amount: 3 mL  Dispense Loc: [facility] Floor Stock  Volume: 3 mL              Discontinued Medications  Medications 08/26/18 08/27/18 08/28/18 08/29/18 08/30/18 08/31/18 09/01/18         Freq: EVERY 1 HOUR PRN Route: Top  PRN Reason: moderate  "pain  PRN Comment: with VAD insertion or accessing implanted port  Start: 08/26/18 2249   End: 08/29/18 1407   Admin Instructions: Do NOT give if patient has a history of allergy to any local anesthetic or any \"rosario\" product.   Apply 30 minutes prior to VAD insertion or port access.  MAX Dose:  2.5 g (  of 5 g tube)    Dispense Loc: [facility] ADS 6B1        1407-Med Discontinued            Dose: 1 mL  Freq: EVERY 1 HOUR PRN Route: OTHER  PRN Comment: mild pain with VAD insertion or accessing implanted port  Start: 08/26/18 2249   End: 08/29/18 1407   Admin Instructions: Do NOT give if patient has a history of allergy to any local anesthetic or any \"rosario\" product. MAX dose 1 mL subcutaneous OR intradermal in divided doses.    Admin. Amount: 1 mL  Dispense Loc: [facility] ADS 6B1  Volume: 2 mL        1407-Med Discontinued            "

## 2018-08-16 NOTE — PROGRESS NOTES
Stroke service    Briefly, 72 year old female with metastatic squamous cell cancer noted to have left pontine infarct 7/21/18 secondary to small vessel disease. At that time, recommended to continue ASA 81 mg and Plavix 75 mg daily. Of note, she was previously on DAPT by vascular surgery for peripheral vascular disease s/p stenting.    Currently admitted with bleeding from trach site and underwent emergent laryngoscopy, bronchoscopy and control of hemorrhage.     From Stroke standpoint - okay to hold antiplatelets in the setting of hemorrhage. Once stable, can restart ASA 81 mg daily.     She was previously maintained on Plavix 75 mg daily as well per Vascular surgery for PVD s/p Stent. Please recheck with Vascular Surgery regarding need for plavix and plan to restart when stable.     Discussed with ENT on call and Stroke attending Dr Hannon.    Breonna Fajardo MD  Vascular Neurology fellow  Pager # [phone]

## 2018-08-16 NOTE — ANESTHESIA PREPROCEDURE EVALUATION
Anesthesia Evaluation     .             ROS/MED HX    ENT/Pulmonary: Comment: Squamous cell carcinoma of the larynx   Bilateral lung nodules likely mets     (+)tobacco use, COPD, , . .    Neurologic:     (+)CVA date: 7/19     Cardiovascular:     (+) Dyslipidemia, hypertension-Peripheral Vascular Disease-- Other, --. : . . . :. .       METS/Exercise Tolerance:     Hematologic:         Musculoskeletal:         GI/Hepatic:     (+) cholecystitis/cholelithiasis,       Renal/Genitourinary:         Endo:     (+) thyroid problem (Thyroid nodule) .      Psychiatric:         Infectious Disease:         Malignancy:   (+) Malignancy History of Other  Other CA lymph nodes of head, face and neck , Laryngeal mass status post         Other:                     Physical Exam      Airway     Dental     Cardiovascular   Rhythm and rate: regular and normal      Pulmonary    breath sounds clear to auscultation    Other findings: Tracheostomy in place. On continuous BiPAP                    Anesthesia Plan      History & Physical Review  History and physical reviewed and following examination; no interval change.    ASA Status:  3 emergent.    NPO Status:  Waived due to emergency    Plan for General, ETT and Trach with Intravenous induction. Maintenance will be Balanced.    PONV prophylaxis:  Ondansetron (or other 5HT-3) and Other (See comment) (propofol on induction)  Additional equipment: 2nd IV and Arterial Line      Postoperative Care  Postoperative pain management:  IV analgesics and Multi-modal analgesia.      Consents  Anesthetic plan, risks, benefits and alternatives discussed with:  Patient and Implied consent/emergency..            LATE ENTRY NOTE DUE TO EMERGENT NEED FOR DIRECT PATIENT CARE.    72yF p/w bleeding tumor in the oropharynx. Plan for DL & attempt at hemostasis. She has a recent trach in place which is cuffed. GETA, PIVx2, arterial line, blood transfusion. Platelet transfusion d/t  ASA+plavix.  ___________________   Albin Adams MD          Pager: [phone]

## 2018-08-16 NOTE — LETTER
Transition Communication Hand-off for Care Transitions to Next Level of Care Provider    Name: Keira Collins  : 1945  MRN #: [id number]  Primary Care Provider: Asa Elliott     Primary Clinic: [id number] [place] [id number]     Reason for Hospitalization:  Tracheostomy hemorrhage (H) [J95.01]  Laryngeal cancer (H) [C32.9]  Squamous cell carcinoma of larynx (H) [C32.9]  Admit Date/Time: 2018  1:15 PM  Discharge Date: 18  Payor Source: Payor: MEDICARE / Plan: MEDICARE / Product Type: Medicare /              Reason for Communication Hand-off: Recent hospitalization    Discharge Plan:Discharge to home with [facility]       Concern for non-adherence with plan of care: No            Key Recommendations:      Isabel Singh RN  6B care coordinator #[phone]

## 2018-08-16 NOTE — IP AVS SNAPSHOT
` ` Patient Information     Patient Name Sex     Keira Colilns ([id number]) Female 1945       Room Bed    6237 6237-01      Patient Demographics     Address Phone    [address] [place] [address] [phone] (Home)  [phone] (Mobile) *Preferred*      Patient Ethnicity & Race     Ethnic Group Patient Race    Not  or        Emergency Contact(s)     Name Relation Home Work Mobile    Dar Collins  Son [phone]      Delma Mccallum Relative [phone]      Jr Nicanor Son   [phone]      Documents on File        Status Date Received Description       Documents for the Patient    Consent for EHR Access Received 16     Insurance Card Received 16 Medicare    Patient ID Scan Refused 16     [facility] Specified Other       Consent for Services/Privacy Notice - Hospital/Clinic Received () 16     Privacy Notice - [facility] Received 16     External Medication Information Consent Accepted 16     HIM QUIANA Authorization - File Only   [facility], [place] - 2016    Consent to Communicate  16 AUTHORIZATION TO DISCUSS PROTECTED HEALTH INFORMATION    HIM QUIANA Authorization - File Only  08/15/16 QUIANA- [place] [facility]  [facility]  16    Immunization Record  08/15/16 [facility] IMMUNIZATIONS    Business/Insurance/Care Coordination/Health Form - Patient  17 [facility] [place] - CARE COORDINATION LETTER - 17    Business/Insurance/Care Coordination/Health Form - Patient  17 [place] CARE COORDIN[place] LETTER    Care Everywhere Prospective Auth Received 18     Consent for Services/Privacy Notice - Hospital/Clinic       Insurance Card Received 18     Consent for Services/Privacy Notice - Hospital/Clinic Received 18     Consent for Services - Hospital and Clinic Received 18     HIE Auth Received 18     Consent for Services - [facility]       Speech Therapy  Certification Re[facility] - [facility] 07/17/18 07/11/2018 - 10/09/2018    HIM QUIANA Authorization  08/14/18 info checked    External Medication Information Consent Accepted (Deleted) 07/11/16     Patient Photo   Photo of Patient    Physical Therapy Certification Received - [facility] (Deleted) 07/17/18 07/11/2018 - 10/09/2018       Documents for the Encounter    CMS IM for Patient Signature Received 08/17/18     Photograph   WOC right buttock 8/17/18    EMS/Ambulance Record  08/22/18 [facility]    Monitoring Device Output  08/29/18 INITIAL MONITORING STRIPS      Admission Information     Attending Provider Admitting Provider Admission Type Admission Date/Time    Cynthia Walden MD Khaja, Sobia Fehmi, MD Emergency 08/16/18  1315    Discharge Date Hospital Service Auth/Cert Status Service Area     Otolaryngology/ENT Incomplete [facility]    Unit Room/Bed Admission Status       [facility] U6B 6237/6237-01 Admission (Confirmed)       Admission     Complaint    Blood in upper airway      Hospital Account     Name Acct ID Class Status Primary Coverage    Keira Collins [id number] Inpatient Open MEDICARE - MEDICARE            Guarantor Account (for Hospital Account #[id number])     Name Relation to Pt Service Area Active? Acct Type    Keira Collins Self FCS Yes Personal/Family    Address Phone          [address] [place] [place] [address] [phone](H)              Coverage Information (for Hospital Account #[id number])     1. MEDICARE/MEDICARE     F/O Payor/Plan Precert #    MEDICARE/MEDICARE     Subscriber Subscriber #    Keira Collins [id number]    Address Phone    [employer] CLAIMS  [address]  [place] [address] [phone]          2. BLUE PLUS/BLUE PLUS MA     F/O Payor/Plan Precert #    BLUE PLUS/BLUE PLUS MA     Subscriber Subscriber #    Keira Collins [id number]    Address Phone    [address] [address]  [place] [address] [phone]

## 2018-08-16 NOTE — IP AVS SNAPSHOT
[address] [address]    Phone:  [phone]                                       After Visit Summary   8/16/2018    Keira Collins    MRN: [id number]           After Visit Summary Signature Page     I have received my discharge instructions, and my questions have been answered. I have discussed any challenges I see with this plan with the nurse or doctor.    ..........................................................................................................................................  Patient/Patient Representative Signature      ..........................................................................................................................................  Patient Representative Print Name and Relationship to Patient    ..................................................               ................................................  Date                                            Time    ..........................................................................................................................................  Reviewed by Signature/Title    ...................................................              ..............................................  Date                                                            Time          22EPIC Rev 08/18

## 2018-08-16 NOTE — BRIEF OP NOTE
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 Shiley cuffed with balloon up in place. See full op note for details  Complications: None.  Implants: None.

## 2018-08-16 NOTE — H&P
Otolaryngology Consult Note  August 16, 2018      CC: bleeding from trach     HPI: Keira Collins is a 72 year old female with a past medical history of a [id number] squamous cell carcinoma of the larynx s/p tracheotomy for upper airway obstruction 7/17/18 who presents to the ED today from TCU with bleeding from the mouth and trach site. Per EMS report, patient pulled her trach out at TCU and TCU staff replaced it and bleeding started shortly afterward. Since arrival to ED she has coughed up several clots from her mouth and has also had blood return on suctioning from the trach. She has been saturating in the high 90s on room air without SOB.     Of note, her other PMH includes COPD, 140- pack year tobacco smoking history, alcohol abuse, stroke, HLD, HTN, and PVD. She also has a spiculated pulmonary nodule concerning for malignancy. She was recently had a prolonged hospital course 7/17/18-8/10/18 for respiratory failure s/p trach complicated by left pontine CVA, severe malnutrition, and frequent trach suctioning requirements.     Past Medical History:   Diagnosis Date     COPD (chronic obstructive pulmonary disease) (H) 7/28/2014    From [facility] Hospitalization 05/2016: bedside feng shows moderate airflow obstruction [FEV1 0.99L, 62% pred and FVC 1.7L, 82% predicted; FEV1 and FVC DROPPED by 11 and 16% respectively post BD  Diagnosis made 7/28/2014 at [facility]        Essential hypertension with goal blood pressure less than 140/90      H/O: alcohol abuse      Hyperlipidemia LDL goal <100      Laryngeal mass 6/27/2018     Laryngeal squamous cell carcinoma (H) 07/17/2018     Osteoporosis 7/14/2016     Pulmonary nodules 7/9/2018     PVD (peripheral vascular disease) (H) 7/11/2016     Tobacco abuse        Past Surgical History:   Procedure Laterality Date     LARYNGOSCOPY WITH BIOPSY(IES) N/A 7/17/2018    Procedure: LARYNGOSCOPY WITH BIOPSY(IES);  Direct Laryngoscopy With Biopsy, Tracheostomy ;  Surgeon: Cynthia Walden  MD Irlanda;  Location: [facility] OR     SURGICAL HISTORY OF -   5/13/2016    right hip fracture     TRACHEOSTOMY N/A 7/17/2018    Procedure: TRACHEOSTOMY;;  Surgeon: Cynthia Walden MD;  Location: [facility] OR     VASCULAR SURGERY Left 01/2017    ; left femoral endarterectomy       Current Outpatient Prescriptions   Medication Sig Dispense Refill     acetaminophen (TYLENOL) 500 MG tablet 1 tablet (500 mg) by Per G Tube route every 6 hours       albuterol (PROAIR HFA/PROVENTIL HFA/VENTOLIN HFA) 108 (90 BASE) MCG/ACT Inhaler Inhale 2 puffs into the lungs every 4 hours as needed for shortness of breath / dyspnea or wheezing 1 Inhaler 9     amLODIPine (NORVASC) 5 MG tablet 1 tablet (5 mg) by Per G Tube route daily 30 tablet 3     arformoterol (BROVANA) 15 MCG/2ML NEBU neb solution Take 2 mLs (15 mcg) by nebulization 2 times daily 120 mL      aspirin 81 MG tablet 1 tablet (81 mg) by Per G Tube route daily 30 tablet      atorvastatin (LIPITOR) 40 MG tablet 1 tablet (40 mg) by Per G Tube route daily 90 tablet 1     budesonide (PULMICORT) 0.25 MG/2ML neb solution Take 2 mLs (0.25 mg) by nebulization 2 times daily       Calcium carb-Vitamin D 500 mg [place]-200 units (OSCAL WITH D;OYSTER SHELL CALCIUM) 500-200 MG-UNIT per tablet 1 tablet by Per G Tube route 3 times daily (with meals) Reported on 2/24/2017 90 tablet      clopidogrel (PLAVIX) 75 MG tablet TAKE 1 TABLET BY MOUTH ONCE DAILY 90 tablet 1     docusate (COLACE) 50 MG/5ML liquid 5 mLs (50 mg) by Per Feeding Tube route 2 times daily 300 mL      ferrous sulfate 300 (60 Fe) MG/5ML syrup 5 mLs (300 mg) by Per G Tube route daily 75 mL      folic acid (FOLATE) 500 mcg/mL SOLN 2 mLs (1 mg) by Per G Tube route daily       glycopyrrolate (ROBINUL) 1 MG tablet 1 tablet (1 mg) by Per G Tube route 3 times daily       hypromellose-dextran (ARTIFICAL TEARS) 0.1-0.3 % SOLN ophthalmic solution Place 1 drop into both eyes every hour as needed for dry eyes       ipratropium (ATROVENT) 0.02  % neb solution Take 2.5 mLs (0.5 mg) by nebulization 4 times daily 225 mL      lactobacillus rhamnosus, GG, (CULTURELL) capsule 1 capsule by Per Feeding Tube route 3 times daily (before meals)       levalbuterol (XOPENEX) 0.63 MG/3ML neb solution Take 3 mLs (0.63 mg) by nebulization 4 times daily 360 mL      losartan (COZAAR) 50 MG tablet 1 tablet (50 mg) by Per G Tube route daily 30 tablet 3     methylphenidate (RITALIN) 5 MG tablet Take 0.5 tablets (2.5 mg) by mouth 2 times daily 6 tablet 0     multivitamins with minerals (CERTAVITE/CEROVITE) LIQD liquid 15 mLs by Per Feeding Tube route daily       oxyCODONE IR (ROXICODONE) 5 MG tablet 1-2 tablets (5-10 mg) by Per G Tube route every 3 hours as needed for other (pain control or improvement in physical function. Hold dose for analgesic side effects.) 15 tablet 0     polyethylene glycol (MIRALAX/GLYCOLAX) Packet 17 g by Per Feeding Tube route daily 7 packet      sennosides (SENOKOT) 8.8 MG/5ML syrup 5 mLs by Per Feeding Tube route 2 times daily 105 mL      sodium chloride 0.9 % neb solution Take 3 mLs by nebulization 3 times daily            Allergies   Allergen Reactions     Penicillins      Cefepime Rash     Provider entered as cephalosporins (pt had cefepime - rash this admission)     Cephalosporins Rash       Social History     Social History     Marital status: Single     Spouse name: N/A     Number of children: N/A     Years of education: N/A     Occupational History     Not on file.     Social History Main Topics     Smoking status: Current Every Day Smoker     Packs/day: 1.00     Years: 50.00     Types: Cigarettes     Smokeless tobacco: Never Used      Comment: 1/2 a pack a day      Alcohol use 0.0 oz/week     0 Standard drinks or equivalent per week      Comment: Beer- 1-4 per day     Drug use: No     Sexual activity: No     Other Topics Concern     Not on file     Social History Narrative    Live with Son who helps with her care           Family History  "  Problem Relation Age of Onset     Chronic Obstructive Pulmonary Disease Daughter      Diabetes Daughter      HEART DISEASE Daughter        ROS: 12 point review of systems is negative unless noted in HPI.    PHYSICAL EXAM:  General: laying in bed, no acute distress  /87  Pulse 114  Temp 98.6  F (37  C) (Axillary)  Resp 17  Ht 1.702 m (5' 7\")  Wt 56.7 kg (125 lb)  SpO2 96%  BMI 19.58 kg/m2  HEAD: normocephalic, atraumatic  Face: no swelling, edema, or erythema  Eyes: EOMI, clear sclera  Ears: external auricles with normal development   Nose: no anterior drainage, intact and midline septum without perforation or hematoma   Mouth/oropharynx: copious oral secretions mixed with bright red blood and clots   Neck: 6 cuffless shiley tracheostomy tube in place with trach ties. No bleeding around the trach face plate. Bright red blood suctioned from the trach  Respiratory: breathing non-labored on RA via trach, O2 sats 97%  Skin: no rashes or skin lesions of the face/neck  Cardio: extremities warm and well perfused     FIBEROPTIC ENDOSCOPY:  Due to bleeding from the mouth and trach site, fiberoptic laryngoscopy was indicated. After obtaining verbal consent, the nose was topically decongested and anesthetized. The fiberoptic laryngoscope was first passed through the trach tube. Tracheal walls appear healthy and intact, anton is clear, blood seen dripping down lateral walls of the trachea from above. The scope was then passed under endoscopic vision through the right nasal passage. The turbinates were normal. The inferior and middle meati were clear bilaterally without purulence, masses, or polyps. The nasopharynx was clear. The eustachian tubes were clear. The soft palate appeared normal with good mobility. The epiglottis was visualized, there was pooling of blood and secretions in the hypopharynx and vallecula that was suctioned away. Large exophytic laryngeal tumor is visualized and noted to have bright red " blood overlying, active bleeding observed.      Type of trach removed: 6 cuffless shiley   Type of trach placed: 6 cuffed shiley   Procedure note: Patient was suctioned via trach and secretions were removed.  Patient was appropriately positioned flat and supine. Trach ties were removed while holding trach in place. Patient was then hyperoxygenated via trach dome. Trach was removed without difficulty. Stoma appeared patent without obstruction or secretions. Scope was inserted through the trach stoma, no bleeding noted at the stoma edges or distally in the trachea. The scope was retroflexed upward and there was bright red blood noted to be coming down from above the trach site. New trach was inserted with obturator without difficulty or resistance. Obturator was removed and inner cannula locked in place. Correct positioning of trach was confirmed by easily passing a suction through the trach and obvious air movement was noted with good oxygen saturations. Trach ties were placed and secured. Patient tolerated the trach change well and without complication.       ROUTINE IP LABS (Last four results)  BMP  Recent Labs  Lab 08/16/18  1346 08/10/18  0724    134   POTASSIUM 4.5 4.3   CHLORIDE 98 103   ESTEFANI 9.4 9.3   CO2 29 21   BUN 31* 27   CR 0.60 0.51*   * 164*     CBC  Recent Labs  Lab 08/16/18  1346 08/10/18  0724   WBC  --  13.9*   RBC  --  2.93*   HGB 7.5* 8.4*   HCT  --  27.5*   MCV  --  94   MCH  --  28.7   MCHC  --  30.5*   RDW  --  13.4   PLT  --  250     INR  Recent Labs  Lab 08/16/18  1346   INR 1.11       Assessment and Plan  Keira Collins is a 72 year old female with a past medical history of [id number] squamous cell carcinoma of the larynx s/p tracheotomy for upper airway obstruction 7/17/18 who presents to the ED today from TCU with bleeding from the laryngeal tumor.     - Emergent OR for DL w/ control of oropharyngeal hemorrhage with Dr. Walden. Consent obtained from patient's son at bedside.   -  Keep NPO - hold TFs  - Transfuse PRBCs per ED  - Will plan for post-op admission to ENT     -- Patient and above plan discussed with Dr Walden.     BERTHA OjedaC  Otolaryngology-Head & Neck Surgery  Please page ENT with questions by dialing * [phone] and entering job code [phone] when prompted.

## 2018-08-16 NOTE — ANESTHESIA POSTPROCEDURE EVALUATION
Patient: Keira Collins    Procedure(s):  COMBINED DIRECT LARYNGOSCOPY, BRONCHOSCOPY, CONTROL OF OROPHARYNGEAL HEMORRHAGE - Wound Class: II-Clean Contaminated    Diagnosis:febrile hermeridge  Diagnosis Additional Information: No value filed.    Anesthesia Type:  General, ETT, Trach    Note:  Anesthesia Post Evaluation    Patient location during evaluation: PACU  Patient participation: Able to fully participate in evaluation  Level of consciousness: awake and alert  Pain management: satisfactory to patient  Airway patency: patent  Cardiovascular status: acceptable and stable  Respiratory status: acceptable and spontaneous ventilation  Hydration status: euvolemic  PONV: controlled     Anesthetic complications: None          Last vitals:  Vitals:    08/16/18 1400 08/16/18 1416 08/16/18 1430   BP:  147/84 134/87   Pulse:      Resp: 23 23 17   Temp:      SpO2: 95% 96% 96%         Electronically Signed By: Terence Valadez MD  August 16, 2018  5:10 PM

## 2018-08-16 NOTE — OP NOTE
Date of Procedure: 8/16/2018    Attending Physician: Cynthia Walden MD    Resident Physicians:   1. Diana Dia MD  2. Yeni Lu MD    Procedure Performed:  Direct laryngoscopy with control of hemorrhage with telescope  Flexible bronchoscopy    Preoperative Diagnosis:   1.  Laryngeal carcinoma  2.  Tracheostomy dependence  3.  Hemorrhage from laryngeal tumor    Postoperative Diagnosis: same    Anesthesia: General    Blood loss: 150 cc    Specimens: None    Implants: None    Complications: None    Findings:  Interval increase in size of the laryngeal tumor with obstruction of glottic aperture  Bleeding identified from the tumor involving the laryngeal surface of the epiglottis, false cords, true cords    Indications:  Keira Collins is a 72-year-old woman with a history of a laryngeal tumor who underwent a direct laryngoscopy with biopsy and tracheostomy in July 2018.  She had a prolonged hospital course complicated by her deconditioning and COPD.  She was discharged to a rehab facility.  She presented to the emergency department with profuse bleeding from her tumor.  She is indicated for a direct laryngoscopy and control bleeding in the operating room.    Description of Procedure:  The patient was transported to the main operating room after informed consent was obtained.  She placed supine on the table and general anesthesia was induced through her tracheostomy tube.  Anesthesia placed an arterial line due to her medical comorbidities and her significant blood loss.  A shoulder roll was placed and the left arm was tucked.  The patient was prepped and draped in usual fashion.  A timeout was performed.  A sponge was placed to protect the upper alveolus.  The Weston laryngoscope was introduced and placed within the vallecula.  Copious amounts of clot and blood were suctioned from the oropharynx and larynx. The patient was put into suspension.  The 0  telescope was used to inspect the larynx.   Additional blood was suctioned off of the epiglottis.  The patient had bleeding diffusely throughout the larynx with exact origin unable to be determined initially.  Multiple Afrin-soaked pledgets were placed over the tumor to try and help improve visualization.  Once Afrin-soaked pledgets were removed bleeding could be identified from the laryngeal surface of the epiglottis.  This is secondary to erosive changes from the tumor.  The bipolar monopolar cautery these were used through the laryngoscope for hemostasis.  There was additional bleeding identified on the left false cord also secondary to the tumor.  This was similarly cauterized.  There was further bleeding from the tumor near the anterior commissure and the true cords.  This was harder to address with the cautery.  Afrin-soaked pledgets were placed to help with hemostasis.  Of note during this time anesthesia did administer 2 units of blood as well as platelets.  The administration of platelets did appear to help with the control of the hemorrhage.  Once significant control was able to obtained of the bleeding the 0  telescope was used to further inspect the larynx.  There was interval increase in the size of the tumor causing near total obstruction of her glottis.  There was only a very small opening that was less than 4 mm in size between the glottis and the subglottis.  There was continued bleeding from this area that was controlled with Afrin-soaked pledgets.    At this point the flexible bronchoscope was passed through the tracheostomy to try and visualize the lower airway.  There was no significant bleeding in the trachea.  The 6 cuffed Shiley was removed and the bronchoscope was attempted to be retroflexed through the stoma up towards the inferior aspect of the glottis.  There was no obvious bleeding with the Afrin-soaked pledgets in place.  The 6 cuffed Shiley was then replaced and the patient was continued to be ventilated through the tube.    The  larynx was again inspected through the laryngoscope.  At this point significant hemostasis had been achieved.  The Afrin-soaked pledgets had been removed and there was only minimal bleeding.  This was controlled with further administration of Afrin-soaked pledgets left in place for several minutes.  Once hemostasis had adequately been achieved with no bleeding identified the patient was handed back to anesthesia and transported to the recovery room in stable condition.    I was present for and participated in the entire procedure.    Cynthia Walden MD    Department of Otolaryngology

## 2018-08-17 PROBLEM — Z71.89 ADVANCED DIRECTIVES, COUNSELING/DISCUSSION: Chronic | Status: RESOLVED | Noted: 2018-01-01 | Resolved: 2018-01-01

## 2018-08-17 PROBLEM — D62 ACUTE POSTHEMORRHAGIC ANEMIA: Status: ACTIVE | Noted: 2018-01-01

## 2018-08-17 NOTE — PLAN OF CARE
Problem: Patient Care Overview  Goal: Plan of Care/Patient Progress Review  Outcome: No Change  VSS. No s/s of pain. Pt has #6 Shiley, cuff inflated. HTD 30%. Pt was suctioned x 1 for thin pink secretions. Inner canula changed x 2. Pt alert most of shift, was able to write that she didn't need anything. TF started at 1300 -15 ml/hr via PEG. Inc of urine x 1, last bm was yesterday. WOC RN came and evaluated buttocks, mepilex applied at that time. Family at bedside during afternoon, unsure of DC plans at this time.

## 2018-08-17 NOTE — PLAN OF CARE
Problem: Patient Care Overview  Goal: Plan of Care/Patient Progress Review  Status:POD#2 s/p Combine direct laryngoscopy, bronchoscopy, control oropharyngeal hemorrhage.Hx squamous cell carcinoma of the larynx s/p tracheostomy placed for upper airway obstruction 7/17/18.   Neuro:Lethargic. orient to self.Unable to speak( trached). Nod head for 'yes' or 'no'.. Can be impulsive at times (mitts available at bedside if needed)..  Vital: HTD 30% and sats. 100% on continuous pulse ox. .VSS.  Pain: No s/s of pain or discomfort at the moment.   Trach/Respiration: Shiley #6 sutured cuffed with balloon up in place . LS diminished. Suctioned x3 overnight, lavage x1, and inner canula changed x1. Thick, mucous/blood secretions removed.   GI: NPO,PEG clamped, waiting for tube feeding order.  :Incontinent of urine x2, mepilex applied to suspected pressure sore in coccyx region: area contains a skin tear. WOC consult placed.   Activity:Up with Lift. Turn/reposition Q 2hrs. Bed alarm on d/t lines. Mitts available if needed.  IV:PIV SL, new orders for MIVF to run at 75/hr.   Plan of care: Continue to monitor for bleeding and call ENT team with any concerns. Assess pain and intervene PRN. Follow POC.

## 2018-08-17 NOTE — PROGRESS NOTES
CLINICAL NUTRITION SERVICES - ASSESSMENT NOTE     Nutrition Prescription    RECOMMENDATIONS FOR MDs/PROVIDERS TO ORDER:  If desire to meet 100% of hydration needs via FT  would recommend increasing free water flushes to 100 ml/hr.   - Per last RD note on 8/8/18 water flushes were discussed with team and planned to increase to 100 ml/hr on 8/9 however it does not look like water flushes were increased during previous admission.      Malnutrition Status:    Severe malnutrition in the context of chronic illness    Recommendations already ordered by Registered Dietitian (RD):  Isosource 1.5 @ goal 45 ml/hr (1080 ml/day) to provide 1620 kcals (28 kcal/kg/day), 73 g PRO (1.3 g/kg/day), 821 ml free H2O, 190 g CHO and 16 g Fiber daily.  - Initiate TF @ 15 ml/hr and advance by 10 ml Q4hr to goal of 45 ml/hr. Only advance if K+/Mg++ are WNL and Phos >1.9.    - Flush 60 ml Q4 hrs for tube patency.   - Continue with Certavite     Future/Additional Recommendations:  1. Monitor for tolerance and weight trends   2. Monitor for appropriateness/ability to change pt to cyclic or bolus feeds pending on plan of care.   3. Monitor for WOC assessment and need for PI protocol.      REASON FOR ASSESSMENT  Keira Collins is a/an 72 year old female assessed by the dietitian for Provider Order - Registered Dietitian to Assess and Order TF per Medical Nutrition Therapy Protocol    NUTRITION HISTORY  Per chart review pt's PMH is nutritionally significant for COPD, HTN, HLD, squamous cell carcinoma of the larynx with s/p tracheostomy. Pt admitted with laryngeal tumor bleed.   Pt was previously seen by RD during her previous admission where the pt was started on TF. The pt was then discharged to TCU.   -Pt's regimen during last hospital admission was Isosource 1.5 @ goal 45 ml/hr (1080 ml/day) to provide 1620 kcals, 73 g PRO, 821 ml free H2O, 190 g CHO and 16 g Fiber daily.   - PEG placement on 7/27/18    Went to see pt today, she opened eyes at  "first and then continued to keep them closed for remaindered of visit.    CURRENT NUTRITION ORDERS  Diet: NPO  Intake/Tolerance: Pt NPO since admission, PEG in place    LABS  Labs reviewed    MEDICATIONS  Medications reviewed  Calcium carb-vit D 500-200 units 2x daily   Ferrous sulfate 300 mg daily   Folic acid 1 mg daily   Lactobacillus rhamnosus  Certavite daily     ANTHROPOMETRICS  Height: 170.2 cm (5' 7\")  Most Recent Weight: 56.7 kg (125 lb)    IBW: 67.3 kg  BMI: Normal BMI - lower end of normal, borderline underweight   Weight History:   Wt Readings from Last 15 Encounters:   08/16/18 56.7 kg (125 lb)   08/09/18 57.8 kg (127 lb 6.8 oz)   07/16/18 53.7 kg (118 lb 4.8 oz)   07/11/18 52.2 kg (115 lb)   01/16/18 59.9 kg (132 lb)   01/31/17 56.6 kg (124 lb 12.8 oz)   01/29/17 56.6 kg (124 lb 12.8 oz)   01/16/17 56.8 kg (125 lb 4.8 oz)   01/11/17 61.7 kg (136 lb)   01/09/17 61.7 kg (136 lb)   09/23/16 56.4 kg (124 lb 4.8 oz)   09/15/16 56.2 kg (123 lb 14.4 oz)   08/22/16 52.6 kg (116 lb)   07/11/16 51.4 kg (113 lb 4.8 oz)       Dosing Weight: 57 kg (actual)     ASSESSED NUTRITION NEEDS  Estimated Energy Needs: 2535-8936+ kcals/day (25 - 30+ kcals/kg)  Justification: Maintenance and Repletion  Estimated Protein Needs: 68-86 grams protein/day (1.2 - 1.5 grams of pro/kg)  Justification: Maintenance and Repletion  Estimated Fluid Needs:  (1 mL/kcal)   Justification: Maintenance    PHYSICAL FINDINGS  See malnutrition section below.     MALNUTRITION  % Intake: Decreased intake does not meet criteria -Pt on TF to meet 100% of her nutrition needs   % Weight Loss: None noted - Pt previously had weight loss   Subcutaneous Fat Loss: Upper arm:  Moderate and Lower arm:  Severe  Muscle Loss: Temporal:  severe, Facial & jaw region:  moderate, Upper arm (bicep, tricep):  severe, Lower arm  (forearm):  severe and Posterior calf:  severe  Fluid Accumulation/Edema: None noted  Malnutrition Diagnosis: Severe malnutrition in the " context of chronic illness    NUTRITION DIAGNOSIS  Inadequate oral intake related to inability for PO intake  as evidenced by pt reliant on nutrition support (TF) to meet 100% of nutrition needs.       INTERVENTIONS  Implementation  Collaboration with other providers - 6A rounds, spoke with nurse about placing in TF order this morning.     Goals  Total avg nutritional intake to meet a minimum of 25 kcal/kg and 1.2 g PRO/kg daily (per dosing wt 57 kg).     Monitoring/Evaluation  Progress toward goals will be monitored and evaluated per protocol.    Ofelia Wilson, MS, RD, LD  Unit Pgr: [phone]

## 2018-08-17 NOTE — CONSULTS
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 Internal medicine was asked to see this patient for medical co-management.     # Bleeding from laryngeal tumor, POD #1 DL for control of tumor hemorrhage  # [id number] Laryngeal squamous cell carcinoma s/p tracheostomy 7/17 for upper airway obstruction  Bleeding from tumor 2/2 to patient pulling out tracheostomy. S/p emergent OR for direct laryngoscopy in order to control oropharyngeal hemorrhage.   - Management per primary team, ENT    Recent L pontine CVA (7/19). MRI brain showing acute L pontine infract. Managed on ASA and plavix PTA. Held on admission given bleeding from tracheostomy. Per neurology recommendations, ok to hold antiplatelets in the setting of hemorrhage. Once stable can start ASA 81 mg every day. Resumption of plavix pending risk vs benefit discussion with Neurology and vascular team (hx of PVD w/ stent placement in 2017).     HTN. PTA managed on amlodipine and losartan. Goal SBP <160 per neurology in setting of recent CVA.  BP currently well controlled. Continue home meds.    Severe protein-calorie malnutrition.  2/2 acute on chronic medical issues including cancer, surgery and CVA. PEG placed 7/27 and started on TF.  - Continue TF at goal rate of 45 ml/hr    Chronic Leukocytosis. Most recent WBC 12. Afebrile. Respiratory  status stable. No s/s of localizing infection. Known metastatic malignancy.   - Would recommend infectious w/u if patient were to decompensate     Spiculated RUL nodule. PET 7/20 showing hypermetabolic pulmonary nodules c/w metastatic disease. Will need lung biopsy outpatient.    COPD. Continue Brovana, Pulmicort, Ipratropium/levalbuterol.  HLD. Continue statin  PVD. S/p lower extremity endarterectomy and stenting 2017. Managed on Plavix and ASA OP. Held on admission given oropharyngeal hemorrhage.     I have discussed my findings & recommendations with Samantha Burnham.   I personally examined Keira today, and reviewed vital signs, medications and pertinent labs or imaging.  Thank you for this opportunity to be involved in your patient's care.      Edita Laguerre PA-C  Internal Medicine [facility] Hospitalist  [facility]  Pager: [phone]  Please page the numbers (based on time of day) found in the sticky note with questions or additional concerns.            History of Present Illness:   History is obtained from the patient and medical record.     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 Internal medicine was asked to see this patient for medical co-management.    Brief Hospital Course:  - Admitted 8/16 for oropharyngeal bleeding after pulling tracheostomy  - OR 8/16 for DL to control hemorrhage    Internal Medicine service was asked to see patient for medical co-management.             Past Medical History:     Past Medical History:   Diagnosis Date     COPD (chronic obstructive pulmonary disease) (H) 7/28/2014    From [facility] Hospitalization 05/2016: bedside feng shows moderate airflow obstruction [FEV1 0.99L, 62% pred and FVC 1.7L, 82% predicted; FEV1 and  FVC DROPPED by 11 and 16% respectively post BD  Diagnosis made 7/28/2014 at [facility]        Essential hypertension with goal blood pressure less than 140/90      H/O: alcohol abuse      Hyperlipidemia LDL goal <100      Laryngeal mass 6/27/2018     Laryngeal squamous cell carcinoma (H) 07/17/2018     Osteoporosis 7/14/2016     Pulmonary nodules 7/9/2018     PVD (peripheral vascular disease) (H) 7/11/2016     Tobacco abuse            Past Surgical History:     Past Surgical History:   Procedure Laterality Date     LARYNGOSCOPY WITH BIOPSY(IES) N/A 7/17/2018    Procedure: LARYNGOSCOPY WITH BIOPSY(IES);  Direct Laryngoscopy With Biopsy, Tracheostomy ;  Surgeon: Cynthia Walden MD;  Location: [facility] OR     LARYNGOSCOPY, BRONCHOSCOPY, COMBINED N/A 8/16/2018    Procedure: COMBINED LARYNGOSCOPY, BRONCHOSCOPY;  COMBINED DIRECT LARYNGOSCOPY, BRONCHOSCOPY, CONTROL OF OROPHARYNGEAL HEMORRHAGE;  Surgeon: Cynthia Walden MD;  Location: [facility] OR     SURGICAL HISTORY OF -   5/13/2016    right hip fracture     TRACHEOSTOMY N/A 7/17/2018    Procedure: TRACHEOSTOMY;;  Surgeon: Cynthia Walden MD;  Location: [facility] OR     VASCULAR SURGERY Left 01/2017    ; left femoral endarterectomy             Social History:   Smoking history  Hx of alcohol abuse          Family History:     Family History   Problem Relation Age of Onset     Chronic Obstructive Pulmonary Disease Daughter      Diabetes Daughter      HEART DISEASE Daughter              Allergies:     Allergies   Allergen Reactions     Penicillins      Cefepime Rash     Provider entered as cephalosporins (pt had cefepime - rash this admission)     Cephalosporins Rash             Medications:     Current Facility-Administered Medications   Medication     acetaminophen (TYLENOL) tablet 500 mg     albuterol (PROVENTIL) neb solution 2.5 mg     amLODIPine (NORVASC) tablet 5 mg     arformoterol (BROVANA) neb solution 15 mcg     aspirin chewable tablet 81 mg     atorvastatin  (LIPITOR) tablet 40 mg     budesonide (PULMICORT) neb solution 0.25 mg     Calcium carb-Vitamin D 500 mg [place]-200 units (OSCAL with D;Oyster Shell Calcium) per tablet 1 tablet     dextrose 10 % 1,000 mL infusion     dextrose 5% and 0.45% NaCl + KCl 20 mEq/L infusion     docusate (COLACE) 50 MG/5ML liquid 50 mg     ferrous sulfate 300 (60 Fe) MG/5ML syrup 300 mg     folic acid (FOLATE) oral solution 1 mg     glycopyrrolate (ROBINUL) tablet 1 mg     HYDROmorphone (DILAUDID) injection 0.2 mg     hypromellose-dextran (ARTIFICAL TEARS) 0.1-0.3 % ophthalmic solution 1 drop     ipratropium (ATROVENT) 0.02 % neb solution 0.5 mg     lactobacillus rhamnosus (GG) (CULTURELL) capsule 1 capsule     levalbuterol (XOPENEX) neb solution 0.63 mg     lidocaine 3%, phenylephrine 0.25% (non-sterile) solution for irrigation     losartan (COZAAR) tablet 50 mg     magnesium sulfate 4 g in 100 mL sterile water (premade)     melatonin tablet 1 mg     methylphenidate (RITALIN) half-tab 2.5 mg     metoclopramide (REGLAN) tablet 5 mg    Or     metoclopramide (REGLAN) injection 5 mg     multivitamins with minerals (CERTAVITE/CEROVITE) liquid 15 mL     naloxone (NARCAN) injection 0.1-0.4 mg     ondansetron (ZOFRAN-ODT) ODT tab 4 mg    Or     ondansetron (ZOFRAN) injection 4 mg     oxyCODONE IR (ROXICODONE) tablet 5-10 mg     polyethylene glycol (MIRALAX/GLYCOLAX) Packet 17 g     potassium chloride (KLOR-CON) Packet 20-40 mEq     potassium chloride 10 mEq in 100 mL intermittent infusion with 10 mg lidocaine     potassium chloride 10 mEq in 100 mL sterile water intermittent infusion (premix)     potassium chloride 20 mEq in 50 mL intermittent infusion     potassium chloride SA (K-DUR/KLOR-CON M) CR tablet 20-40 mEq     prochlorperazine (COMPAZINE) injection 5 mg    Or     prochlorperazine (COMPAZINE) tablet 5 mg    Or     prochlorperazine (COMPAZINE) Suppository 12.5 mg     sennosides (SENOKOT) syrup 5 mL     sodium chloride 0.9 % neb solution 3  "mL             Review of Systems:   A complete review of systems was performed and is negative other than what is stated in the HPI.           Physical Exam:   Blood pressure 132/61, pulse 95, temperature 98.3  F (36.8  C), temperature source Axillary, resp. rate 16, height 1.702 m (5' 7\"), weight 56.7 kg (125 lb), SpO2 99 %, not currently breastfeeding.    GENERAL: Alert and oriented x 3. NAD.   HEENT: Anicteric sclera. Mucous membranes moist. Trach in place.   CV: RRR. S1, S2. No murmurs appreciated.   RESPIRATORY:  Lungs CTAB with no wheezing, rales, rhonchi.   GI: Abdomen soft and non distended with normoactive bowel sounds present in all quadrants. No tenderness, rebound, guarding. PEG tube in place.  NEUROLOGICAL: No focal deficits. Moves all extremities.    EXTREMITIES: No peripheral edema. Intact bilateral pedal pulses.   SKIN: No jaundice. No rashes.             Data:   All laboratory data reviewed.        I saw this patient with the above HAR[facility]AL, examined independently, and participated in management and plan formation.    Key History and/or exam findings:  72 year old woman with ho/o COPD, trach for SCC, NG placement, HTN, HLD, tobacco abuse, admitted after bleeding form mouth, trach site, now resolved.   Asked to see to co-manage.  Previous cares and notes from this hospitalization reviewed.  Neurolgy and vascular discussing risk of bleeding versus clot (neuro notes reviewed, vascular discussed by above HARSHAL).  BP well controlled.  On TF.  Leukocytosis, but no obvious items on exam to further explore.  Pt very reluctant to participate in interview on discussion x 2 today.  Reports no concerns.  Limited ROS possible  Key decision making included:  Continue BP mgmt.  Would restart ASA; Plavix and vascular weighing in on Plvaix.  We'll see again tomorrow and reach out to ENT team to see how can be most helpful.  Felix Samuels MD  Med-Peds Hospitalist      "

## 2018-08-17 NOTE — PROGRESS NOTES
"Otolaryngology Progress Note  August 17, 2018    S: No acute events overnight. No active bleeding since returning from the OR.     O: /61 (BP Location: Right arm)  Pulse 95  Temp 98.3  F (36.8  C) (Axillary)  Resp 16  Ht 1.702 m (5' 7\")  Wt 56.7 kg (125 lb)  SpO2 99%  BMI 19.58 kg/m2   General: Resting comfortably in bed, no acute distress   HEENT: 6 cuffed shiley in place with cuff up, no bloody secretions from trach, no bleeding around the face plate. No blood from mouth.    Pulmonary: Breathing non-labored on HTD, no stridor, no accessory muscle use.      Intake/Output Summary (Last 24 hours) at 08/17/18 0917  Last data filed at 08/16/18 1646   Gross per 24 hour   Intake             2050 ml   Output              100 ml   Net             1950 ml     LABS:  ROUTINE IP LABS (Last four results)  BMP  Recent Labs  Lab 08/17/18  0702 08/16/18  1611 08/16/18  1346    135 134   POTASSIUM 4.2 4.5 4.5   CHLORIDE 102  --  98   ESTEFANI 9.1  --  9.4   CO2 26  --  29   BUN 23  --  31*   CR 0.65  --  0.60   * 159* 141*     CBC  Recent Labs  Lab 08/17/18  0702 08/16/18  2336 08/16/18  1725 08/16/18  1611   WBC 12.0*  --   --   --    RBC 3.24*  --   --   --    HGB 9.6* 9.7* 9.2* 7.3*   HCT 29.0*  --   --   --    MCV 90  --   --   --    MCH 29.6  --   --   --    MCHC 33.1  --   --   --    RDW 14.6  --   --   --      --   --   --      INR  Recent Labs  Lab 08/16/18  1346   INR 1.11       A/P: Keira Collins is a 72 year old female with a past medical history of COPD, HTN, HLD, PVD, recent CVA during last hospitalization, and [id number] squamous cell carcinoma of the larynx s/p tracheotomy for upper airway obstruction 7/17/18 who admitted with bleeding from the laryngeal tumor, now POD#1 from DL and control of tumor hemorrhage.    Neuro:  - Pain control: tylenol/oxy/dilaudid PRN  - PTA ritalin    HEENT:  -  [id number] squamous cell carcinoma of the larynx s/p tracheotomy for upper airway obstruction; s/p DL and " control of hemorrhage from tumor  - 6 cuffed Shiley in place  - monitor for signs of bleeding  - restart ASA today, will follow up with consulted teams regarding plan for plavix    Respiratory:  - HTD, supplemental O2 PRN to keep sats >92%  - PTA brovana, pulmicort, atrovent, xopenex    CV/heme:  - hemodynamically stable  - PTA amlodipine, atorvastatin, losartan  - restart ASA today  - curbside vascular re: restarting plavix    FEN/GI:  - NPO, hold TFs  - mIVF d5 1/2 NS + 20 K @ 75 mL/hr  - Bowel regimen: senna/miralax    :  - no acute issues    Endo  - no acute issues    ID:  - Clindamycin perioperative prophylaxis (completed)  - Monitor for signs/symptoms of infection     PPX:  - SCDs  - IS    Dispo: 6A for clinical monitoring    Consults: Internal Medicine, Nutrition, WOC      -- Patient and above plan discussed with Dr. Win Thomson MD  Otolaryngology-Head & Neck Surgery PGY1  Please contact ENT with questions by dialing [phone] and entering job code [phone] when prompted.

## 2018-08-17 NOTE — PHARMACY-ADMISSION MEDICATION HISTORY
Admission medication history interview status for the 8/16/2018 admission is complete. See Epic admission navigator for allergy information, pharmacy, prior to admission medications and immunization status.     Medication history interview sources:  MAR from TCU    Changes made to PTA medication list (reason)  Added: Nystatin, albuterol nebs  Deleted: Calcium-Vitamin D, levalbuterol, multivitamin w/ minerals, sodium chloride nebs (D/c per TCU MAR)  Changed:   -Folic acid solution->tablets via g-tube (dose and directions the same)  -Methylphenidate twice daily->once daily (dose the same)    Additional medication history information (including reliability of information, actions taken by pharmacist):  -Confirmed all information below with pt's MAR from TCU  -Per MAR, calcium-vitamin D was discontinued on 8/13  -Per MAR levalbuterol was discontinued on 8/14  -Per MAR multivitamin was discontinued on 8/13  -Per MAR sodium chloride nebs were discontinued on 8/11      Prior to Admission medications    Medication Sig Last Dose Taking? Auth Provider   acetaminophen (TYLENOL) 500 MG tablet 1 tablet (500 mg) by Per G Tube route every 6 hours 8/16/2018 at 0600 Yes Sandie Grace PA-C   albuterol (2.5 MG/3ML) 0.083% neb solution Take 2.5 mg by nebulization 4 times daily 8/16/2018 at 0800 Yes Unknown, Entered By History   amLODIPine (NORVASC) 5 MG tablet 1 tablet (5 mg) by Per G Tube route daily 8/16/2018 at 0800 Yes Sandie Grace PA-C   arformoterol (BROVANA) 15 MCG/2ML NEBU neb solution Take 2 mLs (15 mcg) by nebulization 2 times daily 8/16/2018 at 0800 Yes Sandie Grace PA-C   aspirin 81 MG tablet 1 tablet (81 mg) by Per G Tube route daily 8/15/2018 at 2000 Yes Sandie Grace PA-C   atorvastatin (LIPITOR) 40 MG tablet 1 tablet (40 mg) by Per G Tube route daily 8/15/2018 at 2000 Yes Sandie Grace PA-C   budesonide (PULMICORT) 0.25 MG/2ML neb solution Take 2 mLs (0.25  mg) by nebulization 2 times daily 8/16/2018 at 1000 Yes Sandie Grace PA-C   clopidogrel (PLAVIX) 75 MG tablet TAKE 1 TABLET BY MOUTH ONCE DAILY  Patient taking differently: TAKE 1 TABLET VIA G-TUBE ONCE DAILY 8/16/2018 at 0800 Yes Asa Elliott MD   docusate (COLACE) 50 MG/5ML liquid 5 mLs (50 mg) by Per Feeding Tube route 2 times daily 8/16/2018 at 0800 Yes Sandie Grace PA-C   ferrous sulfate 300 (60 Fe) MG/5ML syrup 5 mLs (300 mg) by Per G Tube route daily 8/16/2018 at 0800 Yes Sandie Grace PA-C   folic acid (FOLVITE) 1 MG tablet 1 mg by Per G Tube route daily 8/16/2018 at 0800 Yes Unknown, Entered By History   glycopyrrolate (ROBINUL) 1 MG tablet 1 tablet (1 mg) by Per G Tube route 3 times daily 8/16/2018 at 0800 Yes Sandie Grace PA-C   ipratropium (ATROVENT) 0.02 % neb solution Take 2.5 mLs (0.5 mg) by nebulization 4 times daily 8/16/2018 at 0800 Yes Sandie Grace PA-C   lactobacillus rhamnosus, GG, (CULTURELL) capsule 1 capsule by Per Feeding Tube route 3 times daily (before meals) 8/16/2018 at 0800 Yes Sandie Grace PA-C   losartan (COZAAR) 50 MG tablet 1 tablet (50 mg) by Per G Tube route daily 8/16/2018 at 0800 Yes Sandie Grace PA-C   methylphenidate (RITALIN) 5 MG tablet Take 5 mg by mouth daily 8/16/2018 at 0800 Yes Unknown, Entered By History   nystatin (MYCOSTATIN) 940992 UNIT/ML suspension Take 500,000 Units by mouth 4 times daily Give via toothette until resolved 8/16/2018 at 0800 Yes Unknown, Entered By History   oxyCODONE IR (ROXICODONE) 5 MG tablet 1-2 tablets (5-10 mg) by Per G Tube route every 3 hours as needed for other (pain control or improvement in physical function. Hold dose for analgesic side effects.) 8/16/2018 at 0850 Yes Sandie Grace PA-C   polyethylene glycol (MIRALAX/GLYCOLAX) Packet 17 g by Per Feeding Tube route daily 8/16/2018 at 0800 Yes Sandie Grace PA-C    sennosides (SENOKOT) 8.8 MG/5ML syrup 5 mLs by Per Feeding Tube route 2 times daily 8/16/2018 at 0800 Yes Sandie Grace PA-C   albuterol (PROAIR HFA/PROVENTIL HFA/VENTOLIN HFA) 108 (90 BASE) MCG/ACT Inhaler Inhale 2 puffs into the lungs every 4 hours as needed for shortness of breath / dyspnea or wheezing Unknown at Unknown time  Asa Elliott MD   hypromellose-dextran (ARTIFICAL TEARS) 0.1-0.3 % SOLN ophthalmic solution Place 1 drop into both eyes every hour as needed for dry eyes Unknown at Unknown time  Sandie Grace PA-C         Medication history completed by:   Martin Rodriguez  Pharmacy Student  [employer]

## 2018-08-17 NOTE — PLAN OF CARE
Problem: Patient Care Overview  Goal: Plan of Care/Patient Progress Review  Status:POD#0 s/p Combine direct laryngoscopy, bronchoscopy, control oropharyngeal hemorrhage.Hx squamous cell carcinoma of the larynx s/p tracheostomy placed for upper airway obstruction 7/17/18.   Neuro:Lethargic,orient to self.Unable to speak( trached). Nod head for 'yes' or 'no'.Patinet can be impulsive at times.  Vital:HTD 30% and sats 100% on continuous pulse oximeter.'s/70's, Pulse 92, RR 18, and T-95.8 axillary  Pain: No s/s of pain or discomfort at the moment.  Trach/Respiration:Shiley #6 sutured cuffed with balloon up in place . LS diminished. No suction needed, patient had good productive cough with thin bloody secretion.  GI: NPO,PEG clamped, waiting for tube feeding order.  :Incontinent of urinex1  Activity:Up with Lift. Turn/reposition Q 2hrs. Bed alarm on d/t lines.  IV:PIV SL  Plan of care: Continue to monitor for bleeding and call ENT team with any concerns. Assess pain and intervene PRN. Follow POC.

## 2018-08-17 NOTE — PHARMACY-CONSULT NOTE
Pharmacy Tube Feeding Consult    Medication reviewed for administration by feeding tube and for potential food/drug interactions.    Recommendation: No changes are needed at this time.     Pharmacy will continue to follow as new medications are ordered.     Amada Carty, PharmD

## 2018-08-18 NOTE — PLAN OF CARE
Problem: Patient Care Overview  Goal: Plan of Care/Patient Progress Review  Outcome: No Change  Pt. POD#2 s/p Combine direct laryngoscopy, bronchoscopy, control oropharyngeal hemorrhage.Hx squamous cell carcinoma of the larynx s/p tracheostomy placed for upper airway obstruction 7/17/18. Has #6 shiley cuffed with balloon inflated. Inner cannula changedx2. Pt. has good, productive cough with thin bloody/pink secretions, did not require suctioning this shift. VSS on HTD21%. A&Ox2, d/o to time/situation; nods yes/no to questions or mouths answers. Neuros include: 4/5 strength t/o, generalized weakness. NPO with TF running @ goal rate of 45 mL/hr, tolerating well. Incontinent of bowel and bladder. Had one small, soft BM this shift. Up A2/lift. Pressure ulcer present on coccyx, mepilex dressing changed this morning. Turned/repositioned q2hrs. PIV infusing D5 and 0.45 NaCl + KCl 20 mEq @75mL/hr. Had no c/o pain. Did c/o being hot, PRN Tylenol givenx1 and ice packs applied. Continue to monitor and follow POC.

## 2018-08-18 NOTE — PROGRESS NOTES
"Otolaryngology Progress Note  August 18, 2018    S: Ms Collins had no acute events overnight. Patient was afebrile and VSS and wnl overnight. Patient states that she is doing well and pain is well-controlled. She denies nausea and vomiting.    O: /51 (BP Location: Right arm)  Pulse 89  Temp 99.3  F (37.4  C) (Oral)  Resp 24  Ht 1.702 m (5' 7\")  Wt 56.7 kg (125 lb)  SpO2 96%  BMI 19.58 kg/m2   General: resting comfortably in bed; not in acute distress   HEENT: 6-0 cuffed Shiley in place with straps around neck; cuff appropriately insufflated; no evidence of active or recent bleeding; no subcutaneous fluid or crepitus; no oral bleeding   Pulmonary: breathing comfortably on HTD      Intake/Output Summary (Last 24 hours) at 08/17/18 0917  Last data filed at 08/16/18 1646   Gross per 24 hour   Intake             2050 ml   Output              100 ml   Net             1950 ml     LABS:  ROUTINE IP LABS (Last four results)  BMP    Recent Labs  Lab 08/18/18  0542 08/17/18  0702 08/16/18  1611 08/16/18  1346    137 135 134   POTASSIUM 4.3 4.2 4.5 4.5   CHLORIDE 106 102  --  98   ESTEFANI 8.6 9.1  --  9.4   CO2 25 26  --  29   BUN 20 23  --  31*   CR 0.52 0.65  --  0.60   * 102* 159* 141*     CBC    Recent Labs  Lab 08/18/18  0542 08/18/18  0004 08/17/18  1755 08/17/18  1121 08/17/18  0702   WBC 10.2  --   --   --  12.0*   RBC 3.22*  --   --   --  3.24*   HGB 9.4* 9.5* 9.6* 9.4* 9.6*   HCT 29.2*  --   --   --  29.0*   MCV 91  --   --   --  90   MCH 29.2  --   --   --  29.6   MCHC 32.2  --   --   --  33.1   RDW 14.4  --   --   --  14.6     --   --   --  285     INR    Recent Labs  Lab 08/16/18  1346   INR 1.11       A/P: Keira Collins is a 72 year old female with a past medical history of COPD, HTN, HLD, PVD, recent CVA during last hospitalization, and [id number] squamous cell carcinoma of the larynx s/p tracheotomy for upper airway obstruction 7/17/18 who admitted with bleeding from the laryngeal " tumor, now POD#2 s/p DL and control of tumor hemorrhage.    Neuro:  - Pain control: tylenol/oxy/dilaudid PRN  - PTA ritalin BID  - Melatonin at bedtime prn    HEENT:  -  [id number] squamous cell carcinoma of the larynx s/p tracheotomy for upper airway obstruction  - S/p DL and control of hemorrhage from tumor  - 6-0 cuffed Shiley in place  - Monitor for signs of bleeding    Respiratory:  - Hx of COPD  - Saturating well with HTD  - HTD, supplemental O2 PRN to keep sats >92%  - PTA brovana, pulmicort, atrovent, xopenex    CV/heme:  - Hemodynamically stable- P and BP wnl  - History of PVD s/p stent placement in 2017- on PTA plavix   - Currently holding   - Consider restarting plavix  - Hx of HLD- continue PTA statin  - PTA amlodipine, atorvastatin, losartan  - PTA ASA restarted on 08/17  - Curbside vascular re: restarting plavix    FEN/GI:  - NPO  - TFs per PEG- currently at goal of 45cc/hr- consider transitioning to bolus TFs  - BMP wnl  - Ferrous sulfate TID; folate every day; lactobacillus; multivitamins  - mIVF d5 1/2 NS + 20 K @ 75 mL/hr- consider discontinuing  - Bowel regimen: docusate BID + senna BID + miralax qd    :  - Voiding independently; incontinent of urine    Endo:  - No acute issues    ID:  - Afebrile  - WBC wnl    PPX:  - SCDs  - IS    Dispo: 6A for clinical monitoring    Consults: Internal Medicine, Nutrition, WOC      -- Patient and above plan to be discussed with staff, Dr. Win Lyons MD  Otolaryngology- Head and Neck Surgery, PGY-2  Pager: [phone]  Please contact ENT with questions by dialing [phone] and entering job code [phone] when prompted.

## 2018-08-18 NOTE — PROGRESS NOTES
Nutrition Progress Note -  f/u for progress towards previous nutrition POC (see previous 8/17 reassessment for details)    Clinical nutrition services - Received TF consult to Assess order: comments: bolus feed recommendations.     TF was restarted on 8/17 with regimen of: Isosource 1.5 @ goal 45 ml/hr (1080 ml/day) to provide 1620 kcals (28 kcal/kg/day), 73 g PRO (1.3 g/kg/day), 821 ml free H2O, 190 g CHO and 16 g Fiber daily.  - Initiate TF @ 15 ml/hr and advance by 10 ml Q4hr to goal of 45 ml/hr. Only advance if K+/Mg++ are WNL and Phos >1.9.     Goal TF was reached at 0100 on 8/18 per RN flowsheet documentation. Pt has been tolerating TF.      Interventions:  1. Ordered Bolus TF   Stop continuous TF for 1-2 hrs to let stomach empty prior to starting gravity feeding.   Recommend: begin first bolus with 0.5 cans (125 ml) and if tolerates after approx 4 hrs without GI complaints and/or residuals < 500 ml (if able to accurately return with smaller bore FT), rec adv each subsequent bolus by 0.5 cans (125 ml) every ~4 hrs until reach goal regimen of 1 cans (250 ml) QID = 4 cans daily (1000 ml/day) = 1500 kcals (26 kcal/kg), 68 g PRO (1.2 g/kg), 760 ml H2O, 176 g CHO and 15 g Fiber daily.  *Change H2O flushes to 100 ml H2O before and after each bolus QID (addtl 720 ml H2O) to meet est fld needs.   2. Collaboration with other providers: Paged team about bolus and changing free water to 100 ml before and after bolus feed to provide 100% of the pt's hydration needs and discussed bolus feeds with the nurse.    Ofelia Wilson, MS, RD, LD  Weekend pgr:  [phone]

## 2018-08-18 NOTE — PLAN OF CARE
Problem: Patient Care Overview  Goal: Plan of Care/Patient Progress Review  Status: Patient is POD 2 s/p combine direct laryngoscopy, bronchoscopy, control oropharyngeal hemorrhage.   VS: VSS, HTN within parameters.    Neuros: A&Ox4. Doesn't attempt at communicating often. Follows commands.   GI: NPO. PEG with TF @ 45 mL/hr. Nutrition consulted to switch to bolus feeds. Incontinent of one large, loose BM this morning.   : Incontinent of bladder x4 this shift - voids large amounts.   IV: D51/2 NS w/ 20 mEq of K infusing at 75 mL/hr via PIV.   Activity: Up with assist of 2 and lift. Refusing to get in chair this shift. Turn q2hr. On list to get PT & OT ordered.   Pain: Denies pain.   Respiratory/Trach: Shiley #6 with cuff inflated. Inner cannula changed x2. On continuous pulse oximetry, with sats in high 90s. HTD @ 21%. Suctioned x1 this shift for red streaked secretions. Productive cough.   Skin: Pressure ulcer on bottom, covered with mepilex - changed x1.   Social: Family at bedside throughout shift, supportive.   Plan of care: Patient came from TCU - most likely will have same discharge needs. Will continue to monitor.

## 2018-08-18 NOTE — PROGRESS NOTES
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 Internal medicine was asked to see this patient for medical co-management.      # Bleeding from laryngeal tumor, POD #1 DL for control of tumor hemorrhage  # [id number] Laryngeal squamous cell carcinoma s/p tracheostomy 7/17 for upper airway obstruction  Bleeding from tumor 2/2 to patient pulling out tracheostomy. S/p emergent OR for direct laryngoscopy in order to control oropharyngeal hemorrhage. Hgb stable, no further signs pf bleeding.   - Management per primary team, ENT     Recent L pontine CVA (7/19). MRI brain showing acute L pontine infract. Managed on ASA and plavix PTA. Held on admission given bleeding from tracheostomy. Per neurology recommendations, ok to hold antiplatelets in the setting of hemorrhage. Once stable can start ASA 81 mg every day. Resumption of plavix pending risk vs benefit discussion with Neurology and vascular team (hx of PVD w/ stent placement in 2017).      HTN. PTA managed on amlodipine and losartan. Goal SBP <160 per neurology in setting of recent CVA.  BP currently well controlled. Continue home meds.     Severe protein-calorie malnutrition.  2/2 acute on chronic medical issues including cancer, surgery and CVA. PEG placed 7/27 and started on TF.  - Continue TF at goal rate of 45 ml/hr  - Nutrition following     Spiculated RUL nodule. PET 7/20 showing hypermetabolic pulmonary nodules c/w metastatic disease. Will need lung biopsy outpatient.      COPD. Continue Brovana, Pulmicort, Ipratropium/levalbuterol.  HLD. Continue statin  PVD. S/p lower extremity endarterectomy and stenting  "2017. Managed on Plavix and ASA OP. Held on admission given oropharyngeal hemorrhage.     Given no new recommendations today and clinical stability of patient, Internal medicine will sign off at this time. Discussed with Dr. Lyons. If any questions or concerns arise please re-consult medicine as we would be happy to see this patient again.       Diet: NPO for Medical/Clinical Reasons Except for: No Exceptions  Adult Formula Drip Feeding: Continuous Isosource 1.5; Gastrostomy; Goal Rate: 45; mL/hr; Medication - Tube Feeding Flush Frequency: At least 15-30 mL water before and after medication administration and with tube clogging; Initiate TF @ 15 ml/hr a...  Fluids: D5 1/2 NaCal + KCL @ 75  DVT Prophylaxis: Pneumatic Compression Devices  Code Status: Full Code    Disposition Plan   Per primary team    The patient's care was discussed with the Attending Physician, Dr. Samuels.    Edita Laguerre  Internal Medicine Staff Hospitalist Service  [facility]  Pager: [phone]  Please see sticky note for cross cover information    Interval History   No changes today. Denies any source of pain. Repeats \"I want to go home\". Tolerating TF. No hemodynamic changes.      Data reviewed today: I reviewed all medications, new labs and imaging results over the last 24 hours.     Physical Exam   Vital Signs: Temp: 98.9  F (37.2  C) Temp src: Oral BP: 133/66 Pulse: 89 Heart Rate: 88 Resp: 20 SpO2: 97 % O2 Device: Trach dome    Weight: 125 lbs 0 oz  GENERAL: Alert and oriented x 3. NAD.   HEENT: Anicteric sclera. Mucous membranes moist. Trach in place.   CV: RRR. S1, S2. No murmurs appreciated.   RESPIRATORY:  Lungs CTAB with no wheezing, rales, rhonchi.   GI: Abdomen soft and non distended with normoactive bowel sounds present in all quadrants. No tenderness, rebound, guarding. PEG tube in place.  NEUROLOGICAL: No focal deficits. Moves all extremities.    EXTREMITIES: No peripheral edema. Intact bilateral " pedal pulses.   SKIN: No jaundice. No rashes.          Data   Data     Recent Labs  Lab 08/18/18  0542 08/18/18  0004 08/17/18  1755  08/17/18  0702  08/16/18  1611 08/16/18  1346   WBC 10.2  --   --   --  12.0*  --   --   --    HGB 9.4* 9.5* 9.6*  < > 9.6*  < > 7.3* 7.5*   MCV 91  --   --   --  90  --   --   --      --   --   --  285  --   --   --    INR  --   --   --   --   --   --   --  1.11     --   --   --  137  --  135 134   POTASSIUM 4.3  --   --   --  4.2  --  4.5 4.5   CHLORIDE 106  --   --   --  102  --   --  98   CO2 25  --   --   --  26  --   --  29   BUN 20  --   --   --  23  --   --  31*   CR 0.52  --   --   --  0.65  --   --  0.60   ANIONGAP 7  --   --   --  9  --   --  7   ESTEFANI 8.6  --   --   --  9.1  --   --  9.4   *  --   --   --  102*  --  159* 141*   < > = values in this interval not displayed.    .    I saw this patient with the above HARSHAL, examined independently, and participated in management and plan formation.    Key History and/or exam findings:  72 year old woman with ho/o COPD, trach for SCC, NG placement, HTN, HLD, tobacco abuse, admitted after bleeding form mouth, trach site, now resolved.   Asked to see to co-manage yesterday. Neurolgy and vascular discussing risk of bleeding versus clot (neuro notes reviewed, vascular discussed by above HASRHAL).  BP well controlled.  On TF (malnutirtion)  Leukocytosis resolved, no fevers, but no obvious items on exam to further explore.    Reports no concerns.  Asked to go home Limited ROS possible  Key decision making included:  Clarified again RE: anti-coagluation, ASA restarted; would defer to vascular whether to start Plavix/ Neuro.    Discussed with ENT, we'll sign off; happy to see again if helpful/ new questions.  Felix Samuels MD  Med-Peds Hospitalist

## 2018-08-18 NOTE — PLAN OF CARE
Problem: Patient Care Overview  Goal: Plan of Care/Patient Progress Review  Status:POD#1 s/p Combine direct laryngoscopy, bronchoscopy, control oropharyngeal hemorrhage.Hx squamous cell carcinoma of the larynx s/p tracheostomy placed for upper airway obstruction 7/17/18.   Neuro:Oriet and more awake this evening.Unable to speak( trached). Nod head for 'yes' or 'no'.plesant and calm.  Vital:HTD 21% and sats 95% on continuous pulse oximeter. Other vital stable.  Pain: No s/s of pain or discomfort at the moment.  Trach/Respiration:Shiley #6 sutured cuffed with balloon up in place. Inner cannula changed x2.LS diminished. Patient has good productive cough with thin bloody secretion.Only require suction once this shift.  GI: NPO,Tolerate TF at 35cc/hr and free water flushes 60cc/hr.No nausea/ vomiting.  :Incontinent of urine and stools x3.  Skin: Pressure ulcer on buttocks, Mepilex dressing in place.  Activity:Up with Lift.Turn/reposition Q 2hrs.Bed alarm on d/t lines.  IV:PIV SL  Social: Family members here most of the shift, very supportive.  Plan of care: Continue to monitor for bleeding and call ENT team with any concerns. Assess pain and intervene PRN. Follow POC.

## 2018-08-19 NOTE — PLAN OF CARE
Problem: Patient Care Overview  Goal: Plan of Care/Patient Progress Review  Status: Patient is POD 3 s/p combine direct laryngoscopy, bronchoscopy, control oropharyngeal hemorrhage.   VS: VSS, HTN within parameters.    Neuros: Intact. Doesn't seem to be confused but refuses to answer orientation questions. Doesn't attempt at communicating often. Follows commands.   GI: NPO. PEG with bolus TF. Has had 2/4 cans - tolerating well. Incontinent of two large, loose BM this afternoon.   : Incontinent of bladder x3 this shift - voids large amounts.   IV: PIV SL.   Activity: Up with assist of 2 and lift. In chair x1 this shift. Turn q2hr when in bed. On list to get PT & OT ordered.   Pain: Denies pain.   Respiratory/Trach: Liudmila #6 - cuff was deflated this shift. Inner cannula changed x2. On continuous pulse oximetry, with sats in high 90s. HTD @ 21%. Suctioned x 5 this shift for red streaked secretions - becoming less bloody. Productive cough.   Skin: Pressure ulcer on bottom, covered with mepilex - changed x2.   Social: No family here this shift.   Plan of care: Patient came from TCU - most likely will have same discharge needs. Will continue to monitor.

## 2018-08-19 NOTE — PLAN OF CARE
Problem: Patient Care Overview  Goal: Plan of Care/Patient Progress Review  Outcome: No Change  Pt. POD#3 s/p Combine direct laryngoscopy, bronchoscopy, control oropharyngeal hemorrhage.Hx squamous cell carcinoma of the larynx s/p tracheostomy placed for upper airway obstruction 7/17/18. Has #6 shiley with cuff inflated. Inner cannula changedx2. Pt. has good, productive cough with thin bloody/pink secretions. Suctioned x3 this shift. VSS on HTD21% ex/ HTN within parameters. YULIYA orientation, pt. mouthed name but shook her head no to all other questions. Neuros include: 4/5 strength t/o, generalized weakness. NPO with bolus TF. Tolerated first half can bolus yesterday evening, goal is 4 cans/day. Incontinent of bowel and bladder. Up A2/lift. Pressure ulcer present on coccyx, mepilex dressing changed this morning. Turned/repositioned q2hrs. PIV infusing D5 and 0.45 NaCl + KCl 20 mEq @75mL/hr. Had no c/o pain. Continue to monitor and follow POC.

## 2018-08-19 NOTE — PLAN OF CARE
Problem: Patient Care Overview  Goal: Plan of Care/Patient Progress Review  Outcome: No Change  Status: POD #2 s/p combine direct laryngoscopy, bronchoscopy, control oropharyngeal hemorrhage.   VS: VSS, HTN within parameters.   Neuros: AOx4. Follows commands.   GI/: NPO. PEG in place. TF stopped at 1500, and started on bolus feeds. Pt tolerated half a can well. Loose BM x1, incontinent. Incontinent of bladderx4 this shift - voids large amounts.   IV: D51/2 NS w/ 20 mEq of K infusing at 75 mL/hr via PIV.   Activity: Up w/2 and lift. Refusing to get in chair this shift. Turn q2hr.   Pain: Denies   Respiratory/Trach: Shiley #6 with cuff inflated. Inner cannula changed x2. On continuous pulse oximetry, with sats in high 90s. HTD @ 21%. Suctionedx2 this shift for red streaked secretions. Productive cough.   Skin: Pressure ulcer on bottom, covered with mepilex - changed x2.   Plan of care: Continue to monitor and follow POC.

## 2018-08-19 NOTE — PROGRESS NOTES
"Otolaryngology Progress Note  August 19, 2018    S: Ms Collins had no acute events overnight. Patient had thin blood-streaked secretions overnight. She was afebrile and VSS and wnl overnight except for HTN to 161 systolic that resolved thereafter. Patient denies pain and bleeding per tracheotomy and from mouth.    O: /70 (BP Location: Left arm)  Pulse 87  Temp 96.8  F (36  C) (Axillary)  Resp 20  Ht 1.702 m (5' 7\")  Wt 56.7 kg (125 lb)  SpO2 93%  BMI 19.58 kg/m2   General: resting comfortably in chair; not in acute distress   HEENT: 6-0 cuffed Shiley in place with straps around neck; cuff appropriately insufflated- cuff taken down; no evidence of active or recent bleeding; no subcutaneous fluid or crepitus; no oral bleeding   Pulmonary: breathing comfortably on HTD      Intake/Output Summary (Last 24 hours) at 08/17/18 0917  Last data filed at 08/16/18 1646   Gross per 24 hour   Intake             2050 ml   Output              100 ml   Net             1950 ml     LABS:  ROUTINE IP LABS (Last four results)  BMP    Recent Labs  Lab 08/19/18  0640 08/18/18  0542 08/17/18  0702 08/16/18  1611 08/16/18  1346    139 137 135 134   POTASSIUM 4.2 4.3 4.2 4.5 4.5   CHLORIDE 109 106 102  --  98   ESTEFANI 9.3 8.6 9.1  --  9.4   CO2 21 25 26  --  29   BUN 13 20 23  --  31*   CR 0.47* 0.52 0.65  --  0.60   * 130* 102* 159* 141*     CBC    Recent Labs  Lab 08/19/18  0640 08/18/18  1845 08/18/18  0542 08/18/18  0004  08/17/18  0702   WBC 11.9*  --  10.2  --   --  12.0*   RBC 3.40*  --  3.22*  --   --  3.24*   HGB 9.9* 9.8* 9.4* 9.5*  < > 9.6*   HCT 30.8*  --  29.2*  --   --  29.0*   MCV 91  --  91  --   --  90   MCH 29.1  --  29.2  --   --  29.6   MCHC 32.1  --  32.2  --   --  33.1   RDW 14.1  --  14.4  --   --  14.6     --  314  --   --  285   < > = values in this interval not displayed.  INR    Recent Labs  Lab 08/16/18  1346   INR 1.11       A/P: Keira Collins is a 72 year old female with a past " medical history of COPD, HTN, HLD, PVD, recent CVA during last hospitalization, and [id number] squamous cell carcinoma of the larynx s/p tracheotomy for upper airway obstruction 7/17/18 who admitted with bleeding from the laryngeal tumor, now POD#3 s/p DL and control of tumor hemorrhage.    Neuro:  - Pain control: tylenol, oxycodone, and dilaudid PRN  - PTA ritalin BID  - Melatonin at bedtime prn    HEENT:  - [id number] squamous cell carcinoma of the larynx s/p tracheotomy for upper airway obstruction  - S/p DL and control of hemorrhage from tumor  - 6-0 cuffed Shiley in place- cuff taken down today  - Blood-streaked secretions overnight after restarting PTA plavix- continue to monitor for signs of bleeding    Respiratory:  - Hx of COPD  - Saturating well with HTD  - HTD, supplemental O2 PRN to keep sats >92%  - PTA brovana, pulmicort, atrovent, xopenex    CV/heme:  - Hemodynamically stable- P and BP wnl  - History of PVD s/p stent placement in 2017- on PTA plavix   - Currently holding   - Plavix 75mg restarted on 08/18  - Hx of HLD- continue PTA statin  - PTA amlodipine, atorvastatin, losartan  - PTA ASA restarted on 08/17  - Hgb stable    FEN/GI:  - NPO  - Bolus TFs with goal of 4 cans (QID dosing) started on evening of 08/18  - BMP wnl  - Ferrous sulfate TID; folate every day; lactobacillus; multivitamins  - mIVF discontinued  - Bowel regimen: docusate BID + senna BID + miralax every day  - +BM on 08/18    :  - Voiding independently; incontinent of urine    Endo:  - No acute issues    ID:  - Afebrile  - WBC mildly elevated    PPX:  - SCDs  - IS    Dispo: 6A for clinical monitoring    Consults:  - WOC  - Nutrition for bolus TF recommendations  - Internal Medicine has signed off given patient's stable course at this time- would be happy to be involved in care should patient's condition change      -- Patient and above plandiscussed with staff, Dr. Win Lyons MD  Otolaryngology- Head and Neck Surgery,  PGY-2  Pager: [phone]  Please contact ENT with questions by dialing * [phone] and entering job code [phone] when prompted.

## 2018-08-19 NOTE — PROGRESS NOTES
Social Work Services Progress Note    Hospital Day: 4  Date of Initial Social Work Evaluation:  Completed on 7/30/18 during previous hospitalization  Collaborated with:  Left a message for Admissions at [facility], to call    Data:  Pt was admitted to [facility] from [facility].  Pt has a trach (placed 7/17/18), a peg tube and a pressure ulcer on the coccyx.   Pt is on HTD at 21%.  Pt requires suctioning (5x day shift, 8/19/18).      Intervention:  SW phoned Admissions at [facility] and left a message for Isamar to call in regards to pt's bed hold status.  SW introduced self to pt.  Pt offered no communication.  SW  Phoned pt's son (Dar) and introduced role of SW.  SW informed son that a message was left with [facility] to confirm bed hold status.  SW informed son that this SW will assist with transportation arrangements back to [facility] when discharge date is known.    Assessment: Pt's son voices understanding of the discharge plan and agreement with the discharge plan.    Plan:    Anticipated Disposition:  Return to [facility] pending verification of bed hold status.    Barriers to d/c plan:  Medical readiness, frequent suctioning    Follow Up:  SW will follow for discharge planning.    CHARLES Mosley  Social Work, 6A  Phone:  [phone]  Pager:  [phone]  8/19/2018

## 2018-08-20 NOTE — PLAN OF CARE
Problem: Patient Care Overview  Goal: Plan of Care/Patient Progress Review  Outcome: No Change  Status: POD #3 s/p combine direct laryngoscopy, bronchoscopy, control oropharyngeal hemorrhage.   VS: VSS, HTN within parameters.   Neuros: AOx4. Follows commands.   GI/: NPO. PEG in place, on bolus feeds. 4/4 cans completed today, pt tolerated well. No BM this shift, incontinent of bowel. Incontinent of bladderx4 this shift - voids large amounts.   IV: PIV SL   Activity: Up w/2 and lift. Turn q2hr.   Pain: Denies   Respiratory/Trach: Liudmila #6 with cuff deflated. Inner cannula changed x3. On continuous pulse oximetry, with sats in high 90s. Pt refuses to wear trach dome. Suctioned x5 this shift for red streaked thick secretions. Productive cough.   Skin: Pressure ulcer on bottom, covered with mepilex - changed x1.   Plan of care: Continue to monitor and follow POC.

## 2018-08-20 NOTE — PROGRESS NOTES
Social Work Services Progress Note    Hospital Day: 5  Date of Initial Social Work Evaluation:  Completed on 7/30/18 during previous hospitalization  Collaborated with:  Admissions at [facility] (Isamar)    Data:  Pt has a bed hold at [facility].  Pt is at [facility], for rehab.    Intervention:  SW spoke with Admissions at [facility] and confirmed pt's bed hold status.    Assessment:  Pt's bed is being held at [facility].    Plan:    Anticipated Disposition:  Return to rehab at [facility]    Barriers to d/c plan:  Medical readiness, bleeding from tumor, frequent suctioning.    Follow Up:  SW will follow for discharge planning.    CHARLES Mosley  Social Work, 6A  Phone:  [phone]  Pager:  [phone]  8/20/2018

## 2018-08-20 NOTE — PROGRESS NOTES
"Otolaryngology Progress Note  August 20, 2018     S: Ms Collins had no acute events overnight. Patient having blood tinged secretions from trach. She denies any pain, no blood from mouth.      O: /58 (BP Location: Left arm)  Pulse 96  Temp 96.6  F (35.9  C) (Axillary)  Resp 18  Ht 1.702 m (5' 7\")  Wt 54.9 kg (121 lb 0.5 oz)  SpO2 97%  BMI 18.96 kg/m2   General: resting comfortably in chair; not in acute distress   HEENT: 6-0 cuffed Shiley in place with straps around neck, straps were too loose and were tightened to two finger breadth tightness; cuff down; suctioned thick blood tinged secretions from the trach; no oral bleeding   Pulmonary: breathing comfortably on HTD      Intake/Output Summary (Last 24 hours) at 08/17/18 0917  Last data filed at 08/16/18 1646   Gross per 24 hour   Intake             2050 ml   Output              100 ml   Net             1950 ml     LABS:  ROUTINE IP LABS (Last four results)  BMP    Recent Labs  Lab 08/19/18  0640 08/18/18  0542 08/17/18  0702 08/16/18  1611 08/16/18  1346    139 137 135 134   POTASSIUM 4.2 4.3 4.2 4.5 4.5   CHLORIDE 109 106 102  --  98   ESTEFANI 9.3 8.6 9.1  --  9.4   CO2 21 25 26  --  29   BUN 13 20 23  --  31*   CR 0.47* 0.52 0.65  --  0.60   * 130* 102* 159* 141*     CBC    Recent Labs  Lab 08/19/18  0640 08/18/18  1845 08/18/18  0542 08/18/18  0004  08/17/18  0702   WBC 11.9*  --  10.2  --   --  12.0*   RBC 3.40*  --  3.22*  --   --  3.24*   HGB 9.9* 9.8* 9.4* 9.5*  < > 9.6*   HCT 30.8*  --  29.2*  --   --  29.0*   MCV 91  --  91  --   --  90   MCH 29.1  --  29.2  --   --  29.6   MCHC 32.1  --  32.2  --   --  33.1   RDW 14.1  --  14.4  --   --  14.6     --  314  --   --  285   < > = values in this interval not displayed.  INR    Recent Labs  Lab 08/16/18  1346   INR 1.11       A/P: Keira Collins is a 72 year old female with a past medical history of COPD, HTN, HLD, PVD, recent CVA during last hospitalization, and [id number] squamous " cell carcinoma of the larynx s/p tracheotomy for upper airway obstruction 7/17/18 who admitted with bleeding from the laryngeal tumor, now POD#4 s/p DL and control of tumor hemorrhage.    Neuro:  - Pain control: tylenol, oxycodone, and dilaudid PRN  - PTA ritalin BID  - Melatonin at bedtime prn    HEENT:  - [id number] squamous cell carcinoma of the larynx s/p tracheotomy for upper airway obstruction  - S/p DL and control of hemorrhage from tumor  - 6-0 cuffed Shiley in place, cuff down  - Blood-streaked secretions since restarting PTA plavix- continue to monitor for signs of bleeding    Respiratory:  - Hx of COPD  - Saturating well with HTD  - HTD, supplemental O2 PRN to keep sats >92%  - PTA brovana, pulmicort, atrovent, xopenex    CV/heme:  - Hemodynamically stable- P and BP wnl  - History of PVD s/p stent placement in 2017- on PTA plavix   - Currently holding   - Plavix 75mg restarted on 08/18  - Hx of HLD- continue PTA statin  - PTA amlodipine, atorvastatin, losartan  - PTA ASA restarted on 08/17  - Hgb 9.8 > 9.9    FEN/GI:  - NPO  - Bolus TFs - tolerated 4/4 cans yesterday  - BMP wnl  - Ferrous sulfate TID; folate every day; lactobacillus; multivitamins  - Bowel regimen: docusate BID + senna BID + miralax every day  - +BM on 08/19    :  - Voiding independently; incontinent of urine    Endo:  - No acute issues    ID:  - Afebrile  - WBC mildly elevated    PPX:  - SCDs  - IS    Dispo: 6A for clinical monitoring    Consults:  - WOC  - Nutrition  - Internal Medicine has signed off given patient's stable course at this time- will reconsult if patient's condition changes      -- Patient and above plandiscussed with staff, Dr. Win Thomson MD  Otolaryngology- Head and Neck Surgery, PGY-1  Pager: [phone]  Please contact ENT with questions by dialing * [phone] and entering job code [phone] when prompted.

## 2018-08-20 NOTE — PLAN OF CARE
Problem: Patient Care Overview  Goal: Plan of Care/Patient Progress Review  Outcome: No Change  Pt. POD#4 s/p Combine direct laryngoscopy, bronchoscopy, control oropharyngeal hemorrhage.Hx squamous cell carcinoma of the larynx s/p tracheostomy placed for upper airway obstruction 7/17/18. Has #6 shiley with cuff deflated. Inner cannula changedx2. Pt. has good, productive cough with thin-thick bloody/pink secretions. Suctioned x4 this shift. VSS on HTD21%. YULIYA orientation, pt. mouthed name but shook her head no to all other questions. Neuros include: 4/5 strength t/o, generalized weakness. NPO with bolus TF; tolerated goal of 4 cans/day yesterday. Incontinent of bowel and bladder. Up A2/lift. Pressure ulcer present on coccyx, mepilex dressing changed this morning. Turned/repositioned q2hrs. PIV, SL. Had no c/o pain. Continue to monitor and follow POC.

## 2018-08-20 NOTE — PLAN OF CARE
Problem: Patient Care Overview  Goal: Plan of Care/Patient Progress Review  Pt POD#3 combined direct laryngoscopy, bronchoscopy, and oropharyngeal hemorrhage. VS ex HTN w/in parameters, neuros include: neuro assessment is difficult to assess d/t poor communication and pt 's participation, pt not answering (w/cues and/or assistance) orientation questions, 4/5 throughout, trached, tracks RN in room but doesn't follow all commands. Pt's trach is #6 shiely, cuff deflated this morning, inner cannula changed x2, suctioned x3, bright/blood sputum was removed, pt NPO w/bolus TF (goal=4cans/day), pt tolerated x2 cans this morning-afternoon. Pt up AO2 w/lift, incontinence of bladder/bowel, had large BM this afternoon. Pt denies pain throughout shift, not always able to make needs known but not showing non-verbal s/sx of pain. Continue to monitor per MD orders.

## 2018-08-21 NOTE — PROGRESS NOTES
08/21/18 1645   Quick Adds   Type of Visit Initial Occupational Therapy Evaluation   Living Environment   Lives With child(best), adult   Living Arrangements house   Home Accessibility stairs to enter home   Living Environment Comment Pt unable to verbalize prior level information. per chart, pt lives in a house with her sons; has recently been at TCU following CVA   Self-Care   Dominant Hand right   Usual Activity Tolerance fair   Current Activity Tolerance poor   Regular Exercise yes   Activity/Exercise Type (PT/OT at TCU)   Activity/Exercise/Self-Care Comment Pt unable to provide this information   Functional Level Prior   Ambulation 4-->completely dependent   Transferring 4-->completely dependent   Toileting 4-->completely dependent   Bathing 3-->assistive equipment and person   Dressing 2-->assistive person   Eating 4-->completely dependent   Communication 0-->understands/communicates without difficulty   Swallowing 2-->difficulty swallowing liquids/foods  (NPO)   Fall history within last six months no   Which of the above functional risks had a recent onset or change? ambulation;transferring;toileting;bathing;dressing;eating;swallowing;cognition;communication/speech   Prior Functional Level Comment Unable to provide, per chart, pt far below baseline following CVA, reports works with PT/OT not yet walking with therapies   General Information   Onset of Illness/Injury or Date of Surgery - Date 08/16/18   Referring Physician Samantha Burnham, [facility]   Patient/Family Goals Statement none stated   Additional Occupational Profile Info/Pertinent History of Current Problem : Keira Collins is a 72 year old female with a past medical history of COPD, HTN, HLD, PVD, recent CVA during last hospitalization, and [id number] squamous cell carcinoma of the larynx s/p tracheotomy for upper airway obstruction 7/17/18 who admitted with bleeding from the laryngeal tumor, now POD#5 s/p DL and control of tumor hemorrhage.    Precautions/Limitations swallowing precautions;fall precautions  (trach)   Weight-Bearing Status - LUE full weight-bearing   Weight-Bearing Status - RUE full weight-bearing   Weight-Bearing Status - LLE full weight-bearing   Weight-Bearing Status - RLE full weight-bearing   General Info Comments Activity: Up with assist   Cognitive Status Examination   Cognitive Comment Unable to fully assess, pt able to mouth name, otherwise nodding yes to all questions   Range of Motion (ROM)   ROM Comment RUE with increased tone limiting shoulder flex 160 deg, shoulder abd 140 deg; LUE WNL   Hand Strength   Hand Strength Comments R  fair, L  good   Muscle Tone Assessment   Muscle Tone Comments RUE increased tone shoulder flex, abd, ER, elbow extension   Coordination   Upper Extremity Coordination Right UE impaired   Transfer Skill: Toilet Transfer   Level of [place]: Toilet dependent (less than 25% patients effort)   Bathing   Level of [place] maximum assist (25% patients effort)   Lower Body Dressing   Level of [place]: Dress Lower Body dependent (less than 25% patients effort)   Toileting   Level of [place]: Toilet dependent (less than 25% patients effort)   Grooming   Level of [place]: Grooming maximum assist (25% patients effort)   Activities of Daily Living Analysis   Impairments Contributing to Impaired Activities of Daily Living balance impaired;cognition impaired;pain;post surgical precautions;ROM decreased;sensory feedback impaired;strength decreased   General Therapy Interventions   Planned Therapy Interventions ADL retraining;cognition;bed mobility training;strengthening;stretching;progressive activity/exercise;risk factor education;ROM;neuromuscular re-education   Clinical Impression   Criteria for Skilled Therapeutic Interventions Met yes, treatment indicated   OT Diagnosis Impaired ADL performance   Influenced by the following impairments RUE weakness/increased tone,  "deconditioning, cognition   Assessment of Occupational Performance 3-5 Performance Deficits   Identified Performance Deficits dressing, toileting, functional transfers, bathing, g/h   Clinical Decision Making (Complexity) Low complexity   Therapy Frequency 3 times/wk   Predicted Duration of Therapy Intervention (days/wks) 3 weeks   Anticipated Equipment Needs at Discharge (None to discharge to TCU)   Anticipated Discharge Disposition Transitional Care Facility   Risks and Benefits of Treatment have been explained. Yes   Patient, Family & other staff in agreement with plan of care Yes   [facility] TM \"6 Clicks\"   2016, Trustees of [facility], under license to [employer].  All rights reserved.   6 Clicks Short Forms Daily Activity Inpatient Short Form   [facility]  \"6 Clicks\" Daily Activity Inpatient Short Form   1. Putting on and taking off regular lower body clothing? 1 - Total   2. Bathing (including washing, rinsing, drying)? 2 - A Lot   3. Toileting, which includes using toilet, bedpan or urinal? 1 - Total   4. Putting on and taking off regular upper body clothing? 2 - A Lot   5. Taking care of personal grooming such as brushing teeth? 2 - A Lot   6. Eating meals? 1 - Total   Daily Activity Raw Score (Score out of 24.Lower scores equate to lower levels of function) 9   Total Evaluation Time   Total Evaluation Time (Minutes) 5     "

## 2018-08-21 NOTE — PROGRESS NOTES
"Otolaryngology Progress Note  August 21, 2018     S: Ms Collins had no acute events overnight. She has intermittent suctioning of thick blood tinged secretions from her trach. She denies any pain or difficulty breathing.    O: /65 (BP Location: Left arm)  Pulse 96  Temp 96.6  F (35.9  C) (Axillary)  Resp 20  Ht 1.702 m (5' 7\")  Wt 54.9 kg (121 lb 0.5 oz)  SpO2 98%  BMI 18.96 kg/m2   General: resting comfortably in bed; not in acute distress   HEENT: 6-0 cuffed Shiley in place with straps around neck, straps two finger breadth tightness; cuff down; suctioned thick blood tinged secretions from the trach; no oral bleeding, posterior oropharynx clear   Pulmonary: breathing comfortably on HTD      Intake/Output Summary (Last 24 hours) at 08/17/18 0917  Last data filed at 08/16/18 1646   Gross per 24 hour   Intake             2050 ml   Output              100 ml   Net             1950 ml     LABS:  ROUTINE IP LABS (Last four results)  BMP    Recent Labs  Lab 08/21/18  0658 08/20/18  0810 08/19/18  0640 08/18/18  0542    138 139 139   POTASSIUM 4.2 4.1 4.2 4.3   CHLORIDE 105 105 109 106   ESTEFANI 9.0 9.2 9.3 8.6   CO2 22 24 21 25   BUN 31* 20 13 20   CR 0.60 0.51* 0.47* 0.52   * 107* 119* 130*     CBC    Recent Labs  Lab 08/21/18  0658 08/20/18  0810 08/19/18  0640 08/18/18  1845 08/18/18  0542   WBC 12.2* 13.9* 11.9*  --  10.2   RBC 3.59* 3.59* 3.40*  --  3.22*   HGB 10.5* 10.4* 9.9* 9.8* 9.4*   HCT 33.1* 33.1* 30.8*  --  29.2*   MCV 92 92 91  --  91   MCH 29.2 29.0 29.1  --  29.2   MCHC 31.7 31.4* 32.1  --  32.2   RDW 14.4 14.2 14.1  --  14.4    311 332  --  314     INR    Recent Labs  Lab 08/16/18  1346   INR 1.11       A/P: Keira Collins is a 72 year old female with a past medical history of COPD, HTN, HLD, PVD, recent CVA during last hospitalization, and [id number] squamous cell carcinoma of the larynx s/p tracheotomy for upper airway obstruction 7/17/18 who admitted with bleeding from the " laryngeal tumor, now POD#5 s/p DL and control of tumor hemorrhage.    Neuro:  - Pain control: tylenol, oxycodone, and dilaudid PRN  - PTA ritalin BID  - Melatonin at bedtime prn    HEENT:  - [id number] squamous cell carcinoma of the larynx s/p tracheotomy for upper airway obstruction; s/p DL and control of hemorrhage from tumor  - 6-0 cuffed Shiley in place, cuff down  - Blood-streaked secretions since restarting PTA plavix- continue to monitor for signs of bleeding    Respiratory:  - Hx of COPD  - Saturating well with HTD  - HTD, supplemental O2 PRN to keep sats >92%  - PTA brovana, pulmicort, atrovent, xopenex    CV/heme:  - Hemodynamically stable- P and BP wnl  - History of PVD s/p stent placement in 2017- on PTA plavix   - Plavix 75mg restarted on 08/18  - Hx of HLD- continue PTA statin  - PTA amlodipine, atorvastatin, losartan  - PTA ASA restarted on 08/17  - Hgb 9.8 > 9.9    FEN/GI:  - NPO  - Bolus TFs - tolerated 4/4 cans yesterday  - BMP wnl  - Ferrous sulfate TID; folate every day; lactobacillus; multivitamins  - Bowel regimen: docusate BID + senna BID + miralax every day  - +BM on 08/20    :  - Voiding independently; incontinent of urine    Endo:  - No acute issues    ID:  - Afebrile  - WBC 12.2, down from 13.9 on 8/20    PPX:  - SCDs  - IS    Dispo: 6A for clinical monitoring    Consults:  - WOC  - Nutrition  - Internal Medicine has signed off given patient's stable course at this time- will reconsult if patient's condition changes      -- Patient and above plandiscussed with staff, Dr. Win Thomson MD  Otolaryngology- Head and Neck Surgery, PGY-1  Pager: [phone]  Please contact ENT with questions by dialing * [phone] and entering job code [phone] when prompted.

## 2018-08-21 NOTE — PLAN OF CARE
Problem: Airway, Artificial (Adult)  Goal: Signs and Symptoms of Listed Potential Problems Will be Absent, Minimized or Managed (Airway, Artificial)  Signs and symptoms of listed potential problems will be absent, minimized or managed by discharge/transition of care (reference Airway, Artificial (Adult) CPG).  Status: POD #4 direct laryngoscopy and oropharyngeal hemorrhage.   VS: Stable; 21% HTD with sats in high 90 s; tachy at times     Neuros: withdrawn, 4/5 throughout, pt follows some commands, tracks RN in room, answers yes/no at times  GI: G -tube with 4 cans bolus TF per day; 4 cans received today  : Incontinent of B&B ?  IV: SL  Activity: Up with 2 and lift; refused chair this shift. T&R Q2  Pain: Denies  Respiratory/Trach: #6 shiley trach; cuff deflated. Suctioned x4-5; bloody/bright red thick/thin secretions, increased around 1900, MD notified and will assess. Inner cannula changed x4 this shift. Bloody secretions have decreased since 1900. All emergency equipment at bedside and travel bag in room.    Skin: Pressure sore to coccyx; mepilex in place  Social: Withdrawn; no family visited this evening   Plan of care: continue to monitor and follow current POC. Notify MD if bloody trach secretions continue to increase.

## 2018-08-21 NOTE — PLAN OF CARE
Problem: Patient Care Overview  Goal: Plan of Care/Patient Progress Review  Outcome: Improving  Status: POD #5 direct laryngoscopy and oropharyngeal hemorrhage.   VS: Stable; 21% HTD with sats in high 90 s; tachycardic at times????????????????????????????????  Neuros: Strenfth 4/5 throughout, more cooperative this shift, answers most questions  GI: G Tube with 4 cans bolus TF per day; Had received 4 cans. Free flush 60 mL q4hrs, clamped overnight  : Incontinent of B&B ?  IV: SL'd  Activity: Up with 2 and lift. T&R Q2h  Pain: Denies  Respiratory/Trach: #6 shiley trach; cuff deflated. Suctioned x3, bloody/bright red thick/thin secretions, MD are aware. Inner cannula changed x3 this shift. All emergency equipment at bedside and travel bag in room. ?  Skin: Pressure sore to coccyx; mepilex intact. Blanchable redness to R knee. Continuous pulse oximeter on Left toe. Pt refused site changed because pt does not want her nail polish removed. PCDs to R calf only, legs elevated w/ pillow  Plan of care: Notify MD if bloody trach secretions increases. Cont to monitor & POC.

## 2018-08-21 NOTE — PLAN OF CARE
Problem: Patient Care Overview  Goal: Plan of Care/Patient Progress Review  Outcome: No Change  Status: Pt on 6A for monitoring of bleeding from trach/airway  VS: VSS on 21% HTD via trach dome. Tachycardic at times.  Trach: [place] #6, cuff down. Inner cannula changed x4. Sx x4. Red streaked/bloody secretions, pt mostly coughing up secretions on own, strong productive cough. ENT aware of blood secretions, continue to monitor and notify if increasing. Takes trach dome off at times, re-educated on importance of keeping humidity on.  Neuros: Difficult to assess orientation as pt does not speak and does not like to write things down. Moving all extremities, 4/5. L>R. Will nod responding to questions at times. Cooperative this shift.  GI: Tolerating bolus feeds, received 2/4 cans this shift. No nausea. Loose BM x2. Bowel meds held.  : Incontinent of bowel/bladder  IV: PIV SL  Activity: Ax2 w/lift. Turn/repo Q2hrs. Up in chair x1.    Pain: Denies  Skin: Mepilex on coccyx replaced. Skin pink underneath. Mepilex lite applied under trach plate for protection. PCD on R calf only d/t pulse ox on L foot.  Social: Unable to complete admission w/pt, no family visiting this shift  Plan of care: Pt resting comfortably between cares. Continue to monitor amt/color of secretions. Will continue to monitor and follow plan of care.

## 2018-08-21 NOTE — PLAN OF CARE
Problem: Patient Care Overview  Goal: Plan of Care/Patient Progress Review  6A PT: Acknowledge PT consult.  PT holding will be holding services until otherwise indicated. At this time, pt has poor activity tolerance per OT and is not appropriate to be seen by multiple therapy disciplines at this time. Will initiate PT eval when appropriate.

## 2018-08-21 NOTE — PLAN OF CARE
Problem: Patient Care Overview  Goal: Plan of Care/Patient Progress Review  Discharge Planner OT   Patient plan for discharge: Return to TCU  Current status: OT evaluation completed. Pt with R sided weakness, increased tone RUE, grossly deconditioned with poor activity tolerance. RUE 2/5 with increased tone with shoulder flexion, abduction, horizontal adduction, shoulder ER, elbow extension. Tone and strength impacting functional use of dominant RUE. Able to bring washcloth to face for ADL participation with LUE, educated on incorporating RUE into ADLs as able. Pt declining sit<>stand trial, currently dependent with transfers via ceiling lift.   Barriers to return to prior living situation: dependent mobility, Level of assist with all ADLs, medical needs, Weakness  Recommendations for discharge: TCU  Rationale for recommendations: Pt will benefit from continued therapy at TCU to increase strength and reduce tone for improved participation in ADLs, and progress functional mobility.        Entered by: Kaylee Bales 08/21/2018 10:52 AM

## 2018-08-22 NOTE — PROGRESS NOTES
"Otolaryngology Progress Note  August 22, 2018     S: Ms Dennis partially removed her trach last evening, this was replaced without difficulty. Overnight her secretions were mostly white/tan but continues have some blood tinged secretions with intermittent bloodier sputum.     O: /63 (BP Location: Left arm)  Pulse 90  Temp 95.8  F (35.4  C) (Axillary)  Resp 24  Ht 1.702 m (5' 7\")  Wt 55.7 kg (122 lb 12.7 oz)  SpO2 98%  BMI 19.23 kg/m2   General: resting comfortably in bed; not in acute distress   HEENT: 6-0 cuffed Shiley in place with straps around neck, these were loose but were tightened and the straps cut to appropriate length; cuff down; suctioned thick blood tinged secretions from the trach; no oral bleeding, posterior oropharynx clear   Pulmonary: breathing comfortably on HTD      Intake/Output Summary (Last 24 hours) at 08/17/18 0917  Last data filed at 08/16/18 1646   Gross per 24 hour   Intake             2050 ml   Output              100 ml   Net             1950 ml     LABS:  ROUTINE IP LABS (Last four results)  BMP    Recent Labs  Lab 08/22/18  0658 08/21/18  0658 08/20/18  0810 08/19/18  0640    137 138 139   POTASSIUM 4.3 4.2 4.1 4.2   CHLORIDE 106 105 105 109   ESTEFANI 9.2 9.0 9.2 9.3   CO2 23 22 24 21   BUN 32* 31* 20 13   CR 0.55 0.60 0.51* 0.47*   * 114* 107* 119*     CBC    Recent Labs  Lab 08/22/18  0658 08/21/18  0658 08/20/18  0810 08/19/18  0640   WBC 14.2* 12.2* 13.9* 11.9*   RBC 3.67* 3.59* 3.59* 3.40*   HGB 10.9* 10.5* 10.4* 9.9*   HCT 33.9* 33.1* 33.1* 30.8*   MCV 92 92 92 91   MCH 29.7 29.2 29.0 29.1   MCHC 32.2 31.7 31.4* 32.1   RDW 14.5 14.4 14.2 14.1    303 311 332     INR    Recent Labs  Lab 08/16/18  1346   INR 1.11       A/P: Keira Collins is a 72 year old female with a past medical history of COPD, HTN, HLD, PVD, recent CVA during last hospitalization, and [id number] squamous cell carcinoma of the larynx s/p tracheotomy for upper airway obstruction 7/17/18 " who admitted with bleeding from the laryngeal tumor, now POD#7 s/p DL and control of tumor hemorrhage.    Neuro:  - Pain control: tylenol, oxycodone, and dilaudid PRN  - PTA ritalin BID  - Melatonin at bedtime prn    HEENT:  - [id number] squamous cell carcinoma of the larynx s/p tracheotomy for upper airway obstruction; s/p DL and control of hemorrhage from tumor  - 6-0 cuffed Shiley in place, cuff down  - Please keep trach straps 1-2 finger breadth tightness  - Blood-streaked secretions since restarting PTA plavix- continue to monitor for signs of bleeding    Respiratory:  - Hx of COPD  - Saturating well with HTD  - HTD, supplemental O2 PRN to keep sats >92%  - PTA brovana, pulmicort, atrovent, xopenex    CV/heme:  - Hemodynamically stable- P and BP wnl  - History of PVD s/p stent placement in 2017- on PTA plavix   - Plavix 75mg restarted on 08/18  - Hx of HLD- continue PTA statin  - PTA amlodipine, atorvastatin, losartan  - PTA ASA restarted on 08/17  - Hgb 9.9 > 10.9    FEN/GI:  - NPO  - Bolus TFs - tolerated 4/4 cans yesterday  - BMP wnl  - Ferrous sulfate TID; folate every day; lactobacillus; multivitamins  - Bowel regimen: docusate BID + senna BID + miralax every day    :  - Voiding independently; incontinent of urine    Endo:  - No acute issues    ID:  - Afebrile  - WBC 14.2, increased from 12.2 on 8/21, no s/s localizing infection; will continue to monitor    PPX:  - SCDs  - IS    Dispo: 6A for clinical monitoring    Consults:  - WOC  - Nutrition  - Internal Medicine has signed off given patient's stable course at this time- will reconsult if patient's condition changes      -- Patient and above plan to be discussed with staff, Dr. Win Thomson MD  Otolaryngology- Head and Neck Surgery, PGY-1  Please contact ENT with questions by dialing * [phone] and entering job code [phone] when prompted.

## 2018-08-22 NOTE — PROGRESS NOTES
ENT Note  8/22/2018    ENT called to the bedside to evaluate blood from the trach. She had some blood-tinged secretions today but nothing concerning per nursing until this evening when she had bright red blood from the trach coughed forcefully out onto the wall. No change in O2 sats. No blood from the mouth. The cuff was inflated.    The flexible laryngoscope was passed through the right nostril. There was no evidence of bleeding in the supraglottis of glottis just pooled secretions over the tumor with no old blood.     The scope was then passed through the tracheostomy tube. There were frothy red secretions in the trach tube itself, however the trachea and the anton looked clear with no blood distally.    We decided to proceed with a trach change. The cuff was taken down on the shiley. The trach tube was removed. There is significant granulation tissue at the stoma but no evidence of bleeding from these lesions. The scope was passed into the stoma and there is friable tissue along the posterior tracheal wall but no overt evidence of a bleeding source. No significant blood from the subglottis coming down. Unclear source.     A previously tested 6.0 cuffed shiley tracheostomy tube was replaced in the stoma without difficult. A suction was passed easily. Scope confirmed position in the trachea. The trach dome was placed over the stoma. Clean trach ties were applied. An epi neb through the tracheostomy was ordered to try to help topically stop any mucosal bleeding.     - please continue to perform diligent trach cares  - please make sure the trach ties are tight enough. If the ties are too loose the tracheostomy tube hangs too far anteriorly and can rub against the back wall and can cause irritation   - keep trach dome in place at all times to help keep mucosa moist  - will continue to monitor    Yeni Lu MD  Otolaryngology Resident

## 2018-08-22 NOTE — PLAN OF CARE
Problem: Patient Care Overview  Goal: Plan of Care/Patient Progress Review  Outcome: No Change  #6 shiley in place. VSS, 21% HTD. Denies pain. Suctioned x4, inner cannula changed x1. Secretions are thin, clear/pink tinged. Small amount of dark blood secretions coughed up. Strengths 4/5 throughout. NPO, tolerating bolus TF; 2 of 4 cans completed this shift. Up with lift, up in recliner for a few hours this afternoon. Repo q2. Incontinent of bowel and bladder, small BM this shift. PIV SL. Continue to monitor. DC to TCU when suctioning needs decrease.

## 2018-08-22 NOTE — PLAN OF CARE
Problem: Airway, Artificial (Adult)  Goal: Signs and Symptoms of Listed Potential Problems Will be Absent, Minimized or Managed (Airway, Artificial)  Signs and symptoms of listed potential problems will be absent, minimized or managed by discharge/transition of care (reference Airway, Artificial (Adult) CPG).   Outcome: Improving  Tolerating bolus tube feeds at goal. Incontinent of urine and stool.  Stools x3 this shift. Turned and repositioned every 2 hours.  #6 shiley trach in place with cuff deflated. Secretions thick and blood tinged and also lucinda blood.  She partially removed trach this evening and secretions more bloody after this.  Lavaged x2 with strong cough.  Suctioned 4-6 times to clear secretions.  Plan return to TCU when suctioning needs decrease.

## 2018-08-22 NOTE — PLAN OF CARE
Problem: Patient Care Overview  Goal: Plan of Care/Patient Progress Review  Outcome: No Change  Status: POD #6 direct laryngoscopy and oropharyngeal hemorrhage.   VS: Stable; 21% HTD with sats in high 90 s; tachycardic at times?????????????????????????Neuros: Strength 4/5 throughout, cooperative with most cares, sometimes answers via writing board.   GI: G Tube with 4 cans bolus TF per day. Free flush 60 mL q4hrs, clamped overnight  : Incontinent of B&B. Loose BM this shift. ?  IV: SL'd  Activity: Up with 2 and lift. T&R Q2h  Pain: Denies  Respiratory/Trach: #6 shiley trach; cuff deflated. Suctioned x5, White/tan thick & thin secretions at the beginnning of th shift. Secretions was bright red thick/thin secretions around 0630 when pt was being cleaned after BM. MD are aware & observed during AM rounds. Inner cannula changed x4 this shift. All emergency equipment at bedside and travel bag in room.  Skin: Pressure sore to coccyx; new mepilex dressing in place. Blanchable redness to R buttocks. Continuous pulse oximeter on Left toe as pt refuses pulse oximetry at other sites. PCD to R calf only, legs elevated w/ pillows.  Plan of care: Please make sure the trach ties are tight around the neck at all times. Cont to monitor & POC.

## 2018-08-23 NOTE — PLAN OF CARE
Problem: Patient Care Overview  Goal: Plan of Care/Patient Progress Review  Discharge Planner OT  6A  Patient plan for discharge: home will consider rehab  Current status: Pt with good participation although declined OOB.  Set up A for basic ADLs in bed.  AAROM to promote increased ROM and joint protection.   Barriers to return to prior living situation: weakness, medical needs  Recommendations for discharge: TCU  Rationale for recommendations: Pt currently below her baseline, lives with family although would benefit form short rehab stay to return to [facility].        Entered by: Kaylee Taylor 08/23/2018 4:21 PM

## 2018-08-23 NOTE — PROGRESS NOTES
"Otolaryngology Progress Note  August 22, 2018     S: Ms Collins began coughing blood out of her trach last night, scope exam identified friable tissue on the posterior tracheal wall, cuff inflated. She later produced a large clot from her mouth, repeat scope exam showed pooling of bloody secretions in the larynx, trachea was patent with minimal blood. She denies any pain, resting comfortably this morning.    O: /67 (BP Location: Left arm)  Pulse 96  Temp 97.4  F (36.3  C) (Axillary)  Resp 22  Ht 1.702 m (5' 7\")  Wt 55 kg (121 lb 4.1 oz)  SpO2 95%  BMI 18.99 kg/m2   General: resting comfortably in bed; not in acute distress   HEENT: 6-0 cuffed Shiley in place with straps around neck, no blood from trach; cuff inflated; no blood from mouth   Pulmonary: breathing comfortably on HTD      Intake/Output Summary (Last 24 hours) at 08/17/18 0917  Last data filed at 08/16/18 1646   Gross per 24 hour   Intake             2050 ml   Output              100 ml   Net             1950 ml     LABS:  ROUTINE IP LABS (Last four results)  BMP    Recent Labs  Lab 08/22/18  0658 08/21/18  0658 08/20/18  0810 08/19/18  0640    137 138 139   POTASSIUM 4.3 4.2 4.1 4.2   CHLORIDE 106 105 105 109   ESTEFANI 9.2 9.0 9.2 9.3   CO2 23 22 24 21   BUN 32* 31* 20 13   CR 0.55 0.60 0.51* 0.47*   * 114* 107* 119*     CBC    Recent Labs  Lab 08/23/18  0713 08/22/18 2238 08/22/18  0658 08/21/18  0658 08/20/18  0810   WBC 11.6*  --  14.2* 12.2* 13.9*   RBC 3.48*  --  3.67* 3.59* 3.59*   HGB 10.1* 9.9* 10.9* 10.5* 10.4*   HCT 32.2*  --  33.9* 33.1* 33.1*   MCV 93  --  92 92 92   MCH 29.0  --  29.7 29.2 29.0   MCHC 31.4*  --  32.2 31.7 31.4*   RDW 14.8  --  14.5 14.4 14.2     --  300 303 311     INR    Recent Labs  Lab 08/16/18  1346   INR 1.11       A/P: Keira Collins is a 72 year old female with a past medical history of COPD, HTN, HLD, PVD, recent CVA during last hospitalization, and [id number] squamous cell carcinoma of the " larynx s/p tracheotomy for upper airway obstruction 7/17/18 who admitted with bleeding from the laryngeal tumor, now POD#8 s/p DL and control of tumor hemorrhage.    Neuro:  - Pain control: tylenol, oxycodone, and dilaudid PRN  - PTA ritalin BID  - Melatonin at bedtime prn    HEENT:  - [id number] squamous cell carcinoma of the larynx s/p tracheotomy for upper airway obstruction; s/p DL and control of hemorrhage from tumor  - 6-0 cuffed Shiley in place, cuff inflated  - Please keep trach straps 1-2 finger breadth tightness to prevent damage to tracheal wall  - increase in bleeding last PM- hold PTA plavix 8/23    Respiratory:  - Hx of COPD  - Saturating well with HTD  - HTD, supplemental O2 PRN to keep sats >92%  - PTA brovana, pulmicort, atrovent, xopenex    CV/heme:  - Hemodynamically stable- P and BP wnl  - History of PVD s/p stent placement in 2017- hold PTA plavix 8/23   - Plavix 75mg restarted on 08/18  - Hx of HLD- continue PTA statin  - PTA amlodipine, atorvastatin, losartan  - PTA ASA restarted on 08/17  - Hgb 10.9 > 9.9 > 10.1    FEN/GI:  - NPO  - Bolus TFs - tolerated 4/4 cans yesterday  - BMP wnl  - Ferrous sulfate TID; folate every day; lactobacillus; multivitamins  - Bowel regimen: docusate BID + senna BID + miralax every day    :  - Voiding independently; incontinent of urine    Endo:  - No acute issues    ID:  - Afebrile  - WBC 11.6, no s/s localizing infection; will continue to monitor    PPX:  - SCDs  - IS    Dispo: 6A for clinical monitoring    Consults:  - WOC  - Nutrition  - Internal Medicine has signed off given patient's stable course at this time- will reconsult if patient's condition changes      -- Patient and above plan to be discussed with staff, Dr. Win Thomson MD  Otolaryngology- Head and Neck Surgery, PGY-1  Please contact ENT with questions by dialing * [phone] and entering job code [phone] when prompted.

## 2018-08-23 NOTE — PLAN OF CARE
Problem: Patient Care Overview  Goal: Plan of Care/Patient Progress Review  Status: POD #6 direct laryngoscopy and oropharyngeal hemorrhage.  VS: VSS on 40% HTD.   Neuros: Neuros with AE 4/5.   GI: NPO. TF via PEG at 4/4 cans given today (2 this shift). Loose stool, incontinent, changed Q2hrs. Barrier cream placed on periarea.  : Incontinent of urine Q2hrs.  ?  IV: PIV SL  Activity: Up with lift. BA on.   Pain: Denies   Respiratory/Trach: #6 Shiley in place, cuff-up. Had episode around 1630 where pt was coughing up copious amounts of lucinda blood/large blood clots. MD paged, see notes. Pt had another episode around 2200 when pt coughing up large clots (ping-pong ball size x2) and other bright red-blood from mouth (not trach this time). MD paged and bedside, see MD note. Trach with barely finger size inside trach ties per MD order.   Skin: Large mepilex on coccyx, changed this shift d/t stool on mepilex. PEG. Mepilex lite replaced at trach plate/chest d/t irritation.   Social: No family at bedside this shift, asked pt if she wanted me to call her family and she did not.   Plan of care: Pt seems fairly exhausted and states exhaustion/frustration regarding current situation. Cont to monitor.

## 2018-08-23 NOTE — CONSULTS
VASCULAR SURGERY HOSPITAL PATIENT CONSULTATION NOTE  Consulted by: Dr. Thomson of  ENT  Reason for consultation: ENT would like to hold Plavix due to bleeding, would like vascular surgery recommendations    HPI: Obtained from chart review and discussion with care team  Keira Collins is a 72 year old year old female who has a PMH significant for squamous cell carcinoma of the larynx s/p tracheotomy for upper airway obstruction 7/17/18,  COPD, 140- pack year tobacco smoking history, alcohol abuse, stroke, HLD, HTN, osteoporosis, and PVD. Pt had previously had a lower extremity angiogram by Dr. Null of [facility] 9/2016 (see op note below) and started planning for bypass procedure that was never completed per chart review. Pt then had a LLE nonhealing wound 12/2016-1/2017 and underwent uncomplicated femoral artery endarterectomy, iliac artery recannulization with stent and right SFA recannulization with stent on 1/1/2017 with Dr. Hughes at [facility] [place] (see op note below). She was started on Aspirin/Plavix therapy at that time.     Pt was admitted to [facility] under ENT 8/16/18 for bleeding from the mouth and trach site. Per report, patient pulled her trach out at TCU and TCU staff replaced it and bleeding started shortly afterward. Pt has continued to have clots from her mouth and bloody secretions in the larynx. PTA Aspirin has been continued since admission. PTA Plavix was given 8/18-8/22, but is being held today. Overnight, pt again had bleeding, so vascular was asked for anticoagulation recommendations given ongoing bleeding issues with Plavix and Aspirin therapy.    Pt not able to provide further history. Denies pain in lower extremities. Pt shakes head no when asked if she's followed up with vascular surgery recently.       Review Of Systems: Unable to perform full ROS due to pt mentation/lack of interaction  General: Shakes head no when asked if pain in legs    PAST MEDICAL HISTORY:  Past Medical History:    Diagnosis Date     COPD (chronic obstructive pulmonary disease) (H) 7/28/2014    From [facility] Hospitalization 05/2016: bedside feng shows moderate airflow obstruction [FEV1 0.99L, 62% pred and FVC 1.7L, 82% predicted; FEV1 and FVC DROPPED by 11 and 16% respectively post BD  [facility]        Essential hypertension with goal blood pressure less than 140/90      H/O: alcohol abuse      Hyperlipidemia LDL goal <100      Laryngeal mass 6/27/2018     Laryngeal squamous cell carcinoma (H) 07/17/2018     Osteoporosis 7/14/2016     Pulmonary nodules 7/9/2018     PVD (peripheral vascular disease) (H) 7/11/2016     Tobacco abuse      PAST SURGICAL HISTORY:  Past Surgical History:   Procedure Laterality Date     LARYNGOSCOPY WITH BIOPSY(IES) N/A 7/17/2018    Procedure: LARYNGOSCOPY WITH BIOPSY(IES);  Direct Laryngoscopy With Biopsy, Tracheostomy ;  Surgeon: Cynthia Walden MD;  Location: [facility] OR     LARYNGOSCOPY, BRONCHOSCOPY, COMBINED N/A 8/16/2018    Procedure: COMBINED LARYNGOSCOPY, BRONCHOSCOPY;  COMBINED DIRECT LARYNGOSCOPY, BRONCHOSCOPY, CONTROL OF OROPHARYNGEAL HEMORRHAGE;  Surgeon: Cynthia Walden MD;  Location: [facility] OR     SURGICAL HISTORY OF -   5/13/2016    right hip fracture     TRACHEOSTOMY N/A 7/17/2018    Procedure: TRACHEOSTOMY;;  Surgeon: Cynthia Walden MD;  Location: [facility] OR     VASCULAR SURGERY Left 01/2017    ; left femoral endarterectomy     FAMILY HISTORY:  Family History   Problem Relation Age of Onset     Chronic Obstructive Pulmonary Disease Daughter      Diabetes Daughter      HEART DISEASE Daughter      SOCIAL HISTORY:   Social History   Substance Use Topics     Smoking status: Current Every Day Smoker     Packs/day: 1.00     Years: 50.00     Types: Cigarettes     Smokeless tobacco: Never Used      Comment: 1/2 a pack a day      Alcohol use 0.0 oz/week     0 Standard drinks or equivalent per week      Comment: Beer- 1-4 per day     HOME MEDICATIONS:  Prior  to Admission medications    Medication Sig Start Date End Date Taking? Authorizing Provider   acetaminophen (TYLENOL) 500 MG tablet 1 tablet (500 mg) by Per G Tube route every 6 hours 8/10/18  Yes Sandie Grace PA-C   albuterol (2.5 MG/3ML) 0.083% neb solution Take 2.5 mg by nebulization 4 times daily   Yes Unknown, Entered By History   amLODIPine (NORVASC) 5 MG tablet 1 tablet (5 mg) by Per G Tube route daily 8/10/18  Yes Sandie Grace PA-C   arformoterol (BROVANA) 15 MCG/2ML NEBU neb solution Take 2 mLs (15 mcg) by nebulization 2 times daily 8/9/18  Yes Sandie Grace PA-C   aspirin 81 MG tablet 1 tablet (81 mg) by Per G Tube route daily 8/10/18  Yes Sandie Grace PA-C   atorvastatin (LIPITOR) 40 MG tablet 1 tablet (40 mg) by Per G Tube route daily 8/10/18  Yes Sandie Grace PA-C   budesonide (PULMICORT) 0.25 MG/2ML neb solution Take 2 mLs (0.25 mg) by nebulization 2 times daily 8/9/18  Yes Sandie Grace PA-C   clopidogrel (PLAVIX) 75 MG tablet TAKE 1 TABLET BY MOUTH ONCE DAILY  Patient taking differently: TAKE 1 TABLET VIA G-TUBE ONCE DAILY 11/3/17  Yes Asa Elliott MD   docusate (COLACE) 50 MG/5ML liquid 5 mLs (50 mg) by Per Feeding Tube route 2 times daily 8/9/18  Yes Sandie Grace PA-C   ferrous sulfate 300 (60 Fe) MG/5ML syrup 5 mLs (300 mg) by Per G Tube route daily 8/10/18  Yes Sandie Grace PA-C   folic acid (FOLVITE) 1 MG tablet 1 mg by Per G Tube route daily   Yes Unknown, Entered By History   glycopyrrolate (ROBINUL) 1 MG tablet 1 tablet (1 mg) by Per G Tube route 3 times daily 8/10/18  Yes Sandie Grace PA-C   ipratropium (ATROVENT) 0.02 % neb solution Take 2.5 mLs (0.5 mg) by nebulization 4 times daily 8/9/18  Yes Sandie Grace PA-C   lactobacillus rhamnosus, GG, (CULTURELL) capsule 1 capsule by Per Feeding Tube route 3 times daily (before meals) 8/9/18  Yes Sandie Grace  "ORION Reza   losartan (COZAAR) 50 MG tablet 1 tablet (50 mg) by Per G Tube route daily 8/10/18  Yes Sandie Grace PA-C   methylphenidate (RITALIN) 5 MG tablet Take 5 mg by mouth daily   Yes Unknown, Entered By History   nystatin (MYCOSTATIN) 140613 UNIT/ML suspension Take 500,000 Units by mouth 4 times daily Give via toothette until resolved 8/13/18  Yes Unknown, Entered By History   oxyCODONE IR (ROXICODONE) 5 MG tablet 1-2 tablets (5-10 mg) by Per G Tube route every 3 hours as needed for other (pain control or improvement in physical function. Hold dose for analgesic side effects.) 8/10/18  Yes Sandie Grace PA-C   polyethylene glycol (MIRALAX/GLYCOLAX) Packet 17 g by Per Feeding Tube route daily 8/10/18  Yes Sandie Grace PA-C   sennosides (SENOKOT) 8.8 MG/5ML syrup 5 mLs by Per Feeding Tube route 2 times daily 8/9/18  Yes Sandie Grace PA-C   albuterol (PROAIR HFA/PROVENTIL HFA/VENTOLIN HFA) 108 (90 BASE) MCG/ACT Inhaler Inhale 2 puffs into the lungs every 4 hours as needed for shortness of breath / dyspnea or wheezing 1/16/18   Asa Elliott MD   hypromellose-dextran (ARTIFICAL TEARS) 0.1-0.3 % SOLN ophthalmic solution Place 1 drop into both eyes every hour as needed for dry eyes 8/9/18   Sandie Grace PA-C     VITAL SIGNS:  /67 (BP Location: Left arm)  Pulse 96  Temp 97.4  F (36.3  C) (Axillary)  Resp 22  Ht 1.702 m (5' 7\")  Wt 55 kg (121 lb 4.1 oz)  SpO2 95%  BMI 18.99 kg/m2    Intake/Output Summary (Last 24 hours) at 08/23/18 1036  Last data filed at 08/23/18 0800   Gross per 24 hour   Intake             1420 ml   Output                0 ml   Net             1420 ml     Labs:  ROUTINE IP LABS (Last four results)  BMP  Recent Labs  Lab 08/22/18  0658 08/21/18  0658 08/20/18  0810 08/19/18  0640    137 138 139   POTASSIUM 4.3 4.2 4.1 4.2   CHLORIDE 106 105 105 109   ESTEFANI 9.2 9.0 9.2 9.3   CO2 23 22 24 21   BUN 32* 31* 20 13 "   CR 0.55 0.60 0.51* 0.47*   * 114* 107* 119*     CBC  Recent Labs  Lab 08/23/18  0713 08/22/18  2238 08/22/18  0658 08/21/18  0658 08/20/18  0810   WBC 11.6*  --  14.2* 12.2* 13.9*   RBC 3.48*  --  3.67* 3.59* 3.59*   HGB 10.1* 9.9* 10.9* 10.5* 10.4*   HCT 32.2*  --  33.9* 33.1* 33.1*   MCV 93  --  92 92 92   MCH 29.0  --  29.7 29.2 29.0   MCHC 31.4*  --  32.2 31.7 31.4*   RDW 14.8  --  14.5 14.4 14.2     --  300 303 311     INR  Recent Labs  Lab 08/16/18  1346   INR 1.11     PHYSICAL EXAM:  Constitutional: alert, cachectic, no acute distress   Cardiovascular: RRR  Respiratory: breathing unlabored without secondary muscle use  Psychiatric: mentation appears without agitation. Pt able to shake head to answer questions  Neck: no asymmetry, trach in place without any active bleeding around site  GI/Abdomen:  abdomen soft, non-tender  MSK: able to move legs independently while laying in bed without weakness or ataxia. Is able to assist with exam and putting socks on/off  Extremities: no open lesions, lower extremities not cold. R DP dopplerable biphasic signal, absent PT/peroneal; L DP palpable   Hematology: no bruising on visible skin of lower extremities  Integument: skin of lower extremities in tact without breakdown or ulcer.     IMAGING:  CT ANGIO ABDO/PELVIS WITH LE RUNOFF 12/29/2017 AT [facility]  IMPRESSION:  1. Occlusion of both external iliac arteries.  2. Extensive atherosclerotic calcified and noncalcified plaque in the common femoral arteries bilaterally.  3. Occlusion of the proximal half of the right superficial femoral artery.  4. Occlusion of a 6.5 cm segment of the mid left superficial femoral artery  5. Popliteal arteries and tibial peroneal trunks are widely patent. The dominant runoff vessel in both lower extremities is a widely patent anterior tibial artery. Extensive plaque in the distal left and right posterior tibial arteries  6. Moderate moderate stenosis at the origin of the  left renal artery    Daryl Best MD  Time: over 30 minutes    PROCEDURES:  12/29 PROCEDURES PERFORMED:  1. Irrigation with debridement of left heel.  2. Placement of wound VAC to left heel.    1/1/2017 femoral artery endarterectomy, iliac artery recannulization with stent and right SFA recannulization with stent on 1/1/2017.     INTERVENTIONAL RADIOLOGY BILATERAL LOWER EXTREMITY ANGIOGRAM   9/23/2016  2:44 PM    POSTOPERATIVE DIAGNOSIS: Severe peripheral arterial disease with  bilateral foot ulcerations.     PROCEDURE PERFORMED:  1. Ultrasound-guided access of left brachial artery  2. Abdominal aortogram.  3. Nonselective bilateral lower extremity arteriograms.  4. Selective right lower extremity arteriogram, selective left lower  extremity arteriogram.     IMPRESSION:  1. Severe aortoiliac disease in the distribution noted above. Saccular  aneurysmal changes of the right common iliac artery. Occlusion of the  right external iliac artery for the majority of its length with  reconstitution of that vessel distally. Severe disease of the left  external iliac artery. There appears to be a string sign of flow but I  was never able to direct a wire down to it via an antegrade approach.  2. Severe bilateral common femoral artery that will require bilateral  femoral endarterectomies with patch angioplasties.  3. Bilateral superficial femoral artery occlusions with reconstitution  of popliteal arteries bilaterally. These vessels would appear suitable  for bilateral femoral to popliteal artery bypasses. Given the severity  of her external iliac and common femoral artery disease this is all  poorly suited to a percutaneous approach. Ideally this patient would  benefit most from an aortobifemoral bypass plus or minus completion  femoral to popliteal artery bypasses. If she cannot be medically  cleared for such procedures she is a potential candidate for femoral  endarterectomies with attempted recanalization of her  external iliac  arteries plus or minus completion femoropopliteal bypasses.     IMMANUEL RAMÍREZ MD    Patient Active Problem List   Diagnosis     Essential hypertension with goal blood pressure less than 140/90     Hyperlipidemia LDL goal <100     H/O: alcohol abuse     PVD (peripheral vascular disease) (H)     Tobacco abuse     Cholelithiasis     Osteoporosis     COPD (chronic obstructive pulmonary disease) (H)     Physical deconditioning     Elevated serum free T4 level     Thyroid nodule     Laryngeal mass     Pulmonary nodules     Malignant neoplasm of larynx (H)     Secondary malignancy of lymph nodes of head, face and neck (H)     Tracheostomy, acute management (H)     History of stroke     Severe malnutrition (H)     Aspiration pneumonia (H)     Secondary malignant neoplasm of right lung (H)     Caloric malnutrition (H)     Blood in upper airway     Acute posthemorrhagic anemia     ASSESSMENT:  72F with metastatic laryngeal CA & PVD s/p L femoral endarterectomy/iliac & SFA stents on DAPT with a bleeding trach site.    PLAN:  Check CTA to rule out tracheal-innominate fistula. Okay to stop DAPT in the setting of active bleeding. Primary team reports neurology is okay with stopping Plavix despite recent stroke in July. Conversation with family suggested they would not prefer any surgeries or aggressive treatments in the future. Consider consulting palliative care.      Anitra Murcia MD  Vascular Surgery Fellow  [facility]   Pager: [phone]

## 2018-08-23 NOTE — PROGRESS NOTES
[facility] Nurse Inpatient Wound Assessment   Reason for consultation: Evaluate and treat right buttock wound     Assessment  Right buttock wound due to friction vs scratching  Status: healing    Treatment Plan  Right buttock wound: Daily and with episodes of incontinence: Wash buttocks and perineum with Nora Cleanse and Protect and a soft cloth. Apply a thin layer of Criticaide Paste to perineum and evan cleft and thin layer over the excoriated area on right buttock. To remove Criticaide Paste down to skin, use baby oil and a soft cloth and gently wipe away.    Orders Written    WO Nurse follow-up plan:weekly  Nursing to notify the Provider(s) and re-consult the WOC Nurse if wound(s) deteriorates or new skin concern.    Patient History  According to provider note(s):   Keira Collins is a 72 year old female with a past medical history of COPD, HTN, HLD, PVD, recent CVA during last hospitalization, and [id number] squamous cell carcinoma of the larynx s/p tracheotomy for upper airway obstruction 18 who admitted with bleeding from the laryngeal tumor, now POD#7 s/p DL and control of tumor hemorrhage.    Objective Data  Containment of urine/stool: Diaper and Incontinence Protocol    Active Diet Order    Active Diet Order      NPO for Medical/Clinical Reasons Except for: No Exceptions    Output:   I/O last 3 completed shifts:  In: 1730 [NG/GT:480]  Out: -     Risk Assessment:   Sensory Perception: 3-->slightly limited  Moisture: 3-->occasionally moist  Activity: 3-->walks occasionally  Mobility: 3-->slightly limited  Nutrition: 3-->adequate  Friction and Shear: 3-->no apparent problem  Isacc Score: 18                          Labs:   Recent Labs  Lab 18  0713  18  1346   HGB 10.1*  < > 7.5*   INR  --   --  1.11   WBC 11.6*  < >  --    < > = values in this interval not displayed.    Physical Exam  Skin inspection: focused buttocks and perineum    Wound Location:  Right buttock  Date of last photo:  8/17/18        Wound History: Present on admission. Patient has very long fingernails and wounds appear to be from scratching. Patient also incontinent of stool which may be contributing to the breakdown.  Measurements (length x width x depth, in cm): scattered erosions, largest 0.2 cm round, on the right buttock  Wound Base: epidermal covered today  Palpation of the wound bed: normal   Periwound skin: intact  Color: pink  Temperature: normal   Drainage:, none  Description of drainage: none  Odor: none  Pain: denies     Interventions  Current support surface: Standard  Atmos Air mattress  Current off-loading measures: Pillows under calves  Visual inspection of wound(s) completed  Wound Care: done per plan of care  Supplies: floor stock  Education provided: importance of repositioning  Discussed plan of care with Patient and Nurse    Jory Mathis RN

## 2018-08-23 NOTE — PLAN OF CARE
Problem: Patient Care Overview  Goal: Plan of Care/Patient Progress Review  Outcome: No Change  Alert, able to follow most commands. Shakes head no when asked if in pain. VSS, on 40% TD overnight, weakened without difficulty this am back to 21%. Incontinent of stool x1 and urine x3. Peg flushed and 1 can TF given this am at 0700. #6 Shiley in place, inner cannula changed x3 with bright red bloody sputum noted. Cuff inflated by ENT last domenica. Suctioned x3 with small amount bloody o/p. Up with lift.   Plan: Continue to monitor, follow this am's HGB result.

## 2018-08-23 NOTE — PLAN OF CARE
Problem: Patient Care Overview  Goal: Plan of Care/Patient Progress Review  Outcome: No Change  #6 shiley in place. VSS, 21% HTD. Denies pain. Suctioned x2, inner cannula changed x1. Secretions are thin, dark old blood tinged. No lucinda blood noted this shift. Strengths 4/5 throughout. NPO, tolerating bolus TF; 2 of 4 cans completed this shift. Up with lift. Repo q2. Incontinent of bowel and bladder. Purewick external female catheter placed at 1345; hooked up to wall suction. Coccyx treated per WOC recs. PIV SL. Continue to monitor. DC to TCU when suctioning needs decrease. Son at bedside for a couple hours today. Neck and Chest CTA ordered for today. Continue to monitor.

## 2018-08-24 NOTE — PROGRESS NOTES
Brief ENT Progress Note  8/24/2018    Patient has had more bleeding today, coughing up frequent clots every 30 min per RN. No respiratory issues, O2 sats high 90s-100% on room air. I discussed the recommendation to repeat laryngoscopy to visualize where the bleeding is coming from. Patient declining laryngoscopy, but open to scope through the trach tube. She states she just wants to go home.     FIBEROPTIC ENDOSCOPY:  Due to blood from trach tube, fiberoptic tracheoscopy was indicated. The fiberoptic laryngoscope was passed under endoscopic vision through the trach tube. Blood coating the inner cannula of the trach. There is bright red blood along the tracheal walls and a few small clots that are non-obstructive. Elisabeth is clear, mainstem bronchi clear. No active bleeding from tracheal walls and no active dripping of blood from above.     Discussed with the patient that in the past the bleeding has been coming from her laryngeal tumor, and that is likely the source of her ongoing bleeding. We discussed options to address the bleeding including surgery or potentially an IR procedure. She clearly indicated she did not want to have any more surgical procedures. After explaining the possibility of an IR embolism procedure she indicated she did not want this either. She again stated she wants to go home. I discussed that her sons have indicated they do not feel they could provide the care she needs at home and that if she continues to bleed she will not be able to safely return to the nursing facility. I also discussed the option of pursuing comfort care or Hospice. She shrugged in response to this indicating she was not sure whether comfort care/Hospice was something she wanted.     - Patient declining any procedural intervention for ongoing bleeding at this time. Continue to hold plavix. May consider holding ASA or other medical interventions, though given her recent stroke there would be increased risk of another  stroke and will need to discuss further with patient and family  - Palliative Care consulted today to further discuss goals of care  - Will need to arrange for a care conference with patient, sons (Dar and RJ), ENT, and palliative care  - Continue current plan of care.     -- Patient and above plan discussed with Dr. Win Burnham, PA-C  Otolaryngology-Head & Neck Surgery  Please contact ENT by dialing [phone] and entering job code [phone].

## 2018-08-24 NOTE — PLAN OF CARE
Problem: Patient Care Overview  Goal: Plan of Care/Patient Progress Review  Pt POD#9 direct laryngscopy for laryngeal cancer, vss, neuros include: a/o x4, flat affect, doesn't always or fully cooperate in cares, 4/5 throughout. PIV SL, NPO w/bolus TF (4 cans/day) pt completed x2 cans today, incontinent of bladder/bowel, had large,loose stool this morning, up AO2 w/lift. #6 shiley trach placed, cuff partially deflated by ENT, HTD @21% pt not fully compliant wearing trach-dome. Denies pain, up in chair @ 1330, able to make needs known, RN heavily busy in pt's room d/t frequent coughing up blood clots, ENT physicians were notified of this and her Hgb level dropping, O2 sats WNL and pt is not in respiratory distress. Palliative team is here and has discussed w/ENT and pt about continuing options for treatment. Continue to monitor per MD orders.

## 2018-08-24 NOTE — PROGRESS NOTES
Social Work Services Progress Note    Hospital Day: 9  Date of Initial Social Work Evaluation:  7/30/18  Collaborated with:   ENT P.A. (Samantha Steinberg)    Data:   Pt has a bed hold at [facility].    Intervention:   Per chart review, it appears that the last 3 shifts, pt has required suctioning 2x or less per shift.  Per rounds report, pt is coughing up blood.  SW phoned SANAM Toribio.  Per Samantha, pt is bleeding from her tumor and this will be on-going.  Samantha indicates that pt will continue to cough up blood tinged suptum and this is not anticipated to change.  Per Samantha, there are no surgical interventions planned.   Per Samantha, a palliative consult has been ordered.     Assessment:  Pt has a bed hold at [facility]    Plan:    Anticipated Disposition: Return to [facility]    Barriers to d/c plan:  Await completion of palliative consult and outcome of that consult    Follow Up:  KAMRAN will coordinate return to [facility] upon receipt of MD discharge orders.    CHARLES Mosley  Social Work, 6A  Phone:  [phone]  Pager:  [phone]  8/24/2018

## 2018-08-24 NOTE — PLAN OF CARE
Problem: Patient Care Overview  Goal: Plan of Care/Patient Progress Review    Discharge Planner OT   Patient plan for discharge: Not stated  Current status: Pt completes rolling with min A, transferred bed>chair via ceiling lift. Completes UB bathing with SBA after set up with LUE, UB dressing with min A. RUE limited by weakness and increased tone, completed LUE AAROM with mild stretch at end range, tolerated well, AVSS trach dome 21% FiO2.   Barriers to return to prior living situation: medical needs, weakness, dependent transfers  Recommendations for discharge: TCU  Rationale for recommendations: Pt will benefit from continued therapy to increase strength and progress indep with functional transfers and ADLs       Entered by: Kaylee Bales 08/24/2018 1:42 PM

## 2018-08-24 NOTE — PROGRESS NOTES
"Otolaryngology Progress Note  August 22, 2018     S: Ms Collins had no acute events overnight. Bloody secretions have decreased, strong cough. Suctioned less than 2x the last three shifts.    O: /62 (BP Location: Left arm)  Pulse 96  Temp 98  F (36.7  C) (Axillary)  Resp 20  Ht 1.702 m (5' 7\")  Wt 55 kg (121 lb 4.1 oz)  SpO2 97%  BMI 18.99 kg/m2   General: resting comfortably in bed; not in acute distress   HEENT: 6-0 cuffed Shiley in place with straps around neck, no blood from trach; cuff inflated; no blood from mouth   Pulmonary: breathing comfortably on HTD      Intake/Output Summary (Last 24 hours) at 08/17/18 0917  Last data filed at 08/16/18 1646   Gross per 24 hour   Intake             2050 ml   Output              100 ml   Net             1950 ml     LABS:  ROUTINE IP LABS (Last four results)  BMP    Recent Labs  Lab 08/22/18  0658 08/21/18  0658 08/20/18  0810 08/19/18  0640    137 138 139   POTASSIUM 4.3 4.2 4.1 4.2   CHLORIDE 106 105 105 109   ESTEFANI 9.2 9.0 9.2 9.3   CO2 23 22 24 21   BUN 32* 31* 20 13   CR 0.55 0.60 0.51* 0.47*   * 114* 107* 119*     CBC    Recent Labs  Lab 08/23/18  0713 08/22/18 2238 08/22/18  0658 08/21/18  0658 08/20/18  0810   WBC 11.6*  --  14.2* 12.2* 13.9*   RBC 3.48*  --  3.67* 3.59* 3.59*   HGB 10.1* 9.9* 10.9* 10.5* 10.4*   HCT 32.2*  --  33.9* 33.1* 33.1*   MCV 93  --  92 92 92   MCH 29.0  --  29.7 29.2 29.0   MCHC 31.4*  --  32.2 31.7 31.4*   RDW 14.8  --  14.5 14.4 14.2     --  300 303 311     INR  No lab results found in last 7 days.    A/P: Keira McDonal is a 72 year old female with a past medical history of COPD, HTN, HLD, PVD, recent CVA during last hospitalization, and [id number] squamous cell carcinoma of the larynx s/p tracheotomy for upper airway obstruction 7/17/18 who admitted with bleeding from the laryngeal tumor, now POD#9 s/p DL and control of tumor hemorrhage.    Neuro:  - Pain control: tylenol, oxycodone, and dilaudid PRN  - PTA " ritalin BID  - Melatonin at bedtime prn  - Hx of CVA 7/2018- discussed with neuro, okay to hold plavix given ongoing bleeding    HEENT:  - [id number] squamous cell carcinoma of the larynx s/p tracheotomy for upper airway obstruction; s/p DL and control of hemorrhage from tumor  - 6-0 cuffed Shiley in place, cuff inflated  - Please keep trach straps 1-2 finger breadth tightness to prevent damage to tracheal wall  - Holding Plavix; appreciate recs from neurology and vascular surgery    Respiratory:  - Hx of COPD  - Saturating well with HTD  - HTD, supplemental O2 PRN to keep sats >92%  - PTA brovana, pulmicort, atrovent, xopenex    CV/heme:  - Hemodynamically stable- P and BP wnl  - History of PVD s/p stent placement in 2017; vascular consult, appreciate recs   - CTA negative for tracheo-inominate fistula   - Ok to hold plavix in setting of active bleeding  - Hx of HLD- continue PTA statin  - PTA amlodipine, atorvastatin, losartan  - PTA ASA restarted on 08/17  - Hgb 10.9 > 9.9 > 10.1    FEN/GI:  - NPO  - Bolus TFs - tolerated 4/4 cans yesterday  - BMP wnl  - Ferrous sulfate TID; folate every day; lactobacillus; multivitamins  - Bowel regimen: docusate BID + senna BID + miralax every day    :  - Voiding independently; incontinent of urine    Endo:  - No acute issues    ID:  - Afebrile  - No s/s localizing infection; will continue to monitor    PPX:  - SCDs  - IS    Dispo: 6A for clinical monitoring    Consults:  - WOC  - Nutrition  - Internal Medicine has signed off given patient's stable course at this time- will reconsult if patient's condition changes      -- Patient and above plan to be discussed with staff, Dr. Win Thomson MD  Otolaryngology- Head and Neck Surgery, PGY-1  Please contact ENT with questions by dialing [phone] and entering job code [phone] when prompted.

## 2018-08-24 NOTE — PROGRESS NOTES
CLINICAL NUTRITION SERVICES - REASSESSMENT NOTE     Nutrition Prescription    RECOMMENDATIONS FOR MDs/PROVIDERS TO ORDER:  None    Malnutrition Status:    Severe malnutrition in the context of acute on chronic illness      Recommendations already ordered by Registered Dietitian (RD):  Order current wt   Order current Mg and PO4 for evaluation    Future/Additional Recommendations:  Need to increase TF vol if recheck of wt < 55 kg     EVALUATION OF THE PROGRESS TOWARD GOALS   Diet: Remains NPO for oral intakes    Nutrition Support: TF was restarted on 8/17 via PEG with regimen of Isosource 1.5 beginning at 15 ml/hr with a adv to goal 45 ml/hr (reached on 8/18. Regimen provides 1080 ml/day, 1620 kcals (28 kcal/kg/day), 73 g PRO (1.3 g/kg/day), 821 ml free H2O, 190 g CHO and 16 g Fiber daily.  - Pt transitioned to bolus feeds on 8/18 with goal of 1 can QID which provides 1000 ml/day, 1500 kcals (26 kcal/kg), 68 g PRO (1.2 g/kg), 760 ml H2O, 176 g CHO and 15 g Fiber daily.    Intake: Ave TF intakes received x past 5 days = 850 ml which provides 1275 kcals (22 kcals/kg) and 58 g PRO (1g/kg).   - Noted that pt has received 100% of goal TF vol x past 5 days.       NEW FINDINGS   Weight: 55 kg (8/22) - lowest wt this admission, 56.7 kg (8/16); Wt loss of 1.7 kg (3% loss) x past week.    Labs: K+ 4.3 (8/22) WNL, Mg last drawn on 8/19 and PO4 last drawn on 8/17. Due to intake TF intakes x past week, concern for depletion.    Skin: WOC consult obtained for evaluate and treat right buttock wound. Per assessment on 8/23, right buttock wound d/t friction vs scratching.     MALNUTRITION  % Intake: Decreased intake does not meet criteria, but falls short of meeting RD's goal for TF intakes.  % Weight Loss: > 2% in 1 week (severe)  Subcutaneous Fat Loss: Upper arm and Lower arm: Severe  Muscle Loss: Temporal and Facial & jaw region: Moderate, Upper arm (bicep, tricep), Lower arm  (forearm) and Posterior calf: Severe  Fluid  Accumulation/Edema: None noted  Malnutrition Diagnosis: Severe malnutrition in the context of acute on chronic illness    Previous Goals   Total avg nutritional intake to meet a minimum of 25 kcal/kg and 1.2 g PRO/kg daily (per dosing wt 57 kg).     Evaluation: Not met    Previous Nutrition Diagnosis  Inadequate oral intake related to inability for PO intake  as evidenced by pt reliant on nutrition support (TF) to meet 100% of nutrition needs.       Evaluation: No longer applicable, nutrition diagnosis changed below    CURRENT NUTRITION DIAGNOSIS  Inadequate protein-energy intake related to remains dependent on TF, but goal infusion vol not consistently met  as evidenced by ave TF intakes received x past 5 days meeting 22 kcals/kg  and 1 g PRO/kg and wt loss of 1.7 kg (3% loss) x past week.    INTERVENTIONS  Implementation  Reviewed TF regimen with pt  Obtain current wt and Mg and PO4 levels    Goals  Total avg nutritional intake to meet a minimum of 25 kcal/kg and 1.2 g PRO/kg daily (per dosing wt 57 kg).    Monitoring/Evaluation  Progress toward goals will be monitored and evaluated per protocol.    Cesilia Parker RD,LD  Unit Pager [phone]

## 2018-08-24 NOTE — PLAN OF CARE
Problem: Patient Care Overview  Goal: Plan of Care/Patient Progress Review  Outcome: No Change  Status: POD #8 s/p DL & control of tumor hemorrhage  VS: VSS. On continuous pulse ox stating in mid 90s.    Neuros: Pt nonverbal. Intermittently answers to yes or no Qs.  GI: NPO. Completed 4 cans via Peg. No BM this shift.   : External purewick cathether in place, to suction wall with good UOP.  ?  IV: PIV-SL x2  Activity: Up with lift.   Pain: Denies any pain.  Respiratory/Trach: #6 shiley in place. Good productive cough. Coughing up thick bloody/clotting secretions. Suctioned x1 by RT. Inner cannula changed x2. Trach site cleaned.   Skin: Right buttock wound, treated per WOC orders.   Social: No family at bedside.    Plan of care: Had CT done this shift. Continue to monitor and follow POC.

## 2018-08-24 NOTE — PLAN OF CARE
Problem: Patient Care Overview  Goal: Plan of Care/Patient Progress Review  Outcome: No Change  POD #8 s/p DL & control of tumor hemorrhage. Has #6 shiley in place with cuff inflated. Inner cannula changedx2. Pt. has good, productive cough with thick bloody/clotting secretions. Suctioned x2 this shift. Trach site care completed x3 and mepilex replaced. VSS on HTD21% ex/ intermittent tachycardia. SpO2 in mid-high 90 s on continuous pulse ox. A&Ox4 with options, nods head yes/no to answer orientation questions. Neuros include: 4/5 strength t/o, generalized weakness. NPO with bolus TF; completed 4 cans yesterday. Incontinent of bowel and bladder.Purewick catheter in place to wall suction with good UO. Had one small BM this shift. Up A2/lift. R. buttock wound treated per WOC orders. Turned/repositioned q2hrs. PIVx2, SL. Had no c/o pain. Continue to monitor and follow POC.

## 2018-08-25 NOTE — CONSULTS
[facility], [place]    Palliative Care Consultation Note    Patient: Keira Collins  Date of Admission:  8/16/2018    Requesting provider/team: ENT, Dr. Thomson  Reason for consult: Goals of care                                           Decisional support                                           Patient and family support    Recommendations:  1)  Pt wishes no further CA diagnostic or treatment interventions & wishes to focus on comfort & QOL at home with family supported care.  She appears accepting of hospice care.  2)  No rehab efforts desired  3)  Pt is strongly opposed to subsequent in facility  4)  Continued tube feeds  5)  Management of tumor related bloody trach secretions for comfort only  6)  Pt appears to understand appropriateness of DNR/DNI with POC/wishes above but needs further discussion with pt/family before change in code status.  7)  Emotional support for pt & family struggling to accept recent CA dx, prognostic implications & debilitating effects, especially loss of voice for pt.  8)  Care conference with 3 sons, dtr, & patient on Monday afternoon to finalize POC with intent for care at home if at all possible.  Hospice liaison to be present for informational input.  Palliative team to participate with ENT team.    These recommendations have been discussed with dtr sister Au via phone, ENT team & care coordination staff.    Thank you for the opportunity to participate in the care of this patient and family. Our team will follow.   Please feel free to contact the on-call Palliative provider with any urgent needs.     Madison Bertrand MD, Palliative Medicine Physician  Pager: [phone]  [facility] Inpatient Team Consult pager [phone] (M-F 8-4:30)  After-hours Answering Service [phone]   Palliative Clinic: [phone]   Palliative Total time spent was 90 minutes,  >50% of time was spent counseling and/or coordination of care with primary team, family &  care coordinator.    Assessment & Plan   Keira Collins is a 72 year old female admitted 8 days ago from TCU after previous prolonged hospitalizaton for complications of bleeding from tracheostomy site.  She is medically complex with gradually decline from multiple chronicmedical illnesses & significant decline after presentation of airway obstruction from undiagnosed laryngeal CA 7/18.  Pt had emergency tracheostomy & PEG placement for TFs. Recovery was complicated by L pontine stroke, malnutrition, COPD & severe PVD of LE.  She was found to have spiculated pulmonary nodules strongly suspicious for malignancy on chest imaging.  Pt had L femoral endarterectomy for PVD with reasonable success late 2017 but has high burden of residual PVD.  Hx of prior fall & R hip frx with surgical repair in 5/16.  Pt has continued to smoke & questionably drink significant alcohol until events of last 2 mos.  Per ENT her large exophytic tumor will continue to bleed despite having stopped Plavix.  ASA had been continued due to high stroke recurrence risk.  Current pt is able to manage bloody secretions with strong cough & limited suctioning.  Trach care, bleeding issues, debility all require a high level of care that may not be able to be provided at home with family care available.  Ability to obtain other care resources for home assistance is being explored by care management team.  Current full code status explored with pt as noted above.  Per limited ENT discussion with son today via phone, some prior disagreements of GOC, honoring pt wishes may be resolving.  Further discussion required as noted above.  Pt appears cognitively capacitated for medical decisions.      History of Present Illness   Sources of History:patient, electronic health record, patient's daughter and ENT team.  Admission & history as per assessment above.  Lung nodule dx deferred due to fraility & non-candidate for palliative chemotherapy/XRT per ENT.  R  buttocks wound related to malnutrition/debility.  Anemia related to tumor bleeding.    ROS:  Palliative Symptom Review (0=no symptom/no concern, 1=mild, 2=moderate, 3=severe):  Pain: 1  Fatigue: 2  Nausea: 0  Constipation: 0  Diarrhea: 0  Depressive Symptoms: 1  Anxiety: 1  Drowsiness: 1-2  Poor Appetite: 1  Shortness of Breath: 1  Insomnia: 0  Delirium: 0  Other: trach bleeding 2  Overall (0 good/no concerns, 3 very poor): 2    Past Medical History    Reviewed & as stated in HPI    Past Surgical History   Reviewed & as stated in HPI    Social History   I have personally reviewed the social history with the patient showing issues as per HPI.  No drug abuse.    Family History   Reviewed    Medications   I have reviewed this patient's medication profile and medications given in the past 24 hours.  Relevant palliative medications:  ASA, oxyIR prn, hydromorphone prn, ritalin 2.5 mg BID, robinul 1 mg TID    Physical Exam   Vital Signs: Temp: 98.5  F (36.9  C) Temp src: Oral BP: 131/71   Heart Rate: 98 Resp: 20 SpO2: 96 % O2 Device: Trach dome    Weight: 121 lbs 4.05 oz    Physical Exam:  Cachexic, frail elderly F emotionally distressed by condition & inability to speak.  Frequent coughing bloody clots from trach site.  No evident pain/respiratory distress.  Fully oriented, able to write messages appropriate/mouth answers/shake head to questions.  Withdraws when distressed but smiles appropriately.  Consistent in responses to decisions of care.  HEENT:  Edentulous  Neck:  Trach, no LN  Lungs:  Coarse rhonchi throughout  Cor:  RRR  Abd:  Active BS, soft, no masses  Ext:  No edema, cool    Data   Data reviewed:   labs

## 2018-08-25 NOTE — PROGRESS NOTES
"Otolaryngology Progress Note  August 25, 2018     S: No acute events overnight. Continues to cough out large clots and refused scope exam. Required suctioning x2. Palliative care services stopped by yesterday, plan for care conference Monday. Hgb drop this am to 8.2 from 9.4    O: /68 (BP Location: Left arm)  Pulse 114  Temp 97.4  F (36.3  C) (Axillary)  Resp 18  Ht 1.702 m (5' 7\")  Wt 55 kg (121 lb 4.1 oz)  SpO2 96%  BMI 18.99 kg/m2   General: resting comfortably in bed; not in acute distress   HEENT: 6-0 cuffed Shiley in place with straps around neck, no blood from trach; cuff inflated; no blood from mouth   Pulmonary: breathing comfortably on HTD      Intake/Output Summary (Last 24 hours) at 08/17/18 0917  Last data filed at 08/16/18 1646   Gross per 24 hour   Intake             2050 ml   Output              100 ml   Net             1950 ml     LABS:  ROUTINE IP LABS (Last four results)  BMP    Recent Labs  Lab 08/22/18  0658 08/21/18  0658 08/20/18  0810 08/19/18  0640    137 138 139   POTASSIUM 4.3 4.2 4.1 4.2   CHLORIDE 106 105 105 109   ESTEFANI 9.2 9.0 9.2 9.3   CO2 23 22 24 21   BUN 32* 31* 20 13   CR 0.55 0.60 0.51* 0.47*   * 114* 107* 119*     CBC    Recent Labs  Lab 08/25/18  0922 08/24/18  0818 08/23/18  0713 08/22/18 2238 08/22/18  0658   WBC 16.9* 13.0* 11.6*  --  14.2*   RBC 2.78* 3.25* 3.48*  --  3.67*   HGB 8.2* 9.4* 10.1* 9.9* 10.9*   HCT 26.1* 30.6* 32.2*  --  33.9*   MCV 94 94 93  --  92   MCH 29.5 28.9 29.0  --  29.7   MCHC 31.4* 30.7* 31.4*  --  32.2   RDW 14.7 14.7 14.8  --  14.5    269 278  --  300        A/P: Keira Collins is a 72 year old female with a past medical history of COPD, HTN, HLD, PVD, recent CVA during last hospitalization, and [id number] squamous cell carcinoma of the larynx s/p tracheotomy for upper airway obstruction 7/17/18 who admitted with bleeding from the laryngeal tumor, now POD#10  s/p DL and control of tumor hemorrhage.    Neuro:  - Pain " control: tylenol, oxycodone, and dilaudid PRN  - PTA ritalin BID  - Melatonin at bedtime prn  - Hx of CVA 7/2018- discussed with neuro, okay to hold antiplatelets given ongoing bleeding    HEENT:  - [id number] squamous cell carcinoma of the larynx s/p tracheotomy for upper airway obstruction; s/p DL and control of hemorrhage from tumor  - Palliative care consult 8/24.   - Keira is refusing surgery. It is in her best interest to have a care conference to discuss next steps including code status. Plan to have care conference on Monday with as many of her children present as possible.   - 6-0 cuffed Shiley in place, cuff inflated  - Please keep trach straps 1-2 finger breadth tightness to prevent damage to tracheal wall  - Holding Plavix and ASA given Hgb drop and bleeding; appreciate recs from neurology and vascular surgery      Respiratory:  - Hx of COPD  - Saturating well with HTD  - HTD, supplemental O2 PRN to keep sats >92%  - PTA brovana, pulmicort, atrovent, xopenex    CV/heme:  - Hemodynamically stable- P and BP wnl  - History of PVD s/p stent placement in 2017; vascular consult, appreciate recs   - CTA negative for tracheo-inominate fistula   - Ok to hold plavix in setting of active bleeding  - Hx of HLD- continue PTA statin  - PTA amlodipine, atorvastatin, losartan  - PTA ASA held this AM given hemoglobin drop.   - Hgb 8.2<--9.4<--10.1<--9.1    FEN/GI:  - NPO  - Bolus TFs - tolerated 4/4 cans yesterday  - BMP wnl  - Ferrous sulfate TID; folate every day; lactobacillus; multivitamins  - Bowel regimen: docusate BID + senna BID + miralax every day    :  - Voiding independently; incontinent of urine    Endo:  - No acute issues    ID:  - Afebrile  - No s/s localizing infection; will continue to monitor    PPX:  - SCDs  - IS    Dispo: 6A for clinical monitoring    Consults:  - Palliative care  - WOC  - Nutrition  - Internal Medicine has signed off given patient's stable course at this time- will reconsult if  patient's condition changes      -- Patient and above plan to be discussed with staff, Dr. Win Elliott MD  Otolaryngology- Head and Neck Surgery, PGY-3  Please contact ENT with questions by dialing * [phone] and entering job code [phone] when prompted.

## 2018-08-25 NOTE — PROGRESS NOTES
OtoHNS Brief Note  8/24/2018    I was called regarding Ms. Collins spitting up large blood clots from her mouth. She was having some clots and bright red blood being suctioned from her trach, but the majority was coming from her mouth. When I examined her, she had very minimal active blood secretions from her trach, though she did spit up a large handfull sized clot. I offered to perform a flexible fiberoptic laryngoscopic examination, but she declined.    She appears to continue to bleed from her laryngeal carcinoma, which has been a consistent issue throughout her hospital stay. Would recommend epinephrine nebs q6hr to aid in vasoconstriction of the uclerative/bleeding areas of the tumor. Would also add afrin swish/gargles to aid in this as well.    Please contact ENT with questions/concerns    Marcela Gomez MD  Otolaryngology PGY3

## 2018-08-25 NOTE — PLAN OF CARE
Problem: Airway, Artificial (Adult)  Goal: Signs and Symptoms of Listed Potential Problems Will be Absent, Minimized or Managed (Airway, Artificial)  Signs and symptoms of listed potential problems will be absent, minimized or managed by discharge/transition of care (reference Airway, Artificial (Adult) CPG).   Status: POD #9 direct laryngoscopy and oropharyngeal hemorrhage.   VS: Stable; 21% HTD with sats in high 90 s; tachy at times                                     Neuros: withdrawn (pt wants to go home), 4/5 throughout  GI: G -tube with 4 cans bolus TF per day; 4 cans received today  : Incontinent of B&B ?  IV: SL  Activity: Up with 2 and lift; refused chair this shift. T&R Q2 last repo at 2300  Pain: Denies  Respiratory/Trach: #6 shiley trach; cuff inflated. Suctioned x2, bloody/bright red thick secretions,   Pt coughing out clots thru trach and mouth; large clots (golf-baseball size) ENT paged and assessed pt at bedside, no new orders, inner cannula changed x2 this shift. All emergency equipment at bedside and travel bag in room.    Skin: Pressure sore to coccyx; mepilex in place  Social: Withdrawn; palliative saw pt/daughter (Angely) today and will have care conference on Monday.   Plan of care: continue to monitor and follow current POC.         2300 Addendum: MD assessed patient and recommended afrin gargle (requested from central pharmacy) and epi neb (thru mouth) scheduled for AM.

## 2018-08-25 NOTE — PLAN OF CARE
Problem: Patient Care Overview  Goal: Plan of Care/Patient Progress Review  Outcome: Declining  Status: POD #10 direct laryngoscopy and oropharyngeal hemorrhage. PMH: [id number] squamous cell carcinoma of the larynx s/p trach for upper airway obstruction (07/17)  VS: Stable; 21% HTD with sats in high 90 s; tachycardia at times    Neuros: Withdrawn & frustrated at times; strength 4/5 throughout  GI: orders for G Tube with 4 cans bolus TF per day  : Incontinent of B&B. Medium brown soft BM this AM.?  IV: SL'd  Activity: Up with 2 & lift; T&R Q2hr  Pain: Denies  Respiratory/Trach: #6 shiley trach; cuff inflated. Suctioned x2, bloody red thick secretions. Inner cannula changed x 5. At times, pt coughs out clots thru trach & mouth; ENT was made aware last evening when pt coughed out large clots (golf-baseball size). MD placed orders for Afrin spray (gargle) & RacEpinephrine neb solution thru mouth (scheduled for AM) to aid in hemostasis. Inner cannula changed x3 this shift. All emergency equipment at bedside & travel bag in room.   Skin: Pressure sore to coccyx; mepilex in place    Plan of care: Care conference on Monday. Continue to monitor & follow current POC.

## 2018-08-26 NOTE — PLAN OF CARE
Problem: Airway, Artificial (Adult)  Goal: Signs and Symptoms of Listed Potential Problems Will be Absent, Minimized or Managed (Airway, Artificial)  Signs and symptoms of listed potential problems will be absent, minimized or managed by discharge/transition of care (reference Airway, Artificial (Adult) CPG).   Status: POD #10 direct laryngoscopy and oropharyngeal hemorrhage.   VS: Stable; 21% HTD with sats in high 90 s; tachy at times 100-110's                                    Neuros: withdrawn, 4/5 throughout  GI: G -tube with 4 cans bolus TF per day; 4 cans received today  : Incontinent of B&B; black stool this shift; MD aware. ?  IV: PIV SL x2   Activity: Up with 2 and lift; up in chair x1 this shift. T&R Q2.  Pain: Denies  Respiratory/Trach: #6 shiley trach; cuff inflated. Suctioned x1, bloody/bright red thick secretions,   Pt coughing out clots thru trach and mouth; moderate blood clots (golfball sized), inner cannula changed x3 this shift, PRN afrin given x1. All emergency equipment at bedside and travel bag in room.    Skin: Pressure sore to coccyx; follow WOC orders  Social: Withdrawn; palliative saw pt's daughter (Angely) yesterday and will have care conference on Monday.   Plan of care: Continue to monitor and follow current POC.      2330: RT paged for PRN albuterol neb as patient wheezing, subsided after coughed out large blood clot.

## 2018-08-26 NOTE — PLAN OF CARE
Problem: Patient Care Overview  Goal: Plan of Care/Patient Progress Review  Outcome: Declining  Status: POD #11 direct laryngoscopy and oropharyngeal hemorrhage. PMH: [id number] squamous cell carcinoma of the larynx s/p trach for upper airway obstruction (07/17)  VS: Tachycardia (100-110), tachyapnea (RR 30 - 44), on 21% HTD with O2 sats in high 90 s   Neuros: withdrawn, 4/5 throughout. Sepsis protocol triggered at the beginning of shift. Lactic acid came back 1.2 mmol/L. MD Burt saw pt as he was on the floor.  GI: G -tube with 4 cans bolus TF per day; 4 cans received today  : Incontinent of B&B; medium black stool, MD was made aware of this since similar BM yesterday. ?  IV: PIV SL'd   Activity: Up w/ 2 and lift; up in chair x1 this shift. T&R Q2h.  Pain: Denies  Respiratory/Trach: #6 shiley trach; cuff inflated. Suctioned x1, MD Burt suctioned once as well, bloody/bright red thick secretions, Pt coughing out clots thru trach and some from mouth; moderate blood clots, inner cannula changed x3 this shift. All emergency equipment at bedside and travel bag in room. MD ordered X-ray of chest, pls refer to results.  Skin: Pressure sore to coccyx; follow WOC orders  Social: Withdrawn; Care conference planned on Monday after conversation w/ pt's daughter.   Plan of care: Continue to monitor and follow current POC.

## 2018-08-26 NOTE — PROGRESS NOTES
"Otolaryngology Progress Note  August 26, 2018      S: No acute events overnight. Suctioned twice overnight, continues to cough out clots from trach and mouth intermittently. Sepsis protocol triggered due to tachypnea, lactate normal, CXR ordered, afebrile. Denies shortness of breath, no chest pain.     O: /65 (BP Location: Left arm)  Pulse 106  Temp 97.7  F (36.5  C) (Axillary)  Resp 22  Ht 1.702 m (5' 7\")  Wt 55 kg (121 lb 4.1 oz)  SpO2 97%  BMI 18.99 kg/m2   General: resting comfortably in bed; not in acute distress; tired appearing   HEENT: 6-0 cuffed Shiley in place with straps around neck, no blood from trach during exam this morning; cuff inflated; no blood from mouth during exam this morning.   Pulmonary: breathing comfortably on HTD, no increased work of breathing      Intake/Output Summary (Last 24 hours) at 08/17/18 0917  Last data filed at 08/16/18 1646   Gross per 24 hour   Intake             2050 ml   Output              100 ml   Net             1950 ml     LABS:  ROUTINE IP LABS (Last four results)  BMP    Recent Labs  Lab 08/22/18  0658 08/21/18  0658 08/20/18  0810    137 138   POTASSIUM 4.3 4.2 4.1   CHLORIDE 106 105 105   ESTEFANI 9.2 9.0 9.2   CO2 23 22 24   BUN 32* 31* 20   CR 0.55 0.60 0.51*   * 114* 107*     CBC    Recent Labs  Lab 08/26/18  0642 08/25/18  0922 08/24/18  0818 08/23/18  0713   WBC 15.6* 16.9* 13.0* 11.6*   RBC 2.50* 2.78* 3.25* 3.48*   HGB 7.2* 8.2* 9.4* 10.1*   HCT 23.3* 26.1* 30.6* 32.2*   MCV 93 94 94 93   MCH 28.8 29.5 28.9 29.0   MCHC 30.9* 31.4* 30.7* 31.4*   RDW 15.1* 14.7 14.7 14.8    272 269 278        A/P: Keira Collins is a 72 year old female with a past medical history of COPD, HTN, HLD, PVD, recent CVA during last hospitalization, and [id number] squamous cell carcinoma of the larynx s/p tracheotomy for upper airway obstruction 7/17/18 who admitted with bleeding from the laryngeal tumor, now POD#10  s/p DL and control of tumor " hemorrhage.    Neuro:  - Pain control: tylenol, oxycodone, and dilaudid PRN  - PTA ritalin BID  - Melatonin at bedtime prn  - Hx of CVA 7/2018- discussed with neuro, holding antiplatelets given ongoing bleeding    HEENT:  - [id number] squamous cell carcinoma of the larynx s/p tracheotomy for upper airway obstruction; s/p DL and control of hemorrhage from tumor  - Palliative care consult 8/24.   - Keira does not want surgery or procedural interventions to control bleeding. Plan to have care conference on Monday with as many of her children present as possible to discuss goals of care including code status.  - 6-0 cuffed Shiley in place, cuff inflated  - Please keep trach straps 1-2 finger breadth tightness to prevent damage to tracheal wall  - Holding Plavix and ASA given dropping Hgb; appreciate recs from neurology and vascular surgery      Respiratory:  - Hx of COPD  - Saturating well with HTD  - HTD, supplemental O2 PRN to keep sats >92%  - PTA brovana, pulmicort, atrovent, xopenex  - Aspiration pneumonia vs pneumonitis   - Tachypneic overnight; CXR with right perihilar opacity c/w infiltrate vs atelectasis vs aspiration   - Afebrile, WBC 15.6<--16.9   - Will start moxifloxacin to cover for possible aspiration pneumonia given her allergies to penicillins and cephalosporins    CV/heme:  - Hemodynamically stable- P and BP wnl  - History of PVD s/p stent placement in 2017; vascular consult, appreciate recs   - CTA negative for tracheo-inominate fistula   - holding DAPT in setting of ongoing bleeding  - Hx of HLD- continue PTA statin  - PTA amlodipine, atorvastatin, losartan  - PTA ASA held this AM given hemoglobin drop.   - Hgb 7.2<--8.2<--9.4<--10.1<--9.1   - transfuse 1u pRBCs   - repeat Hgb this PM   - will consider PLT transfusion as she only recently stopped DAPT and PLTs worked well for her intraoperatively    FEN/GI:  - NPO  - Bolus TFs - tolerated 4/4 cans yesterday  - BMP wnl  - Ferrous sulfate TID; folate  every day; lactobacillus; multivitamins  - Bowel regimen: docusate BID + senna BID + miralax every day    :  - Voiding independently; incontinent of urine    Endo:  - No acute issues    ID:  - Afebrile, tachypneic but otherwise VSS  - Continue to monitor for other s/s of infection  - Antibiosis as above    PPX:  - SCDs  - IS    Dispo: 6A for clinical monitoring; Care conference on Monday    Consults:  - Palliative care  - WOC  - Nutrition  - Internal Medicine has signed off given patient's stable course at this time- will reconsult if patient's condition changes      -- Patient and above plan to be discussed with staff, Dr. Win Thomson MD  Otolaryngology- Head and Neck Surgery, PGY-1  Please contact ENT with questions by dialing [phone] and entering job code [phone] when prompted.

## 2018-08-26 NOTE — PLAN OF CARE
Problem: Patient Care Overview  Goal: Plan of Care/Patient Progress Review  Outcome: Improving  Status: POD#11 of direct laryngoscopy and control of hemorrhage. Recent dx carcinoma of the larynx and tracheostomy placement.  VS: HR tachy 95-112bpm. Tachypnea 22-32 respirations/min. BP WNL. 21%FiO2 by trach dome at % (Shiley #6 cuff inflated). Afebrile.   Neuros: Alert and oriented. Appears withdrawn and frustrated, shakes head when doing cares. Generalized weakness. Strength is equal bilaterally. Face symmetric at rest. Spontaneous and purposeful movement of all extremities. EOM intact. Pupils PERRLA.   GI: NPO. PEG. Pt received 2/4 cans for bolus feed. Tolerated well. Had one large bm. Loose, dark brown. Incontinent of bowel and bladder.   : Bladder scanned for 214 mL this afternoon.   IV: PIV SL  Activity: Pt was up to recliner for total of 2 hrs with repositioning. Repo q2h.   Pain: Denied pain.   Respiratory/Trach: Shiley #6 cuffed (inflated). 21%fiO2 with oxygen saturation at %. Max RR 32. Inner cannula changed x4 this shift. Suctioned twice (pt unable or refused to cough). Secretions were blood-streaked but largely white-yellow sputum (moderate amt, moderate consistency). No clots passed. Lung sounds clear in upper lobes. Diminished at based. Scheduled nebs. No PRN albuterol, no prn afarin given.   Skin: Otic solution applied to area under trach plate. Dried blood around plate. Coccyx has pressure ulcer, left open to air, pink with small spots of abrasion.      Plan of care: Care conference on Monday with family to discuss goals of care. Levaquin started for possible aspiration pneumonia on chest xray. One unit of blood given for Hbg of 7.2. Redraw scheduled for 1600.

## 2018-08-26 NOTE — PLAN OF CARE
Problem: Patient Care Overview  Goal: Plan of Care/Patient Progress Review  Charge and MD notified that patient wanted scissors prior to writer coming on shift, scissors taken away as patient did not specify why she wanted them except pointing to herself. MD ordered psych consult and writer asked patient if she had any intent to harm herself which she declined shaking her head no. Will continue to monitor.

## 2018-08-27 NOTE — PROGRESS NOTES
[facility], [place]   Palliative Care Daily Progress Note          Recommendations, Patient/Family Counseling & Coordination   Plan to attend care conference this afternoon at 1430. Met with family last admission. Home resources need to be increased to meet patient needs.   1) Pt wishes no further cancer diagnostic or treatment interventions & wishes to focus on comfort & QOL at home. Question if resources exist to support care needs even with hospice. Plan family meeting again.   2)  No functional rehab per patient  3)  Pt remains strongly opposed to TCU or SNF placement.  4)  Continued tube feeds  5)  Management of tumor related bloody trach secretions for comfort only- no further interventions  6)  Plan to again review appropriateness of DNR/DNI with POC/wishes above but needs further discussion with pt/family before change in code status.  7)  Emotional support for pt & family focusing on recent CA dx, prognostic implications & debilitating effects, especially loss of voice for pt.  8)  Care conference with 3 sons, dtr, & patient this afternoon to review again her POC with intent for care at home if at all possible.  Hospice liaison to be present for informational input.  Palliative team participated with ENT team.   2 of 3 sons present in person (Dar/), Alex present by phone.  Daughter Angely also present.  ENT staff led discussion regarding current clinical status.  Patient remains emphatic that discharge home is her strong desire.  To safely facilitate that request family needs to;  -get additional training in PEG tube and tracheostomy management through [facility].  -Establish hospice services and durable medical equipment needs prior to discharge  -Have interim plan in place for active bleeding should recur  -Establish CODE STATUS.  Present recommendations is DNR, patient already has tracheostomy.  -We will consider inpatient comfort measures and family request.    CARE  CONFERENCE: IN 1435... OUT 1520. Another 20 minutes spent earlier in exam without family present- total time 65 minutes       POLST - not completed- remains full code     Thank you for the opportunity to continue to participate in the care of this patient and family.  Please feel free to contact on-call palliative provider with any emergent needs.  We can be reached via team pager [phone] (answered 8-4:30 Monday-Friday); after-hours answering service ([phone])    REIN Duong APRN NP SHAYNE  Nurse Practitioner- Lead Advanced Practice Provider  [facility] Palliative Medicine Consult Service   [phone]        Assessment      1) Diagnoses & symptoms:        Bleeding from laryngeal tumor, Post op now for control of tumor hemorrhage  # [id number] Laryngeal squamous cell carcinoma s/p tracheostomy 7/17 for upper airway obstruction  Recent L pontine CVA (7/19).   Severe protein-calorie malnutrition.  COPD          Interval History:   Patient seen and examined, see above notes for care conference attended today.  She is quite adamant to discharge home is the only option she is willing to consider.  Family needs to continue training regarding her PEG tube and tracheostomy.  As the day went on she became more short of breath.  She maintained oxygen saturations however respiratory rate climbed into the mid 30s.  Anticipate need for CPAP or BiPAP as she fatigues.  Family reviewing her CODE STATUS and given a recommendation for DNR in the setting of her underlying advanced laryngeal cancer and likely poor outcome of resuscitation.  Patient and family in agreement that hospice discharge plan is appropriate.           Review of Systems:   Palliative Symptom Review (0=no symptom/no concern, 1=mild, 2=moderate, 3=severe):      Pain: 0-1      Fatigue: 1      Nausea: 1      Constipation: 0      Diarrhea: 1      Depressive Symptoms: 2      Anxiety: 1-2      Drowsiness: 0      Poor Appetite: 1-2     "  Shortness of Breath: 2-3      Insomnia: 0                 Medications:   I have reviewed this patient's medication profile and medications given in past 24 hours.       Physical exam: Vitals: /63 (BP Location: Left arm)  Pulse 106  Temp 96.9  F (36.1  C) (Axillary)  Resp 22  Ht 1.702 m (5' 7\")  Wt 53.6 kg (118 lb 2.7 oz)  SpO2 94%  BMI 18.51 kg/m2  BMI= Body mass index is 18.51 kg/(m^2).     General appearance: Cachectic frail woman sitting up in bedside chair family present.  She is aphonic secondary to tracheostomy but able to make needs known with hand gestures and mouthing words.  HEENT: Edentulous, mucous membranes moist.  Excessive salivation noted.  No oral bleeding.  EOMI.  Neck: Tracheostomy in place no palpable adenopathy.  Lungs: Diminished breath sounds bilaterally with coarse rhonchi.  No wheeze.  Heart: S1-S2, no murmur, RRR, resting rate tachycardic at 100 bpm.  Abdomen: Brief exam nontender active bowel sounds no masses.  Extremities no peripheral edema identified.  Cool to touch.   strength symmetric.  Diffuse ecchymosis.  Neuro: Appears fatigued from duration of illness, admits to feeling depressed about current status.  Quite animated when it comes to discussion of her disposition to anyplace besides home.             Data Reviewed:   ROUTINE  LABS (Last four results)  BMP  Recent Labs  Lab 08/22/18  0658 08/21/18  0658    137   POTASSIUM 4.3 4.2   CHLORIDE 106 105   ESTEFANI 9.2 9.0   CO2 23 22   BUN 32* 31*   CR 0.55 0.60   * 114*     CBC  Recent Labs  Lab 08/27/18  0733 08/26/18  1557 08/26/18  0642 08/25/18  0922 08/24/18  0818   WBC 15.2*  --  15.6* 16.9* 13.0*   RBC 3.04*  --  2.50* 2.78* 3.25*   HGB 8.8* 9.0* 7.2* 8.2* 9.4*   HCT 27.8*  --  23.3* 26.1* 30.6*   MCV 91  --  93 94 94   MCH 28.9  --  28.8 29.5 28.9   MCHC 31.7  --  30.9* 31.4* 30.7*   RDW 16.9*  --  15.1* 14.7 14.7     --  250 272 269     INRNo lab results found in last 7 days.          "

## 2018-08-27 NOTE — PROGRESS NOTES
"Social Work Services Progress Note    Hospital Day: 12  Date of Initial Social Work Evaluation:  7/30/18 (completed during previous hospitalization)  Collaborated with:  Care Conference Held.   Attendees included:  Patient, daughter (Angely), sons (BALBINA and Dar), son (Alex) by phone, , Dr. Saunders (ENT), ENT P.A. (Samantha Burnham), ENT residents, Palliative (Raymundo Vega), Psych (Dr. Waters), 6A  RN Care Coordinator (Candy Olivares) and SW (Bonita Louis)    Data:  Pt was admitted to [facility] from [facility].  Pt was placed at [facility] for a rehab stay and bed is being held.  A care conference was scheduled today to discuss pt's goals of care.  Per Dr. 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  Pt has a trach.  Pt's tumor is bleeding and causing blocking of the airway.  Pt is on blood thinners due to risk of CVA and blood clotting off.  Pt has spots on her lungs which may or may not be cancer. MD is unable to prove whether or not the spots on the lungs are cancer as they have not been able to complete a biopsy.  Treatment of the voice box would include surgery and removal of the voice box.  Pt's son (Alex) inquired as to wether or not pt would be appropriate for hospice and MD response was \"yes.\"  Optimizing pt's quality of life no matter how long pt's life is was stressed and this could be achieved through hospice.  Dr. Saunders addressed pt's increasing difficulty breathing and indicated that pt could be place on bipap or cpap.   Per discussion, inhalers, or medication for air hunger would also be an option rather than cpap or bipap. Pt spoke of her desire to return home on hospice and pt's adult children indicated that they could arrange their schedules to provide 24 hour care if they are trained and confident in managing pt's trach, suctioning and tube feeding.  If pt discharges to home on hospice, the blood thinners would be discontinued.  Pt's code status was discussed however no decision made " "in regards to a change from the current \"full code\" status.    Intervention:  SW attended family conference.  SW phoned [facility] Liaison, Maddy Lira, and asked if she could meet with pt and family on 8/28/18 at 3pm  (family indicates that they will be present at that time) to complete a hospice informational.      Assessment:  Pt declines surgery.  Pt indicates that she does not want to remain in the hospital and does not want to be re-hospitalized.  Discharge to home is important to pt.  Pt would like to return to home on home hospice.  Pt indicated that she did not want to be placed on bipap or cpap.  Pt states that she would not want to be placed on a vent.  Pt indicates that she wants to die naturally and not prolong her life.      Plan:    Anticipated Disposition: Home with hospice    Barriers to d/c plan:  Tube feeding, trach and suctioning training needs to be completed with family.      Follow Up:   Pt's family will need PLC and/or nursing training to manage pt's trach, suctioning and tube feeding.   [facility] [address] phone[facility] [phone] [facility]Dar's [facility]) when hospice services are in place and training is complete.  SW will continue to follow.    CHARLES Mosley  Social Work, 6A  Phone:  [phone]  Pager:  [phone]  8/28/2018          "

## 2018-08-27 NOTE — CONSULTS
Her chart was reviewed and she was interviewed briefly.   Indicates that she is depressed, may wish she were dead, denies intent to harm herself. No visual hallucinations.   I see that there is a care conference at 2:30 pm; I will attend this and complete consult after this    page me at [phone] as needed   ADDENDUM:   I did attend part of the care conference; she and the family want to proceed with hospice care at home of one of her children.   Full not dictated   No psychiatric intervention needed

## 2018-08-27 NOTE — PROGRESS NOTES
Late entry missed from 18    [facility] Nurse Inpatient Wound Assessment   Reason for consultation: Evaluate and treat right buttock wound      Assessment  Right buttock wound due to friction vs scratching  Status: healing     Treatment Plan  Right buttock wound: Daily and with episodes of incontinence: Wash buttocks and perineum with Nora Cleanse and Protect and a soft cloth. Apply a thin layer of Criticaide Paste to perineum and  cleft and thin layer over the excoriated area on right buttock. To remove Criticaide Paste down to skin, use baby oil and a soft cloth and gently wipe away.     Orders Written     WO Nurse follow-up plan:weekly  Nursing to notify the Provider(s) and re-consult the WO Nurse if wound(s) deteriorates or new skin concern.     Patient History  According to provider note(s):   Keira Collins is a 72 year old female with a past medical history of COPD, HTN, HLD, PVD, recent CVA during last hospitalization, and [id number] squamous cell carcinoma of the larynx s/p tracheotomy for upper airway obstruction 18 who admitted with bleeding from the laryngeal tumor, now POD#7 s/p DL and control of tumor hemorrhage.     Objective Data  Containment of urine/stool: Diaper and Incontinence Protocol     Active Diet Order     Active Diet Order      NPO for Medical/Clinical Reasons Except for: No Exceptions     Output:   I/O last 3 completed shifts:  In: 1730 [NG/GT:480]  Out: -      Risk Assessment:   Sensory Perception: 3-->slightly limited  Moisture: 3-->occasionally moist  Activity: 3-->walks occasionally  Mobility: 3-->slightly limited  Nutrition: 3-->adequate  Friction and Shear: 3-->no apparent problem  Isacc Score: 18      Labs:   Recent Labs  Lab 18  0713   18  1346   HGB 10.1*  < > 7.5*   INR  --   --  1.11   WBC 11.6*  < >  --    < > = values in this interval not displayed.     Physical Exam  Skin inspection: focused buttocks and perineum     Wound Location:  Right buttock  Date of  last photo: 8/17/18          Wound History: Present on admission. Patient has very long fingernails and wounds appear to be from scratching. Patient also incontinent of stool which may be contributing to the breakdown.  Measurements (length x width x depth, in cm): scattered erosions, largest 0.2 cm round, on the right buttock  Wound Base: epidermal covered today  Palpation of the wound bed: normal   Periwound skin: intact  Color: pink  Temperature: normal   Drainage:, none  Description of drainage: none  Odor: none  Pain: denies      Interventions  Current support surface: Standard  Atmos Air mattress  Current off-loading measures: Pillows under calves  Visual inspection of wound(s) completed  Wound Care: done per plan of care  Supplies: floor stock  Education provided: importance of repositioning  Discussed plan of care with Patient and Nurse

## 2018-08-27 NOTE — CONSULTS
Consult Date:  08/27/2018      REQUESTING SOURCE:  ENT team.      IDENTIFYING DATA:  A 72-year-old woman seen today for psychiatric consultation due to concern by the primary team that she will harm herself.      HISTORY OF PRESENT ILLNESS:  Ms. Collins had undergone a tracheotomy for upper airway obstruction mid-July for squamous cell carcinoma of the larynx.  She had been readmitted on 08/16/2018 due to bleeding from her tracheostomy site.  Prior to this, she had been on the transitional care unit whereas before her hospitalization in 07/2018 she has been living independently.  Yesterday, she had indicated to nursing staff (see note from 3:55 on 08/26/2018) that she wanted a scissors and when asked what she would do with it, she pointed to herself.  This was interpreted as her wishing to harm herself.  I interviewed her today and, of course, it is difficult since she is unable to speak through the tracheostomy.  She indicated that she depressed, had some passive death wish, but no active suicidal thoughts at this point.  She also indicated that she does not feel like harming herself anymore.  She also indicated that she was sleeping and denied any visual hallucinations.      Care conference was done this afternoon to include the primary team, social work and 3 of her children.  It was decided that she would stay with 1 of her children under hospice care.  She strongly desires that she not go to an institution and she does not want aggressive treatment of her medical problems.      PAST PSYCHIATRIC AND CHEMICAL USE HISTORY:  As far as I can ascertain, she has not been on any psychiatric medications previously.  In terms of substance use, she is a long-term smoker and had continued to smoke until the most recent hospitalization.  Also, she may or may not have been a heavy drinker of alcohol.      PAST MEDICAL HISTORY:  COPD, hypertension, squamous cell carcinoma of the larynx, peripheral vascular disease and a  pulmonary nodule suspicious for cancer.      PAST SURGICAL HISTORY:  Laryngoscopy with tracheostomy, ORIF of hip fracture and left femoral endarterectomy last year.      ALLERGIES:  PENICILLINS AND CEPHALOSPORINS.      REVIEW OF SYSTEMS:  A 10-point review of systems was unable to complete today due to the tracheostomy.      CURRENT PSYCHIATRIC MEDICATIONS:  None.        FAMILY HISTORY:  Unable to obtain.      SOCIAL HISTORY:  She was living independently in [place].  She has been living in a transitional care unit since her hospitalization in 07/2018.  Her intention at this point is to return to home care with assistance of her children.  She has 4 children that I am aware of.        MENTAL STATUS EXAMINATION:  She was sitting up in bed and is noted to be alert and cooperative.  She does have a tracheostomy in place and is unable to speak, but she does communicate by nodding the head.  She was reasonably well groomed.  She endorsed depression, a passive death wish, but no active suicidal thoughts and no visual hallucinations.  She did appear to be oriented and alert, but this cannot be formally assessed in terms of her memory and concentration, etc.  Insight and judgment fair.      VITAL SIGNS:  Blood pressure 133/63, pulse 91, temperature 96.9.      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      IMPRESSION:  She is appropriate reaction to her medical condition, which has a very poor prognosis.  She has appropriate grief, but I am not convinced that she presents an ongoing danger to herself.  Also, her cognitive capacities appear intact, so I think she does have suitable decision-making.  The primary team and palliative care have one an excellent job of articulating to the family her options in terms of ongoing care.  At this point, I do not think she would benefit from antidepressants or other psychiatric interventions.      RECOMMENDATIONS:  Care per primary team.  Reconsult  Psychiatry as needed.         FELIX BOYCE MD             D: 2018   T: 2018   MT: RADHA      Name:     SHAHRIAR GARCÍA   MRN:      [id number]        Account:       [id number]   :      1945           Consult Date:  2018      Document: [id number]

## 2018-08-27 NOTE — PLAN OF CARE
"Problem: Patient Care Overview  Goal: Plan of Care/Patient Progress Review  Outcome: No Change  Status: POD #11 direct laryngoscopy and oropharyngeal hemorrhage. PMH: A9L2bH1 squamous cell carcinoma of the larynx s/p trach for upper airway obstruction (07/17)   VS: Tachycardia at times, tachyapnea, on 21% HTD with O2 sats in mid 90s. Sepsis protocol was triggered tonight as well. Lactic acid came back 1.2 mmol/L. No significant changes in VS except tachyapnea   Neuros: Withdrawn, 4/5 throughout.   GI: G -tube with 4 cans bolus TF per day  : Incontinent of B&B; small brown-black stool. ?  IV: R PIV SL'd   Activity: Up w/ 2 and lift; T&R Q2h.  Pain: Denies   Respiratory/Trach: #6 shiley trach; cuff inflated. Bloody/bright red thick secretions, small clots thru trach at times. No bloody secretions from mouth this shift. Inner cannula changed x 4, suctioned \"lightly\" x2 as pt was fatigued & unable to cough w/ encouragement. Lung sounds coarse throughout. All emergency equipment at bedside and travel bag in room.   Skin: Pressure sore to coccyx; follow WOC orders  Social: Withdrawn. No suicidal ideation this shift.   Plan of care: Care conference planned for today at 2 PM. Continue to monitor and follow current POC.   "

## 2018-08-27 NOTE — PROGRESS NOTES
Care Coordinator Progress Note      Care Conference  Location:  Pt's room on 6A    Present:  Pt; sons, Dar & RJ; daughter, Angley and son, Alex be speakerphone; Dr Walden; CARLITA Toribio; Dr Burt; Raymundo Vega NP Palliative; KAMRAN Mosley and Candy Olivares, FERNANDOCC.     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 The tumor has been bleeding. Surgery 1 week ago to control the bleeding. She has been on blood thinners because of stroke history. Keira also has spots on her lung, possibly cancer; these could also bleed. Required a blood transfusion this last weekend (today is Monday). It was considered having Neurosurgery embolize/clot off the blood supply, but the question is what to embolize.  The treatment for the voice box cancer is a laryngectomy. Pt has repeatedly said no, she doesn't want that surgery.   Pt is having a harder time breathing. Hx of COPD. Dr Walden said bipap could be tried to give her some rest.   Dr Walden said pt would be eligible for hospice. When asked about intubation pt said no and waved her arms in a negative manner. Family said they don't want her to suffer.    Pt repeatedly said she wanted to go home. Other option would be a nursing home with hospice care or a residential hospice. Pt said no to those options and continued to say she wanted to go home.   Discussed with family pt's care needs, 24 hour care; family would need to learn trach & g-tube cares & tube feeding administration. Explained hospice does not provide extra help. Family will need to provide cares. Family initially hesitant how they would work it out. After discussion they agreed to discharge home and provide pt's cares.   Dr Walden explained family can expect pt to continue bleeding at home.     Pt and family agreeable to discharge home with hospice. Family will provide cares. Family willing to learn trach/tube feeding cares.     Assessment  Pt wants to go home; she doesn't want surgery or any  procedures; does not want intubation. Pt did not give a definitive answer when asked about DNR/CPR. Pt was alert and attentive to conversation. She was able to make her wishes known.      Plan  --Discharge home on hospice. Family to provide cares.  --PLC for trach & g-tube teaching with children.   --Hospice to meet with pt & family prior to discharge and arrange for home equipment. Pt will need a hospital bed; skip lift; trach supplies, suction & humidity and tube feeding supplies.   --May try Bipap or CPAP while in the hospital.    --Continue to clarify resuscitation wishes with pt. She has expressed she doesn't want intubation, but did not state clearly her wishes about CPR/DNR.     Note:  Future plans to continue tube feeding were not discussed.         Candy Olivares RN Care Coordinator  Unit 6A, [facility]

## 2018-08-27 NOTE — PROGRESS NOTES
"Otolaryngology Progress Note  August 27, 2018      S: Patient had sepsis protocol triggered overnight for tachycardia and tachypnea. Lactate came back wnl. Patient continues to cough up bright red blood per trach. Patient had appropriate increase in Hgb s/p unit of pRBCs.    O: /63 (BP Location: Left arm)  Pulse 106  Temp 96.9  F (36.1  C) (Axillary)  Resp 22  Ht 1.702 m (5' 7\")  Wt 53.6 kg (118 lb 2.7 oz)  SpO2 92%  BMI 18.51 kg/m2   General: resting comfortably in bed; not in acute distress   HEENT: 6-0 cuffed Shiley in place with straps around neck; thick bright red blood per trach this morning; saline lavage performed for thick secretions   Pulmonary: moderately increased work of breathing      Intake/Output Summary (Last 24 hours) at 08/17/18 0917  Last data filed at 08/16/18 1646   Gross per 24 hour   Intake             2050 ml   Output              100 ml   Net             1950 ml     LABS:  ROUTINE IP LABS (Last four results)  BMP    Recent Labs  Lab 08/22/18  0658 08/21/18  0658    137   POTASSIUM 4.3 4.2   CHLORIDE 106 105   ESTEFANI 9.2 9.0   CO2 23 22   BUN 32* 31*   CR 0.55 0.60   * 114*     CBC    Recent Labs  Lab 08/27/18  0733 08/26/18  1557 08/26/18  0642 08/25/18  0922 08/24/18  0818   WBC 15.2*  --  15.6* 16.9* 13.0*   RBC 3.04*  --  2.50* 2.78* 3.25*   HGB 8.8* 9.0* 7.2* 8.2* 9.4*   HCT 27.8*  --  23.3* 26.1* 30.6*   MCV 91  --  93 94 94   MCH 28.9  --  28.8 29.5 28.9   MCHC 31.7  --  30.9* 31.4* 30.7*   RDW 16.9*  --  15.1* 14.7 14.7     --  250 272 269        A/P: Keira Collins is a 72 year old female with a past medical history of COPD, HTN, HLD, PVD, recent CVA during last hospitalization, and P1A6vP0 squamous cell carcinoma of the larynx s/p tracheotomy for upper airway obstruction 7/17/18 who admitted with bleeding from the laryngeal tumor, now POD#11  s/p DL and control of tumor hemorrhage.    Neuro:  - Pain control: tylenol, oxycodone, and dilaudid PRN  - PTA " ritalin BID  - Melatonin at bedtime prn  - Hx of CVA 7/2018- discussed with neuro, holding antiplatelets given ongoing bleeding    HEENT:  - [id number] squamous cell carcinoma of the larynx s/p tracheotomy for upper airway obstruction; s/p DL and control of hemorrhage from tumor  - Palliative care consult 8/24-   - Keira does not want surgery or procedural interventions to control bleeding. Plan to have care conference on Monday with as many of her children present as possible to discuss goals of care including code status.  - 6-0 cuffed Shiley in place, cuff inflated  - Please keep trach straps 1-2 finger breadth tightness to prevent damage to tracheal wall  - Holding Plavix and ASA given dropping Hgb; appreciate recs from neurology and vascular surgery      Respiratory:  - Hx of COPD  - Saturating well with HTD  - HTD, supplemental O2 PRN to keep sats >92%  - PTA brovana, pulmicort, atrovent, xopenex  - Aspiration pneumonia vs pneumonitis   - Tachypneic overnight; CXR on 08/26 with right perihilar opacity c/w infiltrate vs atelectasis vs aspiration   - Afebrile, WBC 15.2 <-- 15.6<--16.9   - Levaquin for possible aspiration pneumonia given her allergies to penicillins and cephalosporins    - Pt with QTc of 466 on previous EKG    - Consulting Medicine regarding their antibiotic recommendations    CV/heme:  - Hemodynamically stable- P and BP wnl  - History of PVD s/p stent placement in 2017; vascular consult, appreciate recs   - CTA negative for tracheo-inominate fistula   - Holding DAPT in setting of ongoing bleeding  - Hx of HLD- continue PTA statin  - PTA amlodipine, atorvastatin, losartan  - PTA ASA held this AM given hemoglobin drop.   - Hgb 8.8 <-- 7.2<--8.2<--9.4<--10.1<--9.1   - S/p 1u pRBCs   - Consider PLT transfusion as she only recently stopped DAPT and PLTs worked well for her intraoperatively    FEN/GI:  - NPO  - Continue bolus TFs  - Ferrous sulfate TID; folate every day; lactobacillus; multivitamins  -  Bowel regimen: docusate BID prn + senna BID prn + miralax qd prn    :  - Voiding independently; incontinent of urine    Endo:  - No acute issues    ID:  - Afebrile  - CXR on 08/26 with right perihilar opacity- currently treating with levaquin    PPX:  - SCDs  - IS    Dispo: 6A for clinical monitoring; Care conference today    Consults:  - Palliative care  - WOC  - Nutrition  - Internal Medicine has signed off given patient's stable course at this time- will reconsult if patient's condition changes      -- Patient and above plan discussed with staff, Dr. Win Lyons MD  Otolaryngology- Head and Neck Surgery, PGY-2  Pager: [phone]  Please contact ENT with questions by dialing [phone] and entering job code [phone] when prompted.

## 2018-08-27 NOTE — PLAN OF CARE
Problem: Airway, Artificial (Adult)  Goal: Signs and Symptoms of Listed Potential Problems Will be Absent, Minimized or Managed (Airway, Artificial)  Signs and symptoms of listed potential problems will be absent, minimized or managed by discharge/transition of care (reference Airway, Artificial (Adult) CPG).   Status: POD #10 direct laryngoscopy and oropharyngeal hemorrhage.   VS: Stable; 21% HTD with sats in high 90 s; tachy at times 100-110's                                    Neuros: withdrawn, 4/5 throughout  GI: G -tube with 4 cans bolus TF per day; 4 cans received today  : Incontinent of B&B; black-dark brown stool this shift; MD aware.   Labs: Hemoglobin redraw of 9.0 after 1 unit of blood received on days?  IV: PIV SL   Activity: Up with 2 and lift; up in chair x1 this shift. T&R Q2.  Pain: Denies  Respiratory/Trach: #6 shiley trach; cuff inflated. Suctioned x2, bloody/bright red thick secretions,   Pt attempts to cough out small clots thru trach at times and declines other times then needing suction, inner cannula changed x4 this shift. Lung sound coarse throughout; scheduled nebs given x2 this shift. All emergency equipment at bedside.     Skin: Pressure sore to coccyx; follow WOC orders. Skin under trach plate; reddened and blanchable.   Social: Withdrawn; no suicidal ideation this shift. Palliative saw pt's daughter (Angely) on Friday and will have care conference on Monday.   Plan of care: Continue to monitor and follow current POC.

## 2018-08-27 NOTE — PLAN OF CARE
Problem: Patient Care Overview  Goal: Plan of Care/Patient Progress Review  Outcome: Declining  POD #11 direct laryngoscopy and oropharyngeal hemorrhage. Tachypnic (20's-40's). Tachycardic (90's-110's). Sat's maintained in 90's t/o shift. No c/o pain. #6 Shiley in place; large amounts of lucinda bloody secretions; clots ranging from pea to golf ball size; suctioned numerous times; Hbg stable. PIV SL. PEG with bolus feedings; 3/4 feedings completed. Incontinent of urine x4. Coccyx with scratch wounds; cleansed with cr spray and barrier cream applied. Up with assist of 2 and lift; to chair for 3hrs this afternoon. This evening, visibly fatigued breathing; scheduled nebs given. Plans to transfer to  for temporary CPAP/BIPAP treatment; awaiting bed. Care conference with family and MD's; goal is DC to home on hospice. PLC placed for trach and feeding tube teaching. Will continue to monitor and update MD's accordingly.   Transfer to  at 1845

## 2018-08-27 NOTE — PLAN OF CARE
Problem: Patient Care Overview  Goal: Plan of Care/Patient Progress Review  OT/6A: Notified by care coordinator, pt is requesting to discharge to home with hospice care. Will complete therapy orders.    Occupational Therapy Discharge Summary    Reason for therapy discharge:    Patient/family request discontinuation of services.    Progress towards therapy goal(s). See goals on Care Plan in [facility] electronic health record for goal details.  Goals not met.  Barriers to achieving goals:   acute medical needs.    Therapy recommendation(s):    No further therapy is recommended.

## 2018-08-27 NOTE — PLAN OF CARE
Problem: Patient Care Overview  Goal: Plan of Care/Patient Progress Review  OT/6A: Care conference scheduled for today at 2 pm. Will await recommendations for continuation of therapy services based on goals of care.

## 2018-08-28 NOTE — PROGRESS NOTES
[facility]  Met with pt and family to discuss plans for hospice.  Pt to be discharged home potentially on Friday as long as the family feels comfortable with cares at home and has agreed to have [facility] follow with hospice services.   Did order equipment to be delivered to her home on Friday morning.  Medications will be filled at discharge pharmacy  Before she leaves and sent with her.  Did explain to the family that they should stay and watch the nurse on the floor do cares and suction as  Learning center is different then the actual cares on their mom.  They are going to PLC on Thursday at 3 pm for education on cares.   Family knows they need to feel comfortable with the cares before she will be released.   Pt family  has the 24 hour phone number for  Hospice for any questions or concerns.     Maddy Lira RN Liaison   [facility]

## 2018-08-28 NOTE — PROGRESS NOTES
Late entry missed from 18    [facility] Nurse Inpatient Wound Assessment   Reason for consultation: Evaluate and treat right buttock wound   New consult for trach site suspected pressure injury      Assessment  Right buttock wound due to friction vs scratching  Status: healing  A blanchable erythema under the trach plate   The site is at risk for pressure injury      Treatment Plan  Right buttock wound: Daily and with episodes of incontinence: Wash buttocks and perineum with Nora Cleanse and Protect and a soft cloth. Apply a thin layer of Criticaide Paste to perineum and evan cleft and thin layer over the excoriated area on right buttock. To remove Criticaide Paste down to skin, use baby oil and a soft cloth and gently wipe away.    Plan of care for trach site  Cleans per trach care policy  and place a fenestrated optifoam secure under the trach flange. To prevent pressure injury       Orders Written     WO Nurse follow-up plan:weekly  Nursing to notify the Provider(s) and re-consult the WO Nurse if wound(s) deteriorates or new skin concern.     Patient History  According to provider note(s):   Keira Collins is a 72 year old female with a past medical history of COPD, HTN, HLD, PVD, recent CVA during last hospitalization, and [id number] squamous cell carcinoma of the larynx s/p tracheotomy for upper airway obstruction 18 who admitted with bleeding from the laryngeal tumor, now POD#7 s/p DL and control of tumor hemorrhage.     Objective Data  Containment of urine/stool: Diaper and Incontinence Protocol     Active Diet Order     Active Diet Order      NPO for Medical/Clinical Reasons Except for: No Exceptions     Output:   I/O last 3 completed shifts:  In: 1730 [NG/GT:480]  Out: -      Risk Assessment:   Sensory Perception: 3-->slightly limited  Moisture: 3-->occasionally moist  Activity: 3-->walks occasionally  Mobility: 3-->slightly limited  Nutrition: 3-->adequate  Friction and Shear: 3-->no apparent  problem  Isacc Score: 18      Labs:   Recent Labs  Lab 08/23/18  0713   08/16/18  1346   HGB 10.1*  < > 7.5*   INR  --   --  1.11   WBC 11.6*  < >  --    < > = values in this interval not displayed.     Physical Exam  Skin inspection: focused buttocks and perineum     Wound Location:  Right buttock  Date of last photo: 8/17/18          Wound History: Present on admission. Patient has very long fingernails and wounds appear to be from scratching. Patient also incontinent of stool which may be contributing to the breakdown.  Measurements (length x width x depth, in cm): scattered erosions, largest 0.2 cm round, on the right buttock  Wound Base: epidermal covered today  Palpation of the wound bed: normal   Periwound skin: intact  Color: pink  Temperature: normal   Drainage:, none  Description of drainage: none  Odor: none  Pain: denies      Trach site blanchable erythema   Interventions  Current support surface: Standard  Atmos Air mattress  Current off-loading measures: Pillows under calves  Visual inspection of wound(s) completed  Wound Care: done per plan of care  Supplies: floor stock  Education provided: importance of repositioning  Discussed plan of care with Patient and Nurse

## 2018-08-28 NOTE — PROGRESS NOTES
Transfer  Transferred from: 6A  Via:bed  Reason for transfer:Pt appropriate for 6B- worsened patient condition  Family: Aware of transfer  Belongings: Received with pt  Chart: Received with pt  Medications: Meds received from old unit with pt  2 RN Skin Assessment Completed By: Maite Cohen  Report received from: Natice Schwab  Pt status: pt placed on monitoring, VSS, 21% trach dome, will continue to monitor.

## 2018-08-28 NOTE — PROGRESS NOTES
"Otolaryngology Progress Note  August 28, 2018      S: Family care conference yesterday afternoon. Decision made to move towards discharge to home with home hospice. Transferred to  due to tachypnea and possible need for CPAP/BiPAP. She did not need positive pressure overnight.    O: /68 (BP Location: Left arm)  Pulse 106  Temp 99  F (37.2  C) (Oral)  Resp 28  Ht 1.702 m (5' 7\")  Wt 56.2 kg (123 lb 14.4 oz)  SpO2 100%  BMI 19.41 kg/m2   General: resting comfortably in bed; sleeping   HEENT: 6-0 cuffed Shiley in place with straps around neck; scant dried blood secretions seen in inner cannula   Pulmonary: moderately increased work of breathing      Intake/Output Summary (Last 24 hours) at 08/28/18 0651  Last data filed at 08/28/18 0400   Gross per 24 hour   Intake             1600 ml   Output                0 ml   Net             1600 ml       LABS:  ROUTINE IP LABS (Last four results)  BMP    Recent Labs  Lab 08/27/18  1408 08/22/18  0658 08/21/18  0658    136 137   POTASSIUM 4.5 4.3 4.2   CHLORIDE 105 106 105   ESTEFANI 8.7 9.2 9.0   CO2 22 23 22   BUN 40* 32* 31*   CR 0.78 0.55 0.60   * 104* 114*     CBC    Recent Labs  Lab 08/28/18  0302 08/27/18  0733 08/26/18  1557 08/26/18  0642 08/25/18  0922   WBC 13.7* 15.2*  --  15.6* 16.9*   RBC 2.84* 3.04*  --  2.50* 2.78*   HGB 8.3* 8.8* 9.0* 7.2* 8.2*   HCT 26.5* 27.8*  --  23.3* 26.1*   MCV 93 91  --  93 94   MCH 29.2 28.9  --  28.8 29.5   MCHC 31.3* 31.7  --  30.9* 31.4*   RDW 16.5* 16.9*  --  15.1* 14.7    225  --  250 272        A/P: Keira Collins is a 72 year old female with a past medical history of COPD, HTN, HLD, PVD, recent CVA during last hospitalization, and [id number] squamous cell carcinoma of the larynx s/p tracheotomy for upper airway obstruction 7/17/18 who admitted with bleeding from the laryngeal tumor, now POD#12  s/p DL and control of tumor hemorrhage.    Neuro:  - Pain control: tylenol, oxycodone, and dilaudid PRN  - PTA " ritalin BID  - Melatonin at bedtime prn  - Hx of CVA 7/2018- discussed with neuro, holding antiplatelets given ongoing bleeding    HEENT:  - [id number] squamous cell carcinoma of the larynx s/p tracheotomy for upper airway obstruction; s/p DL and control of hemorrhage from tumor   - Per family care conference on 8/27: move towards home hospice. No further interventions for bleeding. Discussions regarding code status are ongoing. OK with CPAP/BiPAP on PRN basis.   - 6-0 cuffed Shiley in place, cuff inflated  - Please keep trach straps 1-2 finger breadth tightness to prevent damage to tracheal wall  - Holding Plavix and ASA given dropping Hgb; appreciate recs from neurology and vascular surgery  - Stop robinul - secretions are too thick      Respiratory:  - Hx of COPD  - Saturating well with HTD  - HTD, supplemental O2 PRN to keep sats >92%  - PTA brovana, pulmicort, atrovent, xopenex  - Aspiration pneumonia vs pneumonitis   - Tachypneic overnight; CXR on 08/26 with right perihilar opacity c/w infiltrate vs atelectasis vs aspiration   - Afebrile, WBC 13.7 <-- 15.2 <-- 15.6<--16.9   - Levaquin for possible aspiration pneumonia given her allergies to penicillins and cephalosporins    - Pt with QTc of 466 on previous EKG, 459 on repeat EKG 8/27; discussed with medicine and no concerns     CV/heme:  - Hemodynamically stable- P and BP wnl  - History of PVD s/p stent placement in 2017; vascular consult, appreciate recs   - CTA negative for tracheo-inominate fistula   - Holding DAPT in setting of ongoing bleeding  - Hx of HLD- continue PTA statin  - PTA amlodipine, atorvastatin, losartan  - PTA ASA held due to ongoing bleeding  - Hgb 8.8 <-- 7.2<--8.2<--9.4<--10.1<--9.1   - Consider PLT transfusion as she only recently stopped DAPT and PLTs worked well for her intraoperatively    FEN/GI:  - NPO  - Continue bolus TFs  - Ferrous sulfate TID; folate every day; lactobacillus; multivitamins  - Bowel regimen: docusate BID prn + senna  BID prn + miralax qd prn    :  - Voiding independently; incontinent of urine    Endo:  - No acute issues    ID:  - Afebrile  - CXR on 08/26 with right perihilar opacity- currently treating with levaquin    PPX:  - SCDs  - IS    Dispo:6B. Dispo pending PLC classes for children, arrangement of home supplies and home hospice coordination.    Consults:  - Palliative care  - WOC  - Nutrition  - Internal Medicine has signed off given patient's stable course at this time- will reconsult if patient's condition changes      -- Patient and above plan discussed with staff, Dr. Win Burt MD PGY4  Otolaryngology- Head and Neck Surgery  Please contact ENT with questions by dialing [phone] and entering job code [phone] when prompted.

## 2018-08-28 NOTE — PLAN OF CARE
Problem: Patient Care Overview  Goal: Plan of Care/Patient Progress Review  Outcome: Declining  Pt transferred at 1900 from .    Neuro: Difficult to assess. Pt trached and difficult to read lips. Able to make needs known. Follows commands.   Cardiac: SR 90s. VSS.   Respiratory: Sating 100% on 21% trach dome. Shiley 6. Frequent productive cough with lucinda bloody sputum. No large clots observed.  GI/: Incontinent x1 of urine. No BM this shift.  Diet/appetite: NPO. TF bolus QID.  Activity:  Assist of 2, repo in bed with pillows in use. Mechanical lift.  Pain: Denies pain.   Skin: Nonblanchable redness around trach site. WOC consult placed. Barrier cream placed on bottom.  LDA's: No changes.    Plan: Continue with POC. Notify primary team with changes.

## 2018-08-28 NOTE — PLAN OF CARE
Problem: Airway, Artificial (Adult)  Goal: Signs and Symptoms of Listed Potential Problems Will be Absent, Minimized or Managed (Airway, Artificial)  Signs and symptoms of listed potential problems will be absent, minimized or managed by discharge/transition of care (reference Airway, Artificial (Adult) CPG).   Outcome: No Change  Neuro: Alert, difficult to assess orientation status, answers yes or no questions appropriately  Cardiac: SR. VSS.   Respiratory: Sating 100% on 21% trach dome. Suction x 2, wyatt color secretions   GI/: incontinent of urine   Diet/appetite: NPO bolus tube feeds.  Activity:  Assist of 2, repo Q 2 hrs  Pain: At acceptable level on current regimen.   Skin: No new deficits noted.  LDA's: Gtube    Spoke with JR on the phone regarding trach/suctioning teaching, encouraged family to come in to practice suctioning this week to prepare for discharge on Friday with home hospice.    Plan: Continue with POC. Notify primary team with changes.    Problem: Chronic Respiratory Difficulty Comorbidity  Goal: Chronic Respiratory Difficulty  Patient comorbidity will be monitored for signs and symptoms of Respiratory Difficulty (Chronic) condition.  Problems will be absent, minimized or managed by discharge/transition of care.  Outcome: No Change  Trach dome 21% for humidification, suction prn

## 2018-08-28 NOTE — PROGRESS NOTES
Brief  Note:    [facility] liaison meeting with pt and family today 8/28 at 1500 to discuss hospice services.    Pt's family will attend [facility] Thursday 8/30 with tube feeding training at 1500 and tracheostomy training at 1630.    SHAKIR Simmons, [facility]  6B Intermediate Care Unit  Phone: [phone]  Pager: [phone]

## 2018-08-28 NOTE — PLAN OF CARE
Problem: Patient Care Overview  Goal: Plan of Care/Patient Progress Review  Outcome: Declining  Pt alert, orientation difficult to assess d/t trach, answers questions appropriately with head nods/shakes, following commands appropriately. VSS, T-max 99.3, HR sinus rhythm 90's (infrequently tachy 100's), BP's 120-130's/60-70's, O2 sats > 90% on 21% on trach dome. Trach is Shiley #6, trach kit/spare trach at bedside. LS coarse/fine crackles, BS+ X4, CMS intact. Pt slept part of overnight shift, had few requests/complaints, denied pain. Coughing out lucinda red blood/clots from trach, ranging from dime to half-dollar sized. Trach cares completed at 0000, pt requiring Q2-3 hr suctioning of thick copious bright red secretions. PIV in R arm patent & SL. G-tube dressing CDI, patent with 60mL FWF Q4hrs, received bolus TF 4X/day. Incontinent of bladder/bowel. Turned/repositioned Q2hrs. Has call light within reach, able to make her needs known, will continue to monitor per POC.

## 2018-08-29 NOTE — PLAN OF CARE
Problem: Patient Care Overview  Goal: Plan of Care/Patient Progress Review  Outcome: No Change    A: Alert, trache in place, YULIYA orientation. VSS, TD @ 21% FiO2, suctioning 2x overnight, red wyatt secretions (see provider notification for provider notification on lucinda blood secretions noted @ 0620). No tele. Afebrile. Denies pain and nausea. TF via J tube @ 4x daily via cans. Electrolytes WNL. Repositioned Q2H. Incontinent urine and stool. Pt able to make needs know via call light.     I/O this shift:  In: 120 [NG/GT:120]  Out: -     Temp:  [98.6  F (37  C)-98.9  F (37.2  C)] 98.9  F (37.2  C)  Heart Rate:  [] 93  Resp:  [20-24] 20  BP: (105-120)/(50-65) 110/62  FiO2 (%):  [21 %] 21 %  SpO2:  [95 %-100 %] 95 %     R: Continue with POC. Notify primary team with changes.    Problem: Chronic Respiratory Difficulty Comorbidity  Goal: Chronic Respiratory Difficulty  Patient comorbidity will be monitored for signs and symptoms of Respiratory Difficulty (Chronic) condition.  Problems will be absent, minimized or managed by discharge/transition of care.   Outcome: No Change  RR 22-24, Denies SOB, TD @ 21%, suctioning as needed.

## 2018-08-29 NOTE — PLAN OF CARE
Problem: Patient Care Overview  Goal: Plan of Care/Patient Progress Review  Outcome: No Change  Neuro: A&Ox4 with a flat affect.  Cardiac: SR. BP's 110's/60-70's. VSS. Afebrile.   Respiratory: Sating 100% with trach dome at 21% fiO2.   GI/: Incontinent, adequate urine output. BM X1  Diet/appetite: Bolus tube feeds, tolerating well.  Activity:  Assist of 2, Q2 turns.  Pain: Denies pain, at acceptable level on current regimen.   Skin: Skin irritation on right buttocks, barrier cream applied. No new deficits noted.  LDA's: R PIV SL.    Plan: Four episodes of suctioning. Family planned to come in and learn trach cares today, not present. Possible plan for discharge to hospice on Friday. Continue with POC. Notify primary team with changes.

## 2018-08-29 NOTE — PROGRESS NOTES
[facility], [place]   Palliative Care Daily Progress Note          Recommendations, Patient/Family Counseling & Coordination   Reviewed care conference notes from 8/27 with patient. Plan taking hold to discharge home with family training and    Hospice team support. Home resources need to be increased to meet patient needs.   1) Pt wishes no further cancer diagnostic or treatment interventions & wishes to focus on comfort & QOL at home. There is still a question of family ability to meet her needs but she is clear this is the only discharge option she will consider.   2)  No functional rehab per patient  3)  Continued tube feeds  5)  Management of tumor related bloody trach secretions for comfort only- no further interventions. Family to have PLC and nursing mentoring before discharge.  6) I spoke with son  and patient today again about her code status and the recommendation to change code status DNR (already with trach) as c/w her /wishes. Still wants to review further with family.  Further goals:   -Establish hospice services and durable medical equipment needs prior to discharge  -Have interim plan in place for active bleeding should recur  -We will consider inpatient comfort measures and family request.     POLST - not completed- remains full code (see above)   Thank you for the opportunity to continue to participate in the care of this patient and family.  Please feel free to contact on-call palliative provider with any emergent needs.  We can be reached via team pager [phone] (answered 8-4:30 Monday-Friday); after-hours answering service ([phone])    ERIN Duong NP SHAYNE  Nurse Practitioner- Lead Advanced Practice Provider  [facility] Palliative Medicine Consult Service   [phone]  TT spent: 35 minutes of which 22 minutes were spent in direct face to face contact with patient/family. Greater than 50% of time spent counseling  "and/or coordinating care.       Assessment      1) Diagnoses & symptoms:        Bleeding from laryngeal tumor, Post op now for control of tumor hemorrhage  # [id number] Laryngeal squamous cell carcinoma s/p tracheostomy 7/17 for upper airway obstruction  Recent L pontine CVA (7/19).   Severe protein-calorie malnutrition.  COPD          Interval History:   Patient seen and examined, see above notes for care conference attended earlier this week.  She remains fiercely adamant that discharge home is the only option she is willing to consider.  Family needs to continue training regarding her PEG tube and tracheostomy.    Family yet to  Review her CODE STATUS and given a recommendation for DNR in the setting of her underlying advanced laryngeal cancer and likely poor outcome of resuscitation.  Patient and family in agreement that hospice discharge plan is appropriate and met with [facility] hospice liaison RN yesterday.           Review of Systems:   Palliative Symptom Review (0=no symptom/no concern, 1=mild, 2=moderate, 3=severe):      Pain: 0-1      Fatigue: 1      Nausea: 1      Constipation: 0      Diarrhea: 1      Depressive Symptoms: 2      Anxiety: 1-2      Drowsiness: 0      Poor Appetite: 1-2      Shortness of Breath: 2      Insomnia: 0                 Medications:   I have reviewed this patient's medication profile and medications given in past 24 hours.       Physical exam: Vitals: /70 (BP Location: Left arm)  Pulse 90  Temp 98.4  F (36.9  C) (Oral)  Resp 20  Ht 1.702 m (5' 7\")  Wt 55.9 kg (123 lb 3.8 oz)  SpO2 100%  BMI 19.3 kg/m2  BMI= Body mass index is 19.3 kg/(m^2).     General appearance: Cachectic frail woman lying in bed. No family present.  She is aphonic secondary to tracheostomy but able to make needs known with hand gestures and mouthing words.  HEENT: Edentulous, mucous membranes moist. No oral or evident trach bleeding.  EOMI.  Neck: Tracheostomy in place no palpable adenopathy.  Lungs: " Diminished breath sounds bilaterally with coarse rhonchi.  No wheeze.  Heart: S1-S2, no murmur, RRR, occ ectopy,  84 bpm.  Abdomen: Brief exam nontender active bowel sounds no masses.  Extremities no peripheral edema identified.  Warm to touch.   strength symmetric.  Diffuse ecchymosis.  Neuro: Appears fatigued from duration of illness, admits to feeling depressed about current status.              Data Reviewed:   ROUTINE  LABS (Last four results)  BMP    Recent Labs  Lab 08/27/18  1408      POTASSIUM 4.5   CHLORIDE 105   ESTEFANI 8.7   CO2 22   BUN 40*   CR 0.78   *     CBC    Recent Labs  Lab 08/29/18  0627 08/28/18  0302 08/27/18  0733 08/26/18  1557 08/26/18  0642   WBC 11.8* 13.7* 15.2*  --  15.6*   RBC 3.15* 2.84* 3.04*  --  2.50*   HGB 9.1* 8.3* 8.8* 9.0* 7.2*   HCT 29.2* 26.5* 27.8*  --  23.3*   MCV 93 93 91  --  93   MCH 28.9 29.2 28.9  --  28.8   MCHC 31.2* 31.3* 31.7  --  30.9*   RDW 17.0* 16.5* 16.9*  --  15.1*    228 225  --  250     INRNo lab results found in last 7 days.

## 2018-08-29 NOTE — PROGRESS NOTES
"Otolaryngology Progress Note  August 29, 2018      S: No acute events overnight. Continues to intermittently cough clots and bloody secretions from trach. She denies shortness of breath or difficulty breathing.     O: /70 (BP Location: Left arm)  Pulse 90  Temp 98.4  F (36.9  C) (Oral)  Resp 20  Ht 1.702 m (5' 7\")  Wt 55.9 kg (123 lb 3.8 oz)  SpO2 100%  BMI 19.3 kg/m2   General: resting comfortably in bed; sleeping   HEENT: 6-0 cuffed Shiley in place with straps around neck; pad beneath trach flanges replaced with mepilex, ties tightened to appropriate tightness; scant dried bloody secretions seen in inner cannula   Pulmonary: breathing comfortably on HTD    Intake/Output Summary (Last 24 hours) at 08/28/18 0651  Last data filed at 08/28/18 0400   Gross per 24 hour   Intake             1600 ml   Output                0 ml   Net             1600 ml       LABS:  ROUTINE IP LABS (Last four results)  BMP    Recent Labs  Lab 08/27/18  1408      POTASSIUM 4.5   CHLORIDE 105   ESTEFANI 8.7   CO2 22   BUN 40*   CR 0.78   *     CBC    Recent Labs  Lab 08/29/18  0627 08/28/18  0302 08/27/18  0733 08/26/18  1557 08/26/18  0642   WBC 11.8* 13.7* 15.2*  --  15.6*   RBC 3.15* 2.84* 3.04*  --  2.50*   HGB 9.1* 8.3* 8.8* 9.0* 7.2*   HCT 29.2* 26.5* 27.8*  --  23.3*   MCV 93 93 91  --  93   MCH 28.9 29.2 28.9  --  28.8   MCHC 31.2* 31.3* 31.7  --  30.9*   RDW 17.0* 16.5* 16.9*  --  15.1*    228 225  --  250        A/P: Keira Collins is a 72 year old female with a past medical history of COPD, HTN, HLD, PVD, recent CVA during last hospitalization, and [id number] squamous cell carcinoma of the larynx s/p tracheotomy for upper airway obstruction 7/17/18 who admitted with bleeding from the laryngeal tumor, now POD#12  s/p DL and control of tumor hemorrhage.    Neuro:  - Pain control: tylenol, oxycodone, and dilaudid PRN  - PTA ritalin BID  - Melatonin at bedtime prn  - Hx of CVA 7/2018- discussed with neuro, " holding antiplatelets given ongoing bleeding    HEENT:  - [id number] squamous cell carcinoma of the larynx s/p tracheotomy for upper airway obstruction; s/p DL and control of hemorrhage from tumor   - Per family care conference on 8/27: move towards home hospice. No further interventions for bleeding. Discussions regarding code status are ongoing, will ask palliative to readdress. OK with CPAP/BiPAP on PRN basis.   - 6-0 cuffed Shiley in place, cuff inflated  - Please keep trach straps 1-2 finger breadth tightness to prevent damage to tracheal wall  - Holding Plavix and ASA given dropping Hgb; appreciate recs from neurology and vascular surgery  - Stop robinul - secretions are too thick      Respiratory:  - Hx of COPD  - Saturating well with HTD  - HTD, supplemental O2 PRN to keep sats >92%  - PTA brovana, pulmicort, atrovent, xopenex  - Aspiration pneumonia vs pneumonitis   - Tachypneic overnight; CXR on 08/26 with right perihilar opacity c/w infiltrate vs atelectasis vs aspiration   - Afebrile, WBC 13.7 <-- 15.2 <-- 15.6<--16.9   - Levaquin for possible aspiration pneumonia given her allergies to penicillins and cephalosporins    - Pt with QTc of 466 on previous EKG, 459 on repeat EKG 8/27; discussed with medicine and no concerns     CV/heme:  - Hemodynamically stable- P and BP wnl  - History of PVD s/p stent placement in 2017; vascular consult, appreciate recs   - CTA negative for tracheo-inominate fistula   - Holding DAPT in setting of ongoing bleeding  - Hx of HLD- continue PTA statin  - PTA amlodipine, atorvastatin, losartan  - PTA ASA held due to ongoing bleeding  - Hgb 9.1    FEN/GI:  - NPO  - Continue bolus TFs  - Ferrous sulfate TID; folate every day; lactobacillus; multivitamins  - Bowel regimen: docusate BID prn + senna BID prn + miralax qd prn    :  - Voiding independently; incontinent of urine    Endo:  - No acute issues    ID:  - Afebrile  - CXR on 08/26 with right perihilar opacity- currently treating  with levaquin    PPX:  - SCDs  - IS    Dispo:6B. Dispo pending PLC classes for children, arrangement of home supplies and home hospice coordination, plan for home on hospice on Friday 8/31/18    Consults:  - Palliative care  - WOC  - Nutrition  - Internal Medicine has signed off given patient's stable course at this time- will reconsult if patient's condition changes      -- Patient and above plan discussed with staff, Dr. Win Thomson MD PGY1  Otolaryngology- Head and Neck Surgery  Please contact ENT with questions by dialing [phone] and entering job code [phone] when prompted.

## 2018-08-30 NOTE — PROGRESS NOTES
"Otolaryngology Progress Note  August 30, 2018      S: No acute events overnight. Trach changed yesterday on PM rounds, see procedure note below. Continues to intermittently cough clots and bloody secretions from trach..     O: /75 (BP Location: Left arm)  Pulse 90  Temp 99.1  F (37.3  C) (Oral)  Resp 18  Ht 1.702 m (5' 7\")  Wt 55.9 kg (123 lb 3.8 oz)  SpO2 94%  BMI 19.3 kg/m2   General: resting comfortably in bed; sleeping   HEENT: 6-0 cuffed Shiley in place with straps around neck; pad beneath trach flanges; scant dried bloody secretions seen in inner cannula   Pulmonary: breathing comfortably on HTD     Intake/Output Summary (Last 24 hours) at 08/28/18 0651  Last data filed at 08/28/18 0400   Gross per 24 hour   Intake             1600 ml   Output                0 ml   Net             1600 ml       LABS:  ROUTINE IP LABS (Last four results)  BMP    Recent Labs  Lab 08/27/18  1408      POTASSIUM 4.5   CHLORIDE 105   ESTEFANI 8.7   CO2 22   BUN 40*   CR 0.78   *     CBC    Recent Labs  Lab 08/30/18  0527 08/29/18  0627 08/28/18  0302 08/27/18  0733   WBC 12.6* 11.8* 13.7* 15.2*   RBC 2.90* 3.15* 2.84* 3.04*   HGB 8.5* 9.1* 8.3* 8.8*   HCT 27.4* 29.2* 26.5* 27.8*   MCV 95 93 93 91   MCH 29.3 28.9 29.2 28.9   MCHC 31.0* 31.2* 31.3* 31.7   RDW 16.9* 17.0* 16.5* 16.9*    246 228 225       Procedure note: Patient was suctioned via trach and secretions were removed.  Patient was appropriately positioned flat and supine. Trach ties/sutures were removed while holding trach in place. Patient was then hyperoxygenated via trach dome. Trach was removed without difficulty. Stoma appeared patent without obstruction or secretions, granulation tissue improved since prior trach change. New 6 cuffed Shiley trach was inserted with obturator. Obturator was removed and inner cannula locked in place. The trach did not fully seat on the initial attempt, the trach was removed, a flexible scope was passed through " the stoma. The trach was reinserted and in appropriate position. Correct positioning of trach was confirmed by with flexible endoscopy. Trach ties/sutures were placed and secured. Patient tolerated the trach change well and without complication, no desats during exchange.    Continue routine trach cares as directed.   Future trach changes will be performed as needed.       A/P: Keira Collins is a 72 year old female with a past medical history of COPD, HTN, HLD, PVD, recent CVA during last hospitalization, and [id number] squamous cell carcinoma of the larynx s/p tracheotomy for upper airway obstruction 7/17/18 who admitted with bleeding from the laryngeal tumor, now POD#12  s/p DL and control of tumor hemorrhage.    Neuro:  - Pain control: tylenol, oxycodone, and dilaudid PRN  - PTA ritalin BID  - Melatonin at bedtime prn  - Hx of CVA 7/2018- discussed with neuro, holding antiplatelets given ongoing bleeding    HEENT:  - [id number] squamous cell carcinoma of the larynx s/p tracheotomy for upper airway obstruction; s/p DL and control of hemorrhage from tumor   - Per family care conference on 8/27: move towards home hospice. No further interventions for bleeding. Discussions regarding code status are ongoing. OK with CPAP/BiPAP on PRN basis.  - 6-0 cuffed Shiley in place, cuff inflated  - Please keep trach straps 1-2 finger breadth tightness to prevent damage to tracheal wall  - Holding Plavix and ASA given dropping Hgb; appreciate recs from neurology and vascular surgery  - Stop robinul      Respiratory:  - Hx of COPD  - Saturating well with HTD  - HTD, supplemental O2 PRN to keep sats >92%  - PTA brovana, pulmicort, atrovent, xopenex  - Aspiration pneumonia vs pneumonitis   - Tachypneic overnight; CXR on 08/26 with right perihilar opacity c/w infiltrate vs atelectasis vs aspiration   - Afebrile, WBC 12.6<--11.8<--13.7 <-- 15.2 <-- 15.6<--16.9   - Levaquin for possible aspiration pneumonia given her allergies to penicillins  and cephalosporins    - Pt with QTc of 466 on previous EKG, 459 on repeat EKG 8/27; discussed with medicine and no concerns     CV/heme:  - Hemodynamically stable- P and BP wnl  - History of PVD s/p stent placement in 2017; vascular consult, appreciate recs   - CTA negative for tracheo-inominate fistula   - Holding DAPT in setting of ongoing bleeding  - Hx of HLD- continue PTA statin  - PTA amlodipine, atorvastatin, losartan  - PTA ASA held due to ongoing bleeding  - Hgb 9.1    FEN/GI:  - NPO  - Continue bolus TFs  - Ferrous sulfate TID; folate every day; lactobacillus; multivitamins  - Bowel regimen: docusate BID prn + senna BID prn + miralax qd prn    :  - Voiding independently; incontinent of urine    Endo:  - No acute issues    ID:  - Afebrile  - CXR on 08/26 with right perihilar opacity- currently treating with levaquin    PPX:  - SCDs  - IS    Dispo:6B. Dispo pending PLC classes for children, arrangement of home supplies and home hospice coordination, plan for home on hospice on Friday 8/31/18    Consults:  - Palliative care  - WOC  - Nutrition  - Internal Medicine has signed off given patient's stable course at this time- will reconsult if patient's condition changes      -- Patient and above plan discussed with staff, Dr. Win Thomson MD PGY1  Otolaryngology- Head and Neck Surgery  Please contact ENT with questions by dialing [phone] and entering job code [phone] when prompted.

## 2018-08-30 NOTE — PLAN OF CARE
Problem: Airway, Artificial (Adult)  Goal: Signs and Symptoms of Listed Potential Problems Will be Absent, Minimized or Managed (Airway, Artificial)  Signs and symptoms of listed potential problems will be absent, minimized or managed by discharge/transition of care (reference Airway, Artificial (Adult) CPG).   Outcome: No Change  S/B: Pt alert and oriented as best as can tell with trach. Continues to have bloody thick sputum, trying to minimally suction due to bleeding, sxn x2, has good strong cough able to cough up sputum, trach cares done, under trach plate sensitive to touch; optifoam placed. Cipro to under trach plate. No bleeding from external stoma noted. Dark blood clot noted with am suction. Pt with increased work of breathing after lab awoken her some anxiety component, pt can't describe how she feels throwing arms up in the air.     A: A&Ox4. VSS. SR. Afebrile. Denies pain and nausea. Tolerating cycled tube feeds diet. Electrolytes WNL. Sore inner thighs reddened and buttocks sore when wiping, soft cloths and perineal lotion applied with barrier cream. Incontinent of bowel and bladder. Good urine output.    I/O this shift:  In: 565 [P.O.:50; NG/GT:60]  Out: 620 [Urine:600; Chest Tube:20]    R: Turn q 2 hours, offer reassurance as needed for anxiety. Continue with POC. Notify primary team with changes.

## 2018-08-30 NOTE — PROGRESS NOTES
Brief ENT Progress Note  8/30/2018    Nursing expressing difficulty exchanging trach inner cannula. Patient assessed at bedside. No bleeding from the trach. The inner cannula was removed without difficulty, though patient does report tenderness with trach manipulation. Thick white secretions in inner cannula, no blood. New inner cannula easily replaced.     I also discussed with Keira at this time that she is still listed as full code despite plan for transition to home Hospice tomorrow. I again explained what full code means including the possibility of chest compressions, medications, shock and need for mechanical ventilation should she have a significant event. I explained that some of these measures would cause pain and possibly broken bones and would not reverse or cure her underlying pathology.  She indicated understanding and that she would like to discuss this again when her children come to the hospital later this afternoon. We will continue to follow up regarding these ongoing discussions to determine a plan of care that is in alignment with Keira's wishes.     - Continue routine trach care  - Replace inner cannula every 8 hours. Use 2 fingers to hold trach face plate in place while removing inner cannula to minimize discomfort with trach manipulation.   - Keep trach ties snug - should only be able to fit 2 fingers between the skin and ties to avoid trach from rocking in and out of the stoma with movement and coughing which could result in rubbing of the tube within the airway leading to worsening granulation tissue formation and bleeding.  - ENT to return for discussion about code status with patient's family this evening and fill out POLST form. Appreciate ongoing palliative care input/discussions as well     Samantha Burnham PA-C  Otolaryngology-Head & Neck Surgery  Please contact ENT by dialing * [phone] and entering job code [phone].

## 2018-08-30 NOTE — PLAN OF CARE
Problem: Patient Care Overview  Goal: Discharge Needs Assessment  08/30/18 5967     Rochelle Ivy-Registered Nurse (Nursing)  Patients three children attended Trach/ Feeding tube class. One son, , RD correctly on model with all cares for trach and g-tube including site cares, flushing g-tube, medication administration, gravity feeding, suctioning, irrigating trach, re-inserting trach, and trouble-shooting. Other two children observed only. Received written material related to all content taught.  was encouraged to practice all skills with nursing staff before patient is discharged. State they understand all information presented.

## 2018-08-30 NOTE — PROGRESS NOTES
Social Work Services Progress Note    Hospital Day: 15  Date of Initial Social Work Evaluation:  7/30/18  Collaborated with:  Pt, pt's son Alonso Dodge 6B RN, Maddy (P: [phone]) and Marietta (P: [phone]) with  Hospice (P: [phone]).    Data:  Keira Collins is a 73 yo female admitted to [facility] 8/16/18.      Intervention:  SW following for DC plans.    Assessment:  Pt's sons completed [employer] today and plan to take pt home with [facility] in order to honor pt's wishes to be at home. Durable medical equipment will be delivered to the home Friday morning. [facility] does not have any intake appts available Friday and family would prefer Saturday discharge. [facility] has an appt available Sat 9/1 at 1300. Will plan for transport from hospital at 1100. 8 stairs to enter the home.  [facility]osp[facility] will need [facility] filled [facility] and sent home with pt. Writer asked for  Hospice SW presence in case pt's family gets home and no longer feels comfortable managing pt's care.    Discharge address:  [address] [place] [address]    Primary family contact- Dar (P: [phone]).    Plan:    Anticipated Disposition:  Planning for discharge Saturday 9/1 at 1100 to home with hospice.  hospice will meet pt and family at the home Saturday at 1300.    Barriers to d/c plan:  none    Follow Up:  SW will continue to follow and assist as needed.    SHAKIR Simmons, LICSW  6B Intermediate Care Unit  Phone: [phone]  Pager: [phone]

## 2018-08-30 NOTE — PROGRESS NOTES
Patient alert and oriented x4. Vitals stable. Trach exchanged by EMT. Continues to have blood secretions. Trying not to deep suction. Productive cough present. Will continue to monitor.

## 2018-08-30 NOTE — PROGRESS NOTES
SPIRITUAL HEALTH SERVICES  SPIRITUAL ASSESSMENT Progress Note (Palliative Focus)  [facility] ([place] 6B    REFERRAL SOURCE: Palliative care initial spiritual assessment.    Visit with patient Keira Collins, known to me from previous hospitalization, at bedside. Keira communicated by mouthing words, nodding and shaking her head.     Keira said her only goal is to return home, to her own home, and stay out of the hospital. She misses being independent, while also acknowledging that she needs some help from her children. She wants to enjoy the quality of life she has at home, and named watching game shows (she likes watching the people compete with one another) and tending her nails. Keira said she has no concerns about returning home or current plan of care, and she endorsed feeling a sense of peace in relation to God ([facility]).     Plan: I will follow for spiritual support per length of stay.    Tarsha Sullivan  Palliative   Pager [phone]  [facility] Inpatient Team Consult pager [phone] (M-F 8-4:30)  After-hours Answering Service [phone]

## 2018-08-31 NOTE — PROGRESS NOTES
[facility], [place]   Palliative Care Daily Progress Note          Recommendations, Patient/Family Counseling & Coordination   Referenced recent discussion with pt and family from 8/27 with patient. Adamant that the only discharge option is  home with family training. Frustration is that it is not happening today. Appreciate [facility] team support. Home resources need to be increased to meet patient needs.   1) Pt wishes no further cancer diagnostic or treatment interventions & wishes to focus on comfort & QOL at home. There is still a question of family ability to meet her needs but she is clear this is the only discharge option she will consider.   2)  No functional rehab per patient  3)  Continued tube feeds  5)  Management of tumor related bloody trach secretions for comfort only- no further interventions. Family to have PLC and nursing mentoring before discharge.  6) I spoke with son  and patient on 8/29 again about her code status and the recommendation to change code status DNR (already with trach) as c/w her /wishes. Still wants to review further with family.  Further goals:   -Establish hospice services and durable medical equipment needs prior to discharge  -Have interim plan in place for active bleeding should recur  -Consider inpatient comfort measures and family request.     POLST - not completed- remains full code (see above)   Thank you for the opportunity to continue to participate in the care of this patient and family.  Please feel free to contact on-call palliative provider with any emergent needs.  We can be reached via team pager [phone] (answered 8-4:30 Monday-Friday); after-hours answering service ([phone])    ERIN Duong NP SHAYNE  Nurse Practitioner- Lead Advanced Practice Provider  [facility] Palliative Medicine Consult Service   [phone]  TT spent: 26 minutes of which 16 minutes were spent in direct face to  "face contact with patient/family. Greater than 50% of time spent counseling and/or coordinating care.       Assessment      1) Diagnoses & symptoms:        Bleeding from laryngeal tumor, Post op now for control of tumor hemorrhage  # [id number] Laryngeal squamous cell carcinoma s/p tracheostomy 7/17 for upper airway obstruction  Recent L pontine CVA (7/19).    Severe protein-calorie malnutrition.  COPD  Deconditioning and debility          Interval History:   Patient seen and examined, see notes for care conference attended earlier this week.  She remains fiercely adamant that discharge home is the only option she is willing to consider.  Family needs to continue training regarding her PEG tube and tracheostomy.    Family yet to  Review her CODE STATUS and given a recommendation for DNR in the setting of her underlying advanced laryngeal cancer and likely poor outcome of resuscitation.  Patient and family in agreement that hospice discharge plan is appropriate and met with [facility] liaison RN yesterday.           Review of Systems:   Palliative Symptom Review (0=no symptom/no concern, 1=mild, 2=moderate, 3=severe):      Pain: 0-1      Fatigue: 1      Nausea: 1      Constipation: 0      Diarrhea: 1      Depressive Symptoms: 2      Anxiety: 2      Drowsiness: 0      Poor Appetite: NPO       Shortness of Breath: 2      Insomnia: 0                 Medications:   I have reviewed this patient's medication profile and medications given in past 24 hours.       Physical exam: Vitals: /75 (BP Location: Left arm)  Pulse 100  Temp 98.3  F (36.8  C) (Axillary)  Resp 20  Ht 1.702 m (5' 7\")  Wt 55 kg (121 lb 4.1 oz)  SpO2 100%  BMI 18.99 kg/m2  BMI= Body mass index is 18.99 kg/(m^2).   No appreciable change from exam 8/29/18 as below, was repeated today as below:   General appearance: Cachectic frail woman lying in bed. No family present.  She is aphonic secondary to tracheostomy but able to make needs known with hand " gestures and mouthing words.  HEENT: Edentulous, mucous membranes moist. No oral or evident trach bleeding.  EOMI.  Neck: Tracheostomy in place no palpable adenopathy.  Lungs: Diminished breath sounds bilaterally with coarse rhonchi. No wheeze.  Heart: S1-S2, no murmur, RRR, occ ectopy,  78 bpm.  Abdomen: Brief exam nontender active bowel sounds no masses.  Extremities no peripheral edema identified.  Warm to touch.   strength symmetric.  Diffuse ecchymosis.  Neuro: Appears fatigued from duration of illness, admits to feeling depressed about current status.              Data Reviewed:   ROUTINE  LABS (Last four results)  BMP    Recent Labs  Lab 08/27/18  1408      POTASSIUM 4.5   CHLORIDE 105   ESTEFANI 8.7   CO2 22   BUN 40*   CR 0.78   *     CBC    Recent Labs  Lab 08/31/18  0645 08/30/18  0527 08/29/18  0627 08/28/18  0302   WBC 12.7* 12.6* 11.8* 13.7*   RBC 2.92* 2.90* 3.15* 2.84*   HGB 8.7* 8.5* 9.1* 8.3*   HCT 27.7* 27.4* 29.2* 26.5*   MCV 95 95 93 93   MCH 29.8 29.3 28.9 29.2   MCHC 31.4* 31.0* 31.2* 31.3*   RDW 16.7* 16.9* 17.0* 16.5*    259 246 228     INRNo lab results found in last 7 days.

## 2018-08-31 NOTE — PLAN OF CARE
Problem: Patient Care Overview  Goal: Plan of Care/Patient Progress Review  Outcome: No Change  Neuro: Intact.   Cardiac: VSS.   Respiratory: Sating 98% on 21% FiO2 via trach dome.  GI/: Incont - BM x 2  Diet/appetite: Tolerating feeds.  Activity:  Turn q2  Pain: At acceptable level on current regimen.   Skin: See documentation.  LDA's: Shiley 6    Plan: Continue with POC. Notify primary team with changes.

## 2018-08-31 NOTE — PLAN OF CARE
Problem: Patient Care Overview  Goal: Plan of Care/Patient Progress Review  Outcome: No Change  Neuro: YULIYA - yes/no appropriate at times - calls out appropriately - withdrawn.   Cardiac: SR-ST. VSS.   Respiratory: Sating 98% on 21% FiO2 via trach dome.  GI/: Incont - BM x 3  Diet/appetite: Tolerating feeds.  Activity:  Turn q2.  Pain: At acceptable level on current regimen.   Skin: See documentation  LDA's: Liudmila 6    Plan: Continue with POC. Notify primary team with changes.

## 2018-08-31 NOTE — PLAN OF CARE
Problem: Patient Care Overview  Goal: Plan of Care/Patient Progress Review  Outcome: No Change    A:Alert, trache in place, YULIYA orientation pt able to mouth some answers, flat affect. VSS, TD @ 21% FiO2, suctioning more frequently overnight overnight, red wyatt secretions. No tele. Afebrile. Denies pain and nausea. TF via J tube @ 4x daily via cans. Electrolytes WNL. Repositioned Q2H. Incontinent urine and stool. Pt able to make needs know via call light.     I/O this shift:  In: 30 [NG/GT:30]  Out: -     Temp:  [98.8  F (37.1  C)-99.1  F (37.3  C)] 99  F (37.2  C)  Pulse:  [100] 100  Heart Rate:  [] 96  Resp:  [16-20] 19  BP: ()/(57-85) 115/85  FiO2 (%):  [21 %] 21 %  SpO2:  [94 %-100 %] 95 %     R: Continue with POC. Notify primary team with changes.    Problem: Chronic Respiratory Difficulty Comorbidity  Goal: Chronic Respiratory Difficulty  Patient comorbidity will be monitored for signs and symptoms of Respiratory Difficulty (Chronic) condition.  Problems will be absent, minimized or managed by discharge/transition of care.   Outcome: No Change  RR increased, more frequently suctioning.

## 2018-08-31 NOTE — PROGRESS NOTES
Care Coordinator Progress Note    Admission Date/Time:  8/16/2018  Attending MD:  Cynthia Walden MD    Data  Chart reviewed, discussed with interdisciplinary team.   Patient was admitted for:    Tracheostomy hemorrhage (H)  Laryngeal cancer (H)  Carcinoma larynx (H)  Drug-induced constipation  Dry eyes  Somnolence  Chronic obstructive pulmonary disease, unspecified COPD type (H)  Adjustment disorder with anxious mood  Air hunger.       Assessment  Phone call received from Xiomara with [employer] [employer] [facility] [employer] stating she plans on coming into the hospital to see Patient tomorrow at 8am. Xiomara would like Family here at 8am tomorrow for hands on trach care teaching before Pt is discharged. I have called Pts Son, Dar to inform him of above request, he states he will notify his Brother  and  will come into the hospital tomorrow morning.     Plan  Anticipated Discharge Date:  9/1/18  Anticipated Discharge Plan:  If Family can demonstrate the ability to manage Pts trach cares, she will discharge to home with Family and  Hospice support.    Isabel Singh RN   6B care coordinator #[phone]

## 2018-08-31 NOTE — PROGRESS NOTES
CLINICAL NUTRITION SERVICES - REASSESSMENT NOTE     Nutrition Prescription    RECOMMENDATIONS FOR MDs/PROVIDERS TO ORDER:  Current free water flushes providing 360 ml free water + formula (760ml), for a total of 1120 ml/day of free water total (20 ml/kg). Would recommend increasing water flushes to 120 ml Q4  to increase free water per day to 1480 ml (26 ml/kg)    Malnutrition Status:    Severe malnutrition in the context of acute on chronic illness    Recommendations already ordered by Registered Dietitian (RD):  None at this time    Future/Additional Recommendations:  Need to increase TF vol if recheck of wt < 55 kg  Monitor fluids  Monitor need to increase TF rate/bolus     EVALUATION OF THE PROGRESS TOWARD GOALS   Diet: NPO  Nutrition Support: Isosource 1.5, 1 cans (250 ml) QID = 4 cans daily (1000 ml/day) = 1500 kcals ( 26 kcal/kg), 68 g PRO (1.2 g/kg/day), 760 ml H2O, 176 g CHO and 15 g Fiber daily.  Intake: meeting 100% of TF at this time       NEW FINDINGS   Weight: ~3% in the past 2 weeks  G: IBM+ noted   Skin: Skin irritation on right buttocks, barrier cream applied.   **See social work note for further details pending POC/GOC    MALNUTRITION  % Intake: No decreased intake noted  % Weight Loss: > 3% in 2 week (severe)  Subcutaneous Fat Loss: severe  Muscle Loss: severe  Fluid Accumulation/Edema: None noted  Malnutrition Diagnosis: Severe malnutrition in the context of acute on chronic illness    Previous Goals   Total avg nutritional intake to meet a minimum of 25 kcal/kg and 1.2 g PRO/kg daily (per dosing wt 57 kg).  Evaluation: Met    Previous Nutrition Diagnosis  Inadequate protein-energy intake related to remains dependent on TF, but goal infusion vol not consistently met  as evidenced by ave TF intakes received x past 5 days meeting 22 kcals/kg  and 1 g PRO/kg and wt loss of 1.7 kg (3% loss) x past week.  Evaluation: Improving    CURRENT NUTRITION DIAGNOSIS  Predicted inadequate nutrient intake  kcals/pro related to TF interruptions      INTERVENTIONS  Implementation  Collaboration with other providers- 6B rounds    Goals  Total avg nutritional intake to meet a minimum of 25 kcal/kg and 1.2 g PRO/kg daily (per dosing wt 57 kg).    Weight does not go below 55 kg, or may need to increase TF regimen to 5 cans/day    Monitoring/Evaluation  Progress toward goals will be monitored and evaluated per protocol.      Tracie Lan, RD, MS, LD  6B- Pager: [phone]

## 2018-08-31 NOTE — PROGRESS NOTES
"Social Work Services Discharge Note      ADDENDUM 1400- Pt is now DNR. Palliative completed POLST- original and copy given to pt/family, copy in hard chart for EMS, copy sent to Medical Records to be scanned in, copy sent to [facility]. Updated Maira with [facility].     ADDENDUM 1200- Received update from Raymundo Vega NP and Dr. Cain with Palliative that the  Hospice Physician, Dr. Lindsay, called Palliative and said [facility] will not be accepting pt for services if pt is full code, and  Hospice will not be replacing inner cannula Q8H, and will replace on as as needed and for comfort only basis. Palliative is going to contact ENT to discuss. SW will be available to re-discuss plan after Palliative and ENT touch base.    Patient Name:  Keira Collins     Anticipated Discharge Date: Discharge Saturday 9/1/18 at 1100 to home with hospice. [facility] will meet pt and family at the home at 1300 for an intake.     Discharge Disposition:   Discharge address:  Son JR Collins's home  [address] [place] [address]     Primary family contact- Dar (P: [phone]) or  (P: [phone]).     Additional Services/Equipment Arranged:   -Transport: [facility] (Ph: [phone]) via stretcher arranged for  Sat 9/1 at 1100. Ambulance PCS completed and placed in hard chart; stretcher required d/t trach, humidity, and suctioning. 7 stairs to enter, then a landing, then 3 more stairs to get into the home.  -Hospice: [facility] (P: [phone], F: [phone]). Maddy KING liaison with [facility] met w/pt and family on 8/28. Equipment will be delivered Fri 8/31 to the home. Medications needs to be filled at the [facility] and sent with pt.     **Per Maira at [facility], pt needs to be sent with a \"bleeding protocol\" with specific instructions on what family should do immediately in case of small or large bleeding events.  **Pt will need to be sent with a completed POLST. " There is concern about pt being full code and also on hospice.  **Medications should be sent to  [facility] 8/31 before end of business day to be sure they are ready for Saturday morning as pt is leaving at 11am.     Patient / Family response to discharge plan:  Pt adamantly wants to go home. Family wants to honor these wishes. Pt's sons Dar and  attended [facility] for TF and trach on 8/30.      Persons notified of above discharge plan: Pt, pt's son and POA Dar,  Hospice (Maddy, Maira, and Marietta), Alonso 6B RN, Lissette 6B Charge RN, Isabel RNCC, Samantha ZAZUETA ([phone]) with ENT.    -Staff Discharge Instructions:  Please make sure medications are filled at discharge pharmacy and sent with pt. Provide one copy of POLST to EMS and one copy home with pt.    SHAKIR Simmons, LICSW  6B Intermediate Care Unit  Phone: [phone]  Pager: [phone]

## 2018-08-31 NOTE — PROGRESS NOTES
"Otolaryngology Progress Note  August 31, 2018       S: No acute events overnight. Secretions much less bloody this morning. Resting comfortably in bed.     O: /75 (BP Location: Left arm)  Pulse 100  Temp 98.3  F (36.8  C) (Axillary)  Resp 20  Ht 1.702 m (5' 7\")  Wt 55 kg (121 lb 4.1 oz)  SpO2 100%  BMI 18.99 kg/m2   General: resting comfortably in bed; sleeping   HEENT: 6-0 cuffed Shiley in place with straps around neck; pad beneath trach flanges; Inner cannula without bloody secretions.    Pulmonary: breathing comfortably on HTD     Intake/Output Summary (Last 24 hours) at 08/28/18 0651  Last data filed at 08/28/18 0400   Gross per 24 hour   Intake             1600 ml   Output                0 ml   Net             1600 ml       LABS:  ROUTINE IP LABS (Last four results)  BMP    Recent Labs  Lab 08/27/18  1408      POTASSIUM 4.5   CHLORIDE 105   ESTEFANI 8.7   CO2 22   BUN 40*   CR 0.78   *     CBC    Recent Labs  Lab 08/31/18  0645 08/30/18  0527 08/29/18  0627 08/28/18  0302   WBC 12.7* 12.6* 11.8* 13.7*   RBC 2.92* 2.90* 3.15* 2.84*   HGB 8.7* 8.5* 9.1* 8.3*   HCT 27.7* 27.4* 29.2* 26.5*   MCV 95 95 93 93   MCH 29.8 29.3 28.9 29.2   MCHC 31.4* 31.0* 31.2* 31.3*   RDW 16.7* 16.9* 17.0* 16.5*    259 246 228         A/P: Keira Collins is a 72 year old female with a past medical history of COPD, HTN, HLD, PVD, recent CVA during last hospitalization, and [id number] squamous cell carcinoma of the larynx s/p tracheotomy for upper airway obstruction 7/17/18 who admitted with bleeding from the laryngeal tumor, now POD#13  s/p DL and control of tumor hemorrhage.    Neuro:  - Pain control: tylenol, oxycodone, and dilaudid PRN  - PTA ritalin BID  - Melatonin at bedtime prn  - Hx of CVA 7/2018- discussed with neuro, holding antiplatelets given ongoing bleeding    HEENT:  - [id number] squamous cell carcinoma of the larynx s/p tracheotomy for upper airway obstruction; s/p DL and control of hemorrhage from " tumor   - Per family care conference on 8/27: move towards home hospice. No further interventions for bleeding. Discussions regarding code status are ongoing. OK with CPAP/BiPAP on PRN basis. Plan for discharge to home hospice on 9/1.  - 6-0 cuffed Shiley in place, cuff inflated  - Please keep trach straps 1-2 finger breadth tightness to prevent damage to tracheal wall  - Holding Plavix and ASA given dropping Hgb; appreciate recs from neurology and vascular surgery  - Stop robinul      Respiratory:  - Hx of COPD  - Saturating well with HTD  - HTD, supplemental O2 PRN to keep sats >92%  - PTA brovana, pulmicort, atrovent, xopenex  - Aspiration pneumonia vs pneumonitis   - Tachypneic overnight; CXR on 08/26 with right perihilar opacity c/w infiltrate vs atelectasis vs aspiration   - Afebrile, WBC 12.6<--11.8<--13.7 <-- 15.2 <-- 15.6<--16.9   - Levaquin for possible aspiration pneumonia given her allergies to penicillins and cephalosporins    - Pt with QTc of 466 on previous EKG, 459 on repeat EKG 8/27; discussed with medicine and no concerns     CV/heme:  - Hemodynamically stable- P and BP wnl  - History of PVD s/p stent placement in 2017; vascular consult, appreciate recs   - CTA negative for tracheo-inominate fistula   - Holding DAPT in setting of ongoing bleeding  - Hx of HLD- continue PTA statin  - PTA amlodipine, atorvastatin, losartan  - PTA ASA held due to ongoing bleeding  - Hgb 9.1    FEN/GI:  - NPO  - Continue bolus TFs  - Ferrous sulfate TID; folate every day; lactobacillus; multivitamins  - Bowel regimen: docusate BID prn + senna BID prn + miralax qd prn    :  - Voiding independently; incontinent of urine    Endo:  - No acute issues    ID:  - Afebrile  - CXR on 08/26 with right perihilar opacity- currently treating with levaquin    PPX:  - SCDs  - IS    Dispo:6B. Dispo pending PLC classes for children, arrangement of home supplies and home hospice coordination, plan for home on hospice on Saturday  9/1/18.    Consults:  - Palliative care  - WOC      -- Patient and above plan discussed with staff, Dr. Win Thomson MD PGY1  Otolaryngology- Head and Neck Surgery  Please contact ENT with questions by dialing [phone] and entering job code [phone] when prompted.

## 2018-08-31 NOTE — PROGRESS NOTES
[facility], [place]    Palliative Care Progress Note    Patient: Keira Collins  Date of Admission:  8/16/2018    Recommendations:  Goals of care: Keira was very clear with me today that she understands that she has cancer and that she has decided not to pursue any life prolonging interventions and what she wants is to be able to go home, not return to the hospital, be surrounded by family, feel as good as possible for whatever time she has left. I discussed these goals with Keira when she was alone and then I called her son, Dar, and reviewed them again with Dar while Keira was listening to again be sure that these were Keira's wishes, making home hospice a appropriate discharge plan. I then discussed code status and recommended DNR and DNI and explained my reasons for recommending DNR/DNI. Keira was in agreement with DNR and DNI and signed the POLST form. This was also discussed with Dar and he too agreed with this and understood that this was what his mother had decided as well.     - plan for discharge home with hospice  - DNR/DNI - POLST completed, signed, in chart    Spoke with hospice and ENT: Family must be able to demonstrate ability to change inner trach cannula as this should be changed several times a day to prevent clots from clogging her airway and making her not able to breath. Family also needs to be able to demonstrate ability to suction. Keira clearly wants to go home and family wants to be able to care for her at home but if they do not feel comfortable changing the cannula, we need to have further discussions of next steps in order to best support Keira and manage her symptoms.     Morphine liquid high concentrate SL q3hrs PRN air hunger  Lorazepam liquid per gtube  0.5-1mg q4hrs PRN anxiety    Keira should not be given any secretion drying agents as that seems to make it more difficult for her to clear her secretions.     Assessment  72 yr old female with  laryngeal squamous cell carcinoma ([id number])  s/p tracheostomy 7/17 for upper airway obstruction and recent bleeding from the laryngeal tumor. Also with recent L pontine CVA (7/19), severe protein-calorie malnutrition, COPD and generalized deconditioning and debility. Many discussions this week with pt and family and decision made to go home with hospice as pt wants, more than anything, to be home and she does not want any further interventions, surgeries or life prolonging interventions.      These recommendations have been discussed with ENT/hospice/care coordinator.    Melva Cain  Pager: [phone]  [facility] Inpatient Team Consult pager [phone] (M-F 8-4:30)  After-hours Answering Service [phone]   Palliative Clinic: [phone]       Interval History:      No acute events over night     Medications:   I have reviewed this patient's medication profile and medications during this hospitalization  reviewed        Review of Systems:   10 point ROS neg other than the symptoms noted above in the HPI and here   (0=no symptom/no concern, 1=mild, 2=moderate, 3=severe):      Pain: 0      Fatigue: 2      Nausea: 0      Depressive Symptoms: 0      Drowsiness: 0      Poor Appetite: tube feeds      Shortness of Breath: 1        Physical Exam:     Vital Signs: Temp: 99.5  F (37.5  C) Temp src: Oral BP: 115/60   Heart Rate: 107 Resp: 20 SpO2: 100 % O2 Device: Trach dome    Weight: 121 lbs 4.05 oz    Physical Exam:  General: alert, frail appearing, lying in bed, in NAD  Eyes: EOMI, sclera clear  HEENT: trach in place; mucous membranes moist  Lungs: no increased work of breathing, able to mouth words, did not appear to be in resp distress  Neuropsych: alert, good eye contact, frustrated at times, appropriate, sensorium intact    Data Reviewed:   8/23 CT scan:      IMPRESSION:   In this patient with history of laryngeal cancer:  1. No tracheo-arterial fistula, including no tracheoinnominate  arterial fistula.  Moderate calcified plaque involving the thoracic  aorta.  2. Increased debris in the trachea. Increased circumferential tracheal  enhancement.   3. Resolved bilateral pleural effusions with resolved left and near  resolved right lower lobe compression atelectasis.   4a. Again seen laryngeal mass which causes near obliteration of the  airway.   4b. Stable pulmonary nodules since 7/20/2018, overall increased since  6/13/2018.  4c. Mild decreased thoracic lymphadenopathy.  4d. Again thickened bilateral adrenal glands.  5. Heterogeneous right enlarged thyroid gland.           Attestation:   Thank you for the opportunity to continue to participate in the care of this patient and family.  Please feel free to contact on-call palliative provider with any emergent needs.  We can be reached via team pager [phone] (answered 8-4:30 Monday-Friday); after-hours answering service ([phone]); Main Palliative Clinic - [phone]      This patient has been seen and evaluated by me and discussed with ENT team, floor care coordinator, hospice medical director.  I have reviewed today's vital signs, medications, labs and imaging. Total time on the floor involved in the patient's care: 85 minutes.  Greater than 50% of my time was spent counseling and/or coordination of care regarding symptoms and goals. Face to face time with patient was from 11:15am - 12 noon and from 1:30pm - 1:50pm. While in the room, I called patient's son, Dar, to further discuss goals of care.     Melva Cain MD  Palliative Care Physician  Pager [phone]

## 2018-09-01 NOTE — DISCHARGE SUMMARY
Discharge Summary  Keira Collins  [id number]  1945    Date of Admission: 8/16/2018  Date of Discharge: 9/1/2018    Admission Diagnosis: Tracheostomy hemorrhage (H) [J95.01]  Laryngeal cancer (H) [C32.9]  Squamous cell carcinoma of larynx (H) [C32.9]  Discharge Diagnosis: Tracheostomy hemorrhage (H) [J95.01]  Laryngeal cancer (H) [C32.9]  Squamous cell carcinoma of larynx (H) [C32.9]    Patient Active Problem List   Diagnosis     Essential hypertension with goal blood pressure less than 140/90     Hyperlipidemia LDL goal <100     H/O: alcohol abuse     PVD (peripheral vascular disease) (H)     Tobacco abuse     Cholelithiasis     Osteoporosis     COPD (chronic obstructive pulmonary disease) (H)     Physical deconditioning     Elevated serum free T4 level     Thyroid nodule     Laryngeal mass     Pulmonary nodules     Malignant neoplasm of larynx (H)     Secondary malignancy of lymph nodes of head, face and neck (H)     Tracheostomy, acute management (H)     History of stroke     Severe malnutrition (H)     Aspiration pneumonia (H)     Secondary malignant neoplasm of right lung (H)     Caloric malnutrition (H)     Blood in upper airway     Acute posthemorrhagic anemia         Procedures:  Date: 8/16/2018  Procedure(s):  Direct laryngoscopy with control of hemorrhage with telescope; Flexible bronchoscopy    Pathology: No new pathology specimens    HPI: Keira Collins is a 72-year-old female with a [id number] SCCa of larynx PMH significant for COPD, 140 pack-year tobacco history, alcohol use disorder, HLD, HTN, and PVD who is now s/p direct laryngoscopy with biopsy of laryngeal SCC and tracheotomy on 7/17/18. She was discharged on 08/10 after a hospital course that was complicated by an acute left pontine CVA.    Hospital Course: The patient presented to the [facility] ED on 08/16 with hemorrhage from her tracheotomy stoma. She underwent the aforementioned procedures for control of bleeding. Please see full operative report  for further details. She was admitted postoperatively for observation. Patient's postoperative course was complicated by persistent bleeding from tracheotomy site. She continued to cough large clots of bright red blood from her tracheotomy tube that necessitated holding aspirin and plavix after consultation with neurology and vascular surgery. Flexible tracheoscopy and laryngoscopy was performed on a routine basis. Examinations continued to demonstrate persistent bleeding. Extended discussions were had with patient, her family, and Palliative Medicine. Ultimately the patient decided to continue with DNR and DNI status with home hospice in place- she has signed a POLST form to that effect. Patient has made it very clear that she wants to go home, regardless of her outcome. She has no interest in being hospitalized or living in an alternative care facility. Patient's family feels comfortable caring for patient at home and has demonstrated ability to do so.    We had an extensive conversation with patient's primary caregiver (her son, ) and home hospice nurse.  reports confidence in his ability to perform trach care. We made medical recommendations regarding patient's NPO status and maintaining tracheotomy tube in place.  expressed understanding. Patient will discharge to home on hospice care today.    Discharge Exam:  Vitals:    09/01/18 0400 09/01/18 0717 09/01/18 0850 09/01/18 0924   BP:  108/66 112/80    BP Location:  Left arm     Pulse:       Resp: 16 20     Temp:  98.1  F (36.7  C)     TempSrc:  Axillary     SpO2: 99% 97%  98%   Weight:       Height:           General: resting comfortably in bed; not in acute distress  HEENT: 6-0 cuffed Shiley in place with straps around neck at appropriate tension; gauze removed from beneath trach ties; cuff is down; no peristomal skin breakdown, crepitus, or fluid collection  Pulmonary: breathing comfortably on HTD     Discharge Medications:   [employer], Keira   Home  Medication Instructions [facility]:[id number]    Printed on:09/01/18 1022   Medication Information                      acetaminophen (TYLENOL) 500 MG tablet  1 tablet (500 mg) by Per G Tube route every 4 hours as needed for mild pain             albuterol (2.5 MG/3ML) 0.083% neb solution  Take 1 vial (2.5 mg) by nebulization every 4 hours as needed for shortness of breath / dyspnea or wheezing             amLODIPine (NORVASC) 5 MG tablet  1 tablet (5 mg) by Per G Tube route daily             arformoterol (BROVANA) 15 MCG/2ML NEBU neb solution  Take 2 mLs (15 mcg) by nebulization 2 times daily             budesonide (PULMICORT) 0.25 MG/2ML neb solution  Take 2 mLs (0.25 mg) by nebulization 2 times daily             docusate (COLACE) 50 MG/5ML liquid  5 mLs (50 mg) by Per Feeding Tube route 2 times daily             docusate (COLACE) 50 MG/5ML liquid  5 mLs (50 mg) by Per Feeding Tube route 2 times daily as needed for constipation             hypromellose-dextran (ARTIFICAL TEARS) 0.1-0.3 % SOLN ophthalmic solution  Place 1 drop into both eyes every hour as needed for dry eyes             ipratropium (ATROVENT) 0.02 % neb solution  Take 2.5 mLs (0.5 mg) by nebulization 4 times daily             levalbuterol (XOPENEX) 0.63 MG/3ML neb solution  Take 3 mLs (0.63 mg) by nebulization 4 times daily             LORazepam 0.5 mg/mL NON-STANDARD dilution 0.5 MG/ML SOLN solution  1-2 mLs (0.5-1 mg) by Per G Tube route every 4 hours as needed for anxiety             losartan (COZAAR) 50 MG tablet  1 tablet (50 mg) by Per G Tube route daily             methylphenidate (RITALIN) 5 MG tablet  0.5 tablets (2.5 mg) by Per G Tube route 2 times daily             morphine sulfate, high concentrate, (ROXANOL-CONCENTRATED) 20 MG/ML concentrated solution  Place 0.25-0.5 mLs (5-10 mg) under the tongue every 3 hours as needed (air hunger)             order for DME  Nebulizer machine    Diagnosis: COPD  Associated Rx: arformoterol, budesonide,  ipratropium, levalbuterol             polyethylene glycol (MIRALAX/GLYCOLAX) Packet  17 g by Per Feeding Tube route daily as needed for constipation             predniSONE (DELTASONE) 20 MG tablet  Take 1 tablet (20 mg) by mouth daily                   Discharge Procedure Orders  Medication Therapy Management Referral   Referral Type: Med Therapy Management     Home infusion referral     Home care nursing referral   Referral Type: Home Health Therapies & Aides     Reason for your hospital stay   Order Comments: Bleeding from laryngeal cancer tumor     Adult [facility] Follow-up and recommended labs and tests   Order Comments: No follow up is required. You may follow up with Dr. Walden as needed in ENT clinic. Please call to schedule an appointment: [phone].    Appointments on [place] and/or [facility] (with [facility] or [facility] provider or service). Call [phone] if you haven't heard regarding these appointments within 7 days of discharge.     Activity   Order Comments: Your activity upon discharge: Activity as tolerated   Order Specific Question Answer Comments   Is discharge order? Yes      Tracheostomy care   Order Comments: Please refer to tracheostomy care handout for complete details on how to care for the trach tube at home.     Discharge Instructions   Order Comments: It is likely that the tumor of the voice box will continue to bleed. Keira does not want any invasive interventions for bleeding and does not wish to be brought back to the hospital. Follow these instructions in the event of bleeding:  - Keep her head elevated to make it easier to cough up the blood and secretions  - Allow Keira to cough up blood and secretions through the trach tube  - Assist with suctioning through the trach tube by inserting the suction catheter 2-3 inches into the tube. Repeat as often as needed if she is unable to cough up the secretions   - Use dark towels and sheets to catch bleeding   - We recommend changing the  inner cannula of the trach tube every 8 hours, if able, to prevent dried blood and secretions from building up inside the tube which could lead to difficulty breathing. By keeping the trach tube clean it will help [place] to breathe easier. TO change the inner cannula pinch sides of the inner cannula and remove. Throw away old inner cannula and place a new clean inner cannula, making sure to lock into place.   - If she appears anxious you may give comfort medications as prescribed (Ativan)  - If she develops difficulty breathing give comfort medications as prescribed (morphine)    Robinol was intentionally discontinued due to thick secretions.     DNR/DNI     Diet   Order Comments: You may eat or drink by mouth if you wish. There is risk of food/drink getting into the lungs which could cause infection or difficulty breathing. Continue tube feeding through Gastrostomy (g-tube). Formula: Isosource 1.5. Give 1 can 4 times daily per bolus feed. Separate each feeding by 3-4 hours. Flush tube with 90 ml of water before and after each feeding. Flush tube with 30ml of water before and after giving medications.   Order Specific Question Answer Comments   Is discharge order? Yes          Dispo: To home on hospice. All of the patient's questions/concerns have been addressed at this time.       Colt Lyons MD  Otolaryngology- Head and Neck Surgery, PGY-2  Pager: [phone]  Please contact ENT with questions by dialing [phone] and entering job code [phone] when prompted.        Teaching statement:  I saw and evaluated this patient prior to discharge. I discussed the patient with the resident and agree with plan of care as documented in the resident note. I personally spent 25 minutes on discharge activities. I spoke with JR about the discharge plan. We discussed the home hospice plan. Medicare will not pay for some of patient's home respiratory medications. I discussed options for talking with home hospice SW next week to  see if there are alternative options for payment or speaking with the pharmacist at home to see which are the most reasonable to cover out of pocket. She will be given a script for home prednisone per recommendation of palliative care. We discussed that there may be ongoing bleeding that occurs - recommend suctioning and changing of the inner cannula to prevent clotting/obstruction. She ideally remains NPO but if she chooses to take oral intake for comfort she is at risk for aspiration pneumonia and encouraged  to suction her after any oral intake. He understands any aspiration pneumonia could be devastating and life ending for the patient. We discussed that this is going to be a challenge to manage independently and ideally the patient will allow other family members to assist with her cares. The patient has not wanted to have her trach left in place, we discussed that removal could result in her rapid decline due to her inability to breathe. If she decides to remove her trach, hospice can provide medications to help provide her comfort for air hunger. We discussed that  becomes overwhelmed or panics or unsure of what to do with regards to her cares, there are options for assistance through the 24 hr hospice line and worst case, he can call 911 for assistance.  states that he feels comfortable with the plan and taking her home.     Cynthia Walden MD    Department of Otolaryngology

## 2018-09-01 NOTE — CONSULTS
RN met with client, son , DIL and Laura KING to assess JRs ability to do trach care. He will be primary caregiver at home for client. He was given direction and performed cares well and states he is comfortable doing cares at home.  also stated he is comfortable with home bleeding protocol.  [facility] was called, neb machine and suction was ordered for delivery by 1pm. Respiratory therapy will meet admission team in the home at 1pm to set up heated humidity and continue education for clients son. I was contacted and tube feeds are set to gravity at 4 cans per day.  Coordinated cares with Dr Jean Lyons, Isabel Ness RN CC, Laura KING, Jory RT, and Melva discharge pharmacist.   Thank you for this referral   Please call [facility] with any questions   Adriana Diallo RN

## 2018-09-01 NOTE — PLAN OF CARE
Problem: Patient Care Overview  Goal: Plan of Care/Patient Progress Review  Outcome: No Change  Neuro: Alert, but difficult to assess orientation.    Cardiac: No tele. SBP 110s. HR 90s on pulse ox.   Respiratory: Shiley 6. Sating>90% on trach dome 21%. Suctioned x1.   GI/: Adequate urine output. BMx1. Incontinent of urine and stool.  Diet/appetite: Bolus TF given x1.  Activity:  Assist of 2, repositioned in chair.  Pain: At acceptable level on current regimen.   Skin: No new deficits noted.  LDA's: Peripheral x1.     Pt D/Cd home with hospice care. EMS arrived at 11am. Son aware of transfer home.     Plan: Continue with POC. Notify primary team with changes.

## 2018-09-01 NOTE — PROGRESS NOTES
Care Coordinator - Discharge Planning    Admission Date/Time:  8/16/2018  Attending MD:  Cynthia Walden MD     Data    Chart reviewed, discussed with interdisciplinary team.   Patient was admitted for:   1. Drug-induced constipation    2. Tracheostomy hemorrhage (H)    3. Laryngeal cancer (H)    4. Carcinoma larynx (H)    5. Dry eyes    6. Somnolence    7. Chronic obstructive pulmonary disease, unspecified COPD type (H)    8. Adjustment disorder with anxious mood    9. Air hunger         Assessment   [facility] RN here this am and met with Pts Son,  and his Wife to review discharge plan. Bedside nurse instructed  in suctioning and he did a return demonstration. Suction machine, catheters, trach care supplies, Humidified trach dome,ordered from [facility]. A Resp Therapist from [facility] will make a home visit today to assist with equipment set up and teaching.  [facility] RN scheduled for home visit at 1pm today.  Discharge medications sent home with .  [facility] transport provided transport.        Plan  Anticipated Discharge Date:  91/18  Anticipated Discharge Plan:  Discharge to home with Family, [facility] support      Isabel Singh RN

## 2018-09-01 NOTE — PLAN OF CARE
"Problem: Patient Care Overview  Goal: Plan of Care/Patient Progress Review  /61 (BP Location: Left arm)  Pulse 100  Temp 98.7  F (37.1  C) (Oral)  Resp 16  Ht 1.702 m (5' 7\")  Wt 55 kg (121 lb 4.1 oz)  SpO2 99%  BMI 18.99 kg/m2    Neuro: Alert. YULIYA orientation. Follows commands. Flat.   Cardiac: No tele orders. VSS. HR 80-90   Respiratory: Sating 100% on 21% TD. Shiley 6 cuff inflated. Suctioning Q4H. Good productive cough. Blood streaked sputum.   GI/: Incontinent. Adequate urine output. BM X2  Diet/appetite: NPO. Bolus TF.   Activity:  Repo Q2H.   Pain: Denies.    Skin: No new deficits noted.  LDA's: R PIV saline locked.     Plan: DC home today with hospice. Hospice nursing coming at 8AM to teach sons. Transport set up for 11AM.        "

## 2018-09-01 NOTE — PROGRESS NOTES
"Otolaryngology Progress Note  September 1, 2018       S: Ms Collins had no acute events overnight. She was afebrile and VSS and wnl.    O: /66 (BP Location: Left arm)  Pulse 100  Temp 98.1  F (36.7  C) (Axillary)  Resp 20  Ht 1.702 m (5' 7\")  Wt 55 kg (121 lb 4.1 oz)  SpO2 97%  BMI 18.99 kg/m2   General: resting comfortably in bed; sleeping   HEENT: 6-0 cuffed Shiley in place with straps around neck at appropriate tension; gauze removed from beneath trach ties; cuff is down; no peristomal skin breakdown, crepitus, or fluid collection   Pulmonary: breathing comfortably on HTD     Intake/Output Summary (Last 24 hours) at 08/28/18 0651  Last data filed at 08/28/18 0400   Gross per 24 hour   Intake             1600 ml   Output                0 ml   Net             1600 ml       LABS:  ROUTINE IP LABS (Last four results)  BMP    Recent Labs  Lab 08/27/18  1408      POTASSIUM 4.5   CHLORIDE 105   ESTEFANI 8.7   CO2 22   BUN 40*   CR 0.78   *     CBC    Recent Labs  Lab 08/31/18  0645 08/30/18  0527 08/29/18  0627 08/28/18  0302   WBC 12.7* 12.6* 11.8* 13.7*   RBC 2.92* 2.90* 3.15* 2.84*   HGB 8.7* 8.5* 9.1* 8.3*   HCT 27.7* 27.4* 29.2* 26.5*   MCV 95 95 93 93   MCH 29.8 29.3 28.9 29.2   MCHC 31.4* 31.0* 31.2* 31.3*   RDW 16.7* 16.9* 17.0* 16.5*    259 246 228         A/P: Keira Collins is a 72 year old female with a past medical history of COPD, HTN, HLD, PVD, recent CVA during last hospitalization, and [id number] 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    Neuro:  - Pain control: tylenol, oxycodone, and dilaudid PRN  - PTA ritalin BID  - Melatonin at bedtime prn  - Hx of CVA 7/2018- discussed with neuro, holding antiplatelets given ongoing bleeding    HEENT:  - [id number] squamous cell carcinoma of the larynx s/p tracheotomy for upper airway " obstruction; s/p DL and control of hemorrhage from tumor   - Per family care conference on 8/27: move towards home hospice. No further interventions for bleeding. Discussions regarding code status are ongoing. OK with CPAP/BiPAP on PRN basis. Plan for discharge to home hospice on 9/1   - Patient's family to come in to hospital on 09/1 to perform trach care  - 6-0 cuffed Shiley in place, cuff inflated  - Please keep trach straps 1-2 finger breadth tightness to prevent damage to tracheal wall  - Holding Plavix and ASA given dropping Hgb; appreciate recs from neurology and vascular surgery  - Robinul discontinued    Respiratory:  - Hx of COPD  - Saturating well with HTD  - HTD, supplemental O2 PRN to keep sats >92%  - PTA brovana, pulmicort, atrovent, xopenex  - Aspiration pneumonia vs pneumonitis   - CXR on 08/26 with right perihilar opacity c/w infiltrate vs atelectasis vs aspiration   - Afebrile   - Levaquin for possible aspiration pneumonia given her allergies to penicillins and cephalosporins    - Pt with QTc of 466 on previous EKG, 459 on repeat EKG 8/27; discussed with medicine and no concerns    CV/heme:  - Hemodynamically stable- P and BP wnl  - History of PVD s/p stent placement in 2017; vascular consult, appreciate recs   - CTA negative for tracheo-inominate fistula   - Holding DAPT in setting of ongoing bleeding  - Hx of HLD- continue PTA statin  - PTA amlodipine, atorvastatin, losartan  - PTA ASA held due to ongoing bleeding  - Hgb 9.7 on 08/31    FEN/GI:  - NPO  - Continue bolus TFs  - Ferrous sulfate TID; folate every day; lactobacillus; multivitamins  - Bowel regimen: docusate BID prn + senna BID prn + miralax qd prn    :  - Voiding independently; incontinent of urine    Endo:  - No acute issues    ID:  - Afebrile  - CXR on 08/26 with right perihilar opacity- currently treating with levaquin    PPX:  - SCDs  - IS    Dispo: Plan to discharge home with hospice today    Consults:  - Palliative care  -  WOC      -- Patient and above plan discussed with staff, Dr. Win Lyons MD  Otolaryngology- Head and Neck Surgery, PGY-2  Pager: [phone]  Please contact ENT with questions by dialing [phone] and entering job code [phone] when prompted.

## 2018-09-01 NOTE — PLAN OF CARE
DISCHARGE                         9/1/2018 11:40 AM  ----------------------------------------------------------------------------  Discharged to: Home with hospice care  Via: EMS  Accompanied by: EMS  Discharge Instructions: Instructions sent for hospice nurse.  Prescriptions: To be filled by discharge pharmacy medication list reviewed & sent with pt. Medications picked up and sent with family.  Belongings: All sent with pt  IV: d/c'd  Telemetry: d/c'd    All questions answered.    Discharge Paperwork: Signed, copied, and sent home with patient.

## 2018-09-04 NOTE — PROGRESS NOTES
Clinic Care Coordination Contact  Care Team Conversations    As per a chart review the patient was recently discharged from the hospital.  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  The patient requested to go home with hospice and not be re-hospitalized for any reason.  With the transfer to hospice the patient is no longer a candidate for care coordination.  The chart has been adjusted as per the changes.        Care Coordination to remain available for the patient to contact in the event of future needs. No follow up planned at this time.       Wojciech Marshall, MSN, RN, PHN I Clinic Care Coordinator  [facility]  Phone: [phone]  [email or web address]  .

## 2018-09-04 NOTE — TELEPHONE ENCOUNTER
MTM referral from: Transitions of Care (recent hospital discharge or ED visit)    MTM referral outreach attempt #1 on September 4, 2018 at 1:28 PM      Outcome: Patient is not interested at this time because she was discharged to hospice, will route to MTM Pharmacist/Provider as an FYI. Thank you for the referral.     Cristiano Peters, MTM Coordinator

## 2018-09-05 NOTE — PROGRESS NOTES
This is a recent snapshot of the patient's [facility] medical record.  For current drug dose and complete information and questions, call [phone] or In Basket pool, [facility] home [facility] ([id number])  CSN Number:  [id number]

## 2018-09-05 NOTE — PROGRESS NOTES
Noted.    Melissa Behl BSN, RN, PHN  [facility] Care Coordinator  [phone]

## 2018-10-05 ENCOUNTER — TELEPHONE (OUTPATIENT)
Dept: FAMILY MEDICINE | Facility: CLINIC | Age: 73
End: 2018-10-05

## 2018-10-05 NOTE — LETTER
[facility]  [address]  [place] [address]  Phone: [phone]  Fax: [phone]  October 5, 2018      Keira Collins  [address]  [place] [address]      Dear Keira,    We care about your health and have reviewed your health plan including your medical conditions, medications, and lab results.  Based on this review, it is recommended that you follow up regarding the following health topic(s):  -Breast Cancer Screening  -Colon Cancer Screening    We recommend you take the following action(s):  -schedule a MAMMOGRAM which is due. Please disregard this reminder if you have had this exam elsewhere within the last 1-2 years please let us know so we can update your records.  -schedule a COLONOSCOPY to look for colon cancer (due every 10 years or 5 years in higher risk situations.)  Colonoscopies can prevent 90-95% of colon cancer deaths.  Problem lesions can be removed before they ever become cancer.  If you do not wish to do a colonoscopy or cannot afford to do one at this time, there is another option called a Fecal Immunochemical Occult Blood Test (FIT) a take home stool sample kit.  It does not replace the colonoscopy for colorectal cancer screening, but it can detect hidden bleeding in the lower colon.  It does need to be repeated every year and if a positive result is obtained, you would be referred for a colonoscopy.  If you have completed either one of these tests at another facility, please have the records sent to our clinic for our records.     Please call us at the [facility] - [phone] (or use [employer]) to address the above recommendations.     Thank you for trusting [facility] and we appreciate the opportunity to serve you.  We look forward to supporting your healthcare needs in the future.    Healthy Regards,    Your Health Care Team  [facility]

## 2018-10-05 NOTE — TELEPHONE ENCOUNTER
Summary:    Patient is due/failing the following:   FIT and mammogram     Action needed:   Complete a FIT test and schedule a mammogram     Type of outreach:    Sent letter.    Questions for provider review:    None                                                                                                                                    Violeta Singh       Chart routed to Care Team .        Panel Management Review      Patient has the following on her problem list:     Hypertension   Last three blood pressure readings:  BP Readings from Last 3 Encounters:   09/01/18 110/58   08/10/18 139/63   07/16/18 138/72     Blood pressure: Passed    HTN Guidelines:  Age 18-59 BP range:  Less than 140/90  Age 60-85 with Diabetes:  Less than 140/90  Age 60-85 without Diabetes:  less than 150/90      Composite cancer screening  Chart review shows that this patient is due/due soon for the following Fecal Colorectal (FIT)

## 2020-07-06 NOTE — IP AVS SNAPSHOT
Keira Vick #[id number] (CSN:[id number])  (72 year old F)  (Adm: 18)     [id number]               [address] [place] [address] BANK: [phone]            Patient Demographics     Patient Name Sex          Age SSN Address Phone    Keira Vick Female 1945 (72 year old) [id number]   [facility] [address] [phone] (Home)  [phone] (Mobile) *Preferred*      Emergency Contact(s)     Name Relation Home Work Mobile    Dar Vick [phone]      Delma Mccallum Relative [phone]      Jr Eladio Vick   [phone]      Admission Information     Attending Provider Admitting Provider Admission Type Admission Date/Time    Amaury Estrada MD Wyman, Molly F, MD Elective 18  1134    Discharge Date Hospital Service Auth/Cert Status Service Area     Internal Medicine [facility]    Unit Room/Bed Admission Status        U 520/5202-01 Admission (Confirmed)       Admission     Complaint    Laryngeal Mass , Tracheostomy, acute management (H), Malnutrition , Caloric malnutrition (H)      Hospital Account     Name Acct ID Class Status Primary Coverage    Keira Vick [id number] Inpatient Open MEDICARE - MEDICARE            Guarantor Account (for Hospital Account #[id number])     Name Relation to Pt Service Area Active? Acct Type    Keira Vick Self FCS Yes Personal/Family    Address Phone          [address] [place] [address] [phone](H)              Coverage Information (for Hospital Account #[id number])     1. MEDICARE/MEDICARE     F/O Payor/Plan Precert #    MEDICARE/MEDICARE     Subscriber Subscriber #    Keira Vick [id number]    Address Phone    ATTN CLAIMS  [address]  [facility] [place] [address] [phone]          2. BLUE PLUS/BLUE PLUS MA     F/O Payor/Plan Precert #    BLUE PLUS/BLUE PLUS MA     Subscriber Subscriber #    Keira Vick [id number]    Address Phone    [id number]  [place] [address] [phone]                                                      INTERAGENCY TRANSFER FORM - PHYSICIAN ORDERS   7/17/2018                       [address] [facility] BANK: [phone]            Attending Provider: Amaury Estrada MD     Allergies:  Penicillins, Cefepime, Cephalosporins    Infection:  None   Service:  INTERNAL MED    Ht:  1.6 m (5' 2.99\")   Wt:  57.8 kg (127 lb 6.8 oz)   Admission Wt:  55.8 kg (123 lb 0.3 oz)    BMI:  22.58 kg/m 2   BSA:  1.6 m 2            ED Clinical Impression     Diagnosis Description Comment Added By Time Added    Tracheostomy, acute management (H) [Z43.0] Tracheostomy, acute management (H) [Z43.0]  Sandie Grace PA-C 8/9/2018  3:33 PM    Diarrhea, unspecified type [R19.7] Diarrhea, unspecified type [R19.7]  Sandie Grace PA-C 8/9/2018  3:34 PM    Anemia, unspecified type [D64.9] Anemia, unspecified type [D64.9]  Sandie Grace PA-C 8/9/2018  3:35 PM    Constipation, unspecified constipation type [K59.00] Constipation, unspecified constipation type [K59.00]  Sandie Grace PA-C 8/9/2018  3:37 PM    Malnutrition, unspecified type (H) [E46] Malnutrition, unspecified type (H) [E46]  Sandie Grace PA-C 8/9/2018  3:39 PM    Dry eyes [H04.123] Dry eyes [H04.123]  Sandie Grace PA-C 8/9/2018  3:39 PM    PVD (peripheral vascular disease) (H) [I73.9] PVD (peripheral vascular disease) (H) [I73.9]  Sandie Grace PA-C 8/10/2018 11:19 AM    Adjustment disorder with depressed mood [F43.21] Adjustment disorder with depressed mood [F43.21]  Sandie Grace PA-C 8/10/2018 11:20 AM    Ulcer of heel, left, with unspecified severity (H) [L97.429] Ulcer of heel, left, with unspecified severity (H) [L97.429]  Sandie Grace PA-C 8/10/2018 11:20 AM    PAD (peripheral artery disease) (H) [I73.9] PAD (peripheral artery disease) (H) [I73.9]  Sandie Grace PA-C " 8/10/2018 11:21 AM    Hyperlipidemia LDL goal <100 [E78.5] Hyperlipidemia LDL goal <100 [E78.5]  Sandie Grace PA-C 8/10/2018 11:21 AM    Essential hypertension with goal blood pressure less than 140/90 [I10] Essential hypertension with goal blood pressure less than 140/90 [I10]  Sandie Grace PA-C 8/10/2018 11:21 AM      Hospital Problems as of 8/10/2018              Priority Class Noted POA    COPD (chronic obstructive pulmonary disease) (H) Medium  7/28/2014 Yes    Essential hypertension with goal blood pressure less than 140/90 Medium  Unknown Yes    Hyperlipidemia LDL goal <100 Medium  Unknown Yes    H/O: alcohol abuse Medium  Unknown Yes    PVD (peripheral vascular disease) (H) Medium  7/11/2016 Yes    Tobacco abuse Medium  7/11/2016 Yes    Osteoporosis Medium  7/14/2016 Yes    Thyroid nodule Medium  6/15/2018 Yes    * (Principal)Malignant neoplasm of larynx (H) Medium  7/9/2018 Yes    Secondary malignancy of lymph nodes of head, face and neck (H) Medium  7/9/2018 Yes    History of stroke Medium  7/22/2018 Yes    Severe malnutrition (H) Medium  7/22/2018 Yes    Aspiration pneumonia (H) Medium  7/22/2018 Clinically Undetermined    Secondary malignant neoplasm of right lung (H) Medium  7/22/2018 Yes    Caloric malnutrition (H) Medium  7/27/2018 Yes      Non-Hospital Problems as of 8/10/2018              Priority Class Noted    Cholelithiasis Medium  Unknown    Physical deconditioning Medium  9/15/2016    Advance Care Planning Medium  1/16/2018    Elevated serum free T4 level Medium  1/17/2018    Laryngeal mass Medium  6/27/2018    Pulmonary nodules Medium  7/9/2018    Tracheostomy, acute management (H) Medium  7/17/2018      Code Status History     Date Active Date Inactive Code Status Order ID Comments User Context    8/9/2018  3:45 PM  Full Code [id number]  Sandie Grace PA-C Outpatient    8/3/2018  3:03 AM 8/9/2018  3:45 PM Full Code [id number]  Elder Gusman MD  Inpatient    7/17/2018  7:22 PM 8/3/2018  3:03 AM Full Code [id number]  Diana Degroot MD Inpatient      Current Code Status     Date Active Code Status Order ID Comments User Context       Prior      Summary of Visit     Reason for your hospital stay       You were admitted for a biopsy of the larynx. You had a stroke after surgery which was further complicated by pneumonia, tracheostomy needs and severe deconditioning. You discharged to transitional care unit for ongoing care and rehabilitation                Medication Review      START taking        Dose / Directions Comments    arformoterol 15 MCG/2ML Nebu neb solution   Commonly known as:  BROVANA   Used for:  Tracheostomy, acute management (H)        Dose:  15 mcg   Take 2 mLs (15 mcg) by nebulization 2 times daily   Quantity:  120 mL   Refills:  0        budesonide 0.25 MG/2ML neb solution   Commonly known as:  PULMICORT   Used for:  Tracheostomy, acute management (H)        Dose:  0.25 mg   Take 2 mLs (0.25 mg) by nebulization 2 times daily   Refills:  0        docusate 50 MG/5ML liquid   Commonly known as:  COLACE   Used for:  Constipation, unspecified constipation type        Dose:  50 mg   5 mLs (50 mg) by Per Feeding Tube route 2 times daily   Quantity:  300 mL   Refills:  0    Hold for loose stools       ferrous sulfate 300 (60 Fe) MG/5ML syrup   Used for:  Anemia, unspecified type        Dose:  300 mg   5 mLs (300 mg) by Per G Tube route daily   Quantity:  75 mL   Refills:  0        folic acid 500 mcg/mL Soln   Commonly known as:  FOLATE   Used for:  Anemia, unspecified type        Dose:  1 mg   2 mLs (1 mg) by Per G Tube route daily   Refills:  0        glycopyrrolate 1 MG tablet   Commonly known as:  ROBINUL   Used for:  Tracheostomy, acute management (H)        Dose:  1 mg   1 tablet (1 mg) by Per G Tube route 3 times daily   Refills:  0        hypromellose-dextran 0.1-0.3 % Soln ophthalmic solution   Commonly known as:  ARTIFICAL TEARS    Used for:  Dry eyes        Dose:  1 drop   Place 1 drop into both eyes every hour as needed for dry eyes   Refills:  0        ipratropium 0.02 % neb solution   Commonly known as:  ATROVENT   Used for:  Tracheostomy, acute management (H)        Dose:  0.5 mg   Take 2.5 mLs (0.5 mg) by nebulization 4 times daily   Quantity:  225 mL   Refills:  0        lactobacillus rhamnosus (GG) capsule   Used for:  Diarrhea, unspecified type        Dose:  1 capsule   1 capsule by Per Feeding Tube route 3 times daily (before meals)   Refills:  0        levalbuterol 0.63 MG/3ML neb solution   Commonly known as:  XOPENEX   Used for:  Tracheostomy, acute management (H)        Dose:  0.63 mg   Take 3 mLs (0.63 mg) by nebulization 4 times daily   Quantity:  360 mL   Refills:  0        methylphenidate 5 MG tablet   Commonly known as:  RITALIN   Used for:  Adjustment disorder with depressed mood        Dose:  2.5 mg   Take 0.5 tablets (2.5 mg) by mouth 2 times daily   Quantity:  6 tablet   Refills:  0        multivitamins with minerals Liqd liquid   Used for:  Malnutrition, unspecified type (H)        Dose:  15 mL   15 mLs by Per Feeding Tube route daily   Refills:  0        oxyCODONE IR 5 MG tablet   Commonly known as:  ROXICODONE   Used for:  Tracheostomy, acute management (H), PVD (peripheral vascular disease) (H)        Dose:  5-10 mg   1-2 tablets (5-10 mg) by Per G Tube route every 3 hours as needed for other (pain control or improvement in physical function. Hold dose for analgesic side effects.)   Quantity:  15 tablet   Refills:  0        polyethylene glycol Packet   Commonly known as:  MIRALAX/GLYCOLAX   Used for:  Constipation, unspecified constipation type        Dose:  17 g   17 g by Per Feeding Tube route daily   Quantity:  7 packet   Refills:  0    Hold for loose stools       sennosides 8.8 MG/5ML syrup   Commonly known as:  SENOKOT   Used for:  Anemia, unspecified type        Dose:  5 mL   5 mLs by Per Feeding Tube route 2  times daily   Quantity:  105 mL   Refills:  0    Hold for loose stools       sodium chloride 0.9 % neb solution   Used for:  Tracheostomy, acute management (H)        Dose:  3 mL   Take 3 mLs by nebulization 3 times daily   Refills:  0          CONTINUE these medications which may have CHANGED, or have new prescriptions. If we are uncertain of the size of tablets/capsules you have at home, strength may be listed as something that might have changed.        Dose / Directions Comments    acetaminophen 500 MG tablet   Commonly known as:  TYLENOL   This may have changed:  how to take this   Used for:  Ulcer of heel, left, with unspecified severity (H)        Dose:  500 mg   1 tablet (500 mg) by Per G Tube route every 6 hours   Refills:  0        amLODIPine 5 MG tablet   Commonly known as:  NORVASC   This may have changed:  how to take this   Used for:  Hyperlipidemia LDL goal <100        Dose:  5 mg   1 tablet (5 mg) by Per G Tube route daily   Quantity:  30 tablet   Refills:  3        aspirin 81 MG tablet   This may have changed:  how to take this   Used for:  PVD (peripheral vascular disease) (H)        Dose:  81 mg   1 tablet (81 mg) by Per G Tube route daily   Quantity:  30 tablet   Refills:  0        atorvastatin 40 MG tablet   Commonly known as:  LIPITOR   This may have changed:  See the new instructions.   Used for:  PAD (peripheral artery disease) (H), Hyperlipidemia LDL goal <100        Dose:  40 mg   1 tablet (40 mg) by Per G Tube route daily   Quantity:  90 tablet   Refills:  1        Calcium carb-Vitamin D 500 mg [place]-200 units 500-200 MG-UNIT per tablet   Commonly known as:  OSCAL with D;Oyster Shell Calcium   This may have changed:  how to take this   Used for:  Malnutrition, unspecified type (H), Tracheostomy, acute management (H)        Dose:  1 tablet   1 tablet by Per G Tube route 3 times daily (with meals) Reported on 2/24/2017   Quantity:  90 tablet   Refills:  0        losartan 50 MG tablet    Commonly known as:  COZAAR   This may have changed:  how to take this   Used for:  Essential hypertension with goal blood pressure less than 140/90        Dose:  50 mg   1 tablet (50 mg) by Per G Tube route daily   Quantity:  30 tablet   Refills:  3          CONTINUE these medications which have NOT CHANGED        Dose / Directions Comments    albuterol 108 (90 Base) MCG/ACT inhaler   Commonly known as:  PROAIR HFA/PROVENTIL HFA/VENTOLIN HFA   Used for:  Chronic obstructive pulmonary disease, unspecified COPD type (H)        Dose:  2 puff   Inhale 2 puffs into the lungs every 4 hours as needed for shortness of breath / dyspnea or wheezing   Quantity:  1 Inhaler   Refills:  9        clopidogrel 75 MG tablet   Commonly known as:  PLAVIX   Used for:  PAD (peripheral artery disease) (H)        TAKE 1 TABLET BY MOUTH ONCE DAILY   Quantity:  90 tablet   Refills:  1          STOP taking     fluticasone-salmeterol 45-21 MCG/ACT inhaler   Commonly known as:  ADVAIR-HFA           NUTRITIONAL SUPPLEMENT Liqd           SILVASORB Gel           umeclidinium 62.5 MCG/INH inhaler   Commonly known as:  INCRUSE ELLIPTA                   After Care     Activity - Up with assistive device       HOB elevated to 30 degrees       Adult Formula Bolus Feeding       Specify: See diet order       Advance Diet as Tolerated       Follow this diet upon discharge: Orders Placed This Encounter      Adult Formula Drip Feeding: Continuous Isosource 1.5; Gastrostomy; Goal Rate: 45; mL/hr; Medication - Tube Feeding Flush Frequency: At least 15-30 mL water before and after medication administration and with tube clogging; Amount to Send (Nutritio...      Adult Formula Bolus Feeding      NPO for Medical/Clinical Reasons Except for: No Exceptions       Fall precautions           General info for SNF       Length of Stay Estimate: Short Term Care: Estimated # of Days <30  Condition at Discharge: Stable  Level of care:skilled   Rehabilitation Potential:  Fair  Admission H&P remains valid and up-to-date: Yes  Recent Chemotherapy: N/A  Use Nursing Home Standing Orders: Yes       Mantoux instructions       Give two-step Mantoux (PPD) Per Facility Policy Yes       Neuro checks       q4h       Patient care order       Trach Tube Instructions:   1) Contact ENT on-call with any questions or concerns   2) The first changing of trach tube, sponge, and or ties will be performed by ENT post-op day 5. Afterwards, other staff can manage these items as needed.   3) Perform regular suctioning   4) RN should change disposable inner canulas every shift and/or clean it with a brush   5) Keep trach tube obturator taped to wall behind the head of the bed   6) Keep extra unopened 6.0 Shiley and 4.0 Shiley at bedside at all times   7) Minimize the amount of air in the cuff needed to adequately apply positive pressure ventilate. If positive pressure ventilation is not need then the cuff should be down.       Tracheostomy care by nursing       Standard Cares. Suggest suction for O2 sat changes or symptoms rather than gurgling as patient is often able to clear her own secretions               Further instructions from your care team       Trach Care Instructions:   1) Perform regular suctioning   2) RN should change disposable inner canulas every shift and/or clean it with a brush   3) Keep trach tube obturator taped to wall behind the head of the bed   4) Keep extra unopened 6 Shiley and 4 Shiley at bedside at all times   5) Minimize the amount of air in the cuff needed to adequately apply positive pressure ventilate. If positive pressure ventilation is not need then the cuff should be down.    Supplies     Pneumatic Compression Device        Bilateral calf. Remove 30 mins BID.             Referrals     Occupational Therapy Adult Consult       Evaluate and treat as clinically indicated.    Reason:  Deconditioning       Physical Therapy Adult Consult       Evaluate and treat as clinically  "indicated.    Reason:  Deconditioning       Speech Language Path Adult Consult       Evaluate and treat as clinically indicated.    Reason:  Recent CVA             Your next 10 appointments already scheduled     Sep 12, 2018 11:00 AM CDT   (Arrive by 10:45 AM)   New Patient Visit with Carlos Arroyo MD   [facility]rologic [facility] ([facility])    [address]  4th Floor  [facility] [address] [phone]              Statement of Approval     Ordered          08/10/18 1126  I have reviewed and agree with all the recommendations and orders detailed in this document.  EFFECTIVE NOW     Approved and electronically signed by:  Sandie Grace PA-C                                                 INTERAGENCY TRANSFER FORM - NURSING   7/17/2018                       [address] [facility] BANK: [phone]            Attending Provider: Amaury Estrada MD     Allergies:  Penicillins, Cefepime, Cephalosporins    Infection:  None   Service:  INTERNAL MED    Ht:  1.6 m (5' 2.99\")   Wt:  57.8 kg (127 lb 6.8 oz)   Admission Wt:  55.8 kg (123 lb 0.3 oz)    BMI:  22.58 kg/m 2   BSA:  1.6 m 2            Advance Directives        Scanned docmt in ACP Activity?           No scanned doc        Immunizations     Name Date      Pneumo Conj 13-V (2010&after) 05/18/16     Pneumococcal 23 valent 07/26/13     Tdap (Adacel,Boostrix) 07/26/13       ASSESSMENT     Discharge Profile Flowsheet     EXPECTED DISCHARGE     SKIN      Expected Discharge Date  07/23/18 07/18/18 1040   Inspection of bony prominences  Full 08/10/18 1210    DISCHARGE NEEDS ASSESSMENT     Full except areas not inspected   Scapula, left;Scapula, right;Buttock, left;Buttock, right;Sacrum;Coccyx;Spine 08/06/18 0904    Concerns To Be Addressed  compliance issue concerns 02/13/17 1002   Skin WDL  ex 08/10/18 1210    Concerns Comments  unable to reach patient 02/13/17 1002   Skin Temperature  " "warm 08/10/18 1210    Equipment Currently Used at Home  walker, rolling;shower chair 08/10/18 1200   Skin Moisture  dry 08/10/18 1210    Transportation Available  family or friend will provide 07/18/18 1045   Skin Integrity  bruise(s);drain/device(s);scab(s);scar(s) 08/10/18 1210    GASTROINTESTINAL (ADULT,PEDIATRIC,OB)     Inspection under devices  Full 08/10/18 1210    GI WDL  ex 08/10/18 1210   Not Inspected under devices  -- 08/08/18 1233    All Quadrants Bowel Sounds  audible and active in all quadrants 08/10/18 1220   Skin Elasticity  quick return to original state 08/10/18 0049    Last Bowel Movement  08/09/18 08/10/18 1220   Skin Color/Characteristics  redness blanchable 08/10/18 1210    GI Signs/Symptoms  passing flatus 08/09/18 2247   SAFETY      Incontinence Containment Product  fecal incontinence  08/09/18 2247   Safety WDL  WDL 08/10/18 1210    Passing flatus  yes 08/09/18 2247   Safety Factors  bed in low position;wheels locked;call light in reach;upper side rails raised x 2;ID band on 08/08/18 1733    Additional Documentation  Incontinence Containment Product (Row) 08/03/18 2357   All Alarms  alarm(s) activated and audible 08/09/18 1223    COMMUNICATION ASSESSMENT     Airway Safety Measures  all equipment/monitors on and audible 08/09/18 1223    Patient's communication style  spoken language (English or Bilingual) 07/17/18 1237   Additional Documentation  Airway Safety Measures (Row) 07/22/18 1259    FINAL RESOURCES     Aspiration Risk Screen  tube feeding 08/09/18 1223    Resources List  Home Care 02/13/17 1002                      Assessment WDL (Within Defined Limits) Definitions           Safety WDL     Effective: 09/28/15    Row Information: <b>WDL Definition:</b> Bed in low position, wheels locked; call light in reach; upper side rails up x 2; ID band on<br> <font color=\"gray\"><i>Item=AS safety wdl>>List=AS safety wdl>>Version=F14</i></font>      Skin WDL     Effective: 09/28/15    Row " "Information: <b>WDL Definition:</b> Warm; dry; intact; elastic; without discoloration; pressure points without redness<br> <font color=\"gray\"><i>Item=AS skin wdl>>List=AS skin wdl>>Version=F14</i></font>      Vitals     Vital Signs Flowsheet     VITAL SIGNS     Functioning  can do most things, but pain gets in the way of some 08/07/18 2140    Temp  98.8  F (37.1  C) 08/10/18 0626   Sleep  normal sleep 08/07/18 2140    Temp src  Oral 08/10/18 0626   ANALGESIA SIDE EFFECTS MONITORING      Resp  22 08/10/18 0626   Side Effects Monitoring: Respiratory Quality  R 08/09/18 0318    Pulse  96 08/10/18 0626   Side Effects Monitoring: Respiratory Depth  N 08/09/18 0318    Heart Rate  105 08/09/18 2151   Side Effects Monitoring: Sedation Level  1 08/09/18 0318    Pulse/Heart Rate Source  Monitor 08/10/18 0626   HEIGHT AND WEIGHT      BP  139/63 08/10/18 0626   Height  1.6 m (5' 2.99\") 07/17/18 1206    BP Location  Left arm 08/10/18 0626   Weight  57.8 kg (127 lb 6.8 oz) 08/09/18 0318    Patient Position  Lying 07/17/18 1720   Weight Method  Bed scale 08/09/18 0318    OXYGEN THERAPY     Bed Scale  Standard (fitted sheet, draw sheet/ pad, cover/flat sheet, blanket, two pillows) 08/06/18 0437    SpO2  98 % 08/10/18 0626   BSA (Calculated - sq m)  1.57 07/17/18 1206    O2 Device  Trach dome 08/10/18 0626   BMI (Calculated)  21.84 07/17/18 1206    FiO2 (%)  21 % 08/10/18 0459   ECG      Oxygen Delivery  -- (20 LPM) 08/10/18 0459   ECG Rhythm  Sinus rhythm 08/09/18 0318    Suction Occurrance  1 08/09/18 2237   Ectopy  None 08/09/18 0318    RESPIRATORY MONITORING     Ectopy Frequency  Frequent 08/02/18 0323    Respiratory Monitoring (EtCO2)  29 mmHg 07/28/18 0435   Lead Monitored  Lead II 08/01/18 0501    Integrated Pulmonary Index (IPI)  8-9 07/28/18 0435   Equipment  telemetry batteries changed 08/01/18 0501    PAIN/COMFORT     Rhythm Comment  ST Segment Normal 08/01/18 0501    Patient Currently in Pain  denies 08/10/18 1158  "  POSITIONING      Preferred Pain Scale  CAPA (Clinically Aligned Pain Assessment) ([facility] Adults Only) 08/10/18 1158   Body Position  independently positioning;turned 08/10/18 1220    Pain Location  Incision (G tube site) 08/03/18 0032   Head of Bed (HOB)  HOB at 30-45 degrees 08/10/18 1220    Pain Orientation  Left 07/24/18 1510   Positioning/Transfer Devices  pillows;in use 08/10/18 1220    Pain Descriptors  Patient unable to describe 08/03/18 0032   Chair  Recline and up in chair 08/08/18 1733    Pain Management Interventions  analgesia administered 08/07/18 1305   DAILY CARE      Pain Intervention(s)  Medication (See eMAR) 08/07/18 1305   Activity Management  activity adjusted per tolerance 08/10/18 1220    Response to Interventions  Decrease in pain 08/08/18 0223   Activity Assistance Provided  assistance, 2 people 08/10/18 1220    CLINICALLY ALIGNED PAIN ASSESSMENT (CAPA) ([facility] ADULTS ONLY)     Assistive Device Utilized  mechanical lift 08/10/18 1220    Comfort  negligible pain 08/09/18 0318   POINT OF CARE TESTING      Change in Pain  about the same 08/07/18 2140   Puncture Site  fingertip 08/02/18 1523    Pain Control  fully effective 08/07/18 2140   Bedside Glucose (mg/dl )   112 mg/dl 08/02/18 1557            Patient Lines/Drains/Airways Status    Active LINES/DRAINS/AIRWAYS     Name: Placement date: Placement time: Site: Days: Last dressing change:    Airway - Adult/Peds 6 Shiley 08/01/18   1400   6   8     Gastrostomy/Enterostomy Gastrostomy LUQ 1 20 fr 07/27/18   1425   LUQ   13     Peripheral IV 08/06/18 Left;Posterior Lower forearm 08/06/18   0339   Lower forearm   4             Patient Lines/Drains/Airways Status    Active PICC/CVC     None            Intake/Output Detail Report     Date Intake         Output     Net    Shift P.O. I.V. NG/GT IV Piggyback Enteral Total Urine Emesis/NG output Blood Total       Day 08/09/18 0000 - 08/09/18 0659 -- -- 60 --  315 375 -- -- -- -- 375    Ilsa 08/09/18 0700 - 08/09/18 1459 -- -- 60 -- 360 420 -- -- -- -- 420    Noc 08/09/18 1500 - 08/09/18 2359 -- -- 180 -- 180 360 -- -- -- -- 360    Day 08/10/18 0000 - 08/10/18 0659 -- -- 60 -- -- 60 -- -- -- -- 60    Ilsa 08/10/18 0700 - 08/10/18 1459 -- -- -- -- -- -- -- -- -- -- 0      Last Void/BM       Most Recent Value    Urine Occurrence 1 at 08/10/2018 0600    Stool Occurrence 1 at 08/09/2018 2300      Case Management/Discharge Planning     Case Management/Discharge Planning Flowsheet     REFERRAL INFORMATION     Transportation Available  family or friend will provide 07/18/18 1045    Admission Type  inpatient 07/18/18 1457   FINAL RESOURCES      LIVING ENVIRONMENT     Equipment Currently Used at Home  walker, rolling;shower chair 08/10/18 1200    Lives With  child(best), adult 07/18/18 1045   Resources List  Home Care 02/13/17 1002    Living Arrangements  house 07/18/18 1045   / CAREGIVER      COPING/STRESS     Filed Complexity Screen Score  9 07/18/18 1457    Major Change/Loss/Stressor  none 07/17/18 1237   ABUSE RISK SCREEN      EXPECTED DISCHARGE     QUESTION TO PATIENT:  Has a member of your family or a partner(now or in the past) intimidated, hurt, manipulated, or controlled you in any way?  no 07/17/18 1220    Expected Discharge Date  07/23/18 07/18/18 1040   QUESTION TO PATIENT: Do you feel safe going back to the place where you are living?  yes 07/17/18 1220    ASSESSMENT/CONCERNS TO BE ADDRESSED     OBSERVATION: Is there reason to believe there has been maltreatment of a vulnerable adult (ie. Physical/Sexual/Emotional abuse, self neglect, lack of adequate food, shelter, medical care, or financial exploitation)?  no 07/17/18 1220    Concerns To Be Addressed  compliance issue concerns 02/13/17 1002   OTHER      Concerns Comments  unable to reach patient 02/13/17 1002   Are you depressed or being treated for depression?  No 07/17/18 1237    DISCHARGE PLANNING                          [address] [facility]: [phone]            Medication Administration Report for Keira Collins as of 08/10/18 1223   Legend:    Given Hold Not Given Due Canceled Entry Other Actions    Time Time (Time) Time  Time-Action       Inactive    Active    Linked        Medications 08/04/18 08/05/18 08/06/18 08/07/18 08/08/18 08/09/18 08/10/18    acetaminophen (TYLENOL) tablet 650 mg  Dose: 650 mg  Freq: EVERY 6 HOURS PRN Route: PER FEEDING   PRN Reasons: mild pain,fever  Start: 07/27/18 1934   Admin Instructions: Maximum acetaminophen dose from all sources = 75 mg/kg/day not to exceed 4 grams/day.    Admin. Amount: 2 tablet (2 × 325 mg tablet)  Last Admin: 08/07/18 2035  Dispense Loc: [facility] ADS 5A1       1945 (650 mg)-Given        0835 (650 mg)-Given       2035 (650 mg)-Given              albuterol neb solution 2.5 mg  Dose: 2.5 mg  Freq: EVERY 4 HOURS PRN Route: NEBULIZATION  PRN Reason: wheezing  Start: 07/18/18 1112   Admin. Amount: 2.5 mg = 3 mL Conc: 2.5 mg/3 mL  Last Admin: 07/25/18 0225  Dispense Loc: [facility] ADS 5A1  Volume: 3 mL               amLODIPine (NORVASC) tablet 5 mg  Dose: 5 mg  Freq: DAILY Route: PER FEEDING   Start: 07/21/18 0800   Admin Instructions: Hold for SBP<115    Admin. Amount: 2 tablet (2 × 2.5 mg tablet)  Last Admin: 08/10/18 0839  Dispense Loc: [facility] ADS 5A1     0850 (5 mg)-Given        0818 (5 mg)-Given        0819 (5 mg)-Given        0803 (5 mg)-Given        0844 (5 mg)-Given        0825 (5 mg)-Given        0839 (5 mg)-Given           arformoterol (BROVANA) neb solution 15 mcg  Dose: 15 mcg  Freq: 2 TIMES DAILY Route: NEBULIZATION  Start: 07/18/18 2000   Admin. Amount: 15 mcg = 2 mL Conc: 15 mcg/2 mL  Last Admin: 08/10/18 0716  Dispense Loc: [facility]  Volume: 2 mL     0729 (15 mcg)-Given       2013 (15 mcg)-Given        0859 (15 mcg)-Given       (2046)-Not Given        (0940)-Not Given       2101 (15 mcg)-Given        0826 (15 mcg)-Given       2017 (15  mcg)-Given        0733 (15 mcg)-Given       1951 (15 mcg)-Given        0820 (15 mcg)-Given       2041 (15 mcg)-Given        0716 (15 mcg)-Given       [ ] 2000           aspirin chewable tablet 81 mg  Dose: 81 mg  Freq: DAILY Route: PER FEEDING   Start: 07/28/18 0800   Admin. Amount: 1 tablet (1 × 81 mg tablet)  Last Admin: 08/10/18 0839  Dispense Loc: [facility] ADS 5A1     0851 (81 mg)-Given        0817 (81 mg)-Given        0818 (81 mg)-Given        0802 (81 mg)-Given        0844 (81 mg)-Given        0825 (81 mg)-Given        0839 (81 mg)-Given           atorvastatin (LIPITOR) tablet 40 mg  Dose: 40 mg  Freq: DAILY Route: PER FEEDING   Start: 07/18/18 0800   Admin. Amount: 1 tablet (1 × 40 mg tablet)  Last Admin: 08/10/18 0840  Dispense Loc: [facility] ADS 5A1     0851 (40 mg)-Given        0818 (40 mg)-Given        0819 (40 mg)-Given        0803 (40 mg)-Given        0844 (40 mg)-Given        0825 (40 mg)-Given        0840 (40 mg)-Given           bisacodyl (DULCOLAX) Suppository 10 mg  Dose: 10 mg  Freq: DAILY PRN Route: RE  PRN Reason: constipation  Start: 07/20/18 1014   Admin. Amount: 1 suppository (1 × 10 mg suppository)  Dispense Loc: [facility] ADS 5A1               budesonide (PULMICORT) neb solution 0.25 mg  Dose: 0.25 mg  Freq: 2 TIMES DAILY Route: NEBULIZATION  Start: 07/18/18 1130   Admin. Amount: 0.25 mg = 2 mL Conc: 0.25 mg/2 mL  Last Admin: 08/10/18 0716  Dispense Loc: [facility]     0729 (0.25 mg)-Given       2013 (0.25 mg)-Given        0746 (0.25 mg)-Given       2001 (0.25 mg)-Given        0936 (0.25 mg)-Given       2101 (0.25 mg)-Given        0827 (0.25 mg)-Given       2018 (0.25 mg)-Given        0733 (0.25 mg)-Given       1951 (0.25 mg)-Given        0820 (0.25 mg)-Given       2041 (0.25 mg)-Given        0716 (0.25 mg)-Given       [ ] 2000           Calcium carb-Vitamin D 500 mg [place]-200 units (OSCAL with D;Oyster Shell Calcium) per tablet 1 tablet  Dose: 1 tablet  Freq: 2 TIMES DAILY Route: PER  FEEDING   Start: 07/18/18 0800   Admin. Amount: 1 tablet  Last Admin: 08/10/18 0840  Dispense Loc: [facility] ADS 5A1     0850 (1 tablet)-Given       1955 (1 tablet)-Given        0817 (1 tablet)-Given       1907 (1 tablet)-Given        0819 (1 tablet)-Given       1945 (1 tablet)-Given        0802 (1 tablet)-Given       2035 (1 tablet)-Given        0844 (1 tablet)-Given       2012 (1 tablet)-Given        0826 (1 tablet)-Given       2112 (1 tablet)-Given        0840 (1 tablet)-Given       [ ] 2000           clopidogrel (PLAVIX) tablet 75 mg  Dose: 75 mg  Freq: DAILY Route: PER FEEDING   Start: 07/21/18 1415   Admin. Amount: 1 tablet (1 × 75 mg tablet)  Last Admin: 08/10/18 0839  Dispense Loc: [facility] ADS 5A1     0851 (75 mg)-Given        0817 (75 mg)-Given        0818 (75 mg)-Given        0803 (75 mg)-Given        0845 (75 mg)-Given        0826 (75 mg)-Given        0839 (75 mg)-Given           dextrose 10 % 1,000 mL infusion  Freq: CONTINUOUS PRN Route: IV  PRN Comment: Hypoglycemia prevention  Start: 07/18/18 0739   Admin Instructions: For Hypoglycemia Prevention for patients on long-acting subcutaneous basal insulin (Glargine, Detemir, NPH) or continuous insulin infusion. Whenever nutrition support is held or interrupted:   1) Infuse IV D10W at nutrition support rate  2) Notify provider for further instructions    Dispense Loc: [facility] ADS 5A1  Volume: 1,000 mL   Mixture Administration Information:   Medication Type Amount   dextrose 10 % SOLN Base 1,000 mL                       docusate (COLACE) 50 MG/5ML liquid 50 mg  Dose: 50 mg  Freq: 2 TIMES DAILY Route: PER FEEDING   Start: 07/17/18 2045   Admin Instructions: Should be administered in milk or fruit juice to mask the taste.    Admin. Amount: 50 mg = 5 mL Conc: 10 mg/mL  Last Admin: 08/10/18 0839  Dispense Loc: [facility]  Volume: 5 mL     (0851)-Not Given       (1953)-Not Given [C]        (0821)-Not Given       (1907)-Not Given        (0817)-Not Given  [C]       (1938)-Not Given [C]        (0735)-Not Given [C]       2029-Hold [C]        0843 (50 mg)-Given       2012 (50 mg)-Given        0823 (50 mg)-Given       2111 (50 mg)-Given        0839 (50 mg)-Given       [ ] 2000           enoxaparin (LOVENOX) injection 30 mg  Dose: 30 mg  Freq: EVERY 24 HOURS Route: SC  Start: 07/24/18 1630   Admin. Amount: 30 mg = 0.3 mL Conc: 30 mg/0.3 mL  Last Admin: 08/09/18 2112  Dispense Loc: [facility]  Volume: 0.3 mL     1512 (30 mg)-Given        1512 (30 mg)-Given        1834 (30 mg)-Given        1828 (30 mg)-Given        2218 (30 mg)-Given        2112 (30 mg)-Given        [ ] 2000           ferrous sulfate 300 (60 Fe) MG/5ML syrup 300 mg  Dose: 300 mg  Freq: DAILY Route: PO  Start: 08/09/18 1600   Admin Instructions: Absorbed best on an empty stomach. If stomach upset occurs, can take with meals.    Admin. Amount: 300 mg = 5 mL Conc: 300 mg/5 mL  Last Admin: 08/10/18 0839  Dispense Loc: [facility]  Volume: 5 mL          1638 (300 mg)-Given        0839 (300 mg)-Given           folic acid (FOLATE) oral solution 1 mg  Dose: 1 mg  Freq: DAILY Route: PO  Start: 07/28/18 1645   Admin. Amount: 1 mg = 2 mL Conc: 0.5 mg/mL  Last Admin: 08/10/18 0839  Dispense Loc: [facility]  Volume: 2 mL     0851 (1 mg)-Given        0818 (1 mg)-Given        0817 (1 mg)-Given        0802 (1 mg)-Given        0843 (1 mg)-Given        0823 (1 mg)-Given        0839 (1 mg)-Given           glucose gel 15-30 g  Dose: 15-30 g  Freq: EVERY 15 MIN PRN Route: PO  PRN Reason: low blood sugar  Start: 07/26/18 1947   Admin Instructions: Give 15 g for BG 51 to 69 mg/dL IF patient is conscious and able to swallow. Give 30 g for BG less than or equal to 50 mg/dL IF patient is conscious and able to swallow. Do NOT give glucose gel via enteral tube.  IF patient has enteral tube: give apple juice 120 mL (4 oz or 15 g of CHO) via enteral tube for BG 51 to 69 mg/dL.  Give apple juice 240 mL (8  oz or 30 g of CHO) via enteral tube for BG less than or equal to 50 mg/dL.    ~Oral gel is preferable for conscious and able to swallow patient.   ~IF gel unavailable or patient refuses may provide apple juice 120 mL (4 oz or 15 g of CHO). Document juice on I and O flowsheet.    Admin. Amount: 15-30 g  Dispense Loc: [facility] ADS 5A1  Volume: 93.75 mL              Or  dextrose 50 % injection 25-50 mL  Dose: 25-50 mL  Freq: EVERY 15 MIN PRN Route: IV  PRN Reason: low blood sugar  Start: 07/26/18 1947   Admin Instructions: Use if have IV access, BG less than 70 mg/dL and meet dose criteria below:  Dose if conscious and alert (or disorientated) and NPO = 25 mL  Dose if unconscious / not alert = 50 mL  Vesicant. For ordered doses up to 25 g, give IV Push undiluted. Give each 5g over 1 minute.    Admin. Amount: 25-50 mL  Last Admin: 07/27/18 0334  Dispense Loc: [facility]S 5A1  Infused Over: 1-5 Minutes  Volume: 50 mL              Or  glucagon injection 1 mg  Dose: 1 mg  Freq: EVERY 15 MIN PRN Route: SC  PRN Reason: low blood sugar  PRN Comment: May repeat x 1 only  Start: 07/26/18 1947   Admin Instructions: May give SQ or IM. ONLY use glucagon IF patient has NO IV access AND is UNABLE to swallow AND blood glucose is LESS than or EQUAL to 50 mg/dL.  If ordered IV, give IV Push over 1 minute. Reconstitute with 1mL sterile water.    Admin. Amount: 1 mg  Dispense Loc: [facility] ADS 5A1               glycopyrrolate (ROBINUL) tablet 1 mg  Dose: 1 mg  Freq: 3 TIMES DAILY Route: PO  Start: 08/04/18 1400   Admin. Amount: 1 tablet (1 × 1 mg tablet)  Last Admin: 08/10/18 0839  Dispense Loc: [facility]     1350 (1 mg)-Given       1955 (1 mg)-Given        0818 (1 mg)-Given       1511 (1 mg)-Given       1907 (1 mg)-Given        0818 (1 mg)-Given       1331 (1 mg)-Given       2214 (1 mg)-Given        0802 (1 mg)-Given       1318 (1 mg)-Given       2035 (1 mg)-Given        0844 (1 mg)-Given       1401 (1 mg)-Given       2012 (1  mg)-Given        0825 (1 mg)-Given       1636 (1 mg)-Given        0012 (1 mg)-Given       0839 (1 mg)-Given       [ ] 1400       [ ] 2000           heparin lock flush 10 UNIT/ML injection 5-10 mL  Dose: 5-10 mL  Freq: EVERY 24 HOURS Route: IK  Start: 07/25/18 1700   Admin Instructions: To lock each dormant lumen of peripheral midline IV catheter lumen. Check PRN heparin flush order to see when last dose of PRN heparin was given before administering.   MAX: 5 mL per lumen.    Admin. Amount: 5-10 mL  Last Admin: 07/29/18 1502  Dispense Loc: [facility] ADS 5A1  Volume: 10 mL                                     (1534)-Not Given        (1611)-Not Given        [ ] 1600           heparin lock flush 10 UNIT/ML injection 5-10 mL  Dose: 5-10 mL  Freq: EVERY 1 HOUR PRN Route: IK  PRN Reason: other  PRN Comment: to lock each dormant lumen of peripheral midline IV catheter lumen. MAX: 5 mL per lumen.  Start: 07/25/18 1658   Admin Instructions: MAX: 5 mL per lumen.    Admin. Amount: 5-10 mL  Last Admin: 07/27/18 2224  Dispense Loc: [facility] ADS 5A1  Volume: 10 mL               hydrALAZINE (APRESOLINE) injection 10 mg  Dose: 10 mg  Freq: EVERY 4 HOURS PRN Route: IV  PRN Reason: high blood pressure  PRN Comment: For SBP > 160  Start: 07/22/18 1853   Admin Instructions: If heart rate greater than or equal to 60 bpm, use labetalol PRN first.  If heart rate less than 60 bpm, use hydralazine PRN first.   <br>  For ordered doses up to 40 mg, give IV Push undiluted over 1 minute.    Admin. Amount: 10 mg = 0.5 mL Conc: 20 mg/mL  Last Admin: 08/06/18 0522  Dispense Loc: [facility]  Infused Over: 1 Minutes  Volume: 0.5 mL       0522 (10 mg)-Given               hypromellose-dextran (ARTIFICAL TEARS) 0.1-0.3 % ophthalmic solution 1 drop  Dose: 1 drop  Freq: EVERY 1 HOUR PRN Route: Both Eyes  PRN Reason: dry eyes  Start: 07/25/18 0555   Admin. Amount: 1 drop  Dispense Loc: [facility]  Volume: 15 mL               ipratropium  (ATROVENT) 0.02 % neb solution 0.5 mg  Dose: 0.5 mg  Freq: 4 TIMES DAILY Route: NEBULIZATION  Start: 07/28/18 0800   Admin. Amount: 0.5 mg = 2.5 mL Conc: 0.5 mg/2.5 mL  Last Admin: 08/10/18 1120  Dispense Loc: [facility] ADS 5A1  Volume: 2.5 mL     0730 (0.5 mg)-Given       1205 (0.5 mg)-Given       1535 (0.5 mg)-Given       2013 (0.5 mg)-Given        0746 (0.5 mg)-Given       1127 (0.5 mg)-Given       1534 (0.5 mg)-Given       2002 (0.5 mg)-Given        0936 (0.5 mg)-Given       1309 (0.5 mg)-Given       1627 (0.5 mg)-Given       2101 (0.5 mg)-Given        0827 (0.5 mg)-Given       1203 (0.5 mg)-Given       1627 (0.5 mg)-Given       2018 (0.5 mg)-Given        0734 (0.5 mg)-Given       1200 (0.5 mg)-Given       1604 (0.5 mg)-Given       1951 (0.5 mg)-Given        0819 (0.5 mg)-Given       1240 (0.5 mg)-Given       1628 (0.5 mg)-Given       2040 (0.5 mg)-Given        0716 (0.5 mg)-Given       1120 (0.5 mg)-Given       [ ] 1600       [ ] 2000           labetalol (NORMODYNE/TRANDATE) injection 10 mg  Dose: 10 mg  Freq: EVERY 4 HOURS PRN Route: IV  PRN Reason: high blood pressure  PRN Comment: SBP >160  Start: 07/28/18 1645   Admin Instructions: If heart rate greater than or equal to 60 bpm, use labetalol PRN first.  If heart rate less than 60 bpm, use hydralazine PRN first.   <br>  For ordered doses up to 80 mg, give IV Push undiluted. Give each 20 mg over 2 minutes.    Admin. Amount: 10 mg = 2 mL Conc: 5 mg/mL  Last Admin: 07/31/18 2325  Dispense Loc: [facility]  Infused Over: 2-8 Minutes  Volume: 4 mL               lactobacillus rhamnosus (GG) (CULTURELL) capsule 1 capsule  Dose: 1 capsule  Freq: 3 TIMES DAILY BEFORE MEALS Route: PER FEEDING   Start: 07/20/18 1200   Admin Instructions: Administer at least 2 hours before or after oral antibiotics. Capsules may be opened.    Admin. Amount: 1 capsule  Last Admin: 08/10/18 0840  Dispense Loc: [facility] ADS 5A1     0850 (1 capsule)-Given       1104 (1  capsule)-Given       1737 (1 capsule)-Given        0818 (1 capsule)-Given       1138 (1 capsule)-Given       1651 (1 capsule)-Given        0818 (1 capsule)-Given       1330 (1 capsule)-Given       1835 (1 capsule)-Given        0803 (1 capsule)-Given       1124 (1 capsule)-Given       1828 (1 capsule)-Given        0845 (1 capsule)-Given       1401 (1 capsule)-Given       1718 (1 capsule)-Given        0823 (1 capsule)-Given       1341 (1 capsule)-Given       1636 (1 capsule)-Given        0840 (1 capsule)-Given       [ ] 1200       [ ] 1700           levalbuterol (XOPENEX) neb solution 0.63 mg  Dose: 0.63 mg  Freq: 4 TIMES DAILY Route: NEBULIZATION  Start: 07/28/18 0800   Order specific questions:  Levalbuterol orders will be substituted to albuterol unless intolerance is documented here Tachycardia     Admin. Amount: 0.63 mg = 3 mL Conc: 0.63 mg/3 mL  Last Admin: 08/10/18 1120  Dispense Loc: [facility] ADS 5A1  Volume: 3 mL     0730 (0.63 mg)-Given       1205 (0.63 mg)-Given       1535 (0.63 mg)-Given       2013 (0.63 mg)-Given        0746 (0.63 mg)-Given       1127 (0.63 mg)-Given       1534 (0.63 mg)-Given       2001 (0.63 mg)-Given        0936 (0.63 mg)-Given       1310 (0.63 mg)-Given       1627 (0.63 mg)-Given       2102 (0.63 mg)-Given        0827 (0.63 mg)-Given       1203 (0.63 mg)-Given       1627 (0.63 mg)-Given       2018 (0.63 mg)-Given        0733 (0.63 mg)-Given       1200 (0.63 mg)-Given       1604 (0.63 mg)-Given       1951 (0.63 mg)-Given        0820 (0.63 mg)-Given       1240 (0.63 mg)-Given       1628 (0.63 mg)-Given       2040 (0.63 mg)-Given        0716 (0.63 mg)-Given       1120 (0.63 mg)-Given       [ ] 1600       [ ] 2000           Lidocaine (LIDOCARE) 4 % Patch 1 patch  Dose: 1 patch  Freq: EVERY 24 HOURS 0800 Route: TD  Start: 07/25/18 0800   Admin Instructions: Apply patch(s) to left chest . To prevent lidocaine toxicity, patient should be patch free for 12 hrs daily. Patches may be cut to  "smaller size prior to removing release liner.  NEVER APPLY HEAT OVER PATCH which increases absorption and risk of local anesthetic toxicity. Do not apply over area where liposomal bupivacaine was injected for 96 hours post injection.    Admin. Amount: 1 patch  Last Admin: 08/09/18 0826  Dispense Loc: [facility] ADS 5A1  Infused Over: 12 Hours                            1950 (1 patch)-Given               2035 (1 patch)-Given [C]        (0747)-Not Given        0826 (1 patch)-Given        (0842)-Not Given           lidocaine (LMX4) cream  Freq: EVERY 1 HOUR PRN Route: Top  PRN Reason: moderate pain  PRN Comment: with VAD insertion or accessing implanted port  Start: 07/25/18 1658   Admin Instructions: Do NOT give if patient has a history of allergy to any local anesthetic or any \"rosario\" product.   Apply 30 minutes prior to VAD insertion or port access.  MAX Dose:  2.5 g (  of 5 g tube)    Dispense Loc: [facility] ADS 5A1               lidocaine 1 % 0.5-5 mL  Dose: 0.5-5 mL  Freq: EVERY 1 HOUR PRN Route: OTHER  PRN Comment: mild pain with VAD insertion or accessing implanted port.  Start: 07/25/18 1239   Admin Instructions: Do NOT give if patient has a history of allergy to any local anesthetic or any \"rosario\" product. MAX dose 1 mL subcutaneous OR intradermal in divided doses.    Admin. Amount: 0.5-5 mL  Last Admin: 07/25/18 1700  Dispense Loc: [facility] ADS 5A1  Volume: 6 mL               lidocaine 1 % 1 mL  Dose: 1 mL  Freq: EVERY 1 HOUR PRN Route: OTHER  PRN Comment: mild pain with VAD insertion or accessing implanted port  Start: 07/25/18 1658   Admin Instructions: Do NOT give if patient has a history of allergy to any local anesthetic or any \"rosario\" product. MAX dose 1 mL subcutaneous OR intradermal in divided doses.    Admin. Amount: 1 mL  Dispense Loc: [facility] ADS 5A1  Volume: 2 mL               lidocaine patch in PLACE  Freq: EVERY 8 HOURS Route: TD  Start: 07/24/18 1230   Admin Instructions: Chart every shift, " confirming that patch is still in place on patient (no barcode scan needed). See patch order for dose information.  NEVER APPLY HEAT OVER PATCH which will increase absorption and may lead to risk of local anesthetic toxicity. Do not apply over area where liposomal bupivacaine injected for 96 hours.    Last Admin: 08/10/18 0301  Dispense Loc: [facility]                                                                0351 ( )-Patch in Place              2035 ( )-Patch in Place        0518 ( )-Patch in Place                             1231 ( )-Patch in Place       2113 ( )-Patch in Place        0301 ( )-Patch in Place       [ ] 1200       [ ] 2000           lidocaine patch REMOVAL  Freq: EVERY 24 HOURS 2000 Route: TD  Start: 07/24/18 2000   Admin Instructions: Patient should have a 12 hour patch free interval    Last Admin: 08/10/18 0842  Dispense Loc: [facility]                      0803 ( )-Patch Removed        0845 ( )-Patch Removed                0842 ( )-Negative           losartan (COZAAR) tablet 50 mg  Dose: 50 mg  Freq: DAILY Route: PER FEEDING   Start: 07/18/18 0800   Admin Instructions: Hold for SBP<120    Admin. Amount: 1 tablet (1 × 50 mg tablet)  Last Admin: 08/10/18 0839  Dispense Loc: [facility] ADS 5A1     0850 (50 mg)-Given        0817 (50 mg)-Given        0818 (50 mg)-Given        0803 (50 mg)-Given        0844 (50 mg)-Given        0826 (50 mg)-Given        0839 (50 mg)-Given           magnesium sulfate 4 g in 100 mL sterile water (premade)  Dose: 4 g  Freq: EVERY 4 HOURS PRN Route: IV  PRN Reason: magnesium supplementation  Last Dose: 4 g (07/21/18 1538)  Start: 07/17/18 1922   Admin Instructions: For serum Mg++ less than 2 mg/dL,  Give 4 g and recheck magnesium level 2 hours after dose, and next AM. Maintain Mg++ greater than 2mg/dL    Admin. Amount: 4 g = 100 mL Conc: 4 g/100 mL  Last Admin: 07/21/18 1538  Dispense Loc: [facility]  Infused Over: 120 Minutes  Volume:  100 mL               melatonin tablet 3 mg  Dose: 3 mg  Freq: AT BEDTIME Route: PO  Start: 07/31/18 2200   Admin Instructions: Do not give unless at least 6 hours of uninterrupted sleep is expected.    Admin. Amount: 1 tablet (1 × 3 mg tablet)  Last Admin: 08/09/18 2112  Dispense Loc: [facility] ADS 5A1     2110 (3 mg)-Given        2206 (3 mg)-Given        2214 (3 mg)-Given        2336 (3 mg)-Given        2218 (3 mg)-Given        2112 (3 mg)-Given        [ ] 2200           methylphenidate (RITALIN) half-tab 2.5 mg  Dose: 2.5 mg  Freq: 2 TIMES DAILY Route: PO  Start: 08/02/18 1215   Admin. Amount: 1 half-tab (1 × 2.5 mg half-tab)  Last Admin: 08/10/18 0848  Dispense Loc: [facility] ADS 5A1     0901 (2.5 mg)-Given       1104 (2.5 mg)-Given        0855 (2.5 mg)-Given       1138 (2.5 mg)-Given        0819 (2.5 mg)-Given       1331 (2.5 mg)-Given        0803 (2.5 mg)-Given       1124 (2.5 mg)-Given        0844 (2.5 mg)-Given       1401 (2.5 mg)-Given        0825 (2.5 mg)-Given       1341 (2.5 mg)-Given        0848 (2.5 mg)-Given       [ ] 1200           metoclopramide (REGLAN) tablet 5 mg  Dose: 5 mg  Freq: EVERY 6 HOURS PRN Route: PER FEEDING   PRN Comment: nausea and vomiting  Start: 07/20/18 1015   Admin Instructions: This is Step 3 of nausea and vomiting management.  Give if nausea not resolved 15 minutes after giving prochlorperazine (COMPAZINE).  If nausea not resolved in 15-30 minutes, Notify provider.    Admin. Amount: 1 tablet (1 × 5 mg tablet)  Dispense Loc: [facility]              Or  metoclopramide (REGLAN) injection 5 mg  Dose: 5 mg  Freq: EVERY 6 HOURS PRN Route: IV  PRN Comment: nausea and vomiting  Start: 07/20/18 1015   Admin Instructions: This is Step 3 of nausea and vomiting management.  Give if nausea not resolved 15 minutes after giving prochlorperazine (COMPAZINE).  If nausea not resolved in 15-30 minutes, Notify provider.  Avoid use if patient has full bowel obstruction or perforation.  Irritant. For ordered doses up to 10 mg, give IV Push undiluted over 2 minutes.    Admin. Amount: 5 mg = 1 mL Conc: 5 mg/mL  Dispense Loc: [facility]  Infused Over: 2 Minutes  Volume: 2 mL               multivitamins with minerals (CERTAVITE/CEROVITE) liquid 15 mL  Dose: 15 mL  Freq: DAILY Route: PER FEEDING   Start: 07/18/18 0800   Admin. Amount: 15 mL  Last Admin: 08/10/18 0839  [facility] [facility]  Volume: 15 mL     0850 (15 mL)-Given        0818 (15 mL)-Given        0818 (15 mL)-Given        0802 (15 mL)-Given        0845 (15 mL)-Given        0823 (15 mL)-Given        0839 (15 mL)-Given           naloxone (NARCAN) injection 0.1-0.4 mg  Dose: 0.1-0.4 mg  Freq: EVERY 2 MIN PRN Route: IV  PRN Reason: opioid reversal  Start: 08/03/18 0303   Admin Instructions: For respiratory rate LESS than or EQUAL to 8.  Partial reversal dose:  0.1 mg titrated q 2 minutes for Analgesia Side Effects Monitoring Sedation Level of 3 (frequently drowsy, arousable, drifts to sleep during conversation).Full reversal dose:  0.4 mg bolus for Analgesia Side Effects Monitoring Sedation Level of 4 (somnolent, minimal or no response to stimulation).  For ordered doses up to 2mg give IVP. Give each 0.4mg over 15 seconds in emergency situations. For non-emergent situations further dilute in 9mL of NS to facilitate titration of response.    Admin. Amount: 0.1-0.4 mg = 0.25-1 mL Conc: 0.4 mg/mL  Dispense Loc: [facility] ADS 5A1  Volume: 1 mL  POC: Post-procedure               No lozenges or gum should be given while patient on BIPAP/AVAPS/AVAPS AE  Freq: CONTINUOUS PRN Route: XX  Start: 07/25/18 0555   Dispense Loc: [facility]               nystatin (MYCOSTATIN) suspension 500,000 Units  Dose: 500,000 Units  Freq: 4 TIMES DAILY Route: PO  Indications of Use: OROPHARYNGEAL CANDIDIASIS  Start: 07/20/18 1215   Admin Instructions: Shake Well. Use on swab    Admin. Amount: 500,000 Units = 5 mL Conc: 100,000 Units/mL  Last  Admin: 08/10/18 0839  Dispense Loc: [facility] ADS 5A1  Volume: 5 mL     0847 (500,000 Units)-Given       1104 (500,000 Units)-Given       1511 (500,000 Units)-Given       1955 (500,000 Units)-Given        0818 (500,000 Units)-Given       1138 (500,000 Units)-Given       1512 (500,000 Units)-Given       1907 (500,000 Units)-Given        0818 (500,000 Units)-Given       1330 (500,000 Units)-Given       1834 (500,000 Units)-Given       1950 (500,000 Units)-Given        0802 (500,000 Units)-Given       1124 (500,000 Units)-Given       1828 (500,000 Units)-Given       2035 (500,000 Units)-Given        0843 (500,000 Units)-Given       1355 (500,000 Units)-Given       1718 (500,000 Units)-Given       2013 (500,000 Units)-Given        0823 (500,000 Units)-Given       1341 (500,000 Units)-Given       1636 (500,000 Units)-Given       2111 (500,000 Units)-Given        0839 (500,000 Units)-Given       [ ] 1200       [ ] 1600       [ ] 2000           ondansetron (ZOFRAN-ODT) ODT tab 4 mg  Dose: 4 mg  Freq: EVERY 6 HOURS PRN Route: PO  PRN Reasons: nausea,vomiting  Start: 07/17/18 1922   Admin Instructions: This is Step 1 of nausea and vomiting management.  If nausea not resolved in 15 minutes, go to Step 2 prochlorperazine (COMPAZINE). Do not push through foil backing. Peel back foil and gently remove. Place on tongue immediately. Administration with liquid unnecessary  With dry hands, peel back foil backing and gently remove tablet; do not push oral disintegrating tablet through foil backing; administer immediately on tongue and oral disintegrating tablet dissolves in seconds; then swallow with saliva; liquid not required.    Admin. Amount: 1 tablet (1 × 4 mg tablet)  Dispense Loc: [facility] ADS 5A1              Or  ondansetron (ZOFRAN) injection 4 mg  Dose: 4 mg  Freq: EVERY 6 HOURS PRN Route: IV  PRN Reasons: nausea,vomiting  Start: 07/17/18 1922   Admin Instructions: This is Step 1 of nausea and vomiting management.  If nausea  not resolved in 15 minutes, go to Step 2 prochlorperazine (COMPAZINE).  Irritant. For ordered doses up to 4 mg, give IV Push undiluted over 2-5 minutes.    Admin. Amount: 4 mg = 2 mL Conc: 4 mg/2 mL  Dispense Loc: [facility] ADS 5A1  Infused Over: 2-5 Minutes  Volume: 2 mL               oxyCODONE IR (ROXICODONE) tablet 5-10 mg  Dose: 5-10 mg  Freq: EVERY 3 HOURS PRN Route: PO  PRN Reason: other  PRN Comment: pain control or improvement in physical function. Hold dose for analgesic side effects.  Start: 07/17/18 1922   Admin Instructions: Start with the lowest dose.  May adjust dose by 5 mg every 3 hours as needed. Notify provider to assess for uncontrolled pain or analgesic side effects. Hold while on PCA or with regular IV opioid dosing.    Admin. Amount: 1-2 tablet (1-2 × 5 mg tablet)  Last Admin: 08/07/18 2336  Dispense Loc: [facility] ADS 5A1        2336 (5 mg)-Given              polyethylene glycol (MIRALAX/GLYCOLAX) Packet 17 g  Dose: 17 g  Freq: DAILY Route: PER FEEDING   Start: 07/21/18 0800   Admin Instructions: 1 Packet = 17 grams. Mixed prescribed dose in 8 ounces of water. Follow with 8 oz. of water.    Admin. Amount: 17 g  Last Admin: 08/10/18 0839  Dispense Loc: [facility] ADS 5A1     (0852)-Not Given        (0821)-Not Given        (0819)-Not Given [C]        (0738)-Not Given        0843 (17 g)-Given        (0852)-Not Given        0839 (17 g)-Given           potassium chloride (KLOR-CON) Packet 20-40 mEq  Dose: 20-40 mEq  Freq: EVERY 2 HOURS PRN Route: ORAL OR FEED  PRN Reason: potassium supplementation  Start: 07/21/18 1554   Admin Instructions: Use if unable to tolerate tablets.    If Serum K+ 3.4-4.0, dose = 20 mEq x1. Recheck K+ level the next AM.  If Serum K+ 3.0-3.3, dose = 60 mEq po total dose (40 mEq x 1 followed in 2 hours by 20 mEq X1). Recheck K+ level 4 hours after dose and the next AM.  If Serum K+ 2.5-2.9, dose = 80 mEq po total dose (40 mEq Q2H x2). Recheck K+ level 4 hours after dose and the  next AM.  If Serum K+ less than 2.5, See IV order.  Dissolve packet contents in 4-8 ounces of cold water or juice.    Admin. Amount: 20-40 mEq  Last Admin: 07/31/18 0601  Dispense Loc: [facility] ADS 5A1               potassium chloride 10 mEq in 100 mL intermittent infusion with 10 mg lidocaine  Dose: 10 mEq  Freq: EVERY 1 HOUR PRN Route: IV  PRN Reason: potassium supplementation  Last Dose: 10 mEq (07/27/18 0937)  Start: 07/21/18 1554   Admin Instructions: Infuse via PERIPHERAL LINE. Use potassium with lidocaine for pain with peripheral administration.  If Serum K+ 3.4-4.0, dose = 10 mEq/hr x2 doses. Recheck K+ level the next AM.  If Serum K+ 3.0-3.3, dose = 10 mEq/hr x4 doses (40 mEq IV total dose). Recheck K+ level 2 hours after dose and the next AM.  If Serum K+ less than 3.0, dose = 10 mEq/hr x6 doses (60 mEq IV total dose). Recheck K+ level 2 hours after dose and the next AM.    Admin. Amount: 10 mEq = 100 mL Conc: 10 mEq/100 mL  Last Admin: 07/27/18 0937  Dispense Loc: [facility] ADS 5A1  Infused Over: 1 Hours  Volume: 100 mL               potassium chloride 10 mEq in 100 mL sterile water intermittent infusion (premix)  Dose: 10 mEq  Freq: EVERY 1 HOUR PRN Route: IV  PRN Reason: potassium supplementation  Start: 07/21/18 1554   Admin Instructions: Infuse via PERIPHERAL LINE or CENTRAL LINE. Use for central line replacement if patient weight less than 65 kg, if patient is on TPN with high potassium content or if unit does not stock 20 mEq bags.  If Serum K+ 3.4-4.0, dose = 10 mEq/hr x2 doses. Recheck K+ level the next AM.  If Serum K+ 3.0-3.3, dose = 10 mEq/hr x4 doses (40 mEq IV total dose). Recheck K+ level 2 hours after dose and the next AM.  If Serum K+ less than 3.0, dose = 10 mEq/hr x6 doses (60 mEq IV total dose). Recheck K+ level 2 hours after dose and the next AM.    Admin. Amount: 10 mEq = 100 mL Conc: 10 mEq/100 mL  Dispense Loc: [facility] ADS 5A1  Infused Over: 60 Minutes  Volume: 100 mL                potassium chloride SA (K-DUR/KLOR-CON M) CR tablet 20-40 mEq  Dose: 20-40 mEq  Freq: EVERY 2 HOURS PRN Route: PO  PRN Reason: potassium supplementation  Start: 07/21/18 1554   Admin Instructions: Use if able to take PO.   If Serum K+ 3.4-4.0, dose = 20 mEq x1. Recheck K+ level the next AM.  If Serum K+ 3.0-3.3, dose = 60 mEq po total dose (40 mEq x1 followed in 2 hours by 20 mEq x1). Recheck K+ level 4 hours after dose and the next AM.  If Serum K+ 2.5-2.9, dose = 80 mEq po total dose (40 mEq Q2H x2). Recheck K+ level 4 hours after dose and the next AM.  If Serum K+ less than 2.5, See IV order.  DO NOT CRUSH.    Admin. Amount: 2-4 tablet (2-4 × 10 mEq tablet)  Dispense Loc: [facility] ADS 5A1               potassium phosphate 15 mmol in D5W 250 mL intermittent infusion  Dose: 15 mmol  Freq: DAILY PRN Route: IV  PRN Reason: phosphorous supplementation  Last Dose: 15 mmol (07/28/18 0104)  Start: 07/19/18 1437   Admin Instructions: For serum phosphorus level 2-2.4  Do not infuse Phosphorus in the same line as TPN.   Give 15 mmol and recheck phosphorus level next AM.  Each mmol of phosphate provides 1.47 mEq of Potassium. Multiply the patient's phosphate dose by 1.47 to determine the amount of potassium in this dose.    Admin. Amount: 15 mmol  Last Admin: 07/31/18 0832  Dispense Loc: [facility]  Infused Over: 4 Hours  Volume: 250 mL   Mixture Administration Information:   Medication Type Amount   potassium phosphate 3 MMOLE/ML SOLN Medications 15 mmol   D5W 5 % SOLN Base 250 mL                       potassium phosphate 20 mmol in D5W 250 mL intermittent infusion  Dose: 20 mmol  Freq: EVERY 6 HOURS PRN Route: IV  PRN Reason: phosphorous supplementation  Start: 07/19/18 1437   Admin Instructions: For serum phosphorus level 1.1-1.9  For CENTRAL Line ONLY  Do not infuse Phosphorus in the same line as TPN.   Give 20 mmol and recheck phosphorus level 2 hours after last dose and next AM.  Each mmol of phosphate  provides 1.47 mEq of Potassium. Multiply the patient's phosphate dose by 1.47 to determine the amount of potassium in this dose.    Admin. Amount: 20 mmol  Dispense Loc: [facility]  Infused Over: 4 Hours  Volume: 250 mL   Mixture Administration Information:   Medication Type Amount   potassium phosphate 3 MMOLE/ML SOLN Medications 20 mmol   D5W 5 % SOLN Base 250 mL                       potassium phosphate 20 mmol in D5W 500 mL intermittent infusion  Dose: 20 mmol  Freq: EVERY 6 HOURS PRN Route: IV  PRN Reason: phosphorous supplementation  Start: 07/19/18 1437   Admin Instructions: For serum phosphorus level 1.1-1.9  For Peripheral Line  Do not infuse Phosphorus in the same line as TPN.   Give 20 mmol and recheck phosphorus level 2 hours after last dose and next AM.  Each mmol of phosphate provides 1.47 mEq of Potassium. Multiply the patient's phosphate dose by 1.47 to determine the amount of potassium in this dose.    Admin. Amount: 20 mmol  Dispense Loc: [facility]  Infused Over: 4 Hours  Volume: 500 mL   Mixture Administration Information:   Medication Type Amount   potassium phosphate 3 MMOLE/ML SOLN Medications 20 mmol   D5W 5 % SOLN Base 500 mL                       potassium phosphate 25 mmol in D5W 500 mL intermittent infusion  Dose: 25 mmol  Freq: EVERY 8 HOURS PRN Route: IV  PRN Reason: phosphorous supplementation  Start: 07/19/18 1437   Admin Instructions: For serum phosphorus level less than 1.1  Do not infuse Phosphorus in the same line as TPN.   Give 25 mmol and recheck phosphorus level 2 hours after last dose and next AM.  Each mmol of phosphate provides 1.47 mEq of Potassium. Multiply the patient's phosphate dose by 1.47 to determine the amount of potassium in this dose.    Admin. Amount: 25 mmol  Dispense Loc: [facility]  Infused Over: 6 Hours  Volume: 500 mL   Mixture Administration Information:   Medication Type Amount   potassium phosphate 3 MMOLE/ML SOLN  Medications 25 mmol   D5W 5 % SOLN Base 500 mL                       prochlorperazine (COMPAZINE) injection 5 mg  Dose: 5 mg  Freq: EVERY 6 HOURS PRN Route: IV  PRN Reasons: nausea,vomiting  Start: 07/20/18 1016   Admin Instructions: This is Step 2 of nausea and vomiting management. Give if nausea not resolved 15 minutes after giving ondansetron (ZOFRAN). If nausea not resolved in 15 minutes, go to Step 3 metoclopramide (REGLAN), if ordered.  For ordered doses up to 10 mg, give IV Push undiluted. Each 5mg over 1 minute.    Admin. Amount: 5 mg = 1 mL Conc: 5 mg/mL  Dispense Loc: [facility] ADS 5A1  Infused Over: 1-2 Minutes  Volume: 1 mL              Or  prochlorperazine (COMPAZINE) tablet 5 mg  Dose: 5 mg  Freq: EVERY 6 HOURS PRN Route: NG  PRN Reason: vomiting  Start: 07/20/18 1016   Admin Instructions: This is Step 2 of nausea and vomiting management. Give if nausea not resolved 15 minutes after giving ondansetron (ZOFRAN). If nausea not resolved in 15 minutes, go to Step 3 metoclopramide (REGLAN), if ordered.    Admin. Amount: 1 tablet (1 × 5 mg tablet)  Dispense Loc: [facility] ADS 5A1              Or  prochlorperazine (COMPAZINE) Suppository 12.5 mg  Dose: 12.5 mg  Freq: EVERY 12 HOURS PRN Route: RE  PRN Reasons: nausea,vomiting  Start: 07/20/18 1016   Admin Instructions: This is Step 2 of nausea and vomiting management. Give if nausea not resolved 15 minutes after giving ondansetron (ZOFRAN). If nausea not resolved in 15 minutes, go to Step 3 metoclopramide (REGLAN), if ordered.    Admin. Amount: 0.5 suppository (0.5 × 25 mg suppository)  Dispense Loc: [facility]               sennosides (SENOKOT) syrup 5 mL  Dose: 5 mL  Freq: 2 TIMES DAILY Route: PER FEEDING   Start: 07/17/18 2045   Admin. Amount: 5 mL  Last Admin: 08/10/18 0839  Dispense Loc: [facility]  Volume: 5 mL     (0852)-Not Given       (1954)-Not Given [C]        (0821)-Not Given       (1908)-Not Given        (0817)-Not Given [C]        (1937)-Not Given [C]        (0738)-Not Given       2029-Hold [C]        0845 (5 mL)-Given       2012 (5 mL)-Given        0823 (5 mL)-Given       2112 (5 mL)-Given        0839 (5 mL)-Given       [ ] 2000           sodium chloride (PF) 0.9% PF flush 10 mL  Dose: 10 mL  Freq: EVERY 8 HOURS Route: IK  Start: 07/25/18 1245   Admin Instructions: And Q1H PRN, to lock peripheral midline IV catheter dormant lumen. Recommended to flush Q8H each lumen even during infusions    Admin. Amount: 10 mL  Last Admin: 08/10/18 0303  Dispense Loc: [facility] Floor Stock  Volume: 10 mL   Current Line: Peripheral IV 08/06/18 Left;Posterior Lower forearm    (0524)-Not Given [C]       1107 (10 mL)-Given       1955 (10 mL)-Given        (0500)-Not Given [C]       1138 (10 mL)-Given       1908 (10 mL)-Given        0522 (10 mL)-Given       1332 (10 mL)-Given       1952 (10 mL)-Given        0532 (10 mL)-Given       1125 (10 mL)-Given       2036 (10 mL)-Given        (0518)-Not Given       1413 (10 mL)-Given       2015 (10 mL)-Given        0336 (10 mL)-Given       1341 (10 mL)-Given       2113 (10 mL)-Given        0303 (10 mL)-Given       [ ] 1200       [ ] 2000           sodium chloride (PF) 0.9% PF flush 10-20 mL  Dose: 10-20 mL  Freq: EVERY 1 HOUR PRN Route: IK  PRN Reasons: line flush,post meds or blood draw  PRN Comment: to flush each peripheral midline IV catheter lumen  Start: 07/25/18 1239   Admin Instructions: 10 mL post IV meds;  20 mL post blood draw    Admin. Amount: 10-20 mL  Last Admin: 07/29/18 0518  Dispense Loc: [facility] Floor Stock  Volume: 20 mL               sodium chloride 0.9 % neb solution 3 mL  Dose: 3 mL  Freq: 3 TIMES DAILY Route: NEBULIZATION  Start: 08/07/18 1400   Admin. Amount: 3 mL  Last Admin: 08/10/18 0717  Dispense Loc: Non Rx dispense  Volume: 3 mL        (1400)-Not Given       (2026)-Not Given               1605 (3 mL)-Given               0821 (3 mL)-Given              (2050)-Not Given        0717 (3 mL)-Given        [ ] 1400       [ ] 2000           thiamine 100 MG/ML suspension 100 mg  Dose: 100 mg  Freq: DAILY Route: PO  Start: 18 1645   Admin. Amount: 100 mg = 1 mL Conc: 100 mg/mL  Last Admin: 08/10/18 0838  Dispense Loc: [facility]  Volume: 1 mL     0851 (100 mg)-Given        0818 (100 mg)-Given        0817 (100 mg)-Given        0802 (100 mg)-Given        0844 (100 mg)-Given        0823 (100 mg)-Given        0838 (100 mg)-Given          Completed Medications  Medications 08/04/18 08/05/18 08/06/18 08/07/18 08/08/18 08/09/18 08/10/18         Start: 18 1436   End: 18 1710   Admin Instructions: Monica Mckeon : cabinet override    Last Admin: 18 1710  Administrations Remainin         1710 ( )-Given               Start: 18 1649   End: 18 1828   Admin Instructions: Thi Loaiza : cabinet override    Last Admin: 18 1828  Administrations Remainin        1828 ( )-Given             Discontinued Medications  Medications 08/04/18 08/05/18 08/06/18 08/07/18 08/08/18 08/09/18 08/10/18         Dose: 325 mg  Freq: DAILY Route: PO  Start: 18 1200   End: 18 1347   Admin Instructions: Absorbed best on an empty stomach. If stomach upset occurs, can take with meals.    Admin. Amount: 1 tablet (1 × 325 mg tablet)  Dispense Loc: [facility] ADS 6B2                 1347-Med Discontinued                 INTERAGENCY TRANSFER FORM - NOTES (H&P, Discharge Summary, Consults, Procedures, Therapies)   2018                       [address] [place] [address] BANK: [phone]               History & Physicals      H&P signed by Zahira Gamble at 2018  9:57 AM      Author:  Zahira Gamble Service:  (none) Author Type:  Physician    Filed:  2018  9:57 AM Date of Service:  2018  8:47 AM Creation Time:  2018  9:57 AM    Status:  Signed :  Scan, Provider (Physician)     Scan on 2018  9:57 AM by Scan, Provider : [place] ANGELIQUE MEDINA, 18 1           Revision History        User Key Date/Time User Provider Type Action    > [N/A] 7/19/2018  9:57 AM Scan, Provider Physician Sign                     Discharge Summaries      Discharge Summaries by Sandie Grace PA-C at 8/10/2018 10:55 AM     Author:  Sandie Grace PA-C Service:  Hospitalist Author Type:  Physician Assistant - LYNETTE    Filed:  8/10/2018 11:29 AM Date of Service:  8/10/2018 10:55 AM Creation Time:  8/10/2018 10:54 AM    Status:  Attested :  Sandie Grace PA-C (Physician Assistant - LYNETTE)    Cosigner:  Amaury Estrada MD at 8/10/2018 11:32 AM        Attestation signed by Amaury Estrada MD at 8/10/2018 11:32 AM        Attestation:  IAmaury, saw and evaluated Keira Collins as part of a shared visit.  I have reviewed and discussed with the advanced practice provider their history, physical and plan.     I have reviewed today's care team notes, Medications, Vital Signs and Labs.    My key history or physical exam findings:   Less agitated this am. Not interested in talking with us. Says that she doesn't want to go to TCU    Physical exam:  General: Alert and oriented, chronically ill-appearing  Chest/Pulmonary: trach in place, breathing comfortably, no tachypnea   CV: RRR, no MRG,   Skin: no diaphoresis, skin color normal    Key management decisions made by me:   Discharge to TCU today  Severe malnutrition: nutrition following. Continue tube feeds. Attempt to minimize interruptions.    Raj Estrada MD   of Medicine  [facility] [place] Hospitalist  Pager [phone]    Date of service: 08/10/2018                               [facility]    Internal Medicine Discharge Summary- Gold Service    Date of Admission:  7/17/2018  Date of Discharge:[GR1.1]  8/10/2018[GR1.2]  Discharging Provider:[GR1.1] Sandie Grace[GR1.2]/Dr. 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 Course c/b L pontine CVA 7/19 and acute hypoxic respiratory failure due to aspiration PNA. Patient transferred to medicine for ongoing care     Laryngeal squamous cell carcinoma s/p biopsy and tracheostomy 7/17: [id number]. Course c/b CVA and aspiration PNA. ENT discussed case at tumor conference 7/27 and medical oncology 7/31. Surgical resection offered regardless of whether lung nodules are malignant, and would be left with permanent trach. Patient does not want surgical intervention. Treatment of lung nodules TBD at a later date based on functional status. Palliative care consulted. FiO2 needs stable. Suction needs significantly down prior to discharge to TCU  - Follow up with ENT and oncology on discharge to discuss ongoing treatment options  - Continue Robinul, scheduled NS nebs on discharge  - Trach cares per ENT, they will place in discharge encounter (confirmed 8/10)  - Suggest only suctioning for O2 sat drops or symptomatic relief. Hold off if only for gurgling. Patient has been successful managing her own secretions   - Strict NPO, SLP to follow at TCU  - Continue Ritalin per palliative recs d/t mood  - PT/OT at TCU      Acute anemia: Hgb labile. Hgb 8.4 (9.2, 8.8, normal 8/2). No s/s of GIB or oral bleed. Retics 1.5 8/7. LDH normal, haptoglobin 528 making hemolysis unlikely. Ferritin very elevated  to 1069 but in setting of cancer and . Iron low, 26, IBC 10. Folate and B12 normal. Continue iron on discharge      Acute L pontine CVA:  R facial droop noted 7/19, MRI primo acute L pontine infarct. Outside therapeutic window for TPA. Conservative management recommended with DAPT and statin. No data to support use of DAPT long term (suggest <90 days), but also no consensus in literature regarding duration of DAPT following acute CVA.   - Continue ASA and plavix with plan to stop DAPT (continue ASA) 10/17/2018.Discuss with PCP prior to this  - Continue statin  - SBP goal <160      Persistent leukocytosis: without left shift on most recent diff. Afebrile. Respiratory status stable. No s/s infection. No inciting meds. Significant stress and noted with high ferritin and CRP. Known metastatic malignancy. WBC fluctuates, 11.4 to 13.9 over the past week. Suggest infectious workup if acute decompensation     Severe protein-calorie malnutrition: 2/2 acute on chronic medical issues including cancer, surgery, and CVA/dysphagia. PEG placed 7/27.   - Continue TF at goal rate 45 ml/h, can consider transition to bolus at TCU if appropriate   - Continue FWF 60 ml q4h  - NPO, SLP consult at TCU suggested      Spiculated RUL nodule: Concerning for malignancy. PET 7/20 hypermetabolic pulmonary nodules c/w metastatic disease. Per d/w Medical Oncology, lung nodule biopsy non-urgent. Oncology also noted high mortality with pulmonary involvement at 1 year. Will need to continue discussion re: goals of care and possible surgical resection.   - OP follow up with ENT and Oncology to discuss timing of potential lung biopsy  - See above re: DAPT. Should be OK to hold/stop plavix prior to any scheduled lung biopsy      HTN: Stable. Goal SBP <160 per neurology in setting of recent CVA. Continue amlodipine and losartan with hold parameters.       Chronic Medical Problems:  COPD: PFTs 7/16/18 FEV-1/FVC 65% with FEV-1 0.58 L. Marina Post-op  respiratory failure risk 9.91% (failure to wean of vent within 48h of surgery or unplanned intubation/reintubation). Pulmonary risks with proposed surgery discussion with treatment team and family. Continue Brovana, Pulmicort, Ipratropium/Levalbuterol.   HLD: Continue statin    H/o alcohol abuse: Currently in remission   PAD: S/p lower extremity endarterectomy and stenting 2017. ASA/plavix as above      Resolved Hospital Problems:  Tachycardia: HR up to 110s 8/6. Difficult to interpret given difficulty with communicated, but denies s/s infection. CXR no acute findings. Lactic acid normal. Afebrile. Normalized 8/8  Sepsis and acute hypoxic respiratory failure d/t aspiration pneumonia: Excessive secretions post-trach c/b acute L pontine CVA. Acute hypoxia, fevers and elevated inflammatory markers 7/24. CXR R>L bibasilar opacities with small R effusion. CT Chest pulmon angio negative for PE. Sputum 7/22 negative. ? Pulmonary edema contributing. ID consulted and transitioned to moxifloxacin, last dose 7/31. Clinically improved on antibiotics and diuresis. Currently stable       Discharge Pain Plan:   - During her hospitalization, Keira experienced pain due to chronic pain.  The pain plan for discharge was discussed with Keira and the plan was created in a collaborative fashion.    - Short oxycodone script given on discharge[GR1.1]    Consultations This Hospital Stay[GR1.2]   ENT, oncology, palliative care, SLP, PT, OT, nutrition, IR, pharmacy, neurology,[GR1.1]    Code Status[GR1.2]   Full Code[GR1.1]    Time Spent on this Encounter[GR1.2]   ISandie, personally saw the patient today and spent greater than 30 minutes discharging this patient.[GR1.1]       Sandie Grace[GR1.2]  Internal Medicine Staff Hospitalist Service  [facility]  Pager: [phone]  ______________________________________________________________________[GR1.1]    Physical Exam[GR1.2]   Vital Signs:[GR1.1] Temp:  98.8  F (37.1  C) Temp src: Oral BP: 139/63 Pulse: 96 Heart Rate: 105 Resp: 22 SpO2: 98 % O2 Device: Trach dome[GR1.2]    Weight:[GR1.1] 127 lbs 6.81 oz[GR1.2]    General Appearance: Alert and oriented x3, whispers and moves hands to answer. Appears frustrated  Respiratory: Bilateral course lungs. No wheezing or crackles. Tach on room air  Cardiovascular: RRR, S1, S2. No murmur noted  GI: Abdomen soft, non-tender with bowel sounds active. No guarding or rebound. GT in place with lidoderm patch surrounding  Skin: No rash or jaundice  Other: No peripheral edema, distal pulses palpable[GR1.1]     Significant Results and Procedures[GR1.2]   Most Recent 3 CBC's:[GR1.1]  Recent Labs   Lab Test  08/10/18   0724  08/09/18   0547  08/08/18   0915   WBC  13.9*  12.1*  12.6*   HGB  8.4*  9.2*  8.8*   MCV  94  94  95   PLT  250  251  225[GR1.3]     Most Recent 3 BMP's:[GR1.1]  Recent Labs   Lab Test  08/10/18   0724  08/08/18   0915  08/07/18   0450   NA  134  137  137   POTASSIUM  4.3  4.4  4.2   CHLORIDE  103  105  104   CO2  21  24  25   BUN  27  30  39*   CR  0.51*  0.53  0.54   ANIONGAP  10  8  8   ESTEFANI  9.3  9.0  9.1   GLC  164*  170*  155*[GR1.3]     Most Recent 3 INR's:[GR1.1]  Recent Labs   Lab Test  07/19/18   0709  09/23/16   1115   INR  1.16*  0.95[GR1.3]       Pending Results   Unresulted Labs Ordered in the Past 30 Days of this Admission     No orders found from 5/18/2018 to 7/18/2018.             Primary Care Physician   Asa Elliott    Discharge Disposition[GR1.2]   Discharged to rehabilitation facility  Condition at discharge: Stable[GR1.1]    Discharge Orders     General info for SNF   Length of Stay Estimate: Short Term Care: Estimated # of Days <30  Condition at Discharge: Stable  Level of care:skilled   Rehabilitation Potential: Fair  Admission H&P remains valid and up-to-date: Yes  Recent Chemotherapy: N/A  Use Nursing Home Standing Orders: Yes     Mantoux instructions   Give two-step Mantoux (PPD) Per  Facility Policy Yes     Reason for your hospital stay   You were admitted for a biopsy of the larynx. You had a stroke after surgery which was further complicated by pneumonia, tracheostomy needs and severe deconditioning. You discharged to transitional care unit for ongoing care and rehabilitation     Tracheostomy care by nursing   Standard Cares. Suggest suction for O2 sat changes or symptoms rather than gurgling as patient is often able to clear her own secretions     Activity - Up with assistive device   HOB elevated to 30 degrees     Neuro checks   q4h     Full Code     Physical Therapy Adult Consult   Evaluate and treat as clinically indicated.    Reason:  Deconditioning     Occupational Therapy Adult Consult   Evaluate and treat as clinically indicated.    Reason:  Deconditioning     Speech Language Path Adult Consult   Evaluate and treat as clinically indicated.    Reason:  Recent CVA     Adult Formula Bolus Feeding   Specify:     Fall precautions     Pneumatic Compression Device    Bilateral calf. Remove 30 mins BID.       Discharge Medications   Current Discharge Medication List      START taking these medications    Details   arformoterol (BROVANA) 15 MCG/2ML NEBU neb solution Take 2 mLs (15 mcg) by nebulization 2 times daily  Qty: 120 mL    Associated Diagnoses: Tracheostomy, acute management (H)      budesonide (PULMICORT) 0.25 MG/2ML neb solution Take 2 mLs (0.25 mg) by nebulization 2 times daily    Associated Diagnoses: Tracheostomy, acute management (H)      docusate (COLACE) 50 MG/5ML liquid 5 mLs (50 mg) by Per Feeding Tube route 2 times daily  Qty: 300 mL    Comments: Hold for loose stools  Associated Diagnoses: Constipation, unspecified constipation type      ferrous sulfate 300 (60 Fe) MG/5ML syrup Take 5 mLs (300 mg) by mouth daily  Qty: 75 mL    Associated Diagnoses: Anemia, unspecified type      folic acid (FOLATE) 500 mcg/mL SOLN Take 2 mLs (1 mg) by mouth daily    Associated Diagnoses:  Anemia, unspecified type      glycopyrrolate (ROBINUL) 1 MG tablet Take 1 tablet (1 mg) by mouth 3 times daily    Comments: Hold if secretions start to thicken  Associated Diagnoses: Tracheostomy, acute management (H)      hypromellose-dextran (ARTIFICAL TEARS) 0.1-0.3 % SOLN ophthalmic solution Place 1 drop into both eyes every hour as needed for dry eyes    Associated Diagnoses: Dry eyes      ipratropium (ATROVENT) 0.02 % neb solution Take 2.5 mLs (0.5 mg) by nebulization 4 times daily  Qty: 225 mL    Associated Diagnoses: Tracheostomy, acute management (H)      lactobacillus rhamnosus, GG, (CULTURELL) capsule 1 capsule by Per Feeding Tube route 3 times daily (before meals)    Associated Diagnoses: Diarrhea, unspecified type      levalbuterol (XOPENEX) 0.63 MG/3ML neb solution Take 3 mLs (0.63 mg) by nebulization 4 times daily  Qty: 360 mL    Associated Diagnoses: Tracheostomy, acute management (H)      multivitamins with minerals (CERTAVITE/CEROVITE) LIQD liquid 15 mLs by Per Feeding Tube route daily    Associated Diagnoses: Malnutrition, unspecified type (H)      polyethylene glycol (MIRALAX/GLYCOLAX) Packet 17 g by Per Feeding Tube route daily  Qty: 7 packet    Comments: Hold for loose stools  Associated Diagnoses: Constipation, unspecified constipation type      sennosides (SENOKOT) 8.8 MG/5ML syrup 5 mLs by Per Feeding Tube route 2 times daily  Qty: 105 mL    Comments: Hold for loose stools  Associated Diagnoses: Anemia, unspecified type      sodium chloride 0.9 % neb solution Take 3 mLs by nebulization 3 times daily    Associated Diagnoses: Tracheostomy, acute management (H)         CONTINUE these medications which have NOT CHANGED    Details   albuterol (PROAIR HFA/PROVENTIL HFA/VENTOLIN HFA) 108 (90 BASE) MCG/ACT Inhaler Inhale 2 puffs into the lungs every 4 hours as needed for shortness of breath / dyspnea or wheezing  Qty: 1 Inhaler, Refills: 9    Associated Diagnoses: Chronic obstructive pulmonary  disease, unspecified COPD type (H)      amLODIPine (NORVASC) 5 MG tablet Take 1 tablet (5 mg) by mouth daily  Qty: 30 tablet, Refills: 3    Associated Diagnoses: Hyperlipidemia LDL goal <100      aspirin 81 MG tablet Take 1 tablet (81 mg) by mouth daily  Qty: 30 tablet    Associated Diagnoses: PVD (peripheral vascular disease) (H)      atorvastatin (LIPITOR) 40 MG tablet TAKE 1 TABLET BY MOUTH EVERY DAY  Qty: 90 tablet, Refills: 1    Associated Diagnoses: PAD (peripheral artery disease) (H); Hyperlipidemia LDL goal <100      clopidogrel (PLAVIX) 75 MG tablet TAKE 1 TABLET BY MOUTH ONCE DAILY  Qty: 90 tablet, Refills: 1    Associated Diagnoses: PAD (peripheral artery disease) (H)      losartan (COZAAR) 50 MG tablet Take 1 tablet (50 mg) by mouth daily  Qty: 30 tablet, Refills: 3    Associated Diagnoses: Essential hypertension with goal blood pressure less than 140/90      acetaminophen (TYLENOL) 500 MG tablet Take 1 tablet (500 mg) by mouth every 6 hours    Associated Diagnoses: Ulcer of heel, left, with unspecified severity (H)      calcium carb 1250 mg, 500 mg [place],/vitamin D 200 units (OSCAL WITH D) 500-200 MG-UNIT per tablet Take 1 tablet by mouth 3 times daily (with meals) Reported on 2/24/2017         STOP taking these medications       fluticasone-salmeterol (ADVAIR-HFA) 45-21 MCG/ACT inhaler Comments:   Reason for Stopping:         NUTRITIONAL SUPPLEMENT LIQD Comments:   Reason for Stopping:         umeclidinium (INCRUSE ELLIPTA) 62.5 MCG/INH oral inhaler Comments:   Reason for Stopping:         Wound Dressings (SILVASORB) GEL Comments:   Reason for Stopping:             Allergies   Allergies   Allergen Reactions     Penicillins      Cefepime Rash     Provider entered as cephalosporins (pt had cefepime - rash this admission)     Cephalosporins Rash[GR1.2]          Revision History        User Key Date/Time User Provider Type Action    > [N/A] 8/10/2018 11:29 AM Sandie Grace PA-C Physician  Assistant - C Sign     GR1.3 8/10/2018 11:29 AM Sandie Grace PA-C Physician Assistant - C Sign     GR1.2 8/10/2018 10:55 AM Sandie Grace PA-C Physician Assistant - C      GR1.1 8/10/2018 10:54 AM Sandie Grace PA-C Physician Assistant - C                      Consult Notes      Consults by Shama Strange PA-C at 8/1/2018  3:00 PM     Author:  Shama Strange PA-C Service:  Podiatry Author Type:  Physician Assistant    Filed:  8/2/2018  1:11 PM Date of Service:  8/1/2018  3:00 PM Creation Time:  8/2/2018 12:46 PM    Status:  Signed :  Shama Strange PA-C (Physician Assistant)     Consult Orders:    1. Podiatry IP Consult: Patient to be seen: Routine - within 24 hours; toenail trim; Consultant may enter orders: Yes [[id number]] ordered by Shyam Avery PA-C at 07/31/18 1615                [facility], Orthopaedic Surgery Consultation    Keira Collins MRN# [id number]   Age: 72 year old YOB: 1945     Date of Admission:  7/17/2018    Reason for consult:[KS1.1] Toenails[KS1.2]       Requesting physician:[KS1.1] Shyam Avery[KS1.2]         Assessment and Plan:   Assessment:[KS1.1]  Onychomycosis  Prolonged hospital stay  Squamous cell carcinoma of larynx[KS1.2]    Plan:  Toenails debrided x 10. F/u as needed.          History of Present Illness:   Patient was seen and examined by me. History, PMH, Meds, SH, ROS and PE reviewed with patient and prior medical records.      Keira is a 72 year old female who was admitted to [facility] on[KS1.1] 7/17[KS1.2] for[KS1.1] biopsy of laryngeal mass, tracheostomy and NGT placement. P[KS1.2]odiatry was consulted for toenail trim[KS1.1], as nails are curving over toes and into skin. She denies foot or LE pain. Nursing has not noticed open skin lesions or rashes to LEs.[KS1.2]          Past Medical History:     Past Medical History:   Diagnosis Date     COPD (chronic obstructive pulmonary disease) (H) 7/28/2014    From [place]  Hospitalization 05/2016: bedside feng shows moderate airflow obstruction [FEV1 0.99L, 62% pred and FVC 1.7L, 82% predicted; FEV1 and FVC DROPPED by 11 and 16% respectively post BD  Diagnosis made 7/28/2014 at [facility]        Essential hypertension with goal blood pressure less than 140/90      H/O: alcohol abuse      Hyperlipidemia LDL goal <100      Laryngeal mass 6/27/2018     Laryngeal squamous cell carcinoma (H) 07/17/2018     Osteoporosis 7/14/2016     Pulmonary nodules 7/9/2018     PVD (peripheral vascular disease) (H) 7/11/2016     Tobacco abuse              Past Surgical History:     Past Surgical History:   Procedure Laterality Date     LARYNGOSCOPY WITH BIOPSY(IES) N/A 7/17/2018    Procedure: LARYNGOSCOPY WITH BIOPSY(IES);  Direct Laryngoscopy With Biopsy, Tracheostomy ;  Surgeon: Cynthia Walden MD;  Location: [facility] OR     SURGICAL HISTORY OF -   5/13/2016    right hip fracture     TRACHEOSTOMY N/A 7/17/2018    Procedure: TRACHEOSTOMY;;  Surgeon: Cynthia Walden MD;  Location: [facility] OR     VASCULAR SURGERY Left 01/2017    ; left femoral endarterectomy             Social History:     Social History     Social History     Marital status: Single     Spouse name: N/A     Number of children: N/A     Years of education: N/A     Social History Main Topics     Smoking status: Current Every Day Smoker     Packs/day: 1.00     Years: 50.00     Types: Cigarettes     Smokeless tobacco: Never Used      Comment: 1/2 a pack a day      Alcohol use 0.0 oz/week     0 Standard drinks or equivalent per week      Comment: Beer- 1-4 per day     Drug use: No     Sexual activity: No     Other Topics Concern     None     Social History Narrative    Live with Son who helps with her care                 Family History:     Family History   Problem Relation Age of Onset     Chronic Obstructive Pulmonary Disease Daughter      Diabetes Daughter      HEART DISEASE Daughter               Medications:     Current  Facility-Administered Medications   Medication     acetaminophen (TYLENOL) tablet 650 mg     albuterol neb solution 2.5 mg     amLODIPine (NORVASC) tablet 5 mg     arformoterol (BROVANA) neb solution 15 mcg     aspirin chewable tablet 81 mg     atorvastatin (LIPITOR) tablet 40 mg     bisacodyl (DULCOLAX) Suppository 10 mg     budesonide (PULMICORT) neb solution 0.25 mg     Calcium carb-Vitamin D 500 mg [place]-200 units (OSCAL with D;[place]yster [place] Calcium) per tablet 1 tablet     clopidogrel (PLAVIX) tablet 75 mg     dextrose 10 % 1,000 mL infusion     glucose gel 15-30 g    Or     dextrose 50 % injection 25-50 mL    Or     glucagon injection 1 mg     docusate (COLACE) 50 MG/5ML liquid 50 mg     enoxaparin (LOVENOX) injection 30 mg     folic acid (FOLATE) oral solution 1 mg     glycopyrrolate (ROBINUL) tablet 1 mg     heparin lock flush 10 UNIT/ML injection 5-10 mL     heparin lock flush 10 UNIT/ML injection 5-10 mL     hydrALAZINE (APRESOLINE) injection 10 mg     hypromellose-dextran (ARTIFICAL TEARS) 0.1-0.3 % ophthalmic solution 1 drop     ipratropium (ATROVENT) 0.02 % neb solution 0.5 mg     labetalol (NORMODYNE/TRANDATE) injection 10 mg     lactobacillus rhamnosus (GG) (CULTURELL) capsule 1 capsule     levalbuterol (XOPENEX) neb solution 0.63 mg     Lidocaine (LIDOCARE) 4 % Patch 1 patch     lidocaine (LMX4) cream     lidocaine (LMX4) cream     lidocaine (PF) (XYLOCAINE) 1 % injection 5 mL     lidocaine 1 % 0.5-5 mL     lidocaine 1 % 1 mL     lidocaine patch in PLACE     lidocaine patch REMOVAL     losartan (COZAAR) tablet 50 mg     magnesium sulfate 4 g in 100 mL sterile water (premade)     melatonin tablet 3 mg     methylphenidate (RITALIN) half-tab 2.5 mg     metoclopramide (REGLAN) tablet 5 mg    Or     metoclopramide (REGLAN) injection 5 mg     multivitamins with minerals (CERTAVITE/CEROVITE) liquid 15 mL     naloxone (NARCAN) injection 0.1-0.4 mg     No lozenges or gum should be given while patient on  "BIPAP/AVAPS/AVAPS AE     nystatin (MYCOSTATIN) suspension 500,000 Units     ondansetron (ZOFRAN-ODT) ODT tab 4 mg    Or     ondansetron (ZOFRAN) injection 4 mg     oxyCODONE IR (ROXICODONE) tablet 5-10 mg     polyethylene glycol (MIRALAX/GLYCOLAX) Packet 17 g     potassium chloride (KLOR-CON) Packet 20-40 mEq     potassium chloride 10 mEq in 100 mL intermittent infusion with 10 mg lidocaine     potassium chloride 10 mEq in 100 mL sterile water intermittent infusion (premix)     potassium chloride SA (K-DUR/KLOR-CON M) CR tablet 20-40 mEq     potassium phosphate 15 mmol in D5W 250 mL intermittent infusion     potassium phosphate 20 mmol in D5W 250 mL intermittent infusion     potassium phosphate 20 mmol in D5W 500 mL intermittent infusion     potassium phosphate 25 mmol in D5W 500 mL intermittent infusion     prochlorperazine (COMPAZINE) tablet 5 mg    Or     prochlorperazine (COMPAZINE) injection 5 mg    Or     prochlorperazine (COMPAZINE) Suppository 12.5 mg     sennosides (SENOKOT) syrup 5 mL     sodium chloride (PF) 0.9% PF flush 10 mL     sodium chloride (PF) 0.9% PF flush 10 mL     sodium chloride (PF) 0.9% PF flush 10-20 mL     sodium chloride bacteriostatic 0.9 % flush 12 mL     thiamine 100 MG/ML suspension 100 mg             Allergies:      Allergies   Allergen Reactions     Penicillins      Cefepime Rash     Provider entered as cephalosporins (pt had cefepime - rash this admission)     Cephalosporins Rash            Review of Systems:   As mentioned in HPI          Physical Exam:   COMPLETE EXAMINATION:   VITAL SIGNS: /76  Pulse 91  Temp 98.4  F (36.9  C) (Oral)  Resp 24  Ht 1.6 m (5' 2.99\")  Wt 56.7 kg (125 lb)  SpO2 99%  BMI 22.15 kg/m2  General: Patient is found alert and cooperative lying in bed, in NAD.   Vascular: DP and PT pulses 2/4. Absent pedal hair growth. No LE edema.  Skin: No open lesions, mildly dry. Toenails are elongated, thickened and incurvated with subungual debris.         "   Data:   All pertinent laboratory data reviewed  All imaging studies reviewed by me.    Recent Labs   Lab Test  08/02/18   0457  08/01/18   0515  07/31/18   0445  07/30/18   0450  07/29/18   0523   HGB  12.0  12.4  11.8  10.6*  10.4*   CRP   --    --   19.0*  23.0*  44.0*   WBC  11.4*  12.2*  13.1*  9.9  10.2     No results for input(s): FTYP, FNEU, FOTH, FCOL, FAPR, FWBC in the last 64915 hours.    Signed:  Shama Strange PA-C  [phone][KS1.1]       Revision History        User Key Date/Time User Provider Type Action    > KS1.2 8/2/2018  1:11 PM Shama Strange PA-C Physician Assistant Sign     KS1.1 8/2/2018 12:46 PM Shama Strange PA-C Physician Assistant             Consults by Tiara Salazar RN at 8/2/2018 10:31 AM     Author:  Tiara Salazar RN Service:  Patient Learning Author Type:  Registered Nurse    Filed:  8/2/2018 10:31 AM Date of Service:  8/2/2018 10:31 AM Creation Time:  8/2/2018 10:30 AM    Status:  Signed :  Tiara Salazar RN (Registered Nurse)     Consult Orders:    1. Patient Learning Center IP Consult [[id number]] ordered by Diana Degroot MD at 07/17/18 1559                Per unit--PLC class not needed.[TB1.1]     Revision History        User Key Date/Time User Provider Type Action    > TB1.1 8/2/2018 10:31 AM Tiara Salazar RN Registered Nurse Sign            Consults by Raymundo Vega APRN CNP at 7/31/2018  1:25 PM     Author:  Raymundo Vega APRN CNP Service:  Palliative Author Type:  Nurse Practitioner    Filed:  7/31/2018  9:46 PM Date of Service:  7/31/2018  1:25 PM Creation Time:  7/31/2018  1:25 PM    Status:  Signed :  Vega, Raymundo C, APRN CNP (Nurse Practitioner)     Consult Orders:    1. Palliative Care Adult IP Consult: Patient to be seen: Routine within 24 hrs; Call back #: Samantha [phone]; Laryngeal cancer s/p trach; goals of care and code status discussions. Care conference this week; Consultant may enter  orders: Yes [[id number]] ordered by Samantha Burnham PA-C at 07/31/18 1157                [facility]  Palliative Care Consultation         Keira Collins MRN# [id number]   Age: 72 year old YOB: 1945   Date of Admission: 7/17/2018    Reason for consult: Goals of care       Requesting physician/service: Gold 9       Recommendations[KT1.1]        As of the time of evaluation, Keira's level of attention and her limited ability to communicate did not permit us to perform an extended interview and exploration of her goals of care. At this time, we agree with a family conference to explore the family's understanding of her diagnosis, her prognosis in the setting of her comorbidities, and what her expressed goals may have been prior to this episode. We will support the primary teams in this to the extent desired.[KT1.2]     - Palliative will continue to follow with you, and will participate in goals of care conversations[KT1.3]   - T[KT1.2]o the extent desired by the patient, her family, and the treatment teams[KT1.3], we will update our recommendations going forward  - Palliative  to visit[KT1.2]  -[KT1.3] POLST[KT1.1] - not completed[KT1.3]     Disease Process/es & Symptoms[KT1.1]     SCC of larynx s/p tracheostomy  Acute L pontine CVA[KT1.2] (doi 7/19/18)[MF1.1]  Sepsis  Acute hypoxic respiratory failure secondary to aspiration pneumonia  Severe protein-calorie malnutrition[KT1.2]- on enteral feedings[MF1.1]  HTN  COPD  PVD s/p L femoral endarterectomy  Pain  Lethargy[KT1.2]     Support/Coping[KT1.1]   Unable to obtain clear answers to usual questions around support/coping due to patient mental status, tracheostomy, and limited ability to write at this time secondary to recent CVA. We plan to continue to address these as the patient and her family are able to participate.     It is clear from our limited conversation that her children are her primary support, and that  they are involved in her life and her care. She was able to make clear that she is Rastafari, and would appreciate support from  services.[KT1.3]     Plan for psychosocial/spiritual support/follow-up from palliative team:[KT1.1]  to visit at bedside[KT1.3]     Decision-Making & Goals of Care     Discussion/counseling today about prognosis/goals of care/decisions:[KT1.1]   Limited by patient mental status/ability to participate[KT1.3]  Patient has a completed health care directive available in the chart (Y/N):[KT1.1] N[KT1.3]  Health care agent (only if patient has an available, complete HCD):[KT1.1] N/A  There is no POLST (Physician orders for life-sustaining treatment) form on file for this patient[KT1.3]  Code Status:[KT1.1] Full code[KT1.3]   Patient has decision-making capacity (Y/N):[KT1.1] Y[KT1.3]    Coordination of Care     Findings & plan of care discussed with:[KT1.1] Patient, primary team[KT1.3]  Follow-up plan from palliative team:[KT1.1] We will continue to follow[KT1.3]  Thank you for involving us in the patient's care.[KT1.1]     Raymundo SANDOVAL NP ACHPN  Nurse Practitioner- Lead Advanced Practice Provider  [facility] Palliative Medicine Consult Service   [phone]  Greater than 55 minutes was spent in care coordination and counseling, physical exam possibly 15 minutes patient family present.[KT1.3]          Chief Complaint     Loss of voice, voice change         History of Present Illness     History obtained from: chart[KT1.1], care team,[KT1.2] and patient[KT1.1] at bedside[KT1.2]    Keria Collins[KT1.4] is a[KT1.1] 72 year old[KT1.4] woman with a long-time smoking history, who initially presented with loss of voice and voice change over the last 1-2 months, as well as pain in her left ear and left upper neck. Her PCP ordered imaging[KT1.1] that was concerning for neoplastic process. She was admitted on 7/17/2018 for direct laryngoscopy with biopsy, as well as tracheostomy.  PET scan obtained on 7/20 demonstrated her primary laryngeal tumor, as well as neck LAD, and multiple lung nodules concerning for metastatic disease. Final pathology of her biopsy was consistent with invasive SCC, [id number]. She was presented at Otolaryngology Tumor Board Conference, and recommendations were for total laryngectomy with modified radical neck dissection vs CXRT.     Her hospital course has been complicated by aspiration pneumonia and an acute pontine stroke. She developed heavy secretions and acute hypoxic respiratory failure following tracheostomy placement, with RLL infiltrate on XR thought to be consistent with aspiration. She was started on antibiotics for presumed aspiration pneumonia, complicated by a possible drug reaction to cephalosporins. On the morning of 7/19, she was noted to have a subtle R facial droop, which improved throughout the day. CT head was negative for acute intracranial abnormality. However, she was noted to be lethargic later that day, and developed R-sided hemiparesis. MRI brain revealed an acute pontine stroke, but she was outside of the window for TPA.     She is noted to have severe protein-calorie malnutrition, and originally had an NG tube placed for enteral access, but she removed this tube herself. The NG was attempted to be replaced, but after a failed first attempt, she refused another. After discussion with ENT, she agreed to have a G tube placed to allow for enteral feeding and medication administration. She is currently maintained on tube feedings via her PEG.     As of the time of evaluation, her level of attention and alertness waxes and wanes from minute to minute. She is unable to provide more than head shaking/nodding to yes/no questions, and will attempt to mouth limited responses to questions that require other than yes/no answers. She frequently appears to fall asleep after being asked questions, and is unable to participate in any discussion of goals of  "care at this time.[KT1.2]           Past Medical History:[KT1.1]   I have reviewed this patient's past medical history[KT1.3]  Past Medical History:   Diagnosis Date     COPD (chronic obstructive pulmonary disease) (H) 7/28/2014    From [facility]ization 05/2016: bedside feng shows moderate airflow obstruction [FEV1 0.99L, 62% pred and FVC 1.7L, 82% predicted; FEV1 and FVC DROPPED by 11 and 16% respectively post BD  Diagnosis made 7/28/2014 at [facility]        Essential hypertension with goal blood pressure less than 140/90      H/O: alcohol abuse      Hyperlipidemia LDL goal <100      Laryngeal mass 6/27/2018     Laryngeal squamous cell carcinoma (H) 07/17/2018     Osteoporosis 7/14/2016     Pulmonary nodules 7/9/2018     PVD (peripheral vascular disease) (H) 7/11/2016     Tobacco abuse[KT1.5]               Past Surgical History:[KT1.1]   I have reviewed this patient's past surgical history[KT1.3]  Past Surgical History:   Procedure Laterality Date     LARYNGOSCOPY WITH BIOPSY(IES) N/A 7/17/2018    Procedure: LARYNGOSCOPY WITH BIOPSY(IES);  Direct Laryngoscopy With Biopsy, Tracheostomy ;  Surgeon: Cynthia Walden MD;  Location: [facility] OR     SURGICAL HISTORY OF -   5/13/2016    right hip fracture     TRACHEOSTOMY N/A 7/17/2018    Procedure: TRACHEOSTOMY;;  Surgeon: Cynthia Walden MD;  Location: [facility] OR     VASCULAR SURGERY Left 01/2017    ; left femoral endarterectomy[KT1.5]               Social/Spiritual History:     Living situation:[KT1.1] Lives at home[KT1.3] with son, feels safe in home[KT1.2]  Family system:[KT1.1] close with multiple adult children who live locally[KT1.3]  Functional status (needs help with ADLs or IADLs):[KT1.1] chart notes \"independent but not very active\"; per nursing she spends much of the day watching TV, and sons bring her food[KT1.2]  Employment/education:[KT1.1] unknown[KT1.2]  Use of community resources:[KT1.1] unknown[KT1.2]  Activities/interests:[KT1.1] " unknown[KT1.2]   History of substance use/abuse:[KT1.1] alcohol - drank 1-5 drinks per day PTA; tobacco - smoked up to 1 ppd cigarettes PTA[KT1.2]            Family History:[KT1.1]   I have reviewed this patient's family history[KT1.2]  Family History   Problem Relation Age of Onset     Chronic Obstructive Pulmonary Disease Daughter      Diabetes Daughter      HEART DISEASE Daughter[KT1.6]      Family history reviewed[KT1.2]           Allergies:[KT1.1]   All allergies reviewed and addressed[KT1.2]  Allergies   Allergen Reactions     Penicillins      Cefepime Rash     Provider entered as cephalosporins (pt had cefepime - rash this admission)     Cephalosporins Rash[KT1.6]              Medications:   I have reviewed this patient's medication profile and medications during this hospitalization           Review of Systems:   The comprehensive review of systems is[KT1.1] unreliable due to patient mental status, limited ability to participate due to tracheostomy. Answers to limited ROS as below:[KT1.2]    Palliative Symptom Review (0=no symptom/no concern, 1=mild, 2=moderate, 3=severe):      Pain:[KT1.1] 1[KT1.2]      Nausea:[KT1.1] 0[KT1.2]      Constipation:[KT1.1] 0[KT1.2]      Diarrhea:[KT1.1] 0[KT1.2]      Shortness of Breath:[KT1.1] 1[KT1.2]      Overall (0 good/no concerns, 3 very poor):[KT1.1]  0            Physical Exam:   Vitals were reviewed, and notable for the following:  Afebrile last 24 hours. Intermittently tachycardic to low 100s. RR 18-22, O2 sats % on 30% FiO2 via trach dome. BP 130s-160s/60s-80s.     General: wakes to voice, touch, in NAD, cachectic-appearing; frequently falls asleep during questioning  HEENT: NC/AT. PERRL, EOMI, no scleral icterus. Oropharynx moist, without exudates or lesions. Trach in place, dressing c/d/i without surrounding erythema.   CV: RRR, no M/R/G  Resp: CTAB in lung fields but with some transmitted sounds from tracheostomy, no wheezes or crackles  Abd: soft,  non-distended. Tender to palpation over PEG. Bowel sounds present and normoactive.  Extremities: no LE edema  Skin: no jaundice, rashes, or wounds on examined skin. Area on left thigh marked with skin pen without erythema or rash.   Neuro: subtle R facial droop. Moving all extremities purposefully, but with diminished  strength and biceps/triceps strength in RUE[KT1.2]         Data Reviewed:[KT1.1]   Labs and imaging reviewed in [facility]. Pertinent recent data include the following:    CBC notable for leukocytosis to 13, up from 10 over the last two days, stable to rising hemoglobin, rising platelets - possibly consistent with some hemoconcentration.     Metabolic panel notable for hypophosphatemia, otherwise wnl.     CRP elevated but downtrending. Lactate 1.4.[KT1.2]                 Revision History        User Key Date/Time User Provider Type Action    > MF1.1 7/31/2018  9:46 PM Raymundo Vega APRN CNP Nurse Practitioner Sign     KT1.6 7/31/2018  5:09 PM Surjit Wilson MD Resident Share     KT1.2 7/31/2018  4:18 PM Surjit Wilson MD Resident      KT1.5 7/31/2018  3:59 PM Surjit Wilson MD Resident      KT1.3 7/31/2018  3:58 PM Surjit Wilson MD Resident      KT1.4 7/31/2018  1:34 PM Surjit Wilson MD Resident      KT1.1 7/31/2018  1:25 PM Sujrit Wilson MD Resident             Consults by Carlita Kathleen RPA at 7/30/2018 11:21 AM     Author:  Carlita Kathleen RPA Service:  Interventional Radiology Author Type:  Radiology Practioner Assistant    Filed:  7/30/2018 11:21 AM Date of Service:  7/30/2018 11:21 AM Creation Time:  7/30/2018 11:20 AM    Status:  Signed :  Carlita Kathleen RPA (Radiology Practioner Assistant)     Consult Orders:    1. Interventional Radiology Adult/Peds IP Consult: Patient to be seen: Routine within 24 hours; Call back #: [phone]; ENT reqeusting to review case for possible biopsy of RUL spiculated lung nodule for  oncology plan [[id number]] ordered by Rosa Nguyen NP at 18 1240                Consulted for Right upper lobe spiculated lung mass biopsy.  CT imaging 18 confirmed the lung mass with a moderate right pleural effusion .   Patient is receiving ASA/Plavix for CVA ,this increases the risk of bleeding  IR protocol is to hold plavix for 5 days.  P2Y12 lab value is recommended to see platelet function.  This mass is amendable to a biopsy although at a high risk, a diagnostic thoracentesis may be considered.  Discussed with Dr. Burgos Interventional Radiologist and Khari Avery PA-C [id number]     Carlita Kathleen IR RPA  [phone] Call pager  [phone] pager   [SY1.1]     Revision History        User Key Date/Time User Provider Type Action    > SY1.1 2018 11:21 AM Carlita Kathleen RPA Radiology Practioner Assistant Sign            Consults by Susana Davis MD at 2018 12:17 PM     Author:  Susana Davis MD Service:  Infectious Disease Author Type:  Physician    Filed:  2018 10:14 PM Date of Service:  2018 12:17 PM Creation Time:  2018 12:17 PM    Status:  Signed :  Susana Davis MD (Physician)     Consult Orders:    1. Infectious Disease General Adult IP Consult: Patient to be seen: Routine within 24 hrs; Call back #: [phone]; 71 yo F s/p laryngoscopy w/ biopsy +SCC hospital course c/b fever, acute hypoxic resp failure and ?aspiration PNA, now with worsenin... [[id number]] ordered by Rosa Nguyen NP at 18 0904                  [facility] SERVICE: NEW CONSULTATION   Keira Collins : 1945 Sex: female:   Medical record number [id number]  Date of Admission: 2018  Consult Requester:Rochelle Wu MD  Date of Service: 2018    REASON FOR CONSULT:[BT1.1] I was asked to see Ms.[BT1.2] Dennis[BT1.3] by Rosa Nguyen for fever, leukocytosis, elevated inflammatory markers and acute hypoxic  respiratory failure in the setting of squamous cell carcinoma.[BT1.2]    PROBLEM LIST:   1.[BT1.1] Suspected aspiration pneumonitis vs. pneumonia  2.[BT1.3] Oral candidiasis on nystatin[BT1.4]  3. Rash of uncertain etiology  4. Penicillin allergy by history   5.[BT1.3] Squamous cell carcinoma of larynx[BT1.5] ([BT1.3]Biopsy confirmed on 7/17/18[BT1.5],[BT1.3] Stage T3N2c[BT1.5])  6.[BT1.3] Spiculated RUL lung nodule with multiple bilateral PET avid nodules[BT1.4] of uncertain etiology but concerning for malignancy  7.[BT1.3] Right thyroid nodule[BT1.4] s/p non-diagnostic FNA  8. Dysphasia with abnormal swallow study  9. Acute left pontine CVA      DISCUSSION:    is a 72-year-old woman who is been admitted since 7/17 for diagnostic workup of a laryngeal mass that has been found to be squamous cell carcinoma.  Her hospital course has been complicated by a new left pontine stroke with right hemiparesis and a fluctuating respiratory status with several episodes of acute hypoxia, the most recent of which occurred on 7/25 and required BiPAP for approximately 24 hours.  She also had one episode of fever on 7/22 that has now resolved.  Clinically she seems improved over the past 24h, though with rising WBCs and inflammatory markers.    Her clinical course is most suggestive of acute aspiration events as a cause of her hypoxemia.  She has multiple risk factors including her laryngeal cancer and recent stroke with documented dysphagia on clinical swallow evaluation.  Initial sputum culture from 7/22 grew only normal madison with no resistant organisms.  More recent repeat sputum from 7/26 showed evidence of inflammatory cells without any organisms seen.  Based on review of her culture showing no growth of resistant organisms, at this point it seems most likely that her antibiotics have been suppressing microbial growth but she may be having an acute inflammatory insult from recurrent aspiration events.  Thus it would  be reasonable to narrow her coverage at this point; ceftriaxone and metronidazole would be reasonable coverage but can be adjusted if she were to grow any new organisms on her recent blood or sputum cultures.      Considered other possible sources of infection.  Central line was placed yesterday for catheter associated bloodstream infection seems unlikely.  Similarly, a Pfeiffer catheter was placed yesterday for close monitoring of I's and O's so likely to recently to account for her rising inflammatory markers.  She is at risk for C. difficile colitis based on her broad-spectrum antibiotic use but think this is unlikely as she is currently not having diarrhea nor does she have any abdominal distention or signs of ileus.    The etiology of her rash is uncertain.  Certainly could be secondary to a drug as she has been on multiple antibiotics over the past few days but temporal link between the rash and a particular drug is not clear.  She has not had symptoms of a more significant allergic reactions so it would be reasonable to monitor after going back to her previous regimen.    RECOMMENDATIONS:   1.[BT1.3] Stop meropenem  2.[BT1.6] Resume ceftriaxone 2g daily and metronidazole 500mg q6h   3. Agree with nystatin per primary team since thrush seems to be improving  4. Would remove Pfeiffer catheter as soon as able to minimize risk of catheter associated UTI  5. Would be reasonable to consider diagnostic/therapeutic thoracentesis if sufficient fluid    Staffed with Dr. Davis.[BT1.3]    ID will continue to follow.      Katiana Laura MD, PhD  Adult & Pediatric Infectious Diseases Fellow PGY7, [facility]  Pager: [phone][BT1.1]    Attending Attestation and Findings   Patient seen and examined by me with fellow Dr. Laura. I agree with her findings, assessment and recommendations. I have reviewed today's vital signs, medications, labs and imaging. Recs were discussed with primary team today. Feel free to call with  questions. ID will continue to follow.     Susana Davis MD  [phone][MR1.1]      HPI:[BT1.1]   Ms. Ellison is a 72-year-old woman with history of hypertension, COPD, hypertension, hyperlipidemia, and heavy tobacco and alcohol use was admitted on 7/17 is a scheduled admission for diagnostic evaluation for laryngeal mass.  She underwent scheduled laryngoscopy with biopsy, tracheostomy, and NG tube tube placement.  Operative findings notable for exophytic mass on left false cord extending into the left true cord.  The pathology on biopsy was positive for squamous cell carcinoma.    On prior imaging of the chest, a right upper lobe spiculated lung nodule is noted in June 2018.  PET scan was performed during this admission and showed multiple PET avid pulmonary nodules.  A biopsy of 1 of these nodules is planned but has not yet been completed. Post-operative[BT1.4] clinical[BT1.3] s[BT1.4]wallow[BT1.5] evaluation[BT1.3] from 7/18 showed moderate-severe oral-pharyngeal dysphagia[BT1.5]    In the postoperative period, she developed hypoxemia requiring 40% FiO2 by trach dome.  Chest x-ray was performed and showed right sided infiltrate.  Medicine team was consulted and recommended clindamycin for aspiration pneumonia.  She was noted to have right facial droop and hemiparesis so stat CT was obtained and negative.  Subsequent MRI on 7/20 showed new left pontine stroke.    Medicine team recommended switched to ceftriaxone and metronidazole on 7/20 for aspiration pneumonia.    By  7/21, she had weaned back to trach dome 21%.    On 7/22, she was f[BT1.4]ebrile to 101.8[BT1.5].  Her antibiotics were broadened to meropenem and vancomycin at this time.   NG was also pulled out by patient.    On 7/24, medicine team recommended de-escalation to cefepime and metronazole.    On 7/25,[BT1.4] s[BT1.3]he became acutely more tachycardic to low 100s and hypoxemic requiring BIPAP.  She was continued on BIPAP through the day.  CTA  of the chest was performed and was negative for PE.  Multiple previously noted nodules were noted.[BT1.4] She[BT1.3] was[BT1.4] also[BT1.3] noted to have a morbilliform rash just after cefepime was started so this was discontinued.     Today she has been able to wean back to trach dome of 40%.   She has had no additional fevers since[BT1.4] 7/22[BT1.3].[BT1.4]  Gradually rising WBCs from annalisa 9.1 on 7/21 to 16.9 today.[BT1.5]  She is having about 1 formed stool per day.  Pfeiffer placed yesterday for close monitoring of I&Os.  Rash has extended slightly more onto flank.  No significant skin breakdown.[BT1.3]    ANTI-INFECTIVES:   Current:[BT1.1]   Meropenem 7/22-present[BT1.5]    Previous:[BT1.1]   Metronidazole 7/20-7/22, 7/24-7/25  Clindamycin 7/17, 7/19-7/20  Ceftriaxone 7/20-7/22  Vancomycin 7/23[BT1.5]    ROS: (Recommend ? 10 systems)   A ten point review of systems was obtained and was negative with the exception of that which is described in the HPI.    PMH:[BT1.1]   1. Squamous cell carcinoma of larynx   - Biopsy confirmed on 7/17/18  - Stage T3N2c[BT1.5]  2. Spiculated RUL lung nodule followed in lung nodule clinic with multiple bilateral PET avid nodules  3. Right thyroid nodule[BT1.4] s/p non-diagnostic FNA[BT1.3]  4[BT1.4]. COPD[BT1.5]   5[BT1.4]. Hypertension[BT1.5]  6[BT1.4]. Hyperlipidemia[BT1.5]  7[BT1.4]. Tobacco use[BT1.5]  8[BT1.4]. Daily alcohol abuse[BT1.5]  9[BT1.4]. History of right hip fracture on 5/13/16    PSH:  1. Direct laryngoscopy with biopsy, tracheostomy and NG tube placement on 7/17/18  2. Left femoral endarterectomy in 1/2017[BT1.5]    SOCIAL HISTORY AND RISK FACTORS   Social History   Substance Use Topics     Smoking status: Current Every Day Smoker     Packs/day: 1.00     Years: 50.00     Types: Cigarettes     Smokeless tobacco: Never Used      Comment: 1/2 a pack a day      Alcohol use 0.0 oz/week     0 Standard drinks or equivalent per week      Comment: Beer- 1-4 per day  "    History   Sexual Activity     Sexual activity: No       FAMILY HISTORY:[BT1.1]   EHR reviewed.  Patient unable to provide additional history due to tracheostomy.[BT1.3]  Family History   Problem Relation Age of Onset     Chronic Obstructive Pulmonary Disease Daughter      Diabetes Daughter      HEART DISEASE Daughter        EXAMINATION: (Recommend ? 8 systems)   /70 (BP Location: Left arm)  Pulse 109  Temp 98.4  F (36.9  C) (Oral)  Resp 24  Ht 1.6 m (5' 2.99\")  Wt 59.1 kg (130 lb 4.7 oz)  SpO2 98%  BMI 23.09 kg/m2  GENERAL:  well-developed, well-nourished, in bed in no acute distress.   HEENT:  Head is normocephalic, atraumatic   EYES:  Eyes have anicteric sclerae without conjunctival injection or stigmata of endocarditis.    ENT:[BT1.1] Tacky mucous membranes.  No oral lesions.  White coating on tongue but no evidence of buccal candidiasis.[BT1.3]  NECK:[BT1.1] Tracheostomy in place and intermittently coughing large amount of clear mucus.[BT1.3]  LUNGS:[BT1.1] Rhonchi and crackles bilaterally[BT1.3]  CARDIOVASCULAR:  Regular rate and rhythm with no murmurs, gallops or rubs.  ABDOMEN:  Normal bowel sounds, soft, nontender. No appreciable hepatosplenomegaly  SKIN:  No acute rashes.  Line(s) are in place without any surrounding erythema or exudate. No stigmata of endocarditis.  NEUROLOGIC:[BT1.1] Flattened nasolabial folds on right.  Slight pronator drift on the right upper extremity.[BT1.3]  T/L/D:[BT1.1] Midline catheter in left arm, tracheostomy, Pfeiffer[BT1.3]    DATA/RESULTS:[BT1.1]   Creatinine 0.66 Up from 0.45 yesterday[BT1.3]    CULTURES:[BT1.1]   12/29/16 left heel light growth Enterococcus faecalis (sensitive to ampicillin and vancomycin) and light growth Proteus mirabilis (resistant to tetracycline but otherwise sensitive)  7/22 Sputum Gram stain with less than 25 PMNs/lpf and mixed gram-positive and gram-negative bacteria with a predominance of GPC's in clusters, culture with growth of " normal madison[BT1.3]  7/22 Blood culture x2 NGTD  7/26 Blood culture NGTD  7/26 Urine culture NGTD[BT1.2]  7/26 sputum Gram stain with greater than 25 PMNslpf and no organisms, culture pending[BT1.3]    OTHER ID STUDIES:[BT1.1]   WBCs 9.1 on 7/21 > 9.7 > 10.0 > 9.9 > 14.8 > 16.9   > 84 > 68 >99  Pro-calcitonin 0.11 >0 0.15 > 0.16 > 1.41[BT1.3]    Imaging results:[BT1.1]   7/25[BT1.4] CT:   2.  No CT evidence for pulmonary embolism as questioned.  3.  No significant change of multiple pulmonary nodules, including  right upper lobe spiculated 1.3 cm pulmonary nodule.  4.  Prominent nodular thickening of left upper lobe laterally measures  1.7 x 0.7 cm, favors infectious/inflammatory etiology, however  malignancy cannot be completely excluded.  5.  Increased moderate right greater than left pleural effusion with  associated atelectasis. Right lower lobe atelectasis slightly improved  from 7/20/2018.  6.  Unchanged enlarged heterogeneous appearance of right thyroid gland  with coarse calcifications, follow-up with ultrasound is recommended  if have not done so.   7.  Enlarged mediastinal and right hilum lymphadenopathy.[BT1.5]    7/20 PET CT:  1. Bilateral hypermetabolic pulmonary nodules consistent with  pulmonary metastatic disease.  a. Collapse of the right lower/middle lobes with multiple  hypermetabolic foci suggestive of pulmonary metastases. Right lower  perihilar FDG uptake suggests postobstructive atelectasis.  b. Associated small right pleural effusion.  2. No evidence of metastatic disease in the abdomen/pelvis.  3. See neuroradiology report for results of high-resolution PET CT of  the head and neck.     4. Urethral diverticulum containing stones and septations. Evaluation  is limited by urinary FDG activity. Consider urologic consult.  Prominent urinary contamination about the pelvis.  5. 4.7 cm left adnexal cystic structure. No associated FDG uptake.  Given size and metabolic process follow-up is  pelvic  ultrasound/follow-up recommended.  6. Thoracic abdominal aortic ectasia. 1.9 cm right iliac aneurysm.[BT1.4]        Revision History        User Key Date/Time User Provider Type Action    > MR1.1 7/26/2018 10:14 PM Susana Davis MD Physician Sign     BT1.3 7/26/2018  9:04 PM Katiana Laura MD Fellow Sign     BT1.6 7/26/2018  6:46 PM Katiana Laura MD Fellow      BT1.4 7/26/2018  5:06 PM Katiana Laura MD Fellow      BT1.5 7/26/2018 12:47 PM Katiana Laura MD Fellow      BT1.2 7/26/2018 12:21 PM Katiana Laura MD Fellow      BT1.1 7/26/2018 12:17 PM Katiana Laura MD Fellow             Consults by Sukhwinder Crump MD at 7/23/2018  5:44 PM     Author:  Sukhwinder Crump MD Service:  Thoracic Surgery Author Type:  Resident    Filed:  7/23/2018  8:17 PM Date of Service:  7/23/2018  5:44 PM Creation Time:  7/23/2018  5:44 PM    Status:  Cosign Needed :  Sukhwinder Crump MD (Resident)    Cosign Required:  Yes         Consult Orders:    1. Thoracic Surgery Adult IP Consult: Patient to be seen: Routine within 24 hrs; Call back #: [phone]; Gastrostomy tube placement; Consultant may enter orders: Yes [[id number]] ordered by Samantha Burnham PA-C at 07/23/18 1155                Thoracic Surgery Consult Note     Patient name: Keira Collins  Date of Service: 7/23/2018  Reason for Consult: Gastrostomy tube   Requesting Physician: ENTDr Walden    HPI:[SQ1.1]   72F one week post direct laryngoscopy with biopsy showing SCC of larynx, now with need for enteral access for nutrition. She had previously had an NG tube which she removed in the past few days and has refused to allow another NGT to be replaced. Per chart, she is undergoing workup toward total laryngectomy with adjuvant chemoradiotherapy by the ENT and Onc teams per tumor board discussion. She is unable to take any food or liquid PO since she is high aspiration risk and has failed swallow  evaluation.    Interview limited due to patient somnolence, much of the data above gathered through chart review and discussion with primary team.[SQ1.2]    PMH:   Past Medical History:   Diagnosis Date     COPD (chronic obstructive pulmonary disease) (H) 7/28/2014    From [facility]ization 05/2016: bedside feng shows moderate airflow obstruction [FEV1 0.99L, 62% pred and FVC 1.7L, 82% predicted; FEV1 and FVC DROPPED by 11 and 16% respectively post BD  Diagnosis made 7/28/2014 at [facility]        Essential hypertension with goal blood pressure less than 140/90      H/O: alcohol abuse      Hyperlipidemia LDL goal <100      Laryngeal mass 6/27/2018     Laryngeal squamous cell carcinoma (H) 07/17/2018     Osteoporosis 7/14/2016     Pulmonary nodules 7/9/2018     PVD (peripheral vascular disease) (H) 7/11/2016     Tobacco abuse      PSH:   Past Surgical History:   Procedure Laterality Date     LARYNGOSCOPY WITH BIOPSY(IES) N/A 7/17/2018    Procedure: LARYNGOSCOPY WITH BIOPSY(IES);  Direct Laryngoscopy With Biopsy, Tracheostomy ;  Surgeon: Cynthia Walden MD;  Location: [facility] OR     SURGICAL HISTORY OF -   5/13/2016    right hip fracture     TRACHEOSTOMY N/A 7/17/2018    Procedure: TRACHEOSTOMY;;  Surgeon: Cynthia Walden MD;  Location: [facility] OR     VASCULAR SURGERY Left 01/2017    ; left femoral endarterectomy     Meds:   Prescriptions Prior to Admission   Medication Sig Dispense Refill Last Dose     albuterol (PROAIR HFA/PROVENTIL HFA/VENTOLIN HFA) 108 (90 BASE) MCG/ACT Inhaler Inhale 2 puffs into the lungs every 4 hours as needed for shortness of breath / dyspnea or wheezing 1 Inhaler 9 Past Week at Unknown time     amLODIPine (NORVASC) 5 MG tablet Take 1 tablet (5 mg) by mouth daily 30 tablet 3 Past Week at Unknown time     aspirin 81 MG tablet Take 1 tablet (81 mg) by mouth daily 30 tablet  Past Week at Unknown time     atorvastatin (LIPITOR) 40 MG tablet TAKE 1 TABLET BY MOUTH EVERY DAY 90 tablet 1  Past Week at Unknown time     clopidogrel (PLAVIX) 75 MG tablet TAKE 1 TABLET BY MOUTH ONCE DAILY 90 tablet 1 Past Week at Unknown time     fluticasone-salmeterol (ADVAIR-HFA) 45-21 MCG/ACT inhaler Inhale 2 puffs into the lungs 2 times daily 36 Inhaler 1 Past Week at Unknown time     losartan (COZAAR) 50 MG tablet Take 1 tablet (50 mg) by mouth daily 30 tablet 3 Past Week at Unknown time     umeclidinium (INCRUSE ELLIPTA) 62.5 MCG/INH oral inhaler Inhale 1 puff into the lungs daily 30 Inhaler 5 Past Week at Unknown time     Wound Dressings (SILVASORB) GEL Externally apply 1 Application topically daily 1 Tube 1 Past Month at Unknown time     acetaminophen (TYLENOL) 500 MG tablet Take 1 tablet (500 mg) by mouth every 6 hours   Unknown at Unknown time     calcium carb 1250 mg, 500 mg Hoh,/vitamin D 200 units (OSCAL WITH D) 500-200 MG-UNIT per tablet Take 1 tablet by mouth 3 times daily (with meals) Reported on 2/24/2017   Unknown at Unknown time     NUTRITIONAL SUPPLEMENT LIQD Take 4 oz by mouth 2 times daily Reported on 2/24/2017   Taking     Allergies:   Allergies   Allergen Reactions     Penicillins      FamHx:   Family History   Problem Relation Age of Onset     Chronic Obstructive Pulmonary Disease Daughter      Diabetes Daughter      HEART DISEASE Daughter      SocHx:   Social History     Social History     Marital status: Single     Spouse name: N/A     Number of children: N/A     Years of education: N/A     Occupational History     Not on file.     Social History Main Topics     Smoking status: Current Every Day Smoker     Packs/day: 1.00     Years: 50.00     Types: Cigarettes     Smokeless tobacco: Never Used      Comment: 1/2 a pack a day      Alcohol use 0.0 oz/week     0 Standard drinks or equivalent per week      Comment: Beer- 1-4 per day     Drug use: No     Sexual activity: No     Other Topics Concern     Not on file     Social History Narrative    Live with Son who helps with her care            ROS:  "10-pt ROS negative except as noted above.     Objective:   /80  Pulse 109  Temp 99.2  F (37.3  C) (Axillary)  Resp 28  Ht 1.6 m (5' 2.99\")  Wt 59.9 kg (132 lb 0.9 oz)  SpO2 97%  BMI 23.4 kg/m2  General - no acute distress,[SQ1.1] sleepy, comfortable[SQ1.2]  HEENT - normocephalic, atraumatic, EOMI[SQ1.1]. Opens mouth spontaneously.[SQ1.2]  Cardio - regular rate, regular rhythm  Pulm -[SQ1.1] trach in place.[SQ1.2]  Abdomen -[SQ1.1] soft, nondistended, nontender. Well-healed low midline incisional scar.[SQ1.2]  Extremities - warm, well-perfused  Skin - no rash or bruising     Labs:[SQ1.1] Reviewed[SQ1.2]    Imaging:[SQ1.1]  PET CT reviewed.[SQ1.2]      Assessment/Plan:   72F with laryngeal SCC s/p trach[SQ1.1] currently undergoing treatment for PNA and overall workup toward laryngectomy by ENT team. Thoracic consulted for gastrostomy tube placement.    - Plan for this week in OR for PEG vs laparoscopic G tube placement. Date and attending pending.[SQ1.2]    Discussed with staff, [SQ1.1] [place][SQ1.2].[SQ1.1]    Sukhwinder Crump MD  PGY-3 General Surgery[SQ1.2]    ------------------------------------------------------------------------[SQ1.1]       Revision History        User Key Date/Time User Provider Type Action    > SQ1.2 7/23/2018  8:17 PM Sukhwinder Crump MD Resident Sign     SQ1.1 7/23/2018  5:53 PM Sukhwinder Crump MD Resident Share            Consults by Taiwo Castle MD at 7/20/2018  5:41 PM     Author:  Taiwo Castle MD Service:  Neuro ICU Author Type:  Resident    Filed:  7/20/2018  8:29 PM Date of Service:  7/20/2018  5:41 PM Creation Time:  7/20/2018  5:41 PM    Status:  Attested :  Taiwo Castle MD (Resident)    Cosigner:  Elyse Hannon MD at 7/21/2018  8:18 PM        Attestation signed by Elyse Hannon MD at 7/21/2018  8:18 PM        Attestation:  Physician Attestation   I, Elyse Hannon, saw this patient with the resident and agree with the " resident and/or medical student's findings and plan of care as documented in the note.      I personally reviewed vital signs, medications, labs and imaging.    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  Work up revealed left pontine infarct. Patient was previously on aspirin and plavix. Plavix was held for the recent procedure. Unable to comple 10pts ROS given patient recent stroke.    Etiology of her stroke is small vessels disease.     Plan  -cont dual antiplatelet aspirin 81 mg and plavix 75 mg  -cont atorvastatin 40 mg daily  -PT/OT, PM&R consult  -Might need a PEG tube if unable to progress from the NGT  -Long term BP goal <130/80  -Follow up with Neurology stroke clinic in 3 months  -No further work up. Will sign off. Please call us back for any questions.    Elyse Hannon MD  Date of Service (when I saw the patient): 18                               [facility] [place]      Neurology Stroke Consult    Patient Name: Keira Collins  : 1945 MRN#: [id number]    STROKE DATA    Stroke Code:  Stroke code not activated.  Time patient seen:  2018 5:15PM  Last known normal (pt's baseline):  18 10:00 AM    TPA treatment:  Not given due to stroke mimic: encephalopathy likely secondary to advanced laryngeal and lung neoplasms and possible alcohol withdrawal..     National Institutes of Health Stroke Scale (at presentation)  NIHSS done at:  time patient seen      Score    Level of consciousness:  (0)   Alert, keenly responsive     LOC questions:  (0)   Answers both questions correctly    LOC commands:  (0)   Performs both tasks correctly    Best gaze:  (0)   Normal    Visual:  (0)   No visual loss    Facial palsy:  (1)   Minor paralysis (flat nasolabial fold, smile asymmetry)    Motor arm  (left):  (0)   No drift    Motor arm (right):  (0)   No drift    Motor leg (left):  (0)   No drift    Motor leg (right):  (0)   No drift    Limb ataxia:  (0)   Absent    Sensory:  (0)   Normal- no sensory loss    Best language:  (0)   Normal- no aphasia    Dysarthria:  UN Intubated or other physical barrier: recent tracheostomy placement    Extinction and inattention:  (0)   No abnormality        NIHSS Total Score:  1        Dysphagia Screen  Time of screening:[SJ1.1] 07/20/2018[SJ1.2] 5:20 PM  Screening results: Currently receiving NG tube feeds.     ASSESSMENT & RECOMMENDATIONS[SJ1.1]     Ms. 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  The differential diagnosis includes seizures, metabolic encephalopathy, encephalopathy due to chronic disease, alcohol withdrawal in a chronic heavy alcohol user, metastatic brain cancer, or vasculitis.[SJ1.1]    The patient's family first noted that she seemed more lethargic and less talkative after she had undergone placement of a tracheostomy tube on Tuesday, July 17.  On Thursday, July 19 at approximately 10:00 AM (31 hours prior to consultation), the family expressed that Ms. Collins seemed less responsive to conversation and that her right-sided facial droop (moderate at baseline) had worsened.    Today, July 20, the Stroke service was contacted for concern of these changes without formal consult.  On first two attempts to examine the patient, she was found to be undergoing imaging with CT of the soft tissues of the neck, PET scan of the  whole body, and MRI of the brain with and without contrast.  MRI of the brain subsequently showed acute left pontine mesencephalic junction infarct; MRA showed a 3 mm right A2 aneurysm at the branch point of the frontopolar artery, and approximately 50% stenosis of the proximal right internal carotid artery, with a diminutive right vertebral artery.[SJ1.3]    Ms. Collins[SJ1.1]'s neurologic exam follows here:  General:  Awake, watching television, lying in bed.  Able to whisper around tracheostomy tube (Shiley uncuffed), but actively responds with nods/head shakes and thumbs up/down.    Neurologic:  Mental Status: GCS15. AO3: oriented to name, location, date.  Unable to assess speech due to patient discomfort speaking around new tracheostomy tube.  Cranial Nerve: CN II-XII intact.  Right-sided mouth downturn, moderate at baseline according to RN and patient's hospital admission photo.  Sensation: Intact to light touch in all CN VII distributions, bilateral upper extremities, and bilateral lower extremities.  Motor: No pronator drift in bilateral upper extremities or bilateral lower extremities.  4/5 strength in right upper extremity, and 5/5 strength in left upper extremity.  Strength 4/5 in right lower extremity, and 5/5 strength in left lower extremity.    Neurologic exam otherwise per NIH Stroke Scale (above).[SJ1.3]         Impression:[SJ1.1]  Ms. Collins is a 72-year-old woman with PMH 50-pack-year smoking history, heavy alcohol use, hypertension, hyperlipidemia with known peripheral vascular disease, DM, and squamous cell carcinoma of the larynx with bilateral metastases to the lungs.[SJ1.3]      Recommendations:[SJ1.1]  Acute Ischemic Stroke (without tPA) Plan  - Neurochecks  - Permissive HTN; labetalol PRN for SBP > 220  - Euthermia, Euglycemia  - Daily aspirin for secondary stroke prevention  - Statin  - TTE with Bubble Study  - Telemetry, EKG  - Bedside Glucose Monitoring  - A1c, Lipid Panel, Troponin x  3  - PT/OT/SLP  - Stroke Education  - Depression Screen  - Apnea Screen     Encourage dual antiplatelet therapy with aspirin, Plavix, and atorvastatin.  May hold Plavix in the setting of patient's upcoming operative procedure by ENT.[SJ1.3]    Prophylaxis[SJ1.1]          For VTE Prevention:  - enoxaparin  - pneumatic compression device[SJ1.3]     The patient[SJ1.1] will be managed by the ENT team and  we will sign off at this time.  Thank you for the consult.  Contact the stroke team if you have any further questions[SJ1.3].    HPI  Keira Collins is a 72 year old female with[SJ1.1] PMH of multiple vascular risk factors (smoking, cancer, htn, hld with known peripheral vascular disease) presenting with right-sided facial droop and right-sided weakness.  She was found to have an acute left-sided mesencephalic pontine infarct with no large vessel occlusion on MRI of the brain with and without contrast today, July 20, 2018.  Additional findings include right ACA2 aneurysm, 50% stenosis of the proximal right internal carotid artery, and diminutive right vertebral artery.[SJ1.3]    Pertinent Past Medical/Surgical History  Past Medical History:   Diagnosis Date     COPD (chronic obstructive pulmonary disease) (H) 7/28/2014    From [facility] 05/2016: bedside feng shows moderate airflow obstruction [FEV1 0.99L, 62% pred and FVC 1.7L, 82% predicted; FEV1 and FVC DROPPED by 11 and 16% respectively post BD  Diagnosis made 7/28/2014 at [facility]        Essential hypertension with goal blood pressure less than 140/90      H/O: alcohol abuse      Hyperlipidemia LDL goal <100      Laryngeal mass 6/27/2018     Laryngeal squamous cell carcinoma (H) 07/17/2018     Osteoporosis 7/14/2016     Pulmonary nodules 7/9/2018     PVD (peripheral vascular disease) (H) 7/11/2016     Tobacco abuse        Past Surgical History:   Procedure Laterality Date     LARYNGOSCOPY WITH BIOPSY(IES) N/A 7/17/2018    Procedure: LARYNGOSCOPY WITH  BIOPSY(IES);  Direct Laryngoscopy With Biopsy, Tracheostomy ;  Surgeon: Cynthia Walden MD;  Location: [facility] OR     SURGICAL HISTORY OF -   5/13/2016    right hip fracture     TRACHEOSTOMY N/A 7/17/2018    Procedure: TRACHEOSTOMY;;  Surgeon: Cynthia Walden MD;  Location: [facility] OR     VASCULAR SURGERY Left 01/2017    ; left femoral endarterectomy       Medications:[SJ1.1]   I have reviewed this patient's current medications[SJ1.3]  Current Facility-Administered Medications   Medication     acetaminophen (TYLENOL) solution 650 mg     albuterol neb solution 2.5 mg     [START ON 7/21/2018] amLODIPine (NORVASC) tablet 5 mg     arformoterol (BROVANA) neb solution 15 mcg     [START ON 7/21/2018] aspirin suspension 80 mg     atorvastatin (LIPITOR) tablet 40 mg     bisacodyl (DULCOLAX) Suppository 10 mg     budesonide (PULMICORT) neb solution 0.25 mg     Calcium carb-Vitamin D 500 mg Ewiiaapaayp-200 units (OSCAL with D;Oyster Shell Calcium) per tablet 1 tablet     cefTRIAXone (ROCEPHIN) 1 g vial to attach to  mL bag for ADULTS or NS 50 mL bag for PEDS     dextrose 10 % 1,000 mL infusion     docusate (COLACE) 50 MG/5ML liquid 50 mg     enoxaparin (LOVENOX) injection 40 mg     [START ON 7/21/2018] folic acid (FOLVITE) tablet 1 mg     hydrALAZINE (APRESOLINE) injection 10 mg     HYDROmorphone (DILAUDID) injection 0.2 mg     ipratropium (ATROVENT) 0.02 % neb solution 0.5 mg     lactobacillus rhamnosus (GG) (CULTURELL) capsule 1 capsule     levalbuterol (XOPENEX) neb solution 0.63 mg     LORazepam (ATIVAN) tablet 1-4 mg     losartan (COZAAR) tablet 50 mg     magnesium sulfate 4 g in 100 mL sterile water (premade)     melatonin tablet 1 mg     metoclopramide (REGLAN) tablet 5 mg    Or     metoclopramide (REGLAN) injection 5 mg     metroNIDAZOLE (FLAGYL) infusion 500 mg     multivitamins with minerals (CERTAVITE/CEROVITE) liquid 15 mL     naloxone (NARCAN) injection 0.1-0.4 mg     nystatin (MYCOSTATIN) suspension 500,000  Units     ondansetron (ZOFRAN-ODT) ODT tab 4 mg    Or     ondansetron (ZOFRAN) injection 4 mg     oxyCODONE IR (ROXICODONE) tablet 5-10 mg     [START ON 7/21/2018] polyethylene glycol (MIRALAX/GLYCOLAX) Packet 17 g     potassium chloride (KLOR-CON) Packet 20-40 mEq     potassium chloride 10 mEq in 100 mL intermittent infusion with 10 mg lidocaine     potassium chloride 10 mEq in 100 mL sterile water intermittent infusion (premix)     potassium chloride SA (K-DUR/KLOR-CON M) CR tablet 20-40 mEq     potassium phosphate 15 mmol in D5W 250 mL intermittent infusion     potassium phosphate 20 mmol in D5W 250 mL intermittent infusion     potassium phosphate 20 mmol in D5W 500 mL intermittent infusion     potassium phosphate 25 mmol in D5W 500 mL intermittent infusion     prochlorperazine (COMPAZINE) tablet 5 mg    Or     prochlorperazine (COMPAZINE) injection 5 mg    Or     prochlorperazine (COMPAZINE) Suppository 12.5 mg     sennosides (SENOKOT) syrup 5 mL     sodium chloride 0.9% infusion     thiamine 100 MG/ML suspension 100 mg[SJ1.4]   .    Allergies:[SJ1.1]   All allergies reviewed and addressed[SJ1.3]  Allergies   Allergen Reactions     Penicillins[SJ1.4]    .    Family History:[SJ1.1]   I have reviewed this patient's family history[SJ1.3]  Family History   Problem Relation Age of Onset     Chronic Obstructive Pulmonary Disease Daughter      Diabetes Daughter      HEART DISEASE Daughter[SJ1.5]    .    Social History:[SJ1.1]   I have reviewed this patient's social history[SJ1.3]  Social History   Substance Use Topics     Smoking status: Current Every Day Smoker     Packs/day: 1.00     Years: 50.00     Types: Cigarettes     Smokeless tobacco: Never Used      Comment: 1/2 a pack a day      Alcohol use 0.0 oz/week     0 Standard drinks or equivalent per week      Comment: Beer- 1-4 per day[SJ1.5]   .    Tobacco use:[SJ1.1] Current smoker, 1 PPD x50 years.[SJ1.3]    ROS:[SJ1.1]  CONSTITUTIONAL: fatigue and irregular mild  fever  ENT/MOUTH: NEGATIVE for ear, mouth and throat problems  RESP: NEGATIVE for significant cough or SOB  CV: NEGATIVE for chest pain, palpitations or peripheral edema    P[SJ1.3]HYSICAL EXAMINATION  Vital Signs:  B/P: 184/90,  T: 98.7,  P: 109,  R: 28    General:[SJ1.1]  patient lying in bed without any acute distress, tracheostomy in place[SJ1.3]    HEENT:[SJ1.1]  poor dental hygiene, tracheostomy present, moderate right-sided mouth downturn[SJ1.3]  Cardio:[SJ1.1]  RRR, hypertensive to SBP 180s[SJ1.3]  Pulmonary:[SJ1.1]  on mechanical ventilation[SJ1.3]  Abdomen:[SJ1.1]  soft, non-tender, non-distended, NG tube in place[SJ1.3]  Extremities:[SJ1.1]  no edema[SJ1.3]  Skin:[SJ1.1]  intact, warm/dry[SJ1.3]     Neurologic  Mental Status:  fully alert, attentive and oriented, follows commands  Cranial Nerves:  visual fields intact, PERRL, EOMI with normal smooth pursuit, facial sensation intact and symmetric, tongue protrusion midline, right mouth downturn appearing like patient's admission photo  Motor:  no pronator drift, decreased muscle bulk throughout the body, 5/5 muscle strength in RUE and LUE, 4/5 RLE, 5/5 LLE  Reflexes:  2+ and symmetric throughout, toes downgoing  Sensory:  intact and symmetric to light touch in all 3 distributions of CN VII, BLUEs, and BLLEs  Coordination:[SJ1.1]  FNF and HS intact without dysmetria[SJ1.3]  Station/Gait:[SJ1.1]  unable to test[SJ1.3]    Labs  Labs and Imaging reviewed and used in developing the plan; pertinent results included.     Lab Results   Component Value Date     (H) 07/20/2018       The patient was discussed with the Fellow, [SJ1.1] Bebeto[SJ1.3].  The staff is [SJ1.1] Win, Department of Otolaryngology[SJ1.3].    Taiwo Castle MD   Pager:[SJ1.1] [phone][SJ1.3]       Revision History        User Key Date/Time User Provider Type Action    > [N/A] 7/20/2018  8:29 PM Taiwo Castle MD Resident Sign     SJ1.5 7/20/2018  7:32 PM Taiwo Castle  MD Suman Resident Sign     SJ1.4 7/20/2018  6:27 PM Taiwo Castle MD Resident      SJ1.3 7/20/2018  6:04 PM Taiwo Castle MD Resident      SJ1.2 7/20/2018  5:45 PM Taiwo Castle MD Resident      SJ1.1 7/20/2018  5:41 PM Taiwo Castle MD Resident             Consults by Clau Dempsey PA-C at 7/19/2018  4:20 PM     Author:  Clau Dempsey PA-C Service:  Hospitalist Author Type:  Physician Assistant    Filed:  7/19/2018  7:58 PM Date of Service:  7/19/2018  4:20 PM Creation Time:  7/19/2018  3:43 PM    Status:  Attested :  Clau Dempsey PA-C (Physician Assistant)    Cosigner:  Ngozi Hendrix MD at 7/19/2018  8:32 PM         Consult Orders:    1. Internal Medicine Adult IP Consult for [place]: Patient to be seen: Routine within 24 hrs; Call back #: [id number]; pt w/ new trach d/t laryngeal cancer. hx of COPD, dementia, HTN now with PVCs and intermittent desaturations; Consultant may enter ord... [[id number]] ordered by Nick Burt MD at 07/19/18 1459           Attestation signed by Ngozi Hendrix MD at 7/19/2018  8:32 PM        Attestation:  Physician Attestation   I, Ngozi Hendrix, saw and evaluated Keira Collins as part of a shared visit.  I have reviewed and discussed with the advanced practice provider their history, physical and plan.    I personally reviewed the vital signs, medications, labs and imaging.    My key history or physical exam findings: 72 y.o. Female with h/o COPD, HTN, HLD, tobacco use, h/o ETOH abuse, squamous cell carcinoma of the larynx s/p tach and NG placement 2 days prior now with HTN,Tachycardia, drooling, crackles/secretions throughout lungs, increase O2 needs with some reported desats, and lethargy.     Key management decisions made by me:   Lethargy - concern for stroke previously with facial droop (tobacco use, holding of - head CT stat and normal  - h/o ETOH use concern for ETOH  withdrawal put on protocol and tele  - Hypoxic respiratory failure, likely aspiration vs. Aspiration pna vs. COPD vs. Volume overlaod - will add xopenex and schedule ipratropriom, start clinda, lasix x1, ECHO  - encourage frequent suctioning and hold tube feeds      Ngozi Hendrix  Date of Service (when I saw the patient): 07/19/18                               [facility]    Internal Medicine Consult - Gold Service       Date of Admission:  7/17/2018  Consult Requested by: ENTDr.[AB1.1] Win[AB1.2]  Reason for Consult:[AB1.1] HTN, tachycardia, hypoxia, lethargy[AB1.2]    Assessment & Plan   Keira Collins is a 72 year old female with a past medical history of COPD, HTN, HLD, chronic tobacco abuse, h/o alcohol abuse, squamous cell carcinoma of larynx admitted on 7/17/2018 for direct laryngoscopy w/ biopsy, trach and NG tube placement 07/17. Medicine was asked to consult on HTN, hypoxia and tachycardia.[AB1.1]      #[AB1.2]Acute h[AB1.3]ypoxi[AB1.2]c respiratory failure[AB1.3]: Requiring 40% FiO2 via trach dome.[AB1.2] Requiring frequent suctioning per RN. CXR w/ right lower lobe infiltrate, WBC count of 12.1.[AB1.3] Differential includes PNA (aspiration of TF? Vs HCAP- WBC count of 12.6, afebrile, LA normal), COPD w/ exacerbation (no overt wheezing on exam), pulmonary edema (ECHO 05/2016 w/ normal LV function, EF of 65%, no regional WMA, diastolic dysfunction, weight up 8 pounds, no overt LE edema), pulmonary HTN, PE (tachy, new O2 requirements, known malignancy, no unilateral LE edema), pneumothorax (less likely).[AB1.2]  Wt Readings from Last 3 Encounters:   07/17/18 55.8 kg (123 lb 0.3 oz)   07/16/18 53.7 kg (118 lb 4.8 oz)   07/11/18 52.2 kg (115 lb)[AB1.4]   -Clinda 600 mg TID to treat for possible aspiration PNA  -IV lasix 20 mg x1[AB1.3]  -Scheduled nebs[AB1.2]  -ECHO  -Is/Os, daily weights  -Continuous pulse ox  -Tele[AB1.3]  -Continue RT and RN frequent  suctioning[AB1.5]    #HTN,[AB1.1] tachycardia[AB1.6]: PTA maintained on Losartan 50 mg qday, amlodipine 5 mg qday (unclear compliance per OP notes)[AB1.1] for tachycardia[AB1.3]. /58 (up to 208/91 at 1020), HR in 1 teens, afebrile, sating 94% on 40% humidified trach dome.[AB1.1] Son endorses pt consuming 3-8 beers/day, almost every day, no h/o WD or WD seizures per family.[AB1.2] EKG w/ some ST depression in lead V6, but V5 and lead I WNL- trop negative, PVCs as below.[AB1.3]  -MSSA protocol[AB1.6] w/ ativan[AB1.2]  -Tele  -Continuous pulse ox[AB1.6]  -Scheduled Xopenex[AB1.2] given tachycardia  -Agree with continuing home medications  -Agree with hydralazine PRN[AB1.3]    #[AB1.1]Lethargy, concern for r[AB1.2]ight facial droop:[AB1.6] Right facial droop first noted at 0934 this AM per documentation, improving throughout day per family. Risk factors for stroke include tobacco abuse, alcohol abuse, HTN, HLD known PVD, inactivity.[AB1.2] Head CT w/o acute intracranial abnormality, chronic ischemic disease.[AB1.3]   -Continue ASA, statin  -[AB1.2]BP control as above  -Continue plavix when safe from surgical standpoint[AB1.3]  -Continue thiamine  -Avoid narcotics as able  -Delirium precautions[AB1.2]    #PVCs: Frequent and q4[AB1.7] beats[AB1.3] on EKG.   -Check lytes  -Tele[AB1.7]    #S[AB1.2]quamous cell carcinoma of larynx admitted on 7/17/2018 for direct laryngoscopy w/ biopsy, trach and NG tube placement 07/17[AB1.1]: C/B hypoxia, lethargy.   -Management per primary team, ENT[AB1.2]    #RUL spiculated lung nodule:[AB1.1] Noted on 06/2018 CT scan[AB1.2]. Agree with PET scan.   #Right lobe thyroid nodule: s/p FNA. Follow path.   #PVD s/p endarterectomy, iliac artery recannulization w/ stent, right SFA recannulization w/ stent 01/2017: PTA maintained on ASA, plavix.[AB1.1] Plavix on hold, ASA continued. On Lovenox.[AB1.2]  #COPD: CT chest from 06/13/2018 w/ moderate centrilobular emphysematous changes in upper  "lobes. PTA maintained on Spiriva qday, Advair BID w/ PRN albuterol.  #HLD: Continue Lipitor.   #Adrenal gland nodularity: Noted on CT chest 06/13/2018 w/ nodularity of bilateral adrenal glands w/ may represent mets. Agree with PET.     DVT Prophylaxis: Enoxaparin (Lovenox) SQ    The patient's care was discussed with the Attending Physician, Dr. Hendrix, Bedside Nurse and Patient.    CARILTA Quintanilla  Internal Medicine Staff Hospitalist Service  [facility]  Pager: [phone]  After 5 PM, page gold team cross cover at [phone]. If no response, page job code [phone].  ______________________________________________________________________    Chief Complaint[AB1.1]   High blood pressure[AB1.6]    History is obtained from the patient    History of Present Illness   Keira Collins is a 72 year old female with a past medical history of COPD, HTN, HLD, chronic tobacco abuse, h/o alcohol abuse, squamous cell carcinoma of larynx admitted on 7/17/2018 for direct laryngoscopy w/ biopsy, trach and NG tube placement 07/17. Medicine was asked to consult on HTN, hypoxia and tachycardia.[AB1.1]      Patient unable to provide meaningful history. Son notes that patient is relatively independent at baseline, lives in small house but uses walker to get throughout. Doesn't go out much. Consumes alcohol frequently, mostly everyday but every once in awhile will take a day off. Can consume 3-8 beers in a day. No h/o WD but son notes \"she hasn't had chance to withdraw\" as she drinks daily. He believes last drink was evening of 07/16.[AB1.2]     Review of Systems[AB1.1]   The 10 point Review of Systems is negative other than noted in the HPI or here.[AB1.6]     Past Medical History    I have reviewed this patient's medical history and updated it with pertinent information if needed.[AB1.1]   Past Medical History:   Diagnosis Date     COPD (chronic obstructive pulmonary disease) (H) 7/28/2014    From [facility]ization 05/2016: " bedside feng shows moderate airflow obstruction [FEV1 0.99L, 62% pred and FVC 1.7L, 82% predicted; FEV1 and FVC DROPPED by 11 and 16% respectively post BD  Diagnosis made 7/28/2014 at [facility]        Essential hypertension with goal blood pressure less than 140/90      H/O: alcohol abuse      Hyperlipidemia LDL goal <100      Laryngeal mass 6/27/2018     Laryngeal squamous cell carcinoma (H) 07/17/2018     Osteoporosis 7/14/2016     Pulmonary nodules 7/9/2018     PVD (peripheral vascular disease) (H) 7/11/2016     Tobacco abuse[AB1.8]         Past Surgical History   I have reviewed this patient's surgical history and updated it with pertinent information if needed.[AB1.1]  Past Surgical History:   Procedure Laterality Date     LARYNGOSCOPY WITH BIOPSY(IES) N/A 7/17/2018    Procedure: LARYNGOSCOPY WITH BIOPSY(IES);  Direct Laryngoscopy With Biopsy, Tracheostomy ;  Surgeon: Cynthia Walden MD;  Location: [facility] OR     SURGICAL HISTORY OF -   5/13/2016    right hip fracture     TRACHEOSTOMY N/A 7/17/2018    Procedure: TRACHEOSTOMY;;  Surgeon: Cynthia Walden MD;  Location: [facility] OR     VASCULAR SURGERY Left 01/2017    ; left femoral endarterectomy[AB1.8]        Social History[AB1.1]   Social History   Substance Use Topics     Smoking status: Current Every Day Smoker     Packs/day: 1.00     Years: 50.00     Types: Cigarettes     Smokeless tobacco: Never Used      Comment: 1/2 a pack a day      Alcohol use 0.0 oz/week     0 Standard drinks or equivalent per week      Comment: Beer- 1-4 per day[AB1.8]   Lives in a home, son lives with her, ambulates with walker. Alcohol as per HPI.[AB1.2]    Family History   I have reviewed this patient's family history and updated it with pertinent information if needed.[AB1.1]   Family History   Problem Relation Age of Onset     Chronic Obstructive Pulmonary Disease Daughter      Diabetes Daughter      HEART DISEASE Daughter[AB1.8]        Medications[AB1.1]   I have  reviewed this patient's current medications[AB1.7]    Allergies   Allergies   Allergen Reactions     Penicillins        Physical Exam   Vital Signs: Temp: 98.7  F (37.1  C) Temp src: Axillary BP: 180/58 Pulse: 114 Heart Rate: 116 Resp: 18 SpO2: 94 % O2 Device: Trach dome    Weight: 123 lbs .27 oz  GENERAL: Alert[AB1.1], unable to assess orientation as patient[AB1.6] is unable to speak.[AB1.2]   HEENT: Anicteric sclera. PERRL.[AB1.1] EOMI.[AB1.6] Mucous membranes moist[AB1.1]. Trach in place. NJ in place. Right sided facial droop, unable to smile.[AB1.6]   CV:[AB1.1] Tachycardic rate, regular rhythm[AB1.6]. S1, S2. No murmurs appreciated.   RESPIRATORY: Effort normal on[AB1.1] 40 % FiO2 via trach dome[AB1.6]. Lungs with[AB1.1] diffuse coarse crackles,[AB1.6] no wheezing.   GI: Abdomen soft and non distended, bowel sounds present. No tenderness, rebound, guarding.   MUSCULOSKELETAL: No joint swelling or tenderness. Moves all extremities.   NEUROLOGICAL: No focal deficits. Strength[AB1.1] 3/5 in RUE, 4/5 in LUE, unable to assess lower extremities, moves feet bilaterally.[AB1.6]   EXTREMITIES: No peripheral edema.[AB1.1] SCDs in place.[AB1.6] Intact bilateral pedal pulses.   SKIN: No jaundice. No rashes.     Data   Data reviewed today: I reviewed all medications, new labs and imaging results over the last 24 hours. I personally reviewed no images or EKG's today.    Reviewed BMp, mag, phos, LA, CBC, glucose, INR[AB1.1]       Revision History        User Key Date/Time User Provider Type Action    > AB1.5 7/19/2018  7:58 PM Clau Dempsey PA-C Physician Assistant Sign     AB1.3 7/19/2018  7:43 PM Clau Dempsey PA-C Physician Assistant Sign     AB1.7 7/19/2018  5:32 PM Clau Dempsey PA-C Physician Assistant      AB1.4 7/19/2018  5:08 PM Clau Dempsey PA-C Physician Assistant      AB1.2 7/19/2018  4:55 PM Clau Dempsey PA-C Physician Assistant      AB1.6 7/19/2018   4:47 PM Clau Dempsey PA-C Physician Assistant      AB1.8 7/19/2018  3:45 PM Clau Dempsey PA-C Physician Assistant      AB1.1 7/19/2018  3:43 PM Clau Dempsey PA-C Physician Assistant             Consults by Zuly Horta APRN CNP at 7/18/2018  2:44 PM     Author:  Zuly Horta APRN CNP Service:  Interventional Radiology Author Type:  Nurse Practitioner    Filed:  7/18/2018  2:45 PM Date of Service:  7/18/2018  2:44 PM Creation Time:  7/18/2018  2:44 PM    Status:  Signed :  Zuly Horta APRN CNP (Nurse Practitioner)     Consult Orders:    1. Interventional Radiology Adult/Peds IP Consult: Patient to be seen: Routine within 24 hours; Call back #: [phone]; US guided FNA thyroid nodule - General radiology/US unable to perform [[id number]] ordered by Samantha Burnham PA-C at 07/18/18 1038                Patient is on IR schedule 7/19/2018 for a US guided FNA of thyroid mass.   Labs WNL for procedure.  INR pending.  No NPO required.  Medications to be held include: lovenox x1 dose  Consent will be done prior to procedure.     Please contact the IR [place] RN [place] [phone] for estimated time of procedure.     Case discussed with Dr. Henry from IR and Samantha Burnham PA-C.    Zuly Horta DNP, APRN  Interventional Radiology  Pager: [phone][KM1.1]       Revision History        User Key Date/Time User Provider Type Action    > KM1.1 7/18/2018  2:45 PM Zuly Horta APRN CNP Nurse Practitioner Sign                     Progress Notes - Physician (Notes for yesterday and today)      Progress Notes by Sandie Grace PA-C at 8/9/2018 11:36 AM     Author:  Sandie Grace PA-C Service:  Hospitalist Author Type:  Physician Assistant - C    Filed:  8/9/2018 11:50 AM Date of Service:  8/9/2018 11:36 AM Creation Time:  8/9/2018 11:36 AM    Status:  Attested :  Sandie Grace PA-C  (Physician Assistant - C)    Cosigner:  Amaury Estrada MD at 8/9/2018  7:08 PM        Attestation signed by Amaury Estrada MD at 8/9/2018  7:08 PM        Attestation:  I, Amaury Estrada, saw and evaluated Keira Collins as part of a shared visit.  I have reviewed and discussed with the advanced practice provider their history, physical and plan.     I have reviewed today's care team notes, Medications, Vital Signs and Labs.    My key history or physical exam findings:   More agitated overnight. Pulling at lines and trach. Not interested in talking with us this am.     Physical exam:  General: Alert and oriented, chronically ill-appearing  Chest/Pulmonary: trach in place, breathing comfortably, no tachypnea   CV: RRR, no MRG,   Skin: no diaphoresis, skin color normal    Key management decisions made by me:   continue to hold off on suctioning unless she becomes uncomfortable or desats.   Severe malnutrition: nutrition following. Continue tube feeds. Attempt to minimize interruptions.  Psych consult today for agitation and med management    Raj Estrada MD   of Medicine  Med-Peds Hospitalist  Pager [phone]    Date of service: 08/09/2018                               [facility], [place]    Internal Medicine Progress Note - Gold Service[GR1.1]      Assessment & Plan[GR1.2]   Keira Collins is a 72 year old female with history of COPD, HTN, HLD, etoh abuse (in remission) who was admitted to ENT service 7/17 for direct laryngoscopy with biopsy of laryngeal mess, tracheostomy and NTG placement. Course c/b L pontine CVA 7/19 and acute hypoxic respiratory failure due to aspiration PNA. 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 Course c/b CVA and aspiration PNA. ENT discussed case at tumor conference 7/27 and medical oncology 7/31. Surgical resection offered regardless of  whether lung nodules are malignant, and would be left with permanent trach. Patient does not want surgical intervention. Treatment of lung nodules TBD at a later date based on functional status. Palliative care consulted. FiO2 needs stable. Suction needs significantly down over the 48 hours  - ENT managing  - Psychiatry consult for decision making capacity and assistance with mood  - Continue Robinul 2 mg tid, hold if secretions thickening  - Only suction for O2 sat drops or symptomatic relief. Hold off if only for gurgling   - Scheduled NS nebs tid  - SLP following; planning to trial PMSV during therapy only  - Trach cares per ENT   - Ritalin per palliative recs d/t mood  - Transfer to medicine floor when bed available   - Encourage PT/OT     Acute anemia: Hgb 9.2 (8.8, normal 8/2). No s/s of GIB or oral bleed. Retics 1.5 8/7. LDH normal, haptoglobin 528 making hemolysis unlikely. Ferritin very elevated to 1069 but in setting of cancer. Iron low, 26, IBC 10. Folate and B12 normal  - Start daily iron  - Check CRP    Acute L pontine CVA:  R facial droop noted 7/19, MRI primo acute L pontine infarct. Outside therapeutic window for TPA. Conservative management recommended with DAPT and statin. No data to support use of DAPT long term (suggest <90 days), but also no consensus in literature regarding duration of DAPT following acute CVA.   - Continue ASA and plavix, duration <90 days given increased risk of bleeding beyond this  - Cont statin  - SBP goal <160     Severe protein-calorie malnutrition: 2/2 acute on chronic medical issues including cancer, surgery, and CVA/dysphagia. PEG placed 7/27.   - Continue TF at goal rate 45 ml/h, will darline transition to cycled 8/9 vs 8/10  - Continue FWF 60 ml q4h (per nutrition would need 100 ml q4h for hydration needs)  - NPO, SLP following     Spiculated RUL nodule: Concerning for malignancy. PET 7/20 hypermetabolic pulmonary nodules c/w metastatic disease. Per d/w Medical  Oncology, lung nodule biopsy non-urgent. Oncology also noted high mortality with pulmonary involvement at 1 year. Will need to continue discussion re: goals of care and possible surgical resection.   - OP follow up with ENT and Oncology to discuss timing of potential lung biopsy  - See above re: DAPT. Should be OK to hold/stop plavix prior to any scheduled lung biopsy     HTN: Stable. Goal SBP <160 per neurology in setting of recent CVA. Cont amlodipine and losartan with hold parameters.      Chronic Medical Problems:  COPD: PFTs 7/16/18 FEV-1/FVC 65% with FEV-1 0.58 L. Marina Post-op respiratory failure risk 9.91% (failure to wean of vent within 48h of surgery or unplanned intubation/reintubation). Pulmonary risks with proposed surgery discussion with treatment team and family. Cont Brovana, Pulmicort, Ipratropium/Levalbuterol.   HLD: Continue statin    H/o alcohol abuse: Currently in remission   PAD: S/p lower extremity endarterectomy and stenting 2017. Previously on ASA and Plavix, but per family patient was not taking plavix regularly     Resolved Hospital Problems:  Tachycardia: HR up to 110s 8/6. Difficult to interpret given difficulty with communicated, but denies s/s infection. CXR no acute findings. Lactic acid normal. Afebrile. Normalized 8/8  Sepsis and acute hypoxic respiratory failure d/t aspiration pneumonia: Excessive secretions post-trach c/b acute L pontine CVA. Acute hypoxia, fevers and elevated inflammatory markers 7/24. CXR R>L bibasilar opacities with small R effusion. CT Chest pulmon angio negative for PE. Sputum 7/22 negative. ? Pulmonary edema contributing. ID consulted and transitioned to moxifloxacin, last dose 7/31. Clinically improved on antibiotics and diuresis. Currently stable      Pain Assessment:[GR1.1]  Current Pain Score 8/9/2018   Patient currently in pain? denies   Pain score (0-10) -   Pain location -   Pain descriptors -[GR1.2]   Keira canas pain level was assessed and she currently  denies pain.      Diet:[GR1.1] NPO for Medical/Clinical Reasons Except for: No Exceptions  Adult Formula Drip Feeding: Continuous Isosource 1.5; Gastrostomy; Goal Rate: 45; mL/hr; Medication - Tube Feeding Flush Frequency: At least 15-30 mL water before and after medication administration and with tube clogging; Amount to Send (Nutritio...[GR1.2]  Fluids: None  DVT Prophylaxis: Enoxaparin (Lovenox) SQ  Code Status:[GR1.1] Full Code    Disposition Plan[GR1.2]   Expected discharge: 1-2 days, recommended to TCU vs other once suction needs determined, ongoing improvement, etc      Entered:[GR1.1] Sandie Grace 08/09/2018[GR1.2],[GR1.1] 11:37 AM[GR1.2]   Information in the above section will display in the discharge planner report.      The patient's care was discussed with the patient, nursing, ENT, SW,  family, nutrition, and attending physician, Dr. Estrada[GR1.1]    Sandie Grace[GR1.2]  Internal Medicine Staff Hospitalist Service  [facility]  Pager: [phone]  Please see sticky note for cross cover information[GR1.1]    Interval History[GR1.2]   Patient very frustrated today. Does not want to be here. Per nursing, attempted to pull trach out last night. Unclear if this was done accidentally/due to confusion or if patient was acting out/frustrated. She currently denies pain. She does not want to discuss barriers to discharge. Further information limited due to patient frustration. Attempted to reach son, Dar ([phone]), but he was unavailable as he was on his motorcycle     Data reviewed today: I reviewed all medications, new labs and imaging results over the last 24 hours.[GR1.1]    Physical Exam[GR1.2]   Vital Signs:[GR1.1] Temp: 97.7  F (36.5  C) Temp src: Oral BP: 135/79 Pulse: 100 Heart Rate: 101 Resp: 28 SpO2: 99 % O2 Device: Trach dome[GR1.2]    Weight:[GR1.1] 127 lbs 6.81 oz[GR1.2]  General Appearance: Alert and oriented x3, whispers answers. Appears frustrated.  Trach dome in place  Respiratory: Bilateral course lungs. No wheezing or crackles  Cardiovascular: RRR, S1, S2. No murmur noted  GI: Abdomen soft, non-tender with bowel sounds active. No guarding or rebound. GT in place, CDI  Skin: No rash or jaundice  Other: No peripheral edema, distal pulses palpable[GR1.1]     Data   Data     Recent Labs  Lab 08/09/18  0547 08/08/18  0915 08/07/18  1625 08/07/18  0450 08/06/18  0904  08/06/18  0514   WBC 12.1* 12.6* 12.7* 12.3* 13.8*  < >  --    HGB 9.2* 8.8* 9.2* 9.4* 11.2*  < >  --    MCV 94 95 95 94 94  < >  --     225 250 235 247  < >  --    NA  --  137  --  137 137  --   --    POTASSIUM  --  4.4  --  4.2 4.4  --   --    CHLORIDE  --  105  --  104 105  --   --    CO2  --  24  --  25 26  --   --    BUN  --  30  --  39* 37*  --   --    CR  --  0.53  --  0.54 0.60  --   --    ANIONGAP  --  8  --  8 5  --   --    ESTEFANI  --  9.0  --  9.1 9.1  --   --    GLC  --  170*  --  155* 179*  --   --    ALBUMIN  --   --   --   --   --   --  2.6*   < > = values in this interval not displayed.  No results found for this or any previous visit (from the past 24 hour(s)).[GR1.2]     Revision History        User Key Date/Time User Provider Type Action    > GR1.2 8/9/2018 11:50 AM Sandie Grace PA-C Physician Assistant - LYNETTE Sign     GR1.1 8/9/2018 11:36 AM Sandie Grace PA-C Physician Assistant - C             Progress Notes by Mor Thomson MD at 8/9/2018  8:18 AM     Author:  Mor Thomson MD Service:  Otolaryngology/ENT Author Type:  Resident    Filed:  8/9/2018  8:22 AM Date of Service:  8/9/2018  8:18 AM Creation Time:  8/9/2018  8:18 AM    Status:  Signed :  Mor Thomson MD (Resident)         Otolaryngology Progress Note  August 9, 2018     S:  No acute events overnight. Requiring less frequent suctioning with robinul. Awaiting disposition plan.     O: /74 (BP Location: Left arm)  Pulse 100  Temp 99.7  F (37.6  C) (Axillary)  Resp 28   "Ht 1.6 m (5' 2.99\")  Wt 57.8 kg (127 lb 6.8 oz)  SpO2 99%  BMI 22.58 kg/m2     General: sleeping comfortably supine in bed   HEENT: 6-0 cuffless Shiley in place, optifoam dressing under trach flange, some scant secretions wiped away with gauze. Trach ties are snug w/ 2 finger breadths between neck and ties. Tracheostomy site clean, no bleeding. No skin breakdown or erythema under trach ties or face plate.   Pulmonary: breathing comfortably via 6-0 Shiley on HTD, no blood tinged secretions    Intake/Output Summary (Last 24 hours) at 08/04/18 0832  Last data filed at 08/04/18 0400   Gross per 24 hour   Intake             1335 ml   Output                0 ml   Net             1335 ml     Labs from today:  Phos 4.5  Mg 2.8     IMAGING:    MRI: 7/20:  Acute left pontine mesencephalic junction infarct    PET Scan 7/20:   Large hypermetabolic laryngeal mass involving bilateral VF, anterior commissure, epiglottis, left AE fold, effacement of preepiglottic fat, bilateral LAD.   hypermetabolic pulmonary nodules consistent with metastatic disease, small right pleural effusion.  urethral diverticulum containing stones and septations, 4.7 cm left adnexal cystic structure, thoracic abdominal aortic ectasia. 1.9 cm right iliac aneurysm.    Head CT 7/22  Impression:   Evolving changes of left hemipontine infarction. Otherwise, no acute  intracranial pathology.    ECHO 7/24:  Technically difficult study.  Global and regional left ventricular function is probably normal with an EF of  60-65%. No significnat valve disease.    CT PE protocol 7/25: No PE    PATHOLOGY:  Laryngeal biopsies:  FINAL DIAGNOSIS:   A. LEFT FALSE VOCAL CORD, BIOPSY:   - INVASIVE KERATINIZING SQUAMOUS CELL CARCINOMA, moderately   differentiated.   - Perineural invasion is present.   B. PETIOLE, BIOPSY:   - AT LEAST SQUAMOUS CELL CARCINOMA IN-SITU.   C. ADDITIONAL LEFT FALSE VOCAL CORD, BIOPSY:   - INVASIVE KERATINIZING SQUAMOUS CELL CARCINOMA, moderately "   differentiated.   D. RIGHT FALSE VOCAL CORD, BIOPSY:   - AT LEAST MICROINVASIVE SQUAMOUS CELL CARCINOMA, keratinizing type.     FNA R thyroid nodule  - Atypica of undetermined significance. Recommend repeat FNA in 4-6 weeks.       A/P: Keira Collins is a 72-year-old female with a [id number] SCCa of larynx PMH significant for COPD, 140 pack-year tobacco history, alcohol use disorder, HLD, HTN, and PVD who is now s/p direct laryngoscopy with biopsy of laryngeal SCC and tracheotomy on 7/17/18. Hospital course has been complicated by acute ischemic pontine stroke.    - Tracheostomy care:   - Trach changed 7/24 and 8/1, in good position healing well.   - Tracheoscopy on 8/3 showed small excoriation along posterior tracheal wall likely 2/2 trauma from suctioning.   - Suction only within the lumen of the trach tube, avoid deep suctioning   - Keep trach ties tight around the neck with only 2 finger breadth between skin and ties. If the ties are loose the trach tube will sit along the anterior tracheal wall which could lead to erosion and potential life threatening bleeding from tracheo-innominate fistula if persistently rubbing.    - Perform regular suctioning PRN   - RN should clean inner cannula with a brush Qshift and PRN.    - Keep trach tube obturator taped to wall behind the head of the bed. Keep extra unopened 6-0 Shiley and 4-0 Shiley at bedside at all times.  - SLP consulted for PMSV  - Robniul for secretions, monitor closely for increased viscosity of secretions and increased risk of mucus plugging  - Planning for discharge to TCU once secretions and suctioning needs decrease.   - Will plan for ENT follow up as an outpatient for further discussions of plan of care. Options presented at family conference 8/1 include:   1) surgery (total laryngectomy) to address laryngeal cancer, likely followed by chemo for lung disease   2) palliative chemo/radiation   3) comfort cares  - Will need to obtain lung biopsy when/if  feasible and patient/family wanted to proceed with treatment. Plavix would need to be held for 5 days prior to biopsy.        Discussed assessment and plan with Dr. Walden.    Mor Thomson MD, PGY-1  Otolaryngology- Head and Neck Surgery  Please contact ENT with questions by dialing [phone] and entering job code [phone] when prompted.[TV1.1]       Revision History        User Key Date/Time User Provider Type Action    > TV1.1 8/9/2018  8:22 AM Mor Thomson MD Resident Sign                     Procedure Notes      Procedures by Tristan Flood PA-C at 7/19/2018 11:52 AM     Author:  Tristan Flood PA-C Service:  Interventional Radiology Author Type:  Physician Assistant    Filed:  7/19/2018 11:52 AM Date of Service:  7/19/2018 11:52 AM Creation Time:  7/19/2018 11:50 AM    Status:  Signed :  Tristan Flood PA-C (Physician Assistant)         Interventional Radiology Brief Post Procedure Note    Procedure: IR THYROID BIOPSY    [facility]    Assistant: Aundrea Sanchez MD    Time Out: Prior to the start of the procedure and with procedural staff participation, I verbally confirmed the patient s identity using two indicators, relevant allergies, that the procedure was appropriate and matched the consent or emergent situation, and that the correct equipment/implants were available. Immediately prior to starting the procedure I conducted the Time Out with the procedural staff and re-confirmed the patient s name, procedure, and site/side. (The Joint Commission universal protocol was followed.)  Yes    Medications   Medication Event Details Admin User Admin Time   hydrALAZINE (APRESOLINE) injection 10 mg Medication Given Dose: 10 mg; Route: Intravenous Jennifer Moise RN 7/19/2018 11:15 AM   HYDROmorphone (DILAUDID) injection 0.2 mg Medication Given Dose: 0.2 mg; Route: Intravenous Jennifer Moise RN 7/19/2018 11:33 AM   lidocaine (PF) (XYLOCAINE) 1 % injection 1-30  "mL Medication Given by Other Clinician Dose: 20 mL; Route: Subcutaneous Jennifer Moise, RN 7/19/2018 11:33 AM       Sedation: None. Local Anesthestic used    Findings: U/S guided inferior right thyroid nodule FNA. 5 Passes performed with 25 gauge needles.    Estimated Blood Loss: Minimal    Fluoroscopy Time: 0 minute(s)    SPECIMENS: Fine needle aspirate for cytological analysis    Complications: 1. None     Condition: Stable    Plan: Follow up per primary team.    Comments: See dictated procedure note for full details.    Tristan Flood PA-C[MB1.1]           Revision History        User Key Date/Time User Provider Type Action    > MB1.1 7/19/2018 11:52 AM Tristan Flood PA-C Physician Assistant Sign                  Progress Notes - Therapies (Notes from 08/07/18 through 08/10/18)     No notes of this type exist for this encounter.                                          INTERAGENCY TRANSFER FORM - LAB / IMAGING / EKG / EMG RESULTS   7/17/2018                       [facility] BANK: [phone]            Unresulted Labs (24h ago through future)    Start       Ordered    08/09/18 0600  CBC with platelets  DAILY,   Routine     Comments:  Last Lab Result: Hemoglobin (g/dL)       Date                     Value                 08/08/2018               8.8 (L)          ----------    08/08/18 1502    07/30/18 0400  Calcium ionized  EVERY MWF,   Routine      07/29/18 1241    07/30/18 0400  Phosphorus  DAILY,   Routine      07/29/18 1248    Unscheduled  Magnesium  (Magnesium Replacement -  Adult - \"Standard\" - Replacement for all levels less than 1.6 mg/dL )  CONDITIONAL (SPECIFY),   Routine     Comments:  Obtain Magnesium Level for these conditions:  *IF no magnesium result within 24 hrs before initiation of order set, draw magnesium level with next lab collect.    *2 HOURS AFTER last magnesium replacement dose when magnesium replacement given for level less than 1.6   *Next morning after magnesium " "dose.     Repeat Magnesium Replacement if necessary.    07/17/18 1922    Unscheduled  Phosphorus  (POTASSIUM Phosphate - \"Standard\" - Replacement for levels less than or equal to 2.4 mg/dL )  CONDITIONAL (SPECIFY),   Routine     Comments:  Obtain Phosphorus Level for these conditions:  *IF no phosphorus result within 24 hrs before initiation of order set, draw phosphorus level with next lab collect.    *2 HOURS AFTER last phosphorus replacement dose for levels less than 2.0.  *Next morning after phosphorus dose.     Repeat Phosphorus Replacement if necessary.    07/19/18 1437         Lab Results - 3 Days      Phosphorus [[id number]]  Resulted: 08/10/18 0810, Result status: Final result    Ordering provider: Rosa Nguyen NP  08/09/18 2200 Resulting lab: [facility]    Specimen Information    Type Source Collected On   Blood  08/10/18 0724          Components       Value Reference Range Flag Lab   Phosphorus 3.9 2.5 - 4.5 mg/dL  51            Basic metabolic panel [[id number]] (Abnormal)  Resulted: 08/10/18 0810, Result status: Final result    Ordering provider: Sandie Grace PA-C  08/09/18 2200 Resulting lab: [facility]    Specimen Information    Type Source Collected On   Blood  08/10/18 0724          Components       Value Reference Range Flag Lab   Sodium 134 133 - 144 mmol/L  51   Potassium 4.3 3.4 - 5.3 mmol/L  51   Chloride 103 94 - 109 mmol/L  51   Carbon Dioxide 21 20 - 32 mmol/L  51   Anion Gap 10 3 - 14 mmol/L  51   Glucose 164 70 - 99 mg/dL H 51   Urea Nitrogen 27 7 - 30 mg/dL  51   Creatinine 0.51 0.52 - 1.04 mg/dL L 51   GFR Estimate >90 >60 mL/min/1.7m2  51   Comment:  Non  GFR Calc   GFR Estimate If Black >90 >60 mL/min/1.7m2  51   Comment:  African American GFR Calc   Calcium 9.3 8.5 - 10.1 mg/dL  51            Calcium ionized [[id number]]  Resulted: 08/10/18 0745, Result status: Final result    " Ordering provider: Rosa Nguyen NP  08/09/18 2200 Resulting lab: [facility]    Specimen Information    Type Source Collected On   Blood  08/10/18 0724          Components       Value Reference Range Flag Lab   Calcium Ionized 4.7 4.4 - 5.2 mg/dL  51            CBC with platelets [[id number]] (Abnormal)  Resulted: 08/10/18 0744, Result status: Final result    Ordering provider: Sandie Grace PA-C  08/10/18 0000 Resulting lab: [facility]    Specimen Information    Type Source Collected On   Blood  08/10/18 0724          Components       Value Reference Range Flag Lab   WBC 13.9 4.0 - 11.0 10e9/L H 51   RBC Count 2.93 3.8 - 5.2 10e12/L L 51   Hemoglobin 8.4 11.7 - 15.7 g/dL L 51   Hematocrit 27.5 35.0 - 47.0 % L 51   MCV 94 78 - 100 fl  51   MCH 28.7 26.5 - 33.0 pg  51   MCHC 30.5 31.5 - 36.5 g/dL L 51   RDW 13.4 10.0 - 15.0 %  51   Platelet Count 250 150 - 450 10e9/L  51            CRP inflammation [[id number]] (Abnormal)  Resulted: 08/09/18 1237, Result status: Final result    Ordering provider: Rosa Nguyen NP  08/09/18 0547 Resulting lab: [facility]    Specimen Information    Type Source Collected On     08/09/18 0547          Components       Value Reference Range Flag Lab   CRP Inflammation 140.0 0.0 - 8.0 mg/L H 51            Lactic acid level STAT for sepsis protocol [[id number]]  Resulted: 08/09/18 1028, Result status: Final result    Ordering provider: Amaury Estrada MD  08/09/18 0949 Resulting lab: [facility]    Specimen Information    Type Source Collected On   Blood  08/09/18 1014          Components       Value Reference Range Flag Lab   Lactate for Sepsis Protocol 1.2 0.7 - 2.0 mmol/L  51            Phosphorus [[id number]]  Resulted: 08/09/18 0639, Result status: Final result    Ordering provider: Rosa Nguyen NP  08/08/18 2200  Resulting lab: [facility]    Specimen Information    Type Source Collected On   Blood  08/09/18 0547          Components       Value Reference Range Flag Lab   Phosphorus 4.1 2.5 - 4.5 mg/dL  51            CBC with platelets [[id number]] (Abnormal)  Resulted: 08/09/18 0616, Result status: Final result    Ordering provider: Sandie Grace PA-C  08/09/18 0001 Resulting lab: [facility]    Specimen Information    Type Source Collected On   Blood  08/09/18 0547          Components       Value Reference Range Flag Lab   WBC 12.1 4.0 - 11.0 10e9/L H 51   RBC Count 3.10 3.8 - 5.2 10e12/L L 51   Hemoglobin 9.2 11.7 - 15.7 g/dL L 51   Hematocrit 29.1 35.0 - 47.0 % L 51   MCV 94 78 - 100 fl  51   MCH 29.7 26.5 - 33.0 pg  51   MCHC 31.6 31.5 - 36.5 g/dL  51   RDW 13.5 10.0 - 15.0 %  51   Platelet Count 251 150 - 450 10e9/L  51            Folate [[id number]]  Resulted: 08/08/18 2318, Result status: Final result    Ordering provider: Sandie Grace PA-C  08/08/18 0915 Resulting lab: [facility]    Specimen Information    Type Source Collected On     08/08/18 0915          Components       Value Reference Range Flag Lab   Folate 36.4 >5.4 ng/mL  51            Vitamin B12 [[id number]]  Resulted: 08/08/18 2318, Result status: Final result    Ordering provider: Sandie Grace PA-C  08/08/18 0915 Resulting lab: [facility]    Specimen Information    Type Source Collected On     08/08/18 0915          Components       Value Reference Range Flag Lab   Vitamin B12 737 193 - 986 pg/mL  51            Ferritin [[id number]] (Abnormal)  Resulted: 08/08/18 2246, Result status: Final result    Ordering provider: Sandie Grace PA-C  08/08/18 0915 Resulting lab: [facility]    Specimen Information    Type Source Collected On     08/08/18 0915           Components       Value Reference Range Flag Lab   Ferritin 1069 8 - 252 ng/mL H 51            Iron and iron binding capacity [[id number]] (Abnormal)  Resulted: 08/08/18 2246, Result status: Final result    Ordering provider: Sandie Irby PA-C  08/08/18 0915 Resulting lab: [facility]    Specimen Information    Type Source Collected On     08/08/18 0915          Components       Value Reference Range Flag Lab   Iron 26 35 - 180 ug/dL L 51   Iron Binding Cap 265 240 - 430 ug/dL  51   Iron Saturation Index 10 15 - 46 % L 51            Blood Morphology Pathologist Review [[id number]]  Resulted: 08/08/18 1546, Result status: Final result    Ordering provider: Sandie Irby PA-C  08/07/18 1534 Resulting lab: [facility]    Specimen Information    Type Source Collected On   Blood  08/07/18 1625          Components       Value Reference Range Flag Lab   Copath Report --      Result:         Patient Name: SHAHRIAR GARCÍA  MR#: [id number]  Specimen #: [id number]  Collected: 8/7/2018  Received: 8/8/2018  Reported: 8/8/2018 15:44  Ordering Phy(s): SANDIE IRBY    For improved result formatting, select 'View Enhanced Report Format' under   Linked Documents section.    TEST(S):  Blood Smear Morphology    FINAL DIAGNOSIS:  Blood Smear Morphology:    - Marked hypochromic, normocytic anemia; no increase in erythrocyte   regeneration    - Increased red blood cell rouleaux formation    - No morphologic evidence of microangiopathic hemolysis    - Slight leukocytosis with eosinophilia    I have personally reviewed all specimens and/or slides, including the   listed special stains, and used them  with my medical judgment to determine the final diagnosis.    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    Recent drop in hemoglobin concern  for hemolysis.    MICROSCOPIC DESCRIPTION:  The red blood cells appear hypochromic. Poikilocytosis is minimal.   Polychromasia is not increased. Rouleaux  formation is increased. The morphology of the platelets is normal.    CLINICAL LAB RESULTS:  Battery Order No. Lab Test Code Clinical Result Ref. Range Units Result   Date  Hemogram/Diff/PLT Q38704 BU WBC Count H 12.7 4.0-11.0 10e9/L 8/7/2018   16:46       RBC Count L 3.12 3.8-5.2 10e12/L 8/7/2018 16:46       Hemoglobin L 9.2 11.7-15.7 g/dL 8/7/2018 16:46       Hematocrit L 29.5 35.0-47.0 % 8/7/2018 16:46       MCV 95  fl 8/7/2018 16:46       MCH 29.5 26.5-33.0 pg 8/7/2018 16:46       MCHC L 31.2 31.5-36.5 g/dL 8/7/2018 16:46       RDW 13.5 10.0-15.0 % 8/7/2018 16:46       Platelet Count 250 150-450 10e9/L 8/7/2018 16:46        SEE TEXT   8/7/2018 16:46       Text/Comments:  Automated Method       % Neutrophils 60.1  % 8/7/2018  16:46       % Lymphocytes 24.7  % 8/7/2018 16:46       % Monocytes 7.7  % 8/7/2018 16:46       % Eosinophils 6.7  % 8/7/2018 16:46       % Basophils 0.2  % 8/7/2018 16:46       % Immature Grans 0.6  % 8/7/2018 16:46       Nucleated RBCs 0 0 /100 8/7/2018 16:46       abs Neutrophils 7.6 1.6-8.3 10e9/L 8/7/2018 16:46       abs Lymphocytes 3.1 0.8-5.3 10e9/L 8/7/2018 16:46       abs Monocytes 1.0 0.0-1.3 10e9/L 8/7/2018 16:46       abs Eosinophils H 0.9 0.0-0.7 10e9/L 8/7/2018 16:46       abs Basophils 0.0 0.0-0.2 10e9/L 8/7/2018 16:46       abs Imm Granulocytes 0.1 0-0.4 10e9/L 8/7/2018 16:46       abs NRBC 0.0   8/7/2018 16:46    Haptoglobin   Haptoglobin H 528  mg/dL 8/8/2018 10:57    Lactate Dehydrogenase   Lactate Dehydrogenase 178  U/L 8/7/2018   17:01    Retic   Retic % 1.5 0.5-2.0 % 8/7/2018 16:46       Retic abs 46.2 25-95 10e9/L 8/7/2018 16:46    CPT Codes:  A: 48245-KDZOD    TESTING LAB  LOCATION:  [facility] [address] [place]   [id number] [phone]    COLLECTION SITE:  Client:  [facility]  Location:  [id number] (B)              Haptoglobin [[id number]] (Abnormal)  Resulted: 08/08/18 1057, Result status: Final result    Ordering provider: Sandie Grace PA-C  08/07/18 1534 Resulting lab: [facility]    Specimen Information    Type Source Collected On   Blood  08/07/18 1625          Components       Value Reference Range Flag Lab   Haptoglobin 528 35 - 175 mg/dL H 51            Basic metabolic panel [[id number]] (Abnormal)  Resulted: 08/08/18 1009, Result status: Final result    Ordering provider: Sandie Grace PA-C  08/08/18 0805 Resulting lab: [facility]    Specimen Information    Type Source Collected On   Blood  08/08/18 0915          Components       Value Reference Range Flag Lab   Sodium 137 133 - 144 mmol/L  51   Potassium 4.4 3.4 - 5.3 mmol/L  51   Chloride 105 94 - 109 mmol/L  51   Carbon Dioxide 24 20 - 32 mmol/L  51   Anion Gap 8 3 - 14 mmol/L  51   Glucose 170 70 - 99 mg/dL H 51   Urea Nitrogen 30 7 - 30 mg/dL  51   Creatinine 0.53 0.52 - 1.04 mg/dL  51   GFR Estimate >90 >60 mL/min/1.7m2  51   Comment:  Non  GFR Calc   GFR Estimate If Black >90 >60 mL/min/1.7m2  51   Comment:  African American GFR Calc   Calcium 9.0 8.5 - 10.1 mg/dL  51            Lactic acid level STAT for sepsis protocol [[id number]]  Resulted: 08/08/18 0950, Result status: Final result    Ordering provider: Amaury Estrada MD  08/08/18 0843 Resulting lab: [facility]    Specimen Information    Type Source Collected On   Blood  08/08/18 0915          Components       Value Reference Range Flag Lab   Lactate for Sepsis Protocol 1.5 0.7 - 2.0 mmol/L  51            CBC  with platelets [[id number]] (Abnormal)  Resulted: 08/08/18 0949, Result status: Final result    Ordering provider: Sandie Grace PA-C  08/08/18 0805 Resulting lab: [facility]    Specimen Information    Type Source Collected On   Blood  08/08/18 0915          Components       Value Reference Range Flag Lab   WBC 12.6 4.0 - 11.0 10e9/L H 51   RBC Count 3.05 3.8 - 5.2 10e12/L L 51   Hemoglobin 8.8 11.7 - 15.7 g/dL L 51   Hematocrit 28.9 35.0 - 47.0 % L 51   MCV 95 78 - 100 fl  51   MCH 28.9 26.5 - 33.0 pg  51   MCHC 30.4 31.5 - 36.5 g/dL L 51   RDW 13.4 10.0 - 15.0 %  51   Platelet Count 225 150 - 450 10e9/L  51            Phosphorus [[id number]] (Abnormal)  Resulted: 08/08/18 0602, Result status: Final result    Ordering provider: Rosa Nguyen NP  08/07/18 2200 Resulting lab: [facility]    Specimen Information    Type Source Collected On   Blood  08/08/18 0526          Components       Value Reference Range Flag Lab   Phosphorus 4.8 2.5 - 4.5 mg/dL H 51            Calcium ionized [[id number]]  Resulted: 08/08/18 0549, Result status: Final result    Ordering provider: Rosa Nguyen NP  08/07/18 2200 Resulting lab: [facility]    Specimen Information    Type Source Collected On   Blood  08/08/18 0526          Components       Value Reference Range Flag Lab   Calcium Ionized 4.7 4.4 - 5.2 mg/dL  51            Lactate Dehydrogenase [[id number]]  Resulted: 08/07/18 1701, Result status: Final result    Ordering provider: Sandie Grace PA-C  08/07/18 1534 Resulting lab: [facility]    Specimen Information    Type Source Collected On   Blood  08/07/18 1625          Components       Value Reference Range Flag Lab   Lactate Dehydrogenase 178 81 - 234 U/L  51            CBC with platelets differential [[id number]] (Abnormal)  Resulted: 08/07/18 1646, Result  status: Final result    Ordering provider: Sandie Grace PA-C  08/07/18 1534 Resulting lab: [facility]    Specimen Information    Type Source Collected On   Blood  08/07/18 1625          Components       Value Reference Range Flag Lab   WBC 12.7 4.0 - 11.0 10e9/L H 51   RBC Count 3.12 3.8 - 5.2 10e12/L L 51   Hemoglobin 9.2 11.7 - 15.7 g/dL L 51   Hematocrit 29.5 35.0 - 47.0 % L 51   MCV 95 78 - 100 fl  51   MCH 29.5 26.5 - 33.0 pg  51   MCHC 31.2 31.5 - 36.5 g/dL L 51   RDW 13.5 10.0 - 15.0 %  51   Platelet Count 250 150 - 450 10e9/L  51   Diff Method Automated Method   51   % Neutrophils 60.1 %  51   % Lymphocytes 24.7 %  51   % Monocytes 7.7 %  51   % Eosinophils 6.7 %  51   % Basophils 0.2 %  51   % Immature Granulocytes 0.6 %  51   Nucleated RBCs 0 0 /100  51   Absolute Neutrophil 7.6 1.6 - 8.3 10e9/L  51   Absolute Lymphocytes 3.1 0.8 - 5.3 10e9/L  51   Absolute Monocytes 1.0 0.0 - 1.3 10e9/L  51   Absolute Eosinophils 0.9 0.0 - 0.7 10e9/L H 51   Absolute Basophils 0.0 0.0 - 0.2 10e9/L  51   Abs Immature Granulocytes 0.1 0 - 0.4 10e9/L  51   Absolute Nucleated RBC 0.0   51            Reticulocyte Count [[id number]]  Resulted: 08/07/18 1646, Result status: Final result    Ordering provider: Sandie Grace PA-C  08/07/18 1534 Resulting lab: [facility]    Specimen Information    Type Source Collected On   Blood  08/07/18 1625          Components       Value Reference Range Flag Lab   % Retic 1.5 0.5 - 2.0 %  51   Absolute Retic 46.2 25 - 95 10e9/L  51            Lactic acid level STAT for sepsis protocol [[id number]]  Resulted: 08/07/18 0923, Result status: Final result    Ordering provider: Felix Barahona MD  08/07/18 0825 Resulting lab: [facility]    Specimen Information    Type Source Collected On   Blood  08/07/18 0843          Components       Value Reference Range Flag Lab   Lactate  for Sepsis Protocol 1.2 0.7 - 2.0 mmol/L  51            Phosphorus [[id number]] (Abnormal)  Resulted: 08/07/18 0552, Result status: Final result    Ordering provider: Rosa Nguyen NP  08/06/18 2200 Resulting lab: [facility]    Specimen Information    Type Source Collected On   Blood  08/07/18 0450          Components       Value Reference Range Flag Lab   Phosphorus 4.7 2.5 - 4.5 mg/dL H 51            Basic metabolic panel [[id number]] (Abnormal)  Resulted: 08/07/18 0552, Result status: Final result    Ordering provider: Sandie Grace PA-C  08/06/18 2200 Resulting lab: [facility]    Specimen Information    Type Source Collected On   Blood  08/07/18 0450          Components       Value Reference Range Flag Lab   Sodium 137 133 - 144 mmol/L  51   Potassium 4.2 3.4 - 5.3 mmol/L  51   Chloride 104 94 - 109 mmol/L  51   Carbon Dioxide 25 20 - 32 mmol/L  51   Anion Gap 8 3 - 14 mmol/L  51   Glucose 155 70 - 99 mg/dL H 51   Urea Nitrogen 39 7 - 30 mg/dL H 51   Creatinine 0.54 0.52 - 1.04 mg/dL  51   GFR Estimate >90 >60 mL/min/1.7m2  51   Comment:  Non  GFR Calc   GFR Estimate If Black >90 >60 mL/min/1.7m2  51   Comment:  African American GFR Calc   Calcium 9.1 8.5 - 10.1 mg/dL  51            CBC with platelets [[id number]] (Abnormal)  Resulted: 08/07/18 0531, Result status: Final result    Ordering provider: Sandie Grace PA-C  08/06/18 2200 Resulting lab: [facility]    Specimen Information    Type Source Collected On   Blood  08/07/18 0450          Components       Value Reference Range Flag Lab   WBC 12.3 4.0 - 11.0 10e9/L H 51   RBC Count 3.22 3.8 - 5.2 10e12/L L 51   Hemoglobin 9.4 11.7 - 15.7 g/dL L 51   Hematocrit 30.3 35.0 - 47.0 % L 51   MCV 94 78 - 100 fl  51   MCH 29.2 26.5 - 33.0 pg  51   MCHC 31.0 31.5 - 36.5 g/dL L 51   RDW 13.5 10.0 - 15.0 %  51   Platelet Count  235 150 - 450 10e9/L  51            Testing Performed By     Lab - Abbreviation Name Director Address Valid Date Range    51 - Unknown [facility] Unknown [address] [facility] [address] 14 1010 - Present    88 - Unknown [facility] Unknown Unknown 10/30/02 0000 - Present               ECG/EMG Results      Echocardiogram Complete [[id number]]  Resulted: 18 1012, Result status: Edited Result - FINAL    Ordering provider: Clau Dempsey PA-C  18 1746 Resulted by: Darcy Jeffery MD    Performed: 18 1043 - 18 1043 Resulting lab: [facility]    Narrative:       [id number]  [id number]  [id number]  [id number]^NYDIA^MANAN^           [facility]  Echocardiography Laboratory  [address] [place] [address]     Name: SHAHRIAR GARCÍA  MRN: [id number]  : 1945  Study Date: 2018 10:12 AM  Age: 72 yrs  Gender: Female  Patient Location: [facility]  Reason For Study: Abn EKG  Ordering Physician: MANAN STAFFORD  Performed By: Khari Friedman     BSA: 1.6 m2  Height: 62 in  Weight: 123 lb  BP: 151/83 mmHg  _____________________________________________________________________________  __        Procedure  Complete Portable Echo Adult. Technically difficult study.  _____________________________________________________________________________  __        Interpretation Summary  Technically difficult study.  Global and regional left ventricular function is probably normal with an EF of  60-65%.  The right ventricle is normal size. Global right ventricular function is  normal.  Unable to assess mean RA pressure given the patient is on a ventilator.  No pericardial effusion is present.  No significnat valve disease.  Previous study not available for comparison.  _____________________________________________________________________________  __        Left Ventricle  Global and regional left ventricular function is normal with  an EF of 60-65%.  Left ventricular size is normal. Mild concentric wall thickening consistent  with left ventricular hypertrophy is present. Diastolic function cannot be  assessed due to sinus tachycardia.     Right Ventricle  The right ventricle is normal size. Global right ventricular function is  normal.     Atria  The atria cannot be assessed.        Mitral Valve  Mild mitral annular calcification is present.     Aortic Valve  Mild aortic valve sclerosis is present. The aortic valve is tricuspid.     Tricuspid Valve  The tricuspid valve is normal. The peak velocity of the tricuspid regurgitant  jet is not obtainable. Pulmonary artery systolic pressure cannot be assessed.     Pulmonic Valve  The pulmonic valve is normal.     Vessels  The aorta root is normal. Unable to assess mean RA pressure given the patient  is on a ventilator. IVC size is 1.37 cm.     Pericardium  No pericardial effusion is present.        Compared to Previous Study  Previous study not available for comparison.  _____________________________________________________________________________  __     MMode/2D Measurements & Calculations  IVSd: 1.1 cm  LVIDd: 4.1 cm  LVIDs: 1.7 cm  LVPWd: 1.2 cm  FS: 59.1 %  LV mass(C)d: 163.2 grams  LV mass(C)dI: 105.0 grams/m2  Ao root diam: 3.3 cm  LA dimension: 3.2 cm  asc Aorta Diam: 3.1 cm  LA/Ao: 0.97  LVOT diam: 2.0 cm  LVOT area: 3.1 cm2  RWT: 0.59        Doppler Measurements & Calculations  PA acc time: 0.10 sec        _____________________________________________________________________________  __        Report approved by: Lashell BONILLA 07/20/2018 01:06 PM       1    Type Source Collected On     07/20/18 1012          View Image (below)        Echocardiogram [[id number]]  Resulted: 07/24/18 1345, Result status: In process    Ordering provider: Rosa Nguyen NP  07/24/18 1037 Performed: 07/24/18 1344 - 07/24/18 1344    Resulting lab: [facility]             ECHO BUBBLE STUDY LIMITED WITH  OPTISON [[id number]]  Resulted: 18 1346, Result status: Edited Result - FINAL    Ordering provider: Keturah Mayes NP  18 1037 Resulted by: Ej Tiwari MD    Performed: 18 1344 - 18 1403 Resulting lab: RADIOLOGY RESULTS    Narrative:       [id number]  [id number]  [id number]  [id number]^SUGEY^KETURAH^INDIA           [facility]  Echocardiography Laboratory  [address] [place] [id number]     Name: SHAHRIAR GARCÍA  MRN: [id number]  : 1945  Study Date: 2018 01:46 PM  Age: 72 yrs  Gender: Female  Patient Location: [facility]  Reason For Study: CVA  Ordering Physician: KETURAH MAYES  Performed By: Ilia Harding RDCS     BSA: 1.6 m2  Height: 62 in  Weight: 131 lb  HR: 92  BP: 157/81 mmHg  _____________________________________________________________________________  __        Procedure  Bubble Limited Echocardiogram with portions of two-dimensional, color and  spectral Doppler performed. Optison (NDC #0348-9278-69) given intravenously.  Patient was given 4 ml mixture of 3 ml Optison and 6 ml saline. 5 ml wasted.  _____________________________________________________________________________  __        Interpretation Summary  Limited study to evaluate for PFO.     Left ventricular function, chamber size, wall motion, and wall thickness are  normal.The EF is 60-65%. No regional wall motion abnormalities are seen.  The atrial septum is intact as assessed by agitated saline bubble study .  Unable to compare to prior images, technically difficult and limited images  obtained today.  _____________________________________________________________________________  __        Left Ventricle  Left ventricular function, chamber size, wall motion, and wall thickness are  normal.The EF is 60-65%. No regional wall motion abnormalities are seen.     Right Ventricle  Right ventricular function cannot be assessed due to poor image quality.     Atria  The atria  cannot be assessed. The atrial septum is intact as assessed by  agitated saline bubble study .     Mitral Valve  The mitral valve cannot be assessed.        Aortic Valve  Mild aortic valve sclerosis is present.     Tricuspid Valve  The tricuspid valve cannot be assessed.     Pulmonic Valve  The pulmonic valve cannot be assessed.     Vessels  The inferior vena cava cannot be assessed.     Pericardium  No pericardial effusion is present. Prominent epicardial fat is noted.     _____________________________________________________________________________  __                       Report approved by: MIKO Cotter 07/24/2018 02:41 PM                 _____________________________________________________________________________  __       1    Type Source Collected On     07/24/18 1346          View Image (below)              Encounter-Level Documents:     There are no encounter-level documents.      Order-Level Documents:     There are no order-level documents.       Secondary Intention Text (Leave Blank If You Do Not Want): The defect will heal with secondary intention.

## 2020-07-09 NOTE — PROGRESS NOTES
Brief Otolaryngology Progress Note    Laryngoscopy was indicated to evaluate etiology of large bright red blood clot that patient coughed up from her mouth at approximately 22:00. After obtaining verbal consent, the laryngoscope was passed through the right naris. Examination revealed pooling of bloody > salivary secretions within the larynx above the tracheotomy tube and the hypopharynx. No active bleeding was visualized. This examination is similar to that performed at approximately 18:00 today, though the secretions were mildly more bloody.    Tracheoscopy was performed through the tracheotomy tube. Very minimal bright red blood was seen coming from above. No active bleeding was visualized.    Assessment & Plan:  - STAT Hgb dropped by 1 g/dL; no evidence of active bleeding on examination  - Will hold Plavix for the time being and follow-up AM Hgb      Colt Lyons MD  Otolaryngology- Head and Neck Surgery, PGY-2  Pager: [phone]  Please contact ENT with questions by dialing [phone] and entering job code [phone] when prompted.   ambulatory

## 2020-07-10 NOTE — PATIENT INSTRUCTIONS
1. You are scheduled for surgery on Tuesday 7/17. You need to arrive at the hospital at 11:50am.  You are scheduled for a Pre-op appointment on Monday 7/16 at 5:30pm. This can be canceled if you are able to see your primary care doctor sooner.   2. Please call to let Shelby know when we can arrange a patient learning center appointment for trach teaching.   3. Please call the ENT clinic with any questions,concerns, new or worsening symptoms.    -Clinic number is [phone]   - Shelby's direct line (Dr. Walden's nurse) [phone]    
48

## 2022-02-11 NOTE — TELEPHONE ENCOUNTER
Mailed reminder letter and referral to patient.     [facility] denied because they did not receive any information from the MD in regards to covering this drug for patient.  They contacted the office with no response.      Does patient have a history of insomnia?

## 2024-05-17 NOTE — ANESTHESIA PROCEDURE NOTES
Arterial Line Procedure Note  Staff:     Anesthesiologist:  MAIRA SIMONS  Location: In OR After Induction  Procedure Start/Stop Times:     patient identified, IV checked, risks and benefits discussed, informed consent, monitors and equipment checked, pre-op evaluation and at physician/surgeon's request      Correct Patient: Yes      Correct Position: Yes      Correct Site: Yes      Correct Procedure: Yes      Correct Laterality:  N/A    Site Marked:  N/A  Line Placement:     Procedure:  Arterial Line    Insertion Site:  Radial    Insertion laterality:  Right    Skin Prep: Chloraprep      Patient Prep: patient draped, mask, hat and hand hygiene      Ultrasound Guided?: Yes      Artery evaluated via ultrasound confirming patency.   Using realtime imaging, the artery was punctured and the needle was observed entering the artery.      A permanent image is entered into patient's chart.      Catheter size:  20 gauge, Quick cath    Cath secured with: other (comment)      Cath secured with comment:  Benzoin    Dressing:  Tegaderm    Complications:  None obvious    Arterial waveform: Yes      IBP within 10% of NIBP: Yes          
locker 8/Other belongings

## 2025-05-31 NOTE — PROVIDER NOTIFICATION
Notified ENT about increase in bloody secretions. Gus red blood noted, pt coughed out multiple clots. Dressing changed and cannula changed but continues to become bloody. Pt still remains on FiO2 @21%, denies SOB and any trouble bleeding. Pt is vitally stable. ENT came to assess pt at bedside, per MD, team is aware of bloody secretion. No new orders for RN as of right now.    Call bell/Bedside visitors/Position of comfort/Side rails

## (undated) DEVICE — SU SILK 3-0 TIE 12X30" A304H

## (undated) DEVICE — NDL 30GA 0.5" 305106

## (undated) DEVICE — ESU PENCIL SMOKE EVAC W/ROCKER SWITCH 0703-047-000

## (undated) DEVICE — Device

## (undated) DEVICE — LINEN TOWEL PACK X5 5464

## (undated) DEVICE — LINEN TOWEL PACK X6 WHITE 5487

## (undated) DEVICE — TUBING SUCTION 10'X3/16" N510

## (undated) DEVICE — SYR 10ML FINGER CONTROL W/O NDL 309695

## (undated) DEVICE — SOL NACL 0.9% IRRIG 1000ML BOTTLE 2F7124

## (undated) DEVICE — ESU GROUND PAD ADULT W/CORD E7507

## (undated) DEVICE — SUCTION MANIFOLD DORNOCH ULTRA CART UL-CL500

## (undated) DEVICE — SOL WATER IRRIG 1000ML BOTTLE 2F7114

## (undated) DEVICE — DECANTER VIAL 2006S

## (undated) DEVICE — LUBRICANT INST KIT ENDO-LUBE 220-90

## (undated) DEVICE — TUBE NASOGASTRIC ENTRIFLEX 12FR 43"

## (undated) DEVICE — SU SILK 0 TIE 6X18" A186H

## (undated) DEVICE — COVER CAMERA IN-LIGHT DISP LT-C02

## (undated) DEVICE — SPONGE KITTNER 30-101

## (undated) DEVICE — CATH TRAY FOLEY SURESTEP 16FR W/URNE MTR STLK LATEX A303316A

## (undated) DEVICE — TUBE GASTROSTOMY PONSKY PULL DELUXE 20FR 000792

## (undated) DEVICE — BLADE KNIFE SURG 15 371115

## (undated) DEVICE — SU VICRYL 0 TIE 54" J608H

## (undated) DEVICE — ENDO VALVE SUCTION BRONCH EVIS MAJ-209

## (undated) DEVICE — SYR 10ML SLIP TIP W/O NDL 303134

## (undated) DEVICE — KIT ENDO FIRST STEP DISINFECTANT 200ML W/POUCH EP-4

## (undated) DEVICE — LINEN GOWN XLG 5407

## (undated) DEVICE — SPONGE COTTONOID 1/2X1 1/2" 1-STRING 80-1404

## (undated) DEVICE — DRSG STERI STRIP 1/2X4" R1547

## (undated) DEVICE — SYR 10ML LL W/O NDL 302995

## (undated) DEVICE — PACK ENT ENDOSCOPY UMMC

## (undated) DEVICE — PREP SKIN SCRUB TRAY 4461A

## (undated) DEVICE — ESU CORD BIPOLAR GREEN 10-4000

## (undated) DEVICE — BAG URINARY DRAIN LUBRISIL IC 4000ML LF 253509A

## (undated) DEVICE — SU SILK 2-0 SH CR 5X18" C0125

## (undated) DEVICE — GOWN IMPERVIOUS BREATHABLE SMART XLG 89045

## (undated) DEVICE — SPONGE COTTONOID 1/2X1/2" 20-04S

## (undated) DEVICE — SPONGE COTTONOID 1/2X1" 80-1402

## (undated) DEVICE — SU SILK 0 TIE 18" SA66H

## (undated) DEVICE — RAD INTR PEELAWAY 16FRX30CM

## (undated) DEVICE — ESU PENCIL W/COATED BLADE E2450H

## (undated) DEVICE — SOL WATER IRRIG 1000ML BOTTLE 07139-09

## (undated) DEVICE — PREP POVIDONE IODINE SOLUTION 10% 4OZ

## (undated) DEVICE — GLOVE PROTEXIS MICRO 6.0  2D73PM60

## (undated) DEVICE — DRSG TELFA 3X8" 1238

## (undated) DEVICE — ESU ELEC BLADE 2.75" COATED/INSULATED E1455

## (undated) DEVICE — SU SILK 0 SH 30" K834H

## (undated) DEVICE — NDL 25GA 2"  8881200441

## (undated) DEVICE — SU SILK 4-0 TIE 12X30" A303H

## (undated) DEVICE — ENDO VALVE BX EVIS MAJ-210

## (undated) DEVICE — ANTIFOG SOLUTION W/FOAM PAD CF-1001

## (undated) DEVICE — SOL NACL 0.9% INJ 1000ML BAG 07983-09

## (undated) DEVICE — SU SILK 2-0 TIE 12X30" A305H

## (undated) DEVICE — SUCTION SLEEVE NEPTUNE 2 125MM 0703-005-125

## (undated) DEVICE — ESU ELEC BLADE 6" COATED/INSULATED E1455-6

## (undated) DEVICE — SPONGE COTTONOID 1/2X1 1/2" 20-06S

## (undated) DEVICE — DRAPE SHEET MED 44X70" 9355

## (undated) DEVICE — DEVICE SUTURE GRASPER TROCAR CLOSURE 14GA PMITCSG

## (undated) DEVICE — JELLY LUBRICATING SURGILUBE 2OZ TUBE

## (undated) DEVICE — SU VICRYL 3-0 SH 27" J316H

## (undated) DEVICE — GLOVE PROTEXIS BLUE W/NEU-THERA 6.5  2D73EB65

## (undated) RX ORDER — FENTANYL CITRATE 50 UG/ML
INJECTION, SOLUTION INTRAMUSCULAR; INTRAVENOUS
Status: DISPENSED
Start: 2018-01-01

## (undated) RX ORDER — PROPOFOL 10 MG/ML
INJECTION, EMULSION INTRAVENOUS
Status: DISPENSED
Start: 2018-01-01

## (undated) RX ORDER — DEXTROSE MONOHYDRATE, SODIUM CHLORIDE, AND POTASSIUM CHLORIDE 50; 1.49; 4.5 G/1000ML; G/1000ML; G/1000ML
INJECTION, SOLUTION INTRAVENOUS
Status: DISPENSED
Start: 2018-01-01

## (undated) RX ORDER — LABETALOL HYDROCHLORIDE 5 MG/ML
INJECTION, SOLUTION INTRAVENOUS
Status: DISPENSED
Start: 2018-01-01

## (undated) RX ORDER — HYDROMORPHONE HCL/0.9% NACL/PF 0.2MG/0.2
SYRINGE (ML) INTRAVENOUS
Status: DISPENSED
Start: 2018-01-01

## (undated) RX ORDER — ALBUMIN, HUMAN INJ 5% 5 %
SOLUTION INTRAVENOUS
Status: DISPENSED
Start: 2018-01-01

## (undated) RX ORDER — PHENYLEPHRINE HCL IN 0.9% NACL 1 MG/10 ML
SYRINGE (ML) INTRAVENOUS
Status: DISPENSED
Start: 2018-01-01

## (undated) RX ORDER — LIDOCAINE HYDROCHLORIDE 10 MG/ML
INJECTION, SOLUTION EPIDURAL; INFILTRATION; INTRACAUDAL; PERINEURAL
Status: DISPENSED
Start: 2018-01-01

## (undated) RX ORDER — EPHEDRINE SULFATE 50 MG/ML
INJECTION, SOLUTION INTRAMUSCULAR; INTRAVENOUS; SUBCUTANEOUS
Status: DISPENSED
Start: 2018-01-01

## (undated) RX ORDER — HYDRALAZINE HYDROCHLORIDE 20 MG/ML
INJECTION INTRAMUSCULAR; INTRAVENOUS
Status: DISPENSED
Start: 2018-01-01

## (undated) RX ORDER — CLINDAMYCIN PHOSPHATE 600 MG/50ML
INJECTION, SOLUTION INTRAVENOUS
Status: DISPENSED
Start: 2018-01-01